# Patient Record
Sex: FEMALE | Race: WHITE | NOT HISPANIC OR LATINO | Employment: OTHER | URBAN - METROPOLITAN AREA
[De-identification: names, ages, dates, MRNs, and addresses within clinical notes are randomized per-mention and may not be internally consistent; named-entity substitution may affect disease eponyms.]

---

## 2017-02-17 ENCOUNTER — HISTORICAL (OUTPATIENT)
Dept: ADMINISTRATIVE | Facility: HOSPITAL | Age: 77
End: 2017-02-17

## 2017-02-17 LAB
ALT (SGPT): 19 IU/L (ref 3–33)
AST SERPL-CCNC: 21 IU/L (ref 10–40)
BUN SERPL-MCNC: 13 MG/DL (ref 8–20)
CALCIUM SERPL-MCNC: 8.7 MG/DL (ref 7.7–10.4)
CHLORIDE: 99 MMOL/L (ref 98–110)
CO2 SERPL-SCNC: 29.4 MMOL/L (ref 22.8–31.6)
CREATININE: 0.55 MG/DL (ref 0.6–1.4)
GLUCOSE: 103 MG/DL (ref 70–99)
POTASSIUM SERPL-SCNC: 4 MMOL/L (ref 3.5–5)
SODIUM: 133 MMOL/L (ref 134–144)

## 2017-06-15 ENCOUNTER — TELEPHONE (OUTPATIENT)
Dept: FAMILY MEDICINE | Facility: CLINIC | Age: 77
End: 2017-06-15

## 2017-06-15 RX ORDER — NITROGLYCERIN 0.4 MG/1
1 TABLET SUBLINGUAL DAILY PRN
COMMUNITY
End: 2018-03-20

## 2017-06-15 RX ORDER — ELECTROLYTES/DEXTROSE
1 SOLUTION, ORAL ORAL DAILY
COMMUNITY
End: 2018-11-15

## 2017-06-15 RX ORDER — DIPHENHYDRAMINE HCL 25 MG
1 CAPSULE ORAL DAILY
COMMUNITY
End: 2018-11-15

## 2017-06-15 RX ORDER — PANTOPRAZOLE SODIUM 40 MG/1
1 TABLET, DELAYED RELEASE ORAL DAILY
COMMUNITY
Start: 2017-02-09 | End: 2018-11-15 | Stop reason: SDUPTHER

## 2017-06-15 RX ORDER — MULTIVITAMIN WITH IRON
1 TABLET ORAL DAILY
COMMUNITY
End: 2021-11-10

## 2017-06-21 ENCOUNTER — OFFICE VISIT (OUTPATIENT)
Dept: FAMILY MEDICINE | Facility: CLINIC | Age: 77
End: 2017-06-21
Payer: MEDICARE

## 2017-06-21 VITALS
BODY MASS INDEX: 39.7 KG/M2 | HEART RATE: 67 BPM | HEIGHT: 66 IN | DIASTOLIC BLOOD PRESSURE: 79 MMHG | SYSTOLIC BLOOD PRESSURE: 133 MMHG | WEIGHT: 247 LBS

## 2017-06-21 DIAGNOSIS — E78.2 MULTIPLE-TYPE HYPERLIPIDEMIA: ICD-10-CM

## 2017-06-21 DIAGNOSIS — I10 BENIGN ESSENTIAL HYPERTENSION: Primary | ICD-10-CM

## 2017-06-21 DIAGNOSIS — K21.9 GASTROESOPHAGEAL REFLUX DISEASE WITHOUT ESOPHAGITIS: ICD-10-CM

## 2017-06-21 PROBLEM — G43.009 MIGRAINE WITHOUT AURA AND RESPONSIVE TO TREATMENT: Status: ACTIVE | Noted: 2017-06-21

## 2017-06-21 PROBLEM — E66.9 ADIPOSITY: Status: ACTIVE | Noted: 2017-06-21

## 2017-06-21 PROBLEM — G47.9 SLEEP DISORDER: Status: ACTIVE | Noted: 2017-06-21

## 2017-06-21 PROBLEM — R70.0 ELEVATED ERYTHROCYTE SEDIMENTATION RATE: Status: ACTIVE | Noted: 2017-06-21

## 2017-06-21 PROBLEM — F34.1 DYSTHYMIA: Status: ACTIVE | Noted: 2017-06-21

## 2017-06-21 PROBLEM — J30.1 HAY FEVER: Status: ACTIVE | Noted: 2017-06-21

## 2017-06-21 PROBLEM — G44.209 HEADACHE, TENSION-TYPE: Status: ACTIVE | Noted: 2017-06-21

## 2017-06-21 PROBLEM — N39.46 MIXED STRESS AND URGE URINARY INCONTINENCE: Status: ACTIVE | Noted: 2017-06-21

## 2017-06-21 PROBLEM — E66.9 OBESITY WITH BODY MASS INDEX 30 OR GREATER: Status: ACTIVE | Noted: 2017-06-21

## 2017-06-21 PROBLEM — E55.9 VITAMIN D DEFICIENCY: Status: ACTIVE | Noted: 2017-06-21

## 2017-06-21 PROBLEM — Z78.9 NON-SMOKER: Status: ACTIVE | Noted: 2017-06-21

## 2017-06-21 PROCEDURE — 99214 OFFICE O/P EST MOD 30 MIN: CPT | Mod: ,,, | Performed by: INTERNAL MEDICINE

## 2017-06-21 PROCEDURE — 1125F AMNT PAIN NOTED PAIN PRSNT: CPT | Mod: ,,, | Performed by: INTERNAL MEDICINE

## 2017-06-21 PROCEDURE — 1159F MED LIST DOCD IN RCRD: CPT | Mod: ,,, | Performed by: INTERNAL MEDICINE

## 2017-06-21 NOTE — PROGRESS NOTES
Subjective:       Patient ID: Lety Herbert is a 76 y.o. female.    Chief Complaint: Hypertension and Hyperlipidemia    HPI    Past Medical History:   Diagnosis Date    Anemia     Cancer     breast    Depression     DVT (deep venous thrombosis)     Gastric ulceration     GERD (gastroesophageal reflux disease)     History of frequent urinary tract infections     Hyperlipidemia     Hypertension     MRSA (methicillin resistant staph aureus) culture positive     Neuropathy     PE (pulmonary embolism)     Reflux      Social History     Social History    Marital status:      Spouse name: N/A    Number of children: N/A    Years of education: N/A     Occupational History    Not on file.     Social History Main Topics    Smoking status: Former Smoker    Smokeless tobacco: Not on file    Alcohol use No    Drug use: No    Sexual activity: No     Other Topics Concern    Not on file     Social History Narrative    No narrative on file     Past Surgical History:   Procedure Laterality Date    HYSTERECTOMY      MASTECTOMY, RADICAL      bilateral     Family History   Problem Relation Age of Onset    Cancer Mother     Cancer Father     Heart disease Father        Review of Systems   Constitutional: Positive for fatigue. Negative for activity change, chills, fever and unexpected weight change.   HENT: Negative for congestion, postnasal drip and sinus pressure.    Eyes: Negative for pain, discharge and visual disturbance.   Respiratory: Negative for cough, chest tightness and shortness of breath.    Cardiovascular: Negative for chest pain, palpitations and leg swelling.   Gastrointestinal: Negative for abdominal distention, anal bleeding, constipation and diarrhea.   Genitourinary: Negative for difficulty urinating, dysuria, flank pain, frequency, menstrual problem, pelvic pain, vaginal bleeding and vaginal pain.   Musculoskeletal: Negative for arthralgias and joint swelling.   Skin: Negative  "for color change, pallor and rash.   Allergic/Immunologic: Negative for environmental allergies, food allergies and immunocompromised state.   Neurological: Negative for dizziness, tremors, seizures, syncope, light-headedness and headaches.   Hematological: Negative for adenopathy. Does not bruise/bleed easily.   Psychiatric/Behavioral: Positive for sleep disturbance. Negative for agitation, confusion and dysphoric mood. The patient is not nervous/anxious.         Patient has a history of depression.       Objective:       Vitals:    06/21/17 0859   BP: 133/79   Pulse: 67   Weight: 112 kg (247 lb)   Height: 5' 6" (1.676 m)     Physical Exam   Constitutional: She is oriented to person, place, and time. Vital signs are normal. She appears well-developed. She is cooperative. No distress.   HENT:   Head: Normocephalic and atraumatic.   Right Ear: Tympanic membrane normal.   Left Ear: Tympanic membrane normal.   Eyes: Conjunctivae, EOM and lids are normal. Pupils are equal, round, and reactive to light. Lids are everted and swept, no foreign bodies found. Right pupil is round and reactive. Left pupil is round and reactive.   Neck: Trachea normal and normal range of motion. Neck supple.   Cardiovascular: Normal rate, regular rhythm, S1 normal, S2 normal, normal heart sounds and intact distal pulses.    Pulmonary/Chest: Breath sounds normal.   Abdominal: Soft. Bowel sounds are normal. There is no rigidity and no guarding.   Patient is obese.   Musculoskeletal: Normal range of motion.   Lymphadenopathy:     She has no cervical adenopathy.     She has no axillary adenopathy.   Neurological: She is alert and oriented to person, place, and time.   Skin: Skin is warm and dry. Capillary refill takes less than 2 seconds.        Varicose veins are noted in the inferior extremities.   Psychiatric: Her behavior is normal. Her affect is not inappropriate. She exhibits a depressed mood.   Nursing note and vitals reviewed.    "   Assessment:       1. Benign essential hypertension    2. Gastroesophageal reflux disease without esophagitis    3. Multiple-type hyperlipidemia         Plan:           Benign essential hypertension  -     Comprehensive metabolic panel; Future; Expected date: 06/21/2017  -     Microalbumin/creatinine urine ratio; Future; Expected date: 06/21/2017    Gastroesophageal reflux disease without esophagitis    Multiple-type hyperlipidemia  -     Lipid panel; Future; Expected date: 06/21/2017    Medications have been reviewed. I will encouraged patient exercise and watching her diet. She is interested in joining [employer] program or [employer] program. She'll keep her regular follow-up with Dr. Sargent. She will be due for immunizations with pneumonia vaccine next visit.

## 2017-06-21 NOTE — PATIENT INSTRUCTIONS
Medicines for Acid Reflux  Your healthcare provider has told you that you have acid reflux. This condition causes stomach acid to wash up into your throat. For most people, acid reflux is troubling but not dangerous. But left untreated, acid reflux sometimes damages the esophagus. Medicines can help control acid reflux and limit your risk of future problems.  Medicines for acid reflux  Your healthcare provider may prescribe medicine to help treat your acid reflux. Medicine will be based on your symptoms and any test results. Your provider will explain how to take your medicine. You will also be told about possible side effects.  Reducing stomach acid  Your provider may suggest antacids that you can buy over the counter. Antacids can give fast relief. Or you may be told to take a type of medicine called H2 blockers. These are available over the counter and by prescription (for higher doses).  Blocking stomach acid  In more severe cases, your healthcare provider may suggest stronger medicines such as proton pump inhibitors (PPIs). These keep the stomach from making acid. They are often prescribed for long-term use.  Other medicines  In some cases medicines to reduce or block stomach acid may not work. Then you may be switched to another type of medicine that helps your stomach empty better.     Date Last Reviewed: 10/1/2016  © 5736-3198 [employer] [address] [place] [address], [place] [address]. All rights reserved. This information is not intended as a substitute for professional medical care. Always follow your healthcare professional's instructions.

## 2017-07-11 RX ORDER — POLYETHYLENE GLYCOL 3350 17 G/17G
17 POWDER, FOR SOLUTION ORAL DAILY
Qty: 90 PACKET | Refills: 3 | Status: SHIPPED | OUTPATIENT
Start: 2017-07-11 | End: 2022-12-01

## 2017-11-13 ENCOUNTER — HISTORICAL (OUTPATIENT)
Dept: ADMINISTRATIVE | Facility: HOSPITAL | Age: 77
End: 2017-11-13

## 2017-11-13 LAB
HCT VFR BLD AUTO: 37.6 % (ref 36–48)
HGB BLD-MCNC: 12.4 G/DL (ref 12–15)

## 2017-11-14 LAB
ALBUMIN SERPL-MCNC: 3 G/DL (ref 3.1–4.7)
ALP SERPL-CCNC: 41 IU/L (ref 40–104)
ALT (SGPT): 19 IU/L (ref 3–33)
AST SERPL-CCNC: 45 IU/L (ref 10–40)
BASOPHILS NFR BLD: 0.1 K/UL (ref 0–0.2)
BASOPHILS NFR BLD: 0.2 %
BILIRUB SERPL-MCNC: 0.8 MG/DL (ref 0.3–1)
BUN SERPL-MCNC: 8 MG/DL (ref 8–20)
CALCIUM SERPL-MCNC: 8.1 MG/DL (ref 7.7–10.4)
CHLORIDE: 98 MMOL/L (ref 98–110)
CO2 SERPL-SCNC: 27.5 MMOL/L (ref 22.8–31.6)
CREATININE: 0.5 MG/DL (ref 0.6–1.4)
EOSINOPHIL NFR BLD: 0 K/UL (ref 0–0.7)
EOSINOPHIL NFR BLD: 0.1 %
ERYTHROCYTE [DISTWIDTH] IN BLOOD BY AUTOMATED COUNT: 13.2 % (ref 11.7–14.9)
GLUCOSE: 145 MG/DL (ref 70–99)
GRAN #: 17.2 K/UL (ref 1.4–6.5)
GRAN%: 83.8 %
HCT VFR BLD AUTO: 32.4 % (ref 36–48)
HGB BLD-MCNC: 11 G/DL (ref 12–15)
IMMATURE GRANS (ABS): 0.2 K/UL (ref 0–1)
IMMATURE GRANULOCYTES: 0.8 %
INR PPP: 1.2
LYMPH #: 1.9 K/UL (ref 1.2–3.4)
LYMPH%: 9.4 %
MAGNESIUM SERPL-MCNC: 1.7 MG/DL (ref 1.5–2.6)
MCH RBC QN AUTO: 29.8 PG (ref 25–35)
MCHC RBC AUTO-ENTMCNC: 34 G/DL (ref 31–36)
MCV RBC AUTO: 87.8 FL (ref 79–98)
MONO #: 1.2 K/UL (ref 0.1–0.6)
MONO%: 5.7 %
NUCLEATED RBCS: 0 %
PHOSPHATE FLD-MCNC: 3.2 MG/DL (ref 2.5–4.9)
PLATELET # BLD AUTO: 181 K/UL (ref 140–440)
PMV BLD AUTO: 10.6 FL (ref 8.8–12.7)
POTASSIUM SERPL-SCNC: 3.9 MMOL/L (ref 3.5–5)
PROT SERPL-MCNC: 5.6 G/DL (ref 6–8.2)
PROTHROMBIN TIME: 14.7 SEC (ref 11.3–15.2)
RBC # BLD AUTO: 3.69 M/UL (ref 3.5–5.5)
SODIUM: 132 MMOL/L (ref 134–144)
WBC # BLD AUTO: 20.5 K/UL (ref 5–10)

## 2017-11-15 LAB
ALBUMIN SERPL-MCNC: 2.9 G/DL (ref 3.1–4.7)
ALP SERPL-CCNC: 40 IU/L (ref 40–104)
ALT (SGPT): 18 IU/L (ref 3–33)
AST SERPL-CCNC: 45 IU/L (ref 10–40)
BASOPHILS NFR BLD: 0.1 K/UL (ref 0–0.2)
BASOPHILS NFR BLD: 0.3 %
BILIRUB SERPL-MCNC: 0.6 MG/DL (ref 0.3–1)
BUN SERPL-MCNC: 10 MG/DL (ref 8–20)
CALCIUM SERPL-MCNC: 8 MG/DL (ref 7.7–10.4)
CHLORIDE: 102 MMOL/L (ref 98–110)
CO2 SERPL-SCNC: 28.8 MMOL/L (ref 22.8–31.6)
CREATININE: 0.43 MG/DL (ref 0.6–1.4)
EOSINOPHIL NFR BLD: 0.3 K/UL (ref 0–0.7)
EOSINOPHIL NFR BLD: 1.5 %
ERYTHROCYTE [DISTWIDTH] IN BLOOD BY AUTOMATED COUNT: 13.2 % (ref 11.7–14.9)
GLUCOSE: 117 MG/DL (ref 70–99)
GRAN #: 13.3 K/UL (ref 1.4–6.5)
GRAN%: 78.2 %
HCT VFR BLD AUTO: 31.9 % (ref 36–48)
HGB BLD-MCNC: 10.7 G/DL (ref 12–15)
IMMATURE GRANS (ABS): 0.1 K/UL (ref 0–1)
IMMATURE GRANULOCYTES: 0.5 %
INR PPP: 1.1
LYMPH #: 2.1 K/UL (ref 1.2–3.4)
LYMPH%: 12.3 %
MAGNESIUM SERPL-MCNC: 1.9 MG/DL (ref 1.5–2.6)
MCH RBC QN AUTO: 29.9 PG (ref 25–35)
MCHC RBC AUTO-ENTMCNC: 33.5 G/DL (ref 31–36)
MCV RBC AUTO: 89.1 FL (ref 79–98)
MONO #: 1.2 K/UL (ref 0.1–0.6)
MONO%: 7.2 %
NUCLEATED RBCS: 0 %
PHOSPHATE FLD-MCNC: 2.1 MG/DL (ref 2.5–4.9)
PLATELET # BLD AUTO: 166 K/UL (ref 140–440)
PMV BLD AUTO: 10.5 FL (ref 8.8–12.7)
POTASSIUM SERPL-SCNC: 3.7 MMOL/L (ref 3.5–5)
PROT SERPL-MCNC: 5.6 G/DL (ref 6–8.2)
PROTHROMBIN TIME: 14.3 SEC (ref 11.3–15.2)
RBC # BLD AUTO: 3.58 M/UL (ref 3.5–5.5)
SODIUM: 135 MMOL/L (ref 134–144)
WBC # BLD AUTO: 17 K/UL (ref 5–10)

## 2017-11-18 PROBLEM — S72.001D CLOSED FRACTURE OF NECK OF RIGHT FEMUR WITH ROUTINE HEALING: Status: ACTIVE | Noted: 2017-11-14

## 2017-12-15 ENCOUNTER — OFFICE VISIT (OUTPATIENT)
Dept: ORTHOPEDICS | Facility: CLINIC | Age: 77
End: 2017-12-15
Payer: MEDICARE

## 2017-12-15 VITALS
DIASTOLIC BLOOD PRESSURE: 72 MMHG | SYSTOLIC BLOOD PRESSURE: 138 MMHG | HEIGHT: 66 IN | HEART RATE: 79 BPM | BODY MASS INDEX: 39.7 KG/M2 | WEIGHT: 247 LBS

## 2017-12-15 DIAGNOSIS — S72.002A LEFT DISPLACED FEMORAL NECK FRACTURE: Primary | ICD-10-CM

## 2017-12-15 DIAGNOSIS — Z96.642 STATUS POST TOTAL REPLACEMENT OF LEFT HIP: ICD-10-CM

## 2017-12-15 PROCEDURE — 99024 POSTOP FOLLOW-UP VISIT: CPT | Mod: ,,, | Performed by: ORTHOPAEDIC SURGERY

## 2017-12-15 RX ORDER — HYDROCODONE BITARTRATE AND ACETAMINOPHEN 10; 325 MG/1; MG/1
1 TABLET ORAL 2 TIMES DAILY PRN
COMMUNITY
End: 2018-01-05

## 2017-12-15 NOTE — PROGRESS NOTES
Subjective:       Chief Complaint    Chief Complaint   Patient presents with    Post-op Evaluation     NP p/o 4 weeks & 4 days, left hip.  DOS: 11/13/17, left total hip arthroplasty.  Patient reports she still has some pain but is improving.  Completed home P.T.  Would like an order to attend outpatient P.T. @ [facility] on .       [facility]  Lety Herbert is a 77 y.o.  female who presents Follow-up total hip arthroplasty on the left. A level varies from 4-64/10. He has very bad knees as well. This is slowly her down. But she wants to go to a wellness works on  [address] [facility]      Past History  Past Medical History:   Diagnosis Date    Anemia     Cancer     breast    Depression     DVT (deep venous thrombosis)     Gastric ulceration     GERD (gastroesophageal reflux disease)     History of frequent urinary tract infections     Hyperlipidemia     Hypertension     MRSA (methicillin resistant staph aureus) culture positive     Neuropathy     PE (pulmonary embolism)     Reflux      Past Surgical History:   Procedure Laterality Date    HYSTERECTOMY      MASTECTOMY, RADICAL Bilateral     TOTAL HIP ARTHROPLASTY Left 11/13/2017     Social History     Social History    Marital status:      Spouse name: N/A    Number of children: N/A    Years of education: N/A     Occupational History    Not on file.     Social History Main Topics    Smoking status: Former Smoker    Smokeless tobacco: Never Used    Alcohol use No    Drug use: No    Sexual activity: No     Other Topics Concern    Not on file     Social History Narrative    No narrative on file         Medications  Current Outpatient Prescriptions   Medication Sig    biotin 5 mg Cap Take 1 tablet by mouth once daily at 6am.    diphenhydrAMINE (ZZZQUIL) 25 mg capsule Take 1 tablet by mouth once daily at 6am.    hydrocodone-acetaminophen 10-325mg (NORCO)  mg Tab Take 1 tablet by mouth 2 (two) times daily as needed for  Pain.    metoprolol succinate (TOPROL-XL) 50 MG 24 hr tablet Take 50 mg by mouth once daily.    multivitamin with iron Tab Take 1 tablet by mouth once daily at 6am.    nitroGLYCERIN (NITROSTAT) 0.4 MG SL tablet Place 1 tablet under the tongue daily as needed.    pantoprazole (PROTONIX) 40 MG tablet Take 1 tablet by mouth once daily at 6am.    polyethylene glycol (GLYCOLAX) 17 gram PwPk Take 17 g by mouth once daily.    pravastatin (PRAVACHOL) 20 MG tablet Take 20 mg by mouth once daily.    sertraline (ZOLOFT) 100 MG tablet Take 100 mg by mouth once daily.    valsartan-hydrochlorothiazide (DIOVAN-HCT) 320-25 mg per tablet Take 1 tablet by mouth once daily.     No current facility-administered medications for this visit.        Allergies  Review of patient's allergies indicates:   Allergen Reactions    Meperidine     Promethazine     Bactrim [sulfamethoxazole-trimethoprim] Rash         Review of Systems     Constitutional: Negative    HENT: Negative  Eyes: Negative  Respiratory: Negative  Cardiovascular: Negative  Musculoskeletal: HPI  Skin: Negative  Neurological: Negative  Hematological: Negative  Endocrine: Negative      Physical Exam    Vitals:    12/15/17 1440   BP: 138/72   Pulse: 79     Physical Examination:Incision is well-healed. External rotation 25, internal rotation 30. Abduction, 30°. Flexion 100°.Ambulating with a walker.     Skin-clear  General appearance -  well appearing, and in no distress  Mental status - awake  Neck - supple  Chest -  symmetric air entry  Heart - normal rate   Abdomen - soft      Assessment/Plan   Left displaced femoral neck fracture  -     Ambulatory Referral to Physical/Occupational Therapy    Status post total replacement of left hip  -     Ambulatory Referral to Physical/Occupational Therapy      Will be going to wellness works on  [address] [facility]. Weight loss is critical.      This note was dictated using voice recognition software and may  contain grammatical errors.

## 2018-01-05 ENCOUNTER — OFFICE VISIT (OUTPATIENT)
Dept: ORTHOPEDICS | Facility: CLINIC | Age: 78
End: 2018-01-05
Payer: MEDICARE

## 2018-01-05 VITALS
SYSTOLIC BLOOD PRESSURE: 118 MMHG | WEIGHT: 227 LBS | DIASTOLIC BLOOD PRESSURE: 68 MMHG | BODY MASS INDEX: 36.48 KG/M2 | HEART RATE: 75 BPM | HEIGHT: 66 IN

## 2018-01-05 DIAGNOSIS — Z96.642 STATUS POST TOTAL REPLACEMENT OF LEFT HIP: Primary | ICD-10-CM

## 2018-01-05 PROCEDURE — 99024 POSTOP FOLLOW-UP VISIT: CPT | Mod: ,,, | Performed by: ORTHOPAEDIC SURGERY

## 2018-01-05 NOTE — PROGRESS NOTES
Subjective:       Chief Complaint    Chief Complaint   Patient presents with    Post-op Evaluation     7 weeks & 4 days, left hip.  DOS: 11/13/17, left total hip arthroplasty.  Patient reports sheis doing well.  Only time she really has pain is getting in and out the car.  P.T. 2 times a week @ [place].       [facility]  Lety Herbert is a 77 y.o.  female who presents Postop total hip arthroplasty on the left. 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 appears to be doing very well.  Past History  Past Medical History:   Diagnosis Date    Anemia     Cancer     breast    Depression     DVT (deep venous thrombosis)     Gastric ulceration     GERD (gastroesophageal reflux disease)     History of frequent urinary tract infections     Hyperlipidemia     Hypertension     MRSA (methicillin resistant staph aureus) culture positive     Neuropathy     PE (pulmonary embolism)     Reflux      Past Surgical History:   Procedure Laterality Date    HYSTERECTOMY      MASTECTOMY, RADICAL Bilateral     TOTAL HIP ARTHROPLASTY Left 11/13/2017     Social History     Social History    Marital status:      Spouse name: N/A    Number of children: N/A    Years of education: N/A     Occupational History    Not on file.     Social History Main Topics    Smoking status: Former Smoker    Smokeless tobacco: Never Used    Alcohol use No    Drug use: No    Sexual activity: No     Other Topics Concern    Not on file     Social History Narrative    No narrative on file         Medications  Current Outpatient Prescriptions   Medication Sig    biotin 5 mg Cap Take 1 tablet by mouth once daily at 6am.    diphenhydrAMINE (ZZZQUIL) 25 mg capsule Take 1 tablet by mouth once daily at 6am.    metoprolol succinate (TOPROL-XL) 50 MG 24 hr tablet Take 50 mg by mouth once daily.    multivitamin with iron Tab Take 1 tablet by mouth once daily at 6am.    nitroGLYCERIN (NITROSTAT)  0.4 MG SL tablet Place 1 tablet under the tongue daily as needed.    pantoprazole (PROTONIX) 40 MG tablet Take 1 tablet by mouth once daily at 6am.    polyethylene glycol (GLYCOLAX) 17 gram PwPk Take 17 g by mouth once daily.    pravastatin (PRAVACHOL) 20 MG tablet Take 20 mg by mouth once daily.    sertraline (ZOLOFT) 100 MG tablet Take 100 mg by mouth once daily.    valsartan-hydrochlorothiazide (DIOVAN-HCT) 320-25 mg per tablet Take 1 tablet by mouth once daily.     No current facility-administered medications for this visit.        Allergies  Review of patient's allergies indicates:   Allergen Reactions    Meperidine     Promethazine     Bactrim [sulfamethoxazole-trimethoprim] Rash         Review of Systems     Constitutional: Negative    HENT: Negative  Eyes: Negative  Respiratory: Negative  Cardiovascular: Negative  Musculoskeletal: HPI  Skin: Negative  Neurological: Negative  Hematological: Negative  Endocrine: Negative      Physical Exam    Vitals:    01/05/18 1416   BP: 118/68   Pulse: 75     Physical Examination:Examination reveals abduction 35° flexion past 90°, external rotation 20°, internal rotation 30°. Hip motion is symmetrical bilaterally. Leg lengths look good.     Skin-clear  General appearance -  well appearing, and in no distress  Mental status - awake  Neck - supple  Chest -  symmetric air entry  Heart - normal rate   Abdomen - soft      Assessment/Plan   Status post total replacement of left hip      Okay to drive if she feels safe. Advised to use a cane. She appears a little wobbly.      This note was dictated using voice recognition software and may contain grammatical errors.

## 2018-03-07 ENCOUNTER — OFFICE VISIT (OUTPATIENT)
Dept: ORTHOPEDICS | Facility: CLINIC | Age: 78
End: 2018-03-07
Payer: MEDICARE

## 2018-03-07 VITALS
WEIGHT: 216.38 LBS | HEIGHT: 66 IN | HEART RATE: 68 BPM | DIASTOLIC BLOOD PRESSURE: 84 MMHG | SYSTOLIC BLOOD PRESSURE: 146 MMHG | BODY MASS INDEX: 34.78 KG/M2

## 2018-03-07 DIAGNOSIS — Z96.642 STATUS POST TOTAL REPLACEMENT OF LEFT HIP: Primary | ICD-10-CM

## 2018-03-07 PROCEDURE — 99212 OFFICE O/P EST SF 10 MIN: CPT | Mod: ,,, | Performed by: ORTHOPAEDIC SURGERY

## 2018-03-07 PROCEDURE — 3079F DIAST BP 80-89 MM HG: CPT | Mod: ,,, | Performed by: ORTHOPAEDIC SURGERY

## 2018-03-07 PROCEDURE — 3077F SYST BP >= 140 MM HG: CPT | Mod: ,,, | Performed by: ORTHOPAEDIC SURGERY

## 2018-03-07 NOTE — PROGRESS NOTES
Subjective:       Chief Complaint    Chief Complaint   Patient presents with    Follow-up     Follow up left hip. Patient had a left total hip arthroplasty on 11/13/2017. She states she is doing well and has no issues. She uses a cane to ambulate outside the house. She has completed her PT.  She is not taking any pain medication.        HPI  Lety Herbert is a 77 y.o.  female who presents Follow-up on her total hip arthroplasty, left hip. Doing extremely well. Very happy with it. Her only complaint has been vascular. States she has vascular or varicose veins. She was referred to Dr. Puri.      Past History  Past Medical History:   Diagnosis Date    Anemia     Cancer     breast    Depression     DVT (deep venous thrombosis)     Gastric ulceration     GERD (gastroesophageal reflux disease)     History of frequent urinary tract infections     Hyperlipidemia     Hypertension     MRSA (methicillin resistant staph aureus) culture positive     Neuropathy     PE (pulmonary embolism)     Reflux      Past Surgical History:   Procedure Laterality Date    HYSTERECTOMY      MASTECTOMY, RADICAL Bilateral     TOTAL HIP ARTHROPLASTY Left 11/13/2017     Social History     Social History    Marital status:      Spouse name: N/A    Number of children: N/A    Years of education: N/A     Occupational History    Not on file.     Social History Main Topics    Smoking status: Former Smoker    Smokeless tobacco: Never Used    Alcohol use No    Drug use: No    Sexual activity: No     Other Topics Concern    Not on file     Social History Narrative    No narrative on file         Medications  Current Outpatient Prescriptions   Medication Sig    biotin 5 mg Cap Take 1 tablet by mouth once daily at 6am.    diphenhydrAMINE (ZZZQUIL) 25 mg capsule Take 1 tablet by mouth once daily at 6am.    metoprolol succinate (TOPROL-XL) 50 MG 24 hr tablet Take 50 mg by mouth once daily.    multivitamin with iron  Tab Take 1 tablet by mouth once daily at 6am.    nitroGLYCERIN (NITROSTAT) 0.4 MG SL tablet Place 1 tablet under the tongue daily as needed.    pantoprazole (PROTONIX) 40 MG tablet Take 1 tablet by mouth once daily at 6am.    polyethylene glycol (GLYCOLAX) 17 gram PwPk Take 17 g by mouth once daily.    pravastatin (PRAVACHOL) 20 MG tablet Take 20 mg by mouth once daily.    sertraline (ZOLOFT) 100 MG tablet Take 100 mg by mouth once daily.    valsartan-hydrochlorothiazide (DIOVAN-HCT) 320-25 mg per tablet Take 1 tablet by mouth once daily.     No current facility-administered medications for this visit.        Allergies  Review of patient's allergies indicates:   Allergen Reactions    Meperidine     Promethazine     Bactrim [sulfamethoxazole-trimethoprim] Rash         Review of Systems     Constitutional: Negative    HENT: Negative  Eyes: Negative  Respiratory: Negative  Cardiovascular: Negative  Musculoskeletal: HPI  Skin: Negative  Neurological: Negative  Hematological: Negative  Endocrine: Negative      Physical Exam    Vitals:    03/07/18 0957   BP: (!) 146/84   Pulse: 68     Physical Examination:Left hip is nontender. Incisions well-healed. Abduction 35°, flexion 90+ degrees. External rotation is 30°, internal rotation 30°.     Skin-clear  General appearance -  well appearing, and in no distress  Mental status - awake  Neck - supple  Chest -  symmetric air entry  Heart - normal rate   Abdomen - soft      Assessment/Plan   Status post total replacement of left hip      Continue with weight loss and with her cane.      This note was dictated using voice recognition software and may contain grammatical errors.

## 2018-03-20 ENCOUNTER — OFFICE VISIT (OUTPATIENT)
Dept: FAMILY MEDICINE | Facility: CLINIC | Age: 78
End: 2018-03-20
Payer: MEDICARE

## 2018-03-20 VITALS
HEART RATE: 64 BPM | BODY MASS INDEX: 36 KG/M2 | SYSTOLIC BLOOD PRESSURE: 138 MMHG | HEIGHT: 66 IN | WEIGHT: 224 LBS | DIASTOLIC BLOOD PRESSURE: 76 MMHG

## 2018-03-20 DIAGNOSIS — G44.229 CHRONIC TENSION-TYPE HEADACHE, NOT INTRACTABLE: ICD-10-CM

## 2018-03-20 DIAGNOSIS — S72.002D CLOSED FRACTURE OF LEFT HIP WITH ROUTINE HEALING, SUBSEQUENT ENCOUNTER: ICD-10-CM

## 2018-03-20 DIAGNOSIS — E78.2 MULTIPLE-TYPE HYPERLIPIDEMIA: ICD-10-CM

## 2018-03-20 DIAGNOSIS — I10 BENIGN ESSENTIAL HYPERTENSION: Primary | ICD-10-CM

## 2018-03-20 DIAGNOSIS — F34.1 DYSTHYMIA: ICD-10-CM
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
## 2018-03-20 DIAGNOSIS — Z78.0 ASYMPTOMATIC MENOPAUSE: ICD-10-CM
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
## 2018-03-20 PROCEDURE — 3075F SYST BP GE 130 - 139MM HG: CPT | Mod: ,,, | Performed by: INTERNAL MEDICINE

## 2018-03-20 PROCEDURE — 3078F DIAST BP <80 MM HG: CPT | Mod: ,,, | Performed by: INTERNAL MEDICINE

## 2018-03-20 PROCEDURE — 99214 OFFICE O/P EST MOD 30 MIN: CPT | Mod: ,,, | Performed by: INTERNAL MEDICINE

## 2018-03-20 RX ORDER — VALSARTAN AND HYDROCHLOROTHIAZIDE 160; 12.5 MG/1; MG/1
1 TABLET, FILM COATED ORAL DAILY
Qty: 90 TABLET | Refills: 3 | Status: SHIPPED | OUTPATIENT
Start: 2018-03-20 | End: 2019-03-20

## 2018-03-20 NOTE — LETTER
March 20, 2018        Jeremy Sargent MD  [address] [place] [address]             [facility]  [address]  [place] [address]  Phone: [phone]  Fax: [phone]   Patient: Lety Herbert   MR Number: [id number]   YOB: 1940   Date of Visit: 3/20/2018     Dear Dr. Sargent,     I had the opportunity to follow-up on Ms. Lety Herbert whom as you recall had recently fallen down and sustained a fracture in the left hip. She attributes this to feeling dizzy and not necessarily to any orthopedic issue.    She is stable with her chronic medical issues and I will check a bone density on her to rule out the possibility of osteoporosis which I believe she may have and I will treat her accordingly.      I will keep you updated on the labs that I will pursue.      Sincerely,      Shilo Perez MD            CC  No Recipients    Enclosure

## 2018-03-20 NOTE — PROGRESS NOTES
Subjective:       Patient ID: Lety Herbert is a 77 y.o. female.    Chief Complaint: Hypertension; Anxiety; Hyperlipidemia; Gastroesophageal Reflux; Fall; and Hip Injury    Ms. Davidson is a pleasant 77-year-old  female Who comes for follow-up.  Unfortunately, in the month of November she tripped and fell down. She had a fracture of the left hip. She had surgery for the same followed by skilled nursing facility and outpatient rehabilitation. At that time she was told to stop the valsartan hydrochlorothiazide possibly because of low blood pressure.    She recently saw Dr. Sargent who advised her to resume valsartan hydrochlorothiazide at half the dosage will cause her blood pressure was slightly elevated. Please note that she continues on metoprolol extended release.    She also has underlying hyperlipidemia for which she takes pravastatin. She has a stable reflux.    Chronic headaches still continue to bother her. These are not the migraines. She does have sleep apnea but could not tolerate the mask. She is looking for something which might be used as a replacement for mask.    She is able to walk safely at this point. She does use a cane .    She has not had a bone density checkup which will be ordered today.    Patient's fall apparently is related to feeling dizzy or lightheaded. She claims that she did not have loss of balance or tripping. As to the cause for dizziness and lightheadedness, it is still unclear. She did also followed by Dr. Sargent who did not find any new cardiological issues. And I will also check      Hypertension   This is a chronic problem. The current episode started more than 1 year ago. The problem has been gradually improving since onset. The problem is controlled. Associated symptoms include anxiety, headaches, malaise/fatigue and neck pain. Pertinent negatives include no chest pain, palpitations or shortness of breath. Risk factors for coronary artery disease include sedentary  lifestyle, obesity and dyslipidemia. Past treatments include diuretics, angiotensin blockers and beta blockers. The current treatment provides moderate improvement. Compliance problems include medication side effects and psychosocial issues.  There is no history of renovascular disease. There is no history of hyperaldosteronism or hypercortisolism.   Anxiety   Presents for follow-up visit. Symptoms include insomnia and malaise. Patient reports no chest pain, confusion, dizziness, nervous/anxious behavior, palpitations, panic or shortness of breath.       Hyperlipidemia   This is a chronic problem. The current episode started more than 1 year ago. The problem is controlled. Exacerbating diseases include obesity. She has no history of hypothyroidism. Pertinent negatives include no chest pain or shortness of breath. Risk factors for coronary artery disease include hypertension, dyslipidemia, a sedentary lifestyle and post-menopausal.   Gastroesophageal Reflux   She complains of heartburn. She reports no chest pain or no coughing. This is a chronic problem. The current episode started more than 1 year ago. Associated symptoms include fatigue.   Fall   The accident occurred more than 1 week ago. The fall occurred in unknown circumstances. She landed on hard floor. The point of impact was the left hip. Associated symptoms include headaches. Pertinent negatives include no fever. The treatment provided moderate relief.       Past Medical History:   Diagnosis Date    Anemia     Cancer     breast    Depression     DVT (deep venous thrombosis)     Gastric ulceration     GERD (gastroesophageal reflux disease)     History of frequent urinary tract infections     Hyperlipidemia     Hypertension     MRSA (methicillin resistant staph aureus) culture positive     Neuropathy     PE (pulmonary embolism)     Reflux      Social History     Social History    Marital status:      Spouse name: N/A    Number of  children: N/A    Years of education: N/A     Occupational History    Not on file.     Social History Main Topics    Smoking status: Former Smoker    Smokeless tobacco: Never Used    Alcohol use No    Drug use: No    Sexual activity: No     Other Topics Concern    Not on file     Social History Narrative    No narrative on file     Past Surgical History:   Procedure Laterality Date    HYSTERECTOMY      MASTECTOMY, RADICAL Bilateral     TOTAL HIP ARTHROPLASTY Left 11/13/2017     Family History   Problem Relation Age of Onset    Cancer Mother     Cancer Father     Heart disease Father        Review of Systems   Constitutional: Positive for fatigue and malaise/fatigue. Negative for activity change, chills, fever and unexpected weight change.   HENT: Negative for congestion, postnasal drip and sinus pressure.    Eyes: Negative for pain, discharge and visual disturbance.   Respiratory: Negative for cough, chest tightness and shortness of breath.    Cardiovascular: Negative for chest pain, palpitations and leg swelling.        HTN   Gastrointestinal: Positive for heartburn. Negative for abdominal distention, anal bleeding, constipation and diarrhea.   Genitourinary: Negative for difficulty urinating, dysuria, flank pain and vaginal bleeding.   Musculoskeletal: Positive for neck pain. Negative for arthralgias and joint swelling.        Left hip fracture S/P Arthroplasty NoV 2017   Skin: Negative for color change, pallor and rash.   Allergic/Immunologic: Negative for environmental allergies, food allergies and immunocompromised state.   Neurological: Positive for headaches. Negative for dizziness, tremors, seizures, syncope and light-headedness.   Hematological: Negative for adenopathy. Does not bruise/bleed easily.   Psychiatric/Behavioral: Positive for sleep disturbance. Negative for agitation, confusion and dysphoric mood. The patient has insomnia. The patient is not nervous/anxious.         Patient has a  "history of depression.       Objective:       Vitals:    03/20/18 0814   BP: 138/76   Pulse: 64   Weight: 101.6 kg (224 lb)   Height: 5' 6" (1.676 m)     Physical Exam   Constitutional: Vital signs are normal. She appears well-developed. She is cooperative. No distress.   HENT:   Head: Normocephalic and atraumatic.   Eyes: Conjunctivae, EOM and lids are normal. Pupils are equal, round, and reactive to light. Lids are everted and swept, no foreign bodies found. Right pupil is round and reactive. Left pupil is round and reactive.   Neck: Trachea normal and normal range of motion. Neck supple.   Cardiovascular: Normal rate, regular rhythm, S1 normal, S2 normal and normal heart sounds.    Pulmonary/Chest: Breath sounds normal.   Abdominal: Soft. Bowel sounds are normal. There is no rigidity and no guarding.   Patient is obese.   Lymphadenopathy:     She has no cervical adenopathy.   Neurological: She is alert.   Skin: Skin is warm and dry.        Varicose veins are noted in the inferior extremities.   Psychiatric: Her affect is not inappropriate. She exhibits a depressed mood.   Anhedonic She is attentive.   Nursing note and vitals reviewed.      Assessment:       1. Benign essential hypertension    2. Chronic tension-type headache, not intractable    3. Multiple-type hyperlipidemia    4. Fall, subsequent encounter    5. Closed fracture of left hip with routine healing, subsequent encounter    6. Asymptomatic menopause    7. Dysthymia         Plan:           Benign essential hypertension  -     valsartan-hydrochlorothiazide (DIOVAN-HCT) 160-12.5 mg per tablet; Take 1 tablet by mouth once daily.  Dispense: 90 tablet; Refill: 3  -     Microalbumin/creatinine urine ratio; Future; Expected date: 03/20/2018  -     Comprehensive metabolic panel; Future; Expected date: 03/20/2018    Chronic tension-type headache, not intractable    Multiple-type hyperlipidemia  -     Lipid panel; Future; Expected date: 03/20/2018    Fall, " subsequent encounter    Closed fracture of left hip with routine healing, subsequent encounter    Asymptomatic menopause  -     DXA Bone Density Spine And Hip    Dysthymia    patient's blood pressures are stable and I will reduce valsartan hydrochlorothiazide to 160/12.5. New prescription sent to mail order.    There is no change usually in the headaches which we will continue to address subsequently.    She has gone through physical therapy for fracture of left hip and is able to ambulate safely. Fall precautions will be discussed and then need to use cane when and as needed is important.    Perhaps she should continue the same physical therapy and exercises at home also.    She is already updated on influenza vaccination in 2017.      I will order a bone density for patient to see if she has osteoporosis which was incidentally noted during the surgery by Dr. Pyle.    If she does have osteoporosis then we will  her accordingly.      I will see her back in approximately 2-3 months to see how she is doing Guy will check on her in between.    Patient also incidentally takes vitamin D3 50,000 units once a week.

## 2018-03-23 ENCOUNTER — TELEPHONE (OUTPATIENT)
Dept: FAMILY MEDICINE | Facility: CLINIC | Age: 78
End: 2018-03-23

## 2018-03-23 NOTE — TELEPHONE ENCOUNTER
----- Message from Shilo Perez MD sent at 3/23/2018 11:28 AM CDT -----  Bone density N. No additional treatment

## 2018-04-03 ENCOUNTER — TELEPHONE (OUTPATIENT)
Dept: FAMILY MEDICINE | Facility: CLINIC | Age: 78
End: 2018-04-03

## 2018-05-14 LAB
ALBUMIN SERPL-MCNC: 4 G/DL (ref 3.1–4.7)
ALP SERPL-CCNC: 69 IU/L (ref 40–104)
ALT (SGPT): 14 IU/L (ref 3–33)
AST SERPL-CCNC: 21 IU/L (ref 10–40)
BILIRUB SERPL-MCNC: 0.7 MG/DL (ref 0.3–1)
BUN SERPL-MCNC: 9 MG/DL (ref 8–20)
CALCIUM SERPL-MCNC: 9.1 MG/DL (ref 7.7–10.4)
CHLORIDE: 102 MMOL/L (ref 98–110)
CO2 SERPL-SCNC: 29.1 MMOL/L (ref 22.8–31.6)
CREATININE RANDOM URINE: 79 MG/DL
CREATININE: 0.45 MG/DL (ref 0.6–1.4)
GLUCOSE: 95 MG/DL (ref 70–99)
MICROALBUM.,U,RANDOM: 13.6 MCG/ML (ref 0–19.9)
MICROALBUMIN/CREATININE RATIO: 17 (ref 0–30)
POTASSIUM SERPL-SCNC: 4.2 MMOL/L (ref 3.5–5)
PROT SERPL-MCNC: 7.6 G/DL (ref 6–8.2)
SODIUM: 138 MMOL/L (ref 134–144)

## 2018-05-21 ENCOUNTER — OFFICE VISIT (OUTPATIENT)
Dept: FAMILY MEDICINE | Facility: CLINIC | Age: 78
End: 2018-05-21
Payer: MEDICARE

## 2018-05-21 VITALS
SYSTOLIC BLOOD PRESSURE: 133 MMHG | DIASTOLIC BLOOD PRESSURE: 77 MMHG | WEIGHT: 222 LBS | HEIGHT: 66 IN | HEART RATE: 64 BPM | BODY MASS INDEX: 35.68 KG/M2

## 2018-05-21 DIAGNOSIS — F34.1 DYSTHYMIA: ICD-10-CM

## 2018-05-21 DIAGNOSIS — J31.0 NON-ALLERGIC RHINITIS: ICD-10-CM

## 2018-05-21 DIAGNOSIS — I10 BENIGN ESSENTIAL HYPERTENSION: Primary | ICD-10-CM

## 2018-05-21 DIAGNOSIS — E78.2 MULTIPLE-TYPE HYPERLIPIDEMIA: ICD-10-CM

## 2018-05-21 DIAGNOSIS — N39.46 MIXED STRESS AND URGE URINARY INCONTINENCE: ICD-10-CM

## 2018-05-21 DIAGNOSIS — R39.15 URINARY URGENCY: ICD-10-CM

## 2018-05-21 PROCEDURE — 3075F SYST BP GE 130 - 139MM HG: CPT | Mod: ,,, | Performed by: INTERNAL MEDICINE

## 2018-05-21 PROCEDURE — 99214 OFFICE O/P EST MOD 30 MIN: CPT | Mod: ,,, | Performed by: INTERNAL MEDICINE

## 2018-05-21 PROCEDURE — 3078F DIAST BP <80 MM HG: CPT | Mod: ,,, | Performed by: INTERNAL MEDICINE

## 2018-05-21 RX ORDER — AZELASTINE 1 MG/ML
1 SPRAY, METERED NASAL 2 TIMES DAILY
Qty: 30 ML | Refills: 1 | Status: SHIPPED | OUTPATIENT
Start: 2018-05-21 | End: 2018-11-15

## 2018-05-21 RX ORDER — SERTRALINE HYDROCHLORIDE 100 MG/1
100 TABLET, FILM COATED ORAL DAILY
Qty: 6 TABLET | Refills: 2 | Status: SHIPPED | OUTPATIENT
Start: 2018-05-21 | End: 2018-11-15 | Stop reason: SDUPTHER

## 2018-05-21 NOTE — PATIENT INSTRUCTIONS
Established High Blood Pressure    High blood pressure (hypertension) is a chronic disease. Often, healthcare providers dont know what causes it. But it can be caused by certain health conditions and medicines.  If you have high blood pressure, you may not have any symptoms. If you do have symptoms, they may include headache, dizziness, changes in your vision, chest pain, and shortness of breath. But even without symptoms, high blood pressure thats not treated raises your risk for heart attack and stroke. High blood pressure is a serious health risk and shouldnt be ignored.  A blood pressure reading is made up of two numbers: a higher number over a lower number. The top number is the systolic pressure. The bottom number is the diastolic pressure. A normal blood pressure is a systolic pressure of  less than 120 over a diastolic pressure of less than 80. You will see your blood pressure readings written together. For example, a person with a systolic pressure of 188 and a diastolic pressure of 78 will have 118/78 written in the medical record.  High blood pressure is when either the top number is 140 or higher, or the bottom number is 90 or higher. This must be the result when taking your blood pressure a number of times. The blood pressures between normal and high are called prehypertension.  Home care  If you have high blood pressure, you should do what is listed below to lower your blood pressure. If you are taking medicines for high blood pressure, these methods may reduce or end your need for medicines in the future.  · Begin a weight-loss program if you are overweight.  · Cut back on how much salt you get in your diet. Heres how to do this:  ¨ Dont eat foods that have a lot of salt. These include olives, pickles, smoked meats, and salted potato chips.  ¨ Dont add salt to your food at the table.  ¨ Use only small amounts of salt when cooking.  · Start an exercise program. Talk with your healthcare  provider about the type of exercise program that would be best for you. It doesn't have to be hard. Even brisk walking for 20 minutes 3 times a week is a good form of exercise.  · Dont take medicines that stimulate the heart. This includes many over-the-counter cold and sinus decongestant pills and sprays, as well as diet pills. Check the warnings about hypertension on the label. Before buying any over-the-counter medicines or supplements, always ask the pharmacist about the product's potential interaction with your high blood pressure and your high blood pressure medicines.  · Stimulants such as amphetamine or cocaine could be deadly for someone with high blood pressure. Never take these.  · Limit how much caffeine you get in your diet. Switch to caffeine-free products.  · Stop smoking. If you are a long-time smoker, this can be hard. Talk to your healthcare provider about medicines and nicotine replacement options to help you. Also, enroll in a stop-smoking program to make it more likely that you will quit for good.  · Learn how to handle stress. This is an important part of any program to lower blood pressure. Learn about relaxation methods like meditation, yoga, or biofeedback.  · If your provider prescribed medicines, take them exactly as directed. Missing doses may cause your blood pressure get out of control.  · If you miss a dose or doses, check with your healthcare provider or pharmacist about what to do.  · Consider buying an automatic blood pressure machine. Ask your provider for a recommendation. You can get one of these at most pharmacies.     The American [employer] Association recommends the following guidelines for home blood pressure monitoring:  · Don't smoke or drink coffee for 30 minutes before taking your blood pressure.  · Go to the bathroom before the test.  · Relax for 5 minutes before taking the measurement.  · Sit with your back supported (don't sit on a couch or soft chair); keep your feet on  the floor uncrossed. Place your arm on a solid flat surface (like a table) with the upper part of the arm at heart level. Place the middle of the cuff directly above the eye of the elbow. Check the monitor's instruction manual for an illustration.  · Take multiple readings. When you measure, take 2 to 3 readings one minute apart and record all of the results.  · Take your blood pressure at the same time every day, or as your healthcare provider recommends.  · Record the date, time, and blood pressure reading.  · Take the record with you to your next medical appointment. If your blood pressure monitor has a built-in memory, simply take the monitor with you to your next appointment.  · Call your provider if you have several high readings. Don't be frightened by a single high blood pressure reading, but if you get several high readings, check in with your healthcare provider.  · Note: When blood pressure reaches a systolic (top number) of 180 or higher OR diastolic (bottom number) of 110 or higher, seek emergency medical treatment.  Follow-up care  You will need to see your healthcare provider regularly. This is to check your blood pressure and to make changes to your medicines. Make a follow-up appointment as directed. Bring the record of your home blood pressure readings to the appointment.  When to seek medical advice  Call your healthcare provider right away if any of these occur:  · Blood pressure reaches a systolic (upper number) of 180 or higher OR a diastolic (bottom number) of 110 or higher  · Chest pain or shortness of breath  · Severe headache  · Throbbing or rushing sound in the ears  · Nosebleed  · Sudden severe pain in your belly (abdomen)  · Extreme drowsiness, confusion, or fainting  · Dizziness or spinning sensation (vertigo)  · Weakness of an arm or leg or one side of the face  · You have problems speaking or seeing   Date Last Reviewed: 12/1/2016  © 9855-9001 [employer]. [address] [place] [address]  [place] [address]. All rights reserved. This information is not intended as a substitute for professional medical care. Always follow your healthcare professional's instructions.

## 2018-11-15 ENCOUNTER — OFFICE VISIT (OUTPATIENT)
Dept: FAMILY MEDICINE | Facility: CLINIC | Age: 78
End: 2018-11-15
Payer: MEDICARE

## 2018-11-15 VITALS
HEIGHT: 66 IN | DIASTOLIC BLOOD PRESSURE: 86 MMHG | WEIGHT: 235 LBS | BODY MASS INDEX: 37.77 KG/M2 | SYSTOLIC BLOOD PRESSURE: 135 MMHG | HEART RATE: 62 BPM

## 2018-11-15 DIAGNOSIS — E78.2 MULTIPLE-TYPE HYPERLIPIDEMIA: ICD-10-CM

## 2018-11-15 DIAGNOSIS — I10 BENIGN ESSENTIAL HYPERTENSION: Primary | ICD-10-CM

## 2018-11-15 DIAGNOSIS — J31.0 NON-ALLERGIC RHINITIS: ICD-10-CM

## 2018-11-15 DIAGNOSIS — F34.1 DYSTHYMIA: ICD-10-CM

## 2018-11-15 DIAGNOSIS — R53.83 OTHER FATIGUE: ICD-10-CM

## 2018-11-15 DIAGNOSIS — R09.82 POST-NASAL DRIP: ICD-10-CM

## 2018-11-15 DIAGNOSIS — K21.9 GASTROESOPHAGEAL REFLUX DISEASE WITHOUT ESOPHAGITIS: ICD-10-CM

## 2018-11-15 PROCEDURE — 1101F PT FALLS ASSESS-DOCD LE1/YR: CPT | Mod: ,,, | Performed by: INTERNAL MEDICINE

## 2018-11-15 PROCEDURE — 99214 OFFICE O/P EST MOD 30 MIN: CPT | Mod: ,,, | Performed by: INTERNAL MEDICINE

## 2018-11-15 PROCEDURE — 3079F DIAST BP 80-89 MM HG: CPT | Mod: ,,, | Performed by: INTERNAL MEDICINE

## 2018-11-15 PROCEDURE — 3075F SYST BP GE 130 - 139MM HG: CPT | Mod: ,,, | Performed by: INTERNAL MEDICINE

## 2018-11-15 RX ORDER — SERTRALINE HYDROCHLORIDE 100 MG/1
100 TABLET, FILM COATED ORAL DAILY
Qty: 6 TABLET | Refills: 2 | Status: SHIPPED | OUTPATIENT
Start: 2018-11-15 | End: 2018-11-15 | Stop reason: SDUPTHER

## 2018-11-15 RX ORDER — SERTRALINE HYDROCHLORIDE 100 MG/1
100 TABLET, FILM COATED ORAL DAILY
Qty: 90 TABLET | Refills: 3 | Status: SHIPPED | OUTPATIENT
Start: 2018-11-15 | End: 2019-02-25 | Stop reason: SDUPTHER

## 2018-11-15 RX ORDER — METOPROLOL SUCCINATE 50 MG/1
50 TABLET, EXTENDED RELEASE ORAL DAILY
Qty: 90 TABLET | Refills: 3 | Status: SHIPPED | OUTPATIENT
Start: 2018-11-15 | End: 2020-03-17

## 2018-11-15 RX ORDER — PRAVASTATIN SODIUM 20 MG/1
20 TABLET ORAL DAILY
Qty: 90 TABLET | Refills: 3 | Status: SHIPPED | OUTPATIENT
Start: 2018-11-15 | End: 2019-06-03 | Stop reason: SDUPTHER

## 2018-11-15 RX ORDER — PREDNISONE 20 MG/1
20 TABLET ORAL DAILY
Qty: 5 TABLET | Refills: 0 | Status: SHIPPED | OUTPATIENT
Start: 2018-11-15 | End: 2018-11-20

## 2018-11-15 RX ORDER — PANTOPRAZOLE SODIUM 20 MG/1
20 TABLET, DELAYED RELEASE ORAL DAILY
Qty: 90 TABLET | Refills: 3 | Status: SHIPPED | OUTPATIENT
Start: 2018-11-15 | End: 2019-05-01 | Stop reason: SDUPTHER

## 2018-11-15 NOTE — PROGRESS NOTES
Subjective:       Patient ID: Lety Herbert is a 78 y.o. female.    Chief Complaint: Hypertension and Hyperlipidemia    Hypertension   This is a chronic problem. The current episode started more than 1 year ago. The problem has been waxing and waning since onset. The problem is controlled. Associated symptoms include headaches and malaise/fatigue. Pertinent negatives include no chest pain, neck pain, palpitations or shortness of breath. Risk factors for coronary artery disease include sedentary lifestyle, obesity and dyslipidemia. Past treatments include beta blockers, angiotensin blockers and diuretics. The current treatment provides moderate improvement. Compliance problems include psychosocial issues.  Identifiable causes of hypertension include sleep apnea (does not use mask). There is no history of pheochromocytoma.   Hyperlipidemia   This is a chronic problem. The current episode started more than 1 year ago. Exacerbating diseases include obesity. Pertinent negatives include no chest pain, myalgias or shortness of breath. Current antihyperlipidemic treatment includes statins. The current treatment provides moderate improvement of lipids.   Gastroesophageal Reflux   She complains of heartburn. She reports no chest pain, no choking, no coughing or no dysphagia. This is a chronic problem. The problem occurs occasionally. The problem has been gradually improving. Associated symptoms include fatigue. Pertinent negatives include no weight loss. She has tried a PPI for the symptoms.   Sinus Problem   This is a chronic problem. The current episode started more than 1 year ago. The problem has been waxing and waning since onset. There has been no fever. Associated symptoms include headaches. Pertinent negatives include no chills, congestion, coughing, neck pain, shortness of breath or sinus pressure. Treatments tried: astelin. The treatment provided no relief.   Fatigue   This is a chronic problem. The current  episode started more than 1 year ago. Associated symptoms include arthralgias, fatigue and headaches. Pertinent negatives include no chest pain, chills, congestion, coughing, fever, joint swelling, myalgias, neck pain, numbness or rash. The symptoms are aggravated by exertion. She has tried nothing for the symptoms. The treatment provided mild relief.   Depression   Visit Type: follow-up  Patient presents with the following symptoms: anhedonia and fatigue.  Patient is not experiencing: choking sensation, confusion, nervousness/anxiety, palpitations, psychomotor agitation, psychomotor retardation, shortness of breath, weight gain and weight loss.  Frequency of symptoms: most days     Patient also complains of nasal dripping. She does not find any relief from Astelin. She would like to have some treatment because Thanksgiving is coming and she does not want to be coughing in front of her children and grandchildren.    Past Medical History:   Diagnosis Date    Anemia     Cancer     breast    Depression     DVT (deep venous thrombosis)     Gastric ulceration     GERD (gastroesophageal reflux disease)     History of frequent urinary tract infections     Hyperlipidemia     Hypertension     MRSA (methicillin resistant staph aureus) culture positive     Neuropathy     PE (pulmonary embolism)     Reflux      Social History     Socioeconomic History    Marital status:      Spouse name: Not on file    Number of children: Not on file    Years of education: Not on file    Highest education level: Not on file   Social Needs    Financial resource strain: Not on file    Food insecurity - worry: Not on file    Food insecurity - inability: Not on file    Transportation needs - medical: Not on file    Transportation needs - non-medical: Not on file   Occupational History    Not on file   Tobacco Use    Smoking status: Former Smoker    Smokeless tobacco: Never Used   Substance and Sexual Activity     Alcohol use: No    Drug use: No    Sexual activity: No   Other Topics Concern    Not on file   Social History Narrative    Not on file     Past Surgical History:   Procedure Laterality Date    HYSTERECTOMY      MASTECTOMY, RADICAL Bilateral     TOTAL HIP ARTHROPLASTY Left 11/13/2017     Family History   Problem Relation Age of Onset    Cancer Mother     Cancer Father     Heart disease Father        Review of Systems   Constitutional: Positive for fatigue and malaise/fatigue. Negative for activity change, chills, fever, unexpected weight change, weight gain and weight loss.   HENT: Negative for congestion, postnasal drip and sinus pressure.    Eyes: Negative for pain, discharge and visual disturbance.   Respiratory: Negative for cough, choking, chest tightness and shortness of breath.    Cardiovascular: Negative for chest pain, palpitations and leg swelling.        HTN   Gastrointestinal: Positive for heartburn. Negative for abdominal distention, anal bleeding, constipation, diarrhea and dysphagia.   Genitourinary: Positive for frequency. Negative for difficulty urinating, dysuria, flank pain, hematuria and vaginal bleeding.   Musculoskeletal: Positive for arthralgias. Negative for joint swelling, myalgias and neck pain.        Left hip fracture S/P Arthroplasty NoV 2017   Skin: Negative for color change, pallor and rash.   Allergic/Immunologic: Negative for environmental allergies, food allergies and immunocompromised state.   Neurological: Positive for headaches. Negative for dizziness, tremors, seizures, syncope, light-headedness and numbness.   Hematological: Negative for adenopathy. Does not bruise/bleed easily.   Psychiatric/Behavioral: Positive for depression and sleep disturbance. Negative for agitation, confusion and dysphoric mood. The patient is not nervous/anxious.         Patient has a history of depression. Stable on sertraline. She however denies that she is depressed.         Objective:     "  Blood pressure 135/86, pulse 62, height 5' 6" (1.676 m), weight 106.6 kg (235 lb). Body mass index is 37.93 kg/m².  Physical Exam   Constitutional: Vital signs are normal. She appears well-developed. She is cooperative. No distress.   HENT:   Head: Normocephalic and atraumatic.   Eyes: Conjunctivae, EOM and lids are normal. Lids are everted and swept, no foreign bodies found. Right pupil is round and reactive. Left pupil is round and reactive.   Neck: Trachea normal and normal range of motion. Neck supple.   Cardiovascular: Normal rate, regular rhythm, S1 normal, S2 normal and normal heart sounds.   Varicosities in legs   Pulmonary/Chest: Breath sounds normal.   Abdominal: Soft. Bowel sounds are normal. There is no rigidity and no guarding.   Patient is obese.   Lymphadenopathy:     She has no cervical adenopathy.   Neurological: She is alert.   Skin: Skin is warm and dry.   Varicose veins are noted in the inferior extremities.   Psychiatric: Her affect is not inappropriate. She exhibits a depressed mood.   Anhedonic She is attentive.   Nursing note and vitals reviewed.        Assessment:       1. Benign essential hypertension    2. Multiple-type hyperlipidemia    3. Non-allergic rhinitis    4. Gastroesophageal reflux disease without esophagitis    5. Other fatigue    6. Dysthymia    7. Post-nasal drip           No visits with results within 3 Month(s) from this visit.   Latest known visit with results is:   Orders Only on 05/14/2018   Component Date Value Ref Range Status    Microalbum.,U,Random 05/14/2018 13.6  0.0 - 19.9 mcg/ml Final    Creatinine, Random Ur 05/14/2018 79.00  mg/dl Final    Microalb Creat Ratio 05/14/2018 17  0 - 30 Final         Plan:           Benign essential hypertension  -     Comprehensive metabolic panel; Future; Expected date: 11/15/2018  -     Microalbumin/creatinine urine ratio; Future; Expected date: 11/15/2018  -     metoprolol succinate (TOPROL-XL) 50 MG 24 hr tablet; Take 1 " tablet (50 mg total) by mouth once daily.  Dispense: 90 tablet; Refill: 3    Multiple-type hyperlipidemia  -     Lipid panel; Future; Expected date: 11/15/2018  -     pravastatin (PRAVACHOL) 20 MG tablet; Take 1 tablet (20 mg total) by mouth once daily.  Dispense: 90 tablet; Refill: 3    Non-allergic rhinitis    Gastroesophageal reflux disease without esophagitis  -     pantoprazole (PROTONIX) 20 MG tablet; Take 1 tablet (20 mg total) by mouth once daily. Dose reduced to 20 mgm with an effort to wean off.  Dispense: 90 tablet; Refill: 3    Other fatigue  -     CBC Without Differential; Future; Expected date: 11/15/2018  -     TSH; Future; Expected date: 11/15/2018    Dysthymia  -     Discontinue: sertraline (ZOLOFT) 100 MG tablet; Take 1 tablet (100 mg total) by mouth once daily.  Dispense: 6 tablet; Refill: 2  -     sertraline (ZOLOFT) 100 MG tablet; Take 1 tablet (100 mg total) by mouth once daily.  Dispense: 90 tablet; Refill: 3    Post-nasal drip      Patient has been advised to watch diet and exercise. Avoid fried and fatty food. Compliance to medications and follow up urged.    Increase your water intake to 64-80 oz daily    Nasal Saline spray (Over the counter [place] spray or [place])  2 sprays each nostril 2-3 times a day for nasal congestion    Tylenol 500 mg 2 tablets or Ibuprofen 200 mg 2 tablets every 4-6 hours as needed for fever, headaches, sore throat, ear pain, bodyaches, and/or nasal/sinus inflammation    Warm salt water gargles with 1/2 cup water and 1 tablespoon salt every 4 hours    Warm tea with honey and lemon    Follow up with PCP in 1 week if symptoms do not resolve          The patient is asked to make an attempt to improve diet and exercise patterns to aid in medical management of this problem.    Fall precautions discussed.    Should is already updated on flu shot.      Current Outpatient Medications:     metoprolol succinate (TOPROL-XL) 50 MG 24 hr tablet, Take 1 tablet (50 mg total) by mouth  once daily., Disp: 90 tablet, Rfl: 3    multivitamin with iron Tab, Take 1 tablet by mouth once daily at 6am., Disp: , Rfl:     pantoprazole (PROTONIX) 20 MG tablet, Take 1 tablet (20 mg total) by mouth once daily. Dose reduced to 20 mgm with an effort to wean off., Disp: 90 tablet, Rfl: 3    polyethylene glycol (GLYCOLAX) 17 gram PwPk, Take 17 g by mouth once daily., Disp: 90 packet, Rfl: 3    pravastatin (PRAVACHOL) 20 MG tablet, Take 1 tablet (20 mg total) by mouth once daily., Disp: 90 tablet, Rfl: 3    sertraline (ZOLOFT) 100 MG tablet, Take 1 tablet (100 mg total) by mouth once daily., Disp: 90 tablet, Rfl: 3    valsartan-hydrochlorothiazide (DIOVAN-HCT) 160-12.5 mg per tablet, Take 1 tablet by mouth once daily., Disp: 90 tablet, Rfl: 3

## 2018-11-15 NOTE — PATIENT INSTRUCTIONS
Normal Breathing During Sleep  When you breathe, air travels through passages in your nose and throat. When these air passages are wide enough to let air flow freely, you breathe normally.       Front view of nose and mouth.   Side view of nose and mouth. During normal sleep, air flows freely past the structures in the throat.   Nasal structures  The septum is the wall that divides the left half of the nose from the right half. Turbinates are ridges in the nasal passage.  Throat structures  Air flows past soft, flexible structures where the mouth meets the throat: the soft palate, uvula, tonsils, and back of the tongue. Throat muscles hold these structures in place. While you sleep, the throat muscles relax a bit. But they normally stay tight enough to keep the airway open.  Date Last Reviewed: 7/18/2015 © 2000-2017 [employer]. [address] [place] [address]. All rights reserved. This information is not intended as a substitute for professional medical care. Always follow your healthcare professional's instructions.

## 2019-02-25 DIAGNOSIS — F34.1 DYSTHYMIA: ICD-10-CM

## 2019-02-25 RX ORDER — SERTRALINE HYDROCHLORIDE 100 MG/1
100 TABLET, FILM COATED ORAL DAILY
Qty: 90 TABLET | Refills: 3 | Status: SHIPPED | OUTPATIENT
Start: 2019-02-25 | End: 2022-04-28 | Stop reason: SDUPTHER

## 2019-03-04 LAB
ALBUMIN SERPL-MCNC: 4 G/DL (ref 3.1–4.7)
ALP SERPL-CCNC: 69 IU/L (ref 40–104)
ALT (SGPT): 16 IU/L (ref 3–33)
AST SERPL-CCNC: 23 IU/L (ref 10–40)
BILIRUB SERPL-MCNC: 0.7 MG/DL (ref 0.3–1)
BUN SERPL-MCNC: 12 MG/DL (ref 8–20)
CALCIUM SERPL-MCNC: 9.3 MG/DL (ref 7.7–10.4)
CHLORIDE: 104 MMOL/L (ref 98–110)
CO2 SERPL-SCNC: 32.4 MMOL/L (ref 22.8–31.6)
CREATININE RANDOM URINE: 86 MG/DL
CREATININE: 0.48 MG/DL (ref 0.6–1.4)
GLUCOSE: 102 MG/DL (ref 70–99)
HCT VFR BLD AUTO: 39.8 % (ref 36–48)
HGB BLD-MCNC: 12.4 G/DL (ref 12–15)
MCH RBC QN AUTO: 28.9 PG (ref 25–35)
MCHC RBC AUTO-ENTMCNC: 31.2 G/DL (ref 31–36)
MCV RBC AUTO: 92.8 FL (ref 79–98)
MICROALBUM.,U,RANDOM: 10.1 MCG/ML (ref 0–19.9)
MICROALBUMIN/CREATININE RATIO: 12 (ref 0–30)
NUCLEATED RBCS: 0 %
PLATELET # BLD AUTO: 240 K/UL (ref 140–440)
POTASSIUM SERPL-SCNC: 4.7 MMOL/L (ref 3.5–5)
PROT SERPL-MCNC: 7.6 G/DL (ref 6–8.2)
RBC # BLD AUTO: 4.29 M/UL (ref 3.5–5.5)
SODIUM: 142 MMOL/L (ref 134–144)
TSH SERPL DL<=0.005 MIU/L-ACNC: 0.75 ULU/ML (ref 0.3–5.6)
WBC # BLD AUTO: 7.1 K/UL (ref 5–10)

## 2019-03-06 ENCOUNTER — TELEPHONE (OUTPATIENT)
Dept: FAMILY MEDICINE | Facility: CLINIC | Age: 79
End: 2019-03-06

## 2019-03-06 NOTE — TELEPHONE ENCOUNTER
----- Message from Shilo Perez MD sent at 3/4/2019  1:37 PM CST -----  Results are somewhat abnormal. Please keep regular follow up.

## 2019-03-06 NOTE — TELEPHONE ENCOUNTER
----- Message from Shilo Perez MD sent at 3/4/2019  1:36 PM CST -----  The results are within acceptable range.  Please keep regular follow up.

## 2019-03-15 ENCOUNTER — OFFICE VISIT (OUTPATIENT)
Dept: FAMILY MEDICINE | Facility: CLINIC | Age: 79
End: 2019-03-15
Payer: MEDICARE

## 2019-03-15 VITALS
OXYGEN SATURATION: 92 % | HEART RATE: 65 BPM | BODY MASS INDEX: 38.41 KG/M2 | WEIGHT: 239 LBS | DIASTOLIC BLOOD PRESSURE: 73 MMHG | HEIGHT: 66 IN | SYSTOLIC BLOOD PRESSURE: 124 MMHG | RESPIRATION RATE: 22 BRPM

## 2019-03-15 DIAGNOSIS — E78.2 MULTIPLE-TYPE HYPERLIPIDEMIA: ICD-10-CM

## 2019-03-15 DIAGNOSIS — K21.9 GASTROESOPHAGEAL REFLUX DISEASE WITHOUT ESOPHAGITIS: ICD-10-CM

## 2019-03-15 DIAGNOSIS — R06.09 DYSPNEA ON EXERTION: ICD-10-CM

## 2019-03-15 DIAGNOSIS — R53.83 OTHER FATIGUE: ICD-10-CM

## 2019-03-15 DIAGNOSIS — F34.1 DYSTHYMIA: ICD-10-CM

## 2019-03-15 DIAGNOSIS — I10 BENIGN ESSENTIAL HYPERTENSION: Primary | ICD-10-CM

## 2019-03-15 PROCEDURE — 1101F PT FALLS ASSESS-DOCD LE1/YR: CPT | Mod: ,,, | Performed by: INTERNAL MEDICINE

## 2019-03-15 PROCEDURE — 99214 OFFICE O/P EST MOD 30 MIN: CPT | Mod: ,,, | Performed by: INTERNAL MEDICINE

## 2019-03-15 PROCEDURE — 3074F SYST BP LT 130 MM HG: CPT | Mod: ,,, | Performed by: INTERNAL MEDICINE

## 2019-03-15 PROCEDURE — 3078F DIAST BP <80 MM HG: CPT | Mod: ,,, | Performed by: INTERNAL MEDICINE

## 2019-03-15 PROCEDURE — 99214 PR OFFICE/OUTPT VISIT, EST, LEVL IV, 30-39 MIN: ICD-10-PCS | Mod: ,,, | Performed by: INTERNAL MEDICINE

## 2019-03-15 PROCEDURE — 3074F PR MOST RECENT SYSTOLIC BLOOD PRESSURE < 130 MM HG: ICD-10-PCS | Mod: ,,, | Performed by: INTERNAL MEDICINE

## 2019-03-15 PROCEDURE — 3078F PR MOST RECENT DIASTOLIC BLOOD PRESSURE < 80 MM HG: ICD-10-PCS | Mod: ,,, | Performed by: INTERNAL MEDICINE

## 2019-03-15 PROCEDURE — 1101F PR PT FALLS ASSESS DOC 0-1 FALLS W/OUT INJ PAST YR: ICD-10-PCS | Mod: ,,, | Performed by: INTERNAL MEDICINE

## 2019-03-15 RX ORDER — CHOLECALCIFEROL (VITAMIN D3) 125 MCG
5000 CAPSULE ORAL DAILY
COMMUNITY
End: 2022-03-15

## 2019-03-15 RX ORDER — FLUTICASONE PROPIONATE 50 MCG
1 SPRAY, SUSPENSION (ML) NASAL
Refills: 0 | COMMUNITY
Start: 2019-01-26 | End: 2020-03-17

## 2019-03-15 RX ORDER — PNV NO.95/FERROUS FUM/FOLIC AC 28MG-0.8MG
1000 TABLET ORAL DAILY
COMMUNITY
End: 2022-03-15

## 2019-03-15 NOTE — PROGRESS NOTES
Subjective:       Patient ID: Lety Herbert is a 78 y.o. female.    Chief Complaint: Hypertension; Hyperlipidemia; Depression; Fatigue; and Shortness of Breath    Lety Herbert is a 78-year-old  female who comes for follow-up. Underlying medical issues of hypertension, dyslipidemia, sinus symptoms, gastroesophageal reflux and depression have been noted. Next    She recently went to urgent care center for sinusitis and bronchitis and was found to be somewhat hypoxemic. Patient has had some degree of shortness of breath and mostly on exertion for several years. She states that she is finally feeling her age. She has arthritic and back symptoms also.    Thus far she has not been diagnosed with congestive heart failure. Recent labs do not indicate anemia. Renal functions and liver function tests are okay.    She has not been immobilized recently. She did not have any surgery recently.    As far as depression is concerned, she feels that she is in a stable state with surmounting problems of her  and issues of aging. At any rate she would like to wean herself off Zoloft at this point.    She is also taking valsartan hydrochlorothiazide and I was asked her to check with her pharmacist if this particular batch of medication has been recalled.      Hypertension   This is a chronic problem. The current episode started more than 1 year ago. The problem has been waxing and waning since onset. The problem is controlled. Associated symptoms include headaches, malaise/fatigue and shortness of breath. Pertinent negatives include no chest pain, neck pain or palpitations. Risk factors for coronary artery disease include sedentary lifestyle, obesity and dyslipidemia. Past treatments include beta blockers, angiotensin blockers and diuretics. The current treatment provides moderate improvement. Compliance problems include psychosocial issues.  There is no history of heart failure. Identifiable causes of  hypertension include sleep apnea (does not use mask). There is no history of pheochromocytoma.   Hyperlipidemia   This is a chronic problem. The current episode started more than 1 year ago. Exacerbating diseases include obesity. Associated symptoms include shortness of breath. Pertinent negatives include no chest pain or myalgias. Current antihyperlipidemic treatment includes statins. The current treatment provides moderate improvement of lipids.   Gastroesophageal Reflux   She complains of heartburn. She reports no chest pain, no choking, no coughing, no dysphagia or no wheezing. This is a chronic problem. The problem occurs occasionally. The problem has been gradually improving. Associated symptoms include fatigue. Pertinent negatives include no weight loss. She has tried a PPI for the symptoms.   Sinus Problem   This is a chronic problem. The current episode started more than 1 year ago. The problem has been waxing and waning since onset. There has been no fever. Associated symptoms include congestion, headaches and shortness of breath. Pertinent negatives include no chills, coughing, neck pain or sinus pressure. Treatments tried: astelin. The treatment provided no relief.   Fatigue   This is a chronic problem. The current episode started more than 1 year ago. Associated symptoms include arthralgias, congestion, fatigue and headaches. Pertinent negatives include no chest pain, chills, coughing, fever, joint swelling, myalgias, neck pain, numbness, rash or vomiting. The symptoms are aggravated by exertion. She has tried nothing for the symptoms. The treatment provided mild relief.   Depression   Visit Type: follow-up  Patient presents with the following symptoms: anhedonia, fatigue and shortness of breath.  Patient is not experiencing: choking sensation, confusion, nervousness/anxiety, palpitations, psychomotor agitation, psychomotor retardation, weight gain and weight loss.  Frequency of symptoms: most days      Shortness of Breath   This is a chronic problem. The current episode started more than 1 year ago. The problem has been waxing and waning. Associated symptoms include headaches. Pertinent negatives include no chest pain, fever, hemoptysis, leg swelling, neck pain, rash, sputum production, syncope, vomiting or wheezing. She has tried nothing for the symptoms. The treatment provided no relief. There is no history of a heart failure.       I did review an old record from Dr. Jeremy Sargent, her cardiologist which indicated an echocardiogram showing an ejection fraction of 65%. Mild tricuspid regurgitation and estimated pulmonary artery pressure of 30 mm ..    Electrocardiogram had shown a second-degree AV block Mobitz type I in past.      History of breast cancer status post mastectomy have been noted. She did have a balloon sinus plasty in 2015. Status post hysterectomy in 1967.      Past Medical History:   Diagnosis Date    Anemia     Cancer     breast    Depression     DVT (deep venous thrombosis)     Gastric ulceration     GERD (gastroesophageal reflux disease)     History of frequent urinary tract infections     Hyperlipidemia     Hypertension     MRSA (methicillin resistant staph aureus) culture positive     Neuropathy     PE (pulmonary embolism)     Reflux      Social History     Socioeconomic History    Marital status:      Spouse name: Not on file    Number of children: Not on file    Years of education: Not on file    Highest education level: Not on file   Social Needs    Financial resource strain: Not on file    Food insecurity - worry: Not on file    Food insecurity - inability: Not on file    Transportation needs - medical: Not on file    Transportation needs - non-medical: Not on file   Occupational History    Not on file   Tobacco Use    Smoking status: Former Smoker    Smokeless tobacco: Never Used   Substance and Sexual Activity    Alcohol use: No    Drug use: No     Sexual activity: No   Other Topics Concern    Not on file   Social History Narrative    Not on file     Past Surgical History:   Procedure Laterality Date    HYSTERECTOMY      MASTECTOMY, RADICAL Bilateral     TOTAL HIP ARTHROPLASTY Left 11/13/2017     Family History   Problem Relation Age of Onset    Cancer Mother     Cancer Father     Heart disease Father        Review of Systems   Constitutional: Positive for fatigue and malaise/fatigue. Negative for activity change, chills, fever, unexpected weight change, weight gain and weight loss.   HENT: Positive for congestion. Negative for postnasal drip and sinus pressure.    Eyes: Negative for pain, discharge and visual disturbance.   Respiratory: Positive for shortness of breath. Negative for cough, hemoptysis, sputum production, choking, chest tightness and wheezing.    Cardiovascular: Negative for chest pain, palpitations, leg swelling and syncope.        HTN   Gastrointestinal: Positive for heartburn. Negative for abdominal distention, anal bleeding, constipation, diarrhea, dysphagia and vomiting.   Genitourinary: Positive for frequency. Negative for difficulty urinating, dysuria, flank pain, hematuria and vaginal bleeding.   Musculoskeletal: Positive for arthralgias and back pain (low back pain). Negative for joint swelling, myalgias and neck pain.        Left hip fracture S/P Arthroplasty NoV 2017   Skin: Negative for color change, pallor and rash.   Allergic/Immunologic: Negative for environmental allergies, food allergies and immunocompromised state.   Neurological: Positive for headaches. Negative for dizziness, tremors, seizures, syncope, light-headedness and numbness.   Hematological: Negative for adenopathy. Does not bruise/bleed easily.   Psychiatric/Behavioral: Positive for depression and sleep disturbance. Negative for agitation, confusion and dysphoric mood. The patient is not nervous/anxious.         Patient has a history of depression. Stable  "on sertraline. She however denies that she is depressed.         Objective:      Blood pressure 124/73, pulse 65, resp. rate (!) 22, height 5' 6" (1.676 m), weight 108.4 kg (239 lb), SpO2 (!) 92 %. Body mass index is 38.58 kg/m².  Physical Exam   Constitutional: Vital signs are normal. She appears well-developed. She is cooperative. No distress.   BMI is 38.5   HENT:   Head: Normocephalic and atraumatic.   Eyes: Conjunctivae, EOM and lids are normal. Lids are everted and swept, no foreign bodies found. Right pupil is round and reactive. Left pupil is round and reactive.   Neck: Trachea normal and normal range of motion. Neck supple. No JVD present. No tracheal deviation present. No thyromegaly present.   Cardiovascular: Normal rate, regular rhythm, S1 normal, S2 normal and normal heart sounds. Exam reveals no gallop.   Varicosities in legs   Pulmonary/Chest: Breath sounds normal. No stridor. No respiratory distress. She has no wheezes.   Abdominal: Soft. Bowel sounds are normal. There is no rigidity and no guarding.   Patient is obese.   Musculoskeletal: She exhibits edema (trace edema and varicosities).   Lymphadenopathy:     She has no cervical adenopathy.   Neurological: She is alert.   Skin: Skin is warm and dry. No rash noted. No pallor.   Varicose veins are noted in the inferior extremities.   Psychiatric: Her affect is not inappropriate. She is slowed. Cognition and memory are not impaired. She exhibits a depressed mood.   Anhedonic She is attentive.   Nursing note and vitals reviewed.        Assessment:       1. Benign essential hypertension    2. Multiple-type hyperlipidemia    3. Dysthymia    4. Gastroesophageal reflux disease without esophagitis    5. Other fatigue    6. Dyspnea on exertion           Orders Only on 03/04/2019   Component Date Value Ref Range Status    Microalbum.,U,Random 03/04/2019 10.1  0.0 - 19.9 mcg/ml Final    Creatinine, Random Ur 03/04/2019 86.00  mg/dl Final    Microalb Creat " Ratio 03/04/2019 12  0 - 30 Final   Orders Only on 03/04/2019   Component Date Value Ref Range Status    WBC 03/04/2019 7.1  5.0 - 10.0 K/uL Final    RBC 03/04/2019 4.29  3.50 - 5.50 M/uL Final    Hemoglobin 03/04/2019 12.4  12.0 - 15.0 g/dL Final    Hematocrit 03/04/2019 39.8  36.0 - 48.0 % Final    MCV 03/04/2019 92.8  79.0 - 98.0 fL Final    MCH 03/04/2019 28.9  25.0 - 35.0 pg Final    MCHC 03/04/2019 31.2  31.0 - 36.0 g/dL Final    Platelets 03/04/2019 240  140 - 440 K/uL Final    nRBC% 03/04/2019 0  % Final    Glucose 03/04/2019 102* 70 - 99 mg/dL Final    BUN, Bld 03/04/2019 12  8 - 20 mg/dL Final    Creatinine 03/04/2019 0.48* 0.60 - 1.40 mg/dL Final    Calcium 03/04/2019 9.3  7.7 - 10.4 mg/dL Final    Sodium 03/04/2019 142  134 - 144 mmol/L Final    Potassium 03/04/2019 4.7  3.5 - 5.0 mmol/L Final    Chloride 03/04/2019 104  98 - 110 mmol/L Final    CO2 03/04/2019 32.4* 22.8 - 31.6 mmol/L Final    Albumin 03/04/2019 4.0  3.1 - 4.7 g/dL Final    Total Bilirubin 03/04/2019 0.7  0.3 - 1.0 mg/dL Final    Alkaline Phosphatase 03/04/2019 69  40 - 104 IU/L Final    Total Protein 03/04/2019 7.6  6.0 - 8.2 g/dL Final    ALT (SGPT) 03/04/2019 16  3 - 33 IU/L Final    AST 03/04/2019 23  10 - 40 IU/L Final    TSH 03/04/2019 0.75  0.30 - 5.60 ulU/ml Final         Plan:           Benign essential hypertension    Multiple-type hyperlipidemia    Dysthymia    Gastroesophageal reflux disease without esophagitis    Other fatigue    Dyspnea on exertion    Patient was made to ambulate in the office and her oxygen saturations came down to 90-91%.    No evidence of deep vein thrombosis in the clinic.    Echocardiogram reviewed in past which had shown a pulmonary artery pressure of 30 mm of water. Ejection fraction was good. EKG had shown in past first-degree AV block .    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    I've advised her to  check with Dr. Mulligan concerning this at follow-up.    As far as depression is concerned, I will slowly wean off Zoloft with the provision that if she seems to have symptoms of feeling depressed, anxious or is somewhat snappy, she should go back on it. She verbalizes understanding.                Patient has been advised to watch diet and exercise. Avoid fried and fatty food. Compliance to medications and follow up urged.    Increase your water intake to 64-80 oz daily    Nasal Saline spray (Over the counter [place] spray or [place])  2 sprays each nostril 2-3 times a day for nasal congestion    Tylenol 500 mg 2 tablets or Ibuprofen 200 mg 2 tablets every 4-6 hours as needed for fever, headaches, sore throat, ear pain, bodyaches, and/or nasal/sinus inflammation    Warm salt water gargles with 1/2 cup water and 1 tablespoon salt every 4 hours    Warm tea with honey and lemon    Follow up with PCP in 1 week if symptoms do not resolve.    The patient is asked to make an attempt to improve diet and exercise patterns to aid in medical management of this problem.    Fall precautions discussed.    Should is already updated on flu shot.    Communication sent to Dr mulligan copies of recent labs given to patient to share with Dr. Mulligan.    Follow-up with me as needed in the next couple of weeks to months of situation no better and as per routine appointment in 6 months time.      Current Outpatient Medications:     cyanocobalamin (VITAMIN B-12) 100 MCG tablet, Take 100 mcg by mouth once daily., Disp: , Rfl:     fluticasone (FLONASE) 50 mcg/actuation nasal spray, 1 spray by Each Nare route as needed., Disp: , Rfl: 0    metoprolol succinate (TOPROL-XL) 50 MG 24 hr tablet, Take 1 tablet (50 mg total) by mouth once daily., Disp: 90 tablet, Rfl: 3    multivitamin with iron Tab, Take 1 tablet by mouth once daily at 6am., Disp: , Rfl:     pantoprazole (PROTONIX) 20 MG tablet, Take 1 tablet (20 mg total) by mouth once daily. Dose reduced  to 20 mgm with an effort to wean off., Disp: 90 tablet, Rfl: 3    polyethylene glycol (GLYCOLAX) 17 gram PwPk, Take 17 g by mouth once daily., Disp: 90 packet, Rfl: 3    pravastatin (PRAVACHOL) 20 MG tablet, Take 1 tablet (20 mg total) by mouth once daily., Disp: 90 tablet, Rfl: 3    sertraline (ZOLOFT) 100 MG tablet, Take 1 tablet (100 mg total) by mouth once daily., Disp: 90 tablet, Rfl: 3    valsartan-hydrochlorothiazide (DIOVAN-HCT) 160-12.5 mg per tablet, Take 1 tablet by mouth once daily., Disp: 90 tablet, Rfl: 3    vitamin D (VITAMIN D3) 1000 units Tab, Take 1,000 Units by mouth once daily., Disp: , Rfl:

## 2019-03-15 NOTE — LETTER
March 15, 2019        Jeremy Sargent MD  [address] [facility] [address]             [facility]  [address] [place] [address] [place] [address]  Phone: [phone]  Fax: [phone]   Patient: Lety Herbret   MR Number: [id number]   YOB: 1940   Date of Visit: 3/15/2019   Dear Dr. Sargent,     I had the privilege of following up on Ms. Lety Herbert today for her routine medical issues.  She had recently gone to urgent care center for sinus symptoms and was treated accordingly. She was found to be somewhat hypoxemic at the urgent care center.  During my follow-up today, she seemed to be at her baseline with some persistent sinus symptoms but with improvement. At rest her oxygen saturation was 98% but it dropped to approximately 90-91% on ambulation for a couple of minutes.  I did review an echocardiogram done by you in past which had shown normal ejection fraction but with a pulmonary artery pressure of 30 mm. I did also review ( from your records) that she had coronary artery disease in past with a slightly abnormal EKG possibly with a first-degree AV block (?).    On clinical examination she does not seem to have any evidence of DVT in either legs and neither was she immobilized in recent past. She is nonsmoker with Bilateral air entry was good. I did not hear any significant gallop or murmur.    Recent labs had shown a normal chemistry and CBC without any evidence of anemia.  I've encouraged the patient to follow-up with you for further evaluate for her mild dyspnea on exertion.    Sincerely,      Shilo Perez MD

## 2019-03-15 NOTE — Clinical Note
I have advised patient to follow up with you concerning her shortness of breath. She did have some drop in oxygen saturation on simple ambulation.Dr. Ana BALBUENA.

## 2019-03-15 NOTE — PATIENT INSTRUCTIONS
Understanding Dysthymia  You know something is wrong. You feel tired and sad most of the time. In fact, you dont seem to enjoy life much at all anymore. Nothing you do makes you feel better for very long. If this sounds like you, know that there is hope. You may have a mood disorder called dysthymia. Talk with your healthcare provider about treatments that can help.    What is dysthymia?  Also known as persistent depressive disorder, dysthymia is a mild form of depression that may persist for years. More women than men have dysthymia. Its not known just what causes this disorder. Its not as severe as other types of depression. But it does affect well-being. You may have trouble with work or school. Your relationships with friends and family may suffer. You may miss much of lifes beauty and pleasure. If you have this disorder, you likely:  · Have been depressed most days for at least 2 years  · Have two or more other symptoms of depression  How is it treated?  Your healthcare provider may recommend therapy (counseling), medicines, or both. Just talking to a therapist may be a great relief. Your therapist can help you learn how best to cope with problems. And how to make positive changes in your life. Certain medicines may also be an option. These can help you feel less depressed. Eating a healthy diet and getting plenty of exercise also may help. So will having the support and caring of those closest to you.  Symptoms of depression  · Gaining or losing a lot of weight  · Sleeping too much or too little  · Feeling tired all the time  · Feeling restless  · Feeling worthless or guilty  · Having trouble thinking clearly or making decisions  · Feeling hopeless  · Moving or speaking more slowly  · Thinking about death and suicide   Date Last Reviewed: 5/1/2017  © 7641-3936 [employer]. [address] [place] [address], [place] [address]. All rights reserved. This information is not intended as a substitute for  professional medical care. Always follow your healthcare professional's instructions.

## 2019-05-01 DIAGNOSIS — K21.9 GASTROESOPHAGEAL REFLUX DISEASE WITHOUT ESOPHAGITIS: ICD-10-CM

## 2019-05-01 RX ORDER — PANTOPRAZOLE SODIUM 20 MG/1
20 TABLET, DELAYED RELEASE ORAL DAILY
Qty: 90 TABLET | Refills: 1 | Status: SHIPPED | OUTPATIENT
Start: 2019-05-01 | End: 2019-09-17 | Stop reason: SDUPTHER

## 2019-06-03 DIAGNOSIS — E78.2 MULTIPLE-TYPE HYPERLIPIDEMIA: ICD-10-CM

## 2019-06-03 RX ORDER — PRAVASTATIN SODIUM 20 MG/1
20 TABLET ORAL DAILY
Qty: 90 TABLET | Refills: 3 | Status: SHIPPED | OUTPATIENT
Start: 2019-06-03 | End: 2019-06-27 | Stop reason: SDUPTHER

## 2019-06-27 DIAGNOSIS — E78.2 MULTIPLE-TYPE HYPERLIPIDEMIA: ICD-10-CM

## 2019-06-27 RX ORDER — PRAVASTATIN SODIUM 20 MG/1
20 TABLET ORAL DAILY
Qty: 90 TABLET | Refills: 3 | Status: SHIPPED | OUTPATIENT
Start: 2019-06-27 | End: 2019-07-25 | Stop reason: ALTCHOICE

## 2019-07-25 ENCOUNTER — OFFICE VISIT (OUTPATIENT)
Dept: DERMATOLOGY | Facility: CLINIC | Age: 79
End: 2019-07-25
Payer: MEDICARE

## 2019-07-25 VITALS — BODY MASS INDEX: 38.41 KG/M2 | WEIGHT: 239 LBS | HEIGHT: 66 IN

## 2019-07-25 DIAGNOSIS — Z85.828 HISTORY OF BASAL CELL CARCINOMA (BCC) OF SKIN: ICD-10-CM

## 2019-07-25 DIAGNOSIS — D48.9 NEOPLASM OF UNCERTAIN BEHAVIOR: Primary | ICD-10-CM

## 2019-07-25 DIAGNOSIS — Z85.820 HISTORY OF MALIGNANT MELANOMA: ICD-10-CM

## 2019-07-25 DIAGNOSIS — L82.1 SEBORRHEIC KERATOSES: ICD-10-CM

## 2019-07-25 PROCEDURE — 11102 TANGNTL BX SKIN SINGLE LES: CPT | Mod: S$GLB,,, | Performed by: DERMATOLOGY

## 2019-07-25 PROCEDURE — 88305 TISSUE EXAM BY PATHOLOGIST: CPT | Performed by: PATHOLOGY

## 2019-07-25 PROCEDURE — 88305 TISSUE EXAM BY PATHOLOGIST: CPT | Mod: 26,,, | Performed by: PATHOLOGY

## 2019-07-25 PROCEDURE — 1101F PR PT FALLS ASSESS DOC 0-1 FALLS W/OUT INJ PAST YR: ICD-10-PCS | Mod: CPTII,S$GLB,, | Performed by: DERMATOLOGY

## 2019-07-25 PROCEDURE — 99201 PR OFFICE/OUTPT VISIT,NEW,LEVL I: ICD-10-PCS | Mod: 25,S$GLB,, | Performed by: DERMATOLOGY

## 2019-07-25 PROCEDURE — 99201 PR OFFICE/OUTPT VISIT,NEW,LEVL I: CPT | Mod: 25,S$GLB,, | Performed by: DERMATOLOGY

## 2019-07-25 PROCEDURE — 99999 PR PBB SHADOW E&M-EST. PATIENT-LVL III: CPT | Mod: PBBFAC,,, | Performed by: DERMATOLOGY

## 2019-07-25 PROCEDURE — 99999 PR PBB SHADOW E&M-EST. PATIENT-LVL III: ICD-10-PCS | Mod: PBBFAC,,, | Performed by: DERMATOLOGY

## 2019-07-25 PROCEDURE — 88305 TISSUE SPECIMEN TO PATHOLOGY, DERMATOLOGY: ICD-10-PCS | Mod: 26,,, | Performed by: PATHOLOGY

## 2019-07-25 PROCEDURE — 11102 PR TANGENTIAL BIOPSY, SKIN, SINGLE LESION: ICD-10-PCS | Mod: S$GLB,,, | Performed by: DERMATOLOGY

## 2019-07-25 PROCEDURE — 1101F PT FALLS ASSESS-DOCD LE1/YR: CPT | Mod: CPTII,S$GLB,, | Performed by: DERMATOLOGY

## 2019-07-25 RX ORDER — IPRATROPIUM BROMIDE 42 UG/1
1 SPRAY, METERED NASAL 3 TIMES DAILY
Refills: 5 | COMMUNITY
Start: 2019-06-13 | End: 2020-03-17 | Stop reason: SDUPTHER

## 2019-07-25 RX ORDER — ATORVASTATIN CALCIUM 20 MG/1
TABLET, FILM COATED ORAL
Refills: 1 | COMMUNITY
Start: 2019-06-13 | End: 2019-09-17 | Stop reason: SDUPTHER

## 2019-07-25 RX ORDER — VALSARTAN AND HYDROCHLOROTHIAZIDE 160; 12.5 MG/1; MG/1
TABLET, FILM COATED ORAL
Refills: 1 | COMMUNITY
Start: 2019-06-13 | End: 2019-09-17 | Stop reason: SDUPTHER

## 2019-07-25 RX ORDER — RANOLAZINE 500 MG/1
TABLET, EXTENDED RELEASE ORAL
Refills: 2 | COMMUNITY
Start: 2019-06-19 | End: 2019-09-17

## 2019-07-25 NOTE — PATIENT INSTRUCTIONS
Shave Biopsy Wound Care    Your doctor has performed a shave biopsy today.  A band aid and vaseline ointment has been placed over the site.  This should remain in place for 24 hours.  It is recommended that you keep the area dry for the first 24 hours.  After 24 hours, you may remove the band aid and wash the area with warm soap and water and apply Vaseline jelly.  Many patients prefer to use Neosporin or Bacitracin ointment.  This is acceptable; however, know that you can develop an allergy to this medication even if you have used it safely for years.  It is important to keep the area moist.  Letting it dry out and get air slows healing time, and will worsen the scar.  Band aid is optional after first 24 hours.      If you notice increasing redness, tenderness, pain, or yellow drainage at the biopsy site, please notify your doctor.  These are signs of an infection.    If your biopsy site is bleeding, apply firm pressure for 15 minutes straight.  Repeat for another 15 minutes, if it is still bleeding.   If the surgical site continues to bleed, then please contact your doctor.       [facility]  [facility]ER[facility]  [address] [place] [address] Drive, [address]  [facility] [address]  Dept: [phone]

## 2019-07-25 NOTE — PROGRESS NOTES
"  Subjective:       Patient ID:  Lety Herbert is a 78 y.o. female who presents for   Chief Complaint   Patient presents with    Growth     nose, few months, feels hard, no tx     Initial visit  H/o melanoma left forearm (2004, no SLNB, WLE) and BCC nose, Dr Salazar  Last TBSE 4/2019  Site of electrolysis on nose (post graft from forehead for Mohs repair), "bumpy", getting bigger        Growth  - Initial  Affected locations: nose  Signs and Symptoms: feels hard.  Severity: mild  Timing: constant  Aggravated by: nothing  Relieving factors/Treatments tried: nothing        Review of Systems   Constitutional: Negative for fever, chills and fatigue.   Skin: Positive for activity-related sunscreen use. Negative for daily sunscreen use.   Hematologic/Lymphatic: Bruises/bleeds easily (sometimes).        Objective:    Physical Exam   Constitutional: She appears well-developed and well-nourished. No distress.   Neurological: She is alert and oriented to person, place, and time. She is not disoriented.   Psychiatric: She has a normal mood and affect.   Skin:   Areas Examined (abnormalities noted in diagram):   Head / Face Inspection Performed  LUE Inspection Performed                   Diagram Legend     Erythematous scaling macule/papule c/w actinic keratosis       Vascular papule c/w angioma      Pigmented verrucoid papule/plaque c/w seborrheic keratosis      Yellow umbilicated papule c/w sebaceous hyperplasia      Irregularly shaped tan macule c/w lentigo     1-2 mm smooth white papules consistent with Milia      Movable subcutaneous cyst with punctum c/w epidermal inclusion cyst      Subcutaneous movable cyst c/w pilar cyst      Firm pink to brown papule c/w dermatofibroma      Pedunculated fleshy papule(s) c/w skin tag(s)      Evenly pigmented macule c/w junctional nevus     Mildly variegated pigmented, slightly irregular-bordered macule c/w mildly atypical nevus      Flesh colored to evenly pigmented papule c/w " "intradermal nevus       Pink pearly papule/plaque c/w basal cell carcinoma      Erythematous hyperkeratotic cursted plaque c/w SCC      Surgical scar with no sign of skin cancer recurrence      Open and closed comedones      Inflammatory papules and pustules      Verrucoid papule consistent consistent with wart     Erythematous eczematous patches and plaques     Dystrophic onycholytic nail with subungual debris c/w onychomycosis     Umbilicated papule    Erythematous-base heme-crusted tan verrucoid plaque consistent with inflamed seborrheic keratosis     Erythematous Silvery Scaling Plaque c/w Psoriasis     See annotation          Assessment / Plan:      Pathology Orders:     Normal Orders This Visit    Tissue Specimen To Pathology, Dermatology     Questions:    Directional Terms:  Other(comment)    Clinical Information:  Erythematous scaly 2mm macule within graft (post Mohs repair), AK vs other    Specific Site:  nasal tip        Neoplasm of uncertain behavior  -     Tissue Specimen To Pathology, Dermatology  Shave biopsy procedure note:    Shave biopsy performed after verbal consent including risk of infection, scar, recurrence, need for additional treatment of site. Area prepped with alcohol, anesthetized with approximately 1.0cc of 1% lidocaine with epinephrine. Lesional tissue shaved with razor blade. Hemostasis achieved with application of aluminum chloride followed by hyfrecation. No complications. Dressing applied. Wound care explained.    Seborrheic keratoses  These are benign inherited growths without a malignant potential. Reassurance given to patient. No treatment is necessary.     History of basal cell carcinoma (BCC) of skin, nasal tip post Mohs with FTG repair  Patient very anxious, biopsied in part for reassurance  "hard knots" noted by patient are normal cartilage tips, reassurance    History of malignant melanoma  Last TBSE 04/2019 Dr Salazar, declines today           Follow up if symptoms worsen or " fail to improve.

## 2019-09-17 ENCOUNTER — OFFICE VISIT (OUTPATIENT)
Dept: FAMILY MEDICINE | Facility: CLINIC | Age: 79
End: 2019-09-17
Payer: MEDICARE

## 2019-09-17 VITALS
HEIGHT: 66 IN | DIASTOLIC BLOOD PRESSURE: 89 MMHG | SYSTOLIC BLOOD PRESSURE: 139 MMHG | HEART RATE: 64 BPM | WEIGHT: 246 LBS | BODY MASS INDEX: 39.53 KG/M2

## 2019-09-17 DIAGNOSIS — K21.9 GASTROESOPHAGEAL REFLUX DISEASE WITHOUT ESOPHAGITIS: ICD-10-CM

## 2019-09-17 DIAGNOSIS — F34.1 DYSTHYMIA: Chronic | ICD-10-CM

## 2019-09-17 DIAGNOSIS — I10 BENIGN ESSENTIAL HYPERTENSION: Primary | ICD-10-CM

## 2019-09-17 DIAGNOSIS — E78.2 MULTIPLE-TYPE HYPERLIPIDEMIA: ICD-10-CM

## 2019-09-17 PROCEDURE — 99999 PR PBB SHADOW E&M-EST. PATIENT-LVL III: ICD-10-PCS | Mod: PBBFAC,,, | Performed by: INTERNAL MEDICINE

## 2019-09-17 PROCEDURE — 3079F DIAST BP 80-89 MM HG: CPT | Mod: S$GLB,,, | Performed by: INTERNAL MEDICINE

## 2019-09-17 PROCEDURE — 3075F SYST BP GE 130 - 139MM HG: CPT | Mod: S$GLB,,, | Performed by: INTERNAL MEDICINE

## 2019-09-17 PROCEDURE — 3079F PR MOST RECENT DIASTOLIC BLOOD PRESSURE 80-89 MM HG: ICD-10-PCS | Mod: S$GLB,,, | Performed by: INTERNAL MEDICINE

## 2019-09-17 PROCEDURE — 1101F PT FALLS ASSESS-DOCD LE1/YR: CPT | Mod: S$GLB,,, | Performed by: INTERNAL MEDICINE

## 2019-09-17 PROCEDURE — 99214 OFFICE O/P EST MOD 30 MIN: CPT | Mod: S$GLB,,, | Performed by: INTERNAL MEDICINE

## 2019-09-17 PROCEDURE — 3075F PR MOST RECENT SYSTOLIC BLOOD PRESS GE 130-139MM HG: ICD-10-PCS | Mod: S$GLB,,, | Performed by: INTERNAL MEDICINE

## 2019-09-17 PROCEDURE — 99214 PR OFFICE/OUTPT VISIT, EST, LEVL IV, 30-39 MIN: ICD-10-PCS | Mod: S$GLB,,, | Performed by: INTERNAL MEDICINE

## 2019-09-17 PROCEDURE — 99999 PR PBB SHADOW E&M-EST. PATIENT-LVL III: CPT | Mod: PBBFAC,,, | Performed by: INTERNAL MEDICINE

## 2019-09-17 PROCEDURE — 1101F PR PT FALLS ASSESS DOC 0-1 FALLS W/OUT INJ PAST YR: ICD-10-PCS | Mod: S$GLB,,, | Performed by: INTERNAL MEDICINE

## 2019-09-17 RX ORDER — PANTOPRAZOLE SODIUM 20 MG/1
20 TABLET, DELAYED RELEASE ORAL DAILY
Qty: 90 TABLET | Refills: 3 | Status: SHIPPED | OUTPATIENT
Start: 2019-09-17 | End: 2019-10-28 | Stop reason: SDUPTHER

## 2019-09-17 RX ORDER — VALSARTAN AND HYDROCHLOROTHIAZIDE 160; 12.5 MG/1; MG/1
1 TABLET, FILM COATED ORAL DAILY
Qty: 90 TABLET | Refills: 3 | Status: SHIPPED | OUTPATIENT
Start: 2019-09-17 | End: 2021-06-17

## 2019-09-17 RX ORDER — ATORVASTATIN CALCIUM 20 MG/1
20 TABLET, FILM COATED ORAL DAILY
Qty: 90 TABLET | Refills: 3 | Status: SHIPPED | OUTPATIENT
Start: 2019-09-17 | End: 2020-11-04

## 2019-09-17 NOTE — PROGRESS NOTES
Subjective:       Patient ID: Lety Herbert is a 78 y.o. female.    Chief Complaint: Hypertension; Hyperlipidemia; Gastroesophageal Reflux; and Depression    Patient has stop the CPAP device because of a diagnosis of cancer in the nose.        Hypertension   This is a chronic problem. The current episode started more than 1 year ago. The problem has been waxing and waning since onset. The problem is controlled. Associated symptoms include headaches (stable), malaise/fatigue, peripheral edema and shortness of breath. Pertinent negatives include no chest pain, neck pain or palpitations. Risk factors for coronary artery disease include sedentary lifestyle, obesity and dyslipidemia. Past treatments include beta blockers, angiotensin blockers and diuretics. The current treatment provides moderate improvement. Compliance problems include psychosocial issues.  There is no history of coarctation of the aorta, hyperaldosteronism or pheochromocytoma.   Hyperlipidemia   This is a chronic problem. The current episode started more than 1 year ago. The problem is controlled. Exacerbating diseases include obesity. Associated symptoms include myalgias and shortness of breath. Pertinent negatives include no chest pain. Current antihyperlipidemic treatment includes statins. The current treatment provides moderate improvement of lipids. Risk factors for coronary artery disease include hypertension, dyslipidemia, a sedentary lifestyle, post-menopausal and obesity.   Gastroesophageal Reflux   She complains of heartburn. She reports no chest pain, no choking, no coughing or no wheezing. This is a chronic problem. The problem occurs occasionally. The problem has been waxing and waning. Associated symptoms include fatigue. Pertinent negatives include no anemia, melena or weight loss. Risk factors include obesity. She has tried a PPI for the symptoms. The treatment provided moderate relief.   Depression   Visit Type:  follow-up  Patient presents with the following symptoms: anhedonia, excessive worry, malaise and shortness of breath.  Patient is not experiencing: choking sensation, confusion, feelings of worthlessness, hyperventilation, irritability, nausea, nervousness/anxiety, palpitations and weight loss.  Frequency of symptoms: occasionally   Severity: mild   No history of: anemia  Compliance with medications:  %            Past Medical History:   Diagnosis Date    Anemia     Basal cell carcinoma     nose     Cancer     breast    Depression     DVT (deep venous thrombosis)     Gastric ulceration     GERD (gastroesophageal reflux disease)     History of frequent urinary tract infections     Hyperlipidemia     Hypertension     Melanoma 2004?    left forearm    MRSA (methicillin resistant staph aureus) culture positive     Neuropathy     PE (pulmonary embolism)     Reflux      Social History     Socioeconomic History    Marital status:      Spouse name: Jean    Number of children: 3    Years of education: Not on file    Highest education level: Not on file   Occupational History    Not on file   Social Needs    Financial resource strain: Not on file    Food insecurity:     Worry: Not on file     Inability: Not on file    Transportation needs:     Medical: Not on file     Non-medical: Not on file   Tobacco Use    Smoking status: Former Smoker    Smokeless tobacco: Never Used   Substance and Sexual Activity    Alcohol use: No    Drug use: No    Sexual activity: Not Currently     Partners: Male   Lifestyle    Physical activity:     Days per week: Not on file     Minutes per session: Not on file    Stress: Not at all   Relationships    Social connections:     Talks on phone: Not on file     Gets together: Not on file     Attends [employer] service: Not on file     Active member of club or organization: Not on file     Attends meetings of clubs or organizations: Not on file      Relationship status: Not on file   Other Topics Concern    Are you pregnant or think you may be? Not Asked    Breast-feeding Not Asked   Social History Narrative    3 Children- 9 GC, 7 GGrands     Past Surgical History:   Procedure Laterality Date    HYSTERECTOMY      MASTECTOMY, RADICAL Bilateral     TOTAL HIP ARTHROPLASTY Left 11/13/2017     Family History   Problem Relation Age of Onset    Cancer Mother     Cancer Father     Heart disease Father     Melanoma Neg Hx     Psoriasis Neg Hx     Lupus Neg Hx     Eczema Neg Hx        Review of Systems   Constitutional: Positive for fatigue and malaise/fatigue. Negative for activity change, appetite change, chills, fever, irritability, unexpected weight change and weight loss.   HENT: Negative for congestion, postnasal drip and sinus pressure.    Eyes: Negative for pain, discharge and visual disturbance.   Respiratory: Positive for shortness of breath. Negative for cough, choking, chest tightness and wheezing.    Cardiovascular: Negative for chest pain, palpitations and leg swelling.        HTN   Gastrointestinal: Positive for heartburn. Negative for abdominal distention, anal bleeding, constipation, diarrhea, melena and vomiting.   Genitourinary: Positive for frequency. Negative for difficulty urinating, dysuria, flank pain, hematuria and vaginal bleeding.   Musculoskeletal: Positive for arthralgias, back pain (low back pain) and myalgias. Negative for joint swelling and neck pain.        Left hip fracture S/P Arthroplasty NoV 2017   Skin: Negative for color change, pallor and rash.   Allergic/Immunologic: Negative for environmental allergies, food allergies and immunocompromised state.   Neurological: Positive for headaches (stable). Negative for dizziness, tremors, seizures, syncope, light-headedness and numbness.   Hematological: Negative for adenopathy. Does not bruise/bleed easily.   Psychiatric/Behavioral: Positive for depression and sleep disturbance.  "Negative for agitation, confusion and dysphoric mood. The patient is not nervous/anxious.         Patient has a history of depression. Stable on sertraline. She however denies that she is depressed.         Objective:      Blood pressure 139/89, pulse 64, height 5' 6" (1.676 m), weight 111.6 kg (246 lb). Body mass index is 39.71 kg/m².  Physical Exam   Constitutional: She is oriented to person, place, and time. Vital signs are normal. She appears well-developed. She is cooperative. No distress.   BMI is 38.5   HENT:   Head: Normocephalic and atraumatic.   Eyes: Conjunctivae, EOM and lids are normal. Lids are everted and swept, no foreign bodies found. Right pupil is round and reactive. Left pupil is round and reactive.   Neck: Trachea normal and normal range of motion. Neck supple. No JVD present. No tracheal deviation present. No thyromegaly present.   Cardiovascular: Normal rate, regular rhythm, S1 normal, S2 normal and normal heart sounds. Exam reveals no gallop and no friction rub.   Varicosities in legs   Pulmonary/Chest: Breath sounds normal. No stridor. No respiratory distress. She has no wheezes.   Abdominal: Soft. Bowel sounds are normal. There is no rigidity and no guarding.   Patient is obese.   Musculoskeletal: She exhibits edema (trace edema and varicosities). She exhibits no tenderness.   Lymphadenopathy:     She has no cervical adenopathy.   Neurological: She is alert and oriented to person, place, and time. She exhibits normal muscle tone. Coordination normal.   Skin: Skin is warm and dry. No rash noted. No pallor.   Varicose veins are noted in the inferior extremities.   Psychiatric: Her affect is not inappropriate. She is slowed. Cognition and memory are not impaired. She does not exhibit a depressed mood.   Anhedonic She is attentive.   Nursing note and vitals reviewed.        Assessment:       1. Benign essential hypertension    2. Gastroesophageal reflux disease without esophagitis    3. " Multiple-type hyperlipidemia    4. Dysthymia           No visits with results within 3 Month(s) from this visit.   Latest known visit with results is:   Orders Only on 03/04/2019   Component Date Value Ref Range Status    Microalbum.,U,Random 03/04/2019 10.1  0.0 - 19.9 mcg/ml Final    Creatinine, Random Ur 03/04/2019 86.00  mg/dl Final    Microalb Creat Ratio 03/04/2019 12  0 - 30 Final         Plan:           Benign essential hypertension  -     valsartan-hydrochlorothiazide (DIOVAN-HCT) 160-12.5 mg per tablet; Take 1 tablet by mouth once daily.  Dispense: 90 tablet; Refill: 3  -     Comprehensive metabolic panel; Future; Expected date: 02/03/2020  -     Microalbumin/creatinine urine ratio; Future; Expected date: 02/03/2020    Gastroesophageal reflux disease without esophagitis  -     pantoprazole (PROTONIX) 20 MG tablet; Take 1 tablet (20 mg total) by mouth once daily. Dose reduced to 20 mgm with an effort to wean off.  Dispense: 90 tablet; Refill: 3    Multiple-type hyperlipidemia  -     atorvastatin (LIPITOR) 20 MG tablet; Take 1 tablet (20 mg total) by mouth once daily.  Dispense: 90 tablet; Refill: 3  -     Lipid panel; Future; Expected date: 02/03/2020    Dysthymia  Comments:  Depression and is on sertraline.  This seems to be manageable at this point.  Concerns about her  and issues of aging and medical issues contribute to fe    Patient has been advised to watch diet and exercise. Avoid fried and fatty food. Compliance to medications and follow up urged.  Advised Ms. Herbert about age and season appropriate immunizations/ cancer screenings.  Also seasonal influenza vaccine, update on tetanus diphtheria vaccination every 10 years.  Labs ordered for future visit.    Patient likes to take her flu shot with her  at a local urgent care center for sake of continuity and uniformity.    Fall cautions  Please for would be any consultations.        Current Outpatient Medications:     atorvastatin  (LIPITOR) 20 MG tablet, Take 1 tablet (20 mg total) by mouth once daily., Disp: 90 tablet, Rfl: 3    cyanocobalamin (VITAMIN B-12) 100 MCG tablet, Take 100 mcg by mouth once daily., Disp: , Rfl:     fluticasone (FLONASE) 50 mcg/actuation nasal spray, 1 spray by Each Nare route as needed., Disp: , Rfl: 0    ipratropium (ATROVENT) 42 mcg (0.06 %) nasal spray, , Disp: , Rfl: 5    metoprolol succinate (TOPROL-XL) 50 MG 24 hr tablet, Take 1 tablet (50 mg total) by mouth once daily., Disp: 90 tablet, Rfl: 3    multivitamin with iron Tab, Take 1 tablet by mouth once daily at 6am., Disp: , Rfl:     pantoprazole (PROTONIX) 20 MG tablet, Take 1 tablet (20 mg total) by mouth once daily. Dose reduced to 20 mgm with an effort to wean off., Disp: 90 tablet, Rfl: 3    polyethylene glycol (GLYCOLAX) 17 gram PwPk, Take 17 g by mouth once daily., Disp: 90 packet, Rfl: 3    sertraline (ZOLOFT) 100 MG tablet, Take 1 tablet (100 mg total) by mouth once daily., Disp: 90 tablet, Rfl: 3    valsartan-hydrochlorothiazide (DIOVAN-HCT) 160-12.5 mg per tablet, Take 1 tablet by mouth once daily., Disp: 90 tablet, Rfl: 3    vitamin D (VITAMIN D3) 1000 units Tab, Take 1,000 Units by mouth once daily., Disp: , Rfl:

## 2019-09-17 NOTE — PATIENT INSTRUCTIONS
Tips to Control Acid Reflux    To control acid reflux, youll need to make some basic diet and lifestyle changes. The simple steps outlined below may be all youll need to ease discomfort.  Watch what you eat  · Avoid fatty foods and spicy foods.  · Eat fewer acidic foods, such as citrus and tomato-based foods. These can increase symptoms.  · Limit drinking alcohol, caffeine, and fizzy beverages. All increase acid reflux.  · Try limiting chocolate, peppermint, and spearmint. These can worsen acid reflux in some people.  Watch when you eat  · Avoid lying down for 3 hours after eating.  · Do not snack before going to bed.  Raise your head  Raising your head and upper body by 4 to 6 inches helps limit reflux when youre lying down. Put blocks under the head of your bed frame to raise it.  Other changes  · Lose weight, if you need to  · Dont exercise near bedtime  · Avoid tight-fitting clothes  · Limit aspirin and ibuprofen  · Stop smoking   Date Last Reviewed: 7/1/2016 © 2000-2017 [employer]. [address] [place] [address], [place] [address]. All rights reserved. This information is not intended as a substitute for professional medical care. Always follow your healthcare professional's instructions.        Medicine for Cholesterol Control  Cholesterol is a waxy substance in your bloodstream. If there is too much of it in your blood, it can build up in the walls of your arteries. Over time, this buildup can lead to coronary disease. Coronary disease can put you at risk for a heart attack or stroke. It can also put you at risk for disease of the arteries in your legs and other places in your body. Medicine can give you the extra help you need to control your cholesterol.    How medicine helps  Different kinds of medicines help with cholesterol levels. Some help lower your LDL (bad cholesterol). Some help raise your HDL (good cholesterol). Other medicines lower your triglyceride levels. And some do all three.  It may take some time to find the right medicine for you. Taking medicine will be only one part of your cholesterol control plan. You will still need to eat right and get regular exercise.  Talk with your healthcare provider to find out your risks for having a heart attack. Your provider can tell you what goals to use to see if your treatment is working. These goals may vary based on your health issues or family history. Also ask your provider how often your cholesterol should be checked as part of your treatment plan. You may need to fast before getting your cholesterol checked.  Taking your medicine  It is important to:  · Tell your healthcare provider about any other medicines you take. This includes over-the-counter medicines. It also includes vitamins and herbs.  · Take your medicine exactly as directed. This helps make sure that it works as it should.  · Don't skip a dose.  · Don't stop taking it if you feel better.  · Don't stop taking it when your cholesterol numbers improve.  · Order your refill before your medicine runs out.  Side effects  Medicines can cause side effects. These often occur at the start of taking a new medicine. Side effects can include headache and upset stomach. Rarely you can have muscle aches. Tell your healthcare provider about any side effects you have.  When to call your healthcare provider  When taking your medicine, let your healthcare provider know if you have:  · Yellowing of the whites of eyes  · Blurred vision  · Muscle aches  · Trouble breathing   High-risk groups  Some people may need to take medicines called statins to control their cholesterol. This is in addition to eating a healthy diet and getting regular exercise.  Statins can help you stay healthy. They can also help prevent a heart attack or stroke. You may need to take a statin if you are in one of these groups:  · Adults who have had a heart attack or stroke. Or adults who have had peripheral vascular disease, a  ministroke (transient ischemic attack), or stable or unstable angina. This group also includes people who have had a procedure to restore blood flow through a blocked artery. These procedures include percutaneous coronary intervention, angioplasty, stent, and open-heart bypass surgery.  · Adults who have diabetes. Or adults who are at higher risk of having a heart attack or stroke and have an LDL cholesterol level of 70 to 189 mg/dL  · Adults who are 21 years old or older and have an LDL cholesterol level of 190 mg/dL or higher.  If you are in a high-risk group, talk with your healthcare provider about your treatment goals. Make sure you understand why these goals are important, based on your own health history and your family history of heart disease or high cholesterol.  Make a plan to have regular cholesterol checks. You may need to fast before getting this test. Also ask your provider about any side effects your medicines may cause. Let your provider know about any side effects you have. You may need to take more than one medicine to reach the cholesterol goals that you and your provider decide on.  Date Last Reviewed: 10/1/2016  © 2956-3047 [employer]. [address] [place] [address], [place] [address]. All rights reserved. This information is not intended as a substitute for professional medical care. Always follow your healthcare professional's instructions.

## 2019-10-28 ENCOUNTER — HOSPITAL ENCOUNTER (EMERGENCY)
Facility: HOSPITAL | Age: 79
End: 2019-10-28
Attending: EMERGENCY MEDICINE
Payer: MEDICARE

## 2019-10-28 VITALS
SYSTOLIC BLOOD PRESSURE: 160 MMHG | TEMPERATURE: 98 F | WEIGHT: 245 LBS | RESPIRATION RATE: 18 BRPM | BODY MASS INDEX: 39.37 KG/M2 | OXYGEN SATURATION: 98 % | DIASTOLIC BLOOD PRESSURE: 74 MMHG | HEART RATE: 67 BPM | HEIGHT: 66 IN

## 2019-10-28 DIAGNOSIS — S32.010A CLOSED COMPRESSION FRACTURE OF BODY OF L1 VERTEBRA: ICD-10-CM

## 2019-10-28 DIAGNOSIS — M54.9 INTRACTABLE BACK PAIN: Primary | ICD-10-CM

## 2019-10-28 DIAGNOSIS — K21.9 GASTROESOPHAGEAL REFLUX DISEASE WITHOUT ESOPHAGITIS: ICD-10-CM

## 2019-10-28 LAB
ALBUMIN SERPL BCP-MCNC: 4.4 G/DL (ref 3.5–5.2)
ALP SERPL-CCNC: 71 U/L (ref 55–135)
ALT SERPL W/O P-5'-P-CCNC: 21 U/L (ref 10–44)
ANION GAP SERPL CALC-SCNC: 10 MMOL/L (ref 8–16)
AST SERPL-CCNC: 27 U/L (ref 10–40)
BASOPHILS # BLD AUTO: 0.07 K/UL (ref 0–0.2)
BASOPHILS NFR BLD: 0.5 % (ref 0–1.9)
BILIRUB SERPL-MCNC: 1.1 MG/DL (ref 0.1–1)
BUN SERPL-MCNC: 12 MG/DL (ref 8–23)
CALCIUM SERPL-MCNC: 9.3 MG/DL (ref 8.7–10.5)
CHLORIDE SERPL-SCNC: 99 MMOL/L (ref 95–110)
CO2 SERPL-SCNC: 28 MMOL/L (ref 23–29)
CREAT SERPL-MCNC: 0.5 MG/DL (ref 0.5–1.4)
DIFFERENTIAL METHOD: ABNORMAL
EOSINOPHIL # BLD AUTO: 0.1 K/UL (ref 0–0.5)
EOSINOPHIL NFR BLD: 0.9 % (ref 0–8)
ERYTHROCYTE [DISTWIDTH] IN BLOOD BY AUTOMATED COUNT: 12.7 % (ref 11.5–14.5)
EST. GFR  (AFRICAN AMERICAN): >60 ML/MIN/1.73 M^2
EST. GFR  (NON AFRICAN AMERICAN): >60 ML/MIN/1.73 M^2
GLUCOSE SERPL-MCNC: 106 MG/DL (ref 70–110)
HCT VFR BLD AUTO: 41.4 % (ref 37–48.5)
HGB BLD-MCNC: 13.3 G/DL (ref 12–16)
IMM GRANULOCYTES # BLD AUTO: 0.08 K/UL (ref 0–0.04)
IMM GRANULOCYTES NFR BLD AUTO: 0.6 % (ref 0–0.5)
LYMPHOCYTES # BLD AUTO: 2 K/UL (ref 1–4.8)
LYMPHOCYTES NFR BLD: 15 % (ref 18–48)
MCH RBC QN AUTO: 29.8 PG (ref 27–31)
MCHC RBC AUTO-ENTMCNC: 32.1 G/DL (ref 32–36)
MCV RBC AUTO: 93 FL (ref 82–98)
MONOCYTES # BLD AUTO: 0.8 K/UL (ref 0.3–1)
MONOCYTES NFR BLD: 5.9 % (ref 4–15)
NEUTROPHILS # BLD AUTO: 10.3 K/UL (ref 1.8–7.7)
NEUTROPHILS NFR BLD: 77.1 % (ref 38–73)
NRBC BLD-RTO: 0 /100 WBC
PLATELET # BLD AUTO: 228 K/UL (ref 150–350)
PMV BLD AUTO: 11.1 FL (ref 9.2–12.9)
POTASSIUM SERPL-SCNC: 4 MMOL/L (ref 3.5–5.1)
PROT SERPL-MCNC: 8.1 G/DL (ref 6–8.4)
RBC # BLD AUTO: 4.47 M/UL (ref 4–5.4)
SODIUM SERPL-SCNC: 137 MMOL/L (ref 136–145)
WBC # BLD AUTO: 13.3 K/UL (ref 3.9–12.7)

## 2019-10-28 PROCEDURE — 63600175 PHARM REV CODE 636 W HCPCS: Performed by: EMERGENCY MEDICINE

## 2019-10-28 PROCEDURE — 93005 ELECTROCARDIOGRAM TRACING: CPT

## 2019-10-28 PROCEDURE — 85025 COMPLETE CBC W/AUTO DIFF WBC: CPT

## 2019-10-28 PROCEDURE — 96374 THER/PROPH/DIAG INJ IV PUSH: CPT

## 2019-10-28 PROCEDURE — 80053 COMPREHEN METABOLIC PANEL: CPT

## 2019-10-28 PROCEDURE — 96376 TX/PRO/DX INJ SAME DRUG ADON: CPT

## 2019-10-28 PROCEDURE — 99285 EMERGENCY DEPT VISIT HI MDM: CPT | Mod: 25

## 2019-10-28 PROCEDURE — 96375 TX/PRO/DX INJ NEW DRUG ADDON: CPT

## 2019-10-28 RX ORDER — MORPHINE SULFATE 2 MG/ML
2 INJECTION, SOLUTION INTRAMUSCULAR; INTRAVENOUS
Status: COMPLETED | OUTPATIENT
Start: 2019-10-28 | End: 2019-10-28

## 2019-10-28 RX ORDER — ONDANSETRON 2 MG/ML
4 INJECTION INTRAMUSCULAR; INTRAVENOUS
Status: COMPLETED | OUTPATIENT
Start: 2019-10-28 | End: 2019-10-28

## 2019-10-28 RX ORDER — PANTOPRAZOLE SODIUM 20 MG/1
TABLET, DELAYED RELEASE ORAL
Qty: 90 TABLET | Refills: 1 | Status: SHIPPED | OUTPATIENT
Start: 2019-10-28 | End: 2020-11-04

## 2019-10-28 RX ADMIN — ONDANSETRON 4 MG: 2 INJECTION INTRAMUSCULAR; INTRAVENOUS at 03:10

## 2019-10-28 RX ADMIN — ONDANSETRON 4 MG: 2 INJECTION INTRAMUSCULAR; INTRAVENOUS at 04:10

## 2019-10-28 RX ADMIN — MORPHINE SULFATE 2 MG: 2 INJECTION, SOLUTION INTRAMUSCULAR; INTRAVENOUS at 07:10

## 2019-10-28 RX ADMIN — MORPHINE SULFATE 2 MG: 2 INJECTION, SOLUTION INTRAMUSCULAR; INTRAVENOUS at 03:10

## 2019-10-28 RX ADMIN — MORPHINE SULFATE 2 MG: 2 INJECTION, SOLUTION INTRAMUSCULAR; INTRAVENOUS at 04:10

## 2019-10-28 NOTE — ED PROVIDER NOTES
Encounter Date: 10/28/2019       History     Chief Complaint   Patient presents with    Back Pain     DOG KNOCKED HER DOWN OUTSIDE, LANDING ON BUTT, REPORTS LOW BACK PAIN, NO LOC     78-year-old female with history of anemia, basal cell carcinoma, hypertension, hyperlipidemia, depression, pulmonary embolism, DVT.  Patient presents to the emergency department status post mechanical trip and fall.  Patient states since tripping and falling landing on her back she has had persistent lower back pain since incident.  Patient denies head trauma. Denies nausea, no neck pain, does admit to mid upper back pain and lower back pain.  Denies lower extremity trauma, no weakness, no paresthesia denies any other associated musculoskeletal trauma.        Review of patient's allergies indicates:   Allergen Reactions    Meperidine     Promethazine     Bactrim [sulfamethoxazole-trimethoprim] Rash     Past Medical History:   Diagnosis Date    Anemia     Basal cell carcinoma     nose     Cancer     breast    Depression     DVT (deep venous thrombosis)     Gastric ulceration     GERD (gastroesophageal reflux disease)     History of frequent urinary tract infections     Hyperlipidemia     Hypertension     Melanoma 2004?    left forearm    MRSA (methicillin resistant staph aureus) culture positive     Neuropathy     PE (pulmonary embolism)     Reflux      Past Surgical History:   Procedure Laterality Date    HYSTERECTOMY      MASTECTOMY, RADICAL Bilateral     TOTAL HIP ARTHROPLASTY Left 11/13/2017     Family History   Problem Relation Age of Onset    Cancer Mother     Cancer Father     Heart disease Father     Melanoma Neg Hx     Psoriasis Neg Hx     Lupus Neg Hx     Eczema Neg Hx      Social History     Tobacco Use    Smoking status: Former Smoker    Smokeless tobacco: Never Used   Substance Use Topics    Alcohol use: No    Drug use: No     Review of Systems   Constitutional: Negative for fever.   HENT:  Negative for facial swelling, postnasal drip, sinus pain and sore throat.    Eyes: Negative for redness.   Respiratory: Negative for shortness of breath.    Cardiovascular: Negative for chest pain.   Gastrointestinal: Negative for nausea.   Genitourinary: Negative for dysuria.   Musculoskeletal: Positive for back pain and myalgias.   Skin: Negative for rash.   Neurological: Negative for dizziness, weakness, light-headedness and headaches.   Hematological: Does not bruise/bleed easily.   Psychiatric/Behavioral: Negative for behavioral problems, dysphoric mood, hallucinations and self-injury. The patient is not nervous/anxious and is not hyperactive.        Physical Exam     Initial Vitals [10/28/19 1204]   BP Pulse Resp Temp SpO2   (!) 181/87 66 18 97.9 °F (36.6 °C) 96 %      MAP       --         Physical Exam    Nursing note and vitals reviewed.  Constitutional: She appears well-developed and well-nourished. She is not diaphoretic. No distress.   HENT:   Head: Normocephalic and atraumatic.   Nose: Nose normal.   Mouth/Throat: Oropharynx is clear and moist.   Eyes: Conjunctivae and EOM are normal. Pupils are equal, round, and reactive to light.   Neck: Normal range of motion. Neck supple. No thyromegaly present. No tracheal deviation present.   Cardiovascular: Normal rate, regular rhythm, normal heart sounds and intact distal pulses. Exam reveals no gallop and no friction rub.    No murmur heard.  Pulmonary/Chest: No stridor. No respiratory distress. She has no wheezes. She has no rhonchi. She has no rales.   Course bilateral breath sounds or adventitious sounds   Abdominal: Soft. Bowel sounds are normal. She exhibits no mass. There is no rebound and no guarding.   Musculoskeletal: Normal range of motion. She exhibits no edema.   Lymphadenopathy:     She has no cervical adenopathy.   Neurological: She is alert and oriented to person, place, and time. She has normal strength and normal reflexes. She displays normal  reflexes. No cranial nerve deficit or sensory deficit. GCS score is 15. GCS eye subscore is 4. GCS verbal subscore is 5. GCS motor subscore is 6.   Skin: Skin is warm and dry. Capillary refill takes less than 2 seconds.   Psychiatric: She has a normal mood and affect.         ED Course   Procedures  Labs Reviewed   CBC W/ AUTO DIFFERENTIAL - Abnormal; Notable for the following components:       Result Value    WBC 13.30 (*)     Immature Granulocytes 0.6 (*)     Gran # (ANC) 10.3 (*)     Immature Grans (Abs) 0.08 (*)     Gran% 77.1 (*)     Lymph% 15.0 (*)     All other components within normal limits   COMPREHENSIVE METABOLIC PANEL - Abnormal; Notable for the following components:    Total Bilirubin 1.1 (*)     All other components within normal limits        ECG Results          EKG 12-lead (In process)  Result time 10/28/19 17:24:48    In process by Interface, Lab In [facility] (10/28/19 17:24:48)                 Narrative:    Test Reason : M54.9,    Vent. Rate : 067 BPM     Atrial Rate : 300 BPM     P-R Int : 000 ms          QRS Dur : 090 ms      QT Int : 426 ms       P-R-T Axes : 075 025 039 degrees     QTc Int : 450 ms    Atrial flutter  Abnormal ECG  When compared with ECG of 18-JUN-2008 17:10,  Atrial flutter has replaced Sinus rhythm    Referred By: [email or web address]   SELF           Confirmed By:                             Imaging Results          CT Thoracic Spine Without Contrast (Final result)  Result time 10/28/19 15:57:06    Final result by Moisés Gutierrez MD (10/28/19 15:57:06)                 Impression:      1.  No acute osseous abnormality in the thoracic spine.    2.  Degenerative disc height loss throughout the thoracic spine.      Electronically signed by: Moisés Gutierrez MD  Date:    10/28/2019  Time:    15:57             Narrative:    CLINICAL HISTORY:  ([id number])77 y/o  (1940) F    back pain;    TECHNIQUE:  (CASSANDRA#[id number], exam time 10/28/2019 15:48)    CT THORACIC SPINE WITHOUT CONTRAST  [id number]    Axial CT images of the thoracic spine were obtained with bone and soft tissue algorithm. Sagittal and coronal reformatted images are available for review.    CONTRAST:    No contrast was administered.    CMS MANDATED QUALITY DATA - CT RADIATION - 436    All CT scans at this facility utilize dose modulation, iterative reconstruction, and/or weight based dosing when appropriate to reduce radiation dose to as low as reasonably achievable.    COMPARISON:  None available.    FINDINGS:  Twelve non rib-bearing lumbar vertebral segments.  The vertebral body heights are maintained in the thoracic spine.  There is multilevel degenerative disc height loss throughout the thoracic spine.  No significant spinal canal or neural foraminal stenosis is seen in the thoracic spine.    The visualized prevertebral soft tissues show scattered atheromatous calcifications in the aortic arch and coronary arteries.  The visualized lungs are essentially clear.                               CT Lumbar Spine Without Contrast (Final result)  Result time 10/28/19 15:53:04    Final result by Moisés Gutierrez MD (10/28/19 15:53:04)                 Impression:      1.  Age-indeterminate anterior superior endplate compression fracture deformity of the L1 vertebral body (likely acute to subacute given the imaging appearance).    2.  Dextroscoliosis at the thoracolumbar junction.    3.  Multilevel degenerative disc disease and facet arthropathy as outlined in detail above      Electronically signed by: Moisés Gutierrez MD  Date:    10/28/2019  Time:    15:53             Narrative:    CLINICAL HISTORY:  ([id number])79 y/o  (1940) F    back pain;    TECHNIQUE:  (A#[id number], exam time 10/28/2019 15:46)    CT LUMBAR SPINE WITHOUT CONTRAST [id number]    Axial CT images of the lumbar spine were obtained with bone and soft tissue algorithm. Sagittal and coronal reformatted images are available for review.    CONTRAST:    No contrast was  administered.    CMS MANDATED QUALITY DATA - CT RADIATION - 436    All CT scans at this facility utilize dose modulation, iterative reconstruction, and/or weight based dosing when appropriate to reduce radiation dose to as low as reasonably achievable.    COMPARISON:  None available.    FINDINGS:  This report assumes that there are 5 non-rib bearing lumbar vertebral bodies with the L5 vertebral body articulating with the sacrum. Accurate numbering of the spine levels would require imaging of the thoracolumbar spine to count ribs.    The prevertebral soft tissues show scattered atheromatous calcifications in the abdominal aorta and iliacs with no aneurysm identified.  Dextroscoliosis of the thoracolumbar junction.    Age-indeterminate anterior superior compression fracture deformity of the L1 vertebral body (25% height loss of the anterior column) and no significant retropulsion.    At L1-2,mild disc height loss with a small broad-based posterior disc bulge.  There is mild bilateral facet arthropathy.  This causes mild bilateral neural foraminal stenosis without spinal canal stenosis.    At L2-3, mild disc height loss with a small broad-based posterior disc bulge.  There is moderate right and mild left facet arthropathy.  This causes mild bilateral neural foraminal stenosis without spinal canal stenosis.    At L3-4, the disc demonstrates mild disc height loss with a small broad-based posterior disc bulge.  There is severe bilateral facet arthropathy.  This causes mild-to-moderate spinal canal and bilateral neural foraminal stenosis.    At L4-5, the disc demonstrates moderate disc height loss with severe bilateral facet arthropathy.  There is a small broad-based posterior disc bulge.  This causes severe bilateral neural foraminal stenosis and moderate spinal canal stenosis.    At L5-S1, the disc demonstrates moderate disc height loss with a moderate sized broad-based posterior disc bulge.  There is severe bilateral  facet arthropathy.  This causes moderate to severe bilateral neural foraminal stenosis and mild-to-moderate spinal canal stenosis.    The visualized SI joints and pelvis are within normal limits.                               X-Ray Lumbar Spine Complete 5 View (Final result)  Result time 10/28/19 13:58:26   Procedure changed from X-Ray Lumbar Spine Ap And Lateral     Final result by Moisés Gutierrez MD (10/28/19 13:58:26)                 Impression:      1.  Age-indeterminate anterior superior endplate compression deformity of L1.    2.  Dextroscoliosis.    3.  Degenerative disc height loss and facet arthropathy in the lumbar spine.      Electronically signed by: Moisés Gutierrez MD  Date:    10/28/2019  Time:    13:58             Narrative:    CLINICAL HISTORY:  ([id number])79 y/o  (1940) F    T/L-spine trauma, minor-mod, low back pain;    TECHNIQUE:  (A#[id number], exam time 10/28/2019 13:54)    XR LUMBAR SPINE COMPLETE 5 VIEW [id number]    view(s) obtained.    COMPARISON:  None.    FINDINGS:  There are 5 non rib bearing lumbar vertebral segments.  Age-indeterminate anterior superior endplate compression deformity of L1 (25% height loss of the anterior column) with no retropulsion.  Dextroscoliosis of the thoracolumbar junction of approximately 10 degrees from T11-L3.  Vertebral body heights are otherwise maintained.  Moderate disc height loss at L4-L5 and L5-S1 with moderate to severe facet arthropathy at L3-L4, L4-L5 and L5-S1. The SI joints and visualized sacrum are normal. There are atheromatous calcifications seen in the aorta.                                 Medical Decision Making:   Initial Assessment:   78-year-old female status post mechanical lower back pain after trip and fall.  Differential Diagnosis:   Acute lumbar compression fracture, thoracic compression fracture, lumbar thoracic contusion, musculoskeletal strain,  Clinical Tests:   Lab Tests: Ordered and Reviewed  Radiological Study: Ordered and  Reviewed  Medical Tests: Ordered and Reviewed                      Clinical Impression:       ICD-10-CM ICD-9-CM   1. Intractable back pain M54.9 724.5   2. Closed compression fracture of body of L1 vertebra S32.010A 805.4                                Fede Brandon MD  10/28/19 2030

## 2019-11-11 ENCOUNTER — TELEPHONE (OUTPATIENT)
Dept: NEUROSURGERY | Facility: CLINIC | Age: 79
End: 2019-11-11

## 2019-11-11 DIAGNOSIS — S32.010A CLOSED COMPRESSION FRACTURE OF BODY OF L1 VERTEBRA: Primary | ICD-10-CM

## 2019-11-11 NOTE — TELEPHONE ENCOUNTER
Attempted to contact patient regarding scheduled appointment with Dr. Vickers. No answer. Unable to leave voicemail.

## 2019-11-26 ENCOUNTER — OFFICE VISIT (OUTPATIENT)
Dept: SPINE | Facility: CLINIC | Age: 79
End: 2019-11-26
Payer: MEDICARE

## 2019-11-26 ENCOUNTER — HOSPITAL ENCOUNTER (OUTPATIENT)
Dept: RADIOLOGY | Facility: HOSPITAL | Age: 79
Discharge: HOME | End: 2019-11-26
Attending: NEUROLOGICAL SURGERY
Payer: MEDICARE

## 2019-11-26 VITALS
HEART RATE: 71 BPM | BODY MASS INDEX: 39.54 KG/M2 | SYSTOLIC BLOOD PRESSURE: 129 MMHG | WEIGHT: 246.06 LBS | DIASTOLIC BLOOD PRESSURE: 77 MMHG | HEIGHT: 66 IN

## 2019-11-26 DIAGNOSIS — S32.010A CLOSED COMPRESSION FRACTURE OF BODY OF L1 VERTEBRA: Primary | ICD-10-CM

## 2019-11-26 DIAGNOSIS — S32.010A CLOSED COMPRESSION FRACTURE OF BODY OF L1 VERTEBRA: ICD-10-CM

## 2019-11-26 PROCEDURE — 3074F PR MOST RECENT SYSTOLIC BLOOD PRESSURE < 130 MM HG: ICD-10-PCS | Mod: CPTII,S$GLB,, | Performed by: PHYSICIAN ASSISTANT

## 2019-11-26 PROCEDURE — 99999 PR PBB SHADOW E&M-EST. PATIENT-LVL IV: CPT | Mod: PBBFAC,,, | Performed by: PHYSICIAN ASSISTANT

## 2019-11-26 PROCEDURE — 72100 XR LUMBAR SPINE AP AND LATERAL: ICD-10-PCS | Mod: 26,,, | Performed by: RADIOLOGY

## 2019-11-26 PROCEDURE — 1125F PR PAIN SEVERITY QUANTIFIED, PAIN PRESENT: ICD-10-PCS | Mod: S$GLB,,, | Performed by: PHYSICIAN ASSISTANT

## 2019-11-26 PROCEDURE — 72100 X-RAY EXAM L-S SPINE 2/3 VWS: CPT | Mod: 26,,, | Performed by: RADIOLOGY

## 2019-11-26 PROCEDURE — 99999 PR PBB SHADOW E&M-EST. PATIENT-LVL IV: ICD-10-PCS | Mod: PBBFAC,,, | Performed by: PHYSICIAN ASSISTANT

## 2019-11-26 PROCEDURE — 1101F PR PT FALLS ASSESS DOC 0-1 FALLS W/OUT INJ PAST YR: ICD-10-PCS | Mod: CPTII,S$GLB,, | Performed by: PHYSICIAN ASSISTANT

## 2019-11-26 PROCEDURE — 1159F PR MEDICATION LIST DOCUMENTED IN MEDICAL RECORD: ICD-10-PCS | Mod: S$GLB,,, | Performed by: PHYSICIAN ASSISTANT

## 2019-11-26 PROCEDURE — 3074F SYST BP LT 130 MM HG: CPT | Mod: CPTII,S$GLB,, | Performed by: PHYSICIAN ASSISTANT

## 2019-11-26 PROCEDURE — 3078F PR MOST RECENT DIASTOLIC BLOOD PRESSURE < 80 MM HG: ICD-10-PCS | Mod: CPTII,S$GLB,, | Performed by: PHYSICIAN ASSISTANT

## 2019-11-26 PROCEDURE — 1101F PT FALLS ASSESS-DOCD LE1/YR: CPT | Mod: CPTII,S$GLB,, | Performed by: PHYSICIAN ASSISTANT

## 2019-11-26 PROCEDURE — 72100 X-RAY EXAM L-S SPINE 2/3 VWS: CPT | Mod: TC,FY,PO

## 2019-11-26 PROCEDURE — 99204 PR OFFICE/OUTPT VISIT, NEW, LEVL IV, 45-59 MIN: ICD-10-PCS | Mod: S$GLB,,, | Performed by: PHYSICIAN ASSISTANT

## 2019-11-26 PROCEDURE — 99204 OFFICE O/P NEW MOD 45 MIN: CPT | Mod: S$GLB,,, | Performed by: PHYSICIAN ASSISTANT

## 2019-11-26 PROCEDURE — 1125F AMNT PAIN NOTED PAIN PRSNT: CPT | Mod: S$GLB,,, | Performed by: PHYSICIAN ASSISTANT

## 2019-11-26 PROCEDURE — 3078F DIAST BP <80 MM HG: CPT | Mod: CPTII,S$GLB,, | Performed by: PHYSICIAN ASSISTANT

## 2019-11-26 PROCEDURE — 1159F MED LIST DOCD IN RCRD: CPT | Mod: S$GLB,,, | Performed by: PHYSICIAN ASSISTANT

## 2019-11-26 RX ORDER — TIZANIDINE 4 MG/1
4 TABLET ORAL EVERY 12 HOURS PRN
Qty: 40 TABLET | Refills: 0 | Status: SHIPPED | OUTPATIENT
Start: 2019-11-26 | End: 2020-01-07 | Stop reason: SDUPTHER

## 2019-11-26 NOTE — LETTER
November 26, 2019      Lavonne Vickers MD  [address] [facility] [address] [place] [address]           [facility]  [address]  [facility] [address]  Phone: [phone]  Fax: [phone]          Patient: Lety Herbert   MR Number: [id number]   YOB: 1940   Date of Visit: 11/26/2019       Dear Dr. Lavonne Vickers:    Thank you for referring Lety Herbert to me for evaluation. Attached you will find relevant portions of my assessment and plan of care.    If you have questions, please do not hesitate to call me. I look forward to following Lety Herbert along with you.    Sincerely,    Celine Garcia PA-C    Enclosure  CC:  No Recipients    If you would like to receive this communication electronically, please contact [email or web address] or [phone] to request more information on [facility] access.    For providers and/or their staff who would like to refer a patient to [facility], please contact us through our one-stop-shop provider referral line, [facility], at [phone].    If you feel you have received this communication in error or would no longer like to receive these types of communications, please e-mail [email or web address]

## 2019-11-27 NOTE — PROGRESS NOTES
Back and Spine New Patient    Patient ID: Lety Herbert is a 79 y.o. female.    Chief Complaint   Patient presents with    Low-back Pain     She has had low back pain since falling 10-28-19 and having a L1 compression fracture. She is wearing a back brace. Lying down eases the pain. Standing causes constant pain. Nothing helps pain. Walking and sitting makes pain worse.       Review of Systems   Constitutional: Negative for activity change, appetite change, chills, fever and unexpected weight change.   HENT: Negative for tinnitus, trouble swallowing and voice change.    Respiratory: Negative for apnea, cough, chest tightness and shortness of breath.    Cardiovascular: Negative for chest pain and palpitations.   Gastrointestinal: Negative for constipation, diarrhea, nausea and vomiting.   Genitourinary: Negative for difficulty urinating, dysuria, frequency and urgency.   Musculoskeletal: Positive for back pain. Negative for gait problem, myalgias, neck pain and neck stiffness.   Skin: Negative for wound.   Neurological: Negative for dizziness, tremors, seizures, facial asymmetry, speech difficulty, weakness, light-headedness, numbness and headaches.   Psychiatric/Behavioral: Negative for confusion and decreased concentration.       Past Medical History:   Diagnosis Date    Anemia     Basal cell carcinoma     nose     Cancer     breast    Depression     DVT (deep venous thrombosis)     Gastric ulceration     GERD (gastroesophageal reflux disease)     History of frequent urinary tract infections     Hyperlipidemia     Hypertension     Melanoma 2004?    left forearm    MRSA (methicillin resistant staph aureus) culture positive     Neuropathy     PE (pulmonary embolism)     Reflux      Social History     Socioeconomic History    Marital status:      Spouse name: Jean    Number of children: 3    Years of education: Not on file    Highest education level: Not on file   Occupational  History    Not on file   Social Needs    Financial resource strain: Not on file    Food insecurity:     Worry: Not on file     Inability: Not on file    Transportation needs:     Medical: Not on file     Non-medical: Not on file   Tobacco Use    Smoking status: Former Smoker    Smokeless tobacco: Never Used   Substance and Sexual Activity    Alcohol use: No    Drug use: No    Sexual activity: Not Currently     Partners: Male   Lifestyle    Physical activity:     Days per week: Not on file     Minutes per session: Not on file    Stress: Not at all   Relationships    Social connections:     Talks on phone: Not on file     Gets together: Not on file     Attends [employer] service: Not on file     Active member of club or organization: Not on file     Attends meetings of clubs or organizations: Not on file     Relationship status: Not on file   Other Topics Concern    Are you pregnant or think you may be? Not Asked    Breast-feeding Not Asked   Social History Narrative    3 Children- 9 GC, 7 GGrands     Family History   Problem Relation Age of Onset    Cancer Mother     Cancer Father     Heart disease Father     Melanoma Neg Hx     Psoriasis Neg Hx     Lupus Neg Hx     Eczema Neg Hx      Review of patient's allergies indicates:   Allergen Reactions    Meperidine     Promethazine     Bactrim [sulfamethoxazole-trimethoprim] Rash       Current Outpatient Medications:     atorvastatin (LIPITOR) 20 MG tablet, Take 1 tablet (20 mg total) by mouth once daily., Disp: 90 tablet, Rfl: 3    cholecalciferol, vitamin D3, 5,000 unit capsule, Take 5,000 Units by mouth once daily. , Disp: , Rfl:     cyanocobalamin (VITAMIN B-12) 100 MCG tablet, Take 1,000 mcg by mouth once daily. , Disp: , Rfl:     fluticasone (FLONASE) 50 mcg/actuation nasal spray, 1 spray by Each Nare route as needed., Disp: , Rfl: 0    ipratropium (ATROVENT) 42 mcg (0.06 %) nasal spray, 1 spray by Nasal route 3 (three) times daily. ,  "Disp: , Rfl: 5    metoprolol succinate (TOPROL-XL) 50 MG 24 hr tablet, Take 1 tablet (50 mg total) by mouth once daily. (Patient taking differently: Take 50 mg by mouth every evening. ), Disp: 90 tablet, Rfl: 3    multivitamin with iron Tab, Take 1 tablet by mouth once daily at 6am., Disp: , Rfl:     pantoprazole (PROTONIX) 20 MG tablet, TAKE ONE TABLET BY MOUTH ONCE DAILY, Disp: 90 tablet, Rfl: 1    polyethylene glycol (GLYCOLAX) 17 gram PwPk, Take 17 g by mouth once daily., Disp: 90 packet, Rfl: 3    sertraline (ZOLOFT) 100 MG tablet, Take 1 tablet (100 mg total) by mouth once daily., Disp: 90 tablet, Rfl: 3    valsartan-hydrochlorothiazide (DIOVAN-HCT) 160-12.5 mg per tablet, Take 1 tablet by mouth once daily., Disp: 90 tablet, Rfl: 3    tiZANidine (ZANAFLEX) 4 MG tablet, Take 1 tablet (4 mg total) by mouth every 12 (twelve) hours as needed (muscle spasms/ pain)., Disp: 40 tablet, Rfl: 0    Vitals:    11/26/19 1503   BP: 129/77   BP Location: Left arm   Patient Position: Sitting   BP Method: Medium (Automatic)   Pulse: 71   Weight: 111.6 kg (246 lb 0.5 oz)   Height: 5' 6" (1.676 m)       Physical Exam   Constitutional: She is oriented to person, place, and time. She appears well-developed and well-nourished.   HENT:   Head: Normocephalic and atraumatic.   Eyes: Pupils are equal, round, and reactive to light.   Neck: Normal range of motion. Neck supple.   Cardiovascular: Normal rate.   Pulmonary/Chest: Effort normal.   Musculoskeletal: Normal range of motion. She exhibits no edema.   Neurological: She is alert and oriented to person, place, and time. She has a normal Finger-Nose-Finger Test, a normal Heel to Shin Test, a normal Romberg Test and a normal Tandem Gait Test. Gait normal.   Reflex Scores:       Tricep reflexes are 2+ on the right side and 2+ on the left side.       Bicep reflexes are 2+ on the right side and 2+ on the left side.       Brachioradialis reflexes are 2+ on the right side and 2+ on " the left side.       Patellar reflexes are 2+ on the right side and 2+ on the left side.       Achilles reflexes are 2+ on the right side and 2+ on the left side.  Skin: Skin is warm, dry and intact.   Psychiatric: She has a normal mood and affect. Her speech is normal and behavior is normal. Judgment and thought content normal.   Nursing note and vitals reviewed.      Neurologic Exam     Mental Status   Oriented to person, place, and time.   Oriented to person.   Oriented to place.   Oriented to time.   Follows 3 step commands.   Attention: normal. Concentration: normal.   Speech: speech is normal   Level of consciousness: alert  Knowledge: consistent with education.   Able to name object. Able to read. Able to repeat. Able to write. Normal comprehension.     Cranial Nerves     CN II   Visual acuity: normal  Right visual field deficit: none  Left visual field deficit: none     CN III, IV, VI   Pupils are equal, round, and reactive to light.  Right pupil: Size: 3 mm. Shape: regular. Reactivity: brisk. Consensual response: intact.   Left pupil: Size: 3 mm. Shape: regular. Reactivity: brisk. Consensual response: intact.   CN III: no CN III palsy  CN VI: no CN VI palsy  Nystagmus: none   Diplopia: none  Ophthalmoparesis: none  Conjugate gaze: present    CN V   Right facial sensation deficit: none  Left facial sensation deficit: none    CN VII   Right facial weakness: none  Left facial weakness: none    CN VIII   Hearing: intact    CN IX, X   CN IX normal.   CN X normal.     CN XI   Right sternocleidomastoid strength: normal  Left sternocleidomastoid strength: normal  Right trapezius strength: normal  Left trapezius strength: normal    CN XII   Fasciculations: absent  Tongue deviation: none    Motor Exam   Muscle bulk: normal  Overall muscle tone: normal  Right arm pronator drift: absent  Left arm pronator drift: absent    Strength   Right neck flexion: 5/5  Left neck flexion: 5/5  Right neck extension: 5/5  Left neck  extension: 5/5  Right deltoid: 5/5  Left deltoid: 5/5  Right biceps: 5/5  Left biceps: 5/5  Right triceps: 5/5  Left triceps: 5/5  Right wrist flexion: 5/5  Left wrist flexion: 5/5  Right wrist extension: 5/5  Left wrist extension: 5/5  Right interossei: 5/5  Left interossei: 5/5  Right abdominals: 5/5  Left abdominals: 5/5  Right iliopsoas: 5/5  Left iliopsoas: 5/5  Right quadriceps: 5/5  Left quadriceps: 5/5  Right hamstrin/5  Left hamstrin/5  Right glutei: 5/5  Left glutei: 5/5  Right anterior tibial: 5/5  Left anterior tibial: 5/5  Right posterior tibial: 5/5  Left posterior tibial: 5/5  Right peroneal: 5/5  Left peroneal: 5/5  Right gastroc: 5/5  Left gastroc: 5/5Tender to percussion along entire spine, but most concentrated in the upper lumbar region.  Tenderness is reported as mild by the patient.     Sensory Exam   Right arm light touch: normal  Left arm light touch: normal  Right leg light touch: normal  Left leg light touch: normal  Right arm vibration: normal  Left arm vibration: normal  Right arm pinprick: normal  Left arm pinprick: normal    Gait, Coordination, and Reflexes     Gait  Gait: normal    Coordination   Romberg: negative  Finger to nose coordination: normal  Heel to shin coordination: normal  Tandem walking coordination: normal    Tremor   Resting tremor: absent  Intention tremor: absent  Action tremor: absent    Reflexes   Right brachioradialis: 2+  Left brachioradialis: 2+  Right biceps: 2+  Left biceps: 2+  Right triceps: 2+  Left triceps: 2+  Right patellar: 2+  Left patellar: 2+  Right achilles: 2+  Left achilles: 2+  Right Dumont: absent  Left Dumont: absent  Right ankle clonus: absent  Left ankle clonus: absent      Provider dictation:  79-year-old female former smoker with depression, GERD, hypertension, peripheral neuropathy, migraine, history of breast cancer and pulmonary embolism is referred through the [facility] for evaluation and follow-up of L1 compression  fracture.  She presented to the emergency room at [facility] 10/28/2019 after her dog jumped on her and falling backwards triggering back pain.  She was then transferred to [facility] for neurosurgical care.  At [facility] she was prescribed TLSO brace, pain medication and discharged home.  She feels pain throughout the entire back but most concentrated in the upper lumbar region.  She denies radicular leg pain, numbness, tingling.  Pain increases with activity and improves with lying down.  She has been consistent with wearing the TLSO brace.  Overall pain is better and she is happy with her progress thus far.  She does continue to feel back pain.  She denies lower extremity weakness.  She denies bowel bladder dysfunction.  Oswestry score: 57%.  PHQ:  0.    On exam she is tender to percussion along the entire spine but most concentrated in the upper lumbar region around L1.  She has 5/5 strength in the upper and lower extremities with 2+ muscle stretch reflexes and no sensory deficits.  Gait and station fluid.    I reviewed a CT of the thoracic and lumbar spine from 10/28/2019 revealing L1 superior endplate compression fracture.  There is dextroscoliosis of the thoracolumbar junction.  Multilevel degenerative changes are seen throughout the thoracic and lumbar spine.    She has had repeat x-rays today dated 11/26/2019 of the lumbar spine that reveals worsening L1 compression fracture in comparison to CT from 10/28/2019.    In conclusion, Ms. Quinones is an 79-year-old female with acute onset L1 compression fracture after a fall 10-28-19.  Although current x-rays show worsening of the L1 compression fracture, her overall level of pain is much improved.  She is happy with her progress thus far.  We discussed 2 options:  In light of worsening fracture we can have her see a neurosurgeon to discuss kyphoplasty.  Or because she is improving despite worsening of fracture on imaging,  we can allow her 4 more weeks time with wearing the TLSO brace and see how she continues to do.  She elects to continue with bracing for 4 more weeks.  She should wear the brace anytime she is up and out of the bed.  We removed brace for sleeping in a horizontal position and for hygiene purposes.  Follow-up in 4 weeks with repeat x-rays of the lumbar spine.  Call if any significant worsening of symptoms.    Visit Diagnosis:  Closed compression fracture of body of L1 vertebra  -     X-Ray Lumbar Complete With Flex And Ext; Future; Expected date: 11/26/2019  -     tiZANidine (ZANAFLEX) 4 MG tablet; Take 1 tablet (4 mg total) by mouth every 12 (twelve) hours as needed (muscle spasms/ pain).  Dispense: 40 tablet; Refill: 0        Total time spent counseling greater than fifty percent of total visit time.  Counseling included discussion regarding imaging findings, diagnosis possibilities, treatment options, risks and benefits.   The patient had many questions regarding the options and long-term effects.

## 2020-01-07 ENCOUNTER — HOSPITAL ENCOUNTER (OUTPATIENT)
Dept: RADIOLOGY | Facility: HOSPITAL | Age: 80
Discharge: HOME | End: 2020-01-07
Attending: PHYSICIAN ASSISTANT
Payer: MEDICARE

## 2020-01-07 ENCOUNTER — OFFICE VISIT (OUTPATIENT)
Dept: SPINE | Facility: CLINIC | Age: 80
End: 2020-01-07
Payer: MEDICARE

## 2020-01-07 VITALS
DIASTOLIC BLOOD PRESSURE: 80 MMHG | SYSTOLIC BLOOD PRESSURE: 133 MMHG | BODY MASS INDEX: 39.54 KG/M2 | HEIGHT: 66 IN | HEART RATE: 63 BPM | WEIGHT: 246.06 LBS

## 2020-01-07 DIAGNOSIS — S32.010A CLOSED COMPRESSION FRACTURE OF BODY OF L1 VERTEBRA: Primary | ICD-10-CM

## 2020-01-07 DIAGNOSIS — S32.010A CLOSED COMPRESSION FRACTURE OF BODY OF L1 VERTEBRA: ICD-10-CM

## 2020-01-07 PROCEDURE — 99999 PR PBB SHADOW E&M-EST. PATIENT-LVL V: CPT | Mod: PBBFAC,,, | Performed by: PHYSICIAN ASSISTANT

## 2020-01-07 PROCEDURE — 99213 PR OFFICE/OUTPT VISIT, EST, LEVL III, 20-29 MIN: ICD-10-PCS | Mod: S$GLB,,, | Performed by: PHYSICIAN ASSISTANT

## 2020-01-07 PROCEDURE — 3079F PR MOST RECENT DIASTOLIC BLOOD PRESSURE 80-89 MM HG: ICD-10-PCS | Mod: CPTII,S$GLB,, | Performed by: PHYSICIAN ASSISTANT

## 2020-01-07 PROCEDURE — 1125F PR PAIN SEVERITY QUANTIFIED, PAIN PRESENT: ICD-10-PCS | Mod: S$GLB,,, | Performed by: PHYSICIAN ASSISTANT

## 2020-01-07 PROCEDURE — 1125F AMNT PAIN NOTED PAIN PRSNT: CPT | Mod: S$GLB,,, | Performed by: PHYSICIAN ASSISTANT

## 2020-01-07 PROCEDURE — 1101F PT FALLS ASSESS-DOCD LE1/YR: CPT | Mod: CPTII,S$GLB,, | Performed by: PHYSICIAN ASSISTANT

## 2020-01-07 PROCEDURE — 1159F PR MEDICATION LIST DOCUMENTED IN MEDICAL RECORD: ICD-10-PCS | Mod: S$GLB,,, | Performed by: PHYSICIAN ASSISTANT

## 2020-01-07 PROCEDURE — 99213 OFFICE O/P EST LOW 20 MIN: CPT | Mod: S$GLB,,, | Performed by: PHYSICIAN ASSISTANT

## 2020-01-07 PROCEDURE — 3079F DIAST BP 80-89 MM HG: CPT | Mod: CPTII,S$GLB,, | Performed by: PHYSICIAN ASSISTANT

## 2020-01-07 PROCEDURE — 1101F PR PT FALLS ASSESS DOC 0-1 FALLS W/OUT INJ PAST YR: ICD-10-PCS | Mod: CPTII,S$GLB,, | Performed by: PHYSICIAN ASSISTANT

## 2020-01-07 PROCEDURE — 1159F MED LIST DOCD IN RCRD: CPT | Mod: S$GLB,,, | Performed by: PHYSICIAN ASSISTANT

## 2020-01-07 PROCEDURE — 3075F PR MOST RECENT SYSTOLIC BLOOD PRESS GE 130-139MM HG: ICD-10-PCS | Mod: CPTII,S$GLB,, | Performed by: PHYSICIAN ASSISTANT

## 2020-01-07 PROCEDURE — 72110 X-RAY EXAM L-2 SPINE 4/>VWS: CPT | Mod: TC,PO

## 2020-01-07 PROCEDURE — 3075F SYST BP GE 130 - 139MM HG: CPT | Mod: CPTII,S$GLB,, | Performed by: PHYSICIAN ASSISTANT

## 2020-01-07 PROCEDURE — 99999 PR PBB SHADOW E&M-EST. PATIENT-LVL V: ICD-10-PCS | Mod: PBBFAC,,, | Performed by: PHYSICIAN ASSISTANT

## 2020-01-07 PROCEDURE — 72110 X-RAY EXAM L-2 SPINE 4/>VWS: CPT | Mod: 26,,, | Performed by: RADIOLOGY

## 2020-01-07 PROCEDURE — 72110 XR LUMBAR SPINE 5 VIEW WITH FLEX AND EXT: ICD-10-PCS | Mod: 26,,, | Performed by: RADIOLOGY

## 2020-01-07 RX ORDER — TIZANIDINE 4 MG/1
4 TABLET ORAL EVERY 12 HOURS PRN
Qty: 40 TABLET | Refills: 0 | Status: SHIPPED | OUTPATIENT
Start: 2020-01-07 | End: 2020-03-17

## 2020-01-07 NOTE — Clinical Note
At her visit 1-7-20 I referred her to Dr. Kaur for kyphoplasty.  The appt has not been made yet, can you call to see if she wants to schedule.

## 2020-01-07 NOTE — PATIENT INSTRUCTIONS
I have sent in a refill of zanalfex - the muscle relaxant to help with pain.    With your fracture still causing pain and not healing, I am referring you to Dr. Beto Kaur for the kyphoplasty - bone cement procedure to further resolve pain.    [phone] or [phone]

## 2020-01-12 NOTE — PROGRESS NOTES
Back and Spine Follow Up    Patient ID: Lety Herbert is a 79 y.o. female.    Chief Complaint   Patient presents with    Follow-up     low back pain from compression fracture. Pain is better, but she can't stand for long period with out pain increasing.       Review of Systems   Constitutional: Negative for activity change, appetite change, chills, fever and unexpected weight change.   HENT: Negative for tinnitus, trouble swallowing and voice change.    Respiratory: Negative for apnea, cough, chest tightness and shortness of breath.    Cardiovascular: Negative for chest pain and palpitations.   Gastrointestinal: Negative for constipation, diarrhea, nausea and vomiting.   Genitourinary: Negative for difficulty urinating, dysuria, frequency and urgency.   Musculoskeletal: Positive for back pain. Negative for gait problem, myalgias, neck pain and neck stiffness.   Skin: Negative for wound.   Neurological: Negative for dizziness, tremors, seizures, facial asymmetry, speech difficulty, weakness, light-headedness, numbness and headaches.   Psychiatric/Behavioral: Negative for confusion and decreased concentration.       Past Medical History:   Diagnosis Date    Anemia     Basal cell carcinoma     nose     Cancer     breast    Depression     DVT (deep venous thrombosis)     Gastric ulceration     GERD (gastroesophageal reflux disease)     History of frequent urinary tract infections     Hyperlipidemia     Hypertension     Melanoma 2004?    left forearm    MRSA (methicillin resistant staph aureus) culture positive     Neuropathy     PE (pulmonary embolism)     Reflux      Social History     Socioeconomic History    Marital status:      Spouse name: Jean    Number of children: 3    Years of education: Not on file    Highest education level: Not on file   Occupational History    Not on file   Social Needs    Financial resource strain: Not on file    Food insecurity:     Worry: Not on  file     Inability: Not on file    Transportation needs:     Medical: Not on file     Non-medical: Not on file   Tobacco Use    Smoking status: Former Smoker    Smokeless tobacco: Never Used   Substance and Sexual Activity    Alcohol use: No    Drug use: No    Sexual activity: Not Currently     Partners: Male   Lifestyle    Physical activity:     Days per week: Not on file     Minutes per session: Not on file    Stress: Not at all   Relationships    Social connections:     Talks on phone: Not on file     Gets together: Not on file     Attends [employer] service: Not on file     Active member of club or organization: Not on file     Attends meetings of clubs or organizations: Not on file     Relationship status: Not on file   Other Topics Concern    Are you pregnant or think you may be? Not Asked    Breast-feeding Not Asked   Social History Narrative    3 Children- 9 GC, 7 GGrands     Family History   Problem Relation Age of Onset    Cancer Mother     Cancer Father     Heart disease Father     Melanoma Neg Hx     Psoriasis Neg Hx     Lupus Neg Hx     Eczema Neg Hx      Review of patient's allergies indicates:   Allergen Reactions    Meperidine     Promethazine     Bactrim [sulfamethoxazole-trimethoprim] Rash       Current Outpatient Medications:     atorvastatin (LIPITOR) 20 MG tablet, Take 1 tablet (20 mg total) by mouth once daily., Disp: 90 tablet, Rfl: 3    cholecalciferol, vitamin D3, 5,000 unit capsule, Take 5,000 Units by mouth once daily. , Disp: , Rfl:     cyanocobalamin (VITAMIN B-12) 100 MCG tablet, Take 1,000 mcg by mouth once daily. , Disp: , Rfl:     fluticasone (FLONASE) 50 mcg/actuation nasal spray, 1 spray by Each Nare route as needed., Disp: , Rfl: 0    ipratropium (ATROVENT) 42 mcg (0.06 %) nasal spray, 1 spray by Nasal route 3 (three) times daily. , Disp: , Rfl: 5    metoprolol succinate (TOPROL-XL) 50 MG 24 hr tablet, Take 1 tablet (50 mg total) by mouth once daily.  "(Patient taking differently: Take 50 mg by mouth every evening. ), Disp: 90 tablet, Rfl: 3    multivitamin with iron Tab, Take 1 tablet by mouth once daily at 6am., Disp: , Rfl:     pantoprazole (PROTONIX) 20 MG tablet, TAKE ONE TABLET BY MOUTH ONCE DAILY, Disp: 90 tablet, Rfl: 1    polyethylene glycol (GLYCOLAX) 17 gram PwPk, Take 17 g by mouth once daily., Disp: 90 packet, Rfl: 3    sertraline (ZOLOFT) 100 MG tablet, Take 1 tablet (100 mg total) by mouth once daily., Disp: 90 tablet, Rfl: 3    tiZANidine (ZANAFLEX) 4 MG tablet, Take 1 tablet (4 mg total) by mouth every 12 (twelve) hours as needed (muscle spasms/ pain)., Disp: 40 tablet, Rfl: 0    valsartan-hydrochlorothiazide (DIOVAN-HCT) 160-12.5 mg per tablet, Take 1 tablet by mouth once daily., Disp: 90 tablet, Rfl: 3    Vitals:    01/07/20 1324   BP: 133/80   BP Location: Left arm   Patient Position: Sitting   BP Method: Medium (Automatic)   Pulse: 63   Weight: 111.6 kg (246 lb 0.5 oz)   Height: 5' 6" (1.676 m)       Physical Exam   Constitutional: She is oriented to person, place, and time. She appears well-developed and well-nourished.   HENT:   Head: Normocephalic and atraumatic.   Eyes: Pupils are equal, round, and reactive to light.   Neck: Normal range of motion. Neck supple.   Cardiovascular: Normal rate.   Pulmonary/Chest: Effort normal.   Musculoskeletal: Normal range of motion. She exhibits no edema.   Neurological: She is alert and oriented to person, place, and time. She has a normal Finger-Nose-Finger Test, a normal Heel to Shin Test, a normal Romberg Test and a normal Tandem Gait Test. Gait normal.   Reflex Scores:       Tricep reflexes are 2+ on the right side and 2+ on the left side.       Bicep reflexes are 2+ on the right side and 2+ on the left side.       Brachioradialis reflexes are 2+ on the right side and 2+ on the left side.       Patellar reflexes are 2+ on the right side and 2+ on the left side.       Achilles reflexes are 2+ on " the right side and 2+ on the left side.  Skin: Skin is warm, dry and intact.   Psychiatric: She has a normal mood and affect. Her speech is normal and behavior is normal. Judgment and thought content normal.   Nursing note and vitals reviewed.      Neurologic Exam     Mental Status   Oriented to person, place, and time.   Oriented to person.   Oriented to place.   Oriented to time.   Follows 3 step commands.   Attention: normal. Concentration: normal.   Speech: speech is normal   Level of consciousness: alert  Knowledge: consistent with education.   Able to name object. Able to read. Able to repeat. Able to write. Normal comprehension.     Cranial Nerves     CN II   Visual acuity: normal  Right visual field deficit: none  Left visual field deficit: none     CN III, IV, VI   Pupils are equal, round, and reactive to light.  Right pupil: Size: 3 mm. Shape: regular. Reactivity: brisk. Consensual response: intact.   Left pupil: Size: 3 mm. Shape: regular. Reactivity: brisk. Consensual response: intact.   CN III: no CN III palsy  CN VI: no CN VI palsy  Nystagmus: none   Diplopia: none  Ophthalmoparesis: none  Conjugate gaze: present    CN V   Right facial sensation deficit: none  Left facial sensation deficit: none    CN VII   Right facial weakness: none  Left facial weakness: none    CN VIII   Hearing: intact    CN IX, X   CN IX normal.   CN X normal.     CN XI   Right sternocleidomastoid strength: normal  Left sternocleidomastoid strength: normal  Right trapezius strength: normal  Left trapezius strength: normal    CN XII   Fasciculations: absent  Tongue deviation: none    Motor Exam   Muscle bulk: normal  Overall muscle tone: normal  Right arm pronator drift: absent  Left arm pronator drift: absent    Strength   Right neck flexion: 5/5  Left neck flexion: 5/5  Right neck extension: 5/5  Left neck extension: 5/5  Right deltoid: 5/5  Left deltoid: 5/5  Right biceps: 5/5  Left biceps: 5/5  Right triceps: 5/5  Left  triceps: 5/5  Right wrist flexion: 5/5  Left wrist flexion: 5/5  Right wrist extension: 5/5  Left wrist extension: 5/5  Right interossei: 5/5  Left interossei: 5/5  Right abdominals: 5/5  Left abdominals: 5/5  Right iliopsoas: 5/5  Left iliopsoas: 5/5  Right quadriceps: 5/5  Left quadriceps: 5/5  Right hamstrin/5  Left hamstrin/5  Right glutei: 5/5  Left glutei: 5/5  Right anterior tibial: 5/5  Left anterior tibial: 5/5  Right posterior tibial: 5/5  Left posterior tibial: 5/5  Right peroneal: 5/5  Left peroneal: 5/5  Right gastroc: 5/5  Left gastroc: 5/5Tender to percussion along entire spine, but most concentrated in the upper lumbar region.  Tenderness is reported as mild by the patient.     Sensory Exam   Right arm light touch: normal  Left arm light touch: normal  Right leg light touch: normal  Left leg light touch: normal  Right arm vibration: normal  Left arm vibration: normal  Right arm pinprick: normal  Left arm pinprick: normal    Gait, Coordination, and Reflexes     Gait  Gait: normal    Coordination   Romberg: negative  Finger to nose coordination: normal  Heel to shin coordination: normal  Tandem walking coordination: normal    Tremor   Resting tremor: absent  Intention tremor: absent  Action tremor: absent    Reflexes   Right brachioradialis: 2+  Left brachioradialis: 2+  Right biceps: 2+  Left biceps: 2+  Right triceps: 2+  Left triceps: 2+  Right patellar: 2+  Left patellar: 2+  Right achilles: 2+  Left achilles: 2+  Right Dumont: absent  Left Dumont: absent  Right ankle clonus: absent  Left ankle clonus: absent      Provider dictation:  79-year-old female former smoker with depression, GERD, hypertension, peripheral neuropathy, migraine, history of breast cancer and pulmonary embolism presents for follow up of lumbar compression fracture.  She presented to the emergency room at [facility] 10/28/2019 after her dog jumped on her and falling backwards triggering back pain.  She was then  transferred to [facility] for neurosurgical care.  At [facility] she was prescribed TLSO brace, pain medication and discharged home.  She has worn the brace for 4 weeks now. Pain continues and she cannot stand for long periods of time due to pain.  She has been as compliant as possible with TLSO brace and admits to it being uncomfortable.  She feels pain throughout the entire back but most concentrated in the upper lumbar region.  She denies radicular leg pain, numbness, tingling.   She denies lower extremity weakness.  She denies bowel bladder dysfunction.  Oswestry score: 45% ( 57% last visit)  PHQ:  0.    On exam she is  to percussion along the entire spine but most concentrated in the upper lumbar region around L1.  She has 5/5 strength in the upper and lower extremities with 2+ muscle stretch reflexes and no sensory deficits.  Gait and station fluid.    CT of the thoracic and lumbar spine from 10/28/2019 revealing L1 superior endplate compression fracture.  There is dextroscoliosis of the thoracolumbar junction.  Multilevel degenerative changes are seen throughout the thoracic and lumbar spine.    Lumbar x-rays dated 11/26/2019 of the lumbar spine that reveals worsening L1 compression fracture in comparison to CT from 10/28/2019.    Lumbar spine xrays today 1-7-20 show continued, and stable L1 compression fracture (no further height loss).    In conclusion, Ms. Quinones is an 79-year-old female with acute onset L1 compression fracture after a fall 10-28-19.  Fracture is remaining stable with worsening.  She continues to have pain.  I am referring her to pain management of kyphoplasty evaluation in [place].  She has been wearing a TLSO Brace and it is uncomfortable for her.  We will try a ave back LSO brace for better comfort.  She has requested refill of zanaflex to help with spams.  Follow up with me as needed.     Visit Diagnosis:  Closed compression fracture of  body of L1 vertebra  -     HME - OTHER  -     Ambulatory referral to Pain Clinic  -     tiZANidine (ZANAFLEX) 4 MG tablet; Take 1 tablet (4 mg total) by mouth every 12 (twelve) hours as needed (muscle spasms/ pain).  Dispense: 40 tablet; Refill: 0        Total time spent counseling greater than fifty percent of total visit time.  Counseling included discussion regarding imaging findings, diagnosis possibilities, treatment options, risks and benefits.   The patient had many questions regarding the options and long-term effects.

## 2020-01-13 ENCOUNTER — TELEPHONE (OUTPATIENT)
Dept: SPINE | Facility: CLINIC | Age: 80
End: 2020-01-13

## 2020-01-13 NOTE — TELEPHONE ENCOUNTER
----- Message from Celine Garcia PA-C sent at 1/11/2020 10:20 PM CST -----  At her visit 1-7-20 I referred her to Dr. Kaur for kyphoplasty.  The appt has not been made yet, can you call to see if she wants to schedule.

## 2020-01-15 ENCOUNTER — LAB VISIT (OUTPATIENT)
Dept: LAB | Facility: HOSPITAL | Age: 80
End: 2020-01-15
Attending: PAIN MEDICINE
Payer: MEDICARE

## 2020-01-15 DIAGNOSIS — M48.56XA COLLAPSE OF LUMBAR VERTEBRA: Primary | ICD-10-CM

## 2020-01-15 LAB
BASOPHILS # BLD AUTO: 0.09 K/UL (ref 0–0.2)
BASOPHILS NFR BLD: 0.9 % (ref 0–1.9)
DIFFERENTIAL METHOD: ABNORMAL
EOSINOPHIL # BLD AUTO: 0.2 K/UL (ref 0–0.5)
EOSINOPHIL NFR BLD: 2.1 % (ref 0–8)
ERYTHROCYTE [DISTWIDTH] IN BLOOD BY AUTOMATED COUNT: 12.7 % (ref 11.5–14.5)
HCT VFR BLD AUTO: 42.2 % (ref 37–48.5)
HGB BLD-MCNC: 13 G/DL (ref 12–16)
IMM GRANULOCYTES # BLD AUTO: 0.03 K/UL (ref 0–0.04)
IMM GRANULOCYTES NFR BLD AUTO: 0.3 % (ref 0–0.5)
LYMPHOCYTES # BLD AUTO: 3.4 K/UL (ref 1–4.8)
LYMPHOCYTES NFR BLD: 33.6 % (ref 18–48)
MCH RBC QN AUTO: 28.8 PG (ref 27–31)
MCHC RBC AUTO-ENTMCNC: 30.8 G/DL (ref 32–36)
MCV RBC AUTO: 94 FL (ref 82–98)
MONOCYTES # BLD AUTO: 0.6 K/UL (ref 0.3–1)
MONOCYTES NFR BLD: 5.7 % (ref 4–15)
NEUTROPHILS # BLD AUTO: 5.8 K/UL (ref 1.8–7.7)
NEUTROPHILS NFR BLD: 57.4 % (ref 38–73)
NRBC BLD-RTO: 0 /100 WBC
PLATELET # BLD AUTO: 265 K/UL (ref 150–350)
PMV BLD AUTO: 11.5 FL (ref 9.2–12.9)
RBC # BLD AUTO: 4.51 M/UL (ref 4–5.4)
WBC # BLD AUTO: 10.04 K/UL (ref 3.9–12.7)

## 2020-01-15 PROCEDURE — 36415 COLL VENOUS BLD VENIPUNCTURE: CPT

## 2020-01-15 PROCEDURE — 85025 COMPLETE CBC W/AUTO DIFF WBC: CPT

## 2020-01-21 ENCOUNTER — TELEPHONE (OUTPATIENT)
Dept: PAIN MEDICINE | Facility: CLINIC | Age: 80
End: 2020-01-21

## 2020-01-21 NOTE — TELEPHONE ENCOUNTER
I attempted to contact patient on the following days      1/17/20 @ 11:44am    1/14/20 @ 2:10pm    1/19/20 @2:08pm     1/21/20 9:11am     Pt is advise to contact office or phone room for apt

## 2020-03-11 ENCOUNTER — TELEPHONE (OUTPATIENT)
Dept: FAMILY MEDICINE | Facility: CLINIC | Age: 80
End: 2020-03-11

## 2020-03-11 ENCOUNTER — LAB VISIT (OUTPATIENT)
Dept: LAB | Facility: HOSPITAL | Age: 80
End: 2020-03-11
Attending: INTERNAL MEDICINE
Payer: MEDICARE

## 2020-03-11 DIAGNOSIS — I10 BENIGN ESSENTIAL HYPERTENSION: ICD-10-CM

## 2020-03-11 LAB
ALBUMIN/CREAT UR: 124.9 UG/MG (ref 0–30)
CREAT UR-MCNC: 129 MG/DL (ref 15–325)
MICROALBUMIN UR DL<=1MG/L-MCNC: 161.1 UG/ML

## 2020-03-11 PROCEDURE — 82043 UR ALBUMIN QUANTITATIVE: CPT

## 2020-03-11 NOTE — TELEPHONE ENCOUNTER
----- Message from Shilo Perez MD sent at 3/11/2020  2:12 PM CDT -----  Results are somewhat abnormal. Please keep regular follow up.

## 2020-03-17 ENCOUNTER — OFFICE VISIT (OUTPATIENT)
Dept: FAMILY MEDICINE | Facility: CLINIC | Age: 80
End: 2020-03-17
Payer: MEDICARE

## 2020-03-17 VITALS
WEIGHT: 233 LBS | DIASTOLIC BLOOD PRESSURE: 80 MMHG | SYSTOLIC BLOOD PRESSURE: 137 MMHG | BODY MASS INDEX: 37.45 KG/M2 | HEIGHT: 66 IN | HEART RATE: 68 BPM

## 2020-03-17 DIAGNOSIS — E78.2 MULTIPLE-TYPE HYPERLIPIDEMIA: Chronic | ICD-10-CM
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
## 2020-03-17 DIAGNOSIS — I10 BENIGN ESSENTIAL HYPERTENSION: Primary | Chronic | ICD-10-CM

## 2020-03-17 DIAGNOSIS — F34.1 DYSTHYMIA: Chronic | ICD-10-CM

## 2020-03-17 DIAGNOSIS — J31.0 NON-ALLERGIC RHINITIS: Chronic | ICD-10-CM

## 2020-03-17 DIAGNOSIS — K21.9 GASTROESOPHAGEAL REFLUX DISEASE WITHOUT ESOPHAGITIS: Chronic | ICD-10-CM

## 2020-03-17 DIAGNOSIS — S32.010S COMPRESSION FRACTURE OF L1 VERTEBRA, SEQUELA: ICD-10-CM

## 2020-03-17 PROCEDURE — 99214 OFFICE O/P EST MOD 30 MIN: CPT | Mod: S$GLB,,, | Performed by: INTERNAL MEDICINE

## 2020-03-17 PROCEDURE — 1126F AMNT PAIN NOTED NONE PRSNT: CPT | Mod: S$GLB,,, | Performed by: INTERNAL MEDICINE

## 2020-03-17 PROCEDURE — 1101F PR PT FALLS ASSESS DOC 0-1 FALLS W/OUT INJ PAST YR: ICD-10-PCS | Mod: S$GLB,,, | Performed by: INTERNAL MEDICINE

## 2020-03-17 PROCEDURE — 1159F PR MEDICATION LIST DOCUMENTED IN MEDICAL RECORD: ICD-10-PCS | Mod: S$GLB,,, | Performed by: INTERNAL MEDICINE

## 2020-03-17 PROCEDURE — 3075F SYST BP GE 130 - 139MM HG: CPT | Mod: S$GLB,,, | Performed by: INTERNAL MEDICINE

## 2020-03-17 PROCEDURE — 3075F PR MOST RECENT SYSTOLIC BLOOD PRESS GE 130-139MM HG: ICD-10-PCS | Mod: S$GLB,,, | Performed by: INTERNAL MEDICINE

## 2020-03-17 PROCEDURE — 3079F DIAST BP 80-89 MM HG: CPT | Mod: S$GLB,,, | Performed by: INTERNAL MEDICINE

## 2020-03-17 PROCEDURE — 1126F PR PAIN SEVERITY QUANTIFIED, NO PAIN PRESENT: ICD-10-PCS | Mod: S$GLB,,, | Performed by: INTERNAL MEDICINE

## 2020-03-17 PROCEDURE — 1101F PT FALLS ASSESS-DOCD LE1/YR: CPT | Mod: S$GLB,,, | Performed by: INTERNAL MEDICINE

## 2020-03-17 PROCEDURE — 99214 PR OFFICE/OUTPT VISIT, EST, LEVL IV, 30-39 MIN: ICD-10-PCS | Mod: S$GLB,,, | Performed by: INTERNAL MEDICINE

## 2020-03-17 PROCEDURE — 1159F MED LIST DOCD IN RCRD: CPT | Mod: S$GLB,,, | Performed by: INTERNAL MEDICINE

## 2020-03-17 PROCEDURE — 3079F PR MOST RECENT DIASTOLIC BLOOD PRESSURE 80-89 MM HG: ICD-10-PCS | Mod: S$GLB,,, | Performed by: INTERNAL MEDICINE

## 2020-03-17 RX ORDER — METOPROLOL SUCCINATE 100 MG/1
100 TABLET, EXTENDED RELEASE ORAL DAILY
COMMUNITY
End: 2020-03-17 | Stop reason: SDUPTHER

## 2020-03-17 RX ORDER — IPRATROPIUM BROMIDE 42 UG/1
1 SPRAY, METERED NASAL 3 TIMES DAILY
Qty: 1 ML | Refills: 5 | Status: SHIPPED | OUTPATIENT
Start: 2020-03-17 | End: 2021-02-04

## 2020-03-17 RX ORDER — METOPROLOL SUCCINATE 100 MG/1
100 TABLET, EXTENDED RELEASE ORAL DAILY
Qty: 90 TABLET | Refills: 3 | Status: SHIPPED | OUTPATIENT
Start: 2020-03-17 | End: 2021-02-04

## 2020-03-17 NOTE — PATIENT INSTRUCTIONS
Tips to Control Acid Reflux    To control acid reflux, youll need to make some basic diet and lifestyle changes. The simple steps outlined below may be all youll need to ease discomfort.  Watch what you eat  · Avoid fatty foods and spicy foods.  · Eat fewer acidic foods, such as citrus and tomato-based foods. These can increase symptoms.  · Limit drinking alcohol, caffeine, and fizzy beverages. All increase acid reflux.  · Try limiting chocolate, peppermint, and spearmint. These can worsen acid reflux in some people.  Watch when you eat  · Avoid lying down for 3 hours after eating.  · Do not snack before going to bed.  Raise your head  Raising your head and upper body by 4 to 6 inches helps limit reflux when youre lying down. Put blocks under the head of your bed frame to raise it.  Other changes  · Lose weight, if you need to  · Dont exercise near bedtime  · Avoid tight-fitting clothes  · Limit aspirin and ibuprofen  · Stop smoking   Date Last Reviewed: 7/1/2016  © 7478-3985 [employer]. [address] [place] [address], [place] [address]. All rights reserved. This information is not intended as a substitute for professional medical care. Always follow your healthcare professional's instructions.

## 2020-03-17 NOTE — PROGRESS NOTES
Subjective:       Patient ID: Lety Herbert is a 79 y.o. female.    Chief Complaint: Hypertension (lab review ); Hyperlipidemia; Fall (L1 Compression); Depression; Gastroesophageal Reflux; and Sinus Problem    Miss Davidson is a pleasant 79-year-old  female who comes for follow-up.  Underlying medical issues of hypertension, dyslipidemia have been noted.    Unfortunately she fell down when she was pushed or tripped by her dog.  She fell on her buttocks and upon evaluation she was found ultimately to have L1 compression fracture.  This approximately happened 4 months ago.  She was evaluated then ultimately had to get a vertebroplasty for L1 vertebra which did give her some relief.  At this point she is doing okay and pain is manageable.  She continues to have some trouble walking and uses a quad cane and quad walker at home.  She cannot stand for long periods of time.    Her blood pressures are doing okay.  She is taking her medications as prescribed.    Her sinus allergies are better with ipratropium nasal spray rather than fluticasone.    As far as depression is concerned, she is taking Zoloft for the same.  She is doing fairly okay with this medication.  Her 's illness continues to bother her.  Certainly her own medical conditions which do not get better as time goes by does not help either.    Hypertension   This is a chronic problem. The current episode started more than 1 year ago. The problem is controlled. Associated symptoms include malaise/fatigue, peripheral edema and shortness of breath. Pertinent negatives include no chest pain, headaches, neck pain or palpitations. Risk factors for coronary artery disease include sedentary lifestyle, obesity, dyslipidemia and post-menopausal state. Past treatments include beta blockers, angiotensin blockers and diuretics. The current treatment provides moderate improvement. Compliance problems include psychosocial issues.  There is no history of  coarctation of the aorta, hyperaldosteronism, pheochromocytoma or renovascular disease.   Hyperlipidemia   This is a chronic problem. The current episode started more than 1 year ago. The problem is controlled. Exacerbating diseases include obesity. Associated symptoms include myalgias and shortness of breath. Pertinent negatives include no chest pain. Current antihyperlipidemic treatment includes statins. The current treatment provides moderate improvement of lipids. Compliance problems include psychosocial issues.  Risk factors for coronary artery disease include a sedentary lifestyle, hypertension, post-menopausal, obesity and dyslipidemia.   Fall   Incident onset: Four months ago. The fall occurred while standing. She fell from a height of 1 to 2 ft. She landed on concrete. The point of impact was the buttocks. Pertinent negatives include no abdominal pain, fever, headaches, hematuria, loss of consciousness, numbness or vomiting. Treatments tried: Vertebroplasty of L1 Vertebra.   Gastroesophageal Reflux   She complains of heartburn. She reports no abdominal pain, no chest pain, no choking, no coughing or no wheezing. This is a chronic problem. The current episode started more than 1 year ago. The problem has been waxing and waning. Associated symptoms include fatigue. Risk factors include obesity and lack of exercise. She has tried a PPI for the symptoms. The treatment provided moderate relief.   Depression   Visit Type: follow-up  Patient presents with the following symptoms: anhedonia, depressed mood and shortness of breath.  Patient is not experiencing: choking sensation, confusion, nervousness/anxiety, palpitations, suicidal ideas, suicidal planning and weight gain.  Frequency of symptoms: occasionally     Sinus Problem   This is a chronic problem. The current episode started more than 1 year ago. The problem has been waxing and waning since onset. There has been no fever. Associated symptoms include  shortness of breath. Pertinent negatives include no chills, congestion, coughing, headaches, neck pain or sinus pressure. Treatments tried: Ipratropium nasal spray. The treatment provided moderate relief.       Past Medical History:   Diagnosis Date    Anemia     Basal cell carcinoma     nose     Cancer     breast    Depression     DVT (deep venous thrombosis)     Gastric ulceration     GERD (gastroesophageal reflux disease)     History of frequent urinary tract infections     Hyperlipidemia     Hypertension     Melanoma 2004?    left forearm    MRSA (methicillin resistant staph aureus) culture positive     Neuropathy     PE (pulmonary embolism)     Reflux      Social History     Socioeconomic History    Marital status:      Spouse name: Jean    Number of children: 3    Years of education: Not on file    Highest education level: Not on file   Occupational History    Not on file   Social Needs    Financial resource strain: Not on file    Food insecurity:     Worry: Not on file     Inability: Not on file    Transportation needs:     Medical: Not on file     Non-medical: Not on file   Tobacco Use    Smoking status: Former Smoker    Smokeless tobacco: Never Used   Substance and Sexual Activity    Alcohol use: No    Drug use: No    Sexual activity: Not Currently     Partners: Male   Lifestyle    Physical activity:     Days per week: Not on file     Minutes per session: Not on file    Stress: Not at all   Relationships    Social connections:     Talks on phone: Not on file     Gets together: Not on file     Attends Jew service: Not on file     Active member of club or organization: Not on file     Attends meetings of clubs or organizations: Not on file     Relationship status: Not on file   Other Topics Concern    Are you pregnant or think you may be? Not Asked    Breast-feeding Not Asked   Social History Narrative    3 Children- 9 GC, 7 GGrands     Past Surgical History:    Procedure Laterality Date    HYSTERECTOMY      MASTECTOMY, RADICAL Bilateral     TOTAL HIP ARTHROPLASTY Left 11/13/2017     Family History   Problem Relation Age of Onset    Cancer Mother     Cancer Father     Heart disease Father     Melanoma Neg Hx     Psoriasis Neg Hx     Lupus Neg Hx     Eczema Neg Hx        Review of Systems   Constitutional: Positive for fatigue and malaise/fatigue. Negative for activity change, appetite change, chills, fever, unexpected weight change and weight gain.   HENT: Negative for congestion, postnasal drip and sinus pressure.    Eyes: Negative for pain, discharge and visual disturbance.   Respiratory: Positive for shortness of breath. Negative for cough, choking, chest tightness and wheezing.    Cardiovascular: Negative for chest pain, palpitations and leg swelling.        HTN   Gastrointestinal: Positive for heartburn. Negative for abdominal distention, abdominal pain, anal bleeding, constipation, diarrhea and vomiting.   Genitourinary: Positive for frequency (Nocturia). Negative for difficulty urinating, dysuria, flank pain, hematuria and vaginal bleeding.   Musculoskeletal: Positive for arthralgias, back pain (low back pain) and myalgias. Negative for joint swelling and neck pain.        Left hip fracture S/P Arthroplasty NoV 2017  Fall in October/November 2019.  L1 compression fracture status post vertebroplasty.   Skin: Negative for color change, pallor and rash.   Allergic/Immunologic: Negative for environmental allergies, food allergies and immunocompromised state.   Neurological: Negative for dizziness, tremors, seizures, loss of consciousness, syncope, light-headedness, numbness and headaches.   Hematological: Negative for adenopathy. Does not bruise/bleed easily.   Psychiatric/Behavioral: Positive for depression and sleep disturbance. Negative for agitation, confusion, dysphoric mood and suicidal ideas. The patient is not nervous/anxious.         Patient has a  "history of depression. Stable on sertraline. She however denies that she is depressed.         Objective:      Blood pressure 137/80, pulse 68, height 5' 6" (1.676 m), weight 105.7 kg (233 lb). Body mass index is 37.61 kg/m².  Physical Exam   Constitutional: She is oriented to person, place, and time. Vital signs are normal. She appears well-developed. She is cooperative. No distress.   BMI is 37.6   HENT:   Head: Normocephalic and atraumatic.   Eyes: Conjunctivae, EOM and lids are normal. Lids are everted and swept, no foreign bodies found. Right pupil is round and reactive. Left pupil is round and reactive.   Neck: Trachea normal and normal range of motion. Neck supple. No JVD present. No tracheal deviation present. No thyromegaly present.   Cardiovascular: Normal rate, regular rhythm, S1 normal, S2 normal and normal heart sounds. Exam reveals no gallop and no friction rub.   Varicosities in legs-significant varicosities.   Pulmonary/Chest: Breath sounds normal. No stridor. No respiratory distress. She has no wheezes.   Abdominal: Soft. Bowel sounds are normal. There is no rigidity and no guarding.   Patient is obese.   Musculoskeletal: She exhibits edema (trace edema and varicosities). She exhibits no tenderness.   Lymphadenopathy:     She has no cervical adenopathy.   Neurological: She is alert and oriented to person, place, and time. She exhibits normal muscle tone. Coordination normal.   Skin: Skin is warm and dry. No rash noted. No pallor.   Varicose veins are noted in the inferior extremities.   Psychiatric: Her affect is not inappropriate. She is slowed. Cognition and memory are not impaired. She does not exhibit a depressed mood.   Anhedonic She is attentive.   Nursing note and vitals reviewed.        Assessment:       1. Benign essential hypertension    2. Multiple-type hyperlipidemia    3. Gastroesophageal reflux disease without esophagitis    4. Dysthymia    5. Compression fracture of L1 vertebra, " sequela    6. Fall, subsequent encounter    7. Non-allergic rhinitis           Lab Visit on 03/11/2020   Component Date Value Ref Range Status    Sodium 03/11/2020 140  136 - 145 mmol/L Final    Potassium 03/11/2020 4.6  3.5 - 5.1 mmol/L Final    Chloride 03/11/2020 102  95 - 110 mmol/L Final    CO2 03/11/2020 32* 23 - 29 mmol/L Final    Glucose 03/11/2020 113* 70 - 110 mg/dL Final    BUN, Bld 03/11/2020 14  8 - 23 mg/dL Final    Creatinine 03/11/2020 0.6  0.5 - 1.4 mg/dL Final    Calcium 03/11/2020 9.4  8.7 - 10.5 mg/dL Final    Total Protein 03/11/2020 7.5  6.0 - 8.4 g/dL Final    Albumin 03/11/2020 3.9  3.5 - 5.2 g/dL Final    Total Bilirubin 03/11/2020 0.7  0.1 - 1.0 mg/dL Final    Alkaline Phosphatase 03/11/2020 80  55 - 135 U/L Final    AST 03/11/2020 20  10 - 40 U/L Final    ALT 03/11/2020 15  10 - 44 U/L Final    Anion Gap 03/11/2020 6* 8 - 16 mmol/L Final    eGFR if African American 03/11/2020 >60.0  >60 mL/min/1.73 m^2 Final    eGFR if non African American 03/11/2020 >60.0  >60 mL/min/1.73 m^2 Final    Cholesterol 03/11/2020 172  120 - 199 mg/dL Final    Triglycerides 03/11/2020 109  30 - 150 mg/dL Final    HDL 03/11/2020 49  40 - 75 mg/dL Final    LDL Cholesterol 03/11/2020 101.2  63.0 - 159.0 mg/dL Final    Hdl/Cholesterol Ratio 03/11/2020 28.5  20.0 - 50.0 % Final    Total Cholesterol/HDL Ratio 03/11/2020 3.5  2.0 - 5.0 Final    Non-HDL Cholesterol 03/11/2020 123  mg/dL Final   Lab Visit on 03/11/2020   Component Date Value Ref Range Status    Microalbum.,U,Random 03/11/2020 161.1  ug/mL Final    Creatinine, Random Ur 03/11/2020 129.0  15.0 - 325.0 mg/dL Final    Microalb Creat Ratio 03/11/2020 124.9* 0.0 - 30.0 ug/mg Final   Lab Visit on 01/15/2020   Component Date Value Ref Range Status    WBC 01/15/2020 10.04  3.90 - 12.70 K/uL Final    RBC 01/15/2020 4.51  4.00 - 5.40 M/uL Final    Hemoglobin 01/15/2020 13.0  12.0 - 16.0 g/dL Final    Hematocrit 01/15/2020 42.2  37.0 -  48.5 % Final    Mean Corpuscular Volume 01/15/2020 94  82 - 98 fL Final    Mean Corpuscular Hemoglobin 01/15/2020 28.8  27.0 - 31.0 pg Final    Mean Corpuscular Hemoglobin Conc 01/15/2020 30.8* 32.0 - 36.0 g/dL Final    RDW 01/15/2020 12.7  11.5 - 14.5 % Final    Platelets 01/15/2020 265  150 - 350 K/uL Final    MPV 01/15/2020 11.5  9.2 - 12.9 fL Final    Immature Granulocytes 01/15/2020 0.3  0.0 - 0.5 % Final    Gran # (ANC) 01/15/2020 5.8  1.8 - 7.7 K/uL Final    Immature Grans (Abs) 01/15/2020 0.03  0.00 - 0.04 K/uL Final    Lymph # 01/15/2020 3.4  1.0 - 4.8 K/uL Final    Mono # 01/15/2020 0.6  0.3 - 1.0 K/uL Final    Eos # 01/15/2020 0.2  0.0 - 0.5 K/uL Final    Baso # 01/15/2020 0.09  0.00 - 0.20 K/uL Final    nRBC 01/15/2020 0  0 /100 WBC Final    Gran% 01/15/2020 57.4  38.0 - 73.0 % Final    Lymph% 01/15/2020 33.6  18.0 - 48.0 % Final    Mono% 01/15/2020 5.7  4.0 - 15.0 % Final    Eosinophil% 01/15/2020 2.1  0.0 - 8.0 % Final    Basophil% 01/15/2020 0.9  0.0 - 1.9 % Final    Differential Method 01/15/2020 Automated   Final         Plan:           Benign essential hypertension  Comments:  Patient's blood pressures are stable.  Medications have been reviewed.  Dietary precautions discussed.  Exercise is limited because of arthritis and risk for fa  Orders:  -     metoprolol succinate (TOPROL-XL) 100 MG 24 hr tablet; Take 1 tablet (100 mg total) by mouth once daily.  Dispense: 90 tablet; Refill: 3    Multiple-type hyperlipidemia  Comments:  Patient is taking her statin medications as prescribed.  Dietary precautions expressed.    Gastroesophageal reflux disease without esophagitis  Comments:  Reflux precautions discussed.  Patient is taking medications.    Dysthymia  Comments:  Patient has multiple issues to cause her some degree of depression and anxiety.  She is taking Zoloft.  Stable at this point.    Compression fracture of L1 vertebra, sequela  Comments:  Patient had a compression  fracture of L1 vertebra in past.  Patient her vertebroplasty.  No pain at this point.  Fall precautions discussed.    Fall, subsequent encounter  Comments:  Patient had a fall when she got tripped by her dog.  This fall had led to compression fracture at L1 vertebra.  She had vertebropla  Fall precautions discussed.    Non-allergic rhinitis  Comments:  She finds relief more from ipratropium than Flonase.  New prescription given.  Allergen precautions discussed.  Orders:  -     ipratropium (ATROVENT) 42 mcg (0.06 %) nasal spray; 1 spray by Nasal route 3 (three) times daily.  Dispense: 1 mL; Refill: 5     Patient's labs have been reviewed.  Her blood sugar level is slightly elevated and microalbumin creatinine ratio is slightly abnormal.  She will continue to watch her diet and avoid excess carbohydrates.    Ambulation cautions again have been discussed.  She does use a quad cane.  She will let me know if she would consider some physical therapy for her back and exercise.    I have reviewed her medications including for blood pressure, reflux and depression.  She has been refilled on metoprolol and ipratropium which seems to give her more relief than fluticasone.    If everything goes on well I will see her back in 6 months time also.    Spent sim 25 minutes with patient which involved review of pts medical conditions, labs, medications and with 50% of time face-to-face discussion about medical problems, management and any applicable changes.    Current Outpatient Medications:     atorvastatin (LIPITOR) 20 MG tablet, Take 1 tablet (20 mg total) by mouth once daily., Disp: 90 tablet, Rfl: 3    cholecalciferol, vitamin D3, 5,000 unit capsule, Take 5,000 Units by mouth once daily. , Disp: , Rfl:     cyanocobalamin (VITAMIN B-12) 100 MCG tablet, Take 1,000 mcg by mouth once daily. , Disp: , Rfl:     ipratropium (ATROVENT) 42 mcg (0.06 %) nasal spray, 1 spray by Nasal route 3 (three) times daily., Disp: 1 mL, Rfl: 5     metoprolol succinate (TOPROL-XL) 100 MG 24 hr tablet, Take 1 tablet (100 mg total) by mouth once daily., Disp: 90 tablet, Rfl: 3    multivitamin with iron Tab, Take 1 tablet by mouth once daily at 6am., Disp: , Rfl:     pantoprazole (PROTONIX) 20 MG tablet, TAKE ONE TABLET BY MOUTH ONCE DAILY, Disp: 90 tablet, Rfl: 1    polyethylene glycol (GLYCOLAX) 17 gram PwPk, Take 17 g by mouth once daily., Disp: 90 packet, Rfl: 3    sertraline (ZOLOFT) 100 MG tablet, Take 1 tablet (100 mg total) by mouth once daily., Disp: 90 tablet, Rfl: 3    valsartan-hydrochlorothiazide (DIOVAN-HCT) 160-12.5 mg per tablet, Take 1 tablet by mouth once daily., Disp: 90 tablet, Rfl: 3

## 2020-06-05 DIAGNOSIS — I51.9 HEART DISEASE, UNSPECIFIED: Primary | ICD-10-CM

## 2020-06-05 DIAGNOSIS — I44.0 ATRIOVENTRICULAR BLOCK, FIRST DEGREE: ICD-10-CM

## 2020-06-05 DIAGNOSIS — I25.10 CVD (CARDIOVASCULAR DISEASE): ICD-10-CM

## 2020-06-05 DIAGNOSIS — R55 SYNCOPE AND COLLAPSE: ICD-10-CM

## 2020-06-05 DIAGNOSIS — R94.31 NONSPECIFIC ABNORMAL ELECTROCARDIOGRAM (ECG) (EKG): ICD-10-CM

## 2020-06-05 DIAGNOSIS — R06.02 SHORTNESS OF BREATH: ICD-10-CM

## 2020-06-08 ENCOUNTER — CLINICAL SUPPORT (OUTPATIENT)
Dept: CARDIOLOGY | Facility: HOSPITAL | Age: 80
End: 2020-06-08
Attending: INTERNAL MEDICINE
Payer: MEDICARE

## 2020-06-08 ENCOUNTER — HOSPITAL ENCOUNTER (OUTPATIENT)
Dept: RADIOLOGY | Facility: HOSPITAL | Age: 80
Discharge: HOME | End: 2020-06-08
Attending: INTERNAL MEDICINE
Payer: MEDICARE

## 2020-06-08 DIAGNOSIS — I25.10 CORONARY ATHEROSCLEROSIS OF NATIVE CORONARY ARTERY: ICD-10-CM

## 2020-06-08 DIAGNOSIS — I48.91 ATRIAL FIBRILLATION: ICD-10-CM

## 2020-06-08 DIAGNOSIS — R94.31 NONSPECIFIC ABNORMAL ELECTROCARDIOGRAM (ECG) (EKG): ICD-10-CM

## 2020-06-08 DIAGNOSIS — I44.0 FIRST DEGREE HEART BLOCK BY ELECTROCARDIOGRAM: ICD-10-CM

## 2020-06-08 DIAGNOSIS — I51.9 HEART DISEASE, UNSPECIFIED: ICD-10-CM

## 2020-06-08 DIAGNOSIS — I25.10 CORONARY ATHEROSCLEROSIS OF NATIVE CORONARY ARTERY: Primary | ICD-10-CM

## 2020-06-08 DIAGNOSIS — R55 SYNCOPE AND COLLAPSE: ICD-10-CM

## 2020-06-08 DIAGNOSIS — R06.02 SHORTNESS OF BREATH: ICD-10-CM

## 2020-06-08 DIAGNOSIS — I25.10 CVD (CARDIOVASCULAR DISEASE): ICD-10-CM

## 2020-06-08 DIAGNOSIS — I44.0 ATRIOVENTRICULAR BLOCK, FIRST DEGREE: ICD-10-CM

## 2020-06-08 PROCEDURE — 93306 TTE W/DOPPLER COMPLETE: CPT

## 2020-06-08 PROCEDURE — 71046 X-RAY EXAM CHEST 2 VIEWS: CPT | Mod: TC

## 2020-06-08 PROCEDURE — A9540 TC99M MAA: HCPCS

## 2020-06-09 LAB
AORTIC ROOT ANNULUS: 3.04 CM
AORTIC VALVE CUSP SEPERATION: 2 CM
AV INDEX (PROSTH): 1.07
AV MEAN GRADIENT: 2 MMHG
AV PEAK GRADIENT: 4 MMHG
AV VALVE AREA: 3.33 CM2
AV VELOCITY RATIO: 95.93
CV ECHO LV RWT: 0.46 CM
DOP CALC AO PEAK VEL: 1.06 M/S
DOP CALC AO VTI: 22.9 CM
DOP CALC LVOT AREA: 3.1 CM2
DOP CALC LVOT DIAMETER: 1.99 CM
DOP CALC LVOT PEAK VEL: 101.69 M/S
DOP CALC LVOT STROKE VOLUME: 76.19 CM3
DOP CALCLVOT PEAK VEL VTI: 24.51 CM
E WAVE DECELERATION TIME: 159.36 MSEC
E/E' RATIO: 10.78 M/S
ECHO LV POSTERIOR WALL: 1.24 CM (ref 0.6–1.1)
FRACTIONAL SHORTENING: 36 % (ref 28–44)
INTERVENTRICULAR SEPTUM: 1.24 CM (ref 0.6–1.1)
LEFT ATRIUM SIZE: 4 CM
LEFT INTERNAL DIMENSION IN SYSTOLE: 3.44 CM (ref 2.1–4)
LEFT VENTRICULAR INTERNAL DIMENSION IN DIASTOLE: 5.36 CM (ref 3.5–6)
LEFT VENTRICULAR MASS: 273.41 G
LV LATERAL E/E' RATIO: 11.27 M/S
LV SEPTAL E/E' RATIO: 10.33 M/S
MV PEAK E VEL: 1.24 M/S
PISA TR MAX VEL: 2.88 M/S
PV PEAK VELOCITY: 113.67 CM/S
RA PRESSURE: 3 MMHG
TDI LATERAL: 0.11 M/S
TDI SEPTAL: 0.12 M/S
TDI: 0.12 M/S
TR MAX PG: 33 MMHG
TV REST PULMONARY ARTERY PRESSURE: 36 MMHG

## 2020-06-30 ENCOUNTER — TELEPHONE (OUTPATIENT)
Dept: PULMONOLOGY | Facility: CLINIC | Age: 80
End: 2020-06-30

## 2020-06-30 ENCOUNTER — OFFICE VISIT (OUTPATIENT)
Dept: PULMONOLOGY | Facility: CLINIC | Age: 80
End: 2020-06-30
Payer: MEDICARE

## 2020-06-30 VITALS
HEART RATE: 75 BPM | SYSTOLIC BLOOD PRESSURE: 120 MMHG | DIASTOLIC BLOOD PRESSURE: 63 MMHG | WEIGHT: 237.13 LBS | BODY MASS INDEX: 38.27 KG/M2 | OXYGEN SATURATION: 91 %

## 2020-06-30 DIAGNOSIS — R06.09 DYSPNEA ON EXERTION: Primary | ICD-10-CM

## 2020-06-30 DIAGNOSIS — J96.11 CHRONIC HYPOXEMIC RESPIRATORY FAILURE: ICD-10-CM

## 2020-06-30 DIAGNOSIS — I50.32 CHRONIC HEART FAILURE WITH PRESERVED EJECTION FRACTION: ICD-10-CM

## 2020-06-30 DIAGNOSIS — G47.9 SLEEP DISORDER: ICD-10-CM

## 2020-06-30 PROCEDURE — 1101F PT FALLS ASSESS-DOCD LE1/YR: CPT | Mod: CPTII,S$GLB,, | Performed by: INTERNAL MEDICINE

## 2020-06-30 PROCEDURE — 99999 PR PBB SHADOW E&M-EST. PATIENT-LVL IV: CPT | Mod: PBBFAC,,, | Performed by: INTERNAL MEDICINE

## 2020-06-30 PROCEDURE — 99999 PR PBB SHADOW E&M-EST. PATIENT-LVL IV: ICD-10-PCS | Mod: PBBFAC,,, | Performed by: INTERNAL MEDICINE

## 2020-06-30 PROCEDURE — 1126F PR PAIN SEVERITY QUANTIFIED, NO PAIN PRESENT: ICD-10-PCS | Mod: S$GLB,,, | Performed by: INTERNAL MEDICINE

## 2020-06-30 PROCEDURE — 1126F AMNT PAIN NOTED NONE PRSNT: CPT | Mod: S$GLB,,, | Performed by: INTERNAL MEDICINE

## 2020-06-30 PROCEDURE — 1101F PR PT FALLS ASSESS DOC 0-1 FALLS W/OUT INJ PAST YR: ICD-10-PCS | Mod: CPTII,S$GLB,, | Performed by: INTERNAL MEDICINE

## 2020-06-30 PROCEDURE — 99205 PR OFFICE/OUTPT VISIT, NEW, LEVL V, 60-74 MIN: ICD-10-PCS | Mod: S$GLB,,, | Performed by: INTERNAL MEDICINE

## 2020-06-30 PROCEDURE — 3074F SYST BP LT 130 MM HG: CPT | Mod: CPTII,S$GLB,, | Performed by: INTERNAL MEDICINE

## 2020-06-30 PROCEDURE — 1159F MED LIST DOCD IN RCRD: CPT | Mod: S$GLB,,, | Performed by: INTERNAL MEDICINE

## 2020-06-30 PROCEDURE — 3078F PR MOST RECENT DIASTOLIC BLOOD PRESSURE < 80 MM HG: ICD-10-PCS | Mod: CPTII,S$GLB,, | Performed by: INTERNAL MEDICINE

## 2020-06-30 PROCEDURE — 1159F PR MEDICATION LIST DOCUMENTED IN MEDICAL RECORD: ICD-10-PCS | Mod: S$GLB,,, | Performed by: INTERNAL MEDICINE

## 2020-06-30 PROCEDURE — 3078F DIAST BP <80 MM HG: CPT | Mod: CPTII,S$GLB,, | Performed by: INTERNAL MEDICINE

## 2020-06-30 PROCEDURE — 99205 OFFICE O/P NEW HI 60 MIN: CPT | Mod: S$GLB,,, | Performed by: INTERNAL MEDICINE

## 2020-06-30 PROCEDURE — 3074F PR MOST RECENT SYSTOLIC BLOOD PRESSURE < 130 MM HG: ICD-10-PCS | Mod: CPTII,S$GLB,, | Performed by: INTERNAL MEDICINE

## 2020-06-30 RX ORDER — METHOCARBAMOL 500 MG/1
TABLET, FILM COATED ORAL
COMMUNITY
End: 2021-02-04

## 2020-06-30 RX ORDER — RIVAROXABAN 20 MG/1
TABLET, FILM COATED ORAL
COMMUNITY
Start: 2020-06-04 | End: 2023-09-06

## 2020-06-30 RX ORDER — PRAVASTATIN SODIUM 20 MG/1
TABLET ORAL
COMMUNITY
End: 2020-11-04 | Stop reason: ALTCHOICE

## 2020-06-30 RX ORDER — RANOLAZINE 500 MG/1
TABLET, EXTENDED RELEASE ORAL
COMMUNITY
End: 2021-02-04

## 2020-06-30 RX ORDER — MELOXICAM 7.5 MG/1
TABLET ORAL
COMMUNITY
End: 2021-02-04

## 2020-06-30 RX ORDER — PREDNISONE 20 MG/1
TABLET ORAL
COMMUNITY
Start: 2020-06-04 | End: 2021-02-04

## 2020-06-30 NOTE — PATIENT INSTRUCTIONS
Oxygen for home use ordered- would wear continuously  Use CPAP machine- can try nasal pillows. Would use oxygen with your CPAP too.  Get lung function test and 6 minute walk to check to see how much oxygen you need.

## 2020-06-30 NOTE — PROGRESS NOTES
"6/30/2020    Lety Finley Herbert  New Patient Consult    Chief Complaint   Patient presents with    Referral To Pulmonary     Dr. Sargent sent over,  has afib    Shortness of Breath     low O2 stats       HPI: Pt is an 78 yo female with DVT/PE, GERD, breast ca s/p double mastectomy- no chemo or XRT, melanoma of arm, [place], obesity presenting for initial evaluation. Pt reports oxygen desaturation for over a year but no one has ordered her any oxygen for home. First noted low O2 sat when she had sinus infection. O2 sats will drop when exerting herself or resting. Feels short of breath worsening over time. She coughs w/ phlegm from sinuses. Sees ENT who cauterized nose for bleeding. Daughter is here as well and assists w/ history.  Reports not using CPAP because she had tip of her nose removed for cancer in past and she is concerned mask will cause more cancer to come back. She wakes up a lot at night and feels drowsy during the day.  Denies any hx of lung problems. She is a past smoker- quit 40 yrs ago 1-2 PPD x 25 yrs. Used to have recurrent bronchitis and cough which stopped after she quit smoking.    Outside records from pt's cardiologist Jeremy Sargent in [facility] were faxed to us and have been reviewed- with dx including heart block, atrial fib & flutter, CAD, diastolic dysfunction, HTN, HLD, and obesity. Per that note ASAP consult was placed to pulmonary for "shortness of breath and O2 sat of 70s-80s."    The chief compliant  problem is new to me    PFSH:  Past Medical History:   Diagnosis Date    Anemia     Basal cell carcinoma     nose     Cancer     breast    Depression     DVT (deep venous thrombosis)     Gastric ulceration     GERD (gastroesophageal reflux disease)     History of frequent urinary tract infections     Hyperlipidemia     Hypertension     Melanoma 2004?    left forearm    MRSA (methicillin resistant staph aureus) culture positive     Neuropathy     PE (pulmonary embolism)     " Reflux          Past Surgical History:   Procedure Laterality Date    HYSTERECTOMY      MASTECTOMY, RADICAL Bilateral     TOTAL HIP ARTHROPLASTY Left 11/13/2017     Social History     Tobacco Use    Smoking status: Former Smoker    Smokeless tobacco: Never Used   Substance Use Topics    Alcohol use: No    Drug use: No     Family History   Problem Relation Age of Onset    Cancer Mother     Cancer Father     Heart disease Father     Melanoma Neg Hx     Psoriasis Neg Hx     Lupus Neg Hx     Eczema Neg Hx      Review of patient's allergies indicates:   Allergen Reactions    Meperidine     Promethazine     Bactrim [sulfamethoxazole-trimethoprim] Rash       Performance Status:The patient's activity level is functions out of house.  Feels SOB any exertion out of house.     Review of Systems:  a review of eleven systems covering constitutional, Eye, HEENT, Psych, Respiratory, Cardiac, GI, , Musculoskeletal, Endocrine, Dermatologic was negative except for pertinent findings as listed ABOVE and below:  1 week chest pains radiating to the back- feels like gas- better w/ tums   sinus drip- constant    Exam:Comprehensive exam done. /63 (BP Location: Left arm, Patient Position: Sitting)   Pulse 75   Wt 107.6 kg (237 lb 1.7 oz)   SpO2 (!) 91% Comment: on room air at rest  BMI 38.27 kg/m²   Exam included Vitals as listed, and patient's appearance and affect and alertness and mood, oral exam for yeast and hygiene and pharynx lesions and Mallapatti (M) score, neck with inspection for jvd and masses and thyroid abnormalities and lymph nodes (supraclavicular and infraclavicular nodes and axillary also examined and noted if abn), chest exam included symmetry and effort and fremitus and percussion and auscultation, cardiac exam included rhythm and gallops and murmur and rubs and jvd and edema, abdominal exam for mass and hepatosplenomegaly and tenderness and hernias and bowel sounds, Musculoskeletal exam with  muscle tone and posture and mobility/gait and  strength, and skin for rashes and cyanosis and pallor and turgor, extremity for clubbing.  Findings were normal except for pertinent findings listed below:  Well healed scar over nose  M1  HR irregular  No edema    Radiographs (ct chest and cxr) reviewed: view by direct vision   CXR 6/8/20- interstitial markings prominent  VQ scan 6/8/20- IMPRESSION:  Normal VQ scan perfusion imaging  TTE 6/8/20-   · Concentric left ventricular hypertrophy.  · Normal left ventricular systolic function. The estimated ejection fraction is 65%.  · Normal LV diastolic function.  · Normal right ventricular systolic function.  · Mild left atrial enlargement.  · Mild aortic regurgitation.  · Mild mitral regurgitation.  · Mild tricuspid regurgitation.  · Mild pulmonic regurgitation.  · Normal central venous pressure (3 mmHg).  · The estimated PA systolic pressure is 36 mmHg.    Labs reviewed    Results for RONN SOLORZANO (MRN [id number]) as of 6/30/2020 14:08   Ref. Range 3/11/2020 07:45   CO2 Latest Ref Range: 23 - 29 mmol/L 32 (H)        PFT will be done and results to be reviewed      Plan:  Clinical impression is resonably certain and repeated evaluation prn +/- follow up will be needed as below. Chronic hypoxemic resp failure due to cardiac comorbidities, needs home O2. Needs to use CPAP regularly.    Lety was seen today for referral to pulmonary and shortness of breath.    Diagnoses and all orders for this visit:    Dyspnea on exertion  -     Complete PFT with bronchodilator; Future    Chronic hypoxemic respiratory failure  -     OXYGEN FOR HOME USE  -     Six Minute Walk Test to qualify for Home Oxygen; Future    Sleep disorder  -     CPAP/BIPAP SUPPLIES    Chronic heart failure with preserved ejection fraction        Follow up in about 6 months (around 12/30/2020).    Discussed with patient above for education the following:      Patient Instructions   Oxygen for home use  ordered- would wear continuously  Use CPAP machine- can try nasal pillows. Would use oxygen with your CPAP too.  Get lung function test and 6 minute walk to check to see how much oxygen you need.

## 2020-06-30 NOTE — LETTER
June 30, 2020      Jeremy Sargent MD  [address] [facility] [place] [address]           [facility]  [address]  [place] [address]  Phone: [phone]  Fax: [phone]          Patient: Lety Herbert   MR Number: [id number]   YOB: 1940   Date of Visit: 6/30/2020       Dear Dr. Jeremy Sargent:    Thank you for referring Lety Herbert to me for evaluation. Attached you will find relevant portions of my assessment and plan of care.    If you have questions, please do not hesitate to call me. I look forward to following Lety Herbert along with you.    Sincerely,    Shelbie Villalpando MD    Enclosure  CC:  No Recipients    If you would like to receive this communication electronically, please contact [email or web address] or [phone] to request more information on [email or web address] Link access.    For providers and/or their staff who would like to refer a patient to [facility], please contact us through our one-stop-shop provider referral line, [facility] [place], at [phone].    If you feel you have received this communication in error or would no longer like to receive these types of communications, please e-mail [email or web address]

## 2020-07-08 ENCOUNTER — TELEPHONE (OUTPATIENT)
Dept: PULMONOLOGY | Facility: CLINIC | Age: 80
End: 2020-07-08

## 2020-07-08 NOTE — TELEPHONE ENCOUNTER
Paperwork for portable oxygen faxed to [employer]. Patient notified.----- Message from Shelbie Villalpando MD sent at 7/8/2020  7:35 AM CDT -----  Regarding: RE: Portable oxygen tank  Contact: patient  Please let pt know that the oxygen company just sent paperwork which I signed today and going to fax back to them.  ----- Message -----  From: Sarah Bryant  Sent: 7/6/2020   2:18 PM CDT  To: Shelbie Villalpando MD  Subject: Portable oxygen tank                             Type: Needs Medical Advice  Who Called:  patient  Best Call Back Number: [phone]  Additional Information: Patient states she needs portable oxygen the tank that was ordered for her is to heavy to get around with.  Please call to advise.  [email or web address]

## 2020-07-14 ENCOUNTER — HOSPITAL ENCOUNTER (OUTPATIENT)
Dept: PULMONOLOGY | Facility: HOSPITAL | Age: 80
Discharge: HOME | End: 2020-07-14
Attending: INTERNAL MEDICINE
Payer: MEDICARE

## 2020-07-14 ENCOUNTER — TELEPHONE (OUTPATIENT)
Dept: PULMONOLOGY | Facility: CLINIC | Age: 80
End: 2020-07-14

## 2020-07-14 DIAGNOSIS — R06.09 DYSPNEA ON EXERTION: ICD-10-CM

## 2020-07-14 DIAGNOSIS — J96.11 CHRONIC HYPOXEMIC RESPIRATORY FAILURE: ICD-10-CM

## 2020-07-14 LAB
BR6MWT: NORMAL
BRPFT: ABNORMAL
DLCO ADJ PRE: 11.41 ML/(MIN*MMHG) (ref 15.27–26.73)
DLCO SINGLE BREATH LLN: 15.27
DLCO SINGLE BREATH PRE REF: 54.3 %
DLCO SINGLE BREATH REF: 21
DLCOC SBVA LLN: 2.65
DLCOC SBVA PRE REF: 71.4 %
DLCOC SBVA REF: 3.96
DLCOC SINGLE BREATH LLN: 15.27
DLCOC SINGLE BREATH PRE REF: 54.3 %
DLCOC SINGLE BREATH REF: 21
DLCOVA LLN: 2.65
DLCOVA PRE REF: 71.4 %
DLCOVA PRE: 2.83 ML/(MIN*MMHG*L) (ref 2.65–5.28)
DLCOVA REF: 3.96
DLVAADJ PRE: 2.83 ML/(MIN*MMHG*L) (ref 2.65–5.28)
ERVN2 LLN: -16449.49
ERVN2 PRE REF: 68 %
ERVN2 PRE: 0.34 L (ref -16449.49–16450.51)
ERVN2 REF: 0.51
FEF 25 75 CHG: 26.8 %
FEF 25 75 LLN: 0.65
FEF 25 75 POST REF: 86.3 %
FEF 25 75 PRE REF: 68.1 %
FEF 25 75 REF: 1.58
FET100 CHG: 8.1 %
FEV1 CHG: 6.6 %
FEV1 FVC CHG: 5.6 %
FEV1 FVC LLN: 62
FEV1 FVC POST REF: 97.6 %
FEV1 FVC PRE REF: 92.5 %
FEV1 FVC REF: 77
FEV1 LLN: 1.41
FEV1 POST REF: 89.9 %
FEV1 PRE REF: 84.3 %
FEV1 REF: 2.03
FRCN2 LLN: 2.02
FRCN2 PRE REF: 62.5 %
FRCN2 REF: 2.84
FVC CHG: 1 %
FVC LLN: 1.87
FVC POST REF: 90.7 %
FVC PRE REF: 89.8 %
FVC REF: 2.69
IVC PRE: 2.15 L (ref 1.87–3.51)
IVC SINGLE BREATH LLN: 1.87
IVC SINGLE BREATH PRE REF: 80 %
IVC SINGLE BREATH REF: 2.69
MVV LLN: 68
MVV PRE REF: 57.2 %
MVV REF: 80
PEF CHG: -17.2 %
PEF LLN: 3.2
PEF POST REF: 113.4 %
PEF PRE REF: 137 %
PEF REF: 5.03
POST FEF 25 75: 1.36 L/S (ref 0.65–2.51)
POST FET 100: 9.59 SEC
POST FEV1 FVC: 74.74 % (ref 61.73–91.38)
POST FEV1: 1.82 L (ref 1.41–2.65)
POST FVC: 2.44 L (ref 1.87–3.51)
POST PEF: 5.71 L/S (ref 3.2–6.86)
PRE DLCO: 11.41 ML/(MIN*MMHG) (ref 15.27–26.73)
PRE FEF 25 75: 1.08 L/S (ref 0.65–2.51)
PRE FET 100: 8.87 SEC
PRE FEV1 FVC: 70.78 % (ref 61.73–91.38)
PRE FEV1: 1.71 L (ref 1.41–2.65)
PRE FRC N2: 1.78 L
PRE FVC: 2.42 L (ref 1.87–3.51)
PRE MVV: 46 L/MIN (ref 68.37–92.51)
PRE PEF: 6.89 L/S (ref 3.2–6.86)
RVN2 LLN: 1.76
RVN2 PRE REF: 59.5 %
RVN2 PRE: 1.39 L (ref 1.76–2.91)
RVN2 REF: 2.34
RVN2TLCN2 LLN: 36.91
RVN2TLCN2 PRE REF: 78.6 %
RVN2TLCN2 PRE: 36.54 % (ref 36.91–56.09)
RVN2TLCN2 REF: 46.5
TLCN2 LLN: 4.31
TLCN2 PRE REF: 71.9 %
TLCN2 PRE: 3.81 L (ref 4.31–6.29)
TLCN2 REF: 5.3
VA PRE: 4.04 L (ref 5.15–5.15)
VA SINGLE BREATH LLN: 5.15
VA SINGLE BREATH PRE REF: 78.4 %
VA SINGLE BREATH REF: 5.15
VCMAXN2 LLN: 1.87
VCMAXN2 PRE REF: 89.8 %
VCMAXN2 PRE: 2.42 L (ref 1.87–3.51)
VCMAXN2 REF: 2.69

## 2020-07-14 PROCEDURE — 94729 DIFFUSING CAPACITY: CPT

## 2020-07-14 PROCEDURE — 94060 EVALUATION OF WHEEZING: CPT

## 2020-07-14 PROCEDURE — 94618 PULMONARY STRESS TESTING: CPT | Mod: 26,,, | Performed by: INTERNAL MEDICINE

## 2020-07-14 PROCEDURE — 94729 PR C02/MEMBANE DIFFUSE CAPACITY: ICD-10-PCS | Mod: 26,,, | Performed by: INTERNAL MEDICINE

## 2020-07-14 PROCEDURE — 94727 PR PULM FUNCTION TEST BY GAS: ICD-10-PCS | Mod: 26,,, | Performed by: INTERNAL MEDICINE

## 2020-07-14 PROCEDURE — 94060 EVALUATION OF WHEEZING: CPT | Mod: 26,59,, | Performed by: INTERNAL MEDICINE

## 2020-07-14 PROCEDURE — 94729 DIFFUSING CAPACITY: CPT | Mod: 26,,, | Performed by: INTERNAL MEDICINE

## 2020-07-14 PROCEDURE — 94618 PULMONARY STRESS TESTING: ICD-10-PCS | Mod: 26,,, | Performed by: INTERNAL MEDICINE

## 2020-07-14 PROCEDURE — 94618 PULMONARY STRESS TESTING: CPT

## 2020-07-14 PROCEDURE — 94060 PR EVAL OF BRONCHOSPASM: ICD-10-PCS | Mod: 26,59,, | Performed by: INTERNAL MEDICINE

## 2020-07-14 PROCEDURE — 94727 GAS DIL/WSHOT DETER LNG VOL: CPT | Mod: 26,,, | Performed by: INTERNAL MEDICINE

## 2020-07-14 PROCEDURE — 94727 GAS DIL/WSHOT DETER LNG VOL: CPT

## 2020-07-14 NOTE — TELEPHONE ENCOUNTER
LVM for pt to call back for results----- Message from Shelbie Villalpando MD sent at 7/14/2020  4:18 PM CDT -----  PFT showed restriction which means low lung volumes- likely related to heart issues

## 2020-07-14 NOTE — TELEPHONE ENCOUNTER
[facility] to inform of results AND results of pft ----- Message from Shelbie Villalpando MD sent at 7/14/2020  4:16 PM CDT -----  Walking test showed that she needs 3 L of oxygen continuously

## 2020-08-19 ENCOUNTER — TELEPHONE (OUTPATIENT)
Dept: PULMONOLOGY | Facility: CLINIC | Age: 80
End: 2020-08-19

## 2020-08-19 NOTE — TELEPHONE ENCOUNTER
Returned patients call. No answer. Left voice message for patient to call back. ----- Message from Karley Manning sent at 8/19/2020 12:16 PM CDT -----  Contact: patient  Type: Needs Medical Advice  Who Called:  patient  Symptoms (please be specific):    How long has patient had these symptoms:    Pharmacy name and phone #:    Best Call Back Number: [phone]  Additional Information: patient requesting a call back regarding portable oxygen,and test results

## 2020-09-01 ENCOUNTER — LAB VISIT (OUTPATIENT)
Dept: LAB | Facility: HOSPITAL | Age: 80
End: 2020-09-01
Attending: INTERNAL MEDICINE
Payer: MEDICARE

## 2020-09-01 DIAGNOSIS — R06.02 SHORTNESS OF BREATH: ICD-10-CM

## 2020-09-01 DIAGNOSIS — R60.0 LOCALIZED EDEMA: ICD-10-CM

## 2020-09-01 DIAGNOSIS — E78.2 MIXED HYPERLIPIDEMIA: ICD-10-CM

## 2020-09-01 DIAGNOSIS — I10 ESSENTIAL HYPERTENSION, MALIGNANT: ICD-10-CM

## 2020-09-01 DIAGNOSIS — I48.91 ATRIAL FIBRILLATION: Primary | ICD-10-CM

## 2020-09-01 DIAGNOSIS — Z79.899 ENCOUNTER FOR LONG-TERM (CURRENT) USE OF OTHER MEDICATIONS: ICD-10-CM

## 2020-09-01 DIAGNOSIS — I25.10 CORONARY ATHEROSCLEROSIS OF NATIVE CORONARY ARTERY: ICD-10-CM

## 2020-09-01 DIAGNOSIS — R09.02 HYPOXEMIA: ICD-10-CM

## 2020-09-01 LAB
25(OH)D3+25(OH)D2 SERPL-MCNC: 50 NG/ML (ref 30–96)
ALBUMIN SERPL BCP-MCNC: 4.1 G/DL (ref 3.5–5.2)
ALP SERPL-CCNC: 68 U/L (ref 55–135)
ALT SERPL W/O P-5'-P-CCNC: 17 U/L (ref 10–44)
ANION GAP SERPL CALC-SCNC: 7 MMOL/L (ref 8–16)
AST SERPL-CCNC: 21 U/L (ref 10–40)
BASOPHILS # BLD AUTO: 0.08 K/UL (ref 0–0.2)
BASOPHILS NFR BLD: 1.2 % (ref 0–1.9)
BILIRUB SERPL-MCNC: 0.8 MG/DL (ref 0.1–1)
BNP SERPL-MCNC: 129 PG/ML (ref 0–99)
BUN SERPL-MCNC: 10 MG/DL (ref 8–23)
CALCIUM SERPL-MCNC: 8.9 MG/DL (ref 8.7–10.5)
CHLORIDE SERPL-SCNC: 105 MMOL/L (ref 95–110)
CHOLEST SERPL-MCNC: 136 MG/DL (ref 120–199)
CHOLEST/HDLC SERPL: 3.2 {RATIO} (ref 2–5)
CO2 SERPL-SCNC: 27 MMOL/L (ref 23–29)
CREAT SERPL-MCNC: 0.5 MG/DL (ref 0.5–1.4)
CRP SERPL-MCNC: 0.08 MG/DL
DIFFERENTIAL METHOD: ABNORMAL
EOSINOPHIL # BLD AUTO: 0.2 K/UL (ref 0–0.5)
EOSINOPHIL NFR BLD: 2.6 % (ref 0–8)
ERYTHROCYTE [DISTWIDTH] IN BLOOD BY AUTOMATED COUNT: 14.3 % (ref 11.5–14.5)
ERYTHROCYTE [SEDIMENTATION RATE] IN BLOOD BY WESTERGREN METHOD: 21 MM/HR (ref 0–20)
EST. GFR  (AFRICAN AMERICAN): >60 ML/MIN/1.73 M^2
EST. GFR  (NON AFRICAN AMERICAN): >60 ML/MIN/1.73 M^2
GLUCOSE SERPL-MCNC: 102 MG/DL (ref 70–110)
HCT VFR BLD AUTO: 36.1 % (ref 37–48.5)
HDLC SERPL-MCNC: 42 MG/DL (ref 40–75)
HDLC SERPL: 30.9 % (ref 20–50)
HGB BLD-MCNC: 11 G/DL (ref 12–16)
IMM GRANULOCYTES # BLD AUTO: 0.02 K/UL (ref 0–0.04)
IMM GRANULOCYTES NFR BLD AUTO: 0.3 % (ref 0–0.5)
LDLC SERPL CALC-MCNC: 75.2 MG/DL (ref 63–159)
LYMPHOCYTES # BLD AUTO: 1.7 K/UL (ref 1–4.8)
LYMPHOCYTES NFR BLD: 26.2 % (ref 18–48)
MAGNESIUM SERPL-MCNC: 1.9 MG/DL (ref 1.6–2.6)
MCH RBC QN AUTO: 26.2 PG (ref 27–31)
MCHC RBC AUTO-ENTMCNC: 30.5 G/DL (ref 32–36)
MCV RBC AUTO: 86 FL (ref 82–98)
MONOCYTES # BLD AUTO: 0.4 K/UL (ref 0.3–1)
MONOCYTES NFR BLD: 6.7 % (ref 4–15)
NEUTROPHILS # BLD AUTO: 4 K/UL (ref 1.8–7.7)
NEUTROPHILS NFR BLD: 63 % (ref 38–73)
NONHDLC SERPL-MCNC: 94 MG/DL
NRBC BLD-RTO: 0 /100 WBC
PLATELET # BLD AUTO: 234 K/UL (ref 150–350)
PMV BLD AUTO: 11.3 FL (ref 9.2–12.9)
POTASSIUM SERPL-SCNC: 4 MMOL/L (ref 3.5–5.1)
PROT SERPL-MCNC: 7.3 G/DL (ref 6–8.4)
RBC # BLD AUTO: 4.2 M/UL (ref 4–5.4)
SODIUM SERPL-SCNC: 139 MMOL/L (ref 136–145)
TRIGL SERPL-MCNC: 94 MG/DL (ref 30–150)
TSH SERPL DL<=0.005 MIU/L-ACNC: 0.42 UIU/ML (ref 0.34–5.6)
WBC # BLD AUTO: 6.42 K/UL (ref 3.9–12.7)

## 2020-09-01 PROCEDURE — 85025 COMPLETE CBC W/AUTO DIFF WBC: CPT

## 2020-09-01 PROCEDURE — 83735 ASSAY OF MAGNESIUM: CPT

## 2020-09-01 PROCEDURE — 82306 VITAMIN D 25 HYDROXY: CPT

## 2020-09-01 PROCEDURE — 85651 RBC SED RATE NONAUTOMATED: CPT

## 2020-09-01 PROCEDURE — 84443 ASSAY THYROID STIM HORMONE: CPT

## 2020-09-01 PROCEDURE — 83880 ASSAY OF NATRIURETIC PEPTIDE: CPT

## 2020-09-01 PROCEDURE — 80061 LIPID PANEL: CPT

## 2020-09-01 PROCEDURE — 80053 COMPREHEN METABOLIC PANEL: CPT

## 2020-09-01 PROCEDURE — 86140 C-REACTIVE PROTEIN: CPT

## 2020-09-01 PROCEDURE — 36415 COLL VENOUS BLD VENIPUNCTURE: CPT

## 2020-09-18 ENCOUNTER — TELEPHONE (OUTPATIENT)
Dept: PULMONOLOGY | Facility: CLINIC | Age: 80
End: 2020-09-18

## 2020-09-18 NOTE — TELEPHONE ENCOUNTER
Spoke to patient and told her we would send over an order for a POC.     ----- Message from Justyna Powers sent at 9/18/2020  3:12 PM CDT -----  Type: Needs Medical Advice  Who Called:  Patient   Best Call Back Number:   [phone]  Additional Information: Per patient left a message a few days ago regarding not wanting a portable oxygen tank with wheels and hasn't heard back from anyone, checking status and requesting a call back-please advise-thank you

## 2020-12-30 ENCOUNTER — OFFICE VISIT (OUTPATIENT)
Dept: PULMONOLOGY | Facility: CLINIC | Age: 80
End: 2020-12-30
Payer: MEDICARE

## 2020-12-30 VITALS
WEIGHT: 229.25 LBS | HEART RATE: 70 BPM | OXYGEN SATURATION: 87 % | SYSTOLIC BLOOD PRESSURE: 150 MMHG | BODY MASS INDEX: 36.84 KG/M2 | HEIGHT: 66 IN | DIASTOLIC BLOOD PRESSURE: 78 MMHG

## 2020-12-30 DIAGNOSIS — J96.11 CHRONIC HYPOXEMIC RESPIRATORY FAILURE: ICD-10-CM

## 2020-12-30 DIAGNOSIS — I27.20 PULMONARY HYPERTENSION: ICD-10-CM

## 2020-12-30 DIAGNOSIS — R09.89 CHRONIC SINUS COMPLAINTS: ICD-10-CM

## 2020-12-30 DIAGNOSIS — I50.32 CHRONIC HEART FAILURE WITH PRESERVED EJECTION FRACTION: Primary | ICD-10-CM

## 2020-12-30 DIAGNOSIS — G47.33 OSA (OBSTRUCTIVE SLEEP APNEA): ICD-10-CM

## 2020-12-30 PROBLEM — G47.9 SLEEP DISORDER: Status: RESOLVED | Noted: 2017-06-21 | Resolved: 2020-12-30

## 2020-12-30 PROCEDURE — 1101F PT FALLS ASSESS-DOCD LE1/YR: CPT | Mod: CPTII,S$GLB,, | Performed by: INTERNAL MEDICINE

## 2020-12-30 PROCEDURE — 3078F PR MOST RECENT DIASTOLIC BLOOD PRESSURE < 80 MM HG: ICD-10-PCS | Mod: CPTII,S$GLB,, | Performed by: INTERNAL MEDICINE

## 2020-12-30 PROCEDURE — 1101F PR PT FALLS ASSESS DOC 0-1 FALLS W/OUT INJ PAST YR: ICD-10-PCS | Mod: CPTII,S$GLB,, | Performed by: INTERNAL MEDICINE

## 2020-12-30 PROCEDURE — 3077F SYST BP >= 140 MM HG: CPT | Mod: CPTII,S$GLB,, | Performed by: INTERNAL MEDICINE

## 2020-12-30 PROCEDURE — 1159F MED LIST DOCD IN RCRD: CPT | Mod: S$GLB,,, | Performed by: INTERNAL MEDICINE

## 2020-12-30 PROCEDURE — 3077F PR MOST RECENT SYSTOLIC BLOOD PRESSURE >= 140 MM HG: ICD-10-PCS | Mod: CPTII,S$GLB,, | Performed by: INTERNAL MEDICINE

## 2020-12-30 PROCEDURE — 99214 PR OFFICE/OUTPT VISIT, EST, LEVL IV, 30-39 MIN: ICD-10-PCS | Mod: S$GLB,,, | Performed by: INTERNAL MEDICINE

## 2020-12-30 PROCEDURE — 3288F PR FALLS RISK ASSESSMENT DOCUMENTED: ICD-10-PCS | Mod: CPTII,S$GLB,, | Performed by: INTERNAL MEDICINE

## 2020-12-30 PROCEDURE — 3288F FALL RISK ASSESSMENT DOCD: CPT | Mod: CPTII,S$GLB,, | Performed by: INTERNAL MEDICINE

## 2020-12-30 PROCEDURE — 1126F PR PAIN SEVERITY QUANTIFIED, NO PAIN PRESENT: ICD-10-PCS | Mod: S$GLB,,, | Performed by: INTERNAL MEDICINE

## 2020-12-30 PROCEDURE — 1159F PR MEDICATION LIST DOCUMENTED IN MEDICAL RECORD: ICD-10-PCS | Mod: S$GLB,,, | Performed by: INTERNAL MEDICINE

## 2020-12-30 PROCEDURE — 99214 OFFICE O/P EST MOD 30 MIN: CPT | Mod: S$GLB,,, | Performed by: INTERNAL MEDICINE

## 2020-12-30 PROCEDURE — 99999 PR PBB SHADOW E&M-EST. PATIENT-LVL V: CPT | Mod: PBBFAC,,, | Performed by: INTERNAL MEDICINE

## 2020-12-30 PROCEDURE — 1126F AMNT PAIN NOTED NONE PRSNT: CPT | Mod: S$GLB,,, | Performed by: INTERNAL MEDICINE

## 2020-12-30 PROCEDURE — 3078F DIAST BP <80 MM HG: CPT | Mod: CPTII,S$GLB,, | Performed by: INTERNAL MEDICINE

## 2020-12-30 PROCEDURE — 99999 PR PBB SHADOW E&M-EST. PATIENT-LVL V: ICD-10-PCS | Mod: PBBFAC,,, | Performed by: INTERNAL MEDICINE

## 2020-12-30 RX ORDER — LUBIPROSTONE 24 UG/1
CAPSULE, GELATIN COATED ORAL
COMMUNITY
Start: 2020-11-18 | End: 2021-11-10

## 2020-12-30 RX ORDER — LORAZEPAM 1 MG/1
TABLET ORAL
COMMUNITY
Start: 2020-10-20 | End: 2021-02-04

## 2020-12-30 RX ORDER — FLECAINIDE ACETATE 50 MG/1
TABLET ORAL
COMMUNITY
Start: 2020-11-23 | End: 2021-11-10

## 2020-12-30 RX ORDER — METOPROLOL SUCCINATE 50 MG/1
TABLET, EXTENDED RELEASE ORAL
COMMUNITY
Start: 2020-10-24 | End: 2021-11-10

## 2020-12-30 RX ORDER — LORATADINE 10 MG/1
10 TABLET ORAL DAILY
Qty: 30 TABLET | Refills: 11 | Status: SHIPPED | OUTPATIENT
Start: 2020-12-30 | End: 2021-06-30

## 2020-12-30 RX ORDER — FLUTICASONE PROPIONATE 50 MCG
2 SPRAY, SUSPENSION (ML) NASAL DAILY
Qty: 16 G | Refills: 11 | Status: SHIPPED | OUTPATIENT
Start: 2020-12-30 | End: 2022-03-15

## 2020-12-30 NOTE — PROGRESS NOTES
"12/30/2020    Lety Finley Herbert  Follow up    Chief Complaint   Patient presents with    Follow-up     6m f/u       HPI:   12/30/2020- feels better w/ O2, wears 3L continuously except when sitting still at home. Trouble breathing w/ fatigue is episodic. Gets headaches- tension from wearing O2, glasses and mask behind ear. Has constant sinus drip. Other night R ear blocked up while wearing CPAP.  Tries mucinex DM but doesn't last long. She is on xarelto for atrial fib now.     6/30/20-   Oxygen for home use ordered- would wear continuously  Use CPAP machine- can try nasal pillows. Would use oxygen with your CPAP too.  Get lung function test and 6 minute walk to check to see how much oxygen you need.  Pt is an 80 yo female with DVT/PE, GERD, breast ca s/p double mastectomy- no chemo or XRT, melanoma of arm, [place], obesity presenting for initial evaluation. Pt reports oxygen desaturation for over a year but no one has ordered her any oxygen for home. First noted low O2 sat when she had sinus infection. O2 sats will drop when exerting herself or resting. Feels short of breath worsening over time. She coughs w/ phlegm from sinuses. Sees ENT who cauterized nose for bleeding. Daughter is here as well and assists w/ history.  Reports not using CPAP because she had tip of her nose removed for cancer in past and she is concerned mask will cause more cancer to come back. She wakes up a lot at night and feels drowsy during the day.  Denies any hx of lung problems. She is a past smoker- quit 40 yrs ago 1-2 PPD x 25 yrs. Used to have recurrent bronchitis and cough which stopped after she quit smoking.    Outside records from pt's cardiologist Jeremy Sargent in [facility] were faxed to us and have been reviewed- with dx including heart block, atrial fib & flutter, CAD, diastolic dysfunction, HTN, HLD, and obesity. Per that note ASAP consult was placed to pulmonary for "shortness of breath and O2 sat of 70s-80s."    The chief " "compliant  problem is varies w/ instability at times    PFSH:  Past Medical History:   Diagnosis Date    Anemia     Basal cell carcinoma     nose     Cancer     breast    Depression     DVT (deep venous thrombosis)     Gastric ulceration     GERD (gastroesophageal reflux disease)     History of frequent urinary tract infections     Hyperlipidemia     Hypertension     Melanoma 2004?    left forearm    MRSA (methicillin resistant staph aureus) culture positive     Neuropathy     PE (pulmonary embolism)     Reflux          Past Surgical History:   Procedure Laterality Date    HYSTERECTOMY      MASTECTOMY, RADICAL Bilateral     TOTAL HIP ARTHROPLASTY Left 11/13/2017     Social History     Tobacco Use    Smoking status: Former Smoker    Smokeless tobacco: Never Used   Substance Use Topics    Alcohol use: No    Drug use: No     Family History   Problem Relation Age of Onset    Cancer Mother     Cancer Father     Heart disease Father     Melanoma Neg Hx     Psoriasis Neg Hx     Lupus Neg Hx     Eczema Neg Hx      Review of patient's allergies indicates:   Allergen Reactions    Meperidine     Promethazine     Bactrim [sulfamethoxazole-trimethoprim] Rash       Performance Status:The patient's activity level is functions out of house.  Feels SOB any exertion out of house.     Review of Systems:  a review of eleven systems covering constitutional, Eye, HEENT, Psych, Respiratory, Cardiac, GI, , Musculoskeletal, Endocrine, Dermatologic was negative except for pertinent findings as listed ABOVE and below:  All negative with pertinent positives as above       Exam:Comprehensive exam done. BP (!) 150/78 (BP Location: Left arm, Patient Position: Sitting, BP Method: Medium (Automatic))   Pulse 70   Ht 5' 6" (1.676 m)   Wt 104 kg (229 lb 4.5 oz)   SpO2 (!) 87% Comment: on room air at rest  BMI 37.01 kg/m²   Exam included Vitals as listed, and patient's appearance and affect and alertness and " mood, oral exam for yeast and hygiene and pharynx lesions and Mallapatti (M) score, neck with inspection for jvd and masses and thyroid abnormalities and lymph nodes (supraclavicular and infraclavicular nodes and axillary also examined and noted if abn), chest exam included symmetry and effort and fremitus and percussion and auscultation, cardiac exam included rhythm and gallops and murmur and rubs and jvd and edema, abdominal exam for mass and hepatosplenomegaly and tenderness and hernias and bowel sounds, Musculoskeletal exam with muscle tone and posture and mobility/gait and  strength, and skin for rashes and cyanosis and pallor and turgor, extremity for clubbing.  Findings were normal except for pertinent findings listed below:  M1  HR irregularly irreg  No edema  Varicose veins of legs    Radiographs (ct chest and cxr) reviewed: view by direct vision   CXR 6/8/20- interstitial markings prominent  VQ scan 6/8/20- IMPRESSION:  Normal VQ scan perfusion imaging  TTE 6/8/20-   · Concentric left ventricular hypertrophy.  · Normal left ventricular systolic function. The estimated ejection fraction is 65%.  · Normal LV diastolic function.  · Normal right ventricular systolic function.  · Mild left atrial enlargement.  · Mild aortic regurgitation.  · Mild mitral regurgitation.  · Mild tricuspid regurgitation.  · Mild pulmonic regurgitation.  · Normal central venous pressure (3 mmHg).  · The estimated PA systolic pressure is 36 mmHg.    Labs reviewed    Results for RONN SOLORZANO (MRN [id number]) as of 6/30/2020 14:08   Ref. Range 3/11/2020 07:45   CO2 Latest Ref Range: 23 - 29 mmol/L 32 (H)        PFT 7/14/20 reviewed- mild restriction, reduced DLCO      6MWT 7/14/20- 85m, desat to 83% at rest, req 3L NC    Plan:  Clinical impression is resonably certain and repeated evaluation prn +/- follow up will be needed as below. Chronic hypoxemic resp failure due to cardiac comorbidities, needs home O2. Needs to use CPAP  regularly. PH likely groups 2/3 due to A[place] and HFpEF.    Lety was seen today for follow-up.    Diagnoses and all orders for this visit:    Chronic heart failure with preserved ejection fraction    Chronic hypoxemic respiratory failure    Pulmonary hypertension    [place] (obstructive sleep apnea)    Chronic sinus complaints  -     fluticasone propionate (FLONASE) 50 mcg/actuation nasal spray; 2 sprays (100 mcg total) by Each Nostril route once daily.  -     loratadine (CLARITIN) 10 mg tablet; Take 1 tablet (10 mg total) by mouth once daily.        Follow up in about 6 months (around 6/30/2021).    Discussed with patient above for education the following:      Patient Instructions   Call medical supply for the oxygen and ask about thinner or more comfortable nasal cannulas  Start claritin daily  Start flonase 2 sprays in each nostril daily

## 2020-12-30 NOTE — PATIENT INSTRUCTIONS
Call medical supply for the oxygen and ask about thinner or more comfortable nasal cannulas  Start claritin daily  Start flonase 2 sprays in each nostril daily

## 2021-02-04 ENCOUNTER — OFFICE VISIT (OUTPATIENT)
Dept: FAMILY MEDICINE | Facility: CLINIC | Age: 81
End: 2021-02-04
Payer: MEDICARE

## 2021-02-04 VITALS
TEMPERATURE: 98 F | DIASTOLIC BLOOD PRESSURE: 74 MMHG | HEART RATE: 60 BPM | HEIGHT: 66 IN | SYSTOLIC BLOOD PRESSURE: 136 MMHG | BODY MASS INDEX: 35.84 KG/M2 | WEIGHT: 223 LBS

## 2021-02-04 DIAGNOSIS — J96.11 CHRONIC HYPOXEMIC RESPIRATORY FAILURE: ICD-10-CM

## 2021-02-04 DIAGNOSIS — I48.0 PAROXYSMAL ATRIAL FIBRILLATION: ICD-10-CM

## 2021-02-04 DIAGNOSIS — D64.9 NORMOCYTIC ANEMIA: ICD-10-CM

## 2021-02-04 DIAGNOSIS — N39.46 MIXED STRESS AND URGE URINARY INCONTINENCE: ICD-10-CM

## 2021-02-04 DIAGNOSIS — K21.9 GASTROESOPHAGEAL REFLUX DISEASE WITHOUT ESOPHAGITIS: ICD-10-CM

## 2021-02-04 DIAGNOSIS — F34.1 DYSTHYMIA: ICD-10-CM

## 2021-02-04 DIAGNOSIS — I10 BENIGN ESSENTIAL HYPERTENSION: Primary | ICD-10-CM

## 2021-02-04 DIAGNOSIS — I50.32 CHRONIC HEART FAILURE WITH PRESERVED EJECTION FRACTION: ICD-10-CM

## 2021-02-04 DIAGNOSIS — G44.229 CHRONIC TENSION-TYPE HEADACHE, NOT INTRACTABLE: ICD-10-CM

## 2021-02-04 DIAGNOSIS — E78.2 MULTIPLE-TYPE HYPERLIPIDEMIA: ICD-10-CM
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
## 2021-02-04 PROBLEM — R09.82 POST-NASAL DRIP: Status: RESOLVED | Noted: 2018-11-15 | Resolved: 2021-02-04

## 2021-02-04 PROCEDURE — 99214 OFFICE O/P EST MOD 30 MIN: CPT | Mod: S$GLB,,, | Performed by: INTERNAL MEDICINE

## 2021-02-04 PROCEDURE — 3078F PR MOST RECENT DIASTOLIC BLOOD PRESSURE < 80 MM HG: ICD-10-PCS | Mod: S$GLB,,, | Performed by: INTERNAL MEDICINE

## 2021-02-04 PROCEDURE — 1100F PR PT FALLS ASSESS DOC 2+ FALLS/FALL W/INJURY/YR: ICD-10-PCS | Mod: S$GLB,,, | Performed by: INTERNAL MEDICINE

## 2021-02-04 PROCEDURE — 1126F PR PAIN SEVERITY QUANTIFIED, NO PAIN PRESENT: ICD-10-PCS | Mod: S$GLB,,, | Performed by: INTERNAL MEDICINE

## 2021-02-04 PROCEDURE — 1170F FXNL STATUS ASSESSED: CPT | Mod: S$GLB,,, | Performed by: INTERNAL MEDICINE

## 2021-02-04 PROCEDURE — 1159F MED LIST DOCD IN RCRD: CPT | Mod: S$GLB,,, | Performed by: INTERNAL MEDICINE

## 2021-02-04 PROCEDURE — 3078F DIAST BP <80 MM HG: CPT | Mod: S$GLB,,, | Performed by: INTERNAL MEDICINE

## 2021-02-04 PROCEDURE — 3288F FALL RISK ASSESSMENT DOCD: CPT | Mod: S$GLB,,, | Performed by: INTERNAL MEDICINE

## 2021-02-04 PROCEDURE — 1126F AMNT PAIN NOTED NONE PRSNT: CPT | Mod: S$GLB,,, | Performed by: INTERNAL MEDICINE

## 2021-02-04 PROCEDURE — 1170F PR FUNCTIONAL STATUS ASSESSED: ICD-10-PCS | Mod: S$GLB,,, | Performed by: INTERNAL MEDICINE

## 2021-02-04 PROCEDURE — 1100F PTFALLS ASSESS-DOCD GE2>/YR: CPT | Mod: S$GLB,,, | Performed by: INTERNAL MEDICINE

## 2021-02-04 PROCEDURE — 3288F PR FALLS RISK ASSESSMENT DOCUMENTED: ICD-10-PCS | Mod: S$GLB,,, | Performed by: INTERNAL MEDICINE

## 2021-02-04 PROCEDURE — 3075F PR MOST RECENT SYSTOLIC BLOOD PRESS GE 130-139MM HG: ICD-10-PCS | Mod: S$GLB,,, | Performed by: INTERNAL MEDICINE

## 2021-02-04 PROCEDURE — 1159F PR MEDICATION LIST DOCUMENTED IN MEDICAL RECORD: ICD-10-PCS | Mod: S$GLB,,, | Performed by: INTERNAL MEDICINE

## 2021-02-04 PROCEDURE — 3075F SYST BP GE 130 - 139MM HG: CPT | Mod: S$GLB,,, | Performed by: INTERNAL MEDICINE

## 2021-02-04 PROCEDURE — 99214 PR OFFICE/OUTPT VISIT, EST, LEVL IV, 30-39 MIN: ICD-10-PCS | Mod: S$GLB,,, | Performed by: INTERNAL MEDICINE

## 2021-06-17 ENCOUNTER — OFFICE VISIT (OUTPATIENT)
Dept: PODIATRY | Facility: CLINIC | Age: 81
End: 2021-06-17
Payer: MEDICARE

## 2021-06-17 VITALS
HEIGHT: 66 IN | SYSTOLIC BLOOD PRESSURE: 124 MMHG | DIASTOLIC BLOOD PRESSURE: 64 MMHG | HEART RATE: 66 BPM | WEIGHT: 223 LBS | BODY MASS INDEX: 35.84 KG/M2

## 2021-06-17 DIAGNOSIS — B35.1 ONYCHOMYCOSIS DUE TO DERMATOPHYTE: Primary | ICD-10-CM

## 2021-06-17 PROCEDURE — 1125F PR PAIN SEVERITY QUANTIFIED, PAIN PRESENT: ICD-10-PCS | Mod: S$GLB,,, | Performed by: PODIATRIST

## 2021-06-17 PROCEDURE — 99203 PR OFFICE/OUTPT VISIT, NEW, LEVL III, 30-44 MIN: ICD-10-PCS | Mod: S$GLB,,, | Performed by: PODIATRIST

## 2021-06-17 PROCEDURE — 1125F AMNT PAIN NOTED PAIN PRSNT: CPT | Mod: S$GLB,,, | Performed by: PODIATRIST

## 2021-06-17 PROCEDURE — 17999 PR NON-COVERED FOOT CARE: ICD-10-PCS | Mod: CSM,S$GLB,, | Performed by: PODIATRIST

## 2021-06-17 PROCEDURE — 1159F MED LIST DOCD IN RCRD: CPT | Mod: S$GLB,,, | Performed by: PODIATRIST

## 2021-06-17 PROCEDURE — 3288F FALL RISK ASSESSMENT DOCD: CPT | Mod: CPTII,S$GLB,, | Performed by: PODIATRIST

## 2021-06-17 PROCEDURE — 99203 OFFICE O/P NEW LOW 30 MIN: CPT | Mod: S$GLB,,, | Performed by: PODIATRIST

## 2021-06-17 PROCEDURE — 1101F PT FALLS ASSESS-DOCD LE1/YR: CPT | Mod: CPTII,S$GLB,, | Performed by: PODIATRIST

## 2021-06-17 PROCEDURE — 3288F PR FALLS RISK ASSESSMENT DOCUMENTED: ICD-10-PCS | Mod: CPTII,S$GLB,, | Performed by: PODIATRIST

## 2021-06-17 PROCEDURE — 1159F PR MEDICATION LIST DOCUMENTED IN MEDICAL RECORD: ICD-10-PCS | Mod: S$GLB,,, | Performed by: PODIATRIST

## 2021-06-17 PROCEDURE — 17999 UNLISTD PX SKN MUC MEMB SUBQ: CPT | Mod: CSM,S$GLB,, | Performed by: PODIATRIST

## 2021-06-17 PROCEDURE — 1101F PR PT FALLS ASSESS DOC 0-1 FALLS W/OUT INJ PAST YR: ICD-10-PCS | Mod: CPTII,S$GLB,, | Performed by: PODIATRIST

## 2021-06-17 RX ORDER — GABAPENTIN 100 MG/1
CAPSULE ORAL
COMMUNITY
Start: 2021-03-08 | End: 2022-03-15

## 2021-06-17 RX ORDER — VALSARTAN AND HYDROCHLOROTHIAZIDE 320; 12.5 MG/1; MG/1
1 TABLET, FILM COATED ORAL EVERY MORNING
COMMUNITY
Start: 2021-06-10 | End: 2023-01-11 | Stop reason: SDUPTHER

## 2021-06-30 ENCOUNTER — OFFICE VISIT (OUTPATIENT)
Dept: PULMONOLOGY | Facility: CLINIC | Age: 81
End: 2021-06-30
Payer: MEDICARE

## 2021-06-30 VITALS
DIASTOLIC BLOOD PRESSURE: 77 MMHG | BODY MASS INDEX: 35.86 KG/M2 | HEART RATE: 80 BPM | OXYGEN SATURATION: 88 % | HEIGHT: 66 IN | WEIGHT: 223.13 LBS | SYSTOLIC BLOOD PRESSURE: 146 MMHG

## 2021-06-30 DIAGNOSIS — R09.89 CHRONIC SINUS COMPLAINTS: ICD-10-CM

## 2021-06-30 DIAGNOSIS — J96.11 CHRONIC HYPOXEMIC RESPIRATORY FAILURE: Primary | ICD-10-CM

## 2021-06-30 DIAGNOSIS — I50.32 CHRONIC HEART FAILURE WITH PRESERVED EJECTION FRACTION: ICD-10-CM

## 2021-06-30 PROCEDURE — 99999 PR PBB SHADOW E&M-EST. PATIENT-LVL IV: CPT | Mod: PBBFAC,,, | Performed by: INTERNAL MEDICINE

## 2021-06-30 PROCEDURE — 99214 PR OFFICE/OUTPT VISIT, EST, LEVL IV, 30-39 MIN: ICD-10-PCS | Mod: S$GLB,,, | Performed by: INTERNAL MEDICINE

## 2021-06-30 PROCEDURE — 3288F PR FALLS RISK ASSESSMENT DOCUMENTED: ICD-10-PCS | Mod: CPTII,S$GLB,, | Performed by: INTERNAL MEDICINE

## 2021-06-30 PROCEDURE — 1126F AMNT PAIN NOTED NONE PRSNT: CPT | Mod: S$GLB,,, | Performed by: INTERNAL MEDICINE

## 2021-06-30 PROCEDURE — 99214 OFFICE O/P EST MOD 30 MIN: CPT | Mod: S$GLB,,, | Performed by: INTERNAL MEDICINE

## 2021-06-30 PROCEDURE — 1159F MED LIST DOCD IN RCRD: CPT | Mod: S$GLB,,, | Performed by: INTERNAL MEDICINE

## 2021-06-30 PROCEDURE — 99999 PR PBB SHADOW E&M-EST. PATIENT-LVL IV: ICD-10-PCS | Mod: PBBFAC,,, | Performed by: INTERNAL MEDICINE

## 2021-06-30 PROCEDURE — 1126F PR PAIN SEVERITY QUANTIFIED, NO PAIN PRESENT: ICD-10-PCS | Mod: S$GLB,,, | Performed by: INTERNAL MEDICINE

## 2021-06-30 PROCEDURE — 1101F PT FALLS ASSESS-DOCD LE1/YR: CPT | Mod: CPTII,S$GLB,, | Performed by: INTERNAL MEDICINE

## 2021-06-30 PROCEDURE — 1159F PR MEDICATION LIST DOCUMENTED IN MEDICAL RECORD: ICD-10-PCS | Mod: S$GLB,,, | Performed by: INTERNAL MEDICINE

## 2021-06-30 PROCEDURE — 3288F FALL RISK ASSESSMENT DOCD: CPT | Mod: CPTII,S$GLB,, | Performed by: INTERNAL MEDICINE

## 2021-06-30 PROCEDURE — 1101F PR PT FALLS ASSESS DOC 0-1 FALLS W/OUT INJ PAST YR: ICD-10-PCS | Mod: CPTII,S$GLB,, | Performed by: INTERNAL MEDICINE

## 2021-06-30 RX ORDER — MONTELUKAST SODIUM 10 MG/1
10 TABLET ORAL NIGHTLY
Qty: 30 TABLET | Refills: 11 | Status: SHIPPED | OUTPATIENT
Start: 2021-06-30 | End: 2021-07-30

## 2021-07-01 ENCOUNTER — LAB VISIT (OUTPATIENT)
Dept: LAB | Facility: HOSPITAL | Age: 81
End: 2021-07-01
Attending: INTERNAL MEDICINE
Payer: MEDICARE

## 2021-07-01 DIAGNOSIS — I50.32 CHRONIC HEART FAILURE WITH PRESERVED EJECTION FRACTION: ICD-10-CM

## 2021-07-01 DIAGNOSIS — I48.0 PAROXYSMAL ATRIAL FIBRILLATION: ICD-10-CM

## 2021-07-01 DIAGNOSIS — I10 BENIGN ESSENTIAL HYPERTENSION: ICD-10-CM

## 2021-07-01 DIAGNOSIS — D64.9 NORMOCYTIC ANEMIA: ICD-10-CM

## 2021-07-01 LAB
ALBUMIN SERPL BCP-MCNC: 4 G/DL (ref 3.5–5.2)
ALBUMIN/CREAT UR: 30.9 UG/MG (ref 0–30)
ALP SERPL-CCNC: 108 U/L (ref 55–135)
ALT SERPL W/O P-5'-P-CCNC: 15 U/L (ref 10–44)
ANION GAP SERPL CALC-SCNC: 9 MMOL/L (ref 8–16)
AST SERPL-CCNC: 26 U/L (ref 10–40)
BASOPHILS # BLD AUTO: 0.06 K/UL (ref 0–0.2)
BASOPHILS NFR BLD: 0.8 % (ref 0–1.9)
BILIRUB SERPL-MCNC: 0.8 MG/DL (ref 0.1–1)
BNP SERPL-MCNC: 366 PG/ML (ref 0–99)
BUN SERPL-MCNC: 9 MG/DL (ref 8–23)
CALCIUM SERPL-MCNC: 9.1 MG/DL (ref 8.7–10.5)
CHLORIDE SERPL-SCNC: 101 MMOL/L (ref 95–110)
CO2 SERPL-SCNC: 31 MMOL/L (ref 23–29)
CREAT SERPL-MCNC: 0.6 MG/DL (ref 0.5–1.4)
CREAT UR-MCNC: 76 MG/DL (ref 15–325)
DIFFERENTIAL METHOD: ABNORMAL
EOSINOPHIL # BLD AUTO: 0.1 K/UL (ref 0–0.5)
EOSINOPHIL NFR BLD: 1.3 % (ref 0–8)
ERYTHROCYTE [DISTWIDTH] IN BLOOD BY AUTOMATED COUNT: 16.3 % (ref 11.5–14.5)
EST. GFR  (AFRICAN AMERICAN): >60 ML/MIN/1.73 M^2
EST. GFR  (NON AFRICAN AMERICAN): >60 ML/MIN/1.73 M^2
GLUCOSE SERPL-MCNC: 109 MG/DL (ref 70–110)
HCT VFR BLD AUTO: 37 % (ref 37–48.5)
HGB BLD-MCNC: 10.8 G/DL (ref 12–16)
IMM GRANULOCYTES # BLD AUTO: 0.02 K/UL (ref 0–0.04)
IMM GRANULOCYTES NFR BLD AUTO: 0.3 % (ref 0–0.5)
IRON SERPL-MCNC: 29 UG/DL (ref 30–160)
LYMPHOCYTES # BLD AUTO: 2.2 K/UL (ref 1–4.8)
LYMPHOCYTES NFR BLD: 28.1 % (ref 18–48)
MAGNESIUM SERPL-MCNC: 1.9 MG/DL (ref 1.6–2.6)
MCH RBC QN AUTO: 23.6 PG (ref 27–31)
MCHC RBC AUTO-ENTMCNC: 29.2 G/DL (ref 32–36)
MCV RBC AUTO: 81 FL (ref 82–98)
MICROALBUMIN UR DL<=1MG/L-MCNC: 23.5 UG/ML
MONOCYTES # BLD AUTO: 0.6 K/UL (ref 0.3–1)
MONOCYTES NFR BLD: 7.8 % (ref 4–15)
NEUTROPHILS # BLD AUTO: 4.9 K/UL (ref 1.8–7.7)
NEUTROPHILS NFR BLD: 61.7 % (ref 38–73)
NRBC BLD-RTO: 0 /100 WBC
PLATELET # BLD AUTO: 283 K/UL (ref 150–450)
PMV BLD AUTO: 10.6 FL (ref 9.2–12.9)
POTASSIUM SERPL-SCNC: 3.8 MMOL/L (ref 3.5–5.1)
PROT SERPL-MCNC: 7.8 G/DL (ref 6–8.4)
RBC # BLD AUTO: 4.57 M/UL (ref 4–5.4)
SATURATED IRON: 5 % (ref 20–50)
SODIUM SERPL-SCNC: 141 MMOL/L (ref 136–145)
TOTAL IRON BINDING CAPACITY: 545 UG/DL (ref 250–450)
TRANSFERRIN SERPL-MCNC: 389 MG/DL (ref 200–375)
WBC # BLD AUTO: 7.93 K/UL (ref 3.9–12.7)

## 2021-07-01 PROCEDURE — 80053 COMPREHEN METABOLIC PANEL: CPT | Performed by: INTERNAL MEDICINE

## 2021-07-01 PROCEDURE — 82570 ASSAY OF URINE CREATININE: CPT | Performed by: INTERNAL MEDICINE

## 2021-07-01 PROCEDURE — 82043 UR ALBUMIN QUANTITATIVE: CPT | Performed by: INTERNAL MEDICINE

## 2021-07-01 PROCEDURE — 83880 ASSAY OF NATRIURETIC PEPTIDE: CPT | Performed by: INTERNAL MEDICINE

## 2021-07-01 PROCEDURE — 85025 COMPLETE CBC W/AUTO DIFF WBC: CPT | Performed by: INTERNAL MEDICINE

## 2021-07-01 PROCEDURE — 83735 ASSAY OF MAGNESIUM: CPT | Performed by: INTERNAL MEDICINE

## 2021-07-01 PROCEDURE — 83540 ASSAY OF IRON: CPT | Performed by: INTERNAL MEDICINE

## 2021-07-01 PROCEDURE — 36415 COLL VENOUS BLD VENIPUNCTURE: CPT | Performed by: INTERNAL MEDICINE

## 2021-07-02 ENCOUNTER — TELEPHONE (OUTPATIENT)
Dept: FAMILY MEDICINE | Facility: CLINIC | Age: 81
End: 2021-07-02

## 2021-07-06 ENCOUNTER — OFFICE VISIT (OUTPATIENT)
Dept: FAMILY MEDICINE | Facility: CLINIC | Age: 81
End: 2021-07-06
Payer: MEDICARE

## 2021-07-06 VITALS
SYSTOLIC BLOOD PRESSURE: 118 MMHG | HEART RATE: 80 BPM | HEIGHT: 66 IN | WEIGHT: 222 LBS | BODY MASS INDEX: 35.68 KG/M2 | DIASTOLIC BLOOD PRESSURE: 49 MMHG | RESPIRATION RATE: 18 BRPM

## 2021-07-06 DIAGNOSIS — D64.9 NORMOCYTIC ANEMIA: ICD-10-CM

## 2021-07-06 DIAGNOSIS — I48.0 PAROXYSMAL ATRIAL FIBRILLATION: ICD-10-CM

## 2021-07-06 DIAGNOSIS — Z13.820 ENCOUNTER FOR OSTEOPOROSIS SCREENING IN ASYMPTOMATIC POSTMENOPAUSAL PATIENT: ICD-10-CM

## 2021-07-06 DIAGNOSIS — J96.11 CHRONIC HYPOXEMIC RESPIRATORY FAILURE: ICD-10-CM

## 2021-07-06 DIAGNOSIS — I10 BENIGN ESSENTIAL HYPERTENSION: Primary | ICD-10-CM

## 2021-07-06 DIAGNOSIS — Z78.0 ENCOUNTER FOR OSTEOPOROSIS SCREENING IN ASYMPTOMATIC POSTMENOPAUSAL PATIENT: ICD-10-CM

## 2021-07-06 DIAGNOSIS — K21.9 GASTROESOPHAGEAL REFLUX DISEASE WITHOUT ESOPHAGITIS: ICD-10-CM

## 2021-07-06 DIAGNOSIS — Z23 NEED FOR SHINGLES VACCINE: ICD-10-CM

## 2021-07-06 DIAGNOSIS — I50.32 CHRONIC HEART FAILURE WITH PRESERVED EJECTION FRACTION: ICD-10-CM

## 2021-07-06 DIAGNOSIS — E78.2 MULTIPLE-TYPE HYPERLIPIDEMIA: ICD-10-CM

## 2021-07-06 DIAGNOSIS — N39.46 MIXED STRESS AND URGE URINARY INCONTINENCE: ICD-10-CM

## 2021-07-06 DIAGNOSIS — E61.1 IRON DEFICIENCY: ICD-10-CM

## 2021-07-06 PROCEDURE — 1159F PR MEDICATION LIST DOCUMENTED IN MEDICAL RECORD: ICD-10-PCS | Mod: S$GLB,,, | Performed by: INTERNAL MEDICINE

## 2021-07-06 PROCEDURE — 1101F PT FALLS ASSESS-DOCD LE1/YR: CPT | Mod: S$GLB,,, | Performed by: INTERNAL MEDICINE

## 2021-07-06 PROCEDURE — 99214 OFFICE O/P EST MOD 30 MIN: CPT | Mod: S$GLB,,, | Performed by: INTERNAL MEDICINE

## 2021-07-06 PROCEDURE — 1159F MED LIST DOCD IN RCRD: CPT | Mod: S$GLB,,, | Performed by: INTERNAL MEDICINE

## 2021-07-06 PROCEDURE — 3288F PR FALLS RISK ASSESSMENT DOCUMENTED: ICD-10-PCS | Mod: S$GLB,,, | Performed by: INTERNAL MEDICINE

## 2021-07-06 PROCEDURE — 1126F AMNT PAIN NOTED NONE PRSNT: CPT | Mod: S$GLB,,, | Performed by: INTERNAL MEDICINE

## 2021-07-06 PROCEDURE — 1101F PR PT FALLS ASSESS DOC 0-1 FALLS W/OUT INJ PAST YR: ICD-10-PCS | Mod: S$GLB,,, | Performed by: INTERNAL MEDICINE

## 2021-07-06 PROCEDURE — 3078F DIAST BP <80 MM HG: CPT | Mod: S$GLB,,, | Performed by: INTERNAL MEDICINE

## 2021-07-06 PROCEDURE — 3288F FALL RISK ASSESSMENT DOCD: CPT | Mod: S$GLB,,, | Performed by: INTERNAL MEDICINE

## 2021-07-06 PROCEDURE — 3074F PR MOST RECENT SYSTOLIC BLOOD PRESSURE < 130 MM HG: ICD-10-PCS | Mod: S$GLB,,, | Performed by: INTERNAL MEDICINE

## 2021-07-06 PROCEDURE — 1126F PR PAIN SEVERITY QUANTIFIED, NO PAIN PRESENT: ICD-10-PCS | Mod: S$GLB,,, | Performed by: INTERNAL MEDICINE

## 2021-07-06 PROCEDURE — 99214 PR OFFICE/OUTPT VISIT, EST, LEVL IV, 30-39 MIN: ICD-10-PCS | Mod: S$GLB,,, | Performed by: INTERNAL MEDICINE

## 2021-07-06 PROCEDURE — 3078F PR MOST RECENT DIASTOLIC BLOOD PRESSURE < 80 MM HG: ICD-10-PCS | Mod: S$GLB,,, | Performed by: INTERNAL MEDICINE

## 2021-07-06 PROCEDURE — 3074F SYST BP LT 130 MM HG: CPT | Mod: S$GLB,,, | Performed by: INTERNAL MEDICINE

## 2021-07-06 RX ORDER — ZOSTER VACCINE RECOMBINANT, ADJUVANTED 50 MCG/0.5
0.5 KIT INTRAMUSCULAR ONCE
Qty: 1 EACH | Refills: 1 | Status: SHIPPED | OUTPATIENT
Start: 2021-07-06 | End: 2021-07-06

## 2021-07-06 RX ORDER — QUINIDINE GLUCONATE 324 MG
240 TABLET, EXTENDED RELEASE ORAL
Qty: 36 TABLET | Refills: 1 | Status: SHIPPED | OUTPATIENT
Start: 2021-07-07 | End: 2022-03-15

## 2021-07-28 ENCOUNTER — HOSPITAL ENCOUNTER (OUTPATIENT)
Dept: RADIOLOGY | Facility: HOSPITAL | Age: 81
Discharge: HOME | End: 2021-07-28
Attending: INTERNAL MEDICINE
Payer: MEDICARE

## 2021-07-28 DIAGNOSIS — Z13.820 ENCOUNTER FOR OSTEOPOROSIS SCREENING IN ASYMPTOMATIC POSTMENOPAUSAL PATIENT: ICD-10-CM

## 2021-07-28 DIAGNOSIS — Z78.0 ENCOUNTER FOR OSTEOPOROSIS SCREENING IN ASYMPTOMATIC POSTMENOPAUSAL PATIENT: ICD-10-CM

## 2021-07-28 PROCEDURE — 77080 DXA BONE DENSITY AXIAL: CPT | Mod: TC,PO

## 2021-07-29 ENCOUNTER — TELEPHONE (OUTPATIENT)
Dept: FAMILY MEDICINE | Facility: CLINIC | Age: 81
End: 2021-07-29

## 2021-11-10 ENCOUNTER — OFFICE VISIT (OUTPATIENT)
Dept: FAMILY MEDICINE | Facility: CLINIC | Age: 81
End: 2021-11-10
Payer: MEDICARE

## 2021-11-10 DIAGNOSIS — J96.11 CHRONIC HYPOXEMIC RESPIRATORY FAILURE: ICD-10-CM

## 2021-11-10 DIAGNOSIS — E78.2 MULTIPLE-TYPE HYPERLIPIDEMIA: ICD-10-CM

## 2021-11-10 DIAGNOSIS — D64.9 NORMOCYTIC ANEMIA: ICD-10-CM

## 2021-11-10 DIAGNOSIS — G44.89 OTHER HEADACHE SYNDROME: Primary | Chronic | ICD-10-CM

## 2021-11-10 DIAGNOSIS — I10 BENIGN ESSENTIAL HYPERTENSION: Chronic | ICD-10-CM

## 2021-11-10 DIAGNOSIS — I50.32 CHRONIC HEART FAILURE WITH PRESERVED EJECTION FRACTION: Chronic | ICD-10-CM

## 2021-11-10 DIAGNOSIS — E61.1 IRON DEFICIENCY: ICD-10-CM

## 2021-11-10 DIAGNOSIS — K21.9 GASTROESOPHAGEAL REFLUX DISEASE WITHOUT ESOPHAGITIS: ICD-10-CM

## 2021-11-10 DIAGNOSIS — I48.0 PAROXYSMAL ATRIAL FIBRILLATION: Chronic | ICD-10-CM

## 2021-11-10 DIAGNOSIS — N39.46 MIXED STRESS AND URGE URINARY INCONTINENCE: ICD-10-CM

## 2021-11-10 PROCEDURE — 3077F SYST BP >= 140 MM HG: CPT | Mod: S$GLB,,, | Performed by: INTERNAL MEDICINE

## 2021-11-10 PROCEDURE — 1159F PR MEDICATION LIST DOCUMENTED IN MEDICAL RECORD: ICD-10-PCS | Mod: S$GLB,,, | Performed by: INTERNAL MEDICINE

## 2021-11-10 PROCEDURE — 3288F PR FALLS RISK ASSESSMENT DOCUMENTED: ICD-10-PCS | Mod: S$GLB,,, | Performed by: INTERNAL MEDICINE

## 2021-11-10 PROCEDURE — 3288F FALL RISK ASSESSMENT DOCD: CPT | Mod: S$GLB,,, | Performed by: INTERNAL MEDICINE

## 2021-11-10 PROCEDURE — 1160F PR REVIEW ALL MEDS BY PRESCRIBER/CLIN PHARMACIST DOCUMENTED: ICD-10-PCS | Mod: S$GLB,,, | Performed by: INTERNAL MEDICINE

## 2021-11-10 PROCEDURE — 99214 PR OFFICE/OUTPT VISIT, EST, LEVL IV, 30-39 MIN: ICD-10-PCS | Mod: S$GLB,,, | Performed by: INTERNAL MEDICINE

## 2021-11-10 PROCEDURE — 1101F PT FALLS ASSESS-DOCD LE1/YR: CPT | Mod: S$GLB,,, | Performed by: INTERNAL MEDICINE

## 2021-11-10 PROCEDURE — 1101F PR PT FALLS ASSESS DOC 0-1 FALLS W/OUT INJ PAST YR: ICD-10-PCS | Mod: S$GLB,,, | Performed by: INTERNAL MEDICINE

## 2021-11-10 PROCEDURE — 3079F DIAST BP 80-89 MM HG: CPT | Mod: S$GLB,,, | Performed by: INTERNAL MEDICINE

## 2021-11-10 PROCEDURE — 1160F RVW MEDS BY RX/DR IN RCRD: CPT | Mod: S$GLB,,, | Performed by: INTERNAL MEDICINE

## 2021-11-10 PROCEDURE — 1159F MED LIST DOCD IN RCRD: CPT | Mod: S$GLB,,, | Performed by: INTERNAL MEDICINE

## 2021-11-10 PROCEDURE — 99214 OFFICE O/P EST MOD 30 MIN: CPT | Mod: S$GLB,,, | Performed by: INTERNAL MEDICINE

## 2021-11-10 PROCEDURE — 1125F PR PAIN SEVERITY QUANTIFIED, PAIN PRESENT: ICD-10-PCS | Mod: S$GLB,,, | Performed by: INTERNAL MEDICINE

## 2021-11-10 PROCEDURE — 1125F AMNT PAIN NOTED PAIN PRSNT: CPT | Mod: S$GLB,,, | Performed by: INTERNAL MEDICINE

## 2021-11-10 PROCEDURE — 3079F PR MOST RECENT DIASTOLIC BLOOD PRESSURE 80-89 MM HG: ICD-10-PCS | Mod: S$GLB,,, | Performed by: INTERNAL MEDICINE

## 2021-11-10 PROCEDURE — 3077F PR MOST RECENT SYSTOLIC BLOOD PRESSURE >= 140 MM HG: ICD-10-PCS | Mod: S$GLB,,, | Performed by: INTERNAL MEDICINE

## 2021-11-10 RX ORDER — DILTIAZEM HYDROCHLORIDE 120 MG/1
120 CAPSULE, EXTENDED RELEASE ORAL DAILY
COMMUNITY
End: 2021-11-10

## 2021-11-10 RX ORDER — DILTIAZEM HYDROCHLORIDE 120 MG/1
120 CAPSULE, COATED, EXTENDED RELEASE ORAL DAILY
COMMUNITY
Start: 2021-07-08 | End: 2021-11-10 | Stop reason: SDUPTHER

## 2021-11-10 RX ORDER — MONTELUKAST SODIUM 10 MG/1
10 TABLET ORAL NIGHTLY
COMMUNITY
End: 2022-03-15

## 2021-11-10 RX ORDER — DILTIAZEM HYDROCHLORIDE 120 MG/1
120 CAPSULE, COATED, EXTENDED RELEASE ORAL DAILY
Qty: 90 CAPSULE | Refills: 1 | Status: SHIPPED | OUTPATIENT
Start: 2021-11-10 | End: 2023-03-27 | Stop reason: SDUPTHER

## 2021-11-11 VITALS
DIASTOLIC BLOOD PRESSURE: 80 MMHG | RESPIRATION RATE: 18 BRPM | HEART RATE: 76 BPM | HEIGHT: 66 IN | SYSTOLIC BLOOD PRESSURE: 146 MMHG | WEIGHT: 202 LBS | BODY MASS INDEX: 32.47 KG/M2

## 2021-12-28 ENCOUNTER — OFFICE VISIT (OUTPATIENT)
Dept: PULMONOLOGY | Facility: CLINIC | Age: 81
End: 2021-12-28
Payer: MEDICARE

## 2021-12-28 VITALS
DIASTOLIC BLOOD PRESSURE: 87 MMHG | HEART RATE: 84 BPM | RESPIRATION RATE: 16 BRPM | WEIGHT: 198.63 LBS | SYSTOLIC BLOOD PRESSURE: 141 MMHG | HEIGHT: 66 IN | BODY MASS INDEX: 31.92 KG/M2 | OXYGEN SATURATION: 96 %

## 2021-12-28 DIAGNOSIS — J96.11 CHRONIC HYPOXEMIC RESPIRATORY FAILURE: ICD-10-CM

## 2021-12-28 DIAGNOSIS — R09.89 CHRONIC SINUS COMPLAINTS: ICD-10-CM

## 2021-12-28 DIAGNOSIS — I50.32 CHRONIC HEART FAILURE WITH PRESERVED EJECTION FRACTION: Primary | ICD-10-CM

## 2021-12-28 PROCEDURE — 1159F MED LIST DOCD IN RCRD: CPT | Mod: CPTII,S$GLB,, | Performed by: INTERNAL MEDICINE

## 2021-12-28 PROCEDURE — 99214 PR OFFICE/OUTPT VISIT, EST, LEVL IV, 30-39 MIN: ICD-10-PCS | Mod: S$GLB,,, | Performed by: INTERNAL MEDICINE

## 2021-12-28 PROCEDURE — 3077F SYST BP >= 140 MM HG: CPT | Mod: CPTII,S$GLB,, | Performed by: INTERNAL MEDICINE

## 2021-12-28 PROCEDURE — 1126F AMNT PAIN NOTED NONE PRSNT: CPT | Mod: CPTII,S$GLB,, | Performed by: INTERNAL MEDICINE

## 2021-12-28 PROCEDURE — 1159F PR MEDICATION LIST DOCUMENTED IN MEDICAL RECORD: ICD-10-PCS | Mod: CPTII,S$GLB,, | Performed by: INTERNAL MEDICINE

## 2021-12-28 PROCEDURE — 99999 PR PBB SHADOW E&M-EST. PATIENT-LVL IV: ICD-10-PCS | Mod: PBBFAC,,, | Performed by: INTERNAL MEDICINE

## 2021-12-28 PROCEDURE — 3079F DIAST BP 80-89 MM HG: CPT | Mod: CPTII,S$GLB,, | Performed by: INTERNAL MEDICINE

## 2021-12-28 PROCEDURE — 1126F PR PAIN SEVERITY QUANTIFIED, NO PAIN PRESENT: ICD-10-PCS | Mod: CPTII,S$GLB,, | Performed by: INTERNAL MEDICINE

## 2021-12-28 PROCEDURE — 99999 PR PBB SHADOW E&M-EST. PATIENT-LVL IV: CPT | Mod: PBBFAC,,, | Performed by: INTERNAL MEDICINE

## 2021-12-28 PROCEDURE — 3077F PR MOST RECENT SYSTOLIC BLOOD PRESSURE >= 140 MM HG: ICD-10-PCS | Mod: CPTII,S$GLB,, | Performed by: INTERNAL MEDICINE

## 2021-12-28 PROCEDURE — 99214 OFFICE O/P EST MOD 30 MIN: CPT | Mod: S$GLB,,, | Performed by: INTERNAL MEDICINE

## 2021-12-28 PROCEDURE — 3079F PR MOST RECENT DIASTOLIC BLOOD PRESSURE 80-89 MM HG: ICD-10-PCS | Mod: CPTII,S$GLB,, | Performed by: INTERNAL MEDICINE

## 2021-12-28 NOTE — PATIENT INSTRUCTIONS
Oxygen - continue with activity and any time sats <92%  Clubbing of fingernails is not harmful- due to chronic hypoxia

## 2021-12-28 NOTE — PROGRESS NOTES
1/3/2022    Lety Herbert  Follow up    Chief Complaint   Patient presents with    Follow-up     3 months; doing good- no complaints really, she states.       HPI:   12/28/2021- breathing stable, able to walk around store w/ oxygen. Tries to walk 20 min per day. Denies chest congestion/cough. Sometimes nose congested, uses astelin. She takes singulair nightly.    06/30/2021-   Continue oxygen  Follow up with cardiologist  Stop claritin and try singulair one pill every night for sinus drip  continue flonase  while seated feels fine but gets breathless w/ minimal activity. Daughter reports dyspnea is getting worse over time. Reports cardiologist wants to place pacemaker for her but insurance denied it. Does not feel well and sits in recliner all the time. Reports constant sinus drip and cough w/ green/yellow mucous.    12/30/2020-   Call medical supply for the oxygen and ask about thinner or more comfortable nasal cannulas  Start claritin daily  Start flonase 2 sprays in each nostril daily  feels better w/ O2, wears 3L continuously except when sitting still at home. Trouble breathing w/ fatigue is episodic. Gets headaches- tension from wearing O2, glasses and mask behind ear. Has constant sinus drip. Other night R ear blocked up while wearing CPAP.  Tries mucinex DM but doesn't last long. She is on xarelto for atrial fib now.     6/30/20-   Oxygen for home use ordered- would wear continuously  Use CPAP machine- can try nasal pillows. Would use oxygen with your CPAP too.  Get lung function test and 6 minute walk to check to see how much oxygen you need.  Pt is an 80 yo female with DVT/PE, GERD, breast ca s/p double mastectomy- no chemo or XRT, melanoma of arm, [place], obesity presenting for initial evaluation. Pt reports oxygen desaturation for over a year but no one has ordered her any oxygen for home. First noted low O2 sat when she had sinus infection. O2 sats will drop when exerting herself or resting. Feels  "short of breath worsening over time. She coughs w/ phlegm from sinuses. Sees ENT who cauterized nose for bleeding. Daughter is here as well and assists w/ history.  Reports not using CPAP because she had tip of her nose removed for cancer in past and she is concerned mask will cause more cancer to come back. She wakes up a lot at night and feels drowsy during the day.  Denies any hx of lung problems. She is a past smoker- quit 40 yrs ago 1-2 PPD x 25 yrs. Used to have recurrent bronchitis and cough which stopped after she quit smoking.    Outside records from pt's cardiologist Jeremy Sargent in [facility] were faxed to us and have been reviewed- with dx including heart block, atrial fib & flutter, CAD, diastolic dysfunction, HTN, HLD, and obesity. Per that note ASAP consult was placed to pulmonary for "shortness of breath and O2 sat of 70s-80s."    The chief compliant  problem is varies w/ instability at times    PFSH:  Past Medical History:   Diagnosis Date    Anemia     Basal cell carcinoma     nose     Cancer     breast    Depression     DVT (deep venous thrombosis)     Fall 3/20/2018    Gastric ulceration     GERD (gastroesophageal reflux disease)     History of frequent urinary tract infections     Hyperlipidemia     Hypertension     Melanoma 2004?    left forearm    MRSA (methicillin resistant staph aureus) culture positive     Neuropathy     PE (pulmonary embolism)     Post-nasal drip 11/15/2018    Reflux          Past Surgical History:   Procedure Laterality Date    HYSTERECTOMY      MASTECTOMY, RADICAL Bilateral     TOTAL HIP ARTHROPLASTY Left 11/13/2017     Social History     Tobacco Use    Smoking status: Former Smoker    Smokeless tobacco: Never Used   Substance Use Topics    Alcohol use: No    Drug use: No     Family History   Problem Relation Age of Onset    Cancer Mother     Cancer Father     Heart disease Father     Melanoma Neg Hx     Psoriasis Neg Hx     Lupus Neg Hx  " "   Eczema Neg Hx      Review of patient's allergies indicates:   Allergen Reactions    Meperidine     Promethazine     Bactrim [sulfamethoxazole-trimethoprim] Rash       Performance Status:The patient's activity level is functions out of house.  Feels SOB any exertion out of house.     Review of Systems:  a review of eleven systems covering constitutional, Eye, HEENT, Psych, Respiratory, Cardiac, GI, , Musculoskeletal, Endocrine, Dermatologic was negative except for pertinent findings as listed ABOVE and below:  All negative with pertinent positives as above       Exam:Comprehensive exam done. BP (!) 141/87 (BP Location: Left arm, Patient Position: Sitting, BP Method: Large (Automatic))   Pulse 84   Resp 16   Ht 5' 6" (1.676 m)   Wt 90.1 kg (198 lb 10.2 oz)   SpO2 96% Comment: on room air at rest  BMI 32.06 kg/m²   Exam included Vitals as listed, and patient's appearance and affect and alertness and mood, oral exam for yeast and hygiene and pharynx lesions and Mallapatti (M) score, neck with inspection for jvd and masses and thyroid abnormalities and lymph nodes (supraclavicular and infraclavicular nodes and axillary also examined and noted if abn), chest exam included symmetry and effort and fremitus and percussion and auscultation, cardiac exam included rhythm and gallops and murmur and rubs and jvd and edema, abdominal exam for mass and hepatosplenomegaly and tenderness and hernias and bowel sounds, Musculoskeletal exam with muscle tone and posture and mobility/gait and  strength, and skin for rashes and cyanosis and pallor and turgor, extremity for clubbing.  Findings were normal except for pertinent findings listed below:  M1, oropharynx clear  HR irregularly irreg  Lungs clear bilaterally  No edema  Varicose veins of legs    Radiographs (ct chest and cxr) reviewed: view by direct vision   CXR 6/8/20- interstitial markings prominent  VQ scan 6/8/20- IMPRESSION:  Normal VQ scan perfusion " imaging  TTE 6/8/20-   · Concentric left ventricular hypertrophy.  · Normal left ventricular systolic function. The estimated ejection fraction is 65%.  · Normal LV diastolic function.  · Normal right ventricular systolic function.  · Mild left atrial enlargement.  · Mild aortic regurgitation.  · Mild mitral regurgitation.  · Mild tricuspid regurgitation.  · Mild pulmonic regurgitation.  · Normal central venous pressure (3 mmHg).  · The estimated PA systolic pressure is 36 mmHg.    Labs reviewed    Results for RONN SOLORZANO (MRN [id number]) as of 6/30/2020 14:08   Ref. Range 3/11/2020 07:45   CO2 Latest Ref Range: 23 - 29 mmol/L 32 (H)        PFT 7/14/20 reviewed- mild restriction, reduced DLCO      6MWT 7/14/20- 85m, desat to 83% at rest, req 3L NC    Plan:  Clinical impression is resonably certain and repeated evaluation prn +/- follow up will be needed as below. Chronic hypoxemic resp failure due to cardiac comorbidities. PH likely groups 2/3 due to [place] and HFpEF.    Lety was seen today for follow-up.    Diagnoses and all orders for this visit:    Chronic heart failure with preserved ejection fraction    Chronic hypoxemic respiratory failure    Chronic sinus complaints        Follow up in about 1 year (around 12/28/2022).    Discussed with patient above for education the following:      Patient Instructions   Oxygen - continue with activity and any time sats <92%  Clubbing of fingernails is not harmful- due to chronic hypoxia

## 2022-01-03 PROBLEM — Z78.9 NON-SMOKER: Status: RESOLVED | Noted: 2017-06-21 | Resolved: 2022-01-03

## 2022-01-03 PROBLEM — R09.89 CHRONIC SINUS COMPLAINTS: Status: ACTIVE | Noted: 2022-01-03

## 2022-01-25 ENCOUNTER — OFFICE VISIT (OUTPATIENT)
Dept: FAMILY MEDICINE | Facility: CLINIC | Age: 82
End: 2022-01-25
Payer: MEDICARE

## 2022-01-25 VITALS
DIASTOLIC BLOOD PRESSURE: 86 MMHG | HEART RATE: 70 BPM | HEIGHT: 66 IN | WEIGHT: 200 LBS | SYSTOLIC BLOOD PRESSURE: 192 MMHG | BODY MASS INDEX: 32.14 KG/M2

## 2022-01-25 DIAGNOSIS — M25.532 LEFT WRIST PAIN: Primary | ICD-10-CM

## 2022-01-25 PROCEDURE — 1125F AMNT PAIN NOTED PAIN PRSNT: CPT | Mod: S$GLB,,, | Performed by: INTERNAL MEDICINE

## 2022-01-25 PROCEDURE — 1159F PR MEDICATION LIST DOCUMENTED IN MEDICAL RECORD: ICD-10-PCS | Mod: S$GLB,,, | Performed by: INTERNAL MEDICINE

## 2022-01-25 PROCEDURE — 1160F PR REVIEW ALL MEDS BY PRESCRIBER/CLIN PHARMACIST DOCUMENTED: ICD-10-PCS | Mod: S$GLB,,, | Performed by: INTERNAL MEDICINE

## 2022-01-25 PROCEDURE — 99212 PR OFFICE/OUTPT VISIT, EST, LEVL II, 10-19 MIN: ICD-10-PCS | Mod: S$GLB,,, | Performed by: INTERNAL MEDICINE

## 2022-01-25 PROCEDURE — 3288F PR FALLS RISK ASSESSMENT DOCUMENTED: ICD-10-PCS | Mod: S$GLB,,, | Performed by: INTERNAL MEDICINE

## 2022-01-25 PROCEDURE — 1125F PR PAIN SEVERITY QUANTIFIED, PAIN PRESENT: ICD-10-PCS | Mod: S$GLB,,, | Performed by: INTERNAL MEDICINE

## 2022-01-25 PROCEDURE — 3288F FALL RISK ASSESSMENT DOCD: CPT | Mod: S$GLB,,, | Performed by: INTERNAL MEDICINE

## 2022-01-25 PROCEDURE — 99212 OFFICE O/P EST SF 10 MIN: CPT | Mod: S$GLB,,, | Performed by: INTERNAL MEDICINE

## 2022-01-25 PROCEDURE — 3079F DIAST BP 80-89 MM HG: CPT | Mod: S$GLB,,, | Performed by: INTERNAL MEDICINE

## 2022-01-25 PROCEDURE — 3079F PR MOST RECENT DIASTOLIC BLOOD PRESSURE 80-89 MM HG: ICD-10-PCS | Mod: S$GLB,,, | Performed by: INTERNAL MEDICINE

## 2022-01-25 PROCEDURE — 1159F MED LIST DOCD IN RCRD: CPT | Mod: S$GLB,,, | Performed by: INTERNAL MEDICINE

## 2022-01-25 PROCEDURE — 1101F PR PT FALLS ASSESS DOC 0-1 FALLS W/OUT INJ PAST YR: ICD-10-PCS | Mod: S$GLB,,, | Performed by: INTERNAL MEDICINE

## 2022-01-25 PROCEDURE — 1160F RVW MEDS BY RX/DR IN RCRD: CPT | Mod: S$GLB,,, | Performed by: INTERNAL MEDICINE

## 2022-01-25 PROCEDURE — 1101F PT FALLS ASSESS-DOCD LE1/YR: CPT | Mod: S$GLB,,, | Performed by: INTERNAL MEDICINE

## 2022-01-25 PROCEDURE — 3077F SYST BP >= 140 MM HG: CPT | Mod: S$GLB,,, | Performed by: INTERNAL MEDICINE

## 2022-01-25 PROCEDURE — 3077F PR MOST RECENT SYSTOLIC BLOOD PRESSURE >= 140 MM HG: ICD-10-PCS | Mod: S$GLB,,, | Performed by: INTERNAL MEDICINE

## 2022-01-25 RX ORDER — LIDOCAINE AND PRILOCAINE 25; 25 MG/G; MG/G
CREAM TOPICAL
Qty: 30 G | Refills: 0 | Status: SHIPPED | OUTPATIENT
Start: 2022-01-25 | End: 2022-03-15

## 2022-01-25 NOTE — PROGRESS NOTES
Subjective:       Patient ID: Lety Herbert is a 81 y.o. female.    Chief Complaint: Wrist Pain (Both wrist hurt )    Patient is 81-year-old female who is having wrist pain.  She declines any history of trauma or injury.  She states that the pain is in both the wrists but more on the left side.  Thus far she has not been diagnosed with conditions like rheumatoid arthritis, cirrhotic arthritis or gout.    Hand Pain   The incident occurred more than 1 week ago. There was no injury mechanism. The pain is present in the left forearm, left wrist, right forearm and right wrist. The quality of the pain is described as aching, cramping and shooting. The pain is at a severity of 7/10. The pain is moderate. The pain has been constant since the incident. Pertinent negatives include no chest pain, numbness or tingling. She has tried immobilization, rest, heat and ice for the symptoms. The treatment provided no relief.       Past Medical History:   Diagnosis Date    Anemia     Basal cell carcinoma     nose     Cancer     breast    Depression     DVT (deep venous thrombosis)     Fall 3/20/2018    Gastric ulceration     GERD (gastroesophageal reflux disease)     History of frequent urinary tract infections     Hyperlipidemia     Hypertension     Melanoma 2004?    left forearm    MRSA (methicillin resistant staph aureus) culture positive     Neuropathy     PE (pulmonary embolism)     Post-nasal drip 11/15/2018    Reflux      Social History     Socioeconomic History    Marital status:      Spouse name: Jean    Number of children: 3   Tobacco Use    Smoking status: Former Smoker    Smokeless tobacco: Never Used   Substance and Sexual Activity    Alcohol use: No    Drug use: No    Sexual activity: Not Currently     Partners: Male   Social History Narrative    3 Children- 9 GC, 7 GGrands     Social Determinants of Health     Financial Resource Strain: Medium Risk    Difficulty of Paying Living  Expenses: Somewhat hard   Food Insecurity: No Food Insecurity    Worried About Running Out of Food in the Last Year: Never true    Ran Out of Food in the Last Year: Never true   Transportation Needs: No Transportation Needs    Lack of Transportation (Medical): No    Lack of Transportation (Non-Medical): No   Physical Activity: Inactive    Days of Exercise per Week: 0 days    Minutes of Exercise per Session: 0 min   Stress: Stress Concern Present    Feeling of Stress : To some extent   Housing Stability: Low Risk     Unable to Pay for Housing in the Last Year: No    Number of Places Lived in the Last Year: 1    Unstable Housing in the Last Year: No     Past Surgical History:   Procedure Laterality Date    HYSTERECTOMY      MASTECTOMY, RADICAL Bilateral     TOTAL HIP ARTHROPLASTY Left 11/13/2017     Family History   Problem Relation Age of Onset    Cancer Mother     Cancer Father     Heart disease Father     Melanoma Neg Hx     Psoriasis Neg Hx     Lupus Neg Hx     Eczema Neg Hx        Review of Systems   Constitutional: Positive for fatigue and unexpected weight change (lost weight 20 lbs). Negative for activity change, appetite change and chills.        She has lost 20 lb of weight.  Patient is overall not feeling well.  This should not be the case.  Generally when somebody loses weight by watching diet they really feel well.  Patient on the contrary feels more sick and tired and headaches.   Respiratory: Negative for cough, choking, chest tightness, shortness of breath and wheezing.    Cardiovascular: Negative for chest pain and leg swelling.        HTN   Gastrointestinal: Positive for constipation. Negative for abdominal distention, abdominal pain, anal bleeding and diarrhea.        Constipation seems to be stable with Amitiza 24 mcg capsule.   Musculoskeletal: Positive for arthralgias and myalgias. Negative for joint swelling.        Left hip fracture S/P Arthroplasty NoV 2017  Fall in  "October/November 2019.  L1 compression fracture status post vertebroplasty.   Skin: Negative for color change, pallor and rash.   Neurological: Positive for headaches. Negative for tingling, light-headedness and numbness.   Psychiatric/Behavioral:        Patient has a history of depression. Stable on sertraline. She however denies that she is depressed.         Objective:      Blood pressure (!) 192/86, pulse 70, height 5' 6" (1.676 m), weight 90.7 kg (200 lb). Body mass index is 32.28 kg/m².  Physical Exam  Vitals and nursing note reviewed.   Constitutional:       General: She is not in acute distress.     Appearance: She is well-developed. She is obese. She is ill-appearing. She is not toxic-appearing or diaphoretic.      Comments: BMI is 32.28   HENT:      Head: Normocephalic and atraumatic.   Neck:      Thyroid: No thyromegaly.      Vascular: No JVD.      Trachea: Trachea normal. No tracheal deviation.   Cardiovascular:      Rate and Rhythm: Normal rate and regular rhythm.      Heart sounds: Normal heart sounds, S1 normal and S2 normal. No friction rub. No gallop.       Comments: Varicosities in legs-significant varicosities.  Pulse appears to be regular.  Pulmonary:      Breath sounds: Normal breath sounds. No stridor.   Abdominal:      General: There is no distension.      Palpations: Abdomen is soft. Abdomen is not rigid.      Tenderness: There is no abdominal tenderness.      Comments: Patient is obese.   Musculoskeletal:      Right hand: No swelling or tenderness.      Left hand: Swelling and tenderness present. Normal strength. Normal sensation.        Arms:         Hands:       Right lower leg: Edema present.      Left lower leg: Edema present.      Comments: Tenderness is at the base of the left thumb and shoots up the brachioradialis muscle.  Range of motion is complete the wrist joint itself except in extension when she experiences pain.  Fingertips of warm with intact sensations.  No tenderness on " pressure over the carpal tunnel groove.   Lymphadenopathy:      Cervical: No cervical adenopathy.   Skin:     General: Skin is warm and dry.      Coloration: Skin is not pale.      Findings: No rash.      Comments: Varicose veins are noted in the inferior extremities.   Neurological:      Mental Status: She is alert. Mental status is at baseline.      Motor: No abnormal muscle tone.   Psychiatric:         Attention and Perception: She is attentive.         Behavior: Behavior is slowed.      Comments: Anhedonic           Assessment:       1. Left wrist pain             .pft    Plan:           Left wrist pain  -     Ambulatory referral/consult to Orthopedics; Future; Expected date: 02/01/2022  -     LIDOcaine-prilocaine (EMLA) cream; Apply topically as needed.  Dispense: 30 g; Refill: 0    Patient has bilateral wrist pain especially more on the left side has been noted.  On examination and historically there is no history of trauma or injury.    I suspect she has wear and tear arthritis with some overuse recently and may have tenosynovitis.  Some tendinitis also.  She has tried conservative measures including bracing and splinting.  He had been to local urgent care center where she had an x-ray done and only with findings of arthritis and no fracture.    At this point will make orthopedic referral to see if a cortisone shot on aggressive treatment might be helpful for her.    Thus far I have not diagnosed her with rheumatoid arthritis.    She has tried over-the-counter anti-inflammatory creams without relief.  I will try small amount of EMLA cream and CVD subject to its coverage and cost issues.  Continue with splinting at this point.    She will keep her regular follow-up with me as per her previous appointment.  Her blood pressures are elevated which might be secondary to pain and other issues.    Given her age of 81 and multiple comorbidities her overall prognosis is modest and guarded.    Continue with COVID 19  precautions.      Current Outpatient Medications:     atorvastatin (LIPITOR) 20 MG tablet, TAKE 1 TABLET (20 MG TOTAL) BY MOUTH ONCE DAILY., Disp: 90 tablet, Rfl: 1    cholecalciferol, vitamin D3, 5,000 unit capsule, Take 5,000 Units by mouth once daily. , Disp: , Rfl:     cyanocobalamin (VITAMIN B-12) 100 MCG tablet, Take 1,000 mcg by mouth once daily. , Disp: , Rfl:     diltiaZEM (CARDIZEM CD) 120 MG Cp24, Take 1 capsule (120 mg total) by mouth once daily., Disp: 90 capsule, Rfl: 1    ferrous gluconate (FERGON) 240 (27 FE) MG tablet, Take 1 tablet (240 mg total) by mouth every Mon, Wed, Fri., Disp: 36 tablet, Rfl: 1    fluticasone propionate (FLONASE) 50 mcg/actuation nasal spray, 2 sprays (100 mcg total) by Each Nostril route once daily., Disp: 16 g, Rfl: 11    gabapentin (NEURONTIN) 100 MG capsule, , Disp: , Rfl:     montelukast (SINGULAIR) 10 mg tablet, Take 10 mg by mouth every evening., Disp: , Rfl:     pantoprazole (PROTONIX) 20 MG tablet, TAKE 1 TABLET (20 MG TOTAL) BY MOUTH ONCE DAILY., Disp: 90 tablet, Rfl: 1    polyethylene glycol (GLYCOLAX) 17 gram PwPk, Take 17 g by mouth once daily., Disp: 90 packet, Rfl: 3    sertraline (ZOLOFT) 100 MG tablet, Take 1 tablet (100 mg total) by mouth once daily., Disp: 90 tablet, Rfl: 3    valsartan-hydrochlorothiazide (DIOVAN-HCT) 320-12.5 mg per tablet, Take 1 tablet by mouth every morning., Disp: , Rfl:     XARELTO 20 mg Tab, , Disp: , Rfl:     LIDOcaine-prilocaine (EMLA) cream, Apply topically as needed., Disp: 30 g, Rfl: 0

## 2022-02-08 ENCOUNTER — OFFICE VISIT (OUTPATIENT)
Dept: PHYSICAL MEDICINE AND REHAB | Facility: CLINIC | Age: 82
End: 2022-02-08
Payer: MEDICARE

## 2022-02-08 VITALS — WEIGHT: 200 LBS | HEIGHT: 66 IN | RESPIRATION RATE: 18 BRPM | BODY MASS INDEX: 32.14 KG/M2

## 2022-02-08 DIAGNOSIS — M25.532 BILATERAL WRIST PAIN: Primary | ICD-10-CM

## 2022-02-08 DIAGNOSIS — M25.531 BILATERAL WRIST PAIN: Primary | ICD-10-CM

## 2022-02-08 DIAGNOSIS — M65.4 RADIAL STYLOID TENOSYNOVITIS (DE QUERVAIN): ICD-10-CM

## 2022-02-08 PROCEDURE — 99999 PR PBB SHADOW E&M-EST. PATIENT-LVL III: ICD-10-PCS | Mod: PBBFAC,,, | Performed by: PHYSICAL MEDICINE & REHABILITATION

## 2022-02-08 PROCEDURE — 1125F PR PAIN SEVERITY QUANTIFIED, PAIN PRESENT: ICD-10-PCS | Mod: CPTII,S$GLB,, | Performed by: PHYSICAL MEDICINE & REHABILITATION

## 2022-02-08 PROCEDURE — 1125F AMNT PAIN NOTED PAIN PRSNT: CPT | Mod: CPTII,S$GLB,, | Performed by: PHYSICAL MEDICINE & REHABILITATION

## 2022-02-08 PROCEDURE — 1160F RVW MEDS BY RX/DR IN RCRD: CPT | Mod: CPTII,S$GLB,, | Performed by: PHYSICAL MEDICINE & REHABILITATION

## 2022-02-08 PROCEDURE — 76942 PR U/S GUIDANCE FOR NEEDLE GUIDANCE: ICD-10-PCS | Mod: S$GLB,,, | Performed by: PHYSICAL MEDICINE & REHABILITATION

## 2022-02-08 PROCEDURE — 1159F MED LIST DOCD IN RCRD: CPT | Mod: CPTII,S$GLB,, | Performed by: PHYSICAL MEDICINE & REHABILITATION

## 2022-02-08 PROCEDURE — 99203 OFFICE O/P NEW LOW 30 MIN: CPT | Mod: 25,S$GLB,, | Performed by: PHYSICAL MEDICINE & REHABILITATION

## 2022-02-08 PROCEDURE — 1160F PR REVIEW ALL MEDS BY PRESCRIBER/CLIN PHARMACIST DOCUMENTED: ICD-10-PCS | Mod: CPTII,S$GLB,, | Performed by: PHYSICAL MEDICINE & REHABILITATION

## 2022-02-08 PROCEDURE — 20550 NJX 1 TENDON SHEATH/LIGAMENT: CPT | Mod: 50,S$GLB,, | Performed by: PHYSICAL MEDICINE & REHABILITATION

## 2022-02-08 PROCEDURE — 76942 ECHO GUIDE FOR BIOPSY: CPT | Mod: S$GLB,,, | Performed by: PHYSICAL MEDICINE & REHABILITATION

## 2022-02-08 PROCEDURE — 3288F PR FALLS RISK ASSESSMENT DOCUMENTED: ICD-10-PCS | Mod: CPTII,S$GLB,, | Performed by: PHYSICAL MEDICINE & REHABILITATION

## 2022-02-08 PROCEDURE — 1159F PR MEDICATION LIST DOCUMENTED IN MEDICAL RECORD: ICD-10-PCS | Mod: CPTII,S$GLB,, | Performed by: PHYSICAL MEDICINE & REHABILITATION

## 2022-02-08 PROCEDURE — 1101F PT FALLS ASSESS-DOCD LE1/YR: CPT | Mod: CPTII,S$GLB,, | Performed by: PHYSICAL MEDICINE & REHABILITATION

## 2022-02-08 PROCEDURE — 20550 PR INJECT TENDON SHEATH/LIGAMENT: ICD-10-PCS | Mod: 50,S$GLB,, | Performed by: PHYSICAL MEDICINE & REHABILITATION

## 2022-02-08 PROCEDURE — 99203 PR OFFICE/OUTPT VISIT, NEW, LEVL III, 30-44 MIN: ICD-10-PCS | Mod: 25,S$GLB,, | Performed by: PHYSICAL MEDICINE & REHABILITATION

## 2022-02-08 PROCEDURE — 1101F PR PT FALLS ASSESS DOC 0-1 FALLS W/OUT INJ PAST YR: ICD-10-PCS | Mod: CPTII,S$GLB,, | Performed by: PHYSICAL MEDICINE & REHABILITATION

## 2022-02-08 PROCEDURE — 3288F FALL RISK ASSESSMENT DOCD: CPT | Mod: CPTII,S$GLB,, | Performed by: PHYSICAL MEDICINE & REHABILITATION

## 2022-02-08 PROCEDURE — 99999 PR PBB SHADOW E&M-EST. PATIENT-LVL III: CPT | Mod: PBBFAC,,, | Performed by: PHYSICAL MEDICINE & REHABILITATION

## 2022-02-09 ENCOUNTER — TELEPHONE (OUTPATIENT)
Dept: ORTHOPEDICS | Facility: CLINIC | Age: 82
End: 2022-02-09
Payer: MEDICARE

## 2022-02-09 NOTE — TELEPHONE ENCOUNTER
LVM for pt to schedule appt for L Wrist pain.    Usha Cassidy, MS, OTC  Clinical & OR Assistant to Dr. Gerber Aldridge  [facility]  [phone]    ----- Message from Yareli Gifford sent at 2/7/2022 10:13 AM CST -----  Regarding: STAT referral  Hi     Dr. Shilo Perez  would like to refer the following patient to Dr. Gerber Aldridge. The patients diagnosis is left wrist pain. A referral is placed in [facility].       Thank you,   Yareli Bain

## 2022-02-10 NOTE — PROGRESS NOTES
Today's Date: 02/10/2022     Referring Provider: Aaareferral Self     Chief Complaint:   Chief Complaint   Patient presents with    Wrist Pain     Bilateral wrist pain    Leg Pain     Bilateral leg numbness       HPI: Lety Herbert is a 81 y.o. female  who presents today for evaluation and treatment of bilateral wrist pain.  She complains of pain across the dorsal radial wrist on the right and left.  Symptoms are worse with gripping.  She describes the pain as a deep dull achy pain.    Review of Systems   Constitutional: Negative for chills and fever.   HENT: Negative for congestion and tinnitus.    Eyes: Negative for blurred vision and photophobia.   Respiratory: Negative for shortness of breath and wheezing.    Cardiovascular: Negative for chest pain and palpitations.   Gastrointestinal: Negative for nausea and vomiting.   Genitourinary: Negative for dysuria and frequency.   Musculoskeletal: Positive for joint pain. Negative for myalgias.   Skin: Negative for itching and rash.   Neurological: Negative for speech change and focal weakness.   Endo/Heme/Allergies: Negative for environmental allergies. Does not bruise/bleed easily.   Psychiatric/Behavioral: Negative for depression. The patient is not nervous/anxious.         Social History     Socioeconomic History    Marital status:      Spouse name: Jean    Number of children: 3   Tobacco Use    Smoking status: Former Smoker    Smokeless tobacco: Never Used   Substance and Sexual Activity    Alcohol use: No    Drug use: No    Sexual activity: Not Currently     Partners: Male   Social History Narrative    3 Children- 9 GC, 7 GGrands     Social Determinants of Health     Financial Resource Strain: Medium Risk    Difficulty of Paying Living Expenses: Somewhat hard   Food Insecurity: No Food Insecurity    Worried About Running Out of Food in the Last Year: Never true    Ran Out of Food in the Last Year: Never true   Transportation Needs: No  Transportation Needs    Lack of Transportation (Medical): No    Lack of Transportation (Non-Medical): No   Physical Activity: Inactive    Days of Exercise per Week: 0 days    Minutes of Exercise per Session: 0 min   Stress: Stress Concern Present    Feeling of Stress : To some extent   Housing Stability: Low Risk     Unable to Pay for Housing in the Last Year: No    Number of Places Lived in the Last Year: 1    Unstable Housing in the Last Year: No       Family History   Problem Relation Age of Onset    Cancer Mother     Cancer Father     Heart disease Father     Melanoma Neg Hx     Psoriasis Neg Hx     Lupus Neg Hx     Eczema Neg Hx        Current Outpatient Medications on File Prior to Visit   Medication Sig Dispense Refill    atorvastatin (LIPITOR) 20 MG tablet TAKE 1 TABLET (20 MG TOTAL) BY MOUTH ONCE DAILY. 90 tablet 1    cholecalciferol, vitamin D3, 5,000 unit capsule Take 5,000 Units by mouth once daily.       cyanocobalamin (VITAMIN B-12) 100 MCG tablet Take 1,000 mcg by mouth once daily.       diltiaZEM (CARDIZEM CD) 120 MG Cp24 Take 1 capsule (120 mg total) by mouth once daily. 90 capsule 1    ferrous gluconate (FERGON) 240 (27 FE) MG tablet Take 1 tablet (240 mg total) by mouth every Mon, Wed, Fri. 36 tablet 1    fluticasone propionate (FLONASE) 50 mcg/actuation nasal spray 2 sprays (100 mcg total) by Each Nostril route once daily. 16 g 11    gabapentin (NEURONTIN) 100 MG capsule       LIDOcaine-prilocaine (EMLA) cream Apply topically as needed. 30 g 0    montelukast (SINGULAIR) 10 mg tablet Take 10 mg by mouth every evening.      pantoprazole (PROTONIX) 20 MG tablet TAKE 1 TABLET (20 MG TOTAL) BY MOUTH ONCE DAILY. 90 tablet 1    polyethylene glycol (GLYCOLAX) 17 gram PwPk Take 17 g by mouth once daily. 90 packet 3    sertraline (ZOLOFT) 100 MG tablet Take 1 tablet (100 mg total) by mouth once daily. 90 tablet 3    valsartan-hydrochlorothiazide (DIOVAN-HCT) 320-12.5 mg per  tablet Take 1 tablet by mouth every morning.      XARELTO 20 mg Tab        No current facility-administered medications on file prior to visit.        Review of patient's allergies indicates:   Allergen Reactions    Meperidine     Promethazine     Bactrim [sulfamethoxazole-trimethoprim] Rash       Past Surgical History:   Procedure Laterality Date    HYSTERECTOMY      MASTECTOMY, RADICAL Bilateral     TOTAL HIP ARTHROPLASTY Left 11/13/2017       Past Medical History:   Diagnosis Date    Anemia     Basal cell carcinoma     nose     Cancer     breast    Depression     DVT (deep venous thrombosis)     Fall 3/20/2018    Gastric ulceration     GERD (gastroesophageal reflux disease)     History of frequent urinary tract infections     Hyperlipidemia     Hypertension     Melanoma 2004?    left forearm    MRSA (methicillin resistant staph aureus) culture positive     Neuropathy     PE (pulmonary embolism)     Post-nasal drip 11/15/2018    Reflux        Vitals:    02/08/22 1401   Resp: 18     Physical Exam  Constitutional:       General: She is not in acute distress.     Appearance: Normal appearance.   HENT:      Head: Normocephalic and atraumatic.      Nose: Nose normal. No congestion.      Mouth/Throat:      Mouth: Mucous membranes are moist.      Pharynx: Oropharynx is clear.   Eyes:      Extraocular Movements: Extraocular movements intact.      Pupils: Pupils are equal, round, and reactive to light.   Neck:      Trachea: Trachea and phonation normal.   Pulmonary:      Effort: Pulmonary effort is normal. No respiratory distress.   Abdominal:      General: Abdomen is flat. There is no distension.   Skin:     General: Skin is warm and dry.   Neurological:      Mental Status: She is alert and oriented to person, place, and time.      GCS: GCS eye subscore is 4. GCS verbal subscore is 5. GCS motor subscore is 6.      Gait: Gait abnormal (Walks with a walker).   Psychiatric:         Mood and Affect:  Mood and affect normal.         Behavior: Behavior normal.        Right Hand Exam     Tenderness   The patient is experiencing tenderness in the dorsal area and radial area.    Tests   Finkelstein's test: positive      Left Hand Exam     Tenderness   The patient is experiencing tenderness in the radial area and dorsal area.     Tests   Finkelstein's test: positive             Bilateral wrist pain    Radial styloid tenosynovitis (de quervain)           PLAN:   Lety Herbert is a 81 y.o. female with bilateral dorsal radial wrist pain.  History and physical examination is consistent with de Quervain tenosynovitis.  She has significant inflammation within the tendon sheath of the 1st dorsal compartment of the wrist on the right and left.  At this time, provide her with a tendon sheath injection to the 1st dorsal compartment on the right and left.  See procedure note for more details.      Bello Armendariz D.O.  Physical Medicine and Rehabilitation          CC: Patients PCP: Shilo Perez MD  CC: Referring Provider: Mckenziereferral Self

## 2022-02-10 NOTE — PROCEDURES
Procedures     Procedure:  Bilateral 1st dorsal compartment of the wrist injection with ultrasound guidance.    Procedure indications:  Bilateral de Quervain tenosynovitis    Procedure in detail:  Risks and benefits were discussed and written informed consent was obtained.  The patient was placed in seated position and the right forearm was placed the neutral position.  Using a high-frequency linear transducer, the 1st dorsal compartment over the area of maximal tenderness was visualized.  Distally, skin was cleaned with ChloraPrep.  A 30 gauge 1 in needle was advanced in the short axis into 1st dorsal compartment within the tendon sheath were a 1 cc solution of 0.5 cc of 2% lidocaine, and 0.5 cc of 40 milligrams/milliliter of Kenalog was injected into the tendon sheath.  Needle was removed and Band-Aid was applied.  The procedure was then repeated on the left side in a similar fashion

## 2022-03-03 ENCOUNTER — OFFICE VISIT (OUTPATIENT)
Dept: PHYSICAL MEDICINE AND REHAB | Facility: CLINIC | Age: 82
End: 2022-03-03
Payer: MEDICARE

## 2022-03-03 VITALS — BODY MASS INDEX: 32.14 KG/M2 | WEIGHT: 200 LBS | HEIGHT: 66 IN | RESPIRATION RATE: 18 BRPM

## 2022-03-03 DIAGNOSIS — G62.9 PERIPHERAL POLYNEUROPATHY: ICD-10-CM

## 2022-03-03 DIAGNOSIS — M25.531 PAIN IN BOTH WRISTS: Primary | ICD-10-CM

## 2022-03-03 DIAGNOSIS — M65.4 RADIAL STYLOID TENOSYNOVITIS (DE QUERVAIN): ICD-10-CM

## 2022-03-03 DIAGNOSIS — M25.532 PAIN IN BOTH WRISTS: Primary | ICD-10-CM

## 2022-03-03 PROCEDURE — 1126F AMNT PAIN NOTED NONE PRSNT: CPT | Mod: CPTII,S$GLB,, | Performed by: PHYSICAL MEDICINE & REHABILITATION

## 2022-03-03 PROCEDURE — 3288F FALL RISK ASSESSMENT DOCD: CPT | Mod: CPTII,S$GLB,, | Performed by: PHYSICAL MEDICINE & REHABILITATION

## 2022-03-03 PROCEDURE — 1159F PR MEDICATION LIST DOCUMENTED IN MEDICAL RECORD: ICD-10-PCS | Mod: CPTII,S$GLB,, | Performed by: PHYSICAL MEDICINE & REHABILITATION

## 2022-03-03 PROCEDURE — 3288F PR FALLS RISK ASSESSMENT DOCUMENTED: ICD-10-PCS | Mod: CPTII,S$GLB,, | Performed by: PHYSICAL MEDICINE & REHABILITATION

## 2022-03-03 PROCEDURE — 1159F MED LIST DOCD IN RCRD: CPT | Mod: CPTII,S$GLB,, | Performed by: PHYSICAL MEDICINE & REHABILITATION

## 2022-03-03 PROCEDURE — 1101F PR PT FALLS ASSESS DOC 0-1 FALLS W/OUT INJ PAST YR: ICD-10-PCS | Mod: CPTII,S$GLB,, | Performed by: PHYSICAL MEDICINE & REHABILITATION

## 2022-03-03 PROCEDURE — 99213 PR OFFICE/OUTPT VISIT, EST, LEVL III, 20-29 MIN: ICD-10-PCS | Mod: S$GLB,,, | Performed by: PHYSICAL MEDICINE & REHABILITATION

## 2022-03-03 PROCEDURE — 1101F PT FALLS ASSESS-DOCD LE1/YR: CPT | Mod: CPTII,S$GLB,, | Performed by: PHYSICAL MEDICINE & REHABILITATION

## 2022-03-03 PROCEDURE — 1126F PR PAIN SEVERITY QUANTIFIED, NO PAIN PRESENT: ICD-10-PCS | Mod: CPTII,S$GLB,, | Performed by: PHYSICAL MEDICINE & REHABILITATION

## 2022-03-03 PROCEDURE — 99999 PR PBB SHADOW E&M-EST. PATIENT-LVL III: ICD-10-PCS | Mod: PBBFAC,,, | Performed by: PHYSICAL MEDICINE & REHABILITATION

## 2022-03-03 PROCEDURE — 99213 OFFICE O/P EST LOW 20 MIN: CPT | Mod: S$GLB,,, | Performed by: PHYSICAL MEDICINE & REHABILITATION

## 2022-03-03 PROCEDURE — 99999 PR PBB SHADOW E&M-EST. PATIENT-LVL III: CPT | Mod: PBBFAC,,, | Performed by: PHYSICAL MEDICINE & REHABILITATION

## 2022-03-03 NOTE — PROGRESS NOTES
Chief Complaint   Patient presents with    Follow-up    Wrist Pain     Bilateral wrist pain         HPI: Lety Herbert is a  81 y.o. female who presents today for followup. she was last seen in clinic which time I performed bilateral 1st dorsal compartment injections.  She states significant improvement in pain and function.  She is able to perform more independent activities of daily living such as dressing and activities.  She is happy with the amount of relief.  No new complaints of present        Review of Systems   Constitutional: Negative for chills and fever.   HENT: Negative for congestion and tinnitus.    Eyes: Negative for blurred vision and photophobia.   Respiratory: Negative for shortness of breath and wheezing.    Cardiovascular: Negative for chest pain and palpitations.   Gastrointestinal: Negative for nausea and vomiting.   Genitourinary: Negative for dysuria and frequency.   Musculoskeletal: Negative for joint pain and myalgias.   Skin: Negative for itching and rash.   Neurological: Positive for tingling and sensory change. Negative for speech change and focal weakness.   Endo/Heme/Allergies: Negative for environmental allergies. Does not bruise/bleed easily.   Psychiatric/Behavioral: Negative for depression. The patient is not nervous/anxious.             Past Medical History:   Diagnosis Date    Anemia     Basal cell carcinoma     nose     Cancer     breast    Depression     DVT (deep venous thrombosis)     Fall 3/20/2018    Gastric ulceration     GERD (gastroesophageal reflux disease)     History of frequent urinary tract infections     Hyperlipidemia     Hypertension     Melanoma 2004?    left forearm    MRSA (methicillin resistant staph aureus) culture positive     Neuropathy     PE (pulmonary embolism)     Post-nasal drip 11/15/2018    Reflux           Current Outpatient Medications on File Prior to Visit   Medication Sig Dispense Refill    atorvastatin (LIPITOR) 20  MG tablet TAKE 1 TABLET (20 MG TOTAL) BY MOUTH ONCE DAILY. 90 tablet 1    cholecalciferol, vitamin D3, 5,000 unit capsule Take 5,000 Units by mouth once daily.       cyanocobalamin (VITAMIN B-12) 100 MCG tablet Take 1,000 mcg by mouth once daily.       diltiaZEM (CARDIZEM CD) 120 MG Cp24 Take 1 capsule (120 mg total) by mouth once daily. 90 capsule 1    ferrous gluconate (FERGON) 240 (27 FE) MG tablet Take 1 tablet (240 mg total) by mouth every Mon, Wed, Fri. 36 tablet 1    fluticasone propionate (FLONASE) 50 mcg/actuation nasal spray 2 sprays (100 mcg total) by Each Nostril route once daily. 16 g 11    gabapentin (NEURONTIN) 100 MG capsule       LIDOcaine-prilocaine (EMLA) cream Apply topically as needed. 30 g 0    montelukast (SINGULAIR) 10 mg tablet Take 10 mg by mouth every evening.      pantoprazole (PROTONIX) 20 MG tablet TAKE 1 TABLET (20 MG TOTAL) BY MOUTH ONCE DAILY. 90 tablet 1    polyethylene glycol (GLYCOLAX) 17 gram PwPk Take 17 g by mouth once daily. 90 packet 3    sertraline (ZOLOFT) 100 MG tablet Take 1 tablet (100 mg total) by mouth once daily. 90 tablet 3    valsartan-hydrochlorothiazide (DIOVAN-HCT) 320-12.5 mg per tablet Take 1 tablet by mouth every morning.      XARELTO 20 mg Tab        No current facility-administered medications on file prior to visit.              Physical Exam:  Vitals:    03/03/22 1038   Resp: 18     Physical Exam  Constitutional:       General: She is not in acute distress.     Appearance: Normal appearance.   HENT:      Head: Normocephalic and atraumatic.      Nose: Nose normal. No congestion.      Mouth/Throat:      Mouth: Mucous membranes are moist.      Pharynx: Oropharynx is clear.   Eyes:      Extraocular Movements: Extraocular movements intact.      Pupils: Pupils are equal, round, and reactive to light.   Neck:      Trachea: Trachea and phonation normal.   Pulmonary:      Effort: Pulmonary effort is normal. No respiratory distress.   Abdominal:       General: Abdomen is flat. There is no distension.   Skin:     General: Skin is warm and dry.   Neurological:      General: No focal deficit present.      Mental Status: She is alert and oriented to person, place, and time. Mental status is at baseline.      Gait: Gait abnormal (Walks with a walker).   Psychiatric:         Mood and Affect: Mood and affect normal.         Behavior: Behavior normal.       Ortho Exam          Assessment:  Pain in both wrists    Radial styloid tenosynovitis (de quervain)    Peripheral polyneuropathy               PLAN:  Lety Herbert is a 81 y.o. female with bilateral wrist pain secondary to radial styloid tenosynovitis.  She is status post injections with significant improvement in pain and function.  She will follow up in clinic as needed                      Bello Armendariz D.O.  Physical Medicine and Rehabilitation

## 2022-03-09 ENCOUNTER — LAB VISIT (OUTPATIENT)
Dept: LAB | Facility: HOSPITAL | Age: 82
End: 2022-03-09
Attending: INTERNAL MEDICINE
Payer: MEDICARE

## 2022-03-09 DIAGNOSIS — E78.2 MULTIPLE-TYPE HYPERLIPIDEMIA: ICD-10-CM

## 2022-03-09 DIAGNOSIS — G44.89 OTHER HEADACHE SYNDROME: Chronic | ICD-10-CM

## 2022-03-09 DIAGNOSIS — I10 BENIGN ESSENTIAL HYPERTENSION: Chronic | ICD-10-CM

## 2022-03-09 DIAGNOSIS — I50.32 CHRONIC HEART FAILURE WITH PRESERVED EJECTION FRACTION: Chronic | ICD-10-CM

## 2022-03-09 DIAGNOSIS — E61.1 IRON DEFICIENCY: ICD-10-CM

## 2022-03-09 LAB
ALBUMIN SERPL BCP-MCNC: 4 G/DL (ref 3.5–5.2)
ALP SERPL-CCNC: 68 U/L (ref 55–135)
ALT SERPL W/O P-5'-P-CCNC: 20 U/L (ref 10–44)
ANION GAP SERPL CALC-SCNC: 9 MMOL/L (ref 8–16)
AST SERPL-CCNC: 22 U/L (ref 10–40)
BASOPHILS # BLD AUTO: 0.04 K/UL (ref 0–0.2)
BASOPHILS NFR BLD: 0.5 % (ref 0–1.9)
BILIRUB SERPL-MCNC: 0.7 MG/DL (ref 0.1–1)
BUN SERPL-MCNC: 16 MG/DL (ref 8–23)
CALCIUM SERPL-MCNC: 9.4 MG/DL (ref 8.7–10.5)
CHLORIDE SERPL-SCNC: 100 MMOL/L (ref 95–110)
CO2 SERPL-SCNC: 30 MMOL/L (ref 23–29)
CREAT SERPL-MCNC: 0.6 MG/DL (ref 0.5–1.4)
DIFFERENTIAL METHOD: ABNORMAL
EOSINOPHIL # BLD AUTO: 0.1 K/UL (ref 0–0.5)
EOSINOPHIL NFR BLD: 1.1 % (ref 0–8)
ERYTHROCYTE [DISTWIDTH] IN BLOOD BY AUTOMATED COUNT: 12.7 % (ref 11.5–14.5)
ERYTHROCYTE [SEDIMENTATION RATE] IN BLOOD BY WESTERGREN METHOD: 2 MM/HR (ref 0–20)
EST. GFR  (AFRICAN AMERICAN): >60 ML/MIN/1.73 M^2
EST. GFR  (NON AFRICAN AMERICAN): >60 ML/MIN/1.73 M^2
FERRITIN SERPL-MCNC: 33 NG/ML (ref 20–300)
GLUCOSE SERPL-MCNC: 94 MG/DL (ref 70–110)
HCT VFR BLD AUTO: 43.1 % (ref 37–48.5)
HGB BLD-MCNC: 14 G/DL (ref 12–16)
IMM GRANULOCYTES # BLD AUTO: 0.02 K/UL (ref 0–0.04)
IMM GRANULOCYTES NFR BLD AUTO: 0.3 % (ref 0–0.5)
IRON SERPL-MCNC: 100 UG/DL (ref 30–160)
LYMPHOCYTES # BLD AUTO: 2.2 K/UL (ref 1–4.8)
LYMPHOCYTES NFR BLD: 29.8 % (ref 18–48)
MCH RBC QN AUTO: 31.1 PG (ref 27–31)
MCHC RBC AUTO-ENTMCNC: 32.5 G/DL (ref 32–36)
MCV RBC AUTO: 96 FL (ref 82–98)
MONOCYTES # BLD AUTO: 0.5 K/UL (ref 0.3–1)
MONOCYTES NFR BLD: 6.6 % (ref 4–15)
NEUTROPHILS # BLD AUTO: 4.5 K/UL (ref 1.8–7.7)
NEUTROPHILS NFR BLD: 61.7 % (ref 38–73)
NRBC BLD-RTO: 0 /100 WBC
PLATELET # BLD AUTO: 212 K/UL (ref 150–450)
PMV BLD AUTO: 10.2 FL (ref 9.2–12.9)
POTASSIUM SERPL-SCNC: 4.6 MMOL/L (ref 3.5–5.1)
PROT SERPL-MCNC: 7.1 G/DL (ref 6–8.4)
RBC # BLD AUTO: 4.5 M/UL (ref 4–5.4)
SATURATED IRON: 25 % (ref 20–50)
SODIUM SERPL-SCNC: 139 MMOL/L (ref 136–145)
TOTAL IRON BINDING CAPACITY: 406 UG/DL (ref 250–450)
TRANSFERRIN SERPL-MCNC: 290 MG/DL (ref 200–375)
WBC # BLD AUTO: 7.29 K/UL (ref 3.9–12.7)

## 2022-03-09 PROCEDURE — 82728 ASSAY OF FERRITIN: CPT | Performed by: INTERNAL MEDICINE

## 2022-03-09 PROCEDURE — 85025 COMPLETE CBC W/AUTO DIFF WBC: CPT | Performed by: INTERNAL MEDICINE

## 2022-03-09 PROCEDURE — 80053 COMPREHEN METABOLIC PANEL: CPT | Performed by: INTERNAL MEDICINE

## 2022-03-09 PROCEDURE — 84466 ASSAY OF TRANSFERRIN: CPT | Performed by: INTERNAL MEDICINE

## 2022-03-09 PROCEDURE — 36415 COLL VENOUS BLD VENIPUNCTURE: CPT | Performed by: INTERNAL MEDICINE

## 2022-03-09 PROCEDURE — 85651 RBC SED RATE NONAUTOMATED: CPT | Performed by: INTERNAL MEDICINE

## 2022-03-14 NOTE — PROGRESS NOTES
Subjective:       Patient ID: Lety Herbert is a 81 y.o. female.    Chief Complaint: Hyperlipidemia, Hypertension, lab review, CHF, Atrial Fibrillation, Gastroesophageal Reflux, Headache, and Chronic resp failure (On 24/7 oxygen)    1. Chronic hypoxemic respiratory failure   2. Chronic heart failure with preserved ejection fraction   3. Multiple-type hyperlipidemia   4. Benign essential hypertension   5. Paroxysmal atrial fibrillation   6. Normocytic anemia   7. Mixed stress and urge urinary incontinence   8. Gastroesophageal reflux disease without esophagitis   9. Chronic tension-type headache, not intractable   10. Compression fracture of L1 vertebra, sequela       Hypertension  This is a chronic problem. The current episode started more than 1 year ago. The problem is controlled. Associated symptoms include headaches, malaise/fatigue and peripheral edema. Pertinent negatives include no chest pain or shortness of breath. Risk factors for coronary artery disease include sedentary lifestyle, obesity, dyslipidemia and post-menopausal state. Past treatments include beta blockers, angiotensin blockers and diuretics. The current treatment provides moderate improvement. Compliance problems include psychosocial issues.  There is no history of coarctation of the aorta, hyperaldosteronism, pheochromocytoma or renovascular disease.   Hyperlipidemia  This is a chronic problem. The current episode started more than 1 year ago. The problem is controlled. Associated symptoms include myalgias. Pertinent negatives include no chest pain or shortness of breath. Current antihyperlipidemic treatment includes statins. The current treatment provides moderate improvement of lipids. Compliance problems include psychosocial issues.  Risk factors for coronary artery disease include a sedentary lifestyle, hypertension, post-menopausal, obesity and dyslipidemia.   Sinus Problem  This is a chronic problem. The current episode started  more than 1 year ago. The problem has been waxing and waning since onset. There has been no fever. Associated symptoms include headaches. Pertinent negatives include no chills, congestion, coughing or shortness of breath. Treatments tried: Ipratropium nasal spray. The treatment provided moderate relief.   Constipation  This is a chronic problem. The current episode started more than 1 year ago. The patient is not on a high fiber diet. She does not exercise regularly. There has not been adequate water intake. Associated symptoms include back pain. Pertinent negatives include no abdominal pain, diarrhea or difficulty urinating. The treatment provided moderate relief.   Gastroesophageal Reflux  She complains of heartburn. She reports no abdominal pain, no chest pain, no choking, no coughing or no wheezing. This is a chronic problem. The current episode started more than 1 year ago. The problem has been waxing and waning. Associated symptoms include fatigue. Risk factors include obesity and lack of exercise. She has tried a PPI for the symptoms. The treatment provided moderate relief.   Atrial Fibrillation  Presents for follow-up visit. Symptoms are negative for bradycardia, chest pain, dizziness and shortness of breath. The symptoms have been stable. Past medical history includes atrial fibrillation, CHF and hyperlipidemia.   Depression  Visit Type: follow-up  Patient presents with the following symptoms: anhedonia and depressed mood.  Patient is not experiencing: choking sensation, confusion, nervousness/anxiety, shortness of breath, suicidal ideas, suicidal planning and weight gain.  Frequency of symptoms: occasionally   Patient has a history of: CHF    Headache   This is a chronic problem. The current episode started more than 1 year ago. The problem occurs constantly. The problem has been gradually worsening. The pain is located in the occipital region. The pain radiates to the left neck and right neck. The pain  quality is not similar to prior headaches. The quality of the pain is described as aching. The pain is moderate. Associated symptoms include back pain. Pertinent negatives include no abdominal pain, coughing, dizziness, eye pain or numbness. Nothing aggravates the symptoms. She has tried acetaminophen for the symptoms.   Congestive Heart Failure  Presents for follow-up visit. Associated symptoms include fatigue and unexpected weight change (lost weight 20 lbs). Pertinent negatives include no abdominal pain, chest pain or shortness of breath. Compliance with total regimen is 26-50%. Compliance with diet is 26-50%. Compliance with exercise is 0-25%. Compliance with medications is 51-75%.   Anemia  Symptoms include malaise/fatigue. There has been no abdominal pain, bruising/bleeding easily, confusion, light-headedness or pallor. Signs of blood loss that are not present include vaginal bleeding.       Past Medical History:   Diagnosis Date    Anemia     Basal cell carcinoma     nose     Cancer     breast    Depression     DVT (deep venous thrombosis)     Fall 3/20/2018    Gastric ulceration     GERD (gastroesophageal reflux disease)     History of frequent urinary tract infections     Hyperlipidemia     Hypertension     Melanoma 2004?    left forearm    MRSA (methicillin resistant staph aureus) culture positive     Neuropathy     PE (pulmonary embolism)     Post-nasal drip 11/15/2018    Reflux      Social History     Socioeconomic History    Marital status:      Spouse name: Jean    Number of children: 3   Tobacco Use    Smoking status: Former Smoker    Smokeless tobacco: Never Used   Substance and Sexual Activity    Alcohol use: No    Drug use: No    Sexual activity: Not Currently     Partners: Male   Social History Narrative    3 Children- 9 GC, 7 GGrands     Social Determinants of Health     Financial Resource Strain: Medium Risk    Difficulty of Paying Living Expenses: Somewhat  hard   Food Insecurity: No Food Insecurity    Worried About Running Out of Food in the Last Year: Never true    Ran Out of Food in the Last Year: Never true   Transportation Needs: No Transportation Needs    Lack of Transportation (Medical): No    Lack of Transportation (Non-Medical): No   Physical Activity: Inactive    Days of Exercise per Week: 0 days    Minutes of Exercise per Session: 0 min   Stress: Stress Concern Present    Feeling of Stress : To some extent   Housing Stability: Low Risk     Unable to Pay for Housing in the Last Year: No    Number of Places Lived in the Last Year: 1    Unstable Housing in the Last Year: No     Past Surgical History:   Procedure Laterality Date    HYSTERECTOMY      MASTECTOMY, RADICAL Bilateral     TOTAL HIP ARTHROPLASTY Left 11/13/2017     Family History   Problem Relation Age of Onset    Cancer Mother     Cancer Father     Heart disease Father     Melanoma Neg Hx     Psoriasis Neg Hx     Lupus Neg Hx     Eczema Neg Hx        Review of Systems   Constitutional: Positive for fatigue, malaise/fatigue and unexpected weight change (lost weight 20 lbs). Negative for activity change, appetite change, chills and weight gain.        She has lost 20 lb of weight.  Patient is overall not feeling well.  This should not be the case.  Generally when somebody loses weight by watching diet they really feel well.  Patient on the contrary feels more sick and tired and headaches.   HENT: Negative for congestion.    Eyes: Negative for pain, discharge and visual disturbance.   Respiratory: Negative for cough, choking, chest tightness, shortness of breath and wheezing.    Cardiovascular: Negative for chest pain and leg swelling.        HTN   Gastrointestinal: Positive for heartburn. Negative for abdominal distention, abdominal pain, anal bleeding, constipation and diarrhea.        Constipation seems to be stable with Amitiza 24 mcg capsule.   Endocrine: Positive for polyuria.  "Negative for cold intolerance and polydipsia.   Genitourinary: Positive for enuresis. Negative for difficulty urinating and vaginal bleeding.        Incontinence symptoms   Musculoskeletal: Positive for arthralgias, back pain and myalgias. Negative for joint swelling.        Left hip fracture S/P Arthroplasty NoV 2017  Fall in October/November 2019.  L1 compression fracture status post vertebroplasty.  Did fairly well with radial styloid injections with both the hands by Dr. Leonides Armendariz.  Very thrilled with the relief.   Skin: Negative for color change, pallor, rash and wound.        Complains of somewhat brittle nails.   Allergic/Immunologic: Negative for environmental allergies, food allergies and immunocompromised state.   Neurological: Positive for headaches. Negative for dizziness, light-headedness and numbness.   Hematological: Does not bruise/bleed easily.   Psychiatric/Behavioral: Positive for depression. Negative for confusion and suicidal ideas. The patient is not nervous/anxious.         Patient has a history of depression. Stable on sertraline. She however denies that she is depressed.         Objective:      Blood pressure 136/69, pulse 62, resp. rate (!) 22, height 5' 6" (1.676 m), weight 89.8 kg (198 lb). Body mass index is 31.96 kg/m².     The oxygen saturation was at rest without the oxygen on.  Physical Exam  Vitals and nursing note reviewed.   Constitutional:       General: She is not in acute distress.     Appearance: She is well-developed. She is obese. She is ill-appearing. She is not toxic-appearing or diaphoretic.      Comments: BMI is 31.96   HENT:      Head: Normocephalic and atraumatic.   Neck:      Thyroid: No thyromegaly.      Vascular: No JVD.      Trachea: Trachea normal. No tracheal deviation.   Cardiovascular:      Rate and Rhythm: Normal rate and regular rhythm.      Heart sounds: Normal heart sounds, S1 normal and S2 normal.     No friction rub. No gallop.      Comments: Varicosities " in legs-significant varicosities.  Pulse appears to be regular.  Pulmonary:      Breath sounds: Normal breath sounds. No stridor.   Abdominal:      General: There is no distension.      Palpations: Abdomen is soft. Abdomen is not rigid.      Tenderness: There is no abdominal tenderness.      Comments: Patient is obese.   Musculoskeletal:      Right hand: No swelling or tenderness.      Left hand: Swelling and tenderness present. Normal strength. Normal sensation.        Arms:         Hands:       Right lower leg: Edema present.      Left lower leg: Edema present.      Comments: Tenderness is at the base of the left thumb and shoots up the brachioradialis muscle.  Range of motion is complete the wrist joint itself except in extension when she experiences pain.  Fingertips of warm with intact sensations.  No tenderness on pressure over the carpal tunnel groove.   Lymphadenopathy:      Cervical: No cervical adenopathy.   Skin:     General: Skin is warm and dry.      Coloration: Skin is not pale.      Findings: No rash.      Comments: Varicose veins are noted in the inferior extremities.   Neurological:      Mental Status: She is alert. Mental status is at baseline.      Motor: No abnormal muscle tone.   Psychiatric:         Attention and Perception: She is attentive.         Behavior: Behavior is slowed.      Comments: Anhedonic           Assessment:       1. Chronic hypoxemic respiratory failure    2. Chronic heart failure with preserved ejection fraction    3. Multiple-type hyperlipidemia    4. Benign essential hypertension    5. Paroxysmal atrial fibrillation    6. Normocytic anemia    7. Mixed stress and urge urinary incontinence    8. Gastroesophageal reflux disease without esophagitis    9. Chronic tension-type headache, not intractable    10. Compression fracture of L1 vertebra, sequela    11. Dysthymia           Lab Visit on 03/09/2022   Component Date Value Ref Range Status    WBC 03/09/2022 7.29  3.90 -  12.70 K/uL Final    RBC 03/09/2022 4.50  4.00 - 5.40 M/uL Final    Hemoglobin 03/09/2022 14.0  12.0 - 16.0 g/dL Final    Hematocrit 03/09/2022 43.1  37.0 - 48.5 % Final    MCV 03/09/2022 96  82 - 98 fL Final    MCH 03/09/2022 31.1 (A) 27.0 - 31.0 pg Final    MCHC 03/09/2022 32.5  32.0 - 36.0 g/dL Final    RDW 03/09/2022 12.7  11.5 - 14.5 % Final    Platelets 03/09/2022 212  150 - 450 K/uL Final    MPV 03/09/2022 10.2  9.2 - 12.9 fL Final    Immature Granulocytes 03/09/2022 0.3  0.0 - 0.5 % Final    Gran # (ANC) 03/09/2022 4.5  1.8 - 7.7 K/uL Final    Immature Grans (Abs) 03/09/2022 0.02  0.00 - 0.04 K/uL Final    Lymph # 03/09/2022 2.2  1.0 - 4.8 K/uL Final    Mono # 03/09/2022 0.5  0.3 - 1.0 K/uL Final    Eos # 03/09/2022 0.1  0.0 - 0.5 K/uL Final    Baso # 03/09/2022 0.04  0.00 - 0.20 K/uL Final    nRBC 03/09/2022 0  0 /100 WBC Final    Gran % 03/09/2022 61.7  38.0 - 73.0 % Final    Lymph % 03/09/2022 29.8  18.0 - 48.0 % Final    Mono % 03/09/2022 6.6  4.0 - 15.0 % Final    Eosinophil % 03/09/2022 1.1  0.0 - 8.0 % Final    Basophil % 03/09/2022 0.5  0.0 - 1.9 % Final    Differential Method 03/09/2022 Automated   Final    Iron 03/09/2022 100  30 - 160 ug/dL Final    Transferrin 03/09/2022 290  200 - 375 mg/dL Final    TIBC 03/09/2022 406  250 - 450 ug/dL Final    Saturated Iron 03/09/2022 25  20 - 50 % Final    Ferritin 03/09/2022 33  20.0 - 300.0 ng/mL Final    Sodium 03/09/2022 139  136 - 145 mmol/L Final    Potassium 03/09/2022 4.6  3.5 - 5.1 mmol/L Final    Chloride 03/09/2022 100  95 - 110 mmol/L Final    CO2 03/09/2022 30 (A) 23 - 29 mmol/L Final    Glucose 03/09/2022 94  70 - 110 mg/dL Final    BUN 03/09/2022 16  8 - 23 mg/dL Final    Creatinine 03/09/2022 0.6  0.5 - 1.4 mg/dL Final    Calcium 03/09/2022 9.4  8.7 - 10.5 mg/dL Final    Total Protein 03/09/2022 7.1  6.0 - 8.4 g/dL Final    Albumin 03/09/2022 4.0  3.5 - 5.2 g/dL Final    Total Bilirubin 03/09/2022 0.7  0.1  "- 1.0 mg/dL Final    Alkaline Phosphatase 03/09/2022 68  55 - 135 U/L Final    AST 03/09/2022 22  10 - 40 U/L Final    ALT 03/09/2022 20  10 - 44 U/L Final    Anion Gap 03/09/2022 9  8 - 16 mmol/L Final    eGFR if African American 03/09/2022 >60.0  >60 mL/min/1.73 m^2 Final    eGFR if non African American 03/09/2022 >60.0  >60 mL/min/1.73 m^2 Final    Sed Rate 03/09/2022 2  0 - 20 mm/Hr Final         Plan:           Chronic hypoxemic respiratory failure  Comments:  Uses oxygen continuously.  Doing fairly well.  She is ambulatory.    Chronic heart failure with preserved ejection fraction  Comments:  Heart failure seems to be stable.    Multiple-type hyperlipidemia    Benign essential hypertension  Comments:  Blood pressures are good.    Paroxysmal atrial fibrillation  Comments:  Currently on anticoagulation with Xarelto.    Normocytic anemia  Comments:  Patient's blood count has significantly improved and iron levels also have improved.    Mixed stress and urge urinary incontinence  Comments:  Still not that great.    Gastroesophageal reflux disease without esophagitis  Comments:  Reflux symptoms are stable.    Chronic tension-type headache, not intractable  Comments:  Headaches are better.    Compression fracture of L1 vertebra, sequela  Comments:  Some pain in the back persists and she accepts it has graciously as a part and parcel of getting older    Dysthymia  Comments:  Continues on sertraline.  At this point doing okay.  This increase the resilience and coping skills especially with her  who can be "handful."    Patient's respiratory failure seems to be doing okay.  She uses oxygen and portable oxygen also.    Her heart failure with preserved ejection fraction is also doing okay and she continues on anticoagulation with Xarelto for atrial fibrillation.  This seems to be stable.    The biggest happiness seems to be that her wrist pain is extremely better with radial injection by Dr. Leonides Armendariz.  "     Her headaches which used to bother her previously are also doing better.    She is learning to live with the low back pain at this point and also she has bladder incontinence symptoms.    Her depression and anxiety seems to be stable with Zoloft.  Advised Ms. Herbert about age and season appropriate immunizations/ cancer screenings.  Also seasonal influenza vaccine, update on tetanus diphtheria vaccination every 10 years.        Patient has been advised to watch diet and exercise. Avoid fried and fatty food. Compliance to medications and follow up urged.    Please utilize precautions for current COVID-19 pandemic.  Try to avoid crowds or close contact with multiple people.  Minimize outside interaction.  Wash hands with soap for  frequently upon contact.Use face mask or cover.        Current Outpatient Medications:     atorvastatin (LIPITOR) 20 MG tablet, TAKE 1 TABLET (20 MG TOTAL) BY MOUTH ONCE DAILY., Disp: 90 tablet, Rfl: 1    diltiaZEM (CARDIZEM CD) 120 MG Cp24, Take 1 capsule (120 mg total) by mouth once daily., Disp: 90 capsule, Rfl: 1    pantoprazole (PROTONIX) 20 MG tablet, TAKE 1 TABLET (20 MG TOTAL) BY MOUTH ONCE DAILY., Disp: 90 tablet, Rfl: 1    polyethylene glycol (GLYCOLAX) 17 gram PwPk, Take 17 g by mouth once daily., Disp: 90 packet, Rfl: 3    sertraline (ZOLOFT) 100 MG tablet, Take 1 tablet (100 mg total) by mouth once daily., Disp: 90 tablet, Rfl: 3    valsartan-hydrochlorothiazide (DIOVAN-HCT) 320-12.5 mg per tablet, Take 1 tablet by mouth every morning., Disp: , Rfl:     XARELTO 20 mg Tab, , Disp: , Rfl:

## 2022-03-15 ENCOUNTER — OFFICE VISIT (OUTPATIENT)
Dept: FAMILY MEDICINE | Facility: CLINIC | Age: 82
End: 2022-03-15
Payer: MEDICARE

## 2022-03-15 VITALS
SYSTOLIC BLOOD PRESSURE: 136 MMHG | DIASTOLIC BLOOD PRESSURE: 69 MMHG | HEART RATE: 62 BPM | RESPIRATION RATE: 22 BRPM | BODY MASS INDEX: 31.82 KG/M2 | WEIGHT: 198 LBS | OXYGEN SATURATION: 94 % | HEIGHT: 66 IN

## 2022-03-15 DIAGNOSIS — J96.11 CHRONIC HYPOXEMIC RESPIRATORY FAILURE: Primary | ICD-10-CM

## 2022-03-15 DIAGNOSIS — G44.229 CHRONIC TENSION-TYPE HEADACHE, NOT INTRACTABLE: ICD-10-CM

## 2022-03-15 DIAGNOSIS — I50.32 CHRONIC HEART FAILURE WITH PRESERVED EJECTION FRACTION: ICD-10-CM

## 2022-03-15 DIAGNOSIS — S32.010S COMPRESSION FRACTURE OF L1 VERTEBRA, SEQUELA: ICD-10-CM

## 2022-03-15 DIAGNOSIS — E78.2 MULTIPLE-TYPE HYPERLIPIDEMIA: ICD-10-CM

## 2022-03-15 DIAGNOSIS — N39.46 MIXED STRESS AND URGE URINARY INCONTINENCE: Chronic | ICD-10-CM

## 2022-03-15 DIAGNOSIS — I48.0 PAROXYSMAL ATRIAL FIBRILLATION: ICD-10-CM

## 2022-03-15 DIAGNOSIS — F34.1 DYSTHYMIA: Chronic | ICD-10-CM

## 2022-03-15 DIAGNOSIS — K21.9 GASTROESOPHAGEAL REFLUX DISEASE WITHOUT ESOPHAGITIS: Chronic | ICD-10-CM

## 2022-03-15 DIAGNOSIS — I10 BENIGN ESSENTIAL HYPERTENSION: Chronic | ICD-10-CM

## 2022-03-15 DIAGNOSIS — D64.9 NORMOCYTIC ANEMIA: Chronic | ICD-10-CM

## 2022-03-15 PROCEDURE — 3078F PR MOST RECENT DIASTOLIC BLOOD PRESSURE < 80 MM HG: ICD-10-PCS | Mod: S$GLB,,, | Performed by: INTERNAL MEDICINE

## 2022-03-15 PROCEDURE — 1126F PR PAIN SEVERITY QUANTIFIED, NO PAIN PRESENT: ICD-10-PCS | Mod: S$GLB,,, | Performed by: INTERNAL MEDICINE

## 2022-03-15 PROCEDURE — 99214 PR OFFICE/OUTPT VISIT, EST, LEVL IV, 30-39 MIN: ICD-10-PCS | Mod: S$GLB,,, | Performed by: INTERNAL MEDICINE

## 2022-03-15 PROCEDURE — 99214 OFFICE O/P EST MOD 30 MIN: CPT | Mod: S$GLB,,, | Performed by: INTERNAL MEDICINE

## 2022-03-15 PROCEDURE — 1101F PR PT FALLS ASSESS DOC 0-1 FALLS W/OUT INJ PAST YR: ICD-10-PCS | Mod: S$GLB,,, | Performed by: INTERNAL MEDICINE

## 2022-03-15 PROCEDURE — 1159F PR MEDICATION LIST DOCUMENTED IN MEDICAL RECORD: ICD-10-PCS | Mod: S$GLB,,, | Performed by: INTERNAL MEDICINE

## 2022-03-15 PROCEDURE — 1160F RVW MEDS BY RX/DR IN RCRD: CPT | Mod: S$GLB,,, | Performed by: INTERNAL MEDICINE

## 2022-03-15 PROCEDURE — 3075F SYST BP GE 130 - 139MM HG: CPT | Mod: S$GLB,,, | Performed by: INTERNAL MEDICINE

## 2022-03-15 PROCEDURE — 3075F PR MOST RECENT SYSTOLIC BLOOD PRESS GE 130-139MM HG: ICD-10-PCS | Mod: S$GLB,,, | Performed by: INTERNAL MEDICINE

## 2022-03-15 PROCEDURE — 1126F AMNT PAIN NOTED NONE PRSNT: CPT | Mod: S$GLB,,, | Performed by: INTERNAL MEDICINE

## 2022-03-15 PROCEDURE — 1101F PT FALLS ASSESS-DOCD LE1/YR: CPT | Mod: S$GLB,,, | Performed by: INTERNAL MEDICINE

## 2022-03-15 PROCEDURE — 3078F DIAST BP <80 MM HG: CPT | Mod: S$GLB,,, | Performed by: INTERNAL MEDICINE

## 2022-03-15 PROCEDURE — 1160F PR REVIEW ALL MEDS BY PRESCRIBER/CLIN PHARMACIST DOCUMENTED: ICD-10-PCS | Mod: S$GLB,,, | Performed by: INTERNAL MEDICINE

## 2022-03-15 PROCEDURE — 1159F MED LIST DOCD IN RCRD: CPT | Mod: S$GLB,,, | Performed by: INTERNAL MEDICINE

## 2022-03-15 PROCEDURE — 3288F FALL RISK ASSESSMENT DOCD: CPT | Mod: S$GLB,,, | Performed by: INTERNAL MEDICINE

## 2022-03-15 PROCEDURE — 3288F PR FALLS RISK ASSESSMENT DOCUMENTED: ICD-10-PCS | Mod: S$GLB,,, | Performed by: INTERNAL MEDICINE

## 2022-03-15 NOTE — Clinical Note
MADDISONI .This pt is Ecstatic with the radial injections you performed. Best thing happened to her.  SR

## 2022-04-28 DIAGNOSIS — F34.1 DYSTHYMIA: ICD-10-CM

## 2022-04-28 RX ORDER — SERTRALINE HYDROCHLORIDE 100 MG/1
100 TABLET, FILM COATED ORAL DAILY
Qty: 90 TABLET | Refills: 3 | Status: SHIPPED | OUTPATIENT
Start: 2022-04-28 | End: 2023-07-17

## 2022-07-24 NOTE — PROGRESS NOTES
Subjective:       Patient ID: Lety Herbert is a 81 y.o. female.    Chief Complaint: Hypertension, Congestive Heart Failure, Hyperlipidemia, Atrial Fibrillation, Gastroesophageal Reflux, Headache, and Anemia    Patient is a chronically ill  female comes for follow-up 4 month fup .  Underlying medical issues are as below:-      1.         Chronic hypoxemic respiratory failure   2.         Chronic heart failure with preserved ejection fraction   3.         Multiple-type hyperlipidemia   4.         Benign essential hypertension   5.         Paroxysmal atrial fibrillation   6.         Normocytic anemia   7.         Mixed stress and urge urinary incontinence   8.         Gastroesophageal reflux disease without esophagitis   9.         Chronic tension-type headache, not intractable   10.       Compression fracture of L1 vertebra, sequela     HER MOST IMPORTANT ISSUE SEEMS TO BE THIS INTOLERABLE AND INTRACTABLE PAIN IN BOTH THE LOWER EXTREMITIES ESPECIALLY WHEN SHE IS TRYING TO SLEEP AT NIGHT AND SHE HAS NOT SLEPT FOR THE LAST 2 OR 3 NIGHTS.  SHE HAS KEPT ON TOSSING AND TURNING FINDING A COMFORTABLE POSITION FOR LEGS.  NO HISTORY OF FALL OR TRAUMA.  PATIENT IS SOMEWHAT CONFUSED AS TO THE DURATION OF ONSET OF PAIN BUT I DID SEE A NOTE FROM DR. CELIS WHICH INDICATED THAT SHE WAS INVESTIGATED FOR THIS PROBLEM AS EARLY AS 2021. SHE DID HAVE LEG ULTRASOUND WHICH WAS NEGATIVE FOR DVT.  IF SHE DID HAVE A PERIPHERAL VASCULAR DISEASE STUDY I AM NOT SURE.         Last visit we had noted the issues of chronic low back pain following a compression fracture of L4 vertebra with no significant improvement but no deterioration either.  She was learning to live it.    Her exercise tolerance seems to be stable with her chronic hypoxemic respiratory failure and need for oxygen and also chronic heart failure with preserved ejection fraction.    She continues on anticoagulation with Xarelto for paroxysmal atrial  fibrillation.    The biggest treatment last visit was improvement in the wrist pain after injection by physical medicine rehab doctor Marcello.    Chronic tension-type headaches have been reviewed which wax and wane.    Some degree of disorder with stress also has been noted on a chronic basis several years.  She has stable need for Zoloft at this point.  This apparently keeps her at even keel with various psychosocial issues in factors including her 's issues.    Her cardiologist is Dr. Sargent-    No recent fall.    Preventive care issues of ophthalmology examination, COVID precautions and need for tetanus vaccination been discussed.    Hypertension  This is a chronic problem. The current episode started more than 1 year ago. The problem is controlled. Associated symptoms include headaches, malaise/fatigue and peripheral edema. Pertinent negatives include no chest pain, palpitations or shortness of breath. Risk factors for coronary artery disease include sedentary lifestyle, obesity, dyslipidemia and post-menopausal state. Past treatments include beta blockers, angiotensin blockers and diuretics. The current treatment provides moderate improvement. Compliance problems include psychosocial issues.  There is no history of coarctation of the aorta, hyperaldosteronism, pheochromocytoma or renovascular disease.   Hyperlipidemia  This is a chronic problem. The current episode started more than 1 year ago. The problem is controlled. Associated symptoms include myalgias. Pertinent negatives include no chest pain or shortness of breath. Current antihyperlipidemic treatment includes statins. The current treatment provides moderate improvement of lipids. Compliance problems include psychosocial issues.  Risk factors for coronary artery disease include a sedentary lifestyle, hypertension, post-menopausal, obesity and dyslipidemia.   Sinus Problem  This is a chronic problem. The current episode started more than 1 year ago.  The problem has been waxing and waning since onset. There has been no fever. Associated symptoms include headaches. Pertinent negatives include no chills, congestion, coughing or shortness of breath. Treatments tried: Ipratropium nasal spray. The treatment provided moderate relief.   Constipation  This is a chronic problem. The current episode started more than 1 year ago. The patient is not on a high fiber diet. She does not exercise regularly. There has not been adequate water intake. Associated symptoms include back pain. Pertinent negatives include no abdominal pain, diarrhea or difficulty urinating. The treatment provided moderate relief.   Gastroesophageal Reflux  She complains of heartburn. She reports no abdominal pain, no chest pain, no choking, no coughing or no wheezing. This is a chronic problem. The current episode started more than 1 year ago. The problem has been waxing and waning. Associated symptoms include fatigue. Risk factors include obesity and lack of exercise. She has tried a PPI for the symptoms. The treatment provided moderate relief.   Atrial Fibrillation  Presents for follow-up visit. Symptoms are negative for bradycardia, chest pain, dizziness, palpitations and shortness of breath. The symptoms have been stable. Past medical history includes atrial fibrillation, CHF and hyperlipidemia.   Depression  Visit Type: follow-up  Patient presents with the following symptoms: anhedonia and depressed mood.  Patient is not experiencing: choking sensation, confusion, nervousness/anxiety, palpitations, shortness of breath, suicidal ideas, suicidal planning and weight gain.  Frequency of symptoms: occasionally   Patient has a history of: CHF    Headache   This is a chronic problem. The current episode started more than 1 year ago. The problem occurs constantly. The problem has been gradually worsening. The pain is located in the occipital region. The pain radiates to the left neck and right neck. The  pain quality is not similar to prior headaches. The quality of the pain is described as aching. The pain is moderate. Associated symptoms include back pain. Pertinent negatives include no abdominal pain, coughing, dizziness, eye pain or numbness. Nothing aggravates the symptoms. She has tried acetaminophen for the symptoms.   Congestive Heart Failure  Presents for follow-up visit. Associated symptoms include fatigue and unexpected weight change (GAINED 7 LB OF WEIGHT.). Pertinent negatives include no abdominal pain, chest pain, palpitations or shortness of breath. Compliance with total regimen is 26-50%. Compliance with diet is 26-50%. Compliance with exercise is 0-25%. Compliance with medications is 51-75%.   Anemia  Symptoms include malaise/fatigue. There has been no abdominal pain, bruising/bleeding easily, confusion, light-headedness, pallor or palpitations. Signs of blood loss that are not present include vaginal bleeding.       Past Medical History:   Diagnosis Date    Anemia     Basal cell carcinoma     nose     Cancer     breast    Depression     DVT (deep venous thrombosis)     Fall 3/20/2018    Gastric ulceration     GERD (gastroesophageal reflux disease)     History of frequent urinary tract infections     Hyperlipidemia     Hypertension     Melanoma 2004?    left forearm    MRSA (methicillin resistant staph aureus) culture positive     Neuropathy     PE (pulmonary embolism)     Post-nasal drip 11/15/2018    Reflux      Social History     Socioeconomic History    Marital status:      Spouse name: Jean    Number of children: 3   Tobacco Use    Smoking status: Former Smoker    Smokeless tobacco: Never Used   Substance and Sexual Activity    Alcohol use: No    Drug use: No    Sexual activity: Not Currently     Partners: Male   Social History Narrative    3 Children- 9 GC, 7 GGrands     Social Determinants of Health     Financial Resource Strain: Medium Risk    Difficulty of  Paying Living Expenses: Somewhat hard   Food Insecurity: No Food Insecurity    Worried About Running Out of Food in the Last Year: Never true    Ran Out of Food in the Last Year: Never true   Transportation Needs: No Transportation Needs    Lack of Transportation (Medical): No    Lack of Transportation (Non-Medical): No   Physical Activity: Inactive    Days of Exercise per Week: 0 days    Minutes of Exercise per Session: 0 min   Stress: Stress Concern Present    Feeling of Stress : To some extent   Housing Stability: Low Risk     Unable to Pay for Housing in the Last Year: No    Number of Places Lived in the Last Year: 1    Unstable Housing in the Last Year: No     Past Surgical History:   Procedure Laterality Date    HYSTERECTOMY      MASTECTOMY, RADICAL Bilateral     TOTAL HIP ARTHROPLASTY Left 11/13/2017     Family History   Problem Relation Age of Onset    Cancer Mother     Cancer Father     Heart disease Father     Melanoma Neg Hx     Psoriasis Neg Hx     Lupus Neg Hx     Eczema Neg Hx        Review of Systems   Constitutional: Positive for fatigue, malaise/fatigue and unexpected weight change (GAINED 7 LB OF WEIGHT.). Negative for activity change, appetite change, chills and weight gain.        She has lost 20 lb of weight.  Patient is overall not feeling well.  This should not be the case.  Generally when somebody loses weight by watching diet they really feel well.  Patient on the contrary feels more sick and tired and headaches.   HENT: Negative for congestion.    Eyes: Negative for pain, discharge and visual disturbance.   Respiratory: Negative for cough, choking, chest tightness, shortness of breath and wheezing.    Cardiovascular: Negative for chest pain, palpitations and leg swelling.        HTN   Gastrointestinal: Positive for heartburn. Negative for abdominal distention, abdominal pain, anal bleeding, constipation and diarrhea.        Constipation seems to be stable with Amitiza 24  "mcg capsule.   Endocrine: Positive for polyuria. Negative for cold intolerance and polydipsia.   Genitourinary: Positive for enuresis. Negative for difficulty urinating and vaginal bleeding.        Incontinence symptoms   Musculoskeletal: Positive for arthralgias, back pain and myalgias. Negative for joint swelling.        Left hip fracture S/P Arthroplasty NoV 2017  Fall in October/November 2019.  L1 compression fracture status post vertebroplasty.  Did fairly well with radial styloid injections with both the hands by Dr. Leonides Armendariz.  Very thrilled with the relief.    LOWER EXTREMITY PAIN AND DURATION UNKNOWN.   Skin: Negative for color change, pallor, rash and wound.        Complains of somewhat brittle nails.   Allergic/Immunologic: Negative for environmental allergies, food allergies and immunocompromised state.   Neurological: Positive for headaches. Negative for dizziness, light-headedness and numbness.   Hematological: Does not bruise/bleed easily.   Psychiatric/Behavioral: Positive for depression. Negative for confusion and suicidal ideas. The patient is not nervous/anxious.         Patient has a history of depression. Stable on sertraline. She however denies that she is depressed.         Objective:      Blood pressure (!) 142/73, pulse 62, resp. rate 18, height 5' 6" (1.676 m), weight 93 kg (205 lb). Body mass index is 33.09 kg/m².  Physical Exam  Vitals and nursing note reviewed.   Constitutional:       General: She is not in acute distress.     Appearance: She is well-developed. She is obese. She is ill-appearing. She is not toxic-appearing or diaphoretic.      Comments: BMI is 33.09   HENT:      Head: Normocephalic and atraumatic.   Neck:      Thyroid: No thyromegaly.      Vascular: No JVD.      Trachea: Trachea normal. No tracheal deviation.   Cardiovascular:      Rate and Rhythm: Normal rate and regular rhythm.      Heart sounds: Normal heart sounds, S1 normal and S2 normal.     No friction rub. No " negrito.      Comments: Varicosities in legs-significant varicosities.  Pulse appears to be regular.  Pulmonary:      Breath sounds: Normal breath sounds. No stridor.   Abdominal:      General: There is no distension.      Palpations: Abdomen is soft. Abdomen is not rigid.      Tenderness: There is no abdominal tenderness.      Comments: Patient is obese.   Musculoskeletal:      Right hand: No swelling or tenderness.      Left hand: Swelling and tenderness present. Normal strength. Normal sensation.        Arms:         Hands:       Right lower leg: Edema present.      Left lower leg: Edema present.      Comments: Tenderness is at the base of the left thumb and shoots up the brachioradialis muscle.  Range of motion is complete the wrist joint itself except in extension when she experiences pain.  Fingertips of warm with intact sensations.  No tenderness on pressure over the carpal tunnel groove.   Lymphadenopathy:      Cervical: No cervical adenopathy.   Skin:     General: Skin is warm and dry.      Coloration: Skin is not pale.      Findings: No rash.      Comments: Varicose veins are noted in the inferior extremities.   Neurological:      Mental Status: She is alert. Mental status is at baseline.      Motor: No abnormal muscle tone.   Psychiatric:         Attention and Perception: She is attentive.         Behavior: Behavior is slowed.      Comments: Anhedonic           Assessment:       1. Chronic hypoxemic respiratory failure    2. Chronic heart failure with preserved ejection fraction    3. Benign essential hypertension    4. Paroxysmal atrial fibrillation    5. Multiple-type hyperlipidemia    6. Chronic anticoagulation    7. Normocytic anemia    8. Mixed stress and urge urinary incontinence    9. Gastroesophageal reflux disease without esophagitis    10. Chronic tension-type headache, not intractable    11. Compression fracture of L1 vertebra, sequela    12. Dysthymia           No visits with results within 3  Month(s) from this visit.   Latest known visit with results is:   Lab Visit on 03/09/2022   Component Date Value Ref Range Status    WBC 03/09/2022 7.29  3.90 - 12.70 K/uL Final    RBC 03/09/2022 4.50  4.00 - 5.40 M/uL Final    Hemoglobin 03/09/2022 14.0  12.0 - 16.0 g/dL Final    Hematocrit 03/09/2022 43.1  37.0 - 48.5 % Final    MCV 03/09/2022 96  82 - 98 fL Final    MCH 03/09/2022 31.1 (A) 27.0 - 31.0 pg Final    MCHC 03/09/2022 32.5  32.0 - 36.0 g/dL Final    RDW 03/09/2022 12.7  11.5 - 14.5 % Final    Platelets 03/09/2022 212  150 - 450 K/uL Final    MPV 03/09/2022 10.2  9.2 - 12.9 fL Final    Immature Granulocytes 03/09/2022 0.3  0.0 - 0.5 % Final    Gran # (ANC) 03/09/2022 4.5  1.8 - 7.7 K/uL Final    Immature Grans (Abs) 03/09/2022 0.02  0.00 - 0.04 K/uL Final    Lymph # 03/09/2022 2.2  1.0 - 4.8 K/uL Final    Mono # 03/09/2022 0.5  0.3 - 1.0 K/uL Final    Eos # 03/09/2022 0.1  0.0 - 0.5 K/uL Final    Baso # 03/09/2022 0.04  0.00 - 0.20 K/uL Final    nRBC 03/09/2022 0  0 /100 WBC Final    Gran % 03/09/2022 61.7  38.0 - 73.0 % Final    Lymph % 03/09/2022 29.8  18.0 - 48.0 % Final    Mono % 03/09/2022 6.6  4.0 - 15.0 % Final    Eosinophil % 03/09/2022 1.1  0.0 - 8.0 % Final    Basophil % 03/09/2022 0.5  0.0 - 1.9 % Final    Differential Method 03/09/2022 Automated   Final    Iron 03/09/2022 100  30 - 160 ug/dL Final    Transferrin 03/09/2022 290  200 - 375 mg/dL Final    TIBC 03/09/2022 406  250 - 450 ug/dL Final    Saturated Iron 03/09/2022 25  20 - 50 % Final    Ferritin 03/09/2022 33  20.0 - 300.0 ng/mL Final    Sodium 03/09/2022 139  136 - 145 mmol/L Final    Potassium 03/09/2022 4.6  3.5 - 5.1 mmol/L Final    Chloride 03/09/2022 100  95 - 110 mmol/L Final    CO2 03/09/2022 30 (A) 23 - 29 mmol/L Final    Glucose 03/09/2022 94  70 - 110 mg/dL Final    BUN 03/09/2022 16  8 - 23 mg/dL Final    Creatinine 03/09/2022 0.6  0.5 - 1.4 mg/dL Final    Calcium 03/09/2022 9.4  8.7  - 10.5 mg/dL Final    Total Protein 03/09/2022 7.1  6.0 - 8.4 g/dL Final    Albumin 03/09/2022 4.0  3.5 - 5.2 g/dL Final    Total Bilirubin 03/09/2022 0.7  0.1 - 1.0 mg/dL Final    Alkaline Phosphatase 03/09/2022 68  55 - 135 U/L Final    AST 03/09/2022 22  10 - 40 U/L Final    ALT 03/09/2022 20  10 - 44 U/L Final    Anion Gap 03/09/2022 9  8 - 16 mmol/L Final    eGFR if African American 03/09/2022 >60.0  >60 mL/min/1.73 m^2 Final    eGFR if non African American 03/09/2022 >60.0  >60 mL/min/1.73 m^2 Final    Sed Rate 03/09/2022 2  0 - 20 mm/Hr Final   Summary    · Concentric left ventricular hypertrophy.  · Normal left ventricular systolic function. The estimated ejection fraction is 65%.  · Normal LV diastolic function.  · Normal right ventricular systolic function.  · Mild left atrial enlargement.  · Mild aortic regurgitation.  · Mild mitral regurgitation.  · Mild tricuspid regurgitation.  · Mild pulmonic regurgitation.  · Normal central venous pressure (3 mmHg).  · The estimated PA systolic pressure is 36 mmHg.           Plan:           Chronic hypoxemic respiratory failure  Comments:  Stable with modest exercise tolerance to moving around and extremely minor chores.    Chronic heart failure with preserved ejection fraction  Comments:  Continues with valsartan hydrochlorothiazide .    Benign essential hypertension    Paroxysmal atrial fibrillation  Comments:  Currently on diltiazem and Xarelto as anticoagulation.    Multiple-type hyperlipidemia    Chronic anticoagulation  Comments:  Xarelto for a fibr.  Discussed injury and bleeding precautions.  Need to notify dental surgeon,dermatology  or any other specialist about anticoagulation    Normocytic anemia    Mixed stress and urge urinary incontinence    Gastroesophageal reflux disease without esophagitis  Comments:  Currently taking pantoprazole.  Consider magnesium supplements.    Chronic tension-type headache, not intractable    Compression fracture of  L1 vertebra, sequela  Comments:  Learning to live with pain and accepts it as a part of aging.  Recommend stretch exercises and fall precautions.    Dysthymia  Comments:  Currently stable on sertraline at 100 mg per day.  Main stressors with her health condition and also her 's decline.  Gradually accepting and coping.    Patient's multiple medical issues have been reviewed.    MOST IMPORTANT ISSUE TODAY SEEMS TO BE THE LEG PAINS BILATERAL WHICH IS MAKING HER LIFE UNBEARABLE WITH LACK OF SLEEP.    My differential diagnosis would be as follows:-    1.-chronic varicosities and pain secondary to venous insufficiency with some shin splints.  2.-rule out possibility of peripheral vascular disease though her peripheral pulses are moderately palpable and I need to see if she had any PVD study done.  This would not explain rest pain but mostly claudication pain.  3.-less likely blood clot with no prolonged immobilization recently.  This would cause a variable degree of pain in both the lower extremities but not to the same extent.  The pain seems to be reproducible on palpation over the shin splints and Homans sign is negative.    4.-referred pain from the spine.    Regardless, I will give her a small dose of tramadol 50 mg at nighttime to see if it helps her with sleep.  She can mix it with some Tylenol.  I would like to avoid NSAIDs.    I will checkup with Dr. Sargent if he had done any vascular studies in past.  Compression stockings might be helpful but this has not given her relief in past.  May consider orthopedic or vascular surgeon evaluation also.      STATIN HOLIDAY FOR 2 MONTHS AND SEE IF IT MAKES ANY DIFFERENCE.    /////////////////////////////////////////////////////////////////////////////    Respiratory failure is stable with p.r.n. need for oxygen.    Prior echocardiogram has been reviewed which had indicated acceptable diastolic function and normal systolic function.  LVH been noted.  She might have  some degree of heart failure with ejection fraction noted to be preserved.    Paroxysmal atrial fibrillation has been reviewed as she continues with anticoagulation with Xarelto.  Anticoagulation precautions have been discussed again.    Issues of stressors have been discussed which seems to be stable and 1 of the main issue seems to be a 's declining condition and her own medical problems and issues which prevent her from accomplishing things and chores that she would like to do.  However she has slowly and steadily come into acceptance of limitations.    Long term use of pantoprazole has been reviewed and consideration for magnesium supplements has been given.    Preventive care issues including tetanus vaccination, ophthalmology examination and COVID precautions have been discussed.  The jury is not out on the 2nd booster dose for COVID vaccine given the new variants currently prevalent.    Follow-up in 4 months.    Flu season starting in a few months and recommended to take the shots.    Spent sim 30 minutes with patient which involved review of pts medical conditions, labs, medications and with 50% of time face-to-face discussion about medical problems, management and any applicable changes.        Current Outpatient Medications:     atorvastatin (LIPITOR) 20 MG tablet, TAKE 1 TABLET (20 MG TOTAL) BY MOUTH ONCE DAILY., Disp: 90 tablet, Rfl: 1    diltiaZEM (CARDIZEM CD) 120 MG Cp24, Take 1 capsule (120 mg total) by mouth once daily., Disp: 90 capsule, Rfl: 1    pantoprazole (PROTONIX) 20 MG tablet, TAKE 1 TABLET (20 MG TOTAL) BY MOUTH ONCE DAILY., Disp: 90 tablet, Rfl: 1    polyethylene glycol (GLYCOLAX) 17 gram PwPk, Take 17 g by mouth once daily., Disp: 90 packet, Rfl: 3    sertraline (ZOLOFT) 100 MG tablet, Take 1 tablet (100 mg total) by mouth once daily., Disp: 90 tablet, Rfl: 3    valsartan-hydrochlorothiazide (DIOVAN-HCT) 320-12.5 mg per tablet, Take 1 tablet by mouth every morning., Disp: ,  Rfl:     XARELTO 20 mg Tab, , Disp: , Rfl:   July 26, 2022    Jeremy Sargent MD  [address] [facility] [address]     [facility]  [address] [facility]  [place] [address]  Phone: [phone]  Fax: [phone]   Patient: Lety Herbert   MR Number: [id number]   YOB: 1940   Date of Visit: 7/26/2022     Dear Dr. Sargent,     I had the opportunity to see this wonderful patient miss Lety Herbert today for her medical issues.    The new problem seems to be intractable pain in both the lower extremities localized around the shin bones bilaterally.  She was not sure how long this has been going on but I did read onef your progress notes in early 2021 addressing this issue.  You had performed an venous ultrasound and I believe the results were negative for any DVT.  She has extensive varicosities in both the lower extremities which may contribute to some of this discomfort.    I advised her to stop statins for a couple of months and see how she does.    I am wondering if you had done any peripheral vascular some study for [facility] in past?    Sincerely,      Shilo Perez MD    CC  No Recipients    Enclosure

## 2022-07-26 ENCOUNTER — OFFICE VISIT (OUTPATIENT)
Dept: FAMILY MEDICINE | Facility: CLINIC | Age: 82
End: 2022-07-26
Payer: MEDICARE

## 2022-07-26 VITALS
RESPIRATION RATE: 18 BRPM | HEART RATE: 62 BPM | SYSTOLIC BLOOD PRESSURE: 142 MMHG | BODY MASS INDEX: 32.95 KG/M2 | HEIGHT: 66 IN | WEIGHT: 205 LBS | DIASTOLIC BLOOD PRESSURE: 73 MMHG

## 2022-07-26 DIAGNOSIS — S32.010S COMPRESSION FRACTURE OF L1 VERTEBRA, SEQUELA: Chronic | ICD-10-CM

## 2022-07-26 DIAGNOSIS — D64.9 NORMOCYTIC ANEMIA: ICD-10-CM

## 2022-07-26 DIAGNOSIS — G44.229 CHRONIC TENSION-TYPE HEADACHE, NOT INTRACTABLE: ICD-10-CM

## 2022-07-26 DIAGNOSIS — E78.2 MULTIPLE-TYPE HYPERLIPIDEMIA: ICD-10-CM

## 2022-07-26 DIAGNOSIS — M25.562 ARTHRALGIA OF BOTH LOWER LEGS: ICD-10-CM

## 2022-07-26 DIAGNOSIS — I48.0 PAROXYSMAL ATRIAL FIBRILLATION: Chronic | ICD-10-CM

## 2022-07-26 DIAGNOSIS — I50.32 CHRONIC HEART FAILURE WITH PRESERVED EJECTION FRACTION: ICD-10-CM

## 2022-07-26 DIAGNOSIS — I10 BENIGN ESSENTIAL HYPERTENSION: ICD-10-CM

## 2022-07-26 DIAGNOSIS — N39.46 MIXED STRESS AND URGE URINARY INCONTINENCE: ICD-10-CM

## 2022-07-26 DIAGNOSIS — F34.1 DYSTHYMIA: Chronic | ICD-10-CM

## 2022-07-26 DIAGNOSIS — J96.11 CHRONIC HYPOXEMIC RESPIRATORY FAILURE: Primary | Chronic | ICD-10-CM

## 2022-07-26 DIAGNOSIS — K21.9 GASTROESOPHAGEAL REFLUX DISEASE WITHOUT ESOPHAGITIS: Chronic | ICD-10-CM

## 2022-07-26 DIAGNOSIS — M25.561 ARTHRALGIA OF BOTH LOWER LEGS: ICD-10-CM

## 2022-07-26 DIAGNOSIS — Z79.01 CHRONIC ANTICOAGULATION: Chronic | ICD-10-CM

## 2022-07-26 PROCEDURE — 3077F PR MOST RECENT SYSTOLIC BLOOD PRESSURE >= 140 MM HG: ICD-10-PCS | Mod: CPTII,S$GLB,, | Performed by: INTERNAL MEDICINE

## 2022-07-26 PROCEDURE — 99214 PR OFFICE/OUTPT VISIT, EST, LEVL IV, 30-39 MIN: ICD-10-PCS | Mod: S$GLB,,, | Performed by: INTERNAL MEDICINE

## 2022-07-26 PROCEDURE — 1101F PR PT FALLS ASSESS DOC 0-1 FALLS W/OUT INJ PAST YR: ICD-10-PCS | Mod: CPTII,S$GLB,, | Performed by: INTERNAL MEDICINE

## 2022-07-26 PROCEDURE — 1160F PR REVIEW ALL MEDS BY PRESCRIBER/CLIN PHARMACIST DOCUMENTED: ICD-10-PCS | Mod: CPTII,S$GLB,, | Performed by: INTERNAL MEDICINE

## 2022-07-26 PROCEDURE — 1159F MED LIST DOCD IN RCRD: CPT | Mod: CPTII,S$GLB,, | Performed by: INTERNAL MEDICINE

## 2022-07-26 PROCEDURE — 3288F PR FALLS RISK ASSESSMENT DOCUMENTED: ICD-10-PCS | Mod: CPTII,S$GLB,, | Performed by: INTERNAL MEDICINE

## 2022-07-26 PROCEDURE — 3288F FALL RISK ASSESSMENT DOCD: CPT | Mod: CPTII,S$GLB,, | Performed by: INTERNAL MEDICINE

## 2022-07-26 PROCEDURE — 1126F PR PAIN SEVERITY QUANTIFIED, NO PAIN PRESENT: ICD-10-PCS | Mod: CPTII,S$GLB,, | Performed by: INTERNAL MEDICINE

## 2022-07-26 PROCEDURE — 3077F SYST BP >= 140 MM HG: CPT | Mod: CPTII,S$GLB,, | Performed by: INTERNAL MEDICINE

## 2022-07-26 PROCEDURE — 3078F DIAST BP <80 MM HG: CPT | Mod: CPTII,S$GLB,, | Performed by: INTERNAL MEDICINE

## 2022-07-26 PROCEDURE — 1159F PR MEDICATION LIST DOCUMENTED IN MEDICAL RECORD: ICD-10-PCS | Mod: CPTII,S$GLB,, | Performed by: INTERNAL MEDICINE

## 2022-07-26 PROCEDURE — 1101F PT FALLS ASSESS-DOCD LE1/YR: CPT | Mod: CPTII,S$GLB,, | Performed by: INTERNAL MEDICINE

## 2022-07-26 PROCEDURE — 3078F PR MOST RECENT DIASTOLIC BLOOD PRESSURE < 80 MM HG: ICD-10-PCS | Mod: CPTII,S$GLB,, | Performed by: INTERNAL MEDICINE

## 2022-07-26 PROCEDURE — 1160F RVW MEDS BY RX/DR IN RCRD: CPT | Mod: CPTII,S$GLB,, | Performed by: INTERNAL MEDICINE

## 2022-07-26 PROCEDURE — 1126F AMNT PAIN NOTED NONE PRSNT: CPT | Mod: CPTII,S$GLB,, | Performed by: INTERNAL MEDICINE

## 2022-07-26 PROCEDURE — 99214 OFFICE O/P EST MOD 30 MIN: CPT | Mod: S$GLB,,, | Performed by: INTERNAL MEDICINE

## 2022-07-26 RX ORDER — TRAMADOL HYDROCHLORIDE 50 MG/1
50 TABLET ORAL
Qty: 30 TABLET | Refills: 0 | Status: SHIPPED | OUTPATIENT
Start: 2022-07-26 | End: 2022-09-14

## 2022-07-26 NOTE — LETTER
July 26, 2022    Jeremy Sargent MD  [address] [facility] [place] [address]         [facility]  [address] [place] [address]  [place] [address]  Phone: [phone]  Fax: [phone]   Patient: Lety Herbert   MR Number: [id number]   YOB: 1940   Date of Visit: 7/26/2022     Dear Dr. Sargent,     I had the opportunity to see this wonderful patient miss Lety Herbert today for her medical issues.    The new problem seems to be intractable pain in both the lower extremities localized around the shin bones bilaterally.  She was not sure how long this has been going on but I did read onef your progress notes in early 2021 addressing this issue.  You had performed an venous ultrasound and I believe the results were negative for any DVT.  She has extensive varicosities in both the lower extremities which may contribute to some of this discomfort.    I advised her to stop statins for a couple of months and see how she does.    I am wondering if you had done any peripheral vascular some study for [facility] R[facility]Y in past?    Sincerely,      Shilo Perez MD    CC  No Recipients    Enclosure

## 2022-09-09 ENCOUNTER — LAB VISIT (OUTPATIENT)
Dept: LAB | Facility: HOSPITAL | Age: 82
End: 2022-09-09
Attending: INTERNAL MEDICINE
Payer: MEDICARE

## 2022-09-09 DIAGNOSIS — I50.32 CHRONIC HEART FAILURE WITH PRESERVED EJECTION FRACTION: ICD-10-CM

## 2022-09-09 DIAGNOSIS — M25.562 ARTHRALGIA OF BOTH LOWER LEGS: ICD-10-CM

## 2022-09-09 DIAGNOSIS — E78.2 MULTIPLE-TYPE HYPERLIPIDEMIA: ICD-10-CM

## 2022-09-09 DIAGNOSIS — M25.561 ARTHRALGIA OF BOTH LOWER LEGS: ICD-10-CM

## 2022-09-09 DIAGNOSIS — J96.11 CHRONIC HYPOXEMIC RESPIRATORY FAILURE: Chronic | ICD-10-CM

## 2022-09-09 DIAGNOSIS — I48.0 PAROXYSMAL ATRIAL FIBRILLATION: Chronic | ICD-10-CM

## 2022-09-09 DIAGNOSIS — I10 BENIGN ESSENTIAL HYPERTENSION: ICD-10-CM

## 2022-09-09 LAB
ALBUMIN SERPL BCP-MCNC: 4.2 G/DL (ref 3.5–5.2)
ALBUMIN/CREAT UR: 22.6 UG/MG (ref 0–30)
ALP SERPL-CCNC: 71 U/L (ref 55–135)
ALT SERPL W/O P-5'-P-CCNC: 14 U/L (ref 10–44)
ANION GAP SERPL CALC-SCNC: 7 MMOL/L (ref 8–16)
AST SERPL-CCNC: 20 U/L (ref 10–40)
BASOPHILS # BLD AUTO: 0.07 K/UL (ref 0–0.2)
BASOPHILS NFR BLD: 1.1 % (ref 0–1.9)
BILIRUB SERPL-MCNC: 0.8 MG/DL (ref 0.1–1)
BNP SERPL-MCNC: 175 PG/ML (ref 0–99)
BUN SERPL-MCNC: 16 MG/DL (ref 8–23)
CALCIUM SERPL-MCNC: 9.4 MG/DL (ref 8.7–10.5)
CHLORIDE SERPL-SCNC: 103 MMOL/L (ref 95–110)
CHOLEST SERPL-MCNC: 231 MG/DL (ref 120–199)
CHOLEST/HDLC SERPL: 3.9 {RATIO} (ref 2–5)
CK SERPL-CCNC: 48 U/L (ref 20–180)
CO2 SERPL-SCNC: 32 MMOL/L (ref 23–29)
CREAT SERPL-MCNC: 0.6 MG/DL (ref 0.5–1.4)
CREAT UR-MCNC: 42 MG/DL (ref 15–325)
DIFFERENTIAL METHOD: ABNORMAL
EOSINOPHIL # BLD AUTO: 0.1 K/UL (ref 0–0.5)
EOSINOPHIL NFR BLD: 2.2 % (ref 0–8)
ERYTHROCYTE [DISTWIDTH] IN BLOOD BY AUTOMATED COUNT: 12.6 % (ref 11.5–14.5)
EST. GFR  (NO RACE VARIABLE): >60 ML/MIN/1.73 M^2
GLUCOSE SERPL-MCNC: 96 MG/DL (ref 70–110)
HCT VFR BLD AUTO: 41.8 % (ref 37–48.5)
HDLC SERPL-MCNC: 59 MG/DL (ref 40–75)
HDLC SERPL: 25.5 % (ref 20–50)
HGB BLD-MCNC: 13.3 G/DL (ref 12–16)
IMM GRANULOCYTES # BLD AUTO: 0.01 K/UL (ref 0–0.04)
IMM GRANULOCYTES NFR BLD AUTO: 0.2 % (ref 0–0.5)
LDLC SERPL CALC-MCNC: 154.8 MG/DL (ref 63–159)
LYMPHOCYTES # BLD AUTO: 2.1 K/UL (ref 1–4.8)
LYMPHOCYTES NFR BLD: 32.6 % (ref 18–48)
MAGNESIUM SERPL-MCNC: 2 MG/DL (ref 1.6–2.6)
MCH RBC QN AUTO: 30.5 PG (ref 27–31)
MCHC RBC AUTO-ENTMCNC: 31.8 G/DL (ref 32–36)
MCV RBC AUTO: 96 FL (ref 82–98)
MICROALBUMIN UR DL<=1MG/L-MCNC: 9.5 UG/ML
MONOCYTES # BLD AUTO: 0.5 K/UL (ref 0.3–1)
MONOCYTES NFR BLD: 8.1 % (ref 4–15)
NEUTROPHILS # BLD AUTO: 3.5 K/UL (ref 1.8–7.7)
NEUTROPHILS NFR BLD: 55.8 % (ref 38–73)
NONHDLC SERPL-MCNC: 172 MG/DL
NRBC BLD-RTO: 0 /100 WBC
PLATELET # BLD AUTO: 230 K/UL (ref 150–450)
PMV BLD AUTO: 10.4 FL (ref 9.2–12.9)
POTASSIUM SERPL-SCNC: 4.4 MMOL/L (ref 3.5–5.1)
PROT SERPL-MCNC: 8 G/DL (ref 6–8.4)
RBC # BLD AUTO: 4.36 M/UL (ref 4–5.4)
SODIUM SERPL-SCNC: 142 MMOL/L (ref 136–145)
TRIGL SERPL-MCNC: 86 MG/DL (ref 30–150)
TSH SERPL DL<=0.005 MIU/L-ACNC: 0.87 UIU/ML (ref 0.34–5.6)
WBC # BLD AUTO: 6.29 K/UL (ref 3.9–12.7)

## 2022-09-09 PROCEDURE — 82570 ASSAY OF URINE CREATININE: CPT | Performed by: INTERNAL MEDICINE

## 2022-09-09 PROCEDURE — 82550 ASSAY OF CK (CPK): CPT | Performed by: INTERNAL MEDICINE

## 2022-09-09 PROCEDURE — 84443 ASSAY THYROID STIM HORMONE: CPT | Performed by: INTERNAL MEDICINE

## 2022-09-09 PROCEDURE — 80061 LIPID PANEL: CPT | Performed by: INTERNAL MEDICINE

## 2022-09-09 PROCEDURE — 36415 COLL VENOUS BLD VENIPUNCTURE: CPT | Performed by: INTERNAL MEDICINE

## 2022-09-09 PROCEDURE — 85025 COMPLETE CBC W/AUTO DIFF WBC: CPT | Performed by: INTERNAL MEDICINE

## 2022-09-09 PROCEDURE — 83880 ASSAY OF NATRIURETIC PEPTIDE: CPT | Performed by: INTERNAL MEDICINE

## 2022-09-09 PROCEDURE — 82043 UR ALBUMIN QUANTITATIVE: CPT | Performed by: INTERNAL MEDICINE

## 2022-09-09 PROCEDURE — 83735 ASSAY OF MAGNESIUM: CPT | Performed by: INTERNAL MEDICINE

## 2022-09-09 PROCEDURE — 80053 COMPREHEN METABOLIC PANEL: CPT | Performed by: INTERNAL MEDICINE

## 2022-09-11 NOTE — PROGRESS NOTES
Subjective:       Patient ID: Lety Herbert is a 81 y.o. female.    Chief Complaint: Hypertension, Hyperlipidemia, Follow-up, Atrial Fibrillation, Gastroesophageal Reflux, Headache, CHF with Preserved EF, Bladder incontinence, and Anemia    Patient is a chronically ill  female comes for follow-up 4 month fup .      Besides her numerous issues , she has the following 3 main issues to discuss    1) I am seeming to forget everything and seem not to be focussed. I have no sense of direction  2) I am having trouble having sleep.  3 I need a referral to Skin specialist for Lesions in lip      Underlying medical issues are as below:-    1.         Chronic hypoxemic respiratory failure   2.         Chronic heart failure with preserved ejection fraction   3.         Multiple-type hyperlipidemia   4.         Benign essential hypertension   5.         Paroxysmal atrial fibrillation   6.         Normocytic anemia   7.         Mixed stress and urge urinary incontinence   8.         Gastroesophageal reflux disease without esophagitis   9.         Chronic tension-type headache, not intractable   10.       Compression fracture of L1 vertebra, sequela       Coming back to her presenting issues, she she and her daughter agree that there have been issues when she is not very focused in seems to be lost.  As per the daughter she does not seem to have any sense of direction and plan both psychological as well as physical.    She agrees that she is going through a lot of stress because of her 's medical and mental issues.  Plus her health is noted the best.  There is no history of trauma or injury.  These symptoms have been going on for the last 2 or so months.  Prior to that she was apparently sharp and astute.  No fever.  No history of COVID.      Sleep is somewhat patchy and does not feel rested.  She would like to have something to help her sleep.  We discussed various options including medications like  benzodiazepines and Ambien which would have long-term repercussions and dependency potential.  Those would be considered mostly on a palliative basis.  However palliation does not seem to be patient's goal at this point.    She also reports some lesions on the lip and I am not sure if it is representing some vitamin B12 deficiency or other reasons.  But would like to see a dermatologist.    ////////////////////////////////////////////////////////////////////////////////////////    Her exercise tolerance seems to be stable with her chronic hypoxemic respiratory failure and need for oxygen and also chronic heart failure with preserved ejection fraction.    She continues on anticoagulation with Xarelto for paroxysmal atrial fibrillation.    The biggest treatment last visit was improvement in the wrist pain after injection by physical medicine rehab doctor Marcello.    Chronic tension-type headaches have been reviewed which wax and wane.    Some degree of disorder with stress also has been noted on a chronic basis several years.  She has stable need for Zoloft at this point.  This apparently keeps her at even keel with various psychosocial issues in factors including her 's issues.    Her cardiologist is Dr. Sargent-    No recent fall.    Preventive care issues of ophthalmology examination, COVID precautions and need for tetanus vaccination been discussed.    Hypertension  This is a chronic problem. The current episode started more than 1 year ago. The problem is controlled. Associated symptoms include headaches, malaise/fatigue and peripheral edema. Pertinent negatives include no chest pain, palpitations or shortness of breath. Risk factors for coronary artery disease include sedentary lifestyle, obesity, dyslipidemia and post-menopausal state. Past treatments include beta blockers, angiotensin blockers and diuretics. The current treatment provides moderate improvement. Compliance problems include psychosocial issues.   There is no history of coarctation of the aorta, hyperaldosteronism, pheochromocytoma or renovascular disease.   Hyperlipidemia  This is a chronic problem. The current episode started more than 1 year ago. The problem is controlled. Exacerbating diseases include obesity. Associated symptoms include myalgias. Pertinent negatives include no chest pain or shortness of breath. Current antihyperlipidemic treatment includes statins. The current treatment provides moderate improvement of lipids. Compliance problems include psychosocial issues.  Risk factors for coronary artery disease include a sedentary lifestyle, hypertension, post-menopausal, obesity and dyslipidemia.   Sinus Problem  This is a chronic problem. The current episode started more than 1 year ago. The problem has been waxing and waning since onset. There has been no fever. Associated symptoms include headaches. Pertinent negatives include no chills, congestion, coughing or shortness of breath. Treatments tried: Ipratropium nasal spray. The treatment provided moderate relief.   Constipation  This is a chronic problem. The current episode started more than 1 year ago. The patient is not on a high fiber diet. She Does not exercise regularly. There has Not been adequate water intake. Associated symptoms include back pain. Pertinent negatives include no abdominal pain, diarrhea or difficulty urinating. The treatment provided moderate relief.   Gastroesophageal Reflux  She complains of heartburn. She reports no abdominal pain, no chest pain, no choking, no coughing or no wheezing. This is a chronic problem. The current episode started more than 1 year ago. The problem has been waxing and waning. Associated symptoms include fatigue. Risk factors include obesity and lack of exercise. She has tried a PPI for the symptoms. The treatment provided moderate relief.   Atrial Fibrillation  Presents for follow-up visit. Symptoms are negative for bradycardia, chest pain,  dizziness, palpitations and shortness of breath. The symptoms have been stable. Past medical history includes atrial fibrillation, CHF and hyperlipidemia.   Depression  Visit Type: follow-up  Patient presents with the following symptoms: anhedonia and depressed mood.  Patient is not experiencing: choking sensation, confusion, nervousness/anxiety, palpitations, shortness of breath, suicidal ideas, suicidal planning and weight gain.  Frequency of symptoms: occasionally    Patient has a history of: CHF    Headache   This is a chronic problem. The current episode started more than 1 year ago. The problem occurs constantly. The problem has been gradually worsening. The pain is located in the Occipital region. The pain radiates to the left neck and right neck. The pain quality is not similar to prior headaches. The quality of the pain is described as aching. The pain is moderate. Associated symptoms include back pain. Pertinent negatives include no abdominal pain, coughing, dizziness, eye pain or numbness. Nothing aggravates the symptoms. She has tried acetaminophen for the symptoms. Her past medical history is significant for obesity.   Congestive Heart Failure  Presents for follow-up visit. Associated symptoms include fatigue and unexpected weight change (GAINED 7 LB OF WEIGHT.). Pertinent negatives include no abdominal pain, chest pain, palpitations or shortness of breath. Compliance with total regimen is 26-50%. Compliance with diet is 26-50%. Compliance with exercise is 0-25%. Compliance with medications is 51-75%.   Anemia  Symptoms include malaise/fatigue. There has been no abdominal pain, bruising/bleeding easily, confusion, light-headedness, pallor or palpitations. Signs of blood loss that are not present include vaginal bleeding.     Past Medical History:   Diagnosis Date    Anemia     Basal cell carcinoma     nose     Cancer     breast    Depression     DVT (deep venous thrombosis)     Fall 3/20/2018    Gastric  ulceration     GERD (gastroesophageal reflux disease)     History of frequent urinary tract infections     Hyperlipidemia     Hypertension     Melanoma 2004?    left forearm    MRSA (methicillin resistant staph aureus) culture positive     Neuropathy     PE (pulmonary embolism)     Post-nasal drip 11/15/2018    Reflux      Social History     Socioeconomic History    Marital status:      Spouse name: Jean    Number of children: 3   Tobacco Use    Smoking status: Former    Smokeless tobacco: Never   Substance and Sexual Activity    Alcohol use: No    Drug use: No    Sexual activity: Not Currently     Partners: Male   Social History Narrative    3 Children- 9 GC, 7 GGrands     Social Determinants of Health     Financial Resource Strain: Medium Risk    Difficulty of Paying Living Expenses: Somewhat hard   Food Insecurity: No Food Insecurity    Worried About Running Out of Food in the Last Year: Never true    Ran Out of Food in the Last Year: Never true   Transportation Needs: No Transportation Needs    Lack of Transportation (Medical): No    Lack of Transportation (Non-Medical): No   Physical Activity: Inactive    Days of Exercise per Week: 0 days    Minutes of Exercise per Session: 0 min   Stress: Stress Concern Present    Feeling of Stress : To some extent   Social Connections: Moderately Integrated    Frequency of Communication with Friends and Family: Three times a week    Frequency of Social Gatherings with Friends and Family: Once a week    Attends Restorationist Services: More than 4 times per year    Active Member of Clubs or Organizations: No    Attends Club or Organization Meetings: Never    Marital Status:    Housing Stability: Low Risk     Unable to Pay for Housing in the Last Year: No    Number of Places Lived in the Last Year: 1    Unstable Housing in the Last Year: No     Past Surgical History:   Procedure Laterality Date    HYSTERECTOMY      MASTECTOMY, RADICAL Bilateral     TOTAL HIP  ARTHROPLASTY Left 11/13/2017     Family History   Problem Relation Age of Onset    Cancer Mother     Cancer Father     Heart disease Father     Melanoma Neg Hx     Psoriasis Neg Hx     Lupus Neg Hx     Eczema Neg Hx        Review of Systems   Constitutional:  Positive for fatigue, malaise/fatigue and unexpected weight change (GAINED 7 LB OF WEIGHT.). Negative for activity change, appetite change, chills and weight gain.        She has lost 20 lb of weight.  Patient is overall not feeling well.  This should not be the case.  Generally when somebody loses weight by watching diet they really feel well.  Patient on the contrary feels more sick and tired and headaches.   HENT:  Negative for congestion.    Eyes:  Negative for pain, discharge and visual disturbance.   Respiratory:  Negative for cough, choking, chest tightness, shortness of breath and wheezing.    Cardiovascular:  Negative for chest pain, palpitations and leg swelling.        HTN   Gastrointestinal:  Positive for heartburn. Negative for abdominal distention, abdominal pain, anal bleeding, constipation and diarrhea.        Constipation seems to be stable with Amitiza 24 mcg capsule.   Endocrine: Positive for polyuria. Negative for cold intolerance and polydipsia.   Genitourinary:  Positive for enuresis. Negative for difficulty urinating and vaginal bleeding.        Incontinence symptoms   Musculoskeletal:  Positive for arthralgias, back pain and myalgias. Negative for joint swelling.        Left hip fracture S/P Arthroplasty NoV 2017  Fall in October/November 2019.  L1 compression fracture status post vertebroplasty.  Did fairly well with radial styloid injections with both the hands by Dr. Leonides Armendariz.  Very thrilled with the relief.    LOWER EXTREMITY PAIN AND DURATION UNKNOWN.   Skin:  Negative for color change, pallor, rash and wound.        Complains of somewhat brittle nails.   Allergic/Immunologic: Negative for environmental allergies, food allergies and  "immunocompromised state.   Neurological:  Positive for headaches. Negative for dizziness, light-headedness and numbness.   Hematological:  Does not bruise/bleed easily.   Psychiatric/Behavioral:  Positive for depression. Negative for confusion and suicidal ideas. The patient is not nervous/anxious.         Patient has a history of depression. Stable on sertraline. She however denies that she is depressed.       Objective:      Blood pressure 133/79, pulse 74, height 5' 6" (1.676 m), weight 96.6 kg (213 lb). Body mass index is 34.38 kg/m².  Physical Exam  Vitals and nursing note reviewed.   Constitutional:       General: She is not in acute distress.     Appearance: She is well-developed. She is obese. She is ill-appearing. She is not toxic-appearing or diaphoretic.      Comments: BMI is 34.38   HENT:      Head: Normocephalic and atraumatic.      Mouth/Throat:      Lips: Lesions present.        Comments: A few tiny lesions of denuded mucus linings have been noted.  Neck:      Thyroid: No thyromegaly.      Vascular: No JVD.      Trachea: Trachea normal. No tracheal deviation.   Cardiovascular:      Rate and Rhythm: Normal rate and regular rhythm.      Heart sounds: Normal heart sounds, S1 normal and S2 normal.     No friction rub. No gallop.      Comments: Varicosities in legs-significant varicosities.  Pulse appears to be regular.  Pulmonary:      Breath sounds: Normal breath sounds. No stridor.   Abdominal:      General: There is no distension.      Palpations: Abdomen is soft. Abdomen is not rigid.      Tenderness: There is no abdominal tenderness.      Comments: Patient is obese.   Musculoskeletal:      Right lower leg: Edema present.      Left lower leg: Edema present.   Lymphadenopathy:      Cervical: No cervical adenopathy.   Skin:     General: Skin is warm and dry.      Coloration: Skin is not pale.      Findings: No rash.      Comments: Varicose veins are noted in the inferior extremities.   Neurological:     "  Mental Status: She is alert. Mental status is at baseline.      Motor: No abnormal muscle tone.      Gait: Gait abnormal.      Comments: Was able to recall today's date, month and year.  Was able to recall 4/6 presidents and 2/6 with cues.  Was able to recall 3/3 objects after distraction and before check out.   Psychiatric:         Attention and Perception: She is attentive.         Behavior: Behavior is slowed.         Cognition and Memory: Memory is impaired.      Comments: Anhedonic         Assessment:       1. Mild memory disturbance    2. Other insomnia    3. Lesion of lip    4. Chronic heart failure with preserved ejection fraction    5. Benign essential hypertension    6. Paroxysmal atrial fibrillation    7. Multiple-type hyperlipidemia    8. Chronic anticoagulation    9. Normocytic anemia    10. Mixed stress and urge urinary incontinence    11. Gastroesophageal reflux disease without esophagitis           Lab Visit on 09/09/2022   Component Date Value Ref Range Status    Sodium 09/09/2022 142  136 - 145 mmol/L Final    Potassium 09/09/2022 4.4  3.5 - 5.1 mmol/L Final    Chloride 09/09/2022 103  95 - 110 mmol/L Final    CO2 09/09/2022 32 (H)  23 - 29 mmol/L Final    Glucose 09/09/2022 96  70 - 110 mg/dL Final    BUN 09/09/2022 16  8 - 23 mg/dL Final    Creatinine 09/09/2022 0.6  0.5 - 1.4 mg/dL Final    Calcium 09/09/2022 9.4  8.7 - 10.5 mg/dL Final    Total Protein 09/09/2022 8.0  6.0 - 8.4 g/dL Final    Albumin 09/09/2022 4.2  3.5 - 5.2 g/dL Final    Total Bilirubin 09/09/2022 0.8  0.1 - 1.0 mg/dL Final    Alkaline Phosphatase 09/09/2022 71  55 - 135 U/L Final    AST 09/09/2022 20  10 - 40 U/L Final    ALT 09/09/2022 14  10 - 44 U/L Final    Anion Gap 09/09/2022 7 (L)  8 - 16 mmol/L Final    eGFR 09/09/2022 >60.0  >60 mL/min/1.73 m^2 Final    WBC 09/09/2022 6.29  3.90 - 12.70 K/uL Final    RBC 09/09/2022 4.36  4.00 - 5.40 M/uL Final    Hemoglobin 09/09/2022 13.3  12.0 - 16.0 g/dL Final    Hematocrit  09/09/2022 41.8  37.0 - 48.5 % Final    MCV 09/09/2022 96  82 - 98 fL Final    MCH 09/09/2022 30.5  27.0 - 31.0 pg Final    MCHC 09/09/2022 31.8 (L)  32.0 - 36.0 g/dL Final    RDW 09/09/2022 12.6  11.5 - 14.5 % Final    Platelets 09/09/2022 230  150 - 450 K/uL Final    MPV 09/09/2022 10.4  9.2 - 12.9 fL Final    Immature Granulocytes 09/09/2022 0.2  0.0 - 0.5 % Final    Gran # (ANC) 09/09/2022 3.5  1.8 - 7.7 K/uL Final    Immature Grans (Abs) 09/09/2022 0.01  0.00 - 0.04 K/uL Final    Lymph # 09/09/2022 2.1  1.0 - 4.8 K/uL Final    Mono # 09/09/2022 0.5  0.3 - 1.0 K/uL Final    Eos # 09/09/2022 0.1  0.0 - 0.5 K/uL Final    Baso # 09/09/2022 0.07  0.00 - 0.20 K/uL Final    nRBC 09/09/2022 0  0 /100 WBC Final    Gran % 09/09/2022 55.8  38.0 - 73.0 % Final    Lymph % 09/09/2022 32.6  18.0 - 48.0 % Final    Mono % 09/09/2022 8.1  4.0 - 15.0 % Final    Eosinophil % 09/09/2022 2.2  0.0 - 8.0 % Final    Basophil % 09/09/2022 1.1  0.0 - 1.9 % Final    Differential Method 09/09/2022 Automated   Final    Microalbumin, Urine 09/09/2022 9.5  <19.9 ug/mL Final    Creatinine, Urine 09/09/2022 42.0  15.0 - 325.0 mg/dL Final    Microalb/Creat Ratio 09/09/2022 22.6  0.0 - 30.0 ug/mg Final    Cholesterol 09/09/2022 231 (H)  120 - 199 mg/dL Final    Triglycerides 09/09/2022 86  30 - 150 mg/dL Final    HDL 09/09/2022 59  40 - 75 mg/dL Final    LDL Cholesterol 09/09/2022 154.8  63.0 - 159.0 mg/dL Final    HDL/Cholesterol Ratio 09/09/2022 25.5  20.0 - 50.0 % Final    Total Cholesterol/HDL Ratio 09/09/2022 3.9  2.0 - 5.0 Final    Non-HDL Cholesterol 09/09/2022 172  mg/dL Final    Magnesium 09/09/2022 2.0  1.6 - 2.6 mg/dL Final    BNP 09/09/2022 175 (H)  0 - 99 pg/mL Final    TSH 09/09/2022 0.870  0.340 - 5.600 uIU/mL Final    CPK 09/09/2022 48  20 - 180 U/L Final         Plan:           Mild memory disturbance  Comments:  Able to recall 3 objects initially and after distraction. Did simple calculations. ( was nervous doing calculations)  Named 4/6 prior presidents.      Other insomnia  Comments:  C/O Insomnia leading to stress and fatigue. Cause multifactorial. Trial of Tazodone.  Orders:  -     traZODone (DESYREL) 50 MG tablet; Take 1 tablet (50 mg total) by mouth every evening.  Dispense: 30 tablet; Refill: 2    Lesion of lip  Comments:  Not sure if tongue bite or breakdown in mucus lining due to some auto immune condition  Orders:  -     Ambulatory referral/consult to Dermatology; Future; Expected date: 09/28/2022    Chronic heart failure with preserved ejection fraction    Benign essential hypertension    Paroxysmal atrial fibrillation    Multiple-type hyperlipidemia    Chronic anticoagulation    Normocytic anemia    Mixed stress and urge urinary incontinence    Gastroesophageal reflux disease without esophagitis    Patient's multiple medical issues have been reviewed.    1.-as far as potential of dementia is concerned, she could recall 4/6 presidents, could do simple calculations, could recall 3 objects immediately, intermediately and after a prolonged delay.  Her conversation was goal oriented and appropriate.  She was oriented to the day, month, time and year.  Based upon these simple questioning and her logical responses I do not think she has dementia.  She is probably overwhelmed with her social situation at home,  situation and her medical conditions.  Lack of sleep might be contributed.  Patient is delighted to hear this and seem to be very elated.  Her daughter validates my logic and reasoning.    2.-as far as insomnia is concerned, I will give a trial of trazodone 50 mg to be escalated gradually.  She has tried over-the-counter melatonin gummies and other assorted products.  I will try to avoid Ambien or Valiums.    3. See has some lesions on the lips and I am not sure if it represents some form of stomatitis, cheilitis and she requests a dermatology evaluation.  Thus far I have not diagnosed her with any autoimmune disorder or  Behcets syndrome.    Other medical conditions including chronic  heart failure with preserved ejection fraction has been reviewed.    Paroxysmal atrial fibrillation and chronic anticoagulation also has been noted.  Injury precautions have been discussed.      Chronic normocytic anemia has been noted.      She continues on Lipitor at 20 mg for her lipids.  No major changes indicated.      Continue with fall and injury precautions.      Long-term goals of life and her stress with the  situation also has been noted.  I feel she needs to break and I have encouraged her daughter to give her a vacation for about couple of weeks or so.  Other family members should step also to help them out and take care of father separately.    Patient's daughter will let me know how she is doing on trazodone for the 1st couple of weeks or so till we consider escalating the dosage.    Daytime nap or siesta is okay and this might be more rejuvenating.    PATIENT'S RECENT LABS HAVE BEEN REVIEWED AND THEY ARE IN ACCEPTABLE RANGE.  BNP SLIGHTLY ELEVATED WHICH IS CONSISTENT WITH HER KNOWN HEART ISSUES.  TSH LEVELS ARE WITHIN NORMAL RANGE    If memory issues continue, will consider further investigations are neurology investigations.      At this point I do not think there is any inflammatory disorder going on to merit further attention.    Follow-up in 3-4 months time or earlier as needed.      Spent sim 30 minutes with patient which involved review of pts medical conditions, labs, medications and with 50% of time face-to-face discussion about medical problems, management and any applicable changes.      Current Outpatient Medications:     atorvastatin (LIPITOR) 20 MG tablet, TAKE 1 TABLET (20 MG TOTAL) BY MOUTH ONCE DAILY., Disp: 90 tablet, Rfl: 1    diltiaZEM (CARDIZEM CD) 120 MG Cp24, Take 1 capsule (120 mg total) by mouth once daily., Disp: 90 capsule, Rfl: 1    montelukast (SINGULAIR) 10 mg tablet, Take 10 mg by mouth every  evening., Disp: , Rfl:     pantoprazole (PROTONIX) 20 MG tablet, TAKE 1 TABLET (20 MG TOTAL) BY MOUTH ONCE DAILY., Disp: 90 tablet, Rfl: 1    polyethylene glycol (GLYCOLAX) 17 gram PwPk, Take 17 g by mouth once daily., Disp: 90 packet, Rfl: 3    sertraline (ZOLOFT) 100 MG tablet, Take 1 tablet (100 mg total) by mouth once daily., Disp: 90 tablet, Rfl: 3    valsartan-hydrochlorothiazide (DIOVAN-HCT) 320-12.5 mg per tablet, Take 1 tablet by mouth every morning., Disp: , Rfl:     XARELTO 20 mg Tab, , Disp: , Rfl:     traZODone (DESYREL) 50 MG tablet, Take 1 tablet (50 mg total) by mouth every evening., Disp: 30 tablet, Rfl: 2

## 2022-09-12 ENCOUNTER — TELEPHONE (OUTPATIENT)
Dept: FAMILY MEDICINE | Facility: CLINIC | Age: 82
End: 2022-09-12

## 2022-09-12 NOTE — TELEPHONE ENCOUNTER
Notified patient    she went back on statin because that was not her leg issue  she has appt with ese in a few weeks  will keep her fup with [place]

## 2022-09-14 ENCOUNTER — OFFICE VISIT (OUTPATIENT)
Dept: FAMILY MEDICINE | Facility: CLINIC | Age: 82
End: 2022-09-14
Payer: MEDICARE

## 2022-09-14 VITALS
DIASTOLIC BLOOD PRESSURE: 79 MMHG | BODY MASS INDEX: 34.23 KG/M2 | HEIGHT: 66 IN | SYSTOLIC BLOOD PRESSURE: 133 MMHG | HEART RATE: 74 BPM | WEIGHT: 213 LBS

## 2022-09-14 DIAGNOSIS — I10 BENIGN ESSENTIAL HYPERTENSION: ICD-10-CM

## 2022-09-14 DIAGNOSIS — R41.3 MILD MEMORY DISTURBANCE: Primary | ICD-10-CM

## 2022-09-14 DIAGNOSIS — E78.2 MULTIPLE-TYPE HYPERLIPIDEMIA: Chronic | ICD-10-CM

## 2022-09-14 DIAGNOSIS — N39.46 MIXED STRESS AND URGE URINARY INCONTINENCE: ICD-10-CM

## 2022-09-14 DIAGNOSIS — K21.9 GASTROESOPHAGEAL REFLUX DISEASE WITHOUT ESOPHAGITIS: ICD-10-CM

## 2022-09-14 DIAGNOSIS — G47.09 OTHER INSOMNIA: ICD-10-CM

## 2022-09-14 DIAGNOSIS — I48.0 PAROXYSMAL ATRIAL FIBRILLATION: ICD-10-CM

## 2022-09-14 DIAGNOSIS — D64.9 NORMOCYTIC ANEMIA: ICD-10-CM

## 2022-09-14 DIAGNOSIS — Z79.01 CHRONIC ANTICOAGULATION: Chronic | ICD-10-CM

## 2022-09-14 DIAGNOSIS — I50.32 CHRONIC HEART FAILURE WITH PRESERVED EJECTION FRACTION: ICD-10-CM

## 2022-09-14 DIAGNOSIS — K13.0 LESION OF LIP: Chronic | ICD-10-CM

## 2022-09-14 PROCEDURE — 1101F PT FALLS ASSESS-DOCD LE1/YR: CPT | Mod: CPTII,S$GLB,, | Performed by: INTERNAL MEDICINE

## 2022-09-14 PROCEDURE — 99214 PR OFFICE/OUTPT VISIT, EST, LEVL IV, 30-39 MIN: ICD-10-PCS | Mod: S$GLB,,, | Performed by: INTERNAL MEDICINE

## 2022-09-14 PROCEDURE — 3075F SYST BP GE 130 - 139MM HG: CPT | Mod: CPTII,S$GLB,, | Performed by: INTERNAL MEDICINE

## 2022-09-14 PROCEDURE — 3075F PR MOST RECENT SYSTOLIC BLOOD PRESS GE 130-139MM HG: ICD-10-PCS | Mod: CPTII,S$GLB,, | Performed by: INTERNAL MEDICINE

## 2022-09-14 PROCEDURE — 1159F PR MEDICATION LIST DOCUMENTED IN MEDICAL RECORD: ICD-10-PCS | Mod: CPTII,S$GLB,, | Performed by: INTERNAL MEDICINE

## 2022-09-14 PROCEDURE — 1101F PR PT FALLS ASSESS DOC 0-1 FALLS W/OUT INJ PAST YR: ICD-10-PCS | Mod: CPTII,S$GLB,, | Performed by: INTERNAL MEDICINE

## 2022-09-14 PROCEDURE — 3078F DIAST BP <80 MM HG: CPT | Mod: CPTII,S$GLB,, | Performed by: INTERNAL MEDICINE

## 2022-09-14 PROCEDURE — 99214 OFFICE O/P EST MOD 30 MIN: CPT | Mod: S$GLB,,, | Performed by: INTERNAL MEDICINE

## 2022-09-14 PROCEDURE — 1126F PR PAIN SEVERITY QUANTIFIED, NO PAIN PRESENT: ICD-10-PCS | Mod: CPTII,S$GLB,, | Performed by: INTERNAL MEDICINE

## 2022-09-14 PROCEDURE — 3288F PR FALLS RISK ASSESSMENT DOCUMENTED: ICD-10-PCS | Mod: CPTII,S$GLB,, | Performed by: INTERNAL MEDICINE

## 2022-09-14 PROCEDURE — 1160F RVW MEDS BY RX/DR IN RCRD: CPT | Mod: CPTII,S$GLB,, | Performed by: INTERNAL MEDICINE

## 2022-09-14 PROCEDURE — 1160F PR REVIEW ALL MEDS BY PRESCRIBER/CLIN PHARMACIST DOCUMENTED: ICD-10-PCS | Mod: CPTII,S$GLB,, | Performed by: INTERNAL MEDICINE

## 2022-09-14 PROCEDURE — 1159F MED LIST DOCD IN RCRD: CPT | Mod: CPTII,S$GLB,, | Performed by: INTERNAL MEDICINE

## 2022-09-14 PROCEDURE — 3078F PR MOST RECENT DIASTOLIC BLOOD PRESSURE < 80 MM HG: ICD-10-PCS | Mod: CPTII,S$GLB,, | Performed by: INTERNAL MEDICINE

## 2022-09-14 PROCEDURE — 3288F FALL RISK ASSESSMENT DOCD: CPT | Mod: CPTII,S$GLB,, | Performed by: INTERNAL MEDICINE

## 2022-09-14 PROCEDURE — 1126F AMNT PAIN NOTED NONE PRSNT: CPT | Mod: CPTII,S$GLB,, | Performed by: INTERNAL MEDICINE

## 2022-09-14 RX ORDER — TRAZODONE HYDROCHLORIDE 50 MG/1
50 TABLET ORAL NIGHTLY
Qty: 30 TABLET | Refills: 2 | Status: SHIPPED | OUTPATIENT
Start: 2022-09-14 | End: 2023-09-06

## 2022-09-14 RX ORDER — MONTELUKAST SODIUM 10 MG/1
10 TABLET ORAL NIGHTLY
COMMUNITY
Start: 2022-05-09 | End: 2023-09-06

## 2022-09-15 ENCOUNTER — TELEPHONE (OUTPATIENT)
Dept: FAMILY MEDICINE | Facility: CLINIC | Age: 82
End: 2022-09-15
Payer: MEDICARE

## 2022-11-18 ENCOUNTER — HOSPITAL ENCOUNTER (EMERGENCY)
Facility: HOSPITAL | Age: 82
Discharge: HOME | End: 2022-11-19
Attending: STUDENT IN AN ORGANIZED HEALTH CARE EDUCATION/TRAINING PROGRAM
Payer: MEDICARE

## 2022-11-18 VITALS
HEART RATE: 65 BPM | BODY MASS INDEX: 33.75 KG/M2 | HEIGHT: 66 IN | RESPIRATION RATE: 14 BRPM | WEIGHT: 210 LBS | OXYGEN SATURATION: 98 % | DIASTOLIC BLOOD PRESSURE: 88 MMHG | SYSTOLIC BLOOD PRESSURE: 181 MMHG | TEMPERATURE: 98 F

## 2022-11-18 DIAGNOSIS — R07.9 CHEST PAIN: Primary | ICD-10-CM

## 2022-11-18 DIAGNOSIS — J44.9 CHRONIC OBSTRUCTIVE PULMONARY DISEASE, UNSPECIFIED COPD TYPE: ICD-10-CM

## 2022-11-18 DIAGNOSIS — R91.1 RIGHT UPPER LOBE PULMONARY NODULE: ICD-10-CM

## 2022-11-18 LAB
ALBUMIN SERPL BCP-MCNC: 4.1 G/DL (ref 3.5–5.2)
ALP SERPL-CCNC: 73 U/L (ref 55–135)
ALT SERPL W/O P-5'-P-CCNC: 15 U/L (ref 10–44)
ANION GAP SERPL CALC-SCNC: 7 MMOL/L (ref 8–16)
AST SERPL-CCNC: 25 U/L (ref 10–40)
BASOPHILS # BLD AUTO: 0.09 K/UL (ref 0–0.2)
BASOPHILS NFR BLD: 0.9 % (ref 0–1.9)
BILIRUB SERPL-MCNC: 0.7 MG/DL (ref 0.1–1)
BNP SERPL-MCNC: 171 PG/ML (ref 0–99)
BUN SERPL-MCNC: 19 MG/DL (ref 8–23)
CALCIUM SERPL-MCNC: 9.4 MG/DL (ref 8.7–10.5)
CHLORIDE SERPL-SCNC: 98 MMOL/L (ref 95–110)
CO2 SERPL-SCNC: 30 MMOL/L (ref 23–29)
CREAT SERPL-MCNC: 0.6 MG/DL (ref 0.5–1.4)
DIFFERENTIAL METHOD: ABNORMAL
EOSINOPHIL # BLD AUTO: 0.2 K/UL (ref 0–0.5)
EOSINOPHIL NFR BLD: 2 % (ref 0–8)
ERYTHROCYTE [DISTWIDTH] IN BLOOD BY AUTOMATED COUNT: 13 % (ref 11.5–14.5)
EST. GFR  (NO RACE VARIABLE): >60 ML/MIN/1.73 M^2
GLUCOSE SERPL-MCNC: 92 MG/DL (ref 70–110)
HCT VFR BLD AUTO: 42.2 % (ref 37–48.5)
HGB BLD-MCNC: 13.3 G/DL (ref 12–16)
IMM GRANULOCYTES # BLD AUTO: 0.03 K/UL (ref 0–0.04)
IMM GRANULOCYTES NFR BLD AUTO: 0.3 % (ref 0–0.5)
LYMPHOCYTES # BLD AUTO: 2.9 K/UL (ref 1–4.8)
LYMPHOCYTES NFR BLD: 30.5 % (ref 18–48)
MCH RBC QN AUTO: 29.2 PG (ref 27–31)
MCHC RBC AUTO-ENTMCNC: 31.5 G/DL (ref 32–36)
MCV RBC AUTO: 93 FL (ref 82–98)
MONOCYTES # BLD AUTO: 0.6 K/UL (ref 0.3–1)
MONOCYTES NFR BLD: 6.6 % (ref 4–15)
NEUTROPHILS # BLD AUTO: 5.7 K/UL (ref 1.8–7.7)
NEUTROPHILS NFR BLD: 59.7 % (ref 38–73)
NRBC BLD-RTO: 0 /100 WBC
PLATELET # BLD AUTO: 243 K/UL (ref 150–450)
PMV BLD AUTO: 11 FL (ref 9.2–12.9)
POTASSIUM SERPL-SCNC: 4.4 MMOL/L (ref 3.5–5.1)
PROT SERPL-MCNC: 7.7 G/DL (ref 6–8.4)
RBC # BLD AUTO: 4.55 M/UL (ref 4–5.4)
SODIUM SERPL-SCNC: 135 MMOL/L (ref 136–145)
TROPONIN I SERPL HS-MCNC: 4.1 PG/ML (ref 0–14.9)
TROPONIN I SERPL HS-MCNC: 5.2 PG/ML (ref 0–14.9)
WBC # BLD AUTO: 9.58 K/UL (ref 3.9–12.7)

## 2022-11-18 PROCEDURE — 93010 EKG 12-LEAD: ICD-10-PCS | Mod: ,,, | Performed by: SPECIALIST

## 2022-11-18 PROCEDURE — 83880 ASSAY OF NATRIURETIC PEPTIDE: CPT | Performed by: PHYSICIAN ASSISTANT

## 2022-11-18 PROCEDURE — 80053 COMPREHEN METABOLIC PANEL: CPT | Performed by: PHYSICIAN ASSISTANT

## 2022-11-18 PROCEDURE — 99285 EMERGENCY DEPT VISIT HI MDM: CPT | Mod: 25

## 2022-11-18 PROCEDURE — 85025 COMPLETE CBC W/AUTO DIFF WBC: CPT | Performed by: PHYSICIAN ASSISTANT

## 2022-11-18 PROCEDURE — 25000003 PHARM REV CODE 250: Performed by: STUDENT IN AN ORGANIZED HEALTH CARE EDUCATION/TRAINING PROGRAM

## 2022-11-18 PROCEDURE — 96375 TX/PRO/DX INJ NEW DRUG ADDON: CPT

## 2022-11-18 PROCEDURE — 25000003 PHARM REV CODE 250: Performed by: PHYSICIAN ASSISTANT

## 2022-11-18 PROCEDURE — 84484 ASSAY OF TROPONIN QUANT: CPT | Performed by: PHYSICIAN ASSISTANT

## 2022-11-18 PROCEDURE — 93010 ELECTROCARDIOGRAM REPORT: CPT | Mod: ,,, | Performed by: SPECIALIST

## 2022-11-18 PROCEDURE — 63600175 PHARM REV CODE 636 W HCPCS: Performed by: STUDENT IN AN ORGANIZED HEALTH CARE EDUCATION/TRAINING PROGRAM

## 2022-11-18 PROCEDURE — 96374 THER/PROPH/DIAG INJ IV PUSH: CPT

## 2022-11-18 PROCEDURE — 93005 ELECTROCARDIOGRAM TRACING: CPT | Performed by: SPECIALIST

## 2022-11-18 RX ORDER — FUROSEMIDE 10 MG/ML
40 INJECTION INTRAMUSCULAR; INTRAVENOUS
Status: COMPLETED | OUTPATIENT
Start: 2022-11-18 | End: 2022-11-18

## 2022-11-18 RX ORDER — ENALAPRILAT 1.25 MG/ML
2.5 INJECTION INTRAVENOUS
Status: COMPLETED | OUTPATIENT
Start: 2022-11-18 | End: 2022-11-18

## 2022-11-18 RX ORDER — MAG HYDROX/ALUMINUM HYD/SIMETH 200-200-20
30 SUSPENSION, ORAL (FINAL DOSE FORM) ORAL ONCE
Status: COMPLETED | OUTPATIENT
Start: 2022-11-18 | End: 2022-11-18

## 2022-11-18 RX ORDER — FAMOTIDINE 10 MG/ML
20 INJECTION INTRAVENOUS
Status: COMPLETED | OUTPATIENT
Start: 2022-11-18 | End: 2022-11-18

## 2022-11-18 RX ORDER — RIVAROXABAN 15 MG/1
15 TABLET, FILM COATED ORAL DAILY
COMMUNITY
Start: 2022-10-14 | End: 2022-12-28

## 2022-11-18 RX ORDER — AMOXICILLIN 500 MG/1
500 CAPSULE ORAL EVERY 8 HOURS
COMMUNITY
Start: 2022-11-03 | End: 2022-12-28

## 2022-11-18 RX ORDER — ASPIRIN 325 MG
325 TABLET ORAL
Status: COMPLETED | OUTPATIENT
Start: 2022-11-18 | End: 2022-11-18

## 2022-11-18 RX ORDER — AZELASTINE 1 MG/ML
1 SPRAY, METERED NASAL 2 TIMES DAILY
COMMUNITY
Start: 2022-11-03 | End: 2023-09-06

## 2022-11-18 RX ORDER — PROMETHAZINE HYDROCHLORIDE AND DEXTROMETHORPHAN HYDROBROMIDE 6.25; 15 MG/5ML; MG/5ML
5 SYRUP ORAL EVERY 6 HOURS PRN
COMMUNITY
Start: 2022-11-03 | End: 2023-03-01

## 2022-11-18 RX ORDER — BENZONATATE 100 MG/1
100 CAPSULE ORAL 3 TIMES DAILY
COMMUNITY
Start: 2022-11-03 | End: 2022-12-01

## 2022-11-18 RX ORDER — LIDOCAINE HYDROCHLORIDE 20 MG/ML
15 SOLUTION OROPHARYNGEAL ONCE
Status: COMPLETED | OUTPATIENT
Start: 2022-11-18 | End: 2022-11-18

## 2022-11-18 RX ADMIN — LIDOCAINE HYDROCHLORIDE 15 ML: 20 SOLUTION ORAL; TOPICAL at 10:11

## 2022-11-18 RX ADMIN — ENALAPRILAT 2.5 MG: 1.25 INJECTION INTRAVENOUS at 09:11

## 2022-11-18 RX ADMIN — FAMOTIDINE 20 MG: 10 INJECTION INTRAVENOUS at 09:11

## 2022-11-18 RX ADMIN — FUROSEMIDE 40 MG: 10 INJECTION, SOLUTION INTRAMUSCULAR; INTRAVENOUS at 09:11

## 2022-11-18 RX ADMIN — ALUMINA, MAGNESIA, AND SIMETHICONE ORAL SUSPENSION REGULAR STRENGTH 30 ML: 1200; 1200; 120 SUSPENSION ORAL at 10:11

## 2022-11-18 RX ADMIN — ASPIRIN 325 MG ORAL TABLET 325 MG: 325 PILL ORAL at 08:11

## 2022-11-19 PROCEDURE — 25500020 PHARM REV CODE 255: Performed by: STUDENT IN AN ORGANIZED HEALTH CARE EDUCATION/TRAINING PROGRAM

## 2022-11-19 RX ADMIN — IOHEXOL 100 ML: 350 INJECTION, SOLUTION INTRAVENOUS at 12:11

## 2022-11-19 NOTE — ED PROVIDER NOTES
Encounter Date: 11/18/2022       History     Chief Complaint   Patient presents with    Chest Pain     States started half hour      82-year-old female presents for acute onset left-sided, nonradiating chest pain which started about 30 minutes prior to arrival and is constant.  Nothing makes it better or worse.  She denies cough, fevers or chills.  She denies shortness of breath.  She is a history of hypertension, GERD, CHF preserved ejection fraction, AFib on chronic anticoagulation.     Review of patient's allergies indicates:   Allergen Reactions    Meperidine     Promethazine     Bactrim [sulfamethoxazole-trimethoprim] Rash     Past Medical History:   Diagnosis Date    Anemia     Basal cell carcinoma     nose     Cancer     breast    Depression     DVT (deep venous thrombosis)     Fall 3/20/2018    Gastric ulceration     GERD (gastroesophageal reflux disease)     History of frequent urinary tract infections     Hyperlipidemia     Hypertension     Melanoma 2004?    left forearm    MRSA (methicillin resistant staph aureus) culture positive     Neuropathy     PE (pulmonary embolism)     Post-nasal drip 11/15/2018    Reflux      Past Surgical History:   Procedure Laterality Date    HYSTERECTOMY      MASTECTOMY, RADICAL Bilateral     TOTAL HIP ARTHROPLASTY Left 11/13/2017     Family History   Problem Relation Age of Onset    Cancer Mother     Cancer Father     Heart disease Father     Melanoma Neg Hx     Psoriasis Neg Hx     Lupus Neg Hx     Eczema Neg Hx      Social History     Tobacco Use    Smoking status: Former    Smokeless tobacco: Never   Substance Use Topics    Alcohol use: No    Drug use: No     Review of Systems   Constitutional:  Negative for activity change, appetite change, chills, fever and unexpected weight change.   HENT:  Negative for dental problem and drooling.    Eyes:  Negative for discharge and itching.   Respiratory:  Negative for cough, chest tightness, shortness of breath, wheezing and  stridor.    Cardiovascular:  Positive for chest pain. Negative for palpitations and leg swelling.   Gastrointestinal:  Negative for abdominal distention, abdominal pain, diarrhea and nausea.   Genitourinary:  Negative for difficulty urinating, dysuria, frequency and urgency.   Musculoskeletal:  Negative for back pain, gait problem and joint swelling.   Neurological:  Negative for dizziness, syncope, numbness and headaches.   Psychiatric/Behavioral:  Negative for agitation, behavioral problems and confusion.      Physical Exam     Initial Vitals [11/18/22 1930]   BP Pulse Resp Temp SpO2   (!) 193/81 86 18 98.4 °F (36.9 °C) (!) 94 %      MAP       --         Physical Exam    Nursing note and vitals reviewed.  Constitutional: She appears well-developed and well-nourished. She is not diaphoretic.   HENT:   Head: Normocephalic and atraumatic.   Mouth/Throat: Oropharynx is clear and moist.   Eyes: EOM are normal. Pupils are equal, round, and reactive to light. Right eye exhibits no discharge. Left eye exhibits no discharge.   Neck: No tracheal deviation present.   Normal range of motion.  Cardiovascular:  Normal rate, regular rhythm and intact distal pulses.           Pulmonary/Chest: No respiratory distress. She has no wheezes. She exhibits no tenderness.   Patient requires her home 2L nasal cannula to maintain oxygenation of 92%.   Abdominal: Abdomen is soft. She exhibits no distension. There is no abdominal tenderness.   Musculoskeletal:         General: No tenderness or edema. Normal range of motion.      Cervical back: Normal range of motion.     Neurological: She is alert and oriented to person, place, and time. She has normal strength. No cranial nerve deficit or sensory deficit. GCS eye subscore is 4. GCS verbal subscore is 5. GCS motor subscore is 6.   Skin: Skin is warm and dry. No rash noted.   Psychiatric: She has a normal mood and affect. Her behavior is normal. Thought content normal.       ED Course    Procedures  Labs Reviewed   CBC W/ AUTO DIFFERENTIAL - Abnormal; Notable for the following components:       Result Value    MCHC 31.5 (*)     All other components within normal limits   COMPREHENSIVE METABOLIC PANEL - Abnormal; Notable for the following components:    Sodium 135 (*)     CO2 30 (*)     Anion Gap 7 (*)     All other components within normal limits   B-TYPE NATRIURETIC PEPTIDE - Abnormal; Notable for the following components:     (*)     All other components within normal limits   TROPONIN I HIGH SENSITIVITY   TROPONIN I HIGH SENSITIVITY     EKG Readings: (Independently Interpreted)   EKG with regular rate, regular rhythm, normal axis, no acute ST elevations or depressions, normal [place], QRS and QT interval. Interpreted by me.       Imaging Results              CTA Chest Non-Coronary (PE Studies) (Edited Result - FINAL)  Result time 11/19/22 01:01:14      Edited Result - FINAL by Tammie Peng DO (11/19/22 01:01:14)                   Narrative:         ADDENDUM #1       The presence of right upper lobe lung nodule needing further evaluation or follow-up has been discussed with Dr. Rick Salgado 11/19/2022 12:50 AM CST.    Electronically signed by:  Sharlene Du MD  11/19/2022 1:01 AM CST Workstation: [id number]     ORIGINAL REPORT       EXAM:  CT Angiography Chest With Intravenous Contrast    CLINICAL HISTORY:  Pulmonary embolism (PE) suspected, unknown D-dimer. Chest pain.    TECHNIQUE:  Axial, coronal, and sagittal computed tomographic angiography images of the chest with intravenous contrast.  This CT exam was performed using one or more of the following dose reduction techniques:  automated exposure control, adjustment of the mA and/or kV according to patient size, and/or use of iterative reconstruction technique.  MIP reconstructed images were created and reviewed.    COMPARISON:  1/25/2019    FINDINGS:  Pulmonary arteries:  No pulmonary arterial filling defects.  Aorta:  No  acute findings.  No thoracic aortic aneurysm.  Lungs:  No focal consolidation. Mild interstitial septal thickening and subpleural reticulation, increased. Mild centrilobular emphysema.  Nodule in the anterior right upper lobe measuring 2.1 cm in diameter, series 4 image 101, new compared to previous.  Pleural space:  Unremarkable.  No significant effusion.  No pneumothorax.  Heart:  Enlarged heart. Coronary calcification. No significant pericardial effusion.  Bones/joints:  No acute skeletal abnormality. Degenerative changes.  Radiopaque cement within L1.  Soft tissues:  Unremarkable.  Lymph nodes: No definite pathologically enlarged lymph nodes. Small mediastinal and hilar lymph nodes noted.    IMPRESSION:  1.  No pulmonary embolus detected.  2.  Interstitial abnormality, suspect mild edema superimposed upon chronic changes.  3.  Right upper lobe 2.1 cm nodule. Consider a non-contrast Chest CT at 3 months, a PET/CT, or tissue sampling. These guidelines do not apply to immunocompromised patients and patients with cancer. Follow up in patients with significant comorbidities as clinically warranted. For lung cancer screening, adhere to Lung-RADS guidelines. Reference: Radiology. 2017; 284(1):228-43.    Electronically signed by:  Sharlene Du MD  11/19/2022 12:46 AM CST Workstation: [id number]                      Final result by Tammie Peng DO (11/19/22 00:46:30)                   Narrative:    EXAM:  CT Angiography Chest With Intravenous Contrast    CLINICAL HISTORY:  Pulmonary embolism (PE) suspected, unknown D-dimer. Chest pain.    TECHNIQUE:  Axial, coronal, and sagittal computed tomographic angiography images of the chest with intravenous contrast.  This CT exam was performed using one or more of the following dose reduction techniques:  automated exposure control, adjustment of the mA and/or kV according to patient size, and/or use of iterative reconstruction technique.  MIP reconstructed images were  created and reviewed.    COMPARISON:  1/25/2019    FINDINGS:  Pulmonary arteries:  No pulmonary arterial filling defects.  Aorta:  No acute findings.  No thoracic aortic aneurysm.  Lungs:  No focal consolidation. Mild interstitial septal thickening and subpleural reticulation, increased. Mild centrilobular emphysema.  Nodule in the anterior right upper lobe measuring 2.1 cm in diameter, series 4 image 101, new compared to previous.  Pleural space:  Unremarkable.  No significant effusion.  No pneumothorax.  Heart:  Enlarged heart. Coronary calcification. No significant pericardial effusion.  Bones/joints:  No acute skeletal abnormality. Degenerative changes.  Radiopaque cement within L1.  Soft tissues:  Unremarkable.  Lymph nodes: No definite pathologically enlarged lymph nodes. Small mediastinal and hilar lymph nodes noted.    IMPRESSION:  1.  No pulmonary embolus detected.  2.  Interstitial abnormality, suspect mild edema superimposed upon chronic changes.  3.  Right upper lobe 2.1 cm nodule. Consider a non-contrast Chest CT at 3 months, a PET/CT, or tissue sampling. These guidelines do not apply to immunocompromised patients and patients with cancer. Follow up in patients with significant comorbidities as clinically warranted. For lung cancer screening, adhere to Lung-RADS guidelines. Reference: Radiology. 2017; 284(1):228-43.    Electronically signed by:  Sharlene Du MD  11/19/2022 12:46 AM CST Workstation: [id number]                                     X-Ray Chest AP Portable (Final result)  Result time 11/19/22 06:59:16      Final result by Moisés Martinez Jr., MD (11/19/22 06:59:16)                   Narrative:    HISTORY: Chest Pain    COMPARISON:6/10/2020    FINDINGS: Lungs remain slightly hyperinflated with evidence stable and slightly prominent perihilar and lower lobe interstitial markings with minimal reticulonodular background that is basilar predominant without definable change upon  comparison. No evidence of confluent infiltrate, pleural effusion, atelectasis, or mass lesion. Pulmonary vascularity is not engorged. The heart is at the upper edge of normal. The thoracic aorta is tortuous as a manifestation of systemic hypertension. Chronic degenerative spondylosis changes of the thoracic spine. Upper lumbar spine has undergone previous kyphoplasty.    IMPRESSION:  1. Stable appearance the chest as compared to previous diffuse perihilar and lower lobe pulmonary scarring.  2. No evidence of adverse change upon comparison.    Electronically signed by:  Moisés Martinez MD  11/19/2022 6:59 AM CST Workstation: [id number]                                     Medications   aspirin tablet 325 mg (325 mg Oral Given 11/18/22 2003)   famotidine (PF) injection 20 mg (20 mg Intravenous Given 11/18/22 2115)   aluminum-magnesium hydroxide-simethicone 200-200-20 mg/5 mL suspension 30 mL (30 mLs Oral Given 11/18/22 2215)     And   LIDOcaine HCl 2% oral solution 15 mL (15 mLs Oral Given 11/18/22 2215)   furosemide injection 40 mg (40 mg Intravenous Given 11/18/22 2115)   enalaprilat injection 2.5 mg (2.5 mg Intravenous Given 11/18/22 2115)   iohexoL (OMNIPAQUE 350) injection 100 mL (100 mLs Intravenous Given 11/19/22 0006)     Medical Decision Making:   Initial Assessment:   82-year-old female history of hypertension and hyperlipidemia presents for left-sided chest pain, heavy in nature.  Vitals notable for hypertension, otherwise within normal limits.  Exam notable for well-appearing female no acute distress.  Additional MDM:   Heart Score:    History:          Moderately suspicious.  ECG:             Normal  Age:               >65 years  Risk factors: 1-2 risk factors  Troponin:       Less than or equal to normal limit  Final Score: 4          ED Course as of 11/19/22 1255   Fri Nov 18, 2022   2100 Troponin I High Sensitivity #1 [BS]   2100 CBC auto differential(!) [BS]   2100 Comprehensive metabolic panel(!)  [BS]   2108 B-Type natriuretic peptide (BNP)(!) [BS]   2108 Comprehensive metabolic panel(!) [BS]   2108 CBC auto differential(!) [BS]      ED Course User Index  [BS] Rick Salgado MD               82 year old patient with hx of HTN, CHF presents secondary to chest pain. Vitals notable for hypertension to 181 systolic and hypoxia, patient on 2L NC at home for COPD. Patient well appearing and non-toxic on my exam.     Chest pain less likely to be ACS given EKG without acute ischemic changes, two normal troponin. Heart score is 4. Advise close outpatient follow-up. , mildly elevated, patient diuresed with good urine output. CTPE w/ evidence of interstitial pulmonary edema, likely cardiac in origin, possible cause of her chest pain. BP treated with enalapril w/ adequate response. Chest xray without infiltrate, patient has no fever and no cough, lessening concern for pneumonia. Breath sounds equal, respirations unlabored, CXR w/o evidence of pneumothorax. Patient well appearing with normal vital signs, no muffled heart tones, doubt tamponade. No emesis, dyspaghia, no mediastinal air on CXR, doubt esophageal rupture. Doubt pulmonary embolus, given no PE on CT_PE study. Doubt aortic dissection given patient is well appearing with equal pulses bilaterally, no abdominal pain, no peripheral pulse deficit, no new neurological deficit and no tearing chest pain to the back. Chest pain likely due to mild volume overload, but symptoms have resolved and she is in no respiratory distress. PE study also notable for 2.1 cm right lung nodule, patient advised to follow-up with PCP.Vitals improved after diuresis and BP control. She is feeling well and wants to go home. Stable for discharge.    Return precautions given for new or worsening pain, exertional chest pain, worsening shortness of breath, or other new or worsening symptoms, patient understands and agrees with plan. All questions answered.  Instructed to follow up  with her cardiologist and return if symptoms worsen.     Clinical Impression:   Final diagnoses:  [R07.9] Chest pain (Primary)  [R91.1] Right upper lobe pulmonary nodule  [J44.9] Chronic obstructive pulmonary disease, unspecified COPD type        ED Disposition Condition    Discharge Stable          ED Prescriptions    None       Follow-up Information       Follow up With Specialties Details Why Contact Info    Shilo Perez MD Internal Medicine Call in 3 days To discuss right upper lobe lung nodule measuring 2.1 cm [address] [facility]  [address] [place] [id number]  [phone]               Rick Salgado MD  11/19/22 4530

## 2022-11-19 NOTE — DISCHARGE INSTRUCTIONS

## 2022-11-19 NOTE — FIRST PROVIDER EVALUATION
Emergency Department TeleTriage Encounter Note      CHIEF COMPLAINT    Chief Complaint   Patient presents with    Chest Pain     States started half hour        VITAL SIGNS   Initial Vitals [11/18/22 1930]   BP Pulse Resp Temp SpO2   (!) 193/81 86 18 98.4 °F (36.9 °C) (!) 94 %      MAP       --            ALLERGIES    Review of patient's allergies indicates:   Allergen Reactions    Meperidine     Promethazine     Bactrim [sulfamethoxazole-trimethoprim] Rash       PROVIDER TRIAGE NOTE  This is a teletriage evaluation of a 82 y.o. female presenting to the ED complaining of chest pain. Patient reports chest pain started approximately 1.5 hours ago. Pain has been constant. She took mylanta without relief. She denies shortness of breath.     Initial orders will be placed and care will be transferred to an alternate provider when patient is roomed for a full evaluation. Any additional orders and the final disposition will be determined by that provider.         ORDERS  Labs Reviewed   CBC W/ AUTO DIFFERENTIAL   COMPREHENSIVE METABOLIC PANEL   TROPONIN I HIGH SENSITIVITY   TROPONIN I HIGH SENSITIVITY   B-TYPE NATRIURETIC PEPTIDE       ED Orders (720h ago, onward)      Start Ordered     Status Ordering Provider    11/18/22 2136 11/18/22 1936  Troponin I High Sensitivity #2  Once Timed         Ordered GURVINDER, SAVANNAH G.    11/18/22 1945 11/18/22 1936  aspirin tablet 325 mg  ED 1 Time         Ordered GURVINDER, SAVANNAH G.    11/18/22 1936 11/18/22 1936  Vital signs  Every 15 min         Ordered GURVINDER, SAVANNAH G.    11/18/22 1936 11/18/22 1936  Cardiac Monitoring - Adult  Continuous        Comments: Notify Physician If:    Ordered GURVINDER, SAVANNAH G.    11/18/22 1936 11/18/22 1936  Pulse Oximetry Continuous  Continuous         Ordered GURVINDER, SAVANNAH G.    11/18/22 1936 11/18/22 1936  Diet NPO  Diet effective now         Ordered GURVINDER, SAVANNAH G.    11/18/22 1936 11/18/22 1936  Saline lock IV  Once         Ordered GURVINDER, SAVANNAH G.    11/18/22  1936 11/18/22 1936  EKG 12-lead  Once        Comments: Do not perform if previously done during this visit/ triage    Ordered SAVANNAH HOLLINS.    11/18/22 1936 11/18/22 1936  CBC auto differential  STAT         Ordered SAVANNAH HOLLINS.    11/18/22 1936 11/18/22 1936  Comprehensive metabolic panel  STAT         Ordered SAVANNAH HOLLINS.    11/18/22 1936 11/18/22 1936  Troponin I High Sensitivity #1  STAT         Ordered SAVANNAH HOLLINS.    11/18/22 1936 11/18/22 1936  B-Type natriuretic peptide (BNP)  STAT         Ordered SAVANNAH HOLLINS.    11/18/22 1936 11/18/22 1936  X-Ray Chest AP Portable  1 time imaging         Ordered SAVANNAH HOLLINSNorma              Virtual Visit Note: The provider triage portion of this emergency department evaluation and documentation was performed via [employer], a HIPAA-compliant telemedicine application, in concert with a tele-presenter in the room. A face to face patient evaluation with one of my colleagues will occur once the patient is placed in an emergency department room.      DISCLAIMER: This note was prepared with [employer] voice recognition transcription software. Garbled syntax, mangled pronouns, and other bizarre constructions may be attributed to that software system.

## 2022-12-01 ENCOUNTER — OFFICE VISIT (OUTPATIENT)
Dept: FAMILY MEDICINE | Facility: CLINIC | Age: 82
End: 2022-12-01
Payer: MEDICARE

## 2022-12-01 VITALS
RESPIRATION RATE: 18 BRPM | HEART RATE: 87 BPM | TEMPERATURE: 98 F | DIASTOLIC BLOOD PRESSURE: 70 MMHG | SYSTOLIC BLOOD PRESSURE: 123 MMHG | OXYGEN SATURATION: 94 % | HEIGHT: 66 IN | BODY MASS INDEX: 34.04 KG/M2 | WEIGHT: 211.81 LBS

## 2022-12-01 DIAGNOSIS — J30.2 SEASONAL ALLERGIES: ICD-10-CM

## 2022-12-01 DIAGNOSIS — R91.1 SOLITARY PULMONARY NODULE: Primary | ICD-10-CM

## 2022-12-01 PROCEDURE — 1101F PR PT FALLS ASSESS DOC 0-1 FALLS W/OUT INJ PAST YR: ICD-10-PCS | Mod: CPTII,S$GLB,, | Performed by: NURSE PRACTITIONER

## 2022-12-01 PROCEDURE — 3078F DIAST BP <80 MM HG: CPT | Mod: CPTII,S$GLB,, | Performed by: NURSE PRACTITIONER

## 2022-12-01 PROCEDURE — 3288F PR FALLS RISK ASSESSMENT DOCUMENTED: ICD-10-PCS | Mod: CPTII,S$GLB,, | Performed by: NURSE PRACTITIONER

## 2022-12-01 PROCEDURE — 1159F PR MEDICATION LIST DOCUMENTED IN MEDICAL RECORD: ICD-10-PCS | Mod: CPTII,S$GLB,, | Performed by: NURSE PRACTITIONER

## 2022-12-01 PROCEDURE — 99213 OFFICE O/P EST LOW 20 MIN: CPT | Mod: S$GLB,,, | Performed by: NURSE PRACTITIONER

## 2022-12-01 PROCEDURE — 3074F PR MOST RECENT SYSTOLIC BLOOD PRESSURE < 130 MM HG: ICD-10-PCS | Mod: CPTII,S$GLB,, | Performed by: NURSE PRACTITIONER

## 2022-12-01 PROCEDURE — 3074F SYST BP LT 130 MM HG: CPT | Mod: CPTII,S$GLB,, | Performed by: NURSE PRACTITIONER

## 2022-12-01 PROCEDURE — 1159F MED LIST DOCD IN RCRD: CPT | Mod: CPTII,S$GLB,, | Performed by: NURSE PRACTITIONER

## 2022-12-01 PROCEDURE — 3078F PR MOST RECENT DIASTOLIC BLOOD PRESSURE < 80 MM HG: ICD-10-PCS | Mod: CPTII,S$GLB,, | Performed by: NURSE PRACTITIONER

## 2022-12-01 PROCEDURE — 99213 PR OFFICE/OUTPT VISIT, EST, LEVL III, 20-29 MIN: ICD-10-PCS | Mod: S$GLB,,, | Performed by: NURSE PRACTITIONER

## 2022-12-01 PROCEDURE — 3288F FALL RISK ASSESSMENT DOCD: CPT | Mod: CPTII,S$GLB,, | Performed by: NURSE PRACTITIONER

## 2022-12-01 PROCEDURE — 1101F PT FALLS ASSESS-DOCD LE1/YR: CPT | Mod: CPTII,S$GLB,, | Performed by: NURSE PRACTITIONER

## 2022-12-01 RX ORDER — CEFDINIR 300 MG/1
300 CAPSULE ORAL 2 TIMES DAILY
COMMUNITY
Start: 2022-10-01 | End: 2023-03-01

## 2022-12-01 RX ORDER — FLUTICASONE PROPIONATE 50 MCG
SPRAY, SUSPENSION (ML) NASAL
COMMUNITY
Start: 2022-10-01 | End: 2023-09-06

## 2022-12-01 RX ORDER — LORATADINE 10 MG/1
10 TABLET ORAL DAILY
Qty: 30 TABLET | Refills: 0 | Status: SHIPPED | OUTPATIENT
Start: 2022-12-01 | End: 2024-01-25

## 2022-12-01 NOTE — PROGRESS NOTES
Patient ID: Lety Herbert is a 82 y.o. female.    Chief Complaint: ER f/u (ER visit 11/18/22 for right lung nodule)    Ms. Herbert is a a very pleasant 83 yo patient of Dr. Perez who  presents today s/p ED visit for follow up for lung nodule. She states she is doing better since being discharged from hospital but she wanted to see what follow up was necessary in regards to incidental finding. She denies any shortness of breath or chest pain at this time.She is a history of hypertension, GERD, CHF preserved ejection fraction, AFib on chronic anticoagulation.      She denies any other issues or complaints at this time.     Past Medical History:   Diagnosis Date    Anemia     Basal cell carcinoma     nose     Cancer     breast    Depression     DVT (deep venous thrombosis)     Fall 3/20/2018    Gastric ulceration     GERD (gastroesophageal reflux disease)     History of frequent urinary tract infections     Hyperlipidemia     Hypertension     Melanoma 2004?    left forearm    MRSA (methicillin resistant staph aureus) culture positive     Neuropathy     PE (pulmonary embolism)     Post-nasal drip 11/15/2018    Reflux      Past Surgical History:   Procedure Laterality Date    HYSTERECTOMY      MASTECTOMY, RADICAL Bilateral     TOTAL HIP ARTHROPLASTY Left 11/13/2017         Tobacco History:  reports that she has quit smoking. She has never used smokeless tobacco.      Review of patient's allergies indicates:   Allergen Reactions    Meperidine     Promethazine     Bactrim [sulfamethoxazole-trimethoprim] Rash       Current Outpatient Medications:     atorvastatin (LIPITOR) 20 MG tablet, TAKE 1 TABLET (20 MG TOTAL) BY MOUTH ONCE DAILY., Disp: 90 tablet, Rfl: 1    diltiaZEM (CARDIZEM CD) 120 MG Cp24, Take 1 capsule (120 mg total) by mouth once daily., Disp: 90 capsule, Rfl: 1    multivit-min/folic ac/collagen (WOMEN'S MULTIVITAMIN COLLAGEN ORAL), Take by mouth once daily., Disp: , Rfl:     pantoprazole (PROTONIX) 20 MG  tablet, TAKE 1 TABLET (20 MG TOTAL) BY MOUTH ONCE DAILY., Disp: 90 tablet, Rfl: 1    sertraline (ZOLOFT) 100 MG tablet, Take 1 tablet (100 mg total) by mouth once daily., Disp: 90 tablet, Rfl: 3    valsartan-hydrochlorothiazide (DIOVAN-HCT) 320-12.5 mg per tablet, Take 1 tablet by mouth every morning., Disp: , Rfl:     XARELTO 20 mg Tab, , Disp: , Rfl:     amoxicillin (AMOXIL) 500 MG capsule, Take 500 mg by mouth every 8 (eight) hours., Disp: , Rfl:     azelastine (ASTELIN) 137 mcg (0.1 %) nasal spray, 1 spray 2 (two) times daily., Disp: , Rfl:     cefdinir (OMNICEF) 300 MG capsule, Take 300 mg by mouth 2 (two) times daily., Disp: , Rfl:     fluticasone propionate (FLONASE) 50 mcg/actuation nasal spray, by Each Nostril route., Disp: , Rfl:     loratadine (CLARITIN) 10 mg tablet, Take 1 tablet (10 mg total) by mouth once daily., Disp: 30 tablet, Rfl: 0    montelukast (SINGULAIR) 10 mg tablet, Take 10 mg by mouth every evening., Disp: , Rfl:     promethazine-dextromethorphan (PROMETHAZINE-DM) 6.25-15 mg/5 mL Syrp, Take 5 mLs by mouth every 6 (six) hours as needed., Disp: , Rfl:     traZODone (DESYREL) 50 MG tablet, Take 1 tablet (50 mg total) by mouth every evening. (Patient not taking: Reported on 12/1/2022), Disp: 30 tablet, Rfl: 2    XARELTO 15 mg Tab, Take 15 mg by mouth once daily., Disp: , Rfl:     Review of Systems   Constitutional:  Positive for fatigue. Negative for activity change, appetite change and chills. Unexpected weight change: GAINED 7 LB OF WEIGHT..  HENT:  Negative for congestion.    Eyes:  Negative for pain, discharge and visual disturbance.   Respiratory:  Negative for cough, choking, chest tightness, shortness of breath and wheezing.    Cardiovascular:  Negative for chest pain, palpitations and leg swelling.        HTN   Gastrointestinal:  Negative for abdominal distention, abdominal pain, anal bleeding, constipation and diarrhea.        Constipation seems to be stable with Amitiza 24 mcg  "capsule.   Endocrine: Positive for polyuria. Negative for cold intolerance and polydipsia.   Genitourinary:  Positive for enuresis. Negative for difficulty urinating and vaginal bleeding.        Incontinence symptoms   Musculoskeletal:  Positive for arthralgias, back pain and myalgias. Negative for joint swelling.        Left hip fracture S/P Arthroplasty NoV 2017   Skin:  Negative for color change, pallor, rash and wound.        Complains of somewhat brittle nails.   Allergic/Immunologic: Negative for environmental allergies, food allergies and immunocompromised state.   Neurological:  Positive for headaches. Negative for dizziness, light-headedness and numbness.   Hematological:  Does not bruise/bleed easily.   Psychiatric/Behavioral:  Negative for confusion and suicidal ideas. The patient is not nervous/anxious.         Patient has a history of depression. Stable on sertraline. She however denies that she is depressed.        Objective:      Vitals:    12/01/22 1327   BP: 123/70   Pulse: 87   Resp: 18   Temp: 97.5 °F (36.4 °C)   SpO2: (!) 94%   Weight: 96.1 kg (211 lb 12.8 oz)   Height: 5' 6" (1.676 m)         Physical assessment remains unchanged.     Physical Exam     Nursing note and vitals reviewed.  Constitutional: She appears well-developed and well-nourished. She is not diaphoretic.   HENT:   Head: Normocephalic and atraumatic.   Mouth/Throat: Oropharynx is clear and moist.   Eyes: EOM are normal. Pupils are equal, round, and reactive to light. Right eye exhibits no discharge. Left eye exhibits no discharge.   Neck: No tracheal deviation present.   Normal range of motion.  Cardiovascular:  Normal rate, regular rhythm and intact distal pulses.           Pulmonary/Chest: No respiratory distress. She has no wheezes. She exhibits no tenderness.   Patient requires her home 2L nasal cannula to maintain oxygenation of 92%.   Abdominal: Abdomen is soft. She exhibits no distension. There is no abdominal tenderness. "   Musculoskeletal:         General: No tenderness or edema. Normal range of motion.      Cervical back: Normal range of motion.      Neurological: She is alert and oriented to person, place, and time. She has normal strength. No cranial nerve deficit or sensory deficit. GCS eye subscore is 4. GCS verbal subscore is 5. GCS motor subscore is 6.   Skin: Skin is warm and dry. No rash noted.   Psychiatric: She has a normal mood and affect. Her behavior is normal. Thought content normal.      Assessment:       1. Solitary pulmonary nodule    2. Seasonal allergies           Plan:       Solitary pulmonary nodule  -     Cancel: CT Chest Without Contrast; Future; Expected date: 12/01/2022  -     CT Chest Without Contrast; Future; Expected date: 02/22/2023    Seasonal allergies  -     loratadine (CLARITIN) 10 mg tablet; Take 1 tablet (10 mg total) by mouth once daily.  Dispense: 30 tablet; Refill: 0      Follow up in about 3 months (around 2/27/2023), or if symptoms worsen or fail to improve, for With Dr. Perez Lung nodule follow up .        12/5/2022 Rebekah Sneed NP

## 2022-12-14 DIAGNOSIS — J98.4 OTHER DISORDERS OF LUNG: ICD-10-CM

## 2022-12-14 DIAGNOSIS — J01.00 SINUSITIS, ACUTE MAXILLARY: Primary | ICD-10-CM

## 2022-12-14 DIAGNOSIS — J18.9 PNEUMONIA, UNSPECIFIED ORGANISM: ICD-10-CM

## 2022-12-14 DIAGNOSIS — J18.9 PNEUMONIA: ICD-10-CM

## 2022-12-14 DIAGNOSIS — J20.9 ACUTE BRONCHITIS: ICD-10-CM

## 2022-12-14 DIAGNOSIS — R06.2 WHEEZING: ICD-10-CM

## 2022-12-14 DIAGNOSIS — J01.10 SINUSITIS, ACUTE FRONTAL: ICD-10-CM

## 2022-12-15 ENCOUNTER — TELEPHONE (OUTPATIENT)
Dept: FAMILY MEDICINE | Facility: CLINIC | Age: 82
End: 2022-12-15

## 2022-12-15 ENCOUNTER — HOSPITAL ENCOUNTER (OUTPATIENT)
Dept: RADIOLOGY | Facility: HOSPITAL | Age: 82
Discharge: HOME | End: 2022-12-15
Attending: EMERGENCY MEDICINE
Payer: MEDICARE

## 2022-12-15 DIAGNOSIS — J01.00 SINUSITIS, ACUTE MAXILLARY: ICD-10-CM

## 2022-12-15 DIAGNOSIS — R06.2 WHEEZING: ICD-10-CM

## 2022-12-15 DIAGNOSIS — J18.9 PNEUMONIA, UNSPECIFIED ORGANISM: ICD-10-CM

## 2022-12-15 DIAGNOSIS — J01.10 SINUSITIS, ACUTE FRONTAL: ICD-10-CM

## 2022-12-15 DIAGNOSIS — R91.8 ABNORMAL CT SCAN OF LUNG: ICD-10-CM

## 2022-12-15 DIAGNOSIS — J98.4 OTHER DISORDERS OF LUNG: ICD-10-CM

## 2022-12-15 DIAGNOSIS — R91.1 LUNG NODULE SEEN ON IMAGING STUDY: Primary | ICD-10-CM

## 2022-12-15 DIAGNOSIS — J20.9 ACUTE BRONCHITIS: ICD-10-CM

## 2022-12-15 PROCEDURE — 71250 CT THORAX DX C-: CPT | Mod: TC

## 2022-12-15 NOTE — TELEPHONE ENCOUNTER
Pt's daughter called [facility] They sent her for a ct scan of the chest and it came back with a spot on her lung.They told her to call her pcp and get a ref to Dr. Ahn.  I pended the ref. For you.

## 2022-12-20 ENCOUNTER — TELEPHONE (OUTPATIENT)
Dept: PULMONOLOGY | Facility: CLINIC | Age: 82
End: 2022-12-20

## 2022-12-22 NOTE — TELEPHONE ENCOUNTER
Patient states she already has an appointment with Dr. Villalpando for this issue and will follow up with her.

## 2022-12-28 ENCOUNTER — OFFICE VISIT (OUTPATIENT)
Dept: PULMONOLOGY | Facility: CLINIC | Age: 82
End: 2022-12-28
Payer: MEDICARE

## 2022-12-28 VITALS
SYSTOLIC BLOOD PRESSURE: 166 MMHG | WEIGHT: 215.25 LBS | OXYGEN SATURATION: 90 % | DIASTOLIC BLOOD PRESSURE: 80 MMHG | HEART RATE: 69 BPM | BODY MASS INDEX: 34.75 KG/M2

## 2022-12-28 DIAGNOSIS — G89.29 CHRONIC RIGHT-SIDED THORACIC BACK PAIN: ICD-10-CM

## 2022-12-28 DIAGNOSIS — R91.1 LUNG NODULE SEEN ON IMAGING STUDY: ICD-10-CM

## 2022-12-28 DIAGNOSIS — R91.8 MASS OF RIGHT LUNG: ICD-10-CM

## 2022-12-28 DIAGNOSIS — J41.1 BRONCHITIS, CHRONIC, MUCOPURULENT: Primary | ICD-10-CM

## 2022-12-28 DIAGNOSIS — R68.3 CLUBBING OF FINGERS: ICD-10-CM

## 2022-12-28 DIAGNOSIS — R59.0 MEDIASTINAL ADENOPATHY: ICD-10-CM

## 2022-12-28 DIAGNOSIS — M54.6 CHRONIC RIGHT-SIDED THORACIC BACK PAIN: ICD-10-CM

## 2022-12-28 DIAGNOSIS — R91.8 ABNORMAL CT SCAN OF LUNG: ICD-10-CM

## 2022-12-28 DIAGNOSIS — J47.9 BRONCHIECTASIS WITHOUT COMPLICATION: ICD-10-CM

## 2022-12-28 DIAGNOSIS — J84.10 PULMONARY FIBROSIS: ICD-10-CM

## 2022-12-28 PROCEDURE — 99999 PR PBB SHADOW E&M-EST. PATIENT-LVL III: ICD-10-PCS | Mod: PBBFAC,,, | Performed by: INTERNAL MEDICINE

## 2022-12-28 PROCEDURE — 3079F PR MOST RECENT DIASTOLIC BLOOD PRESSURE 80-89 MM HG: ICD-10-PCS | Mod: CPTII,S$GLB,, | Performed by: INTERNAL MEDICINE

## 2022-12-28 PROCEDURE — 1101F PR PT FALLS ASSESS DOC 0-1 FALLS W/OUT INJ PAST YR: ICD-10-PCS | Mod: CPTII,S$GLB,, | Performed by: INTERNAL MEDICINE

## 2022-12-28 PROCEDURE — 3079F DIAST BP 80-89 MM HG: CPT | Mod: CPTII,S$GLB,, | Performed by: INTERNAL MEDICINE

## 2022-12-28 PROCEDURE — 99999 PR PBB SHADOW E&M-EST. PATIENT-LVL III: CPT | Mod: PBBFAC,,, | Performed by: INTERNAL MEDICINE

## 2022-12-28 PROCEDURE — 3288F FALL RISK ASSESSMENT DOCD: CPT | Mod: CPTII,S$GLB,, | Performed by: INTERNAL MEDICINE

## 2022-12-28 PROCEDURE — 3288F PR FALLS RISK ASSESSMENT DOCUMENTED: ICD-10-PCS | Mod: CPTII,S$GLB,, | Performed by: INTERNAL MEDICINE

## 2022-12-28 PROCEDURE — 3077F SYST BP >= 140 MM HG: CPT | Mod: CPTII,S$GLB,, | Performed by: INTERNAL MEDICINE

## 2022-12-28 PROCEDURE — 99215 PR OFFICE/OUTPT VISIT, EST, LEVL V, 40-54 MIN: ICD-10-PCS | Mod: S$GLB,,, | Performed by: INTERNAL MEDICINE

## 2022-12-28 PROCEDURE — 3077F PR MOST RECENT SYSTOLIC BLOOD PRESSURE >= 140 MM HG: ICD-10-PCS | Mod: CPTII,S$GLB,, | Performed by: INTERNAL MEDICINE

## 2022-12-28 PROCEDURE — 99215 OFFICE O/P EST HI 40 MIN: CPT | Mod: S$GLB,,, | Performed by: INTERNAL MEDICINE

## 2022-12-28 PROCEDURE — 1101F PT FALLS ASSESS-DOCD LE1/YR: CPT | Mod: CPTII,S$GLB,, | Performed by: INTERNAL MEDICINE

## 2022-12-28 RX ORDER — HYDROCODONE BITARTRATE AND ACETAMINOPHEN 5; 325 MG/1; MG/1
1 TABLET ORAL EVERY 6 HOURS PRN
Qty: 28 TABLET | Refills: 0 | Status: SHIPPED | OUTPATIENT
Start: 2022-12-28 | End: 2023-02-07 | Stop reason: SDUPTHER

## 2022-12-28 RX ORDER — FLUTICASONE FUROATE, UMECLIDINIUM BROMIDE AND VILANTEROL TRIFENATATE 200; 62.5; 25 UG/1; UG/1; UG/1
1 POWDER RESPIRATORY (INHALATION) DAILY
Qty: 60 EACH | Refills: 11 | Status: SHIPPED | OUTPATIENT
Start: 2022-12-28 | End: 2023-09-06

## 2022-12-28 NOTE — PATIENT INSTRUCTIONS
Bronchiectasis with yellow mucous-- cultures needed    Trial trelegy in place of current inhaler. Use aerobika for 10 breaths after inhaler.      Lung tissue with progression fibrosis-- slow process.  Consider treatment later.    Lung lesion right upper lung worrisome for cancer-- new over last 4 yrs-- enlarging lymph node in middle chest is still small.    Check pet scan.     May need biopsy -  will need to skip xarelto prior biopsy- needle of lung but may need ebus.      Back pain noted on right -- will send in Slade for as needed use.  Pet scan may help determine if cancer related.               Will direct care with result of pet scan.  Could do phone follow up to discuss in detail if complex.

## 2022-12-28 NOTE — PROGRESS NOTES
12/28/2022    Lety Herbert  Follow up    Chief Complaint   Patient presents with    Abnormal Chest X-ray       HPI:   12/28/2022     Pt developed left chest pain acute onset-- lasted few days, pt has had lingering back pain.  Coughs a lot with yellow mucous-- tablespoon daily.  Inhalers no help in past    Had right shoulder area achey pain about 7/10 pain-- comes and goes.    chr hypoxic resp failure post pe and pulm htn assoc cor dz and osas.    Ct this month with 23 mm rul lesion cw ca. The lesion is new from 2019. Ild dz with bronchiectasis assoc mucous plugging seen.   Upper lung honeycombing suggested ct 12/15/2022 -- viewed directly.     No asbestos exposure but father worked [employer].  Pt was smoker    Pt had double mastectomy 15 yrs ago-- no oncology follow up    Below from Dr Villalpando  12/28/2021- breathing stable, able to walk around store w/ oxygen. Tries to walk 20 min per day. Denies chest congestion/cough. Sometimes nose congested, uses astelin. She takes singulair nightly.  Patient Instructions   Oxygen - continue with activity and any time sats <92%  Clubbing of fingernails is not harmful- due to chronic hypoxia  06/30/2021-   Continue oxygen  Follow up with cardiologist  Stop claritin and try singulair one pill every night for sinus drip  continue flonase  while seated feels fine but gets breathless w/ minimal activity. Daughter reports dyspnea is getting worse over time. Reports cardiologist wants to place pacemaker for her but insurance denied it. Does not feel well and sits in recliner all the time. Reports constant sinus drip and cough w/ green/yellow mucous.    12/30/2020-   Call medical supply for the oxygen and ask about thinner or more comfortable nasal cannulas  Start claritin daily  Start flonase 2 sprays in each nostril daily  feels better w/ O2, wears 3L continuously except when sitting still at home. Trouble breathing w/ fatigue is episodic. Gets headaches- tension from wearing O2,  "glasses and mask behind ear. Has constant sinus drip. Other night R ear blocked up while wearing CPAP.  Tries mucinex DM but doesn't last long. She is on xarelto for atrial fib now.     6/30/20-   Oxygen for home use ordered- would wear continuously  Use CPAP machine- can try nasal pillows. Would use oxygen with your CPAP too.  Get lung function test and 6 minute walk to check to see how much oxygen you need.  Pt is an 78 yo female with DVT/PE, GERD, breast ca s/p double mastectomy- no chemo or XRT, melanoma of arm, AUSTIN, obesity presenting for initial evaluation. Pt reports oxygen desaturation for over a year but no one has ordered her any oxygen for home. First noted low O2 sat when she had sinus infection. O2 sats will drop when exerting herself or resting. Feels short of breath worsening over time. She coughs w/ phlegm from sinuses. Sees ENT who cauterized nose for bleeding. Daughter is here as well and assists w/ history.  Reports not using CPAP because she had tip of her nose removed for cancer in past and she is concerned mask will cause more cancer to come back. She wakes up a lot at night and feels drowsy during the day.  Denies any hx of lung problems. She is a past smoker- quit 40 yrs ago 1-2 PPD x 25 yrs. Used to have recurrent bronchitis and cough which stopped after she quit smoking.    Outside records from pt's cardiologist Jeremy Sargent in [facility] were faxed to us and have been reviewed- with dx including heart block, atrial fib & flutter, CAD, diastolic dysfunction, HTN, HLD, and obesity. Per that note ASAP consult was placed to pulmonary for "shortness of breath and O2 sat of 70s-80s."    The chief compliant  problem is varies w/ instability at times    PFSH:  Past Medical History:   Diagnosis Date    Anemia     Basal cell carcinoma     nose     Cancer     breast    Depression     DVT (deep venous thrombosis)     Fall 3/20/2018    Gastric ulceration     GERD (gastroesophageal reflux disease)     " History of frequent urinary tract infections     Hyperlipidemia     Hypertension     Melanoma 2004?    left forearm    MRSA (methicillin resistant staph aureus) culture positive     Neuropathy     PE (pulmonary embolism)     Post-nasal drip 11/15/2018    Reflux          Past Surgical History:   Procedure Laterality Date    HYSTERECTOMY      MASTECTOMY, RADICAL Bilateral     TOTAL HIP ARTHROPLASTY Left 11/13/2017     Social History     Tobacco Use    Smoking status: Former    Smokeless tobacco: Never   Substance Use Topics    Alcohol use: No    Drug use: No     Family History   Problem Relation Age of Onset    Cancer Mother     Cancer Father     Heart disease Father     Melanoma Neg Hx     Psoriasis Neg Hx     Lupus Neg Hx     Eczema Neg Hx      Review of patient's allergies indicates:   Allergen Reactions    Meperidine     Promethazine     Bactrim [sulfamethoxazole-trimethoprim] Rash       Performance Status:The patient's activity level is functions out of house.  Feels SOB any exertion out of house.     Review of Systems:  a review of eleven systems covering constitutional, Eye, HEENT, Psych, Respiratory, Cardiac, GI, , Musculoskeletal, Endocrine, Dermatologic was negative except for pertinent findings as listed ABOVE and below:  All negative with pertinent positives as above       Exam:Comprehensive exam done. BP (!) 166/80 (BP Location: Right arm, Patient Position: Sitting, BP Method: Small (Automatic))   Pulse 69   Wt 97.7 kg (215 lb 4.5 oz)   SpO2 (!) 90% Comment: 2L  BMI 34.75 kg/m²   Exam included Vitals as listed, and patient's appearance and affect and alertness and mood, oral exam for yeast and hygiene and pharynx lesions and Mallapatti (M) score, neck with inspection for jvd and masses and thyroid abnormalities and lymph nodes (supraclavicular and infraclavicular nodes and axillary also examined and noted if abn), chest exam included symmetry and effort and fremitus and percussion and auscultation,  cardiac exam included rhythm and gallops and murmur and rubs and jvd and edema, abdominal exam for mass and hepatosplenomegaly and tenderness and hernias and bowel sounds, Musculoskeletal exam with muscle tone and posture and mobility/gait and  strength, and skin for rashes and cyanosis and pallor and turgor, extremity for clubbing.  Findings were normal except for pertinent findings listed below:  M1, oropharynx clear  HR irregularly irreg  Lungs rales bilaterally  No edema  Varicose veins of legs  Clubbing, no neck nodes      Radiographs (ct chest and cxr) reviewed: view by direct vision     Ct chest 12/15/2022 c/w 2019 -- progressive ild, bronchiectasis with mucous plugging, new lung lesion rul c/w ca.  Viewed.      CXR 6/8/20- interstitial markings prominent  VQ scan 6/8/20- IMPRESSION:  Normal VQ scan perfusion imaging  TTE 6/8/20-   Concentric left ventricular hypertrophy.  Normal left ventricular systolic function. The estimated ejection fraction is 65%.  Normal LV diastolic function.  Normal right ventricular systolic function.  Mild left atrial enlargement.  Mild aortic regurgitation.  Mild mitral regurgitation.  Mild tricuspid regurgitation.  Mild pulmonic regurgitation.  Normal central venous pressure (3 mmHg).  The estimated PA systolic pressure is 36 mmHg.    Labs reviewed    Results for RONN SOLORZANO (MRN [id number]) as of 6/30/2020 14:08   Ref. Range 3/11/2020 07:45   CO2 Latest Ref Range: 23 - 29 mmol/L 32 (H)        PFT 7/14/20 reviewed- mild restriction, reduced DLCO      6MWT 7/14/20- 85m, desat to 83% at rest, req 3L NC    Plan:  Clinical impression is resonably certain and repeated evaluation prn +/- follow up will be needed as below. Chronic hypoxemic resp failure due to cardiac comorbidities. PH likely groups 2/3 due to AUSTIN and HFpEF.    Lety was seen today for abnormal chest x-ray.    Diagnoses and all orders for this visit:    Bronchitis, chronic, mucopurulent  -     Culture,  Respiratory with Gram Stain; Future  -     fluticasone-umeclidin-vilanter (TRELEGY ELLIPTA) 200-62.5-25 mcg inhaler; Inhale 1 puff into the lungs once daily.    Lung nodule seen on imaging study  -     Ambulatory referral/consult to Pulmonology  -     Culture, Respiratory with Gram Stain; Future  -     AFB Culture & Smear; Standing  -     NM PET CT Routine FDG; Future    Abnormal CT scan of lung  -     Ambulatory referral/consult to Pulmonology    Bronchiectasis without complication  -     Culture, Respiratory with Gram Stain; Future  -     fluticasone-umeclidin-vilanter (TRELEGY ELLIPTA) 200-62.5-25 mcg inhaler; Inhale 1 puff into the lungs once daily.    Pulmonary fibrosis    Clubbing of fingers    Mass of right lung  -     Culture, Respiratory with Gram Stain; Future  -     AFB Culture & Smear; Standing  -     NM PET CT Routine FDG; Future    Chronic right-sided thoracic back pain  -     HYDROcodone-acetaminophen (NORCO) 5-325 mg per tablet; Take 1 tablet by mouth every 6 (six) hours as needed for Pain.  -     NM PET CT Routine FDG; Future    Mediastinal adenopathy  -     NM PET CT Routine FDG; Future          Follow up in about 3 weeks (around 1/18/2023), or if symptoms worsen or fail to improve.    Discussed with patient above for education the following:      Patient Instructions   Bronchiectasis with yellow mucous-- cultures needed    Trial trelegy in place of current inhaler. Use aerobika for 10 breaths after inhaler.      Lung tissue with progression fibrosis-- slow process.  Consider treatment later.    Lung lesion right upper lung worrisome for cancer-- new over last 4 yrs-- enlarging lymph node in middle chest is still small.    Check pet scan.     May need biopsy -  will need to skip xarelto prior biopsy- needle of lung but may need ebus.      Back pain noted on right -- will send in norco for as needed use.  Pet scan may help determine if cancer related.               Will direct care with result of  "pet scan.  Could do phone follow up to discuss in detail if complex.            Number and complexity of problems 5     progression of chronic illness and an illness that poses a threat to life or body function  Amount and/or complexity of Data - need 2 of 3  Catagory 1- test, documents or independent historians: review of results of each unique test and ordering each unique test   Catagory 2 -independent interpretation of test: "independent review of radiographs   Catagory 3- discussion of management or test or interpretation: none   Risk of Complication  High risk from testing or treatment - prescription drug monitoring for toxicity and decision for surgery or procedures with major risk                Eval took 60 min        "

## 2022-12-29 ENCOUNTER — LAB VISIT (OUTPATIENT)
Dept: LAB | Facility: HOSPITAL | Age: 82
End: 2022-12-29
Attending: INTERNAL MEDICINE
Payer: MEDICARE

## 2022-12-29 DIAGNOSIS — R91.1 LUNG NODULE SEEN ON IMAGING STUDY: ICD-10-CM

## 2022-12-29 DIAGNOSIS — J47.9 BRONCHIECTASIS WITHOUT COMPLICATION: ICD-10-CM

## 2022-12-29 DIAGNOSIS — J41.1 BRONCHITIS, CHRONIC, MUCOPURULENT: ICD-10-CM

## 2022-12-29 DIAGNOSIS — R91.8 MASS OF RIGHT LUNG: ICD-10-CM

## 2022-12-29 PROCEDURE — 87116 MYCOBACTERIA CULTURE: CPT | Performed by: INTERNAL MEDICINE

## 2022-12-29 PROCEDURE — 87070 CULTURE OTHR SPECIMN AEROBIC: CPT | Performed by: INTERNAL MEDICINE

## 2022-12-29 PROCEDURE — 87205 SMEAR GRAM STAIN: CPT | Performed by: INTERNAL MEDICINE

## 2022-12-29 PROCEDURE — 87206 SMEAR FLUORESCENT/ACID STAI: CPT | Performed by: INTERNAL MEDICINE

## 2022-12-31 LAB
BACTERIA SPEC AEROBE CULT: NORMAL
GRAM STN SPEC: NORMAL

## 2023-01-09 ENCOUNTER — LAB VISIT (OUTPATIENT)
Dept: LAB | Facility: HOSPITAL | Age: 83
End: 2023-01-09
Payer: MEDICARE

## 2023-01-09 DIAGNOSIS — R91.1 COIN LESION: Primary | ICD-10-CM

## 2023-01-09 DIAGNOSIS — R91.8 LUNG MASS: ICD-10-CM

## 2023-01-09 PROCEDURE — 87206 SMEAR FLUORESCENT/ACID STAI: CPT | Performed by: INTERNAL MEDICINE

## 2023-01-09 PROCEDURE — 87116 MYCOBACTERIA CULTURE: CPT | Performed by: INTERNAL MEDICINE

## 2023-01-11 ENCOUNTER — HOSPITAL ENCOUNTER (OUTPATIENT)
Dept: RADIOLOGY | Facility: HOSPITAL | Age: 83
Discharge: HOME | End: 2023-01-11
Attending: INTERNAL MEDICINE
Payer: MEDICARE

## 2023-01-11 VITALS — WEIGHT: 210 LBS | BODY MASS INDEX: 33.75 KG/M2 | HEIGHT: 66 IN

## 2023-01-11 DIAGNOSIS — R91.8 MASS OF RIGHT LUNG: Primary | ICD-10-CM

## 2023-01-11 DIAGNOSIS — R59.0 MEDIASTINAL ADENOPATHY: ICD-10-CM

## 2023-01-11 DIAGNOSIS — G89.29 CHRONIC RIGHT-SIDED THORACIC BACK PAIN: ICD-10-CM

## 2023-01-11 DIAGNOSIS — M54.6 CHRONIC RIGHT-SIDED THORACIC BACK PAIN: ICD-10-CM

## 2023-01-11 DIAGNOSIS — R91.8 MASS OF RIGHT LUNG: ICD-10-CM

## 2023-01-11 DIAGNOSIS — R91.1 LUNG NODULE SEEN ON IMAGING STUDY: ICD-10-CM

## 2023-01-11 LAB — GLUCOSE SERPL-MCNC: 108 MG/DL (ref 70–110)

## 2023-01-11 PROCEDURE — 78815 PET IMAGE W/CT SKULL-THIGH: CPT | Mod: TC,PO

## 2023-01-11 PROCEDURE — A9552 F18 FDG: HCPCS | Mod: PO

## 2023-01-12 RX ORDER — VALSARTAN AND HYDROCHLOROTHIAZIDE 320; 12.5 MG/1; MG/1
1 TABLET, FILM COATED ORAL EVERY MORNING
Qty: 90 TABLET | Refills: 0 | Status: SHIPPED | OUTPATIENT
Start: 2023-01-12 | End: 2023-04-10 | Stop reason: SDUPTHER

## 2023-01-13 ENCOUNTER — LAB VISIT (OUTPATIENT)
Dept: LAB | Facility: HOSPITAL | Age: 83
End: 2023-01-13
Payer: MEDICARE

## 2023-01-13 DIAGNOSIS — J41.1 BRONCHITIS, CHRONIC, MUCOPURULENT: ICD-10-CM

## 2023-01-13 DIAGNOSIS — R91.1 LUNG NODULE SEEN ON IMAGING STUDY: ICD-10-CM

## 2023-01-13 DIAGNOSIS — R91.8 MASS OF RIGHT LUNG: ICD-10-CM

## 2023-01-13 DIAGNOSIS — J47.9 BRONCHIECTASIS WITHOUT COMPLICATION: ICD-10-CM

## 2023-01-13 PROCEDURE — 87206 SMEAR FLUORESCENT/ACID STAI: CPT | Performed by: INTERNAL MEDICINE

## 2023-01-13 PROCEDURE — 87116 MYCOBACTERIA CULTURE: CPT | Performed by: INTERNAL MEDICINE

## 2023-01-18 ENCOUNTER — TELEPHONE (OUTPATIENT)
Dept: PULMONOLOGY | Facility: CLINIC | Age: 83
End: 2023-01-18
Payer: MEDICARE

## 2023-01-18 ENCOUNTER — HOSPITAL ENCOUNTER (OUTPATIENT)
Dept: PREADMISSION TESTING | Facility: HOSPITAL | Age: 83
Discharge: HOME | End: 2023-01-18
Attending: INTERNAL MEDICINE
Payer: MEDICARE

## 2023-01-18 ENCOUNTER — OFFICE VISIT (OUTPATIENT)
Dept: PULMONOLOGY | Facility: CLINIC | Age: 83
End: 2023-01-18
Payer: MEDICARE

## 2023-01-18 ENCOUNTER — HOSPITAL ENCOUNTER (OUTPATIENT)
Dept: RADIOLOGY | Facility: HOSPITAL | Age: 83
Discharge: HOME | End: 2023-01-18
Attending: INTERNAL MEDICINE
Payer: MEDICARE

## 2023-01-18 VITALS
DIASTOLIC BLOOD PRESSURE: 82 MMHG | TEMPERATURE: 99 F | BODY MASS INDEX: 34.74 KG/M2 | WEIGHT: 216.19 LBS | HEART RATE: 78 BPM | OXYGEN SATURATION: 94 % | SYSTOLIC BLOOD PRESSURE: 165 MMHG | HEIGHT: 66 IN

## 2023-01-18 DIAGNOSIS — R94.2 ABNORMAL PET SCAN OF LUNG: ICD-10-CM

## 2023-01-18 DIAGNOSIS — Z51.81 THERAPEUTIC DRUG MONITORING: ICD-10-CM

## 2023-01-18 DIAGNOSIS — R04.2 HEMOPTYSIS: ICD-10-CM

## 2023-01-18 DIAGNOSIS — J47.1 BRONCHIECTASIS WITH (ACUTE) EXACERBATION: ICD-10-CM

## 2023-01-18 DIAGNOSIS — E87.6 HYPOKALEMIA: ICD-10-CM

## 2023-01-18 DIAGNOSIS — R07.89 OTHER CHEST PAIN: ICD-10-CM

## 2023-01-18 DIAGNOSIS — R61 NIGHT SWEATS: ICD-10-CM

## 2023-01-18 DIAGNOSIS — J41.1 BRONCHITIS, CHRONIC, MUCOPURULENT: ICD-10-CM

## 2023-01-18 DIAGNOSIS — J18.9 PNEUMONIA OF RIGHT LOWER LOBE DUE TO INFECTIOUS ORGANISM: Primary | ICD-10-CM

## 2023-01-18 DIAGNOSIS — J18.9 PNEUMONIA OF RIGHT LOWER LOBE DUE TO INFECTIOUS ORGANISM: ICD-10-CM

## 2023-01-18 PROCEDURE — 71046 X-RAY EXAM CHEST 2 VIEWS: CPT | Mod: TC

## 2023-01-18 PROCEDURE — 1125F PR PAIN SEVERITY QUANTIFIED, PAIN PRESENT: ICD-10-PCS | Mod: CPTII,S$GLB,, | Performed by: INTERNAL MEDICINE

## 2023-01-18 PROCEDURE — 3079F PR MOST RECENT DIASTOLIC BLOOD PRESSURE 80-89 MM HG: ICD-10-PCS | Mod: CPTII,S$GLB,, | Performed by: INTERNAL MEDICINE

## 2023-01-18 PROCEDURE — 99215 PR OFFICE/OUTPT VISIT, EST, LEVL V, 40-54 MIN: ICD-10-PCS | Mod: S$GLB,,, | Performed by: INTERNAL MEDICINE

## 2023-01-18 PROCEDURE — 1101F PR PT FALLS ASSESS DOC 0-1 FALLS W/OUT INJ PAST YR: ICD-10-PCS | Mod: CPTII,S$GLB,, | Performed by: INTERNAL MEDICINE

## 2023-01-18 PROCEDURE — 1125F AMNT PAIN NOTED PAIN PRSNT: CPT | Mod: CPTII,S$GLB,, | Performed by: INTERNAL MEDICINE

## 2023-01-18 PROCEDURE — 99417 PROLNG OP E/M EACH 15 MIN: CPT | Mod: S$GLB,,, | Performed by: INTERNAL MEDICINE

## 2023-01-18 PROCEDURE — 99215 OFFICE O/P EST HI 40 MIN: CPT | Mod: S$GLB,,, | Performed by: INTERNAL MEDICINE

## 2023-01-18 PROCEDURE — 99417 PR PROLONGED SVC, OUTPT, W/WO DIRECT PT CONTACT,  EA ADDTL 15 MIN: ICD-10-PCS | Mod: S$GLB,,, | Performed by: INTERNAL MEDICINE

## 2023-01-18 PROCEDURE — 3077F SYST BP >= 140 MM HG: CPT | Mod: CPTII,S$GLB,, | Performed by: INTERNAL MEDICINE

## 2023-01-18 PROCEDURE — 93010 EKG 12-LEAD: ICD-10-PCS | Mod: ,,, | Performed by: SPECIALIST

## 2023-01-18 PROCEDURE — 3077F PR MOST RECENT SYSTOLIC BLOOD PRESSURE >= 140 MM HG: ICD-10-PCS | Mod: CPTII,S$GLB,, | Performed by: INTERNAL MEDICINE

## 2023-01-18 PROCEDURE — 3288F PR FALLS RISK ASSESSMENT DOCUMENTED: ICD-10-PCS | Mod: CPTII,S$GLB,, | Performed by: INTERNAL MEDICINE

## 2023-01-18 PROCEDURE — 99999 PR PBB SHADOW E&M-EST. PATIENT-LVL V: ICD-10-PCS | Mod: PBBFAC,,, | Performed by: INTERNAL MEDICINE

## 2023-01-18 PROCEDURE — 1101F PT FALLS ASSESS-DOCD LE1/YR: CPT | Mod: CPTII,S$GLB,, | Performed by: INTERNAL MEDICINE

## 2023-01-18 PROCEDURE — 3079F DIAST BP 80-89 MM HG: CPT | Mod: CPTII,S$GLB,, | Performed by: INTERNAL MEDICINE

## 2023-01-18 PROCEDURE — 1159F PR MEDICATION LIST DOCUMENTED IN MEDICAL RECORD: ICD-10-PCS | Mod: CPTII,S$GLB,, | Performed by: INTERNAL MEDICINE

## 2023-01-18 PROCEDURE — 1159F MED LIST DOCD IN RCRD: CPT | Mod: CPTII,S$GLB,, | Performed by: INTERNAL MEDICINE

## 2023-01-18 PROCEDURE — 93010 ELECTROCARDIOGRAM REPORT: CPT | Mod: ,,, | Performed by: SPECIALIST

## 2023-01-18 PROCEDURE — 3288F FALL RISK ASSESSMENT DOCD: CPT | Mod: CPTII,S$GLB,, | Performed by: INTERNAL MEDICINE

## 2023-01-18 PROCEDURE — 99999 PR PBB SHADOW E&M-EST. PATIENT-LVL V: CPT | Mod: PBBFAC,,, | Performed by: INTERNAL MEDICINE

## 2023-01-18 PROCEDURE — 93005 ELECTROCARDIOGRAM TRACING: CPT | Performed by: SPECIALIST

## 2023-01-18 RX ORDER — RIVAROXABAN 15 MG/1
15 TABLET, FILM COATED ORAL
COMMUNITY
Start: 2023-01-06 | End: 2023-09-06

## 2023-01-18 RX ORDER — FLUTICASONE PROPIONATE AND SALMETEROL 100; 50 UG/1; UG/1
1 POWDER RESPIRATORY (INHALATION) 2 TIMES DAILY
COMMUNITY
Start: 2022-12-13 | End: 2023-09-06

## 2023-01-18 RX ORDER — LEVOFLOXACIN 500 MG/1
500 TABLET, FILM COATED ORAL DAILY
Qty: 10 TABLET | Refills: 1 | Status: SHIPPED | OUTPATIENT
Start: 2023-01-18 | End: 2023-03-01

## 2023-01-18 RX ORDER — PREDNISONE 20 MG/1
TABLET ORAL
Qty: 20 TABLET | Refills: 0 | Status: SHIPPED | OUTPATIENT
Start: 2023-01-18 | End: 2023-02-07 | Stop reason: SDUPTHER

## 2023-01-18 RX ORDER — POTASSIUM CHLORIDE 750 MG/1
10 TABLET, EXTENDED RELEASE ORAL 2 TIMES DAILY
Qty: 60 TABLET | Refills: 2 | Status: SHIPPED | OUTPATIENT
Start: 2023-01-18 | End: 2023-03-01

## 2023-01-18 RX ORDER — HYDROCODONE BITARTRATE AND ACETAMINOPHEN 5; 325 MG/1; MG/1
1 TABLET ORAL EVERY 4 HOURS PRN
Qty: 90 TABLET | Refills: 0 | Status: SHIPPED | OUTPATIENT
Start: 2023-01-18 | End: 2023-02-07 | Stop reason: SDUPTHER

## 2023-01-18 RX ORDER — ALBUTEROL SULFATE 0.83 MG/ML
2.5 SOLUTION RESPIRATORY (INHALATION) EVERY 6 HOURS PRN
Qty: 120 EACH | Refills: 11 | Status: SHIPPED | OUTPATIENT
Start: 2023-01-18 | End: 2023-09-06

## 2023-01-18 RX ORDER — ALBUTEROL SULFATE 90 UG/1
AEROSOL, METERED RESPIRATORY (INHALATION)
COMMUNITY
Start: 2022-12-13 | End: 2023-09-06

## 2023-01-18 RX ORDER — ALBUTEROL SULFATE 90 UG/1
AEROSOL, METERED RESPIRATORY (INHALATION)
Qty: 18 G | Refills: 11 | Status: SHIPPED | OUTPATIENT
Start: 2023-01-18 | End: 2023-09-06

## 2023-01-18 NOTE — PATIENT INSTRUCTIONS
Re check for regular germs    Dose levaquin    Would check temp at night    Big large pet active Spot in lung could be infection-- infection is apparent with mucous but culture had been negative-- afb can take 2 months.    New spot in right lower lung should be infection-- having yellow mucous and blood and sweats at night    Prednisone 20 mg may help cough-- dose for 5 days --- may repeat as needed for cough.    Use norco as needed to suppress pain and cough.      Cancer typically slow-- infection quick.  Infection active.   Biopsy would be recommended (unless afb + -- regular germ not expected to cause spot in upper lung.     Would check cxr now to assure no rapid  pneumonia as right lower  lung spot new.       I would anticipate may be able to do biopsy-- if mycobacterial culture positive (typical germs that cause this problem are slow growers and take a month at least-- early February??) might hold off but would like to be sure about cancer ??    Qtc was 432 November 2022-- need to check ekg on 23rd.  F/u 23 office    Cxr stable 1/18/2023 , wbc 21k, no impressive infiltrates.  K 3.2 -- discussion with pt -- sent in potassium 10 meq bid, will discuss bx at f/u on 23rd if pt doing well.

## 2023-01-18 NOTE — PROGRESS NOTES
1/18/2023    Lety Herbert  Follow up    Chief Complaint   Patient presents with    3wk f/u    Cough    Shortness of Breath       HPI:       1/18/2023 having cough and fatigue -- coughs all day with yellow mucous with streaks blood.  Poor appetite, having back and rib pain -- lower ribs attributed to violent coughing.   Used trelegy with min help.  Walks about house with no falls-- no weak.  No percieved wt loss.  Used norco for pains -- helped min.   Has albuterol in past but not using.  Used prednisone in past-- some benefit appreciated.  Declines hosp.     Pt had pet scan viwed today-- new rll 13 mm density with min pet activity --new from 12/15/2022.      Pt now with night sweats-new last wk or so- sputum culture pending afb and nl krista 12/29.    May have fever nighttime          12/28/2022     Pt developed left chest pain acute onset-- lasted few days, pt has had lingering back pain.  Coughs a lot with yellow mucous-- tablespoon daily.  Inhalers no help in past    Had right shoulder area achey pain about 7/10 pain-- comes and goes.    chr hypoxic resp failure post pe and pulm htn assoc cor dz and osas.    Ct this month with 23 mm rul lesion cw ca. The lesion is new from 2019. Ild dz with bronchiectasis assoc mucous plugging seen.   Upper lung honeycombing suggested ct 12/15/2022 -- viewed directly.     No asbestos exposure but father worked [employer].  Pt was smoker    Pt had double mastectomy 15 yrs ago-- no oncology follow up  Patient Instructions   Bronchiectasis with yellow mucous-- cultures needed    Trial trelegy in place of current inhaler. Use aerobika for 10 breaths after inhaler.      Lung tissue with progression fibrosis-- slow process.  Consider treatment later.    Lung lesion right upper lung worrisome for cancer-- new over last 4 yrs-- enlarging lymph node in middle chest is still small.    Check pet scan.     May need biopsy -  will need to skip xarelto prior biopsy- needle of lung but may  need ebus.      Back pain noted on right -- will send in [place] for as needed use.  Pet scan may help determine if cancer related.               Will direct care with result of pet scan.  Could do phone follow up to discuss in detail if complex.   Below from Dr Villalpando  12/28/2021- breathing stable, able to walk around store w/ oxygen. Tries to walk 20 min per day. Denies chest congestion/cough. Sometimes nose congested, uses astelin. She takes singulair nightly.  Patient Instructions   Oxygen - continue with activity and any time sats <92%  Clubbing of fingernails is not harmful- due to chronic hypoxia  06/30/2021-   Continue oxygen  Follow up with cardiologist  Stop claritin and try singulair one pill every night for sinus drip  continue flonase  while seated feels fine but gets breathless w/ minimal activity. Daughter reports dyspnea is getting worse over time. Reports cardiologist wants to place pacemaker for her but insurance denied it. Does not feel well and sits in recliner all the time. Reports constant sinus drip and cough w/ green/yellow mucous.    12/30/2020-   Call medical supply for the oxygen and ask about thinner or more comfortable nasal cannulas  Start claritin daily  Start flonase 2 sprays in each nostril daily  feels better w/ O2, wears 3L continuously except when sitting still at home. Trouble breathing w/ fatigue is episodic. Gets headaches- tension from wearing O2, glasses and mask behind ear. Has constant sinus drip. Other night R ear blocked up while wearing CPAP.  Tries mucinex DM but doesn't last long. She is on xarelto for atrial fib now.     6/30/20-   Oxygen for home use ordered- would wear continuously  Use CPAP machine- can try nasal pillows. Would use oxygen with your CPAP too.  Get lung function test and 6 minute walk to check to see how much oxygen you need.  Pt is an 78 yo female with DVT/PE, GERD, breast ca s/p double mastectomy- no chemo or XRT, melanoma of arm, AUSTIN, obesity  "presenting for initial evaluation. Pt reports oxygen desaturation for over a year but no one has ordered her any oxygen for home. First noted low O2 sat when she had sinus infection. O2 sats will drop when exerting herself or resting. Feels short of breath worsening over time. She coughs w/ phlegm from sinuses. Sees ENT who cauterized nose for bleeding. Daughter is here as well and assists w/ history.  Reports not using CPAP because she had tip of her nose removed for cancer in past and she is concerned mask will cause more cancer to come back. She wakes up a lot at night and feels drowsy during the day.  Denies any hx of lung problems. She is a past smoker- quit 40 yrs ago 1-2 PPD x 25 yrs. Used to have recurrent bronchitis and cough which stopped after she quit smoking.    Outside records from pt's cardiologist Jeremy Sargent in [facility] were faxed to us and have been reviewed- with dx including heart block, atrial fib & flutter, CAD, diastolic dysfunction, HTN, HLD, and obesity. Per that note ASAP consult was placed to pulmonary for "shortness of breath and O2 sat of 70s-80s."    The chief compliant  problem is varies w/ instability at times    PFSH:  Past Medical History:   Diagnosis Date    Anemia     Basal cell carcinoma     nose     Cancer     breast    Depression     DVT (deep venous thrombosis)     Fall 3/20/2018    Gastric ulceration     GERD (gastroesophageal reflux disease)     History of frequent urinary tract infections     Hyperlipidemia     Hypertension     Melanoma 2004?    left forearm    MRSA (methicillin resistant staph aureus) culture positive     Neuropathy     PE (pulmonary embolism)     Post-nasal drip 11/15/2018    Reflux          Past Surgical History:   Procedure Laterality Date    HYSTERECTOMY      MASTECTOMY, RADICAL Bilateral     TOTAL HIP ARTHROPLASTY Left 11/13/2017     Social History     Tobacco Use    Smoking status: Former    Smokeless tobacco: Never   Substance Use Topics    " "Alcohol use: No    Drug use: No     Family History   Problem Relation Age of Onset    Cancer Mother     Cancer Father     Heart disease Father     Melanoma Neg Hx     Psoriasis Neg Hx     Lupus Neg Hx     Eczema Neg Hx      Review of patient's allergies indicates:   Allergen Reactions    Meperidine     Promethazine     Bactrim [sulfamethoxazole-trimethoprim] Rash       Performance Status:The patient's activity level is functions out of house.  Feels SOB any exertion out of house.     Review of Systems:  a review of eleven systems covering constitutional, Eye, HEENT, Psych, Respiratory, Cardiac, GI, , Musculoskeletal, Endocrine, Dermatologic was negative except for pertinent findings as listed ABOVE and below:  All negative with pertinent positives as above       Exam:Comprehensive exam done. BP (!) 165/82 (BP Location: Right arm, Patient Position: Sitting, BP Method: Medium (Automatic))   Pulse 78   Temp 98.7 °F (37.1 °C)   Ht 5' 6" (1.676 m)   Wt 98.1 kg (216 lb 2.6 oz)   SpO2 (!) 94% Comment: on room air at rest  BMI 34.89 kg/m²   Exam included Vitals as listed, and patient's appearance and affect and alertness and mood, oral exam for yeast and hygiene and pharynx lesions and Mallapatti (M) score, neck with inspection for jvd and masses and thyroid abnormalities and lymph nodes (supraclavicular and infraclavicular nodes and axillary also examined and noted if abn), chest exam included symmetry and effort and fremitus and percussion and auscultation, cardiac exam included rhythm and gallops and murmur and rubs and jvd and edema, abdominal exam for mass and hepatosplenomegaly and tenderness and hernias and bowel sounds, Musculoskeletal exam with muscle tone and posture and mobility/gait and  strength, and skin for rashes and cyanosis and pallor and turgor, extremity for clubbing.  Findings were normal except for pertinent findings listed below:  M1, oropharynx clear  HR irregularly irreg  Lungs rales " bilaterally  No edema  Varicose veins of legs  Clubbing, no neck nodes      Radiographs (ct chest and cxr) reviewed: view by direct vision     Ct chest 12/15/2022 c/w 2019 -- progressive ild, bronchiectasis with mucous plugging, new lung lesion rul c/w ca.  Viewed.      CXR 6/8/20- interstitial markings prominent  VQ scan 6/8/20- IMPRESSION:  Normal VQ scan perfusion imaging  TTE 6/8/20-   Concentric left ventricular hypertrophy.  Normal left ventricular systolic function. The estimated ejection fraction is 65%.  Normal LV diastolic function.  Normal right ventricular systolic function.  Mild left atrial enlargement.  Mild aortic regurgitation.  Mild mitral regurgitation.  Mild tricuspid regurgitation.  Mild pulmonic regurgitation.  Normal central venous pressure (3 mmHg).  The estimated PA systolic pressure is 36 mmHg.    Labs reviewed    Results for RONN SOLORZANO (MRN [id number]) as of 6/30/2020 14:08   Ref. Range 3/11/2020 07:45   CO2 Latest Ref Range: 23 - 29 mmol/L 32 (H)        PFT 7/14/20 reviewed- mild restriction, reduced DLCO      6MWT 7/14/20- 85m, desat to 83% at rest, req 3L NC    Plan:  Clinical impression is resonably certain and repeated evaluation prn +/- follow up will be needed as below. Chronic hypoxemic resp failure due to cardiac comorbidities. PH likely groups 2/3 due to AUSTIN and HFpEF.    Lety was seen today for 3wk f/u, cough and shortness of breath.    Diagnoses and all orders for this visit:    Pneumonia of right lower lobe due to infectious organism  -     Culture, Respiratory with Gram Stain; Future  -     levoFLOXacin (LEVAQUIN) 500 MG tablet; Take 1 tablet (500 mg total) by mouth once daily.  -     X-Ray Chest PA And Lateral; Future  -     CBC auto differential; Future  -     Comprehensive Metabolic Panel; Future  -     Procalcitonin; Future    Bronchiectasis with (acute) exacerbation  -     NEBULIZER FOR HOME USE  -     albuterol (PROVENTIL) 2.5 mg /3 mL (0.083 %) nebulizer  solution; Take 3 mLs (2.5 mg total) by nebulization every 6 (six) hours as needed (cough).  -     predniSONE (DELTASONE) 20 MG tablet; Take one daily for 5 days and may repeat for shortness of breath or cough  -     albuterol (PROVENTIL/VENTOLIN HFA) 90 mcg/actuation inhaler; 2 puffs every 4 hours as needed for cough, wheeze, or shortness of breath    Abnormal PET scan of lung    Night sweats  -     Culture, Respiratory with Gram Stain; Future    Hemoptysis    Bronchitis, chronic, mucopurulent  -     Culture, Respiratory with Gram Stain; Future  -     NEBULIZER FOR HOME USE  -     albuterol (PROVENTIL) 2.5 mg /3 mL (0.083 %) nebulizer solution; Take 3 mLs (2.5 mg total) by nebulization every 6 (six) hours as needed (cough).  -     albuterol (PROVENTIL/VENTOLIN HFA) 90 mcg/actuation inhaler; 2 puffs every 4 hours as needed for cough, wheeze, or shortness of breath    Other chest pain  -     HYDROcodone-acetaminophen (NORCO) 5-325 mg per tablet; Take 1 tablet by mouth every 4 (four) hours as needed for Pain.    Therapeutic drug monitoring  -     EKG 12-lead; Future    Hypokalemia  -     potassium chloride (KLOR-CON) 10 MEQ TbSR; Take 1 tablet (10 mEq total) by mouth 2 (two) times daily.            Follow up in about 5 days (around 1/23/2023).    Discussed with patient above for education the following:      Patient Instructions   Re check for regular germs    Dose levaquin    Would check temp at night    Big large pet active Spot in lung could be infection-- infection is apparent with mucous but culture had been negative-- afb can take 2 months.    New spot in right lower lung should be infection-- having yellow mucous and blood and sweats at night    Prednisone 20 mg may help cough-- dose for 5 days --- may repeat as needed for cough.    Use norco as needed to suppress pain and cough.      Cancer typically slow-- infection quick.  Infection active.   Biopsy would be recommended (unless afb + -- regular germ not  expected to cause spot in upper lung.     Would check cxr now to assure no rapid  pneumonia as right lower  lung spot new.       I would anticipate may be able to do biopsy-- if mycobacterial culture positive (typical germs that cause this problem are slow growers and take a month at least-- early February??) might hold off but would like to be sure about cancer ??    Qtc was 432 November 2022-- need to check ekg on 23rd.  F/u 23 office    Cxr stable 1/18/2023 , wbc 21k, no impressive infiltrates.  K 3.2 -- discussion with pt -- sent in potassium 10 meq bid, will discuss bx at f/u on 23rd if pt doing well.         Trupti took 95 min

## 2023-01-19 ENCOUNTER — TELEPHONE (OUTPATIENT)
Dept: PULMONOLOGY | Facility: CLINIC | Age: 83
End: 2023-01-19
Payer: MEDICARE

## 2023-01-19 NOTE — TELEPHONE ENCOUNTER
Script printed. Will fax to albuterol to [facility].     ----- Message from Tera Alamo sent at 1/19/2023 12:34 PM CST -----  Type: Needs Medical Advice  Who Called:  Patient    Pharmacy name and phone #:    [facility] - [place], [place] - [address] [facility]  [address]  [place] [address]  Phone: [phone] Fax: [phone]      Best Call Back Number: [phone]  Additional Information: Patient states that her medication isn't at the pharmacy:  albuterol (PROVENTIL) 2.5 mg /3 mL (0.083 %) nebulizer solution    Please call to advise

## 2023-01-23 ENCOUNTER — OFFICE VISIT (OUTPATIENT)
Dept: PULMONOLOGY | Facility: CLINIC | Age: 83
End: 2023-01-23
Payer: MEDICARE

## 2023-01-23 VITALS
OXYGEN SATURATION: 90 % | WEIGHT: 209.88 LBS | HEART RATE: 88 BPM | SYSTOLIC BLOOD PRESSURE: 131 MMHG | HEIGHT: 66 IN | DIASTOLIC BLOOD PRESSURE: 70 MMHG | BODY MASS INDEX: 33.73 KG/M2

## 2023-01-23 DIAGNOSIS — F41.9 ANXIETY: Primary | ICD-10-CM

## 2023-01-23 PROCEDURE — 99214 PR OFFICE/OUTPT VISIT, EST, LEVL IV, 30-39 MIN: ICD-10-PCS | Mod: S$GLB,,, | Performed by: INTERNAL MEDICINE

## 2023-01-23 PROCEDURE — 1101F PR PT FALLS ASSESS DOC 0-1 FALLS W/OUT INJ PAST YR: ICD-10-PCS | Mod: CPTII,S$GLB,, | Performed by: INTERNAL MEDICINE

## 2023-01-23 PROCEDURE — 3288F PR FALLS RISK ASSESSMENT DOCUMENTED: ICD-10-PCS | Mod: CPTII,S$GLB,, | Performed by: INTERNAL MEDICINE

## 2023-01-23 PROCEDURE — 99999 PR PBB SHADOW E&M-EST. PATIENT-LVL V: ICD-10-PCS | Mod: PBBFAC,,, | Performed by: INTERNAL MEDICINE

## 2023-01-23 PROCEDURE — 3288F FALL RISK ASSESSMENT DOCD: CPT | Mod: CPTII,S$GLB,, | Performed by: INTERNAL MEDICINE

## 2023-01-23 PROCEDURE — 1101F PT FALLS ASSESS-DOCD LE1/YR: CPT | Mod: CPTII,S$GLB,, | Performed by: INTERNAL MEDICINE

## 2023-01-23 PROCEDURE — 1126F PR PAIN SEVERITY QUANTIFIED, NO PAIN PRESENT: ICD-10-PCS | Mod: CPTII,S$GLB,, | Performed by: INTERNAL MEDICINE

## 2023-01-23 PROCEDURE — 1126F AMNT PAIN NOTED NONE PRSNT: CPT | Mod: CPTII,S$GLB,, | Performed by: INTERNAL MEDICINE

## 2023-01-23 PROCEDURE — 1159F MED LIST DOCD IN RCRD: CPT | Mod: CPTII,S$GLB,, | Performed by: INTERNAL MEDICINE

## 2023-01-23 PROCEDURE — 3078F PR MOST RECENT DIASTOLIC BLOOD PRESSURE < 80 MM HG: ICD-10-PCS | Mod: CPTII,S$GLB,, | Performed by: INTERNAL MEDICINE

## 2023-01-23 PROCEDURE — 99214 OFFICE O/P EST MOD 30 MIN: CPT | Mod: S$GLB,,, | Performed by: INTERNAL MEDICINE

## 2023-01-23 PROCEDURE — 3078F DIAST BP <80 MM HG: CPT | Mod: CPTII,S$GLB,, | Performed by: INTERNAL MEDICINE

## 2023-01-23 PROCEDURE — 3075F PR MOST RECENT SYSTOLIC BLOOD PRESS GE 130-139MM HG: ICD-10-PCS | Mod: CPTII,S$GLB,, | Performed by: INTERNAL MEDICINE

## 2023-01-23 PROCEDURE — 1159F PR MEDICATION LIST DOCUMENTED IN MEDICAL RECORD: ICD-10-PCS | Mod: CPTII,S$GLB,, | Performed by: INTERNAL MEDICINE

## 2023-01-23 PROCEDURE — 99999 PR PBB SHADOW E&M-EST. PATIENT-LVL V: CPT | Mod: PBBFAC,,, | Performed by: INTERNAL MEDICINE

## 2023-01-23 PROCEDURE — 3075F SYST BP GE 130 - 139MM HG: CPT | Mod: CPTII,S$GLB,, | Performed by: INTERNAL MEDICINE

## 2023-01-23 RX ORDER — ALPRAZOLAM 0.25 MG/1
0.25 TABLET ORAL 3 TIMES DAILY PRN
Qty: 45 TABLET | Refills: 1 | Status: SHIPPED | OUTPATIENT
Start: 2023-01-23 | End: 2023-02-07 | Stop reason: SDUPTHER

## 2023-01-23 NOTE — PATIENT INSTRUCTIONS
May need to repeat prednisone if cough returns.    Need to do biopsy right upper density.    You still have night sweats-- apparent pneumonia cleared from last visit.      Oxygen  needs seem very high with activity?      Use oxygen more --ok to use 1-2 lpm for restt and 3 lpm activity.  Lung tissue disease looks somewhat stable since 2019.  Oxygen needs are high but able to get by at rest with room air but marginally    Repeat prednisone now.     Will need to do needle biopsy.    Will direct over phone biopsy results

## 2023-01-23 NOTE — PROGRESS NOTES
1/23/2023    Lety Herbert  Follow up    Chief Complaint   Patient presents with    Shortness of Breath     Everyday.    Wheezing     Due to congestive, pt states its getting better.     Cough     Coughs up yellow mucus.        HPI:     1/23/2023-- no fever, coughing min mucous yellow.  Ribs ok with no sign pain.  Sob still and still night sweats needing to change night gown.  Poor appetite.  Pt relates felt like she was dying last visit-- doing better now.  Occ blood streak hemoptysis.    Pt has home ox at 3 lpm --- uses intermittently with sat 90 rest and 80 or so with activity.  Pt vague on If breathing worsening last yr.  Six min walk 2020 with sat low resting.      1/18/2023 having cough and fatigue -- coughs all day with yellow mucous with streaks blood.  Poor appetite, having back and rib pain -- lower ribs attributed to violent coughing.   Used trelegy with min help.  Walks about house with no falls-- no weak.  No percieved wt loss.  Used norco for pains -- helped min.   Has albuterol in past but not using.  Used prednisone in past-- some benefit appreciated.  Declines hosp.   Pt had pet scan viwed today-- new rll 13 mm density with min pet activity --new from 12/15/2022.  Pt now with night sweats-new last wk or so- sputum culture pending afb and nl krista 12/29.  May have fever nighttime  Patient Instructions   Re check for regular germs  Dose levaquin  Would check temp at night  Big large pet active Spot in lung could be infection-- infection is apparent with mucous but culture had been negative-- afb can take 2 months.  New spot in right lower lung should be infection-- having yellow mucous and blood and sweats at night  Prednisone 20 mg may help cough-- dose for 5 days --- may repeat as needed for cough.  Use norco as needed to suppress pain and cough.  Cancer typically slow-- infection quick.  Infection active.   Biopsy would be recommended (unless afb + -- regular germ not expected to cause spot  in upper lung.   Would check cxr now to assure no rapid  pneumonia as right lower  lung spot new.     I would anticipate may be able to do biopsy-- if mycobacterial culture positive (typical germs that cause this problem are slow growers and take a month at least-- early February??) might hold off but would like to be sure about cancer ??  Qtc was 432 November 2022-- need to check ekg on 23rd.  F/u 23 office  Cx stable 1/18/2023 , wbc 21k, no impressive infiltrates.  K 3.2 -- discussion with pt -- sent in potassium 10 meq bid, will discuss bx at f/u on 23rd if pt doing well.     12/28/2022   Pt developed left chest pain acute onset-- lasted few days, pt has had lingering back pain.  Coughs a lot with yellow mucous-- tablespoon daily.  Inhalers no help in past  Had right shoulder area achey pain about 7/10 pain-- comes and goes.  chr hypoxic resp failure post pe and pulm htn assoc cor dz and osas.    Ct this month with 23 mm rul lesion cw ca. The lesion is new from 2019. Ild dz with bronchiectasis assoc mucous plugging seen.   Upper lung honeycombing suggested ct 12/15/2022 -- viewed directly.   No asbestos exposure but father worked [employer].  Pt was smoker  Pthad double mastectomy 15 yrs ago-- no oncology follow up  Patient Instructions   Bronchiectasis with yellow mucous-- cultures needed  Trial trelegy in place of current inhaler. Use aerobika for 10 breaths after inhaler.    Lung tissue with progression fibrosis-- slow process.  Consider treatment later.  Lung lesion right upper lung worrisome for cancer-- new over last 4 yrs-- enlarging lymph node in middle chest is still small.  Check pet scan.   May need biopsy -  will need to skip xarelto prior biopsy- needle of lung but may need ebus.    Back pain noted on right -- will send in [employer] for as needed use.  Pet scan may help determine if cancer related.   Will direct care with result of pet scan.  Could do phone follow up to discuss in detail if complex.   Below  from Dr Villalpando  12/28/2021- breathing stable, able to walk around store w/ oxygen. Tries to walk 20 min per day. Denies chest congestion/cough. Sometimes nose congested, uses astelin. She takes singulair nightly.  Patient Instructions   Oxygen - continue with activity and any time sats <92%  Clubbing of fingernails is not harmful- due to chronic hypoxia  06/30/2021-   Continue oxygen  Follow up with cardiologist  Stop claritin and try singulair one pill every night for sinus drip  continue flonase  while seated feels fine but gets breathless w/ minimal activity. Daughter reports dyspnea is getting worse over time. Reports cardiologist wants to place pacemaker for her but insurance denied it. Does not feel well and sits in recliner all the time. Reports constant sinus drip and cough w/ green/yellow mucous.    12/30/2020-   Call medical supply for the oxygen and ask about thinner or more comfortable nasal cannulas  Start claritin daily  Start flonase 2 sprays in each nostril daily  feels better w/ O2, wears 3L continuously except when sitting still at home. Trouble breathing w/ fatigue is episodic. Gets headaches- tension from wearing O2, glasses and mask behind ear. Has constant sinus drip. Other night R ear blocked up while wearing CPAP.  Tries mucinex DM but doesn't last long. She is on xarelto for atrial fib now.     6/30/20-   Oxygen for home use ordered- would wear continuously  Use CPAP machine- can try nasal pillows. Would use oxygen with your CPAP too.  Get lung function test and 6 minute walk to check to see how much oxygen you need.  Pt is an 78 yo female with DVT/PE, GERD, breast ca s/p double mastectomy- no chemo or XRT, melanoma of arm, [place], obesity presenting for initial evaluation. Pt reports oxygen desaturation for over a year but no one has ordered her any oxygen for home. First noted low O2 sat when she had sinus infection. O2 sats will drop when exerting herself or resting. Feels short of breath  "worsening over time. She coughs w/ phlegm from sinuses. Sees ENT who cauterized nose for bleeding. Daughter is here as well and assists w/ history.  Reports not using CPAP because she had tip of her nose removed for cancer in past and she is concerned mask will cause more cancer to come back. She wakes up a lot at night and feels drowsy during the day.  Denies any hx of lung problems. She is a past smoker- quit 40 yrs ago 1-2 PPD x 25 yrs. Used to have recurrent bronchitis and cough which stopped after she quit smoking.    Outside records from pt's cardiologist Jeremy Sargent in [facility] were faxed to us and have been reviewed- with dx including heart block, atrial fib & flutter, CAD, diastolic dysfunction, HTN, HLD, and obesity. Per that note ASAP consult was placed to pulmonary for "shortness of breath and O2 sat of 70s-80s."    The chief compliant  problem is varies w/ instability at times    PFSH:  Past Medical History:   Diagnosis Date    Anemia     Basal cell carcinoma     nose     Cancer     breast    Depression     DVT (deep venous thrombosis)     Fall 3/20/2018    Gastric ulceration     GERD (gastroesophageal reflux disease)     History of frequent urinary tract infections     Hyperlipidemia     Hypertension     Melanoma 2004?    left forearm    MRSA (methicillin resistant staph aureus) culture positive     Neuropathy     PE (pulmonary embolism)     Post-nasal drip 11/15/2018    Reflux          Past Surgical History:   Procedure Laterality Date    HYSTERECTOMY      MASTECTOMY, RADICAL Bilateral     TOTAL HIP ARTHROPLASTY Left 11/13/2017     Social History     Tobacco Use    Smoking status: Former    Smokeless tobacco: Never   Substance Use Topics    Alcohol use: No    Drug use: No     Family History   Problem Relation Age of Onset    Cancer Mother     Cancer Father     Heart disease Father     Melanoma Neg Hx     Psoriasis Neg Hx     Lupus Neg Hx     Eczema Neg Hx      Review of patient's allergies " "indicates:   Allergen Reactions    Meperidine     Promethazine     Bactrim [sulfamethoxazole-trimethoprim] Rash       Performance Status:The patient's activity level is functions out of house.  Feels SOB any exertion out of house.     Review of Systems:  a review of eleven systems covering constitutional, Eye, HEENT, Psych, Respiratory, Cardiac, GI, , Musculoskeletal, Endocrine, Dermatologic was negative except for pertinent findings as listed ABOVE and below:  All negative with pertinent positives as above       Exam:Comprehensive exam done. /70 (BP Location: Left arm, Patient Position: Sitting, BP Method: Pediatric (Automatic))   Pulse 88   Ht 5' 6" (1.676 m)   Wt 95.2 kg (209 lb 14.1 oz)   SpO2 (!) 90% Comment: rest sat  BMI 33.88 kg/m²   Exam included Vitals as listed, and patient's appearance and affect and alertness and mood, oral exam for yeast and hygiene and pharynx lesions and Mallapatti (M) score, neck with inspection for jvd and masses and thyroid abnormalities and lymph nodes (supraclavicular and infraclavicular nodes and axillary also examined and noted if abn), chest exam included symmetry and effort and fremitus and percussion and auscultation, cardiac exam included rhythm and gallops and murmur and rubs and jvd and edema, abdominal exam for mass and hepatosplenomegaly and tenderness and hernias and bowel sounds, Musculoskeletal exam with muscle tone and posture and mobility/gait and  strength, and skin for rashes and cyanosis and pallor and turgor, extremity for clubbing.  Findings were normal except for pertinent findings listed below:  M1, oropharynx clear  HR irregularly irreg  Lungs rales bilaterally  No edema  Varicose veins of legs  Clubbing, no neck nodes      Radiographs (ct chest and cxr) reviewed: view by direct vision     Ct chest 12/15/2022 c/w 2019 -- progressive ild, bronchiectasis with mucous plugging, new lung lesion rul c/w ca.  Viewed.      CXR 6/8/20- interstitial " markings prominent  VQ scan 6/8/20- IMPRESSION:  Normal VQ scan perfusion imaging  TTE 6/8/20-   Concentric left ventricular hypertrophy.  Normal left ventricular systolic function. The estimated ejection fraction is 65%.  Normal LV diastolic function.  Normal right ventricular systolic function.  Mild left atrial enlargement.  Mild aortic regurgitation.  Mild mitral regurgitation.  Mild tricuspid regurgitation.  Mild pulmonic regurgitation.  Normal central venous pressure (3 mmHg).  The estimated PA systolic pressure is 36 mmHg.    Labs reviewed    Results for RONN SOLORZANO (MRN [id number]) as of 6/30/2020 14:08   Ref. Range 3/11/2020 07:45   CO2 Latest Ref Range: 23 - 29 mmol/L 32 (H)        PFT 7/14/20 reviewed- mild restriction, reduced DLCO      6MWT 7/14/20- 85m, desat to 83% at rest, req 3L NC    Plan:  Clinical impression is resonably certain and repeated evaluation prn +/- follow up will be needed as below. Chronic hypoxemic resp failure due to cardiac comorbidities. PH likely groups 2/3 due to AUST[place] and HFpEF.    Lety was seen today for shortness of breath, wheezing and cough.    Diagnoses and all orders for this visit:    Anxiety    Other orders  -     ALPRAZolam (XANAX) 0.25 MG tablet; Take 1 tablet (0.25 mg total) by mouth 3 (three) times daily as needed for Anxiety.              Follow up in about 2 weeks (around 2/6/2023), or if symptoms worsen or fail to improve.    Discussed with patient above for education the following:      Patient Instructions   May need to repeat prednisone if cough returns.    Need to do biopsy right upper density.    You still have night sweats-- apparent pneumonia cleared from last visit.      Oxygen  needs seem very high with activity?      Use oxygen more --ok to use 1-2 lpm for restt and 3 lpm activity.  Lung tissue disease looks somewhat stable since 2019.  Oxygen needs are high but able to get by at rest with room air but marginally    Repeat prednisone now.      Will need to do needle biopsy.    Will direct over phone biopsy results          Eval took 39 min

## 2023-02-07 ENCOUNTER — OFFICE VISIT (OUTPATIENT)
Dept: PULMONOLOGY | Facility: CLINIC | Age: 83
End: 2023-02-07
Payer: MEDICARE

## 2023-02-07 VITALS
WEIGHT: 212.44 LBS | HEIGHT: 66 IN | HEART RATE: 69 BPM | OXYGEN SATURATION: 95 % | SYSTOLIC BLOOD PRESSURE: 123 MMHG | DIASTOLIC BLOOD PRESSURE: 75 MMHG | BODY MASS INDEX: 34.14 KG/M2

## 2023-02-07 DIAGNOSIS — G47.09 OTHER INSOMNIA: ICD-10-CM

## 2023-02-07 DIAGNOSIS — J18.9 PNEUMONIA OF RIGHT LOWER LOBE DUE TO INFECTIOUS ORGANISM: ICD-10-CM

## 2023-02-07 DIAGNOSIS — G89.29 CHRONIC RIGHT-SIDED THORACIC BACK PAIN: ICD-10-CM

## 2023-02-07 DIAGNOSIS — R91.8 MASS OF UPPER LOBE OF RIGHT LUNG: Primary | ICD-10-CM

## 2023-02-07 DIAGNOSIS — J44.9 CHRONIC OBSTRUCTIVE PULMONARY DISEASE, UNSPECIFIED COPD TYPE: ICD-10-CM

## 2023-02-07 DIAGNOSIS — F41.9 ANXIETY: ICD-10-CM

## 2023-02-07 DIAGNOSIS — R94.2 ABNORMAL PET SCAN OF LUNG: ICD-10-CM

## 2023-02-07 DIAGNOSIS — R07.89 OTHER CHEST PAIN: ICD-10-CM

## 2023-02-07 DIAGNOSIS — M54.6 CHRONIC RIGHT-SIDED THORACIC BACK PAIN: ICD-10-CM

## 2023-02-07 DIAGNOSIS — J47.1 BRONCHIECTASIS WITH (ACUTE) EXACERBATION: ICD-10-CM

## 2023-02-07 DIAGNOSIS — J96.11 CHRONIC HYPOXEMIC RESPIRATORY FAILURE: ICD-10-CM

## 2023-02-07 PROCEDURE — 1159F MED LIST DOCD IN RCRD: CPT | Mod: CPTII,S$GLB,, | Performed by: INTERNAL MEDICINE

## 2023-02-07 PROCEDURE — 3078F PR MOST RECENT DIASTOLIC BLOOD PRESSURE < 80 MM HG: ICD-10-PCS | Mod: CPTII,S$GLB,, | Performed by: INTERNAL MEDICINE

## 2023-02-07 PROCEDURE — 1125F AMNT PAIN NOTED PAIN PRSNT: CPT | Mod: CPTII,S$GLB,, | Performed by: INTERNAL MEDICINE

## 2023-02-07 PROCEDURE — 1101F PR PT FALLS ASSESS DOC 0-1 FALLS W/OUT INJ PAST YR: ICD-10-PCS | Mod: CPTII,S$GLB,, | Performed by: INTERNAL MEDICINE

## 2023-02-07 PROCEDURE — 3078F DIAST BP <80 MM HG: CPT | Mod: CPTII,S$GLB,, | Performed by: INTERNAL MEDICINE

## 2023-02-07 PROCEDURE — 1159F PR MEDICATION LIST DOCUMENTED IN MEDICAL RECORD: ICD-10-PCS | Mod: CPTII,S$GLB,, | Performed by: INTERNAL MEDICINE

## 2023-02-07 PROCEDURE — 3288F FALL RISK ASSESSMENT DOCD: CPT | Mod: CPTII,S$GLB,, | Performed by: INTERNAL MEDICINE

## 2023-02-07 PROCEDURE — 3074F PR MOST RECENT SYSTOLIC BLOOD PRESSURE < 130 MM HG: ICD-10-PCS | Mod: CPTII,S$GLB,, | Performed by: INTERNAL MEDICINE

## 2023-02-07 PROCEDURE — 3288F PR FALLS RISK ASSESSMENT DOCUMENTED: ICD-10-PCS | Mod: CPTII,S$GLB,, | Performed by: INTERNAL MEDICINE

## 2023-02-07 PROCEDURE — 1101F PT FALLS ASSESS-DOCD LE1/YR: CPT | Mod: CPTII,S$GLB,, | Performed by: INTERNAL MEDICINE

## 2023-02-07 PROCEDURE — 99214 OFFICE O/P EST MOD 30 MIN: CPT | Mod: S$GLB,,, | Performed by: INTERNAL MEDICINE

## 2023-02-07 PROCEDURE — 99999 PR PBB SHADOW E&M-EST. PATIENT-LVL IV: ICD-10-PCS | Mod: PBBFAC,,, | Performed by: INTERNAL MEDICINE

## 2023-02-07 PROCEDURE — 99999 PR PBB SHADOW E&M-EST. PATIENT-LVL IV: CPT | Mod: PBBFAC,,, | Performed by: INTERNAL MEDICINE

## 2023-02-07 PROCEDURE — 3074F SYST BP LT 130 MM HG: CPT | Mod: CPTII,S$GLB,, | Performed by: INTERNAL MEDICINE

## 2023-02-07 PROCEDURE — 1125F PR PAIN SEVERITY QUANTIFIED, PAIN PRESENT: ICD-10-PCS | Mod: CPTII,S$GLB,, | Performed by: INTERNAL MEDICINE

## 2023-02-07 PROCEDURE — 99214 PR OFFICE/OUTPT VISIT, EST, LEVL IV, 30-39 MIN: ICD-10-PCS | Mod: S$GLB,,, | Performed by: INTERNAL MEDICINE

## 2023-02-07 RX ORDER — HYDROCODONE BITARTRATE AND ACETAMINOPHEN 5; 325 MG/1; MG/1
1 TABLET ORAL EVERY 6 HOURS PRN
Qty: 90 TABLET | Refills: 0 | Status: SHIPPED | OUTPATIENT
Start: 2023-04-07 | End: 2023-08-21

## 2023-02-07 RX ORDER — PREDNISONE 20 MG/1
TABLET ORAL
Qty: 20 TABLET | Refills: 0 | Status: SHIPPED | OUTPATIENT
Start: 2023-02-07 | End: 2023-03-01

## 2023-02-07 RX ORDER — ALPRAZOLAM 0.25 MG/1
0.25 TABLET ORAL 3 TIMES DAILY PRN
Qty: 45 TABLET | Refills: 4 | Status: SHIPPED | OUTPATIENT
Start: 2023-02-07 | End: 2023-05-25 | Stop reason: SDUPTHER

## 2023-02-07 RX ORDER — HYDROCODONE BITARTRATE AND ACETAMINOPHEN 5; 325 MG/1; MG/1
1 TABLET ORAL EVERY 6 HOURS PRN
Qty: 90 TABLET | Refills: 0 | Status: SHIPPED | OUTPATIENT
Start: 2023-03-07 | End: 2023-05-25

## 2023-02-07 RX ORDER — HYDROCODONE BITARTRATE AND ACETAMINOPHEN 5; 325 MG/1; MG/1
1 TABLET ORAL EVERY 6 HOURS PRN
Qty: 90 TABLET | Refills: 0 | Status: SHIPPED | OUTPATIENT
Start: 2023-02-07 | End: 2023-05-25

## 2023-02-07 NOTE — PATIENT INSTRUCTIONS
Use levaquin prompt for infection.    May use prednisone if cough or breathing worsens    Will send in norco 90/month for 3 months.    Renewed xanax for as needed.    Will order needle bx.  Pet scan  and cxr viewed.  Will have cxr after bx.      Check six min walk and breathing test -- suspect surgery may not be offered as oxygen level had been low.   If cancer may refer to oncology doc.  May refer to Maryse Lange MD in [place]    Will call and discuss bx results over phone.

## 2023-02-07 NOTE — PROGRESS NOTES
2/7/2023    Leyt Finley Herbert  Follow up    Chief Complaint   Patient presents with    Follow-up     2 week f/u        HPI:     2/7/2023--  feels better, no cough nor mucous,  no night sweats, appetite ok but not strong.   Uses oxygen occasionally.     1/23/2023-- no fever, coughing min mucous yellow.  Ribs ok with no sign pain.  Sob still and still night sweats needing to change night gown.  Poor appetite.  Pt relates felt like she was dying last visit-- doing better now.  Occ blood streak hemoptysis.    Pt has home ox at 3 lpm --- uses intermittently with sat 90 rest and 80 or so with activity.  Pt vague on If breathing worsening last yr.  Six min walk 2020 with sat low resting.  Patient Instructions   May need to repeat prednisone if cough returns.    Need to do biopsy right upper density.    You still have night sweats-- apparent pneumonia cleared from last visit.      Oxygen  needs seem very high with activity?      Use oxygen more --ok to use 1-2 lpm for restt and 3 lpm activity.  Lung tissue disease looks somewhat stable since 2019.  Oxygen needs are high but able to get by at rest with room air but marginally    Repeat prednisone now.     Will need to do needle biopsy.    Will direct over phone biopsy results       1/18/2023 having cough and fatigue -- coughs all day with yellow mucous with streaks blood.  Poor appetite, having back and rib pain -- lower ribs attributed to violent coughing.   Used trelegy with min help.  Walks about house with no falls-- no weak.  No percieved wt loss.  Used norco for pains -- helped min.   Has albuterol in past but not using.  Used prednisone in past-- some benefit appreciated.  Declines hosp.   Pt had pet scan viwed today-- new rll 13 mm density with min pet activity --new from 12/15/2022.  Pt now with night sweats-new last wk or so- sputum culture pending afb and nl krista 12/29.  May have fever nighttime  Patient Instructions   Re check for regular germs  Dose  levaquin  Would check temp at night  Big large pet active Spot in lung could be infection-- infection is apparent with mucous but culture had been negative-- afb can take 2 months.  New spot in right lower lung should be infection-- having yellow mucous and blood and sweats at night  Prednisone 20 mg may help cough-- dose for 5 days --- may repeat as needed for cough.  Use norco as needed to suppress pain and cough.  Cancer typically slow-- infection quick.  Infection active.   Biopsy would be recommended (unless afb + -- regular germ not expected to cause spot in upper lung.   Would check cxr now to assure no rapid  pneumonia as right lower  lung spot new.     I would anticipate may be able to do biopsy-- if mycobacterial culture positive (typical germs that cause this problem are slow growers and take a month at least-- early February??) might hold off but would like to be sure about cancer ??  Qtc was 432 November 2022-- need to check ekg on 23rd.  F/u 23 office  Cx stable 1/18/2023 , wbc 21k, no impressive infiltrates.  K 3.2 -- discussion with pt -- sent in potassium 10 meq bid, will discuss bx at f/u on 23rd if pt doing well.     12/28/2022   Pt developed left chest pain acute onset-- lasted few days, pt has had lingering back pain.  Coughs a lot with yellow mucous-- tablespoon daily.  Inhalers no help in past  Had right shoulder area achey pain about 7/10 pain-- comes and goes.  chr hypoxic resp failure post pe and pulm htn assoc cor dz and osas.    Ct this month with 23 mm rul lesion cw ca. The lesion is new from 2019. Ild dz with bronchiectasis assoc mucous plugging seen.   Upper lung honeycombing suggested ct 12/15/2022 -- viewed directly.   No asbestos exposure but father worked [employer].  Pt was smoker  Pthad double mastectomy 15 yrs ago-- no oncology follow up  Patient Instructions   Bronchiectasis with yellow mucous-- cultures needed  Trial trelegy in place of current inhaler. Use aerobika for 10 breaths  after inhaler.    Lung tissue with progression fibrosis-- slow process.  Consider treatment later.  Lung lesion right upper lung worrisome for cancer-- new over last 4 yrs-- enlarging lymph node in middle chest is still small.  Check pet scan.   May need biopsy -  will need to skip xarelto prior biopsy- needle of lung but may need ebus.    Back pain noted on right -- will send in [place] for as needed use.  Pet scan may help determine if cancer related.   Will direct care with result of pet scan.  Could do phone follow up to discuss in detail if complex.   Below from Dr Villalpando  12/28/2021- breathing stable, able to walk around store w/ oxygen. Tries to walk 20 min per day. Denies chest congestion/cough. Sometimes nose congested, uses astelin. She takes singulair nightly.  Patient Instructions   Oxygen - continue with activity and any time sats <92%  Clubbing of fingernails is not harmful- due to chronic hypoxia  06/30/2021-   Continue oxygen  Follow up with cardiologist  Stop claritin and try singulair one pill every night for sinus drip  continue flonase  while seated feels fine but gets breathless w/ minimal activity. Daughter reports dyspnea is getting worse over time. Reports cardiologist wants to place pacemaker for her but insurance denied it. Does not feel well and sits in recliner all the time. Reports constant sinus drip and cough w/ green/yellow mucous.    12/30/2020-   Call medical supply for the oxygen and ask about thinner or more comfortable nasal cannulas  Start claritin daily  Start flonase 2 sprays in each nostril daily  feels better w/ O2, wears 3L continuously except when sitting still at home. Trouble breathing w/ fatigue is episodic. Gets headaches- tension from wearing O2, glasses and mask behind ear. Has constant sinus drip. Other night R ear blocked up while wearing CPAP.  Tries mucinex DM but doesn't last long. She is on xarelto for atrial fib now.     6/30/20-   Oxygen for home use ordered-  "would wear continuously  Use CPAP machine- can try nasal pillows. Would use oxygen with your CPAP too.  Get lung function test and 6 minute walk to check to see how much oxygen you need.  Pt is an 78 yo female with DVT/PE, GERD, breast ca s/p double mastectomy- no chemo or XRT, melanoma of arm, AUSTIN, obesity presenting for initial evaluation. Pt reports oxygen desaturation for over a year but no one has ordered her any oxygen for home. First noted low O2 sat when she had sinus infection. O2 sats will drop when exerting herself or resting. Feels short of breath worsening over time. She coughs w/ phlegm from sinuses. Sees ENT who cauterized nose for bleeding. Daughter is here as well and assists w/ history.  Reports not using CPAP because she had tip of her nose removed for cancer in past and she is concerned mask will cause more cancer to come back. She wakes up a lot at night and feels drowsy during the day.  Denies any hx of lung problems. She is a past smoker- quit 40 yrs ago 1-2 PPD x 25 yrs. Used to have recurrent bronchitis and cough which stopped after she quit smoking.    Outside records from pt's cardiologist Jeremy Sargent in [facility] were faxed to us and have been reviewed- with dx including heart block, atrial fib & flutter, CAD, diastolic dysfunction, HTN, HLD, and obesity. Per that note ASAP consult was placed to pulmonary for "shortness of breath and O2 sat of 70s-80s."    The chief compliant  problem is varies w/ instability at times    PFSH:  Past Medical History:   Diagnosis Date    Anemia     Basal cell carcinoma     nose     Cancer     breast    Depression     DVT (deep venous thrombosis)     Fall 3/20/2018    Gastric ulceration     GERD (gastroesophageal reflux disease)     History of frequent urinary tract infections     Hyperlipidemia     Hypertension     Melanoma 2004?    left forearm    MRSA (methicillin resistant staph aureus) culture positive     Neuropathy     PE (pulmonary embolism)     " "Post-nasal drip 11/15/2018    Reflux          Past Surgical History:   Procedure Laterality Date    HYSTERECTOMY      MASTECTOMY, RADICAL Bilateral     TOTAL HIP ARTHROPLASTY Left 11/13/2017     Social History     Tobacco Use    Smoking status: Former    Smokeless tobacco: Never   Substance Use Topics    Alcohol use: No    Drug use: No     Family History   Problem Relation Age of Onset    Cancer Mother     Cancer Father     Heart disease Father     Melanoma Neg Hx     Psoriasis Neg Hx     Lupus Neg Hx     Eczema Neg Hx      Review of patient's allergies indicates:   Allergen Reactions    Meperidine     Promethazine     Bactrim [sulfamethoxazole-trimethoprim] Rash       Performance Status:The patient's activity level is functions out of house.  Feels SOB any exertion out of house.     Review of Systems:  a review of eleven systems covering constitutional, Eye, HEENT, Psych, Respiratory, Cardiac, GI, , Musculoskeletal, Endocrine, Dermatologic was negative except for pertinent findings as listed ABOVE and below:  All negative with pertinent positives as above       Exam:Comprehensive exam done. /75 (BP Location: Left arm, Patient Position: Sitting, BP Method: Small (Automatic)) Comment (BP Location): wrist  Pulse 69   Ht 5' 6" (1.676 m)   Wt 96.3 kg (212 lb 6.6 oz)   SpO2 95% Comment: 3 L pulse  BMI 34.28 kg/m²   Exam included Vitals as listed, and patient's appearance and affect and alertness and mood, oral exam for yeast and hygiene and pharynx lesions and Mallapatti (M) score, neck with inspection for jvd and masses and thyroid abnormalities and lymph nodes (supraclavicular and infraclavicular nodes and axillary also examined and noted if abn), chest exam included symmetry and effort and fremitus and percussion and auscultation, cardiac exam included rhythm and gallops and murmur and rubs and jvd and edema, abdominal exam for mass and hepatosplenomegaly and tenderness and hernias and bowel sounds, " Musculoskeletal exam with muscle tone and posture and mobility/gait and  strength, and skin for rashes and cyanosis and pallor and turgor, extremity for clubbing.  Findings were normal except for pertinent findings listed below:  M1, oropharynx clear  HR irregularly irreg  Lungs rales bilaterally  No edema  Varicose veins of legs  Clubbing, no neck nodes      Radiographs (ct chest and cxr) reviewed: view by direct vision   Cxr 1/18/2023 patchy pneumonia  Ct chest 12/15/2022 c/w 2019 -- progressive ild, bronchiectasis with mucous plugging, new lung lesion rul c/w ca.  Viewed.      CXR 6/8/20- interstitial markings prominent  VQ scan 6/8/20- IMPRESSION:  Normal VQ scan perfusion imaging  TTE 6/8/20-   Concentric left ventricular hypertrophy.  Normal left ventricular systolic function. The estimated ejection fraction is 65%.  Normal LV diastolic function.  Normal right ventricular systolic function.  Mild left atrial enlargement.  Mild aortic regurgitation.  Mild mitral regurgitation.  Mild tricuspid regurgitation.  Mild pulmonic regurgitation.  Normal central venous pressure (3 mmHg).  The estimated PA systolic pressure is 36 mmHg.    Labs reviewed    Results for RONN SOLORZANO (MRN [id number]) as of 6/30/2020 14:08   Ref. Range 3/11/2020 07:45   CO2 Latest Ref Range: 23 - 29 mmol/L 32 (H)        PFT 7/14/20 reviewed- mild restriction, reduced DLCO      6MWT 7/14/20- 85m, desat to 83% at rest, req 3L NC    Plan:  Clinical impression is resonably certain and repeated evaluation prn +/- follow up will be needed as below. Chronic hypoxemic resp failure due to cardiac comorbidities. PH likely groups 2/3 due to AUSTIN and HFpEF.    Lety was seen today for follow-up.    Diagnoses and all orders for this visit:    Mass of upper lobe of right lung  -     CT Guided Needle Placement; Future    Other chest pain  -     HYDROcodone-acetaminophen (NORCO) 5-325 mg per tablet; Take 1 tablet by mouth every 6 (six) hours as  needed for Pain.  -     HYDROcodone-acetaminophen (NORCO) 5-325 mg per tablet; Take 1 tablet by mouth every 6 (six) hours as needed for Pain.  -     HYDROcodone-acetaminophen (NORCO) 5-325 mg per tablet; Take 1 tablet by mouth every 6 (six) hours as needed for Pain.    Chronic right-sided thoracic back pain  -     HYDROcodone-acetaminophen (NORCO) 5-325 mg per tablet; Take 1 tablet by mouth every 6 (six) hours as needed for Pain.  -     HYDROcodone-acetaminophen (NORCO) 5-325 mg per tablet; Take 1 tablet by mouth every 6 (six) hours as needed for Pain.  -     HYDROcodone-acetaminophen (NORCO) 5-325 mg per tablet; Take 1 tablet by mouth every 6 (six) hours as needed for Pain.    Bronchiectasis with (acute) exacerbation  -     predniSONE (DELTASONE) 20 MG tablet; Take one daily for 5 days and may repeat for shortness of breath or cough    Chronic hypoxemic respiratory failure  -     Six Minute Walk Test to qualify for Home Oxygen; Future  -     Spirometry without bronchodilator; Future    Pneumonia of right lower lobe due to infectious organism    Chronic obstructive pulmonary disease, unspecified COPD type  -     Six Minute Walk Test to qualify for Home Oxygen; Future  -     Spirometry without bronchodilator; Future    Abnormal PET scan of lung  -     CT Guided Needle Placement; Future    Other insomnia  -     ALPRAZolam (XANAX) 0.25 MG tablet; Take 1 tablet (0.25 mg total) by mouth 3 (three) times daily as needed for Anxiety.    Anxiety  -     ALPRAZolam (XANAX) 0.25 MG tablet; Take 1 tablet (0.25 mg total) by mouth 3 (three) times daily as needed for Anxiety.                Follow up if symptoms worsen or fail to improve.    Discussed with patient above for education the following:      Patient Instructions   Use levaquin prompt for infection.    May use prednisone if cough or breathing worsens    Will send in norco 90/month for 3 months.    Renewed xanax for as needed.    Will order needle bx.  Pet scan  and cxr  viewed.  Will have cxr after bx.      Check six min walk and breathing test -- suspect surgery may not be offered as oxygen level had been low.   If cancer may refer to oncology doc.  [place] [place] [facility]    Will call and discuss bx results over phone.

## 2023-02-12 LAB
ACID FAST MOD KINY STN SPEC: NORMAL
MYCOBACTERIUM SPEC QL CULT: NORMAL

## 2023-02-14 DIAGNOSIS — R91.1 LUNG NODULE: Primary | ICD-10-CM

## 2023-02-14 NOTE — NURSING
Radiology Pre-Procedural Instructions- [facility]    Radiology Nurse contacted patient to provide instructions for scheduled CT Lung Biopsy Wednesday Feb 15th at 11 am .   Patient instructed to arrive no later than 800 am to outpatient registration.   *Patient has an appointment for a pulmonary function test (8am) prior to pre-op   Registration will direct patient to outpatient lab for pre-op lab work if required.  Following labs, patient needs to check in at the surgery waiting room desk located on the second floor.   Patient is currently taking xeralto . Prior approval to withhold meds received from [facility] . Pts last dose 2/7/23 .  Patient instructed to remain NPO after midnight with the exception of approved essential meds taken with a small sip of water.  Instructed patient to bathe the night before and the morning of their procedure with an anti bacterial soap or Hibiclens.   Patient is aware of their responsibility to make post transportation arrangements home by a responsible adult.   Patient educated on all additional pre-op instructions per policy and verbalized understanding.

## 2023-02-15 ENCOUNTER — HOSPITAL ENCOUNTER (OUTPATIENT)
Dept: PULMONOLOGY | Facility: HOSPITAL | Age: 83
Discharge: HOME | End: 2023-02-15
Attending: INTERNAL MEDICINE
Payer: MEDICARE

## 2023-02-15 ENCOUNTER — HOSPITAL ENCOUNTER (OUTPATIENT)
Dept: RADIOLOGY | Facility: HOSPITAL | Age: 83
Discharge: HOME | End: 2023-02-15
Attending: INTERNAL MEDICINE
Payer: MEDICARE

## 2023-02-15 ENCOUNTER — HOSPITAL ENCOUNTER (OUTPATIENT)
Dept: RADIOLOGY | Facility: HOSPITAL | Age: 83
Discharge: HOME | End: 2023-02-15
Attending: RADIOLOGY
Payer: MEDICARE

## 2023-02-15 VITALS
HEART RATE: 61 BPM | SYSTOLIC BLOOD PRESSURE: 150 MMHG | TEMPERATURE: 98 F | RESPIRATION RATE: 16 BRPM | HEIGHT: 66 IN | OXYGEN SATURATION: 96 % | WEIGHT: 212 LBS | DIASTOLIC BLOOD PRESSURE: 70 MMHG | BODY MASS INDEX: 34.07 KG/M2

## 2023-02-15 DIAGNOSIS — J96.11 CHRONIC HYPOXEMIC RESPIRATORY FAILURE: ICD-10-CM

## 2023-02-15 DIAGNOSIS — J44.9 CHRONIC OBSTRUCTIVE PULMONARY DISEASE, UNSPECIFIED COPD TYPE: ICD-10-CM

## 2023-02-15 DIAGNOSIS — R94.2 ABNORMAL PET SCAN OF LUNG: ICD-10-CM

## 2023-02-15 DIAGNOSIS — R91.8 MASS OF UPPER LOBE OF RIGHT LUNG: ICD-10-CM

## 2023-02-15 DIAGNOSIS — J44.9 CHRONIC OBSTRUCTIVE PULMONARY DISEASE, UNSPECIFIED COPD TYPE: Primary | ICD-10-CM

## 2023-02-15 LAB
BR6MWT: NORMAL
BRPFT: ABNORMAL
ERV LLN: -16449.54
ERV PRE REF: 258.7 %
ERV REF: 0.46
FEF 25 75 LLN: 0.6
FEF 25 75 PRE REF: 105 %
FEF 25 75 REF: 1.51
FEV1 FVC LLN: 61
FEV1 FVC PRE REF: 98 %
FEV1 FVC REF: 76
FEV1 LLN: 1.34
FEV1 PRE REF: 104.1 %
FEV1 REF: 1.95
FRCPLETH LLN: 2.02
FRCPLETH PREREF: 83.2 %
FRCPLETH REF: 2.84
FVC LLN: 1.79
FVC PRE REF: 104.4 %
FVC REF: 2.6
MVV LLN: 65
MVV PRE REF: 94.1 %
MVV REF: 77
PEF LLN: 2.89
PEF PRE REF: 102.6 %
PEF REF: 4.71
PRE ERV: 1.19 L (ref -16449.54–16450.46)
PRE FEF 25 75: 1.58 L/S (ref 0.76–3.56)
PRE FET 100: 11.75 SEC
PRE FEV1 FVC: 74.82 %
PRE FEV1: 2.03 L
PRE FRC PL: 2.36 L (ref 2.02–3.66)
PRE FVC: 2.72 L
PRE MVV: 72.31 L/MIN (ref 65.34–88.4)
PRE PEF: 4.83 L/S (ref 4.11–7.07)
PRE RV: 1.17 L (ref 1.8–2.95)
PRE TLC: 3.89 L (ref 4.29–6.26)
RAW LLN: 3.06
RAW PRE REF: 177.5 %
RAW PRE: 5.43 CMH2O*S/L (ref 3.06–3.06)
RAW REF: 3.06
RV LLN: 1.8
RV PRE REF: 49.2 %
RV REF: 2.38
RVTLC LLN: 38
RVTLC PRE REF: 63.4 %
RVTLC PRE: 30.12 % (ref 37.93–57.11)
RVTLC REF: 48
TLC LLN: 4.29
TLC PRE REF: 73.7 %
TLC REF: 5.27
VC LLN: 1.79
VC PRE REF: 104.4 %
VC PRE: 2.72 L
VC REF: 2.6

## 2023-02-15 PROCEDURE — 63600175 PHARM REV CODE 636 W HCPCS: Performed by: RADIOLOGY

## 2023-02-15 PROCEDURE — 71045 X-RAY EXAM CHEST 1 VIEW: CPT | Mod: 26,76,, | Performed by: RADIOLOGY

## 2023-02-15 PROCEDURE — 71045 X-RAY EXAM CHEST 1 VIEW: CPT | Mod: TC,FY

## 2023-02-15 PROCEDURE — 71045 XR CHEST 1 VIEW: ICD-10-PCS | Mod: 26,,, | Performed by: RADIOLOGY

## 2023-02-15 PROCEDURE — 71045 XR CHEST 1 VIEW: ICD-10-PCS | Mod: 26,76,, | Performed by: RADIOLOGY

## 2023-02-15 PROCEDURE — 94618 PULMONARY STRESS TESTING: CPT | Mod: 26,,, | Performed by: INTERNAL MEDICINE

## 2023-02-15 PROCEDURE — 94010 BREATHING CAPACITY TEST: ICD-10-PCS | Mod: 26,59,, | Performed by: INTERNAL MEDICINE

## 2023-02-15 PROCEDURE — 94010 BREATHING CAPACITY TEST: CPT

## 2023-02-15 PROCEDURE — 94726 PLETHYSMOGRAPHY LUNG VOLUMES: CPT | Mod: 26,,, | Performed by: INTERNAL MEDICINE

## 2023-02-15 PROCEDURE — 99900103 DSU ONLY-NO CHARGE-INITIAL HR (STAT)

## 2023-02-15 PROCEDURE — 94618 PULMONARY STRESS TESTING: CPT

## 2023-02-15 PROCEDURE — 32408 CORE NDL BX LNG/MED PERQ: CPT

## 2023-02-15 PROCEDURE — 94726 PULM FUNCT TST PLETHYSMOGRAP: ICD-10-PCS | Mod: 26,,, | Performed by: INTERNAL MEDICINE

## 2023-02-15 PROCEDURE — 32408 CT BIOPSY LUNG W/ GUIDANCE: ICD-10-PCS | Mod: ,,, | Performed by: RADIOLOGY

## 2023-02-15 PROCEDURE — 32408 CORE NDL BX LNG/MED PERQ: CPT | Mod: ,,, | Performed by: RADIOLOGY

## 2023-02-15 PROCEDURE — 99900104 DSU ONLY-NO CHARGE-EA ADD'L HR (STAT)

## 2023-02-15 PROCEDURE — 71045 X-RAY EXAM CHEST 1 VIEW: CPT | Mod: 26,,, | Performed by: RADIOLOGY

## 2023-02-15 PROCEDURE — 94727 GAS DIL/WSHOT DETER LNG VOL: CPT

## 2023-02-15 PROCEDURE — 94618 PULMONARY STRESS TESTING: ICD-10-PCS | Mod: 26,,, | Performed by: INTERNAL MEDICINE

## 2023-02-15 PROCEDURE — 94010 BREATHING CAPACITY TEST: CPT | Mod: 26,59,, | Performed by: INTERNAL MEDICINE

## 2023-02-15 RX ORDER — MIDAZOLAM HYDROCHLORIDE 1 MG/ML
INJECTION INTRAMUSCULAR; INTRAVENOUS
Status: COMPLETED | OUTPATIENT
Start: 2023-02-15 | End: 2023-02-15

## 2023-02-15 RX ORDER — FENTANYL CITRATE 50 UG/ML
INJECTION, SOLUTION INTRAMUSCULAR; INTRAVENOUS
Status: COMPLETED | OUTPATIENT
Start: 2023-02-15 | End: 2023-02-15

## 2023-02-15 RX ORDER — SODIUM CHLORIDE 9 MG/ML
INJECTION, SOLUTION INTRAVENOUS CONTINUOUS
Status: DISCONTINUED | OUTPATIENT
Start: 2023-02-15 | End: 2023-02-16 | Stop reason: HOSPADM

## 2023-02-15 RX ORDER — LIDOCAINE HYDROCHLORIDE 10 MG/ML
1 INJECTION, SOLUTION EPIDURAL; INFILTRATION; INTRACAUDAL; PERINEURAL ONCE AS NEEDED
Status: DISCONTINUED | OUTPATIENT
Start: 2023-02-15 | End: 2023-02-16 | Stop reason: HOSPADM

## 2023-02-15 RX ADMIN — FENTANYL CITRATE 25 MCG: 50 INJECTION, SOLUTION INTRAMUSCULAR; INTRAVENOUS at 01:02

## 2023-02-15 RX ADMIN — MIDAZOLAM HYDROCHLORIDE 1 MG: 1 INJECTION, SOLUTION INTRAMUSCULAR; INTRAVENOUS at 01:02

## 2023-02-15 NOTE — H&P
[facility]  History & Physical    Subjective:      Chief Complaint/Reason for Admission: right lung mass     Lety Herbert is a 82 y.o. female.    Past Medical History:   Diagnosis Date    Anemia     Basal cell carcinoma     nose     Cancer     breast    Depression     DVT (deep venous thrombosis)     Fall 3/20/2018    Gastric ulceration     GERD (gastroesophageal reflux disease)     History of frequent urinary tract infections     Hyperlipidemia     Hypertension     Melanoma 2004?    left forearm    MRSA (methicillin resistant staph aureus) culture positive     Neuropathy     PE (pulmonary embolism)     Post-nasal drip 11/15/2018    Reflux      Past Surgical History:   Procedure Laterality Date    HYSTERECTOMY      MASTECTOMY, RADICAL Bilateral     TOTAL HIP ARTHROPLASTY Left 11/13/2017     Family History   Problem Relation Age of Onset    Cancer Mother     Cancer Father     Heart disease Father     Melanoma Neg Hx     Psoriasis Neg Hx     Lupus Neg Hx     Eczema Neg Hx      Social History     Tobacco Use    Smoking status: Former    Smokeless tobacco: Never   Substance Use Topics    Alcohol use: No    Drug use: No       (Not in a hospital admission)    Review of patient's allergies indicates:   Allergen Reactions    Meperidine     Promethazine     Bactrim [sulfamethoxazole-trimethoprim] Rash        Review of Systems   Constitutional:  Negative for chills and fever.   Eyes:  Negative for blurred vision.   Respiratory:  Positive for cough and shortness of breath (chronic).         Occasional home O2 use   Cardiovascular:  Negative for chest pain.   Gastrointestinal:  Negative for abdominal pain and nausea.   Neurological:  Positive for weakness. Negative for dizziness.   Endo/Heme/Allergies:  Bruises/bleeds easily.   Psychiatric/Behavioral:  Negative for memory loss.      Objective:      Vital Signs (Most Recent)  Temp: 97.8 °F (36.6 °C) (02/15/23 1041)  Pulse: 67 (02/15/23 1041)  Resp: 16  (02/15/23 1041)  BP: (!) 197/88 (02/15/23 1044)  SpO2: 96 % (02/15/23 1041)    Vital Signs Range (Last 24H):  Temp:  [97.8 °F (36.6 °C)]   Pulse:  [67]   Resp:  [16]   BP: (191-197)/(88)   SpO2:  [96 %]     Physical Exam  Constitutional:       Appearance: Normal appearance.   HENT:      Head: Normocephalic.      Mouth/Throat:      Mouth: Mucous membranes are moist.   Eyes:      Pupils: Pupils are equal, round, and reactive to light.   Cardiovascular:      Rate and Rhythm: Normal rate.   Pulmonary:      Effort: Pulmonary effort is normal.      Comments: Sat 90 room air, pt placed on 1L o2 via nasal cannula   Abdominal:      Palpations: Abdomen is soft.   Musculoskeletal:         General: Normal range of motion.   Skin:     General: Skin is warm and dry.   Neurological:      Mental Status: She is alert and oriented to person, place, and time.   Psychiatric:         Mood and Affect: Mood normal.       Data Review:  CBC:   Lab Results   Component Value Date    WBC 13.14 (H) 02/15/2023    RBC 4.63 02/15/2023    HGB 13.5 02/15/2023    HCT 42.9 02/15/2023     02/15/2023     BMP:   Lab Results   Component Value Date     (H) 01/18/2023     (H) 03/04/2019     (L) 01/18/2023     03/04/2019    K 3.2 (L) 01/18/2023    CL 97 01/18/2023     03/04/2019    CO2 29 01/18/2023    BUN 10 01/18/2023    CREATININE 0.7 01/18/2023    CREATININE 0.48 (L) 03/04/2019    CALCIUM 9.1 01/18/2023     Coagulation:   Lab Results   Component Value Date    INR 1.1 11/15/2017      ECG:  reviewed .     Assessment:      There are no hospital problems to display for this patient.      Plan:    CT guided right lung biopsy  The procedure was discussed in detail, including risks and alternatives. The patient voices understanding and all questions were answered. The patient agrees to proceed as planned.  ASA 3  Mallampati 1

## 2023-02-15 NOTE — PLAN OF CARE
Pt awake, alert, oriented. Vital signs stable. Discharge instructions reviewed with pt and pt's daughter. Pt and pt's daughter verbalized understanding. IV removed, catheter intact. Dressing clean, dry, and intact. All belongings returned to patient. Pt transferred to car via wheelchair. Safety maintained.

## 2023-02-15 NOTE — DISCHARGE INSTRUCTIONS
"Remove dressing in 24 hours.      Discharge Instructions: After Your Surgery/Procedure  Youve just had surgery. During surgery you were given medicine called anesthesia to keep you relaxed and free of pain. After surgery you may have some pain or nausea. This is common. Here are some tips for feeling better and getting well after surgery.     Stay on schedule with your medication.   Going home  Your doctor or nurse will show you how to take care of yourself when you go home. He or she will also answer your questions. Have an adult family member or friend drive you home.      For your safety we recommend these precaution for the first 24 hours after your procedure:  Do not drive or use heavy equipment.  Do not make important decisions or sign legal papers.  Do not drink alcohol.  Have someone stay with you, if needed. He or she can watch for problems and help keep you safe.  Your concentration, balance, coordination, and judgement may be impaired for many hours after anesthesia.  Use caution when ambulating or standing up.     You may feel weak and "washed out" after anesthesia and surgery.      Subtle residual effects of general anesthesia or sedation with regional / local anesthesia can last more than 24 hours.  Rest for the remainder of the day or longer if your Doctor/Surgeon has advised you to do so.  Although you may feel normal within the first 24 hours, your reflexes and mental ability may be impaired without you realizing it.  You may feel dizzy, lightheaded or sleepy for 24 hours or longer.      Be sure to go to all follow-up visits with your doctor. And rest after your surgery for as long as your doctor tells you to.  Coping with pain  If you have pain after surgery, pain medicine will help you feel better. Take it as told, before pain becomes severe. Also, ask your doctor or pharmacist about other ways to control pain. This might be with heat, ice, or relaxation. And follow any other instructions your " surgeon or nurse gives you.  Tips for taking pain medicine  To get the best relief possible, remember these points:  Pain medicines can upset your stomach. Taking them with a little food may help.  Most pain relievers taken by mouth need at least 20 to 30 minutes to start to work.  Taking medicine on a schedule can help you remember to take it. Try to time your medicine so that you can take it before starting an activity. This might be before you get dressed, go for a walk, or sit down for dinner.  Constipation is a common side effect of pain medicines. Call your doctor before taking any medicines such as laxatives or stool softeners to help ease constipation. Also ask if you should skip any foods. Drinking lots of fluids and eating foods such as fruits and vegetables that are high in fiber can also help. Remember, do not take laxatives unless your surgeon has prescribed them.  Drinking alcohol and taking pain medicine can cause dizziness and slow your breathing. It can even be deadly. Do not drink alcohol while taking pain medicine.  Pain medicine can make you react more slowly to things. Do not drive or run machinery while taking pain medicine.  Your health care provider may tell you to take acetaminophen to help ease your pain. Ask him or her how much you are supposed to take each day. Acetaminophen or other pain relievers may interact with your prescription medicines or other over-the-counter (OTC) drugs. Some prescription medicines have acetaminophen and other ingredients. Using both prescription and OTC acetaminophen for pain can cause you to overdose. Read the labels on your OTC medicines with care. This will help you to clearly know the list of ingredients, how much to take, and any warnings. It may also help you not take too much acetaminophen. If you have questions or do not understand the information, ask your pharmacist or health care provider to explain it to you before you take the OTC  medicine.  Managing nausea  Some people have an upset stomach after surgery. This is often because of anesthesia, pain, or pain medicine, or the stress of surgery. These tips will help you handle nausea and eat healthy foods as you get better. If you were on a special food plan before surgery, ask your doctor if you should follow it while you get better. These tips may help:  Do not push yourself to eat. Your body will tell you when to eat and how much.  Start off with clear liquids and soup. They are easier to digest.  Next try semi-solid foods, such as mashed potatoes, applesauce, and gelatin, as you feel ready.  Slowly move to solid foods. Dont eat fatty, rich, or spicy foods at first.  Do not force yourself to have 3 large meals a day. Instead eat smaller amounts more often.  Take pain medicines with a small amount of solid food, such as crackers or toast, to avoid nausea.     Call your surgeon if  You still have pain an hour after taking medicine. The medicine may not be strong enough.  You feel too sleepy, dizzy, or groggy. The medicine may be too strong.  You have side effects like nausea, vomiting, or skin changes, such as rash, itching, or hives.       If you have obstructive sleep apnea  You were given anesthesia medicine during surgery to keep you comfortable and free of pain. After surgery, you may have more apnea spells because of this medicine and other medicines you were given. The spells may last longer than usual.   At home:  Keep using the continuous positive airway pressure (CPAP) device when you sleep. Unless your health care provider tells you not to, use it when you sleep, day or night. CPAP is a common device used to treat obstructive sleep apnea.  Talk with your provider before taking any pain medicine, muscle relaxants, or sedatives. Your provider will tell you about the possible dangers of taking these medicines.  © 9938-6322 [employer], [employer]. [address] [place] [address], [place]  [address]. All rights reserved. This information is not intended as a substitute for professional medical care. Always follow your healthcare professional's instructions.       We hope your stay was comfortable as you heal now, mend and rest.    For we have enjoyed taking care of you by giving your our best.    And as you get better, by regaining your health and strength;   We count it as a privilege to have served you and hope your time at [facility] was well spent.      Thank  You!!!

## 2023-02-15 NOTE — DISCHARGE SUMMARY
Radiology Post-Procedure Note    Pre Op Diagnosis: Lung mass  Post Op Diagnosis: Same    Procedure: CT guided lung biopsy    Procedure performed by: MD Dustin    Written Informed Consent Obtained: Yes  Specimen Removed: YES 3 cores  Estimated Blood Loss: Minimal    Findings:   Successful biopsy without immediate complication.    Patient tolerated procedure well.    Buster Chan MD  Department of Radiology

## 2023-02-15 NOTE — NURSING
Procedure explained and pt consented in DSU by Radiologist.   Pt arrived via stretcher to CT for CT Guided Lung Biopsy.   AAOx4,- Time out performed.     Pt received Fentanyl 100 mcg and Versed 3 mg IV. Vital signs monitored and remained stable for duration of procedure. Biopsies collected By Radiologist.   Specimens submitted to lab for Pathology.   Bandage applied to pts anterior left thorax . CDI- Report called to   Pt transported to DSU  STAT Post- Chest CT - showed minimal bleeding   STAT Post- Chest  Xray - completed and awaiting dictation.  Timed chest Xray ordered and due at 1645 and must be cleared by radiologist prior to pts discharge

## 2023-02-15 NOTE — PLAN OF CARE
Pt's second CXR completed. Pt denies any complaints. Dressing clean, dry, and intact. Dr. Chan updated. Per Dr. Chan, OK for pt to discharge home.

## 2023-02-16 ENCOUNTER — TELEPHONE (OUTPATIENT)
Dept: PULMONOLOGY | Facility: CLINIC | Age: 83
End: 2023-02-16
Payer: MEDICARE

## 2023-02-16 NOTE — TELEPHONE ENCOUNTER
----- Message from Ashish Henley MD sent at 2/15/2023  6:08 PM CST -----  Breathing test was not bad, lungs measured pretty reasonable.  Notify.  No change in plan.

## 2023-02-21 LAB
FINAL PATHOLOGIC DIAGNOSIS: NORMAL
GROSS: NORMAL
Lab: NORMAL

## 2023-02-22 DIAGNOSIS — C34.91 MALIGNANT NEOPLASM OF RIGHT LUNG, UNSPECIFIED PART OF LUNG: Primary | ICD-10-CM

## 2023-02-22 NOTE — PROGRESS NOTES
Pt called by Dr Henley,pt has ild and desaturates walking.  Pt diagnosed as lung cancer -- called daughter and patient-- will have  pt f/u Dr Maryse Lange as planned. Pt may need to have f/u for norco or abx??   To call prn.   Pt should have lung f/u in6 months or so.  Pt has ild and not optimal for surgery.

## 2023-02-23 ENCOUNTER — TELEPHONE (OUTPATIENT)
Dept: PULMONOLOGY | Facility: CLINIC | Age: 83
End: 2023-02-23
Payer: MEDICARE

## 2023-02-23 LAB
ACID FAST MOD KINY STN SPEC: NORMAL
MYCOBACTERIUM SPEC QL CULT: NORMAL

## 2023-02-24 ENCOUNTER — TELEPHONE (OUTPATIENT)
Dept: HEMATOLOGY/ONCOLOGY | Facility: CLINIC | Age: 83
End: 2023-02-24
Payer: MEDICARE

## 2023-02-24 DIAGNOSIS — R91.8 MASS OF RIGHT LUNG: Primary | ICD-10-CM

## 2023-02-28 LAB
ACID FAST MOD KINY STN SPEC: NORMAL
MYCOBACTERIUM SPEC QL CULT: NORMAL

## 2023-03-01 ENCOUNTER — OFFICE VISIT (OUTPATIENT)
Dept: FAMILY MEDICINE | Facility: CLINIC | Age: 83
End: 2023-03-01
Payer: MEDICARE

## 2023-03-01 ENCOUNTER — TELEPHONE (OUTPATIENT)
Dept: HEMATOLOGY/ONCOLOGY | Facility: CLINIC | Age: 83
End: 2023-03-01
Payer: MEDICARE

## 2023-03-01 VITALS
HEART RATE: 88 BPM | DIASTOLIC BLOOD PRESSURE: 100 MMHG | HEIGHT: 66 IN | SYSTOLIC BLOOD PRESSURE: 196 MMHG | WEIGHT: 218 LBS | BODY MASS INDEX: 35.03 KG/M2

## 2023-03-01 DIAGNOSIS — C34.91 NON-SMALL CELL CANCER OF RIGHT LUNG: Primary | ICD-10-CM

## 2023-03-01 DIAGNOSIS — I10 BENIGN ESSENTIAL HYPERTENSION: ICD-10-CM

## 2023-03-01 DIAGNOSIS — I48.0 PAROXYSMAL ATRIAL FIBRILLATION: ICD-10-CM

## 2023-03-01 DIAGNOSIS — I50.32 CHRONIC HEART FAILURE WITH PRESERVED EJECTION FRACTION: ICD-10-CM

## 2023-03-01 PROCEDURE — 1160F PR REVIEW ALL MEDS BY PRESCRIBER/CLIN PHARMACIST DOCUMENTED: ICD-10-PCS | Mod: CPTII,S$GLB,, | Performed by: INTERNAL MEDICINE

## 2023-03-01 PROCEDURE — 99214 OFFICE O/P EST MOD 30 MIN: CPT | Mod: S$GLB,,, | Performed by: INTERNAL MEDICINE

## 2023-03-01 PROCEDURE — 1125F AMNT PAIN NOTED PAIN PRSNT: CPT | Mod: CPTII,S$GLB,, | Performed by: INTERNAL MEDICINE

## 2023-03-01 PROCEDURE — 1159F MED LIST DOCD IN RCRD: CPT | Mod: CPTII,S$GLB,, | Performed by: INTERNAL MEDICINE

## 2023-03-01 PROCEDURE — 3077F SYST BP >= 140 MM HG: CPT | Mod: CPTII,S$GLB,, | Performed by: INTERNAL MEDICINE

## 2023-03-01 PROCEDURE — 1160F RVW MEDS BY RX/DR IN RCRD: CPT | Mod: CPTII,S$GLB,, | Performed by: INTERNAL MEDICINE

## 2023-03-01 PROCEDURE — 3077F PR MOST RECENT SYSTOLIC BLOOD PRESSURE >= 140 MM HG: ICD-10-PCS | Mod: CPTII,S$GLB,, | Performed by: INTERNAL MEDICINE

## 2023-03-01 PROCEDURE — 99214 PR OFFICE/OUTPT VISIT, EST, LEVL IV, 30-39 MIN: ICD-10-PCS | Mod: S$GLB,,, | Performed by: INTERNAL MEDICINE

## 2023-03-01 PROCEDURE — 3080F DIAST BP >= 90 MM HG: CPT | Mod: CPTII,S$GLB,, | Performed by: INTERNAL MEDICINE

## 2023-03-01 PROCEDURE — 3080F PR MOST RECENT DIASTOLIC BLOOD PRESSURE >= 90 MM HG: ICD-10-PCS | Mod: CPTII,S$GLB,, | Performed by: INTERNAL MEDICINE

## 2023-03-01 PROCEDURE — 1125F PR PAIN SEVERITY QUANTIFIED, PAIN PRESENT: ICD-10-PCS | Mod: CPTII,S$GLB,, | Performed by: INTERNAL MEDICINE

## 2023-03-01 PROCEDURE — 1159F PR MEDICATION LIST DOCUMENTED IN MEDICAL RECORD: ICD-10-PCS | Mod: CPTII,S$GLB,, | Performed by: INTERNAL MEDICINE

## 2023-03-01 NOTE — PROGRESS NOTES
Subjective:       Patient ID: Lety Herbert is a 82 y.o. female.    Chief Complaint: Lung Mass, Non-small cell lung cancer, Hyperlipidemia, Heart failure with preserved ejection fraction, and Hypertension    She had gone to the urgent care center sometimes in the month of December with cough and congestion and findings on pneumonia.  She had a x-ray which was abnormal for lesion.  Thereafter she was referred to Dr. Ashish Henley confirmed the lung nodule seen on the imaging study and ordered cultures AFB and nuclear medicine PET CT scan also.      Further investigations raise the possibility of cancer also.  She did have a bronchoscopic biopsy and the results come back as non-small cell carcinoma.    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     Family History:  Breast cancer- Mother (60s)  Colorectal cancer- n/a  Ovarian cancer- n/a  Prostate cancer- n/a  Pancreatic cancer- n/a    She had a CT-guided biopsy on 02/15/2023 at Radiology by Dr. Buster Chan.    Hyperlipidemia  This is a chronic problem. The current episode started more than 1 year ago. The problem is controlled. Associated symptoms include myalgias and shortness of breath. Current antihyperlipidemic treatment includes statins. The current treatment provides moderate improvement of lipids. Compliance problems include psychosocial issues.  Risk factors for coronary artery disease include a sedentary lifestyle, hypertension, post-menopausal, obesity and dyslipidemia.   Hypertension  This is a chronic problem. The current episode started more than 1 year ago. The problem is uncontrolled. Associated symptoms include headaches, malaise/fatigue, peripheral edema and shortness of breath. Pertinent negatives include no palpitations. Risk factors for coronary artery disease include sedentary lifestyle,  obesity, dyslipidemia and post-menopausal state. Past treatments include beta blockers, angiotensin blockers and diuretics. The current treatment provides moderate improvement. Compliance problems include psychosocial issues.  There is no history of coarctation of the aorta, hyperaldosteronism, pheochromocytoma or renovascular disease.   Atrial Fibrillation  Presents for follow-up visit. Symptoms include shortness of breath. Symptoms are negative for bradycardia, dizziness and palpitations. The symptoms have been stable. Past medical history includes hyperlipidemia.   Depression  Visit Type: follow-up  Patient presents with the following symptoms: anhedonia, depressed mood and shortness of breath.  Patient is not experiencing: confusion, nervousness/anxiety, palpitations, suicidal ideas, suicidal planning and weight gain.  Frequency of symptoms: occasionally      Headache   This is a chronic problem. The current episode started more than 1 year ago. The problem occurs constantly. The problem has been gradually worsening. The pain is located in the Occipital region. The pain radiates to the left neck and right neck. The pain quality is not similar to prior headaches. The quality of the pain is described as aching. The pain is moderate. Associated symptoms include back pain. Pertinent negatives include no abdominal pain, dizziness, eye pain or numbness. Nothing aggravates the symptoms. She has tried acetaminophen for the symptoms.   Congestive Heart Failure  Presents for follow-up visit. Associated symptoms include fatigue, shortness of breath and unexpected weight change (GAINED 6 LB OF WEIGHT..). Pertinent negatives include no abdominal pain or palpitations. Compliance with total regimen is 26-50%. Compliance with diet is 26-50%. Compliance with exercise is 0-25%. Compliance with medications is 51-75%.   Anemia  Symptoms include malaise/fatigue. There has been no abdominal pain, bruising/bleeding easily, confusion,  light-headedness, pallor or palpitations. Signs of blood loss that are not present include vaginal bleeding.     Past Medical History:   Diagnosis Date    Anemia     Basal cell carcinoma     nose     Cancer     breast    Depression     DVT (deep venous thrombosis)     Fall 3/20/2018    Gastric ulceration     GERD (gastroesophageal reflux disease)     History of frequent urinary tract infections     Hyperlipidemia     Hypertension     Melanoma 2004?    left forearm    MRSA (methicillin resistant staph aureus) culture positive     Neuropathy     PE (pulmonary embolism)     Post-nasal drip 11/15/2018    Reflux      Social History     Socioeconomic History    Marital status:      Spouse name: Jean    Number of children: 3   Tobacco Use    Smoking status: Former    Smokeless tobacco: Never   Substance and Sexual Activity    Alcohol use: No    Drug use: No    Sexual activity: Not Currently     Partners: Male   Social History Narrative    3 Children- 9 GC, 7 GGrands     Social Determinants of Health     Social Connections: Moderately Integrated    Frequency of Communication with Friends and Family: Three times a week    Frequency of Social Gatherings with Friends and Family: Once a week    Attends Rastafarian Services: More than 4 times per year    Active Member of Clubs or Organizations: No    Attends Club or Organization Meetings: Never    Marital Status:      Past Surgical History:   Procedure Laterality Date    HYSTERECTOMY      MASTECTOMY, RADICAL Bilateral     TOTAL HIP ARTHROPLASTY Left 11/13/2017     Family History   Problem Relation Age of Onset    Cancer Mother     Cancer Father     Heart disease Father     Melanoma Neg Hx     Psoriasis Neg Hx     Lupus Neg Hx     Eczema Neg Hx        Review of Systems   Constitutional:  Positive for activity change (reducing activities), fatigue, malaise/fatigue and unexpected weight change (GAINED 6 LB OF WEIGHT..). Negative for appetite change and weight gain.  "       She has lost 20 lb of weight.  Patient is overall not feeling well.  This should not be the case.  Generally when somebody loses weight by watching diet they really feel well.  Patient on the contrary feels more sick and tired and headaches.   HENT:  Positive for congestion.    Eyes:  Negative for pain, discharge and visual disturbance.   Respiratory:  Positive for shortness of breath. Negative for chest tightness.         Recent diagnosis of lung cancer non-small cell.   Cardiovascular:  Negative for palpitations and leg swelling.        HTN   Gastrointestinal:  Negative for abdominal pain and blood in stool.        Constipation seems to be stable with Amitiza 24 mcg capsule.   Endocrine: Positive for polyuria. Negative for cold intolerance and polydipsia.   Genitourinary:  Positive for enuresis. Negative for vaginal bleeding.        Incontinence symptoms   Musculoskeletal:  Positive for arthralgias, back pain and myalgias. Negative for joint swelling.        Left hip fracture S/P Arthroplasty NoV 2017  Fall in October/November 2019.  L1 compression fracture status post vertebroplasty.  LOWER EXTREMITY PAIN AND DURATION UNKNOWN.   Skin:  Negative for color change, pallor, rash and wound.        Complains of somewhat brittle nails.   Allergic/Immunologic: Negative for environmental allergies, food allergies and immunocompromised state.   Neurological:  Positive for headaches. Negative for dizziness, light-headedness and numbness.   Hematological:  Does not bruise/bleed easily.   Psychiatric/Behavioral:  Positive for behavioral problems. Negative for confusion and suicidal ideas. The patient is not nervous/anxious.         Patient has a history of depression. Stable on sertraline. She however denies that she is depressed.       Objective:      Blood pressure (!) 196/100, pulse 88, height 5' 6" (1.676 m), weight 98.9 kg (218 lb). Body mass index is 35.19 kg/m².  Physical Exam  Vitals and nursing note reviewed. "   Constitutional:       General: She is not in acute distress.     Appearance: She is well-developed. She is obese. She is ill-appearing. She is not toxic-appearing or diaphoretic.      Comments: BMI is 35.19   HENT:      Head: Normocephalic and atraumatic.   Neck:      Thyroid: No thyromegaly.      Vascular: No JVD.      Trachea: Trachea normal. No tracheal deviation.   Cardiovascular:      Rate and Rhythm: Normal rate and regular rhythm.      Heart sounds: Normal heart sounds, S1 normal and S2 normal.     No friction rub. No gallop.      Comments: Varicosities in legs-significant varicosities.  Pulse appears to be regular.  Pulmonary:      Breath sounds: Normal breath sounds. No stridor.   Abdominal:      General: There is no distension.      Palpations: Abdomen is soft. Abdomen is not rigid.      Tenderness: There is no abdominal tenderness.      Comments: Patient is obese.   Musculoskeletal:      Right lower leg: Edema present.      Left lower leg: Edema present.   Lymphadenopathy:      Cervical: No cervical adenopathy.   Skin:     General: Skin is warm and dry.      Coloration: Skin is not pale.      Findings: No rash.      Comments: Varicose veins are noted in the inferior extremities.   Neurological:      Mental Status: She is alert. Mental status is at baseline.      Motor: No abnormal muscle tone.      Gait: Gait abnormal.   Psychiatric:         Attention and Perception: She is attentive.         Behavior: Behavior is slowed.         Cognition and Memory: Memory is impaired.      Comments: Anhedonic.  Cognition is intact to issues of her daily need and personal management.  However details preclude her.         Assessment:       1. Non-small cell cancer of right lung    2. Chronic heart failure with preserved ejection fraction    3. Paroxysmal atrial fibrillation    4. Benign essential hypertension         Final Pathologic Diagnosis LUNG, RIGHT, BIOPSY:   Non-small cell carcinoma consistent with squamous  features, see comment   The biopsy show a poorly differentiated non-small cell carcinoma with   immunohistochemical features supporting the above diagnosis. Patient's remote   clinical history of breast carcinoma is noticed. Based on the   immunohistochemical features, the current tumor is favored to be of lung   primary. Please also correlate clinically. Specimen will be sent for TEMPUS   and PDL1 and results will be reported separately.   P40, GATA3, - Positive   TTF-1, ER - Negative    Comment: Interp By Tesha Borges M.D., Signed on 02/21/2023 at 13:44           Plan:           Non-small cell cancer of right lung  -     Ambulatory referral/consult to Hematology / Oncology; Future; Expected date: 03/08/2023    Chronic heart failure with preserved ejection fraction    Paroxysmal atrial fibrillation    Benign essential hypertension      Patient's recent diagnosis of normal small cell cancer of lung has been reviewed.  Potential interventions including radiation therapy, with the without chemotherapy have been discussed.  The details of these will be discussed by Oncology.  Referral has been made for the same.    Long-term issues and care have been reviewed again and patient desires no extreme aggressive care.    Chronic heart failure with preserved ejection fraction has been noted    Blood pressure control with valsartan hydrochlorothiazide and COPD with Trelegy Ellipta has been noted.    Overall general performance is on the borderline.    Patient is aware of her medical issues and challenges of complex diagnosis.    Follow-up with me in 4-6 months time or earlier based upon further workup for cancer status.    Spent sim 35 minutes with patient which involved review of pts medical conditions, labs, medications and with 50% of time face-to-face discussion about medical problems, management and any applicable changes.      Current Outpatient Medications:     albuterol (PROVENTIL) 2.5 mg /3 mL (0.083 %) nebulizer  solution, Take 3 mLs (2.5 mg total) by nebulization every 6 (six) hours as needed (cough)., Disp: 120 each, Rfl: 11    albuterol (PROVENTIL/VENTOLIN HFA) 90 mcg/actuation inhaler, inhale 1 to 2 puffs every 4 hours as needed for wheezing, Disp: , Rfl:     albuterol (PROVENTIL/VENTOLIN HFA) 90 mcg/actuation inhaler, 2 puffs every 4 hours as needed for cough, wheeze, or shortness of breath, Disp: 18 g, Rfl: 11    ALPRAZolam (XANAX) 0.25 MG tablet, Take 1 tablet (0.25 mg total) by mouth 3 (three) times daily as needed for Anxiety., Disp: 45 tablet, Rfl: 4    atorvastatin (LIPITOR) 20 MG tablet, TAKE 1 TABLET (20 MG TOTAL) BY MOUTH ONCE DAILY., Disp: 90 tablet, Rfl: 1    azelastine (ASTELIN) 137 mcg (0.1 %) nasal spray, 1 spray 2 (two) times daily., Disp: , Rfl:     diltiaZEM (CARDIZEM CD) 120 MG Cp24, Take 1 capsule (120 mg total) by mouth once daily., Disp: 90 capsule, Rfl: 1    fluticasone propionate (FLONASE) 50 mcg/actuation nasal spray, by Each Nostril route., Disp: , Rfl:     fluticasone-umeclidin-vilanter (TRELEGY ELLIPTA) 200-62.5-25 mcg inhaler, Inhale 1 puff into the lungs once daily., Disp: 60 each, Rfl: 11    HYDROcodone-acetaminophen (NORCO) 5-325 mg per tablet, Take 1 tablet by mouth every 6 (six) hours as needed for Pain., Disp: 90 tablet, Rfl: 0    [START ON 4/7/2023] HYDROcodone-acetaminophen (NORCO) 5-325 mg per tablet, Take 1 tablet by mouth every 6 (six) hours as needed for Pain., Disp: 90 tablet, Rfl: 0    [START ON 3/7/2023] HYDROcodone-acetaminophen (NORCO) 5-325 mg per tablet, Take 1 tablet by mouth every 6 (six) hours as needed for Pain., Disp: 90 tablet, Rfl: 0    loratadine (CLARITIN) 10 mg tablet, Take 1 tablet (10 mg total) by mouth once daily., Disp: 30 tablet, Rfl: 0    montelukast (SINGULAIR) 10 mg tablet, Take 10 mg by mouth every evening., Disp: , Rfl:     multivit-min/folic ac/collagen (WOMEN'S MULTIVITAMIN COLLAGEN ORAL), Take by mouth once daily., Disp: , Rfl:     pantoprazole  (PROTONIX) 20 MG tablet, TAKE 1 TABLET (20 MG TOTAL) BY MOUTH ONCE DAILY., Disp: 90 tablet, Rfl: 1    sertraline (ZOLOFT) 100 MG tablet, Take 1 tablet (100 mg total) by mouth once daily., Disp: 90 tablet, Rfl: 3    traZODone (DESYREL) 50 MG tablet, Take 1 tablet (50 mg total) by mouth every evening., Disp: 30 tablet, Rfl: 2    valsartan-hydrochlorothiazide (DIOVAN-HCT) 320-12.5 mg per tablet, Take 1 tablet by mouth every morning., Disp: 90 tablet, Rfl: 0    WIXELA INHUB 100-50 mcg/dose diskus inhaler, Inhale 1 puff into the lungs 2 (two) times daily., Disp: , Rfl:     XARELTO 15 mg Tab, Take 15 mg by mouth., Disp: , Rfl:     XARELTO 20 mg Tab, , Disp: , Rfl:

## 2023-03-02 NOTE — TELEPHONE ENCOUNTER
Called patient to inform her of [employer] and to complete financial assistance form over the phone. Unfortunately, no answer, no vmail, and no mychart access.

## 2023-03-03 DIAGNOSIS — F41.9 ANXIETY: ICD-10-CM

## 2023-03-03 DIAGNOSIS — G47.09 OTHER INSOMNIA: ICD-10-CM

## 2023-03-03 NOTE — TELEPHONE ENCOUNTER
Medication requesting - Alprazolam 0.25 mg tab   Last Rx-02/23/2023 quanity -45 refill-1   Last office visit -02/07/2023  Next Office Visit-None

## 2023-03-06 RX ORDER — ALPRAZOLAM 0.25 MG/1
0.25 TABLET ORAL 3 TIMES DAILY PRN
Qty: 45 TABLET | Refills: 4 | OUTPATIENT
Start: 2023-03-06 | End: 2023-04-05

## 2023-03-09 PROBLEM — Z87.891 FORMER SMOKER: Status: ACTIVE | Noted: 2023-03-09

## 2023-03-09 PROBLEM — Z90.13 S/P BILATERAL MASTECTOMY: Status: ACTIVE | Noted: 2023-03-09

## 2023-03-09 PROBLEM — R93.89 ABNORMAL CHEST CT: Status: ACTIVE | Noted: 2023-03-09

## 2023-03-09 PROBLEM — C34.11 MALIGNANT NEOPLASM OF UPPER LOBE OF RIGHT LUNG: Status: ACTIVE | Noted: 2023-03-09

## 2023-03-09 PROBLEM — Z85.3 HISTORY OF BREAST CANCER: Status: ACTIVE | Noted: 2023-03-09

## 2023-03-09 NOTE — PROGRESS NOTES
[facility] Hematolgy/Oncology  History & Physical    Subjective:      Patient ID:   NAME: Lety Herbert : 1940     82 y.o. female    Referring Doc: Shilo Perez MD  Other Physicians: Ashish Henley; Maryse Beckwith        Chief Complaint: RUL mass      HPI:  82 y.o. female with diagnosis of RUL lung mass who has been referred by Shilo Perez MD for evaluation by medical hematology/oncology. She is here with her son who is also a patient of dallin. She has been followed by Dr Ashish Henley with pulmonary for her COPD and chronic hypoxemia/bronchitis, who was a former smoker. She had a CTA in 2022 which was negative for a PE but showed a RUL lung mass, which was followed by a CT Chest on 12/15/2022 which confirmed a 2.3cm RUL mass. She had a PET scan on 2023 which showed a 2.3 cm hypermetabolic mass in the anterior right upper lobe abutting the superior vena cava along with development of a 13 mm subpleural nodule within the right lower lobe.       She had CT guided biopsy of the RUL lung mass on 2/15/2023 with pathology coming back NSCLC favoring a lung primary. She has history of breast cancer in past around   for which she has had bilateral mastectomies and is followed by Dr Beckwith. She did not require chemotherapy or radiation. She had reconstruction surgery with Dr Grimaldo. She saw Dr Beckwith couple of weeks ago.    She is supposed to be on oxygen at home. She has portable tank and has a lot of GAVIRIA. She has chronic SOB. She is former smoker and quit when she was 43yrs old.     No weight loss. No CP, HA's or N/V.     She is retired from [employer] department    Her mom had breast cancer. Dad  from [place].    Dicussed covid precautions - she has been vaccinated              ROS:   GEN: normal without any fever, night sweats or weight loss  HEENT: normal with no HA's, sore throat, stiff neck, changes in vision  CV: normal with no CP, SOB, PND, GAVIRIA or orthopnea  PULM: chronic SOB/GAVIRIA; O2 dependent  at home, cough, hemoptysis, sputum or pleuritic pain  GI: normal with no abdominal pain, nausea, vomiting, constipation, diarrhea, melanotic stools, BRBPR, or hematemesis  : normal with no hematuria, dysuria  BREAST: normal with no mass, discharge, pain  SKIN: normal with no rash, erythema, bruising, or swelling       Past Medical/Surgical History:  Past Medical History:   Diagnosis Date    Abnormal chest CT 3/9/2023    Anemia     Basal cell carcinoma     nose     Cancer     breast    Depression     DVT (deep venous thrombosis)     Fall 3/20/2018    Former smoker 3/9/2023    Gastric ulceration     GERD (gastroesophageal reflux disease)     History of breast cancer 3/9/2023    History of frequent urinary tract infections     Hyperlipidemia     Hypertension     Malignant neoplasm of upper lobe of right lung (NSCLC favoring lung primary) 3/9/2023    Melanoma 2004?    left forearm    MRSA (methicillin resistant staph aureus) culture positive     Neuropathy     PE (pulmonary embolism)     Post-nasal drip 11/15/2018    Reflux     S/P bilateral mastectomy 3/9/2023     Past Surgical History:   Procedure Laterality Date    HYSTERECTOMY      MASTECTOMY, RADICAL Bilateral     TOTAL HIP ARTHROPLASTY Left 11/13/2017         Allergies:  Review of patient's allergies indicates:   Allergen Reactions    Meperidine     Promethazine     Bactrim [sulfamethoxazole-trimethoprim] Rash       Social/Family History:  Social History     Socioeconomic History    Marital status:      Spouse name: Jean    Number of children: 3   Tobacco Use    Smoking status: Former    Smokeless tobacco: Never   Substance and Sexual Activity    Alcohol use: No    Drug use: No    Sexual activity: Not Currently     Partners: Male   Social History Narrative    3 Children- 9 GC, 7 GGrands     Social Determinants of Health     Social Connections: Moderately Integrated    Frequency of Communication with Friends and Family: Three times a week    Frequency  of Social Gatherings with Friends and Family: Once a week    Attends Rastafari Services: More than 4 times per year    Active Member of Clubs or Organizations: No    Attends Club or Organization Meetings: Never    Marital Status:      Family History   Problem Relation Age of Onset    Cancer Mother     Cancer Father     Heart disease Father     Melanoma Neg Hx     Psoriasis Neg Hx     Lupus Neg Hx     Eczema Neg Hx          Medications:    Current Outpatient Medications:     albuterol (PROVENTIL) 2.5 mg /3 mL (0.083 %) nebulizer solution, Take 3 mLs (2.5 mg total) by nebulization every 6 (six) hours as needed (cough)., Disp: 120 each, Rfl: 11    albuterol (PROVENTIL/VENTOLIN HFA) 90 mcg/actuation inhaler, inhale 1 to 2 puffs every 4 hours as needed for wheezing, Disp: , Rfl:     albuterol (PROVENTIL/VENTOLIN HFA) 90 mcg/actuation inhaler, 2 puffs every 4 hours as needed for cough, wheeze, or shortness of breath, Disp: 18 g, Rfl: 11    atorvastatin (LIPITOR) 20 MG tablet, TAKE 1 TABLET (20 MG TOTAL) BY MOUTH ONCE DAILY., Disp: 90 tablet, Rfl: 1    azelastine (ASTELIN) 137 mcg (0.1 %) nasal spray, 1 spray 2 (two) times daily., Disp: , Rfl:     diltiaZEM (CARDIZEM CD) 120 MG Cp24, Take 1 capsule (120 mg total) by mouth once daily., Disp: 90 capsule, Rfl: 1    fluticasone propionate (FLONASE) 50 mcg/actuation nasal spray, by Each Nostril route., Disp: , Rfl:     fluticasone-umeclidin-vilanter (TRELEGY ELLIPTA) 200-62.5-25 mcg inhaler, Inhale 1 puff into the lungs once daily., Disp: 60 each, Rfl: 11    HYDROcodone-acetaminophen (NORCO) 5-325 mg per tablet, Take 1 tablet by mouth every 6 (six) hours as needed for Pain., Disp: 90 tablet, Rfl: 0    [START ON 4/7/2023] HYDROcodone-acetaminophen (NORCO) 5-325 mg per tablet, Take 1 tablet by mouth every 6 (six) hours as needed for Pain., Disp: 90 tablet, Rfl: 0    HYDROcodone-acetaminophen (NORCO) 5-325 mg per tablet, Take 1 tablet by mouth every 6 (six) hours as needed  "for Pain., Disp: 90 tablet, Rfl: 0    montelukast (SINGULAIR) 10 mg tablet, Take 10 mg by mouth every evening., Disp: , Rfl:     multivit-min/folic ac/collagen (WOMEN'S MULTIVITAMIN COLLAGEN ORAL), Take by mouth once daily., Disp: , Rfl:     pantoprazole (PROTONIX) 20 MG tablet, TAKE 1 TABLET (20 MG TOTAL) BY MOUTH ONCE DAILY., Disp: 90 tablet, Rfl: 1    sertraline (ZOLOFT) 100 MG tablet, Take 1 tablet (100 mg total) by mouth once daily., Disp: 90 tablet, Rfl: 3    traZODone (DESYREL) 50 MG tablet, Take 1 tablet (50 mg total) by mouth every evening., Disp: 30 tablet, Rfl: 2    valsartan-hydrochlorothiazide (DIOVAN-HCT) 320-12.5 mg per tablet, Take 1 tablet by mouth every morning., Disp: 90 tablet, Rfl: 0    WIXELA INHUB 100-50 mcg/dose diskus inhaler, Inhale 1 puff into the lungs 2 (two) times daily., Disp: , Rfl:     XARELTO 15 mg Tab, Take 15 mg by mouth., Disp: , Rfl:     XARELTO 20 mg Tab, , Disp: , Rfl:     ALPRAZolam (XANAX) 0.25 MG tablet, Take 1 tablet (0.25 mg total) by mouth 3 (three) times daily as needed for Anxiety., Disp: 45 tablet, Rfl: 4    loratadine (CLARITIN) 10 mg tablet, Take 1 tablet (10 mg total) by mouth once daily., Disp: 30 tablet, Rfl: 0      Pathology:   Cancer Staging   No matching staging information was found for the patient.    CT guided lung biopsy  2/15/2023:    Final Pathologic Diagnosis LUNG, RIGHT, BIOPSY:   Non-small cell carcinoma consistent with squamous features, see comment   The biopsy show a poorly differentiated non-small cell carcinoma with   immunohistochemical features supporting the above diagnosis. Patient's remote   clinical history of breast carcinoma is noticed. Based on the   immunohistochemical features, the current tumor is favored to be of lung   primary.           Objective:   Vitals:  Blood pressure (!) 156/89, pulse 85, temperature 97.4 °F (36.3 °C), resp. rate 18, height 5' 6" (1.676 m), weight 99.6 kg (219 lb 8 oz).    Physical Examination:   GEN: no " apparent distress, comfortable; AAOx3; overweight; elderly  HEAD: atraumatic and normocephalic  EYES: no pallor, no icterus, PERRLA  ENT: OMM, no pharyngeal erythema, external ears WNL; no nasal discharge; no thrush  NECK: no masses, thyroid normal, trachea midline, no LAD/LN's, supple  CV: RRR with no murmur; normal pulse; normal S1 and S2; no pedal edema  CHEST: Normal respiratory effort; CTAB; normal breath sounds; no wheeze or crackles  ABDOM: nontender and nondistended; soft; normal bowel sounds; no rebound/guarding  MUSC/Skeletal: ROM normal; no crepitus; joints normal; no deformities; +arthropathy of hip/knees, hands  EXTREM: no clubbing, cyanosis, inflammation or swelling; varicosities in bilateral lower extremities  SKIN: no rashes, lesions, ulcers, petechiae or subcutaneous nodules; chronic age related skin changes  : no carrillo  NEURO: grossly intact; motor/sensory WNL; AAOx3; no tremors  PSYCH: normal mood, affect and behavior  LYMPH: normal cervical, supraclavicular, axillary and groin LN's      Labs:   Lab Results   Component Value Date    WBC 13.14 (H) 02/15/2023    HGB 13.5 02/15/2023    HCT 42.9 02/15/2023    MCV 93 02/15/2023     02/15/2023    CMP  Sodium   Date Value Ref Range Status   01/18/2023 135 (L) 136 - 145 mmol/L Final   03/04/2019 142 134 - 144 mmol/L      Potassium   Date Value Ref Range Status   01/18/2023 3.2 (L) 3.5 - 5.1 mmol/L Final     Chloride   Date Value Ref Range Status   01/18/2023 97 95 - 110 mmol/L Final   03/04/2019 104 98 - 110 mmol/L      CO2   Date Value Ref Range Status   01/18/2023 29 23 - 29 mmol/L Final     Glucose   Date Value Ref Range Status   01/18/2023 176 (H) 70 - 110 mg/dL Final   03/04/2019 102 (H) 70 - 99 mg/dL      BUN   Date Value Ref Range Status   01/18/2023 10 8 - 23 mg/dL Final     Creatinine   Date Value Ref Range Status   01/18/2023 0.7 0.5 - 1.4 mg/dL Final   03/04/2019 0.48 (L) 0.60 - 1.40 mg/dL      Calcium   Date Value Ref Range Status    01/18/2023 9.1 8.7 - 10.5 mg/dL Final     Total Protein   Date Value Ref Range Status   01/18/2023 8.1 6.0 - 8.4 g/dL Final     Albumin   Date Value Ref Range Status   01/18/2023 3.7 3.5 - 5.2 g/dL Final   03/04/2019 4.0 3.1 - 4.7 g/dL      Total Bilirubin   Date Value Ref Range Status   01/18/2023 0.8 0.1 - 1.0 mg/dL Final     Comment:     For infants and newborns, interpretation of results should be based  on gestational age, weight and in agreement with clinical  observations.    Premature Infant recommended reference ranges:  Up to 24 hours.............<8.0 mg/dL  Up to 48 hours............<12.0 mg/dL  3-5 days..................<15.0 mg/dL  6-29 days.................<15.0 mg/dL       Alkaline Phosphatase   Date Value Ref Range Status   01/18/2023 125 55 - 135 U/L Final     AST   Date Value Ref Range Status   01/18/2023 53 (H) 10 - 40 U/L Final     ALT   Date Value Ref Range Status   01/18/2023 43 10 - 44 U/L Final     Anion Gap   Date Value Ref Range Status   01/18/2023 9 8 - 16 mmol/L Final     eGFR if    Date Value Ref Range Status   03/09/2022 >60.0 >60 mL/min/1.73 m^2 Final     eGFR if non    Date Value Ref Range Status   03/09/2022 >60.0 >60 mL/min/1.73 m^2 Final     Comment:     Calculation used to obtain the estimated glomerular filtration  rate (eGFR) is the CKD-EPI equation.            Radiology/Diagnostic Studies:      PET 1/11/2023:    IMPRESSION: Hypermetabolic 2.3 cm mass within the anterior right upper lobe suspicious for primary lung malignancy     Faint groundglass opacities throughout the lungs with prominence of the pulmonary vasculature and cardiomegaly which may be represent pulmonary edema.  Development of a 13 mm subpleural nodule in the right lower lobe. Since this is new since the most recent CT findings most likely are secondary to infectious or inflammatory process however metastatic lesion cannot be excluded     Increased FDG activity in the region of  the right external iliac vein without corresponding adenopathy. This may be physiologic there is physiologic activity within a superficial vein in the upper right thigh and findings may be secondary to thrombophlebitis.. Follow-up CT abdomen and pelvis with contrast is recommended     Degenerative changes of the spine         CT Chest 12/15/2022:    IMPRESSION:  1. A 23 mm right upper lobe noncalcified pulmonary nodule is highly suspicious for primary pulmonary neoplasm. Tissue diagnosis is recommended.  2. No findings of regional metastatic disease in the chest.  3. Enlarged pulmonary arteries, suggesting pulmonary arterial hypertension.  4. Cardiomegaly, with aortic and coronary arterial calcifications.    CTA 11/19/2022:    IMPRESSION:  1.  No pulmonary embolus detected.  2.  Interstitial abnormality, suspect mild edema superimposed upon chronic changes.  3.  Right upper lobe 2.1 cm nodule. Consider a non-contrast Chest CT at 3 months, a PET/CT, or tissue sampling. These guidelines do not apply to immunocompromised patients and patients with cancer   ADDENDUM #1         The presence of right upper lobe lung nodule needing further evaluation or follow-up has been discussed with Dr. Rick Salgado 11/19/2022 12:50 AM CST.    All lab results and imaging results have been reviewed and discussed with the patient    Assessment:   (1) 82 y.o. female  with diagnosis of RUL lung mass who has been referred by Shilo Perez MD for evaluation by medical hematology/oncology. She has been followed by Dr Ashish Henley with pulmonary for her COPD and chronic hypoxemia/bronchitis, who was a former smoker.     - She had a CTA in Nov 2022 which was negative for a PE but showed a RUL lung mass, which was followed by a CT Chest on 12/15/2022 which confirmed a 2.3cm RUL mass.   - She had a PET scan on 1/11/2023 which showed a 2.3 cm hypermetabolic mass in the anterior right upper lobe abutting the superior vena cava along with  development of a 13 mm subpleural nodule within the right lower lobe.       - She had CT guided biopsy of the RUL lung mass on 2/15/2023 with pathology coming back NSCLC favoring a lung primary.    - She is supposed to be on oxygen at home. She has portable tank and has a lot of GAVIRIA. She has chronic SOB.   - She is former smoker and quit when she was 43yrs old.     - discussed the pathology and reviewed and discussed the latest NCCN guidelines (vs. 2-2023)  - unlikely candidate for any surgical intervention  - will refer to Rad/onc to see if there are any radiation options either monotherapy or in combination with low dose weekly chemotherapy  - CARIS on the pathology  - check CEA and up to date labs        (2) Hx/of PE and DVT    (3) HTN and hypercholesterolemia    (4) COPD/chronic hypoxemia; bronchiectasis; chronic bronchitis - followed by Dr Ashish Henley/Shelbie Villalpando    (5) CHF and dysrhythmia    (6) GERD; gastric ulcer    (7) Hx/of breast cancer s/p bilateral mastectomies- followed by Dr Maryse Beckwith  - She has history of breast cancer in past around 2005  for which she has had bilateral mastectomies and is followed by Dr Beckwith. She did not require chemotherapy or radiation. She had reconstruction surgery with Dr Grimaldo. She saw Dr Beckwith couple of weeks ago.       (8) OA (Knees); chronic back pain; s/p Left total hip after fracture    (9) Urinary urgency/incontinence    (10) Migraine HA's    (11) Anxiety and Depression    (12) Hx/of melanoma left forearm, BCC    (13) Former smoker    VISIT DIAGNOSES:               Abnormal PET scan of lung    Mass of upper lobe of right lung    Malignant neoplasm of upper lobe of right lung (NSCLC favoring lung primary)    Abnormal chest CT    Former smoker    History of breast cancer    S/P bilateral mastectomy    Non-small cell cancer of right lung  -     Ambulatory referral/consult to Hematology / Oncology            Plan:     PLAN:  Refer to Rad/onc - see if there  are any radiation options either monotherapy or in combination with low dose weekly chemotherapy  Ask pathology to do CARIS studies  Check up to date labs incl. CEA level  F/u with PCP, Pulm, etc    RTC in  2 weeks   Fax note to Shilo Perez MD; Amena Mejia Mannina, [place]    COVID-19 Discussion:    I had long discussion with patient and any applicable family about the COVID-19 coronavirus epidemic and the recommended precautions with regard to cancer and/or hematology patients. I have re-iterated the CDC recommendations for adequate hand washing, use of hand -like products, and coughing into elbow, etc. In addition, especially for our patients who are on chemotherapy and/or our otherwise immunocompromised patients, I have recommended avoidance of crowds, including movie theaters, restaurants, churches, etc. I have recommended avoidance of any sick or symptomatic family members and/or friends. I have also recommended avoidance of any raw and unwashed food products, and general avoidance of food items that have not been prepared by themselves. The patient has been asked to call us immediately with any symptom developments, issues, questions or other general concerns.       Pathology Discussion:    I reviewed and discussed the pathology report(s) and radiograph reports (if available) in as simple to understand and/or laymen's terms to the best of my ability. I had an indepth conversation with the patient and went over the patient's individual diagnosis based on the information that was currently available. I discussed the TNM staging process with regard to the patient's particular cancer type, and the calculated stage based on the currently available TNM data and literature. I discussed the available prognostic data with regard to the current staging information and how it relates to the prognosis of their particular neoplastic process.        NCCN Guidelines:    I discussed the available  "treatment option(s) in accordance with the latest literature from the NCCN Clinical Practice Guidelines for the patient's particular type of cancer disorder. The NCCN Guidelines provide a "document evidence-based (and) consensus-driven management" of the care of oncology patients. The treatment recommendations were made not only in accordance to the NCCN guidelines, but also factored in to account the patient's overall age, condition, performance status and their medical co-morbidities. I went over the risks and benefits of the the treatment options (if any could be made) with regard to their particular cancer type, their cancer stage, their age, and their co-morbidities.       I have explained and the patient understands all of  the current recommendation(s). I have answered all of their questions to the best of my ability and to their complete satisfaction.             Thank you for allowing me to participate in this patient's care. Please call with any questions or concerns.    Electronically signed Isacc Vogt MD    "

## 2023-03-10 ENCOUNTER — OFFICE VISIT (OUTPATIENT)
Dept: HEMATOLOGY/ONCOLOGY | Facility: CLINIC | Age: 83
End: 2023-03-10
Payer: MEDICARE

## 2023-03-10 ENCOUNTER — LAB VISIT (OUTPATIENT)
Dept: LAB | Facility: HOSPITAL | Age: 83
End: 2023-03-10
Attending: INTERNAL MEDICINE
Payer: MEDICARE

## 2023-03-10 VITALS
HEIGHT: 66 IN | DIASTOLIC BLOOD PRESSURE: 89 MMHG | RESPIRATION RATE: 18 BRPM | BODY MASS INDEX: 35.27 KG/M2 | SYSTOLIC BLOOD PRESSURE: 156 MMHG | WEIGHT: 219.5 LBS | HEART RATE: 85 BPM | TEMPERATURE: 97 F

## 2023-03-10 DIAGNOSIS — Z87.891 FORMER SMOKER: ICD-10-CM

## 2023-03-10 DIAGNOSIS — R93.89 ABNORMAL CHEST CT: ICD-10-CM

## 2023-03-10 DIAGNOSIS — R91.8 MASS OF UPPER LOBE OF RIGHT LUNG: ICD-10-CM

## 2023-03-10 DIAGNOSIS — R94.2 ABNORMAL PET SCAN OF LUNG: Primary | ICD-10-CM

## 2023-03-10 DIAGNOSIS — Z90.13 S/P BILATERAL MASTECTOMY: ICD-10-CM

## 2023-03-10 DIAGNOSIS — C34.11 MALIGNANT NEOPLASM OF UPPER LOBE OF RIGHT LUNG: ICD-10-CM

## 2023-03-10 DIAGNOSIS — Z85.3 HISTORY OF BREAST CANCER: ICD-10-CM

## 2023-03-10 DIAGNOSIS — C34.91 NON-SMALL CELL CANCER OF RIGHT LUNG: ICD-10-CM

## 2023-03-10 LAB
ALBUMIN SERPL BCP-MCNC: 4.2 G/DL (ref 3.5–5.2)
ALP SERPL-CCNC: 57 U/L (ref 55–135)
ALT SERPL W/O P-5'-P-CCNC: 20 U/L (ref 10–44)
ANION GAP SERPL CALC-SCNC: 8 MMOL/L (ref 8–16)
AST SERPL-CCNC: 24 U/L (ref 10–40)
BASOPHILS # BLD AUTO: 0.05 K/UL (ref 0–0.2)
BASOPHILS NFR BLD: 0.5 % (ref 0–1.9)
BILIRUB SERPL-MCNC: 0.9 MG/DL (ref 0.1–1)
BUN SERPL-MCNC: 14 MG/DL (ref 8–23)
CALCIUM SERPL-MCNC: 9.4 MG/DL (ref 8.7–10.5)
CEA SERPL-MCNC: 3.7 NG/ML (ref 0–5)
CHLORIDE SERPL-SCNC: 102 MMOL/L (ref 95–110)
CO2 SERPL-SCNC: 32 MMOL/L (ref 23–29)
CREAT SERPL-MCNC: 0.6 MG/DL (ref 0.5–1.4)
DIFFERENTIAL METHOD: ABNORMAL
EOSINOPHIL # BLD AUTO: 0.2 K/UL (ref 0–0.5)
EOSINOPHIL NFR BLD: 1.6 % (ref 0–8)
ERYTHROCYTE [DISTWIDTH] IN BLOOD BY AUTOMATED COUNT: 14.4 % (ref 11.5–14.5)
EST. GFR  (NO RACE VARIABLE): >60 ML/MIN/1.73 M^2
GLUCOSE SERPL-MCNC: 101 MG/DL (ref 70–110)
HCT VFR BLD AUTO: 41.6 % (ref 37–48.5)
HGB BLD-MCNC: 13.2 G/DL (ref 12–16)
IMM GRANULOCYTES # BLD AUTO: 0.04 K/UL (ref 0–0.04)
IMM GRANULOCYTES NFR BLD AUTO: 0.4 % (ref 0–0.5)
LYMPHOCYTES # BLD AUTO: 1.9 K/UL (ref 1–4.8)
LYMPHOCYTES NFR BLD: 19.1 % (ref 18–48)
MCH RBC QN AUTO: 29.1 PG (ref 27–31)
MCHC RBC AUTO-ENTMCNC: 31.7 G/DL (ref 32–36)
MCV RBC AUTO: 92 FL (ref 82–98)
MONOCYTES # BLD AUTO: 0.7 K/UL (ref 0.3–1)
MONOCYTES NFR BLD: 7.4 % (ref 4–15)
NEUTROPHILS # BLD AUTO: 7 K/UL (ref 1.8–7.7)
NEUTROPHILS NFR BLD: 71 % (ref 38–73)
NRBC BLD-RTO: 0 /100 WBC
PLATELET # BLD AUTO: 211 K/UL (ref 150–450)
PMV BLD AUTO: 10.1 FL (ref 9.2–12.9)
POTASSIUM SERPL-SCNC: 4 MMOL/L (ref 3.5–5.1)
PROT SERPL-MCNC: 7.7 G/DL (ref 6–8.4)
RBC # BLD AUTO: 4.54 M/UL (ref 4–5.4)
SODIUM SERPL-SCNC: 142 MMOL/L (ref 136–145)
WBC # BLD AUTO: 9.87 K/UL (ref 3.9–12.7)

## 2023-03-10 PROCEDURE — 99204 PR OFFICE/OUTPT VISIT, NEW, LEVL IV, 45-59 MIN: ICD-10-PCS | Mod: S$GLB,,, | Performed by: INTERNAL MEDICINE

## 2023-03-10 PROCEDURE — 99204 OFFICE O/P NEW MOD 45 MIN: CPT | Mod: S$GLB,,, | Performed by: INTERNAL MEDICINE

## 2023-03-10 PROCEDURE — 85025 COMPLETE CBC W/AUTO DIFF WBC: CPT | Performed by: INTERNAL MEDICINE

## 2023-03-10 PROCEDURE — 1159F MED LIST DOCD IN RCRD: CPT | Mod: CPTII,S$GLB,, | Performed by: INTERNAL MEDICINE

## 2023-03-10 PROCEDURE — 3079F DIAST BP 80-89 MM HG: CPT | Mod: CPTII,S$GLB,, | Performed by: INTERNAL MEDICINE

## 2023-03-10 PROCEDURE — 1101F PR PT FALLS ASSESS DOC 0-1 FALLS W/OUT INJ PAST YR: ICD-10-PCS | Mod: CPTII,S$GLB,, | Performed by: INTERNAL MEDICINE

## 2023-03-10 PROCEDURE — 36415 COLL VENOUS BLD VENIPUNCTURE: CPT | Performed by: INTERNAL MEDICINE

## 2023-03-10 PROCEDURE — 82378 CARCINOEMBRYONIC ANTIGEN: CPT | Performed by: INTERNAL MEDICINE

## 2023-03-10 PROCEDURE — 1159F PR MEDICATION LIST DOCUMENTED IN MEDICAL RECORD: ICD-10-PCS | Mod: CPTII,S$GLB,, | Performed by: INTERNAL MEDICINE

## 2023-03-10 PROCEDURE — 1160F PR REVIEW ALL MEDS BY PRESCRIBER/CLIN PHARMACIST DOCUMENTED: ICD-10-PCS | Mod: CPTII,S$GLB,, | Performed by: INTERNAL MEDICINE

## 2023-03-10 PROCEDURE — 1125F AMNT PAIN NOTED PAIN PRSNT: CPT | Mod: CPTII,S$GLB,, | Performed by: INTERNAL MEDICINE

## 2023-03-10 PROCEDURE — 1125F PR PAIN SEVERITY QUANTIFIED, PAIN PRESENT: ICD-10-PCS | Mod: CPTII,S$GLB,, | Performed by: INTERNAL MEDICINE

## 2023-03-10 PROCEDURE — 3288F FALL RISK ASSESSMENT DOCD: CPT | Mod: CPTII,S$GLB,, | Performed by: INTERNAL MEDICINE

## 2023-03-10 PROCEDURE — 3077F PR MOST RECENT SYSTOLIC BLOOD PRESSURE >= 140 MM HG: ICD-10-PCS | Mod: CPTII,S$GLB,, | Performed by: INTERNAL MEDICINE

## 2023-03-10 PROCEDURE — 1101F PT FALLS ASSESS-DOCD LE1/YR: CPT | Mod: CPTII,S$GLB,, | Performed by: INTERNAL MEDICINE

## 2023-03-10 PROCEDURE — 3079F PR MOST RECENT DIASTOLIC BLOOD PRESSURE 80-89 MM HG: ICD-10-PCS | Mod: CPTII,S$GLB,, | Performed by: INTERNAL MEDICINE

## 2023-03-10 PROCEDURE — 3077F SYST BP >= 140 MM HG: CPT | Mod: CPTII,S$GLB,, | Performed by: INTERNAL MEDICINE

## 2023-03-10 PROCEDURE — 1160F RVW MEDS BY RX/DR IN RCRD: CPT | Mod: CPTII,S$GLB,, | Performed by: INTERNAL MEDICINE

## 2023-03-10 PROCEDURE — 80053 COMPREHEN METABOLIC PANEL: CPT | Performed by: INTERNAL MEDICINE

## 2023-03-10 PROCEDURE — 3288F PR FALLS RISK ASSESSMENT DOCUMENTED: ICD-10-PCS | Mod: CPTII,S$GLB,, | Performed by: INTERNAL MEDICINE

## 2023-03-14 LAB
DNA RANGE(S) EXAMINED NAR: NORMAL
GENE DIS ANL INTERP-IMP: POSITIVE
GENE DIS ASSESSED: NORMAL
GENE MUT TESTED BLD/T: 5.8 M/MB
MSI CA SPEC-IMP: NORMAL
PD-L1 BY 22C3 TISS IMSTN DOC: NEGATIVE
REASON FOR STUDY: NORMAL
TEMPUS FUSIONADDENDUM: NORMAL
TEMPUS LCA: NORMAL
TEMPUS PD-L1 (22C3) COMBINED POSITIVE SCORE: <1
TEMPUS PD-L1 (22C3) TUMOR PROPORTION SCORE: <1 %
TEMPUS PERTINENTNEGATIVES: NORMAL
TEMPUS PORTAL: NORMAL

## 2023-03-20 ENCOUNTER — OFFICE VISIT (OUTPATIENT)
Dept: RADIATION ONCOLOGY | Facility: CLINIC | Age: 83
End: 2023-03-20
Payer: MEDICARE

## 2023-03-20 ENCOUNTER — DOCUMENTATION ONLY (OUTPATIENT)
Dept: HEMATOLOGY/ONCOLOGY | Facility: CLINIC | Age: 83
End: 2023-03-20

## 2023-03-20 VITALS
RESPIRATION RATE: 16 BRPM | DIASTOLIC BLOOD PRESSURE: 76 MMHG | BODY MASS INDEX: 35.96 KG/M2 | SYSTOLIC BLOOD PRESSURE: 129 MMHG | OXYGEN SATURATION: 87 % | HEART RATE: 78 BPM | WEIGHT: 222.81 LBS | TEMPERATURE: 98 F

## 2023-03-20 DIAGNOSIS — C34.11 MALIGNANT NEOPLASM OF UPPER LOBE OF RIGHT LUNG: ICD-10-CM

## 2023-03-20 PROCEDURE — 3288F FALL RISK ASSESSMENT DOCD: CPT | Mod: CPTII,S$GLB,, | Performed by: RADIOLOGY

## 2023-03-20 PROCEDURE — 1125F AMNT PAIN NOTED PAIN PRSNT: CPT | Mod: CPTII,S$GLB,, | Performed by: RADIOLOGY

## 2023-03-20 PROCEDURE — 1160F RVW MEDS BY RX/DR IN RCRD: CPT | Mod: CPTII,S$GLB,, | Performed by: RADIOLOGY

## 2023-03-20 PROCEDURE — 1159F PR MEDICATION LIST DOCUMENTED IN MEDICAL RECORD: ICD-10-PCS | Mod: CPTII,S$GLB,, | Performed by: RADIOLOGY

## 2023-03-20 PROCEDURE — 3074F PR MOST RECENT SYSTOLIC BLOOD PRESSURE < 130 MM HG: ICD-10-PCS | Mod: CPTII,S$GLB,, | Performed by: RADIOLOGY

## 2023-03-20 PROCEDURE — 3078F PR MOST RECENT DIASTOLIC BLOOD PRESSURE < 80 MM HG: ICD-10-PCS | Mod: CPTII,S$GLB,, | Performed by: RADIOLOGY

## 2023-03-20 PROCEDURE — 1159F MED LIST DOCD IN RCRD: CPT | Mod: CPTII,S$GLB,, | Performed by: RADIOLOGY

## 2023-03-20 PROCEDURE — 3074F SYST BP LT 130 MM HG: CPT | Mod: CPTII,S$GLB,, | Performed by: RADIOLOGY

## 2023-03-20 PROCEDURE — 1101F PR PT FALLS ASSESS DOC 0-1 FALLS W/OUT INJ PAST YR: ICD-10-PCS | Mod: CPTII,S$GLB,, | Performed by: RADIOLOGY

## 2023-03-20 PROCEDURE — 99205 PR OFFICE/OUTPT VISIT, NEW, LEVL V, 60-74 MIN: ICD-10-PCS | Mod: S$GLB,,, | Performed by: RADIOLOGY

## 2023-03-20 PROCEDURE — 1125F PR PAIN SEVERITY QUANTIFIED, PAIN PRESENT: ICD-10-PCS | Mod: CPTII,S$GLB,, | Performed by: RADIOLOGY

## 2023-03-20 PROCEDURE — 1160F PR REVIEW ALL MEDS BY PRESCRIBER/CLIN PHARMACIST DOCUMENTED: ICD-10-PCS | Mod: CPTII,S$GLB,, | Performed by: RADIOLOGY

## 2023-03-20 PROCEDURE — 3288F PR FALLS RISK ASSESSMENT DOCUMENTED: ICD-10-PCS | Mod: CPTII,S$GLB,, | Performed by: RADIOLOGY

## 2023-03-20 PROCEDURE — 1101F PT FALLS ASSESS-DOCD LE1/YR: CPT | Mod: CPTII,S$GLB,, | Performed by: RADIOLOGY

## 2023-03-20 PROCEDURE — 3078F DIAST BP <80 MM HG: CPT | Mod: CPTII,S$GLB,, | Performed by: RADIOLOGY

## 2023-03-20 PROCEDURE — 99205 OFFICE O/P NEW HI 60 MIN: CPT | Mod: S$GLB,,, | Performed by: RADIOLOGY

## 2023-03-20 RX ORDER — POTASSIUM CHLORIDE 750 MG/1
10 TABLET, EXTENDED RELEASE ORAL 2 TIMES DAILY
COMMUNITY
Start: 2023-03-02 | End: 2023-09-06

## 2023-03-20 NOTE — PROGRESS NOTES
Lety Herbert  [id number]  1940  3/20/2023  Isacc Vogt Md  [address] [facility]  [address]  [place] [address]    REASON FOR CONSULTATION: IA3, [id number] poor diff SCCa RUL    TREATMENT GOAL: primary    HISTORY OF PRESENT ILLNESS:   Lety Herbert is a 82 y.o. female former smoker (quit >40yrs ago) following Dr. Henley for diagnosis of COPD and ILD on supplemental home O2 who presented with chest pain with CTA chest noting 2.1 cm right upper lobe nodule demonstrating enlargement on short interval CT of the chest to 2.3 cm.  PET-CT confirmed SUV of 16.4 with development of a 2nd 1.3 mm subpleural right lower lobe nodule, SUV 2.3 thought infectious/inflammatory etiology.  No FDG uptake in the mediastinum or distant.  CT-guided biopsy returned non-small cell carcinoma, poorly differentiated with squamous features, lung primary favored.    Patient was seen by Dr. Henley who advised she is not operative candidate.  She was also seen by Dr. Vogt and is referred to discuss definitive radiotherapy options.    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  Has chronic shortness of breath and fatigue.  Uses supplemental oxygen.  Presents with her daughter.    Review of systems otherwise negative unless indicated in HPI.    Past Medical History:   Diagnosis Date    Abnormal chest CT 3/9/2023    Anemia     Basal cell carcinoma     nose     Cancer     breast    Depression     DVT (deep venous thrombosis)     Fall 3/20/2018    Former smoker 3/9/2023    Gastric ulceration     GERD (gastroesophageal reflux disease)     History of breast cancer 3/9/2023    History of frequent urinary tract infections     Hyperlipidemia     Hypertension     Malignant neoplasm of upper lobe of right lung (NSCLC favoring lung primary) 3/9/2023    Melanoma 2004?     left forearm    MRSA (methicillin resistant staph aureus) culture positive     Neuropathy     PE (pulmonary embolism)     Post-nasal drip 11/15/2018    Reflux     S/P bilateral mastectomy 3/9/2023     Past Surgical History:   Procedure Laterality Date    HYSTERECTOMY      MASTECTOMY, RADICAL Bilateral     TOTAL HIP ARTHROPLASTY Left 11/13/2017     Social History     Socioeconomic History    Marital status:      Spouse name: Jean    Number of children: 3   Tobacco Use    Smoking status: Former    Smokeless tobacco: Never   Substance and Sexual Activity    Alcohol use: No    Drug use: No    Sexual activity: Not Currently     Partners: Male   Social History Narrative    3 Children- 9 GC, 7 GGrands     Social Determinants of Health     Social Connections: Moderately Integrated    Frequency of Communication with Friends and Family: Three times a week    Frequency of Social Gatherings with Friends and Family: Once a week    Attends Zoroastrianism Services: More than 4 times per year    Active Member of Clubs or Organizations: No    Attends Club or Organization Meetings: Never    Marital Status:      Family History   Problem Relation Age of Onset    Cancer Mother     Cancer Father     Heart disease Father     Melanoma Neg Hx     Psoriasis Neg Hx     Lupus Neg Hx     Eczema Neg Hx        PRIOR HISTORY OF CHEMOTHERAPY OR RADIOTHERAPY: Please see HPI for patients prior oncologic history.    Medication List with Changes/Refills   Current Medications    ALBUTEROL (PROVENTIL) 2.5 MG /3 ML (0.083 %) NEBULIZER SOLUTION    Take 3 mLs (2.5 mg total) by nebulization every 6 (six) hours as needed (cough).    ALBUTEROL (PROVENTIL/VENTOLIN HFA) 90 MCG/ACTUATION INHALER    inhale 1 to 2 puffs every 4 hours as needed for wheezing    ALBUTEROL (PROVENTIL/VENTOLIN HFA) 90 MCG/ACTUATION INHALER    2 puffs every 4 hours as needed for cough, wheeze, or shortness of breath    ALPRAZOLAM (XANAX) 0.25 MG TABLET    Take 1 tablet  (0.25 mg total) by mouth 3 (three) times daily as needed for Anxiety.    ATORVASTATIN (LIPITOR) 20 MG TABLET    TAKE 1 TABLET (20 MG TOTAL) BY MOUTH ONCE DAILY.    AZELASTINE (ASTELIN) 137 MCG (0.1 %) NASAL SPRAY    1 spray 2 (two) times daily.    DILTIAZEM (CARDIZEM CD) 120 MG CP24    Take 1 capsule (120 mg total) by mouth once daily.    FLUTICASONE PROPIONATE (FLONASE) 50 MCG/ACTUATION NASAL SPRAY    by Each Nostril route.    FLUTICASONE-UMECLIDIN-VILANTER (TRELEGY ELLIPTA) 200-62.5-25 MCG INHALER    Inhale 1 puff into the lungs once daily.    HYDROCODONE-ACETAMINOPHEN (NORCO) 5-325 MG PER TABLET    Take 1 tablet by mouth every 6 (six) hours as needed for Pain.    HYDROCODONE-ACETAMINOPHEN (NORCO) 5-325 MG PER TABLET    Take 1 tablet by mouth every 6 (six) hours as needed for Pain.    HYDROCODONE-ACETAMINOPHEN (NORCO) 5-325 MG PER TABLET    Take 1 tablet by mouth every 6 (six) hours as needed for Pain.    LORATADINE (CLARITIN) 10 MG TABLET    Take 1 tablet (10 mg total) by mouth once daily.    MONTELUKAST (SINGULAIR) 10 MG TABLET    Take 10 mg by mouth every evening.    MULTIVIT-MIN/FOLIC AC/COLLAGEN (WOMEN'S MULTIVITAMIN COLLAGEN ORAL)    Take by mouth once daily.    PANTOPRAZOLE (PROTONIX) 20 MG TABLET    TAKE 1 TABLET (20 MG TOTAL) BY MOUTH ONCE DAILY.    POTASSIUM CHLORIDE (KLOR-CON) 10 MEQ TBSR    Take 10 mEq by mouth 2 (two) times daily.    SERTRALINE (ZOLOFT) 100 MG TABLET    Take 1 tablet (100 mg total) by mouth once daily.    TRAZODONE (DESYREL) 50 MG TABLET    Take 1 tablet (50 mg total) by mouth every evening.    VALSARTAN-HYDROCHLOROTHIAZIDE (DIOVAN-HCT) 320-12.5 MG PER TABLET    Take 1 tablet by mouth every morning.    WIXELA INHUB 100-50 MCG/DOSE DISKUS INHALER    Inhale 1 puff into the lungs 2 (two) times daily.    XARELTO 15 MG TAB    Take 15 mg by mouth.    XARELTO 20 MG TAB         Review of patient's allergies indicates:   Allergen Reactions    Meperidine     Promethazine     Bactrim  [sulfamethoxazole-trimethoprim] Rash       QUALITY OF LIFE: 80%- Normal Activity with Effort: Some Symptoms of Disease    Vitals:    03/20/23 0826   BP: 129/76   Pulse: 78   Resp: 16   Temp: 97.9 °F (36.6 °C)   TempSrc: Oral   SpO2: (!) 87%   Weight: 101.1 kg (222 lb 12.8 oz)   PainSc:   7   PainLoc: Back     Body mass index is 35.96 kg/m².    PHYSICAL EXAM:   GENERAL: alert; in no apparent distress.   HEAD: normocephalic, atraumatic.  EYES: pupils are equal, round, reactive to light and accommodation. Sclera anicteric. Conjunctiva not injected.   NOSE/THROAT: no nasal erythema or rhinorrhea. Oropharynx pink, without erythema, ulcerations or thrush.   NECK: no cervical motion rigidity; supple with no masses.    CHEST: Patient is speaking comfortably on supplemental O2 with normal work of breathing without using accessory muscles of respiration.  CARDIOVASCULAR: regular rate and rhythm  ABDOMEN: soft, nontender, nondistended.   MUSCULOSKELETAL: no tenderness to palpation along the spine or scapulae. Normal range of motion.  NEUROLOGIC: cranial nerves II-XII intact bilaterally. Strength 5/5 in bilateral upper and lower extremities. No sensory deficits appreciated. Using walker  EXTREMITIES: no clubbing, cyanosis, edema.  SKIN: no erythema, rashes, ulcerations noted.     REVIEW OF IMAGING/PATHOLOGY/LABS: Please see HPI. All images reviewed personally by dictating physician.     ASSESSMENT: Lety Herbert is a 82 y.o. 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  She has very poor lung function requiring supplemental oxygen and is not a surgical candidate.  Today we discussed definitive intent therapy using SBRT. I described the process of 4D CT simulation to monitor respiratory motion, abdominal compression to minimize excursion with  IMRT treatment planning and intent to deliver ablative high-dose per fraction treatments every other day using advanced image guidance of cone beam CT.  Published literature expected local control of 90%+, similar to surgery.  Will advise surveillance CTs at completion.  Discussed expectation for fatigue, dosing to the bronchovascular structures/great vessels, ribs and possible exacerbation of her pre-existing lung disease.    I carefully explained the process of simulation and treatment delivery with weekly physician visits. Patient wishes to proceed.     We discussed the risks and benefits of the above treatment and have gone over in detail the acute and late toxicities of radiation therapy to the right upper lobe. The patient expressed  understanding and has signed a consent form which is included in the patients chart.      The patient has our contact information and understands that they are free to contact us at any time with questions or concerns regarding radiation therapy.     DISPOSITION: RTC FOR CT SIM     I have personally seen and evaluated this patient with a high complexity diagnosis.      Greater than 60 minutes were dedicated to reviewing/interpreting pertinent laboratory/imaging/pathology as well as prior consultations; reviewing and performing history and physical; counseling patient on oncologic recommendations; documentation in the electronic medical record including ordering of additional tests and/or radiation treatment protocol; and coordination of care with physicians with referrals placed as appropriate.     COVID-19 precautions and [facility] policy discussed.      PHYSICIAN: Pedrito Ralph Jr, MD    Thank you for the opportunity to meet and consult with Lety Herbert.   Please feel free to contact me to discuss the above recommendation further.

## 2023-03-26 NOTE — PROGRESS NOTES
[facility] Hematology/Oncology  PROGRESS NOTE - 2nd Follow-up Visit      Subjective:       Patient ID:   NAME: Lety Herbert : 1940     82 y.o. female    Referring Doc: Shilo Perez MD  Other Physicians: Ashish Henley; Maryse Beckwith           Chief Complaint: RUL mass/lung ca f/u       History of Present Illness:     Patient returns today for a 2nd regularly scheduled follow-up visit.  The patient is here today to go over the results of the recently ordered labs, tests and studies. She is here with her daughter Buffy today.     She saw Dr Ralph with rad/onc and she is starting XRT today with day#1    Breathing is stable. No CP, HA's or N/V.    Dicussed covid precautions - she has been vaccinated    ROS:   GEN: normal without any fever, night sweats or weight loss  HEENT: normal with no HA's, sore throat, stiff neck, changes in vision  CV: normal with no CP, SOB, PND, GAVIRIA or orthopnea  PULM: chronic SOB/GAVIRIA; O2 dependent at home, hemoptysis, sputum or pleuritic pain  GI: normal with no abdominal pain, nausea, vomiting, constipation, diarrhea, melanotic stools, BRBPR, or hematemesis  : normal with no hematuria, dysuria  BREAST: normal with no mass, discharge, pain  SKIN: normal with no rash, erythema, bruising, or swelling    Pain Scale:    Allergies:  Review of patient's allergies indicates:   Allergen Reactions    Meperidine     Promethazine     Bactrim [sulfamethoxazole-trimethoprim] Rash       Medications:    Current Outpatient Medications:     albuterol (PROVENTIL) 2.5 mg /3 mL (0.083 %) nebulizer solution, Take 3 mLs (2.5 mg total) by nebulization every 6 (six) hours as needed (cough)., Disp: 120 each, Rfl: 11    albuterol (PROVENTIL/VENTOLIN HFA) 90 mcg/actuation inhaler, inhale 1 to 2 puffs every 4 hours as needed for wheezing, Disp: , Rfl:     albuterol (PROVENTIL/VENTOLIN HFA) 90 mcg/actuation inhaler, 2 puffs every 4 hours as needed for cough, wheeze, or shortness of breath, Disp: 18 g, Rfl:  11    atorvastatin (LIPITOR) 20 MG tablet, TAKE 1 TABLET (20 MG TOTAL) BY MOUTH ONCE DAILY., Disp: 90 tablet, Rfl: 1    azelastine (ASTELIN) 137 mcg (0.1 %) nasal spray, 1 spray 2 (two) times daily., Disp: , Rfl:     diltiaZEM (CARDIZEM CD) 120 MG Cp24, Take 1 capsule (120 mg total) by mouth once daily., Disp: 90 capsule, Rfl: 1    fluticasone propionate (FLONASE) 50 mcg/actuation nasal spray, by Each Nostril route., Disp: , Rfl:     fluticasone-umeclidin-vilanter (TRELEGY ELLIPTA) 200-62.5-25 mcg inhaler, Inhale 1 puff into the lungs once daily., Disp: 60 each, Rfl: 11    HYDROcodone-acetaminophen (NORCO) 5-325 mg per tablet, Take 1 tablet by mouth every 6 (six) hours as needed for Pain., Disp: 90 tablet, Rfl: 0    [START ON 4/7/2023] HYDROcodone-acetaminophen (NORCO) 5-325 mg per tablet, Take 1 tablet by mouth every 6 (six) hours as needed for Pain., Disp: 90 tablet, Rfl: 0    HYDROcodone-acetaminophen (NORCO) 5-325 mg per tablet, Take 1 tablet by mouth every 6 (six) hours as needed for Pain., Disp: 90 tablet, Rfl: 0    montelukast (SINGULAIR) 10 mg tablet, Take 10 mg by mouth every evening., Disp: , Rfl:     multivit-min/folic ac/collagen (WOMEN'S MULTIVITAMIN COLLAGEN ORAL), Take by mouth once daily., Disp: , Rfl:     pantoprazole (PROTONIX) 20 MG tablet, TAKE 1 TABLET (20 MG TOTAL) BY MOUTH ONCE DAILY., Disp: 90 tablet, Rfl: 1    potassium chloride (KLOR-CON) 10 MEQ TbSR, Take 10 mEq by mouth 2 (two) times daily., Disp: , Rfl:     sertraline (ZOLOFT) 100 MG tablet, Take 1 tablet (100 mg total) by mouth once daily., Disp: 90 tablet, Rfl: 3    traZODone (DESYREL) 50 MG tablet, Take 1 tablet (50 mg total) by mouth every evening., Disp: 30 tablet, Rfl: 2    valsartan-hydrochlorothiazide (DIOVAN-HCT) 320-12.5 mg per tablet, Take 1 tablet by mouth every morning., Disp: 90 tablet, Rfl: 0    WIXELA INHUB 100-50 mcg/dose diskus inhaler, Inhale 1 puff into the lungs 2 (two) times daily., Disp: , Rfl:     XARELTO 15 mg  "Tab, Take 15 mg by mouth., Disp: , Rfl:     XARELTO 20 mg Tab, , Disp: , Rfl:     ALPRAZolam (XANAX) 0.25 MG tablet, Take 1 tablet (0.25 mg total) by mouth 3 (three) times daily as needed for Anxiety., Disp: 45 tablet, Rfl: 4    loratadine (CLARITIN) 10 mg tablet, Take 1 tablet (10 mg total) by mouth once daily., Disp: 30 tablet, Rfl: 0    PMHx/PSHx Updates:  See patient's last visit with me on 3/10/2023.  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 Patient's remote   clinical history of breast carcinoma is noticed. Based on the   immunohistochemical features, the current tumor is favored to be of lung   primary.            Objective:     Vitals:  Blood pressure (!) 163/91, pulse 92, temperature 97.8 °F (36.6 °C), resp. rate 18, height 5' 6" (1.676 m), weight 100.7 kg (222 lb 1.6 oz).    Physical Examination:   GEN: no apparent distress, comfortable; AAOx3; overweight; elderly  HEAD: atraumatic and normocephalic  EYES: no pallor, no icterus, PERRLA  ENT: OMM, no pharyngeal erythema, external ears WNL; no nasal discharge; no thrush  NECK: no masses, thyroid normal, trachea midline, no LAD/LN's, supple  CV: RRR with no murmur; normal pulse; normal S1 and S2; no pedal edema  CHEST: Normal respiratory effort; CTAB; normal breath sounds; no wheeze or crackles  ABDOM: nontender and nondistended; soft; normal bowel sounds; no rebound/guarding  MUSC/Skeletal: ROM normal; no crepitus; joints normal; no deformities; " +arthropathy of hip/knees, hands  EXTREM: no clubbing, cyanosis, inflammation or swelling; varicosities in bilateral lower extremities  SKIN: no rashes, lesions, ulcers, petechiae or subcutaneous nodules; chronic age related skin changes  : no carrillo  NEURO: grossly intact; motor/sensory WNL; AAOx3; no tremors  PSYCH: normal mood, affect and behavior  LYMPH: normal cervical, supraclavicular, axillary and groin LN's          Labs:     Lab Results   Component Value Date    WBC 9.87 03/10/2023    HGB 13.2 03/10/2023    HCT 41.6 03/10/2023    MCV 92 03/10/2023     03/10/2023       CMP  Sodium   Date Value Ref Range Status   03/10/2023 142 136 - 145 mmol/L Final   03/04/2019 142 134 - 144 mmol/L      Potassium   Date Value Ref Range Status   03/10/2023 4.0 3.5 - 5.1 mmol/L Final     Chloride   Date Value Ref Range Status   03/10/2023 102 95 - 110 mmol/L Final   03/04/2019 104 98 - 110 mmol/L      CO2   Date Value Ref Range Status   03/10/2023 32 (H) 23 - 29 mmol/L Final     Glucose   Date Value Ref Range Status   03/10/2023 101 70 - 110 mg/dL Final   03/04/2019 102 (H) 70 - 99 mg/dL      BUN   Date Value Ref Range Status   03/10/2023 14 8 - 23 mg/dL Final     Creatinine   Date Value Ref Range Status   03/10/2023 0.6 0.5 - 1.4 mg/dL Final   03/04/2019 0.48 (L) 0.60 - 1.40 mg/dL      Calcium   Date Value Ref Range Status   03/10/2023 9.4 8.7 - 10.5 mg/dL Final     Total Protein   Date Value Ref Range Status   03/10/2023 7.7 6.0 - 8.4 g/dL Final     Albumin   Date Value Ref Range Status   03/10/2023 4.2 3.5 - 5.2 g/dL Final   03/04/2019 4.0 3.1 - 4.7 g/dL      Total Bilirubin   Date Value Ref Range Status   03/10/2023 0.9 0.1 - 1.0 mg/dL Final     Comment:     For infants and newborns, interpretation of results should be based  on gestational age, weight and in agreement with clinical  observations.    Premature Infant recommended reference ranges:  Up to 24 hours.............<8.0 mg/dL  Up to 48  hours............<12.0 mg/dL  3-5 days..................<15.0 mg/dL  6-29 days.................<15.0 mg/dL       Alkaline Phosphatase   Date Value Ref Range Status   03/10/2023 57 55 - 135 U/L Final     AST   Date Value Ref Range Status   03/10/2023 24 10 - 40 U/L Final     ALT   Date Value Ref Range Status   03/10/2023 20 10 - 44 U/L Final     Anion Gap   Date Value Ref Range Status   03/10/2023 8 8 - 16 mmol/L Final     eGFR   Date Value Ref Range Status   03/10/2023 >60.0 >60 mL/min/1.73 m^2 Final     Lab Results   Component Value Date    CEA 3.7 03/10/2023           Radiology/Diagnostic Studies:    PET 1/11/2023:     IMPRESSION: Hypermetabolic 2.3 cm mass within the anterior right upper lobe suspicious for primary lung malignancy     Faint groundglass opacities throughout the lungs with prominence of the pulmonary vasculature and cardiomegaly which may be represent pulmonary edema.  Development of a 13 mm subpleural nodule in the right lower lobe. Since this is new since the most recent CT findings most likely are secondary to infectious or inflammatory process however metastatic lesion cannot be excluded     Increased FDG activity in the region of the right external iliac vein without corresponding adenopathy. This may be physiologic there is physiologic activity within a superficial vein in the upper right thigh and findings may be secondary to thrombophlebitis.. Follow-up CT abdomen and pelvis with contrast is recommended     Degenerative changes of the spine           CT Chest 12/15/2022:     IMPRESSION:  1. A 23 mm right upper lobe noncalcified pulmonary nodule is highly suspicious for primary pulmonary neoplasm. Tissue diagnosis is recommended.  2. No findings of regional metastatic disease in the chest.  3. Enlarged pulmonary arteries, suggesting pulmonary arterial hypertension.  4. Cardiomegaly, with aortic and coronary arterial calcifications.     CTA 11/19/2022:     IMPRESSION:  1.  No pulmonary  embolus detected.  2.  Interstitial abnormality, suspect mild edema superimposed upon chronic changes.  3.  Right upper lobe 2.1 cm nodule. Consider a non-contrast Chest CT at 3 months, a PET/CT, or tissue sampling. These guidelines do not apply to immunocompromised patients and patients with cancer   ADDENDUM #1         The presence of right upper lobe lung nodule needing further evaluation or follow-up has been discussed with Dr. Rick Salgado 11/19/2022 12:50 AM CST.     All lab results and imaging results have been reviewed and     I have reviewed all available lab results and radiology reports.    Assessment/Plan:   (1) 82 y.o. female with diagnosis of RUL lung mass who has been referred by Shilo Perez MD for evaluation by medical hematology/oncology. She has been followed by Dr Ashish Henley with pulmonary for her COPD and chronic hypoxemia/bronchitis, who was a former smoker.      - She had a CTA in Nov 2022 which was negative for a PE but showed a RUL lung mass, which was followed by a CT Chest on 12/15/2022 which confirmed a 2.3cm RUL mass.   - She had a PET scan on 1/11/2023 which showed a 2.3 cm hypermetabolic mass in the anterior right upper lobe abutting the superior vena cava along with development of a 13 mm subpleural nodule within the right lower lobe.         - She had CT guided biopsy of the RUL lung mass on 2/15/2023 with pathology coming back NSCLC favoring a lung primary.     - She is supposed to be on oxygen at home. She has portable tank and has a lot of GAVIRIA. She has chronic SOB.   - She is former smoker and quit when she was 43yrs old.      - discussed the pathology and reviewed and discussed the latest NCCN guidelines (vs. 2-2023)  - unlikely candidate for any surgical intervention  - will refer to Rad/onc to see if there are any radiation options either monotherapy or in combination with low dose weekly chemotherapy  - CARIS on the pathology  - check CEA and up to date  labs    3/27/2023:  - She saw Dr Ralph with rad/onc on 3/20/2023 and she is starting XRT monotherapy today with day#1           (2) Hx/of PE and DVT     (3) HTN and hypercholesterolemia     (4) COPD/chronic hypoxemia; bronchiectasis; chronic bronchitis - followed by Dr Ashish Henley/Shelbie Villalpando     (5) CHF and dysrhythmia     (6) GERD; gastric ulcer     (7) Hx/of breast cancer s/p bilateral mastectomies- followed by Dr Maryse Beckwith  - She has history of breast cancer in past around 2005  for which she has had bilateral mastectomies and is followed by Dr Beckwith. She did not require chemotherapy or radiation. She had reconstruction surgery with Dr Grimaldo. She saw Dr Beckwith couple of weeks ago.        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 CEA level every 3 months  F/u with PCP, Pulm, etc     RTC in  4 weeks  Fax note to Shilo Perez MD; Amena Mejia Mannina, [place]       Discussion:     COVID-19 Discussion:     I had long discussion with patient and any applicable family about the COVID-19 coronavirus epidemic and the recommended precautions with regard to cancer and/or hematology patients. I have re-iterated the CDC recommendations for adequate hand washing, use of hand -like products, and coughing into elbow, etc. In addition, especially for our patients who are on chemotherapy and/or our otherwise  "immunocompromised patients, I have recommended avoidance of crowds, including movie theaters, restaurants, churches, etc. I have recommended avoidance of any sick or symptomatic family members and/or friends. I have also recommended avoidance of any raw and unwashed food products, and general avoidance of food items that have not been prepared by themselves. The patient has been asked to call us immediately with any symptom developments, issues, questions or other general concerns.         Pathology Discussion:     I reviewed and discussed the pathology report(s) and radiograph reports (if available) in as simple to understand and/or laymen's terms to the best of my ability. I had an indepth conversation with the patient and went over the patient's individual diagnosis based on the information that was currently available. I discussed the TNM staging process with regard to the patient's particular cancer type, and the calculated stage based on the currently available TNM data and literature. I discussed the available prognostic data with regard to the current staging information and how it relates to the prognosis of their particular neoplastic process.          NCCN Guidelines:     I discussed the available treatment option(s) in accordance with the latest literature from the NCCN Clinical Practice Guidelines for the patient's particular type of cancer disorder. The NCCN Guidelines provide a "document evidence-based (and) consensus-driven management" of the care of oncology patients. The treatment recommendations were made not only in accordance to the NCCN guidelines, but also factored in to account the patient's overall age, condition, performance status and their medical co-morbidities. I went over the risks and benefits of the the treatment options (if any could be made) with regard to their particular cancer type, their cancer stage, their age, and their co-morbidities.         I have explained and the patient " understands all of  the current recommendation(s). I have answered all of their questions to the best of my ability and to their complete satisfaction.      I spent over 25 mins of time with the patient. Reviewed results of the recently ordered labs, tests and studies; made directives with regards to the results. Over half of this time was spent couseling and coordinating care.    I have explained all of the above in detail and the patient understands all of the current recommendation(s). I have answered all of their questions to the best of my ability and to their complete satisfaction.   The patient is to continue with the current management plan.            Electronically signed by Isacc Vogt MD

## 2023-03-27 ENCOUNTER — OFFICE VISIT (OUTPATIENT)
Dept: HEMATOLOGY/ONCOLOGY | Facility: CLINIC | Age: 83
End: 2023-03-27
Payer: MEDICARE

## 2023-03-27 VITALS
SYSTOLIC BLOOD PRESSURE: 163 MMHG | RESPIRATION RATE: 18 BRPM | HEIGHT: 66 IN | HEART RATE: 92 BPM | TEMPERATURE: 98 F | BODY MASS INDEX: 35.7 KG/M2 | DIASTOLIC BLOOD PRESSURE: 91 MMHG | WEIGHT: 222.13 LBS

## 2023-03-27 DIAGNOSIS — Z87.891 FORMER SMOKER: ICD-10-CM

## 2023-03-27 DIAGNOSIS — Z85.3 HISTORY OF BREAST CANCER: ICD-10-CM

## 2023-03-27 DIAGNOSIS — Z90.13 S/P BILATERAL MASTECTOMY: ICD-10-CM

## 2023-03-27 DIAGNOSIS — R93.89 ABNORMAL CHEST CT: ICD-10-CM

## 2023-03-27 DIAGNOSIS — C34.11 MALIGNANT NEOPLASM OF UPPER LOBE OF RIGHT LUNG: ICD-10-CM

## 2023-03-27 DIAGNOSIS — R91.8 MASS OF UPPER LOBE OF RIGHT LUNG: ICD-10-CM

## 2023-03-27 DIAGNOSIS — R94.2 ABNORMAL PET SCAN OF LUNG: Primary | ICD-10-CM

## 2023-03-27 DIAGNOSIS — I48.0 PAROXYSMAL ATRIAL FIBRILLATION: Chronic | ICD-10-CM

## 2023-03-27 PROCEDURE — 3288F FALL RISK ASSESSMENT DOCD: CPT | Mod: CPTII,S$GLB,, | Performed by: INTERNAL MEDICINE

## 2023-03-27 PROCEDURE — 1160F RVW MEDS BY RX/DR IN RCRD: CPT | Mod: CPTII,S$GLB,, | Performed by: INTERNAL MEDICINE

## 2023-03-27 PROCEDURE — 1160F PR REVIEW ALL MEDS BY PRESCRIBER/CLIN PHARMACIST DOCUMENTED: ICD-10-PCS | Mod: CPTII,S$GLB,, | Performed by: INTERNAL MEDICINE

## 2023-03-27 PROCEDURE — 1125F AMNT PAIN NOTED PAIN PRSNT: CPT | Mod: CPTII,S$GLB,, | Performed by: INTERNAL MEDICINE

## 2023-03-27 PROCEDURE — 3077F SYST BP >= 140 MM HG: CPT | Mod: CPTII,S$GLB,, | Performed by: INTERNAL MEDICINE

## 2023-03-27 PROCEDURE — 1101F PR PT FALLS ASSESS DOC 0-1 FALLS W/OUT INJ PAST YR: ICD-10-PCS | Mod: CPTII,S$GLB,, | Performed by: INTERNAL MEDICINE

## 2023-03-27 PROCEDURE — 3288F PR FALLS RISK ASSESSMENT DOCUMENTED: ICD-10-PCS | Mod: CPTII,S$GLB,, | Performed by: INTERNAL MEDICINE

## 2023-03-27 PROCEDURE — 3080F DIAST BP >= 90 MM HG: CPT | Mod: CPTII,S$GLB,, | Performed by: INTERNAL MEDICINE

## 2023-03-27 PROCEDURE — 3077F PR MOST RECENT SYSTOLIC BLOOD PRESSURE >= 140 MM HG: ICD-10-PCS | Mod: CPTII,S$GLB,, | Performed by: INTERNAL MEDICINE

## 2023-03-27 PROCEDURE — 1101F PT FALLS ASSESS-DOCD LE1/YR: CPT | Mod: CPTII,S$GLB,, | Performed by: INTERNAL MEDICINE

## 2023-03-27 PROCEDURE — 1159F MED LIST DOCD IN RCRD: CPT | Mod: CPTII,S$GLB,, | Performed by: INTERNAL MEDICINE

## 2023-03-27 PROCEDURE — 1159F PR MEDICATION LIST DOCUMENTED IN MEDICAL RECORD: ICD-10-PCS | Mod: CPTII,S$GLB,, | Performed by: INTERNAL MEDICINE

## 2023-03-27 PROCEDURE — 1125F PR PAIN SEVERITY QUANTIFIED, PAIN PRESENT: ICD-10-PCS | Mod: CPTII,S$GLB,, | Performed by: INTERNAL MEDICINE

## 2023-03-27 PROCEDURE — 3080F PR MOST RECENT DIASTOLIC BLOOD PRESSURE >= 90 MM HG: ICD-10-PCS | Mod: CPTII,S$GLB,, | Performed by: INTERNAL MEDICINE

## 2023-03-27 PROCEDURE — 99215 OFFICE O/P EST HI 40 MIN: CPT | Mod: S$GLB,,, | Performed by: INTERNAL MEDICINE

## 2023-03-27 PROCEDURE — 99215 PR OFFICE/OUTPT VISIT, EST, LEVL V, 40-54 MIN: ICD-10-PCS | Mod: S$GLB,,, | Performed by: INTERNAL MEDICINE

## 2023-03-27 RX ORDER — DILTIAZEM HYDROCHLORIDE 120 MG/1
120 CAPSULE, COATED, EXTENDED RELEASE ORAL DAILY
Qty: 90 CAPSULE | Refills: 1 | Status: SHIPPED | OUTPATIENT
Start: 2023-03-27 | End: 2023-09-20 | Stop reason: SDUPTHER

## 2023-04-05 ENCOUNTER — TREATMENT (OUTPATIENT)
Dept: RADIATION ONCOLOGY | Facility: CLINIC | Age: 83
End: 2023-04-05
Payer: MEDICARE

## 2023-04-05 PROCEDURE — 77373 PR  RADN RX DELIV,BODY, EACH FRACTION: ICD-10-PCS | Mod: S$GLB,,, | Performed by: RADIOLOGY

## 2023-04-05 PROCEDURE — 77373 STRTCTC BDY RAD THER TX DLVR: CPT | Mod: S$GLB,,, | Performed by: RADIOLOGY

## 2023-04-06 PROCEDURE — 77336 RADIATION PHYSICS CONSULT: CPT | Mod: S$GLB,,, | Performed by: RADIOLOGY

## 2023-04-06 PROCEDURE — 77336 PR  RADN PHYSICS CONSULT CONTINUING: ICD-10-PCS | Mod: S$GLB,,, | Performed by: RADIOLOGY

## 2023-04-11 RX ORDER — VALSARTAN AND HYDROCHLOROTHIAZIDE 320; 12.5 MG/1; MG/1
1 TABLET, FILM COATED ORAL EVERY MORNING
Qty: 90 TABLET | Refills: 4 | Status: SHIPPED | OUTPATIENT
Start: 2023-04-11

## 2023-04-17 ENCOUNTER — LAB VISIT (OUTPATIENT)
Dept: LAB | Facility: HOSPITAL | Age: 83
End: 2023-04-17
Attending: INTERNAL MEDICINE
Payer: MEDICARE

## 2023-04-17 DIAGNOSIS — C34.11 MALIGNANT NEOPLASM OF UPPER LOBE OF RIGHT LUNG: ICD-10-CM

## 2023-04-17 LAB
ALBUMIN SERPL BCP-MCNC: 4.3 G/DL (ref 3.5–5.2)
ALP SERPL-CCNC: 85 U/L (ref 55–135)
ALT SERPL W/O P-5'-P-CCNC: 18 U/L (ref 10–44)
ANION GAP SERPL CALC-SCNC: 6 MMOL/L (ref 8–16)
AST SERPL-CCNC: 26 U/L (ref 10–40)
BASOPHILS # BLD AUTO: 0.07 K/UL (ref 0–0.2)
BASOPHILS NFR BLD: 0.9 % (ref 0–1.9)
BILIRUB SERPL-MCNC: 1 MG/DL (ref 0.1–1)
BUN SERPL-MCNC: 11 MG/DL (ref 8–23)
CALCIUM SERPL-MCNC: 8.6 MG/DL (ref 8.7–10.5)
CEA SERPL-MCNC: 2.4 NG/ML (ref 0–5)
CHLORIDE SERPL-SCNC: 97 MMOL/L (ref 95–110)
CO2 SERPL-SCNC: 30 MMOL/L (ref 23–29)
CREAT SERPL-MCNC: 0.5 MG/DL (ref 0.5–1.4)
DIFFERENTIAL METHOD: ABNORMAL
EOSINOPHIL # BLD AUTO: 0.2 K/UL (ref 0–0.5)
EOSINOPHIL NFR BLD: 2.4 % (ref 0–8)
ERYTHROCYTE [DISTWIDTH] IN BLOOD BY AUTOMATED COUNT: 14.2 % (ref 11.5–14.5)
EST. GFR  (NO RACE VARIABLE): >60 ML/MIN/1.73 M^2
GLUCOSE SERPL-MCNC: 120 MG/DL (ref 70–110)
HCT VFR BLD AUTO: 40.9 % (ref 37–48.5)
HGB BLD-MCNC: 12.3 G/DL (ref 12–16)
IMM GRANULOCYTES # BLD AUTO: 0.02 K/UL (ref 0–0.04)
IMM GRANULOCYTES NFR BLD AUTO: 0.3 % (ref 0–0.5)
LYMPHOCYTES # BLD AUTO: 1.3 K/UL (ref 1–4.8)
LYMPHOCYTES NFR BLD: 16.8 % (ref 18–48)
MCH RBC QN AUTO: 28 PG (ref 27–31)
MCHC RBC AUTO-ENTMCNC: 30.1 G/DL (ref 32–36)
MCV RBC AUTO: 93 FL (ref 82–98)
MONOCYTES # BLD AUTO: 0.5 K/UL (ref 0.3–1)
MONOCYTES NFR BLD: 6.3 % (ref 4–15)
NEUTROPHILS # BLD AUTO: 5.7 K/UL (ref 1.8–7.7)
NEUTROPHILS NFR BLD: 73.3 % (ref 38–73)
NRBC BLD-RTO: 0 /100 WBC
PLATELET # BLD AUTO: 245 K/UL (ref 150–450)
PMV BLD AUTO: 9.9 FL (ref 9.2–12.9)
POTASSIUM SERPL-SCNC: 4.3 MMOL/L (ref 3.5–5.1)
PROT SERPL-MCNC: 7.5 G/DL (ref 6–8.4)
RBC # BLD AUTO: 4.39 M/UL (ref 4–5.4)
SODIUM SERPL-SCNC: 133 MMOL/L (ref 136–145)
WBC # BLD AUTO: 7.76 K/UL (ref 3.9–12.7)

## 2023-04-17 PROCEDURE — 36415 COLL VENOUS BLD VENIPUNCTURE: CPT | Performed by: INTERNAL MEDICINE

## 2023-04-17 PROCEDURE — 85025 COMPLETE CBC W/AUTO DIFF WBC: CPT | Performed by: INTERNAL MEDICINE

## 2023-04-17 PROCEDURE — 82378 CARCINOEMBRYONIC ANTIGEN: CPT | Performed by: INTERNAL MEDICINE

## 2023-04-17 PROCEDURE — 80053 COMPREHEN METABOLIC PANEL: CPT | Performed by: INTERNAL MEDICINE

## 2023-04-24 ENCOUNTER — LAB VISIT (OUTPATIENT)
Dept: LAB | Facility: HOSPITAL | Age: 83
End: 2023-04-24
Attending: INTERNAL MEDICINE
Payer: MEDICARE

## 2023-04-24 DIAGNOSIS — C34.11 MALIGNANT NEOPLASM OF UPPER LOBE OF RIGHT LUNG: ICD-10-CM

## 2023-04-24 PROBLEM — J18.9 PNEUMONIA OF RIGHT LOWER LOBE DUE TO INFECTIOUS ORGANISM: Status: RESOLVED | Noted: 2023-01-18 | Resolved: 2023-04-24

## 2023-04-24 LAB
ALBUMIN SERPL BCP-MCNC: 4.2 G/DL (ref 3.5–5.2)
ALP SERPL-CCNC: 81 U/L (ref 55–135)
ALT SERPL W/O P-5'-P-CCNC: 15 U/L (ref 10–44)
ANION GAP SERPL CALC-SCNC: 10 MMOL/L (ref 8–16)
AST SERPL-CCNC: 23 U/L (ref 10–40)
BASOPHILS # BLD AUTO: 0.09 K/UL (ref 0–0.2)
BASOPHILS NFR BLD: 1.4 % (ref 0–1.9)
BILIRUB SERPL-MCNC: 0.8 MG/DL (ref 0.1–1)
BUN SERPL-MCNC: 10 MG/DL (ref 8–23)
CALCIUM SERPL-MCNC: 9.4 MG/DL (ref 8.7–10.5)
CEA SERPL-MCNC: 2.5 NG/ML (ref 0–5)
CHLORIDE SERPL-SCNC: 103 MMOL/L (ref 95–110)
CO2 SERPL-SCNC: 29 MMOL/L (ref 23–29)
CREAT SERPL-MCNC: 0.6 MG/DL (ref 0.5–1.4)
DIFFERENTIAL METHOD: ABNORMAL
EOSINOPHIL # BLD AUTO: 0.1 K/UL (ref 0–0.5)
EOSINOPHIL NFR BLD: 2.1 % (ref 0–8)
ERYTHROCYTE [DISTWIDTH] IN BLOOD BY AUTOMATED COUNT: 14 % (ref 11.5–14.5)
EST. GFR  (NO RACE VARIABLE): >60 ML/MIN/1.73 M^2
GLUCOSE SERPL-MCNC: 93 MG/DL (ref 70–110)
HCT VFR BLD AUTO: 39.3 % (ref 37–48.5)
HGB BLD-MCNC: 12 G/DL (ref 12–16)
IMM GRANULOCYTES # BLD AUTO: 0.01 K/UL (ref 0–0.04)
IMM GRANULOCYTES NFR BLD AUTO: 0.2 % (ref 0–0.5)
LYMPHOCYTES # BLD AUTO: 1.3 K/UL (ref 1–4.8)
LYMPHOCYTES NFR BLD: 20.4 % (ref 18–48)
MCH RBC QN AUTO: 27.6 PG (ref 27–31)
MCHC RBC AUTO-ENTMCNC: 30.5 G/DL (ref 32–36)
MCV RBC AUTO: 90 FL (ref 82–98)
MONOCYTES # BLD AUTO: 0.5 K/UL (ref 0.3–1)
MONOCYTES NFR BLD: 7.5 % (ref 4–15)
NEUTROPHILS # BLD AUTO: 4.5 K/UL (ref 1.8–7.7)
NEUTROPHILS NFR BLD: 68.4 % (ref 38–73)
NRBC BLD-RTO: 0 /100 WBC
PLATELET # BLD AUTO: 243 K/UL (ref 150–450)
PMV BLD AUTO: 9.9 FL (ref 9.2–12.9)
POTASSIUM SERPL-SCNC: 4 MMOL/L (ref 3.5–5.1)
PROT SERPL-MCNC: 7.6 G/DL (ref 6–8.4)
RBC # BLD AUTO: 4.35 M/UL (ref 4–5.4)
SODIUM SERPL-SCNC: 142 MMOL/L (ref 136–145)
WBC # BLD AUTO: 6.53 K/UL (ref 3.9–12.7)

## 2023-04-24 PROCEDURE — 85025 COMPLETE CBC W/AUTO DIFF WBC: CPT | Performed by: INTERNAL MEDICINE

## 2023-04-24 PROCEDURE — 36415 COLL VENOUS BLD VENIPUNCTURE: CPT | Performed by: INTERNAL MEDICINE

## 2023-04-24 PROCEDURE — 80053 COMPREHEN METABOLIC PANEL: CPT | Performed by: INTERNAL MEDICINE

## 2023-04-24 PROCEDURE — 82378 CARCINOEMBRYONIC ANTIGEN: CPT | Performed by: INTERNAL MEDICINE

## 2023-04-24 NOTE — PROGRESS NOTES
[facility] Hematology/Oncology  PROGRESS NOTE -   Follow-up Visit      Subjective:       Patient ID:   NAME: Lety Herbert : 1940     82 y.o. female    Referring Doc: Shilo Perez MD  Other Physicians: Ashish Henley; Maryse Beckwith           Chief Complaint: RUL mass/lung ca f/u       History of Present Illness:     Patient returns today for a regularly scheduled follow-up visit.  The patient is here today to go over the results of the recently ordered labs, tests and studies. She is here with her daughter Buffy today.     She saw Dr Ralph with rad/onc and completed XRT  and has some residual fatigue    Breathing is stable. No CP, HA's or N/V.    Dicussed covid precautions - she has been vaccinated    ROS:   GEN: normal without any fever, night sweats or weight loss; fatigue  HEENT: normal with no HA's, sore throat, stiff neck, changes in vision  CV: normal with no CP, SOB, PND, GAVIRIA or orthopnea  PULM: chronic SOB/GAVIRIA; O2 dependent at home, hemoptysis, sputum or pleuritic pain  GI: normal with no abdominal pain, nausea, vomiting, constipation, diarrhea, melanotic stools, BRBPR, or hematemesis  : normal with no hematuria, dysuria  BREAST: normal with no mass, discharge, pain  SKIN: normal with no rash, erythema, bruising, or swelling    Pain Scale:  0    Allergies:  Review of patient's allergies indicates:   Allergen Reactions    Meperidine     Promethazine     Bactrim [sulfamethoxazole-trimethoprim] Rash       Medications:    Current Outpatient Medications:     albuterol (PROVENTIL) 2.5 mg /3 mL (0.083 %) nebulizer solution, Take 3 mLs (2.5 mg total) by nebulization every 6 (six) hours as needed (cough)., Disp: 120 each, Rfl: 11    albuterol (PROVENTIL/VENTOLIN HFA) 90 mcg/actuation inhaler, inhale 1 to 2 puffs every 4 hours as needed for wheezing, Disp: , Rfl:     albuterol (PROVENTIL/VENTOLIN HFA) 90 mcg/actuation inhaler, 2 puffs every 4 hours as needed for cough, wheeze, or shortness of breath,  Disp: 18 g, Rfl: 11    atorvastatin (LIPITOR) 20 MG tablet, TAKE 1 TABLET (20 MG TOTAL) BY MOUTH ONCE DAILY., Disp: 90 tablet, Rfl: 1    azelastine (ASTELIN) 137 mcg (0.1 %) nasal spray, 1 spray 2 (two) times daily., Disp: , Rfl:     diltiaZEM (CARDIZEM CD) 120 MG Cp24, Take 1 capsule (120 mg total) by mouth once daily., Disp: 90 capsule, Rfl: 1    fluticasone propionate (FLONASE) 50 mcg/actuation nasal spray, by Each Nostril route., Disp: , Rfl:     fluticasone-umeclidin-vilanter (TRELEGY ELLIPTA) 200-62.5-25 mcg inhaler, Inhale 1 puff into the lungs once daily., Disp: 60 each, Rfl: 11    HYDROcodone-acetaminophen (NORCO) 5-325 mg per tablet, Take 1 tablet by mouth every 6 (six) hours as needed for Pain., Disp: 90 tablet, Rfl: 0    HYDROcodone-acetaminophen (NORCO) 5-325 mg per tablet, Take 1 tablet by mouth every 6 (six) hours as needed for Pain., Disp: 90 tablet, Rfl: 0    HYDROcodone-acetaminophen (NORCO) 5-325 mg per tablet, Take 1 tablet by mouth every 6 (six) hours as needed for Pain., Disp: 90 tablet, Rfl: 0    montelukast (SINGULAIR) 10 mg tablet, Take 10 mg by mouth every evening., Disp: , Rfl:     multivit-min/folic ac/collagen (WOMEN'S MULTIVITAMIN COLLAGEN ORAL), Take by mouth once daily., Disp: , Rfl:     pantoprazole (PROTONIX) 20 MG tablet, TAKE 1 TABLET (20 MG TOTAL) BY MOUTH ONCE DAILY., Disp: 90 tablet, Rfl: 1    potassium chloride (KLOR-CON) 10 MEQ TbSR, Take 10 mEq by mouth 2 (two) times daily., Disp: , Rfl:     sertraline (ZOLOFT) 100 MG tablet, Take 1 tablet (100 mg total) by mouth once daily., Disp: 90 tablet, Rfl: 3    traZODone (DESYREL) 50 MG tablet, Take 1 tablet (50 mg total) by mouth every evening., Disp: 30 tablet, Rfl: 2    valsartan-hydrochlorothiazide (DIOVAN-HCT) 320-12.5 mg per tablet, Take 1 tablet by mouth every morning., Disp: 90 tablet, Rfl: 4    WIXELA INHUB 100-50 mcg/dose diskus inhaler, Inhale 1 puff into the lungs 2 (two) times daily., Disp: , Rfl:     XARELTO 15 mg Tab,  "Take 15 mg by mouth., Disp: , Rfl:     XARELTO 20 mg Tab, , Disp: , Rfl:     ALPRAZolam (XANAX) 0.25 MG tablet, Take 1 tablet (0.25 mg total) by mouth 3 (three) times daily as needed for Anxiety., Disp: 45 tablet, Rfl: 4    loratadine (CLARITIN) 10 mg tablet, Take 1 tablet (10 mg total) by mouth once daily., Disp: 30 tablet, Rfl: 0    PMHx/PSHx Updates:  See patient's last visit with me on 3/27/2023.  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 Patient's remote   clinical history of breast carcinoma is noticed. Based on the   immunohistochemical features, the current tumor is favored to be of lung   primary.            Objective:     Vitals:  Blood pressure (!) 156/73, pulse 94, temperature 98 °F (36.7 °C), resp. rate 18, height 5' 6" (1.676 m), weight 100.9 kg (222 lb 6.4 oz).    Physical Examination:   GEN: no apparent distress, comfortable; AAOx3; overweight; elderly  HEAD: atraumatic and normocephalic  EYES: no pallor, no icterus, PERRLA  ENT: OMM, no pharyngeal erythema, external ears WNL; no nasal discharge; no thrush  NECK: no masses, thyroid normal, trachea midline, no LAD/LN's, supple  CV: RRR with no murmur; normal pulse; normal S1 and S2; no pedal edema  CHEST: Normal respiratory effort; CTAB; normal breath sounds; no wheeze or crackles  ABDOM: nontender and nondistended; soft; normal bowel sounds; no rebound/guarding  MUSC/Skeletal: ROM normal; no crepitus; joints normal; no deformities; " +arthropathy of hip/knees, hands  EXTREM: no clubbing, cyanosis, inflammation or swelling; varicosities in bilateral lower extremities  SKIN: no rashes, lesions, ulcers, petechiae or subcutaneous nodules; chronic age related skin changes  : no carrillo  NEURO: grossly intact; motor/sensory WNL; AAOx3; no tremors  PSYCH: normal mood, affect and behavior  LYMPH: normal cervical, supraclavicular, axillary and groin LN's          Labs:     Lab Results   Component Value Date    WBC 6.53 04/24/2023    HGB 12.0 04/24/2023    HCT 39.3 04/24/2023    MCV 90 04/24/2023     04/24/2023       CMP  Sodium   Date Value Ref Range Status   04/24/2023 142 136 - 145 mmol/L Final   03/04/2019 142 134 - 144 mmol/L      Potassium   Date Value Ref Range Status   04/24/2023 4.0 3.5 - 5.1 mmol/L Final     Chloride   Date Value Ref Range Status   04/24/2023 103 95 - 110 mmol/L Final   03/04/2019 104 98 - 110 mmol/L      CO2   Date Value Ref Range Status   04/24/2023 29 23 - 29 mmol/L Final     Glucose   Date Value Ref Range Status   04/24/2023 93 70 - 110 mg/dL Final   03/04/2019 102 (H) 70 - 99 mg/dL      BUN   Date Value Ref Range Status   04/24/2023 10 8 - 23 mg/dL Final     Creatinine   Date Value Ref Range Status   04/24/2023 0.6 0.5 - 1.4 mg/dL Final   03/04/2019 0.48 (L) 0.60 - 1.40 mg/dL      Calcium   Date Value Ref Range Status   04/24/2023 9.4 8.7 - 10.5 mg/dL Final     Total Protein   Date Value Ref Range Status   04/24/2023 7.6 6.0 - 8.4 g/dL Final     Albumin   Date Value Ref Range Status   04/24/2023 4.2 3.5 - 5.2 g/dL Final   03/04/2019 4.0 3.1 - 4.7 g/dL      Total Bilirubin   Date Value Ref Range Status   04/24/2023 0.8 0.1 - 1.0 mg/dL Final     Comment:     For infants and newborns, interpretation of results should be based  on gestational age, weight and in agreement with clinical  observations.    Premature Infant recommended reference ranges:  Up to 24 hours.............<8.0 mg/dL  Up to 48 hours............<12.0  mg/dL  3-5 days..................<15.0 mg/dL  6-29 days.................<15.0 mg/dL       Alkaline Phosphatase   Date Value Ref Range Status   04/24/2023 81 55 - 135 U/L Final     AST   Date Value Ref Range Status   04/24/2023 23 10 - 40 U/L Final     ALT   Date Value Ref Range Status   04/24/2023 15 10 - 44 U/L Final     Anion Gap   Date Value Ref Range Status   04/24/2023 10 8 - 16 mmol/L Final     eGFR   Date Value Ref Range Status   04/24/2023 >60.0 >60 mL/min/1.73 m^2 Final     Lab Results   Component Value Date    CEA 2.5 04/24/2023           Radiology/Diagnostic Studies:    PET 1/11/2023:     IMPRESSION: Hypermetabolic 2.3 cm mass within the anterior right upper lobe suspicious for primary lung malignancy     Faint groundglass opacities throughout the lungs with prominence of the pulmonary vasculature and cardiomegaly which may be represent pulmonary edema.  Development of a 13 mm subpleural nodule in the right lower lobe. Since this is new since the most recent CT findings most likely are secondary to infectious or inflammatory process however metastatic lesion cannot be excluded     Increased FDG activity in the region of the right external iliac vein without corresponding adenopathy. This may be physiologic there is physiologic activity within a superficial vein in the upper right thigh and findings may be secondary to thrombophlebitis.. Follow-up CT abdomen and pelvis with contrast is recommended     Degenerative changes of the spine           CT Chest 12/15/2022:     IMPRESSION:  1. A 23 mm right upper lobe noncalcified pulmonary nodule is highly suspicious for primary pulmonary neoplasm. Tissue diagnosis is recommended.  2. No findings of regional metastatic disease in the chest.  3. Enlarged pulmonary arteries, suggesting pulmonary arterial hypertension.  4. Cardiomegaly, with aortic and coronary arterial calcifications.     CTA 11/19/2022:     IMPRESSION:  1.  No pulmonary embolus detected.  2.   Interstitial abnormality, suspect mild edema superimposed upon chronic changes.  3.  Right upper lobe 2.1 cm nodule. Consider a non-contrast Chest CT at 3 months, a PET/CT, or tissue sampling. These guidelines do not apply to immunocompromised patients and patients with cancer   ADDENDUM #1         The presence of right upper lobe lung nodule needing further evaluation or follow-up has been discussed with Dr. Rick Salgado 11/19/2022 12:50 AM CST.     All lab results and imaging results have been reviewed and     I have reviewed all available lab results and radiology reports.    Assessment/Plan:   (1) 82 y.o. female with diagnosis of RUL lung mass who has been referred by Shilo Perez MD for evaluation by medical hematology/oncology. She has been followed by Dr Ashish Henley with pulmonary for her COPD and chronic hypoxemia/bronchitis, who was a former smoker.      - She had a CTA in Nov 2022 which was negative for a PE but showed a RUL lung mass, which was followed by a CT Chest on 12/15/2022 which confirmed a 2.3cm RUL mass.   - She had a PET scan on 1/11/2023 which showed a 2.3 cm hypermetabolic mass in the anterior right upper lobe abutting the superior vena cava along with development of a 13 mm subpleural nodule within the right lower lobe.         - She had CT guided biopsy of the RUL lung mass on 2/15/2023 with pathology coming back NSCLC favoring a lung primary.     - She is supposed to be on oxygen at home. She has portable tank and has a lot of GAVIRIA. She has chronic SOB.   - She is former smoker and quit when she was 43yrs old.      - discussed the pathology and reviewed and discussed the latest NCCN guidelines (vs. 2-2023)  - unlikely candidate for any surgical intervention  - will refer to Rad/onc to see if there are any radiation options either monotherapy or in combination with low dose weekly chemotherapy  - CARIS on the pathology  - check CEA and up to date labs    3/27/2023:  - She saw Dr Ralph  with rad/onc on 3/20/2023 and she is starting XRT monotherapy today with day#1    4/25/2023:  - she completed XRT and has some fatigue  - she will need post-XRT evaluation in about 4 weeks with rad/onc and expect her to need repeat scans in 6-8 weeks           (2) Hx/of PE and DVT     (3) HTN and hypercholesterolemia     (4) COPD/chronic hypoxemia; bronchiectasis; chronic bronchitis - followed by Dr Ashish Henley/Shelbie Villalpando     (5) CHF and dysrhythmia     (6) GERD; gastric ulcer     (7) Hx/of breast cancer s/p bilateral mastectomies- followed by Dr Maryse Beckwith  - She has history of breast cancer in past around 2005  for which she has had bilateral mastectomies and is followed by Dr Beckwith. She did not require chemotherapy or radiation. She had reconstruction surgery with Dr Grimaldo. She saw Dr Beckwith couple of weeks ago.        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 CEA level every 3 months  F/u with PCP, Pulm, etc     RTC in  4 weeks  Fax note to Shilo Perez MD; Amena Mejia Mannina, [place]       Discussion:     COVID-19 Discussion:     I had long discussion with patient and any applicable family about the COVID-19 coronavirus epidemic and the recommended precautions with regard to cancer and/or hematology patients. I have re-iterated the CDC recommendations for adequate hand washing, use of hand  "-like products, and coughing into elbow, etc. In addition, especially for our patients who are on chemotherapy and/or our otherwise immunocompromised patients, I have recommended avoidance of crowds, including movie theaters, restaurants, churches, etc. I have recommended avoidance of any sick or symptomatic family members and/or friends. I have also recommended avoidance of any raw and unwashed food products, and general avoidance of food items that have not been prepared by themselves. The patient has been asked to call us immediately with any symptom developments, issues, questions or other general concerns.         Pathology Discussion:     I reviewed and discussed the pathology report(s) and radiograph reports (if available) in as simple to understand and/or laymen's terms to the best of my ability. I had an indepth conversation with the patient and went over the patient's individual diagnosis based on the information that was currently available. I discussed the TNM staging process with regard to the patient's particular cancer type, and the calculated stage based on the currently available TNM data and literature. I discussed the available prognostic data with regard to the current staging information and how it relates to the prognosis of their particular neoplastic process.          NCCN Guidelines:     I discussed the available treatment option(s) in accordance with the latest literature from the NCCN Clinical Practice Guidelines for the patient's particular type of cancer disorder. The NCCN Guidelines provide a "document evidence-based (and) consensus-driven management" of the care of oncology patients. The treatment recommendations were made not only in accordance to the NCCN guidelines, but also factored in to account the patient's overall age, condition, performance status and their medical co-morbidities. I went over the risks and benefits of the the treatment options (if any could be made) " with regard to their particular cancer type, their cancer stage, their age, and their co-morbidities.         I have explained and the patient understands all of  the current recommendation(s). I have answered all of their questions to the best of my ability and to their complete satisfaction.      I spent over 25 mins of time with the patient. Reviewed results of the recently ordered labs, tests and studies; made directives with regards to the results. Over half of this time was spent couseling and coordinating care.    I have explained all of the above in detail and the patient understands all of the current recommendation(s). I have answered all of their questions to the best of my ability and to their complete satisfaction.   The patient is to continue with the current management plan.            Electronically signed by Isacc Vogt MD

## 2023-04-25 ENCOUNTER — OFFICE VISIT (OUTPATIENT)
Dept: HEMATOLOGY/ONCOLOGY | Facility: CLINIC | Age: 83
End: 2023-04-25
Payer: MEDICARE

## 2023-04-25 VITALS
BODY MASS INDEX: 35.74 KG/M2 | HEART RATE: 94 BPM | SYSTOLIC BLOOD PRESSURE: 156 MMHG | TEMPERATURE: 98 F | HEIGHT: 66 IN | RESPIRATION RATE: 18 BRPM | DIASTOLIC BLOOD PRESSURE: 73 MMHG | WEIGHT: 222.38 LBS

## 2023-04-25 DIAGNOSIS — Z87.891 FORMER SMOKER: ICD-10-CM

## 2023-04-25 DIAGNOSIS — R93.89 ABNORMAL CHEST CT: ICD-10-CM

## 2023-04-25 DIAGNOSIS — Z85.3 HISTORY OF BREAST CANCER: ICD-10-CM

## 2023-04-25 DIAGNOSIS — R91.8 MASS OF UPPER LOBE OF RIGHT LUNG: ICD-10-CM

## 2023-04-25 DIAGNOSIS — C34.11 MALIGNANT NEOPLASM OF UPPER LOBE OF RIGHT LUNG: ICD-10-CM

## 2023-04-25 DIAGNOSIS — R94.2 ABNORMAL PET SCAN OF LUNG: Primary | ICD-10-CM

## 2023-04-25 DIAGNOSIS — Z90.13 S/P BILATERAL MASTECTOMY: ICD-10-CM

## 2023-04-25 PROCEDURE — 1159F PR MEDICATION LIST DOCUMENTED IN MEDICAL RECORD: ICD-10-PCS | Mod: CPTII,S$GLB,, | Performed by: INTERNAL MEDICINE

## 2023-04-25 PROCEDURE — 3288F FALL RISK ASSESSMENT DOCD: CPT | Mod: CPTII,S$GLB,, | Performed by: INTERNAL MEDICINE

## 2023-04-25 PROCEDURE — 3077F SYST BP >= 140 MM HG: CPT | Mod: CPTII,S$GLB,, | Performed by: INTERNAL MEDICINE

## 2023-04-25 PROCEDURE — 3077F PR MOST RECENT SYSTOLIC BLOOD PRESSURE >= 140 MM HG: ICD-10-PCS | Mod: CPTII,S$GLB,, | Performed by: INTERNAL MEDICINE

## 2023-04-25 PROCEDURE — 1101F PR PT FALLS ASSESS DOC 0-1 FALLS W/OUT INJ PAST YR: ICD-10-PCS | Mod: CPTII,S$GLB,, | Performed by: INTERNAL MEDICINE

## 2023-04-25 PROCEDURE — 1160F RVW MEDS BY RX/DR IN RCRD: CPT | Mod: CPTII,S$GLB,, | Performed by: INTERNAL MEDICINE

## 2023-04-25 PROCEDURE — 3288F PR FALLS RISK ASSESSMENT DOCUMENTED: ICD-10-PCS | Mod: CPTII,S$GLB,, | Performed by: INTERNAL MEDICINE

## 2023-04-25 PROCEDURE — 3078F DIAST BP <80 MM HG: CPT | Mod: CPTII,S$GLB,, | Performed by: INTERNAL MEDICINE

## 2023-04-25 PROCEDURE — 1160F PR REVIEW ALL MEDS BY PRESCRIBER/CLIN PHARMACIST DOCUMENTED: ICD-10-PCS | Mod: CPTII,S$GLB,, | Performed by: INTERNAL MEDICINE

## 2023-04-25 PROCEDURE — 1159F MED LIST DOCD IN RCRD: CPT | Mod: CPTII,S$GLB,, | Performed by: INTERNAL MEDICINE

## 2023-04-25 PROCEDURE — 1125F PR PAIN SEVERITY QUANTIFIED, PAIN PRESENT: ICD-10-PCS | Mod: CPTII,S$GLB,, | Performed by: INTERNAL MEDICINE

## 2023-04-25 PROCEDURE — 99214 PR OFFICE/OUTPT VISIT, EST, LEVL IV, 30-39 MIN: ICD-10-PCS | Mod: S$GLB,,, | Performed by: INTERNAL MEDICINE

## 2023-04-25 PROCEDURE — 99214 OFFICE O/P EST MOD 30 MIN: CPT | Mod: S$GLB,,, | Performed by: INTERNAL MEDICINE

## 2023-04-25 PROCEDURE — 3078F PR MOST RECENT DIASTOLIC BLOOD PRESSURE < 80 MM HG: ICD-10-PCS | Mod: CPTII,S$GLB,, | Performed by: INTERNAL MEDICINE

## 2023-04-25 PROCEDURE — 1101F PT FALLS ASSESS-DOCD LE1/YR: CPT | Mod: CPTII,S$GLB,, | Performed by: INTERNAL MEDICINE

## 2023-04-25 PROCEDURE — 1125F AMNT PAIN NOTED PAIN PRSNT: CPT | Mod: CPTII,S$GLB,, | Performed by: INTERNAL MEDICINE

## 2023-05-02 ENCOUNTER — CLINICAL SUPPORT (OUTPATIENT)
Dept: RADIATION ONCOLOGY | Facility: CLINIC | Age: 83
End: 2023-05-02
Payer: MEDICARE

## 2023-05-02 DIAGNOSIS — C34.11 MALIGNANT NEOPLASM OF UPPER LOBE OF RIGHT LUNG: Primary | ICD-10-CM

## 2023-05-02 NOTE — PROGRESS NOTES
DIAGNOSIS:  IA3, [id number] poor diff SCCa RUL    TREATMENT  Completed SBRT, 50 Gy in 5 fractions to the right upper lobe ending April 5, 2023.  Treatment was well tolerated with mild fatigue.    Contacted patient today for 3 week checkup.  Patient endorses residual fatigue that is correcting.  She is otherwise without complaints.    A/P  1.  Good tolerance of SBRT. Assured that fatigue will subside in the coming weeks.  2.  Follow-up with Dr. Vogt, Dr. Henlye  3.  Return to clinic 6mos. CT C at 3,6,12mos

## 2023-05-22 DIAGNOSIS — K21.9 GASTROESOPHAGEAL REFLUX DISEASE WITHOUT ESOPHAGITIS: ICD-10-CM

## 2023-05-22 RX ORDER — PANTOPRAZOLE SODIUM 20 MG/1
20 TABLET, DELAYED RELEASE ORAL DAILY
Qty: 90 TABLET | Refills: 1 | Status: SHIPPED | OUTPATIENT
Start: 2023-05-22 | End: 2023-11-20

## 2023-05-24 NOTE — PROGRESS NOTES
[facility] Hematology/Oncology  PROGRESS NOTE -   Follow-up Visit      Subjective:       Patient ID:   NAME: Lety Herbert : 1940     82 y.o. female    Referring Doc: Shlio Perez MD  Other Physicians: Ashish Henley; Maryse Beckwith           Chief Complaint: RUL mass/lung ca f/u       History of Present Illness:     Patient returns today for a regularly scheduled follow-up visit.  The patient is here today to go over the results of the recently ordered labs, tests and studies. She is here with her daughter Buffy today.     She saw Dr Ralph with rad/onc last on  and completed XRT and has some residual fatigue. Some back pain.     Breathing is stable. No CP, HA's or N/V.    Dicussed covid precautions - she has been vaccinated    ROS:   GEN: normal without any fever, night sweats or weight loss; fatigue  HEENT: normal with no HA's, sore throat, stiff neck, changes in vision  CV: normal with no CP, SOB, PND, GAVIRIA or orthopnea  PULM: chronic SOB/GAVIRIA; O2 dependent at home, hemoptysis, sputum or pleuritic pain  GI: normal with no abdominal pain, nausea, vomiting, constipation, diarrhea, melanotic stools, BRBPR, or hematemesis  : normal with no hematuria, dysuria  BREAST: normal with no mass, discharge, pain  SKIN: normal with no rash, erythema, bruising, or swelling    Pain Scale:  0    Allergies:  Review of patient's allergies indicates:   Allergen Reactions    Meperidine     Promethazine     Bactrim [sulfamethoxazole-trimethoprim] Rash       Medications:    Current Outpatient Medications:     albuterol (PROVENTIL) 2.5 mg /3 mL (0.083 %) nebulizer solution, Take 3 mLs (2.5 mg total) by nebulization every 6 (six) hours as needed (cough)., Disp: 120 each, Rfl: 11    albuterol (PROVENTIL/VENTOLIN HFA) 90 mcg/actuation inhaler, inhale 1 to 2 puffs every 4 hours as needed for wheezing, Disp: , Rfl:     ALPRAZolam (XANAX) 0.25 MG tablet, Take 1 tablet (0.25 mg total) by mouth 3 (three) times daily as needed for  Anxiety., Disp: 45 tablet, Rfl: 4    atorvastatin (LIPITOR) 20 MG tablet, TAKE 1 TABLET (20 MG TOTAL) BY MOUTH ONCE DAILY., Disp: 90 tablet, Rfl: 1    azelastine (ASTELIN) 137 mcg (0.1 %) nasal spray, 1 spray 2 (two) times daily., Disp: , Rfl:     diltiaZEM (CARDIZEM CD) 120 MG Cp24, Take 1 capsule (120 mg total) by mouth once daily., Disp: 90 capsule, Rfl: 1    fluticasone propionate (FLONASE) 50 mcg/actuation nasal spray, by Each Nostril route., Disp: , Rfl:     fluticasone-umeclidin-vilanter (TRELEGY ELLIPTA) 200-62.5-25 mcg inhaler, Inhale 1 puff into the lungs once daily., Disp: 60 each, Rfl: 11    gabapentin (NEURONTIN) 100 MG capsule, Take 100 mg by mouth., Disp: , Rfl:     HYDROcodone-acetaminophen (NORCO) 5-325 mg per tablet, Take 1 tablet by mouth every 6 (six) hours as needed for Pain., Disp: 90 tablet, Rfl: 0    montelukast (SINGULAIR) 10 mg tablet, Take 10 mg by mouth every evening., Disp: , Rfl:     multivit-min/folic ac/collagen (WOMEN'S MULTIVITAMIN COLLAGEN ORAL), Take by mouth once daily., Disp: , Rfl:     pantoprazole (PROTONIX) 20 MG tablet, Take 1 tablet (20 mg total) by mouth once daily., Disp: 90 tablet, Rfl: 1    potassium chloride (KLOR-CON) 10 MEQ TbSR, Take 10 mEq by mouth 2 (two) times daily., Disp: , Rfl:     sertraline (ZOLOFT) 100 MG tablet, Take 1 tablet (100 mg total) by mouth once daily., Disp: 90 tablet, Rfl: 3    traZODone (DESYREL) 50 MG tablet, Take 1 tablet (50 mg total) by mouth every evening., Disp: 30 tablet, Rfl: 2    valsartan-hydrochlorothiazide (DIOVAN-HCT) 320-12.5 mg per tablet, Take 1 tablet by mouth every morning., Disp: 90 tablet, Rfl: 4    WIXELA INHUB 100-50 mcg/dose diskus inhaler, Inhale 1 puff into the lungs 2 (two) times daily., Disp: , Rfl:     XARELTO 15 mg Tab, Take 15 mg by mouth., Disp: , Rfl:     albuterol (PROVENTIL/VENTOLIN HFA) 90 mcg/actuation inhaler, 2 puffs every 4 hours as needed for cough, wheeze, or shortness of breath (Patient not taking:  "Reported on 5/25/2023), Disp: 18 g, Rfl: 11    HYDROcodone-acetaminophen (NORCO) 5-325 mg per tablet, Take 1 tablet by mouth every 6 (six) hours as needed for Pain. (Patient not taking: Reported on 5/25/2023), Disp: 90 tablet, Rfl: 0    HYDROcodone-acetaminophen (NORCO) 5-325 mg per tablet, Take 1 tablet by mouth every 6 (six) hours as needed for Pain. (Patient not taking: Reported on 5/25/2023), Disp: 90 tablet, Rfl: 0    loratadine (CLARITIN) 10 mg tablet, Take 1 tablet (10 mg total) by mouth once daily., Disp: 30 tablet, Rfl: 0    XARELTO 20 mg Tab, , Disp: , Rfl:     PMHx/PSHx Updates:  See patient's last visit with me on 4/25/2023.  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 Patient's remote   clinical history of breast carcinoma is noticed. Based on the   immunohistochemical features, the current tumor is favored to be of lung   primary.            Objective:     Vitals:  Blood pressure (!) 161/81, pulse 88, temperature 98 °F (36.7 °C), resp. rate 18, height 5' 6" (1.676 m), weight 100.2 kg (220 lb 12.8 oz).    Physical Examination:   GEN: no apparent distress, comfortable; AAOx3; overweight; elderly  HEAD: atraumatic and normocephalic  EYES: no pallor, no icterus, PERRLA  ENT: OMM, no pharyngeal erythema, external ears WNL; no nasal discharge; no thrush  NECK: no masses, thyroid normal, trachea midline, no LAD/LN's, supple  CV: RRR with no murmur; normal pulse; normal S1 and S2; no pedal edema  CHEST: " Normal respiratory effort; CTAB; normal breath sounds; no wheeze or crackles  ABDOM: nontender and nondistended; soft; normal bowel sounds; no rebound/guarding  MUSC/Skeletal: ROM normal; no crepitus; joints normal; no deformities; +arthropathy of hip/knees, hands  EXTREM: no clubbing, cyanosis, inflammation or swelling; varicosities in bilateral lower extremities  SKIN: no rashes, lesions, ulcers, petechiae or subcutaneous nodules; chronic age related skin changes  : no carrillo  NEURO: grossly intact; motor/sensory WNL; AAOx3; no tremors  PSYCH: normal mood, affect and behavior  LYMPH: normal cervical, supraclavicular, axillary and groin LN's          Labs:     Lab Results   Component Value Date    WBC 6.53 04/24/2023    HGB 12.0 04/24/2023    HCT 39.3 04/24/2023    MCV 90 04/24/2023     04/24/2023       CMP  Sodium   Date Value Ref Range Status   04/24/2023 142 136 - 145 mmol/L Final   03/04/2019 142 134 - 144 mmol/L      Potassium   Date Value Ref Range Status   04/24/2023 4.0 3.5 - 5.1 mmol/L Final     Chloride   Date Value Ref Range Status   04/24/2023 103 95 - 110 mmol/L Final   03/04/2019 104 98 - 110 mmol/L      CO2   Date Value Ref Range Status   04/24/2023 29 23 - 29 mmol/L Final     Glucose   Date Value Ref Range Status   04/24/2023 93 70 - 110 mg/dL Final   03/04/2019 102 (H) 70 - 99 mg/dL      BUN   Date Value Ref Range Status   04/24/2023 10 8 - 23 mg/dL Final     Creatinine   Date Value Ref Range Status   04/24/2023 0.6 0.5 - 1.4 mg/dL Final   03/04/2019 0.48 (L) 0.60 - 1.40 mg/dL      Calcium   Date Value Ref Range Status   04/24/2023 9.4 8.7 - 10.5 mg/dL Final     Total Protein   Date Value Ref Range Status   04/24/2023 7.6 6.0 - 8.4 g/dL Final     Albumin   Date Value Ref Range Status   04/24/2023 4.2 3.5 - 5.2 g/dL Final   03/04/2019 4.0 3.1 - 4.7 g/dL      Total Bilirubin   Date Value Ref Range Status   04/24/2023 0.8 0.1 - 1.0 mg/dL Final     Comment:     For infants and newborns,  interpretation of results should be based  on gestational age, weight and in agreement with clinical  observations.    Premature Infant recommended reference ranges:  Up to 24 hours.............<8.0 mg/dL  Up to 48 hours............<12.0 mg/dL  3-5 days..................<15.0 mg/dL  6-29 days.................<15.0 mg/dL       Alkaline Phosphatase   Date Value Ref Range Status   04/24/2023 81 55 - 135 U/L Final     AST   Date Value Ref Range Status   04/24/2023 23 10 - 40 U/L Final     ALT   Date Value Ref Range Status   04/24/2023 15 10 - 44 U/L Final     Anion Gap   Date Value Ref Range Status   04/24/2023 10 8 - 16 mmol/L Final     eGFR   Date Value Ref Range Status   04/24/2023 >60.0 >60 mL/min/1.73 m^2 Final     Lab Results   Component Value Date    CEA 2.5 04/24/2023           Radiology/Diagnostic Studies:    PET 1/11/2023:     IMPRESSION: Hypermetabolic 2.3 cm mass within the anterior right upper lobe suspicious for primary lung malignancy     Faint groundglass opacities throughout the lungs with prominence of the pulmonary vasculature and cardiomegaly which may be represent pulmonary edema.  Development of a 13 mm subpleural nodule in the right lower lobe. Since this is new since the most recent CT findings most likely are secondary to infectious or inflammatory process however metastatic lesion cannot be excluded     Increased FDG activity in the region of the right external iliac vein without corresponding adenopathy. This may be physiologic there is physiologic activity within a superficial vein in the upper right thigh and findings may be secondary to thrombophlebitis.. Follow-up CT abdomen and pelvis with contrast is recommended     Degenerative changes of the spine           CT Chest 12/15/2022:     IMPRESSION:  1. A 23 mm right upper lobe noncalcified pulmonary nodule is highly suspicious for primary pulmonary neoplasm. Tissue diagnosis is recommended.  2. No findings of regional metastatic disease in  the chest.  3. Enlarged pulmonary arteries, suggesting pulmonary arterial hypertension.  4. Cardiomegaly, with aortic and coronary arterial calcifications.     CTA 11/19/2022:     IMPRESSION:  1.  No pulmonary embolus detected.  2.  Interstitial abnormality, suspect mild edema superimposed upon chronic changes.  3.  Right upper lobe 2.1 cm nodule. Consider a non-contrast Chest CT at 3 months, a PET/CT, or tissue sampling. These guidelines do not apply to immunocompromised patients and patients with cancer   ADDENDUM #1         The presence of right upper lobe lung nodule needing further evaluation or follow-up has been discussed with Dr. Rick Salgado 11/19/2022 12:50 AM CST.     All lab results and imaging results have been reviewed and     I have reviewed all available lab results and radiology reports.    Assessment/Plan:   (1) 82 y.o. female with diagnosis of RUL lung mass who has been referred by Shilo Perez MD for evaluation by medical hematology/oncology. She has been followed by Dr Ashish Henley with pulmonary for her COPD and chronic hypoxemia/bronchitis, who was a former smoker.      - She had a CTA in Nov 2022 which was negative for a PE but showed a RUL lung mass, which was followed by a CT Chest on 12/15/2022 which confirmed a 2.3cm RUL mass.   - She had a PET scan on 1/11/2023 which showed a 2.3 cm hypermetabolic mass in the anterior right upper lobe abutting the superior vena cava along with development of a 13 mm subpleural nodule within the right lower lobe.         - She had CT guided biopsy of the RUL lung mass on 2/15/2023 with pathology coming back NSCLC favoring a lung primary.     - She is supposed to be on oxygen at home. She has portable tank and has a lot of GAVIRIA. She has chronic SOB.   - She is former smoker and quit when she was 43yrs old.      - discussed the pathology and reviewed and discussed the latest NCCN guidelines (vs. 2-2023)  - unlikely candidate for any surgical  intervention  - will refer to Rad/onc to see if there are any radiation options either monotherapy or in combination with low dose weekly chemotherapy  - ROCHELLE on the pathology  - check CEA and up to date labs    3/27/2023:  - She saw Dr Ralph with rad/onc on 3/20/2023 and she is starting XRT monotherapy today with day#1    4/25/2023:  - she completed XRT and has some fatigue  - she will need post-XRT evaluation in about 4 weeks with rad/onc and expect her to need repeat scans in 6-8 weeks    5/25/2023:  - she saw Dr Ralph on 5/2 and he has CT Chest ordered post-XRT  - some residual fatigue and back pain  - due to see Dr Henley  - labs adequate           (2) Hx/of PE and DVT     (3) HTN and hypercholesterolemia     (4) COPD/chronic hypoxemia; bronchiectasis; chronic bronchitis - followed by Dr Ashish Henley/Shelbie Villalpando     (5) CHF and dysrhythmia     (6) GERD; gastric ulcer     (7) Hx/of breast cancer s/p bilateral mastectomies- followed by Dr Maryse Beckwith  - She has history of breast cancer in past around 2005  for which she has had bilateral mastectomies and is followed by Dr Beckwith. She did not require chemotherapy or radiation. She had reconstruction surgery with Dr Grimaldo. She saw Dr Beckwith couple of weeks ago.        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 CEA level every 3 months  Check on f/u with Dr Henley  F/u with PCP, Pulm, etc     RTC in  6 weeks with Bety, 12 weeks with  myself  Fax note to Shilo Perez MD; Amena Mejia Mannina, [place]       Discussion:     COVID-19 Discussion:     I had long discussion with patient and any applicable family about the COVID-19 coronavirus epidemic and the recommended precautions with regard to cancer and/or hematology patients. I have re-iterated the CDC recommendations for adequate hand washing, use of hand -like products, and coughing into elbow, etc. In addition, especially for our patients who are on chemotherapy and/or our otherwise immunocompromised patients, I have recommended avoidance of crowds, including movie theaters, restaurants, churches, etc. I have recommended avoidance of any sick or symptomatic family members and/or friends. I have also recommended avoidance of any raw and unwashed food products, and general avoidance of food items that have not been prepared by themselves. The patient has been asked to call us immediately with any symptom developments, issues, questions or other general concerns.         Pathology Discussion:     I reviewed and discussed the pathology report(s) and radiograph reports (if available) in as simple to understand and/or laymen's terms to the best of my ability. I had an indepth conversation with the patient and went over the patient's individual diagnosis based on the information that was currently available. I discussed the TNM staging process with regard to the patient's particular cancer type, and the calculated stage based on the currently available TNM data and literature. I discussed the available prognostic data with regard to the current staging information and how it relates to the prognosis of their particular neoplastic process.          NCCN Guidelines:     I discussed the available treatment option(s) in accordance with the latest literature from the NCCN Clinical Practice Guidelines for the patient's particular type of cancer disorder. The NCCN Guidelines provide a  ""document evidence-based (and) consensus-driven management" of the care of oncology patients. The treatment recommendations were made not only in accordance to the NCCN guidelines, but also factored in to account the patient's overall age, condition, performance status and their medical co-morbidities. I went over the risks and benefits of the the treatment options (if any could be made) with regard to their particular cancer type, their cancer stage, their age, and their co-morbidities.         I have explained and the patient understands all of  the current recommendation(s). I have answered all of their questions to the best of my ability and to their complete satisfaction.      I spent over 25 mins of time with the patient. Reviewed results of the recently ordered labs, tests and studies; made directives with regards to the results. Over half of this time was spent couseling and coordinating care.    I have explained all of the above in detail and the patient understands all of the current recommendation(s). I have answered all of their questions to the best of my ability and to their complete satisfaction.   The patient is to continue with the current management plan.            Electronically signed by Isacc Vogt MD              "

## 2023-05-25 ENCOUNTER — OFFICE VISIT (OUTPATIENT)
Dept: HEMATOLOGY/ONCOLOGY | Facility: CLINIC | Age: 83
End: 2023-05-25
Payer: MEDICARE

## 2023-05-25 VITALS
SYSTOLIC BLOOD PRESSURE: 161 MMHG | HEART RATE: 88 BPM | DIASTOLIC BLOOD PRESSURE: 81 MMHG | RESPIRATION RATE: 18 BRPM | WEIGHT: 220.81 LBS | TEMPERATURE: 98 F | HEIGHT: 66 IN | BODY MASS INDEX: 35.49 KG/M2

## 2023-05-25 DIAGNOSIS — G89.29 CHRONIC RIGHT-SIDED THORACIC BACK PAIN: ICD-10-CM

## 2023-05-25 DIAGNOSIS — G47.09 OTHER INSOMNIA: ICD-10-CM

## 2023-05-25 DIAGNOSIS — R93.89 ABNORMAL CHEST CT: ICD-10-CM

## 2023-05-25 DIAGNOSIS — M54.6 CHRONIC RIGHT-SIDED THORACIC BACK PAIN: ICD-10-CM

## 2023-05-25 DIAGNOSIS — Z90.13 S/P BILATERAL MASTECTOMY: ICD-10-CM

## 2023-05-25 DIAGNOSIS — R07.89 OTHER CHEST PAIN: ICD-10-CM

## 2023-05-25 DIAGNOSIS — R91.8 MASS OF UPPER LOBE OF RIGHT LUNG: Primary | ICD-10-CM

## 2023-05-25 DIAGNOSIS — F41.9 ANXIETY: ICD-10-CM

## 2023-05-25 DIAGNOSIS — R94.2 ABNORMAL PET SCAN OF LUNG: ICD-10-CM

## 2023-05-25 DIAGNOSIS — C34.11 MALIGNANT NEOPLASM OF UPPER LOBE OF RIGHT LUNG: ICD-10-CM

## 2023-05-25 PROCEDURE — 1101F PR PT FALLS ASSESS DOC 0-1 FALLS W/OUT INJ PAST YR: ICD-10-PCS | Mod: CPTII,S$GLB,, | Performed by: INTERNAL MEDICINE

## 2023-05-25 PROCEDURE — 1159F MED LIST DOCD IN RCRD: CPT | Mod: CPTII,S$GLB,, | Performed by: INTERNAL MEDICINE

## 2023-05-25 PROCEDURE — 3077F PR MOST RECENT SYSTOLIC BLOOD PRESSURE >= 140 MM HG: ICD-10-PCS | Mod: CPTII,S$GLB,, | Performed by: INTERNAL MEDICINE

## 2023-05-25 PROCEDURE — 1160F PR REVIEW ALL MEDS BY PRESCRIBER/CLIN PHARMACIST DOCUMENTED: ICD-10-PCS | Mod: CPTII,S$GLB,, | Performed by: INTERNAL MEDICINE

## 2023-05-25 PROCEDURE — 3079F PR MOST RECENT DIASTOLIC BLOOD PRESSURE 80-89 MM HG: ICD-10-PCS | Mod: CPTII,S$GLB,, | Performed by: INTERNAL MEDICINE

## 2023-05-25 PROCEDURE — 99214 OFFICE O/P EST MOD 30 MIN: CPT | Mod: S$GLB,,, | Performed by: INTERNAL MEDICINE

## 2023-05-25 PROCEDURE — 3077F SYST BP >= 140 MM HG: CPT | Mod: CPTII,S$GLB,, | Performed by: INTERNAL MEDICINE

## 2023-05-25 PROCEDURE — 3288F PR FALLS RISK ASSESSMENT DOCUMENTED: ICD-10-PCS | Mod: CPTII,S$GLB,, | Performed by: INTERNAL MEDICINE

## 2023-05-25 PROCEDURE — 1125F PR PAIN SEVERITY QUANTIFIED, PAIN PRESENT: ICD-10-PCS | Mod: CPTII,S$GLB,, | Performed by: INTERNAL MEDICINE

## 2023-05-25 PROCEDURE — 1125F AMNT PAIN NOTED PAIN PRSNT: CPT | Mod: CPTII,S$GLB,, | Performed by: INTERNAL MEDICINE

## 2023-05-25 PROCEDURE — 1159F PR MEDICATION LIST DOCUMENTED IN MEDICAL RECORD: ICD-10-PCS | Mod: CPTII,S$GLB,, | Performed by: INTERNAL MEDICINE

## 2023-05-25 PROCEDURE — 1160F RVW MEDS BY RX/DR IN RCRD: CPT | Mod: CPTII,S$GLB,, | Performed by: INTERNAL MEDICINE

## 2023-05-25 PROCEDURE — 3079F DIAST BP 80-89 MM HG: CPT | Mod: CPTII,S$GLB,, | Performed by: INTERNAL MEDICINE

## 2023-05-25 PROCEDURE — 3288F FALL RISK ASSESSMENT DOCD: CPT | Mod: CPTII,S$GLB,, | Performed by: INTERNAL MEDICINE

## 2023-05-25 PROCEDURE — 99214 PR OFFICE/OUTPT VISIT, EST, LEVL IV, 30-39 MIN: ICD-10-PCS | Mod: S$GLB,,, | Performed by: INTERNAL MEDICINE

## 2023-05-25 PROCEDURE — 1101F PT FALLS ASSESS-DOCD LE1/YR: CPT | Mod: CPTII,S$GLB,, | Performed by: INTERNAL MEDICINE

## 2023-05-25 RX ORDER — GABAPENTIN 100 MG/1
200 CAPSULE ORAL NIGHTLY
COMMUNITY
Start: 2023-05-24

## 2023-05-25 RX ORDER — HYDROCODONE BITARTRATE AND ACETAMINOPHEN 5; 325 MG/1; MG/1
1 TABLET ORAL EVERY 6 HOURS PRN
Qty: 90 TABLET | Refills: 0 | Status: SHIPPED | OUTPATIENT
Start: 2023-05-25 | End: 2023-08-21

## 2023-05-25 RX ORDER — ALPRAZOLAM 0.25 MG/1
0.25 TABLET ORAL 2 TIMES DAILY PRN
Qty: 60 TABLET | Refills: 1 | Status: SHIPPED | OUTPATIENT
Start: 2023-05-25 | End: 2023-07-17

## 2023-06-16 ENCOUNTER — HOSPITAL ENCOUNTER (OUTPATIENT)
Dept: RADIOLOGY | Facility: HOSPITAL | Age: 83
Discharge: HOME | End: 2023-06-16
Attending: RADIOLOGY
Payer: MEDICARE

## 2023-06-16 DIAGNOSIS — G89.3 CANCER ASSOCIATED PAIN: Primary | ICD-10-CM

## 2023-06-16 DIAGNOSIS — C34.11 MALIGNANT NEOPLASM OF UPPER LOBE OF RIGHT LUNG: ICD-10-CM

## 2023-06-16 PROCEDURE — 71250 CT THORAX DX C-: CPT | Mod: TC,PO

## 2023-06-19 DIAGNOSIS — C34.11 MALIGNANT NEOPLASM OF UPPER LOBE OF RIGHT LUNG: Primary | ICD-10-CM

## 2023-06-19 RX ORDER — HYDROCODONE BITARTRATE AND ACETAMINOPHEN 5; 325 MG/1; MG/1
TABLET ORAL
Qty: 90 TABLET | Refills: 0 | Status: SHIPPED | OUTPATIENT
Start: 2023-06-19 | End: 2023-08-21

## 2023-06-19 RX ORDER — METHYLPREDNISOLONE 4 MG/1
TABLET ORAL
Qty: 21 EACH | Refills: 0 | Status: SHIPPED | OUTPATIENT
Start: 2023-06-19 | End: 2023-07-10

## 2023-06-20 DIAGNOSIS — C34.11 MALIGNANT NEOPLASM OF UPPER LOBE OF RIGHT LUNG: Primary | ICD-10-CM

## 2023-07-10 ENCOUNTER — OFFICE VISIT (OUTPATIENT)
Dept: HEMATOLOGY/ONCOLOGY | Facility: CLINIC | Age: 83
End: 2023-07-10
Payer: MEDICARE

## 2023-07-10 ENCOUNTER — LAB VISIT (OUTPATIENT)
Dept: LAB | Facility: HOSPITAL | Age: 83
End: 2023-07-10
Attending: INTERNAL MEDICINE
Payer: MEDICARE

## 2023-07-10 VITALS
DIASTOLIC BLOOD PRESSURE: 75 MMHG | HEART RATE: 81 BPM | BODY MASS INDEX: 35.36 KG/M2 | TEMPERATURE: 98 F | SYSTOLIC BLOOD PRESSURE: 121 MMHG | HEIGHT: 66 IN | WEIGHT: 220 LBS | RESPIRATION RATE: 18 BRPM

## 2023-07-10 DIAGNOSIS — C34.11 MALIGNANT NEOPLASM OF UPPER LOBE OF RIGHT LUNG: ICD-10-CM

## 2023-07-10 DIAGNOSIS — C34.11 MALIGNANT NEOPLASM OF UPPER LOBE OF RIGHT LUNG: Primary | ICD-10-CM

## 2023-07-10 DIAGNOSIS — Z90.13 S/P BILATERAL MASTECTOMY: ICD-10-CM

## 2023-07-10 LAB
ALBUMIN SERPL BCP-MCNC: 4.3 G/DL (ref 3.5–5.2)
ALP SERPL-CCNC: 69 U/L (ref 55–135)
ALT SERPL W/O P-5'-P-CCNC: 16 U/L (ref 10–44)
ANION GAP SERPL CALC-SCNC: 5 MMOL/L (ref 8–16)
AST SERPL-CCNC: 23 U/L (ref 10–40)
BASOPHILS # BLD AUTO: 0.08 K/UL (ref 0–0.2)
BASOPHILS NFR BLD: 0.9 % (ref 0–1.9)
BILIRUB SERPL-MCNC: 0.5 MG/DL (ref 0.1–1)
BUN SERPL-MCNC: 21 MG/DL (ref 8–23)
CALCIUM SERPL-MCNC: 9.1 MG/DL (ref 8.7–10.5)
CEA SERPL-MCNC: 2 NG/ML (ref 0–5)
CHLORIDE SERPL-SCNC: 98 MMOL/L (ref 95–110)
CO2 SERPL-SCNC: 31 MMOL/L (ref 23–29)
CREAT SERPL-MCNC: 0.7 MG/DL (ref 0.5–1.4)
DIFFERENTIAL METHOD: ABNORMAL
EOSINOPHIL # BLD AUTO: 0.1 K/UL (ref 0–0.5)
EOSINOPHIL NFR BLD: 1.4 % (ref 0–8)
ERYTHROCYTE [DISTWIDTH] IN BLOOD BY AUTOMATED COUNT: 16.3 % (ref 11.5–14.5)
EST. GFR  (NO RACE VARIABLE): >60 ML/MIN/1.73 M^2
GLUCOSE SERPL-MCNC: 102 MG/DL (ref 70–110)
HCT VFR BLD AUTO: 40.3 % (ref 37–48.5)
HGB BLD-MCNC: 12.2 G/DL (ref 12–16)
IMM GRANULOCYTES # BLD AUTO: 0.02 K/UL (ref 0–0.04)
IMM GRANULOCYTES NFR BLD AUTO: 0.2 % (ref 0–0.5)
LYMPHOCYTES # BLD AUTO: 1.8 K/UL (ref 1–4.8)
LYMPHOCYTES NFR BLD: 21.5 % (ref 18–48)
MCH RBC QN AUTO: 24.7 PG (ref 27–31)
MCHC RBC AUTO-ENTMCNC: 30.3 G/DL (ref 32–36)
MCV RBC AUTO: 82 FL (ref 82–98)
MONOCYTES # BLD AUTO: 0.5 K/UL (ref 0.3–1)
MONOCYTES NFR BLD: 6.4 % (ref 4–15)
NEUTROPHILS # BLD AUTO: 5.9 K/UL (ref 1.8–7.7)
NEUTROPHILS NFR BLD: 69.6 % (ref 38–73)
NRBC BLD-RTO: 0 /100 WBC
PLATELET # BLD AUTO: 238 K/UL (ref 150–450)
PMV BLD AUTO: 9.9 FL (ref 9.2–12.9)
POTASSIUM SERPL-SCNC: 4.3 MMOL/L (ref 3.5–5.1)
PROT SERPL-MCNC: 7.6 G/DL (ref 6–8.4)
RBC # BLD AUTO: 4.94 M/UL (ref 4–5.4)
SODIUM SERPL-SCNC: 134 MMOL/L (ref 136–145)
WBC # BLD AUTO: 8.46 K/UL (ref 3.9–12.7)

## 2023-07-10 PROCEDURE — 1159F PR MEDICATION LIST DOCUMENTED IN MEDICAL RECORD: ICD-10-PCS | Mod: CPTII,S$GLB,, | Performed by: NURSE PRACTITIONER

## 2023-07-10 PROCEDURE — 99214 PR OFFICE/OUTPT VISIT, EST, LEVL IV, 30-39 MIN: ICD-10-PCS | Mod: S$GLB,,, | Performed by: NURSE PRACTITIONER

## 2023-07-10 PROCEDURE — 1101F PT FALLS ASSESS-DOCD LE1/YR: CPT | Mod: CPTII,S$GLB,, | Performed by: NURSE PRACTITIONER

## 2023-07-10 PROCEDURE — 85025 COMPLETE CBC W/AUTO DIFF WBC: CPT | Performed by: INTERNAL MEDICINE

## 2023-07-10 PROCEDURE — 1101F PR PT FALLS ASSESS DOC 0-1 FALLS W/OUT INJ PAST YR: ICD-10-PCS | Mod: CPTII,S$GLB,, | Performed by: NURSE PRACTITIONER

## 2023-07-10 PROCEDURE — 3078F DIAST BP <80 MM HG: CPT | Mod: CPTII,S$GLB,, | Performed by: NURSE PRACTITIONER

## 2023-07-10 PROCEDURE — 80053 COMPREHEN METABOLIC PANEL: CPT | Performed by: INTERNAL MEDICINE

## 2023-07-10 PROCEDURE — 3074F PR MOST RECENT SYSTOLIC BLOOD PRESSURE < 130 MM HG: ICD-10-PCS | Mod: CPTII,S$GLB,, | Performed by: NURSE PRACTITIONER

## 2023-07-10 PROCEDURE — 3078F PR MOST RECENT DIASTOLIC BLOOD PRESSURE < 80 MM HG: ICD-10-PCS | Mod: CPTII,S$GLB,, | Performed by: NURSE PRACTITIONER

## 2023-07-10 PROCEDURE — 1125F AMNT PAIN NOTED PAIN PRSNT: CPT | Mod: CPTII,S$GLB,, | Performed by: NURSE PRACTITIONER

## 2023-07-10 PROCEDURE — 3074F SYST BP LT 130 MM HG: CPT | Mod: CPTII,S$GLB,, | Performed by: NURSE PRACTITIONER

## 2023-07-10 PROCEDURE — 82378 CARCINOEMBRYONIC ANTIGEN: CPT | Performed by: INTERNAL MEDICINE

## 2023-07-10 PROCEDURE — 36415 COLL VENOUS BLD VENIPUNCTURE: CPT | Performed by: INTERNAL MEDICINE

## 2023-07-10 PROCEDURE — 1125F PR PAIN SEVERITY QUANTIFIED, PAIN PRESENT: ICD-10-PCS | Mod: CPTII,S$GLB,, | Performed by: NURSE PRACTITIONER

## 2023-07-10 PROCEDURE — 99214 OFFICE O/P EST MOD 30 MIN: CPT | Mod: S$GLB,,, | Performed by: NURSE PRACTITIONER

## 2023-07-10 PROCEDURE — 3288F PR FALLS RISK ASSESSMENT DOCUMENTED: ICD-10-PCS | Mod: CPTII,S$GLB,, | Performed by: NURSE PRACTITIONER

## 2023-07-10 PROCEDURE — 3288F FALL RISK ASSESSMENT DOCD: CPT | Mod: CPTII,S$GLB,, | Performed by: NURSE PRACTITIONER

## 2023-07-10 PROCEDURE — 1159F MED LIST DOCD IN RCRD: CPT | Mod: CPTII,S$GLB,, | Performed by: NURSE PRACTITIONER

## 2023-07-10 NOTE — PROGRESS NOTES
[facility] Hematology/Oncology  PROGRESS NOTE -   Follow-up Visit      Subjective:       Patient ID:   NAME: Lety Herbert : 1940     82 y.o. female    Referring Doc: Shilo Perez MD  Other Physicians: Ashish Henley; Maryse Beckwith           Chief Complaint: RUL mass/lung ca f/u       History of Present Illness:     Patient returns today for a regularly scheduled follow-up visit.  The patient is here today to go over the results of the recently ordered labs, tests and studies. She is here with her grandson.     She saw Dr Ralph with rad/onc as a follow up after she finished XRT to the lung.     Breathing is stable. No CP, HA's or N/V.    She is a poor historian.     Dicussed covid precautions - she has been vaccinated    ROS:   GEN: normal without any fever, night sweats or weight loss; fatigue, + back pain chronic with movement   HEENT: normal with no HA's, sore throat, stiff neck, changes in vision  CV: normal with no CP, SOB, PND, GAVIRIA or orthopnea  PULM: chronic SOB/GAVIRIA; O2 dependent at home, hemoptysis, sputum or pleuritic pain  GI: normal with no abdominal pain, nausea, vomiting, constipation, diarrhea, melanotic stools, BRBPR, or hematemesis  : normal with no hematuria, dysuria  BREAST: normal with no mass, discharge, pain  SKIN: normal with no rash, erythema, bruising, or swelling    Pain Scale:  0    Allergies:  Review of patient's allergies indicates:   Allergen Reactions    Meperidine     Promethazine     Bactrim [sulfamethoxazole-trimethoprim] Rash       Medications:    Current Outpatient Medications:     albuterol (PROVENTIL) 2.5 mg /3 mL (0.083 %) nebulizer solution, Take 3 mLs (2.5 mg total) by nebulization every 6 (six) hours as needed (cough)., Disp: 120 each, Rfl: 11    albuterol (PROVENTIL/VENTOLIN HFA) 90 mcg/actuation inhaler, inhale 1 to 2 puffs every 4 hours as needed for wheezing, Disp: , Rfl:     ALPRAZolam (XANAX) 0.25 MG tablet, Take 1 tablet (0.25 mg total) by mouth 2 (two)  times daily as needed for Anxiety., Disp: 60 tablet, Rfl: 1    atorvastatin (LIPITOR) 20 MG tablet, TAKE 1 TABLET (20 MG TOTAL) BY MOUTH ONCE DAILY., Disp: 90 tablet, Rfl: 1    azelastine (ASTELIN) 137 mcg (0.1 %) nasal spray, 1 spray 2 (two) times daily., Disp: , Rfl:     diltiaZEM (CARDIZEM CD) 120 MG Cp24, Take 1 capsule (120 mg total) by mouth once daily., Disp: 90 capsule, Rfl: 1    fluticasone propionate (FLONASE) 50 mcg/actuation nasal spray, by Each Nostril route., Disp: , Rfl:     fluticasone-umeclidin-vilanter (TRELEGY ELLIPTA) 200-62.5-25 mcg inhaler, Inhale 1 puff into the lungs once daily., Disp: 60 each, Rfl: 11    gabapentin (NEURONTIN) 100 MG capsule, Take 100 mg by mouth., Disp: , Rfl:     HYDROcodone-acetaminophen (NORCO) 5-325 mg per tablet, Take 1 tablet by mouth every 6 (six) hours as needed for Pain., Disp: 90 tablet, Rfl: 0    montelukast (SINGULAIR) 10 mg tablet, Take 10 mg by mouth every evening., Disp: , Rfl:     multivit-min/folic ac/collagen (WOMEN'S MULTIVITAMIN COLLAGEN ORAL), Take by mouth once daily., Disp: , Rfl:     pantoprazole (PROTONIX) 20 MG tablet, Take 1 tablet (20 mg total) by mouth once daily., Disp: 90 tablet, Rfl: 1    potassium chloride (KLOR-CON) 10 MEQ TbSR, Take 10 mEq by mouth 2 (two) times daily., Disp: , Rfl:     sertraline (ZOLOFT) 100 MG tablet, Take 1 tablet (100 mg total) by mouth once daily., Disp: 90 tablet, Rfl: 3    traZODone (DESYREL) 50 MG tablet, Take 1 tablet (50 mg total) by mouth every evening., Disp: 30 tablet, Rfl: 2    valsartan-hydrochlorothiazide (DIOVAN-HCT) 320-12.5 mg per tablet, Take 1 tablet by mouth every morning., Disp: 90 tablet, Rfl: 4    XARELTO 15 mg Tab, Take 15 mg by mouth., Disp: , Rfl:     albuterol (PROVENTIL/VENTOLIN HFA) 90 mcg/actuation inhaler, 2 puffs every 4 hours as needed for cough, wheeze, or shortness of breath (Patient not taking: Reported on 5/25/2023), Disp: 18 g, Rfl: 11    HYDROcodone-acetaminophen (NORCO) 5-325 mg  "per tablet, Take 1 tablet by mouth every 6 (six) hours as needed for Pain. (Patient not taking: Reported on 7/10/2023), Disp: 90 tablet, Rfl: 0    HYDROcodone-acetaminophen (NORCO) 5-325 mg per tablet, TAKE one TABLET BY MOUTH EVERY SIX hours AS NEEDED FOR PAIN (Patient not taking: Reported on 7/10/2023), Disp: 90 tablet, Rfl: 0    loratadine (CLARITIN) 10 mg tablet, Take 1 tablet (10 mg total) by mouth once daily., Disp: 30 tablet, Rfl: 0    methylPREDNISolone (MEDROL DOSEPACK) 4 mg tablet, use as directed (Patient not taking: Reported on 7/10/2023), Disp: 21 each, Rfl: 0    WIXELA INHUB 100-50 mcg/dose diskus inhaler, Inhale 1 puff into the lungs 2 (two) times daily., Disp: , Rfl:     XARELTO 20 mg Tab, , Disp: , Rfl:     PMHx/PSHx Updates:  See patient's last visit with Dr. 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 Patient's remote   clinical history of breast carcinoma is noticed. Based on the   immunohistochemical features, the current tumor is favored to be of lung   primary.            Objective:     Vitals:  Blood pressure 121/75, pulse 81, temperature 97.7 °F (36.5 °C), resp. rate 18, height 5' 6" (1.676 m), weight 99.8 kg (220 lb).    Physical Examination:   GEN: no apparent distress, comfortable; AAOx3;  elderly  HEAD: atraumatic and normocephalic  EYES: no pallor, no icterus, PERRLA  ENT: OMM, no pharyngeal erythema, external ears WNL; no nasal discharge; no thrush  NECK: " no masses, thyroid normal, trachea midline, no LAD/LN's, supple  CV: RRR with no murmur; normal pulse; normal S1 and S2; no pedal edema  CHEST: Normal respiratory effort; CTAB; normal breath sounds; no wheeze or crackles  ABDOM: nontender and nondistended; soft; normal bowel sounds; no rebound/guarding  MUSC/Skeletal: ROM normal; no crepitus; joints normal; no deformities; +arthropathy of hip/knees, hands, back pain  EXTREM: no clubbing, cyanosis, inflammation or swelling; varicosities in bilateral lower extremities  SKIN: no rashes, lesions, ulcers, petechiae or subcutaneous nodules; chronic age related skin changes  : no carrillo  NEURO: grossly intact; motor/sensory WNL; AAOx3; no tremors, + apathetic today   PSYCH: normal mood, affect and behavior  LYMPH: normal cervical, supraclavicular, axillary and groin LN's          Labs:     Due today     Lab Results   Component Value Date    WBC 6.53 04/24/2023    HGB 12.0 04/24/2023    HCT 39.3 04/24/2023    MCV 90 04/24/2023     04/24/2023       CMP  Sodium   Date Value Ref Range Status   04/24/2023 142 136 - 145 mmol/L Final   03/04/2019 142 134 - 144 mmol/L      Potassium   Date Value Ref Range Status   04/24/2023 4.0 3.5 - 5.1 mmol/L Final     Chloride   Date Value Ref Range Status   04/24/2023 103 95 - 110 mmol/L Final   03/04/2019 104 98 - 110 mmol/L      CO2   Date Value Ref Range Status   04/24/2023 29 23 - 29 mmol/L Final     Glucose   Date Value Ref Range Status   04/24/2023 93 70 - 110 mg/dL Final   03/04/2019 102 (H) 70 - 99 mg/dL      BUN   Date Value Ref Range Status   04/24/2023 10 8 - 23 mg/dL Final     Creatinine   Date Value Ref Range Status   04/24/2023 0.6 0.5 - 1.4 mg/dL Final   03/04/2019 0.48 (L) 0.60 - 1.40 mg/dL      Calcium   Date Value Ref Range Status   04/24/2023 9.4 8.7 - 10.5 mg/dL Final     Total Protein   Date Value Ref Range Status   04/24/2023 7.6 6.0 - 8.4 g/dL Final     Albumin   Date Value Ref Range Status   04/24/2023 4.2 3.5  - 5.2 g/dL Final   03/04/2019 4.0 3.1 - 4.7 g/dL      Total Bilirubin   Date Value Ref Range Status   04/24/2023 0.8 0.1 - 1.0 mg/dL Final     Comment:     For infants and newborns, interpretation of results should be based  on gestational age, weight and in agreement with clinical  observations.    Premature Infant recommended reference ranges:  Up to 24 hours.............<8.0 mg/dL  Up to 48 hours............<12.0 mg/dL  3-5 days..................<15.0 mg/dL  6-29 days.................<15.0 mg/dL       Alkaline Phosphatase   Date Value Ref Range Status   04/24/2023 81 55 - 135 U/L Final     AST   Date Value Ref Range Status   04/24/2023 23 10 - 40 U/L Final     ALT   Date Value Ref Range Status   04/24/2023 15 10 - 44 U/L Final     Anion Gap   Date Value Ref Range Status   04/24/2023 10 8 - 16 mmol/L Final     eGFR   Date Value Ref Range Status   04/24/2023 >60.0 >60 mL/min/1.73 m^2 Final     Lab Results   Component Value Date    CEA 2.5 04/24/2023           Radiology/Diagnostic Studies:    CT Chest 6/16/2023:  IMPRESSION:  Decrease in size of the mass within the anterior right upper lobe     Development of peripheral groundglass opacities in the right upper lobe and adjacent to the mass compatible with post radiation changes     No evidence of metastatic disease     Cardiomegaly     Electronically signed by:  Sofía Bryan MD  6/16/2023 4:37 PM CDT Workstation: [id number]    PET 1/11/2023:     IMPRESSION: Hypermetabolic 2.3 cm mass within the anterior right upper lobe suspicious for primary lung malignancy     Faint groundglass opacities throughout the lungs with prominence of the pulmonary vasculature and cardiomegaly which may be represent pulmonary edema.  Development of a 13 mm subpleural nodule in the right lower lobe. Since this is new since the most recent CT findings most likely are secondary to infectious or inflammatory process however metastatic lesion cannot be excluded     Increased FDG activity  in the region of the right external iliac vein without corresponding adenopathy. This may be physiologic there is physiologic activity within a superficial vein in the upper right thigh and findings may be secondary to thrombophlebitis.. Follow-up CT abdomen and pelvis with contrast is recommended     Degenerative changes of the spine           CT Chest 12/15/2022:     IMPRESSION:  1. A 23 mm right upper lobe noncalcified pulmonary nodule is highly suspicious for primary pulmonary neoplasm. Tissue diagnosis is recommended.  2. No findings of regional metastatic disease in the chest.  3. Enlarged pulmonary arteries, suggesting pulmonary arterial hypertension.  4. Cardiomegaly, with aortic and coronary arterial calcifications.     CTA 11/19/2022:     IMPRESSION:  1.  No pulmonary embolus detected.  2.  Interstitial abnormality, suspect mild edema superimposed upon chronic changes.  3.  Right upper lobe 2.1 cm nodule. Consider a non-contrast Chest CT at 3 months, a PET/CT, or tissue sampling. These guidelines do not apply to immunocompromised patients and patients with cancer   ADDENDUM #1         The presence of right upper lobe lung nodule needing further evaluation or follow-up has been discussed with Dr. Rick Salgado 11/19/2022 12:50 AM CST.     All lab results and imaging results have been reviewed and     I have reviewed all available lab results and radiology reports.    Assessment/Plan:   (1) 82 y.o. female with diagnosis of RUL lung mass who has been referred by Shilo Perez MD for evaluation by medical hematology/oncology. She has been followed by Dr Ashish Henley with pulmonary for her COPD and chronic hypoxemia/bronchitis, who was a former smoker.      - She had a CTA in Nov 2022 which was negative for a PE but showed a RUL lung mass, which was followed by a CT Chest on 12/15/2022 which confirmed a 2.3cm RUL mass.   - She had a PET scan on 1/11/2023 which showed a 2.3 cm hypermetabolic mass in the  anterior right upper lobe abutting the superior vena cava along with development of a 13 mm subpleural nodule within the right lower lobe.         - She had CT guided biopsy of the RUL lung mass on 2/15/2023 with pathology coming back NSCLC favoring a lung primary.     - She is supposed to be on oxygen at home. She has portable tank and has a lot of GAVIRIA. She has chronic SOB.   - She is former smoker and quit when she was 43yrs old.      - discussed the pathology and reviewed and discussed the latest NCCN guidelines (vs. 2-2023)  - unlikely candidate for any surgical intervention  - will refer to Rad/onc to see if there are any radiation options either monotherapy or in combination with low dose weekly chemotherapy  - CARIS on the pathology  - check CEA and up to date labs    3/27/2023:  - She saw Dr Ralph with rad/onc on 3/20/2023 and she is starting XRT monotherapy today with day#1    4/25/2023:  - she completed XRT and has some fatigue  - she will need post-XRT evaluation in about 4 weeks with rad/onc and expect her to need repeat scans in 6-8 weeks    5/25/2023:  - she saw Dr Ralph on 5/2 and he has CT Chest ordered post-XRT  - some residual fatigue and back pain  - due to see Dr Henley  - labs adequate    7/10/2023:  - labs due today   -patient has some fatigue and chronic back pain, she denies further imaging at this time   - not scheduled to see Dr. Henley             (2) Hx/of PE and DVT     (3) HTN and hypercholesterolemia     (4) COPD/chronic hypoxemia; bronchiectasis; chronic bronchitis - followed by Dr Ashish Henley/Shelbie Villalpando     (5) CHF and dysrhythmia     (6) GERD; gastric ulcer     (7) Hx/of breast cancer s/p bilateral mastectomies- followed by Dr Maryse Beckwith  - She has history of breast cancer in past around 2005  for which she has had bilateral mastectomies and is followed by Dr Beckwith. She did not require chemotherapy or radiation. She had reconstruction surgery with Dr Grimaldo. She saw   Cherelle couple of weeks ago.    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 CEA level every 3 months - labs due today   Check on f/u with Dr Henley  F/u with PCP, Pulm, etc     RTC in  6 weeks with Dr. Vogt     Discussion:     COVID-19 Discussion:     I had long discussion with patient and any applicable family about the COVID-19 coronavirus epidemic and the recommended precautions with regard to cancer and/or hematology patients. I have re-iterated the CDC recommendations for adequate hand washing, use of hand -like products, and coughing into elbow, etc. In addition, especially for our patients who are on chemotherapy and/or our otherwise immunocompromised patients, I have recommended avoidance of crowds, including movie theaters, restaurants, churches, etc. I have recommended avoidance of any sick or symptomatic family members and/or friends. I have also recommended avoidance of any raw and unwashed food products, and general avoidance of food items that have not been prepared by themselves. The patient has been asked to call us immediately with any symptom developments, issues, questions or other general concerns.         Pathology Discussion:     I reviewed and discussed the pathology report(s) and radiograph reports (if available) in as simple to understand and/or laymen's terms to the best of my ability. I had an indepth conversation with the patient and went over the  "patient's individual diagnosis based on the information that was currently available. I discussed the TNM staging process with regard to the patient's particular cancer type, and the calculated stage based on the currently available TNM data and literature. I discussed the available prognostic data with regard to the current staging information and how it relates to the prognosis of their particular neoplastic process.          NCCN Guidelines:     I discussed the available treatment option(s) in accordance with the latest literature from the NCCN Clinical Practice Guidelines for the patient's particular type of cancer disorder. The NCCN Guidelines provide a "document evidence-based (and) consensus-driven management" of the care of oncology patients. The treatment recommendations were made not only in accordance to the NCCN guidelines, but also factored in to account the patient's overall age, condition, performance status and their medical co-morbidities. I went over the risks and benefits of the the treatment options (if any could be made) with regard to their particular cancer type, their cancer stage, their age, and their co-morbidities.         I have explained and the patient understands all of  the current recommendation(s). I have answered all of their questions to the best of my ability and to their complete satisfaction.      I spent over 25 mins of time with the patient. Reviewed results of the recently ordered labs, tests and studies; made directives with regards to the results. Over half of this time was spent couseling and coordinating care.    I have explained all of the above in detail and the patient understands all of the current recommendation(s). I have answered all of their questions to the best of my ability and to their complete satisfaction.   The patient is to continue with the current management plan.            Electronically signed by Domonique Ortega, MSN,APRN,AGNP-C                "

## 2023-07-12 DIAGNOSIS — E78.2 MULTIPLE-TYPE HYPERLIPIDEMIA: ICD-10-CM

## 2023-07-12 RX ORDER — ATORVASTATIN CALCIUM 20 MG/1
20 TABLET, FILM COATED ORAL DAILY
Qty: 90 TABLET | Refills: 1 | Status: SHIPPED | OUTPATIENT
Start: 2023-07-12 | End: 2024-03-05

## 2023-07-16 DIAGNOSIS — G47.09 OTHER INSOMNIA: ICD-10-CM

## 2023-07-16 DIAGNOSIS — F41.9 ANXIETY: ICD-10-CM

## 2023-07-16 DIAGNOSIS — F34.1 DYSTHYMIA: ICD-10-CM

## 2023-07-17 RX ORDER — SERTRALINE HYDROCHLORIDE 100 MG/1
100 TABLET, FILM COATED ORAL DAILY
Qty: 90 TABLET | Refills: 3 | Status: SHIPPED | OUTPATIENT
Start: 2023-07-17

## 2023-07-17 RX ORDER — ALPRAZOLAM 0.25 MG/1
TABLET ORAL
Qty: 60 TABLET | Refills: 1 | Status: SHIPPED | OUTPATIENT
Start: 2023-07-17 | End: 2024-01-24 | Stop reason: SDUPTHER

## 2023-07-18 ENCOUNTER — TELEPHONE (OUTPATIENT)
Dept: HEMATOLOGY/ONCOLOGY | Facility: CLINIC | Age: 83
End: 2023-07-18

## 2023-07-18 NOTE — TELEPHONE ENCOUNTER
Patient made aware that script for xanax ready for pickup from this office, verbalized understanding.

## 2023-08-20 NOTE — PROGRESS NOTES
[facility] Hematology/Oncology  PROGRESS NOTE -   Follow-up Visit      Subjective:       Patient ID:   NAME: Lety Herbert : 1940     82 y.o. female    Referring Doc: Shilo Perez MD  Other Physicians: Ashish Henley; Maryse Beckwith           Chief Complaint: RUL mass/lung ca f/u       History of Present Illness:     Patient returns today for a regularly scheduled follow-up visit.  The patient is here today to go over the results of the recently ordered labs, tests and studies. She is here with her daughter Buffy today.     She has been having more back issues    She saw Dr Ralph with rad/onc last on  and completed XRT and has some residual fatigue. Some back pain.     Breathing is stable. No CP, HA's or N/V.    She saw Domonique Talley on 7/10/2023    Dicussed covid precautions - she has been vaccinated    ROS:   GEN: normal without any fever, night sweats or weight loss; fatigue; some more back pains  HEENT: normal with no HA's, sore throat, stiff neck, changes in vision  CV: normal with no CP, SOB, PND, GAVIRIA or orthopnea  PULM: chronic SOB/GAVIRIA; O2 dependent at home, hemoptysis, sputum or pleuritic pain  GI: normal with no abdominal pain, nausea, vomiting, constipation, diarrhea, melanotic stools, BRBPR, or hematemesis  : normal with no hematuria, dysuria  BREAST: normal with no mass, discharge, pain  SKIN: normal with no rash, erythema, bruising, or swelling    Pain Scale:  9-10 on back    Allergies:  Review of patient's allergies indicates:   Allergen Reactions    Meperidine     Promethazine     Bactrim [sulfamethoxazole-trimethoprim] Rash       Medications:    Current Outpatient Medications:     albuterol (PROVENTIL) 2.5 mg /3 mL (0.083 %) nebulizer solution, Take 3 mLs (2.5 mg total) by nebulization every 6 (six) hours as needed (cough)., Disp: 120 each, Rfl: 11    albuterol (PROVENTIL/VENTOLIN HFA) 90 mcg/actuation inhaler, inhale 1 to 2 puffs every 4 hours as needed for wheezing, Disp: , Rfl:      albuterol (PROVENTIL/VENTOLIN HFA) 90 mcg/actuation inhaler, 2 puffs every 4 hours as needed for cough, wheeze, or shortness of breath, Disp: 18 g, Rfl: 11    ALPRAZolam (XANAX) 0.25 MG tablet, TAKE one TABLET BY MOUTH TWICE DAILY AS NEEDED FOR ANXIETY, Disp: 60 tablet, Rfl: 1    atorvastatin (LIPITOR) 20 MG tablet, Take 1 tablet (20 mg total) by mouth once daily., Disp: 90 tablet, Rfl: 1    azelastine (ASTELIN) 137 mcg (0.1 %) nasal spray, 1 spray 2 (two) times daily., Disp: , Rfl:     diltiaZEM (CARDIZEM CD) 120 MG Cp24, Take 1 capsule (120 mg total) by mouth once daily., Disp: 90 capsule, Rfl: 1    fluticasone propionate (FLONASE) 50 mcg/actuation nasal spray, by Each Nostril route., Disp: , Rfl:     fluticasone-umeclidin-vilanter (TRELEGY ELLIPTA) 200-62.5-25 mcg inhaler, Inhale 1 puff into the lungs once daily., Disp: 60 each, Rfl: 11    gabapentin (NEURONTIN) 100 MG capsule, Take 100 mg by mouth., Disp: , Rfl:     HYDROcodone-acetaminophen (NORCO) 5-325 mg per tablet, Take 1 tablet by mouth every 6 (six) hours as needed for Pain., Disp: 90 tablet, Rfl: 0    HYDROcodone-acetaminophen (NORCO) 5-325 mg per tablet, Take 1 tablet by mouth every 6 (six) hours as needed for Pain., Disp: 90 tablet, Rfl: 0    HYDROcodone-acetaminophen (NORCO) 5-325 mg per tablet, TAKE one TABLET BY MOUTH EVERY SIX hours AS NEEDED FOR PAIN, Disp: 90 tablet, Rfl: 0    montelukast (SINGULAIR) 10 mg tablet, Take 10 mg by mouth every evening., Disp: , Rfl:     multivit-min/folic ac/collagen (WOMEN'S MULTIVITAMIN COLLAGEN ORAL), Take by mouth once daily., Disp: , Rfl:     pantoprazole (PROTONIX) 20 MG tablet, Take 1 tablet (20 mg total) by mouth once daily., Disp: 90 tablet, Rfl: 1    potassium chloride (KLOR-CON) 10 MEQ TbSR, Take 10 mEq by mouth 2 (two) times daily., Disp: , Rfl:     sertraline (ZOLOFT) 100 MG tablet, TAKE 1 TABLET (100 MG TOTAL) BY MOUTH ONCE DAILY., Disp: 90 tablet, Rfl: 3    traZODone (DESYREL) 50 MG tablet, Take 1  "tablet (50 mg total) by mouth every evening., Disp: 30 tablet, Rfl: 2    valsartan-hydrochlorothiazide (DIOVAN-HCT) 320-12.5 mg per tablet, Take 1 tablet by mouth every morning., Disp: 90 tablet, Rfl: 4    WIXELA INHUB 100-50 mcg/dose diskus inhaler, Inhale 1 puff into the lungs 2 (two) times daily., Disp: , Rfl:     XARELTO 15 mg Tab, Take 15 mg by mouth., Disp: , Rfl:     XARELTO 20 mg Tab, , Disp: , Rfl:     loratadine (CLARITIN) 10 mg tablet, Take 1 tablet (10 mg total) by mouth once daily., Disp: 30 tablet, Rfl: 0    PMHx/PSHx Updates:  See patient's last visit with me on 5/25/2023.  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 Patient's remote   clinical history of breast carcinoma is noticed. Based on the   immunohistochemical features, the current tumor is favored to be of lung   primary.            Objective:     Vitals:  Blood pressure 134/74, pulse 88, temperature 98 °F (36.7 °C), resp. rate 18, height 5' 6" (1.676 m), weight 99.8 kg (220 lb).    Physical Examination:   GEN: no apparent distress, comfortable; AAOx3; overweight; elderly  HEAD: atraumatic and normocephalic  EYES: no pallor, no icterus, PERRLA  ENT: OMM, no pharyngeal erythema, external ears WNL; no nasal discharge; no thrush  NECK: no masses, thyroid normal, trachea midline, no LAD/LN's, supple  CV: RRR with no murmur; normal pulse; normal S1 and S2; no pedal edema  CHEST: Normal respiratory effort; CTAB; normal breath sounds; no " wheeze or crackles  ABDOM: nontender and nondistended; soft; normal bowel sounds; no rebound/guarding  MUSC/Skeletal: ROM normal; no crepitus; joints normal; no deformities; +arthropathy of hip/knees, hands  EXTREM: no clubbing, cyanosis, inflammation or swelling; varicosities in bilateral lower extremities  SKIN: no rashes, lesions, ulcers, petechiae or subcutaneous nodules; chronic age related skin changes  : no carrillo  NEURO: grossly intact; motor/sensory WNL; AAOx3; no tremors  PSYCH: normal mood, affect and behavior  LYMPH: normal cervical, supraclavicular, axillary and groin LN's          Labs:     Lab Results   Component Value Date    WBC 8.46 07/10/2023    HGB 12.2 07/10/2023    HCT 40.3 07/10/2023    MCV 82 07/10/2023     07/10/2023       CMP  Sodium   Date Value Ref Range Status   07/10/2023 134 (L) 136 - 145 mmol/L Final   03/04/2019 142 134 - 144 mmol/L      Potassium   Date Value Ref Range Status   07/10/2023 4.3 3.5 - 5.1 mmol/L Final     Chloride   Date Value Ref Range Status   07/10/2023 98 95 - 110 mmol/L Final   03/04/2019 104 98 - 110 mmol/L      CO2   Date Value Ref Range Status   07/10/2023 31 (H) 23 - 29 mmol/L Final     Glucose   Date Value Ref Range Status   07/10/2023 102 70 - 110 mg/dL Final   03/04/2019 102 (H) 70 - 99 mg/dL      BUN   Date Value Ref Range Status   07/10/2023 21 8 - 23 mg/dL Final     Creatinine   Date Value Ref Range Status   07/10/2023 0.7 0.5 - 1.4 mg/dL Final   03/04/2019 0.48 (L) 0.60 - 1.40 mg/dL      Calcium   Date Value Ref Range Status   07/10/2023 9.1 8.7 - 10.5 mg/dL Final     Total Protein   Date Value Ref Range Status   07/10/2023 7.6 6.0 - 8.4 g/dL Final     Albumin   Date Value Ref Range Status   07/10/2023 4.3 3.5 - 5.2 g/dL Final   03/04/2019 4.0 3.1 - 4.7 g/dL      Total Bilirubin   Date Value Ref Range Status   07/10/2023 0.5 0.1 - 1.0 mg/dL Final     Comment:     For infants and newborns, interpretation of results should be based  on gestational  age, weight and in agreement with clinical  observations.    Premature Infant recommended reference ranges:  Up to 24 hours.............<8.0 mg/dL  Up to 48 hours............<12.0 mg/dL  3-5 days..................<15.0 mg/dL  6-29 days.................<15.0 mg/dL       Alkaline Phosphatase   Date Value Ref Range Status   07/10/2023 69 55 - 135 U/L Final     AST   Date Value Ref Range Status   07/10/2023 23 10 - 40 U/L Final     ALT   Date Value Ref Range Status   07/10/2023 16 10 - 44 U/L Final     Anion Gap   Date Value Ref Range Status   07/10/2023 5 (L) 8 - 16 mmol/L Final     eGFR   Date Value Ref Range Status   07/10/2023 >60.0 >60 mL/min/1.73 m^2 Final     Lab Results   Component Value Date    CEA 2.0 07/10/2023           Radiology/Diagnostic Studies:    CT chest 6/16/2023:    IMPRESSION:  Decrease in size of the mass within the anterior right upper lobe     Development of peripheral groundglass opacities in the right upper lobe and adjacent to the mass compatible with post radiation changes     No evidence of metastatic disease     Cardiomegaly        PET 1/11/2023:     IMPRESSION: Hypermetabolic 2.3 cm mass within the anterior right upper lobe suspicious for primary lung malignancy     Faint groundglass opacities throughout the lungs with prominence of the pulmonary vasculature and cardiomegaly which may be represent pulmonary edema.  Development of a 13 mm subpleural nodule in the right lower lobe. Since this is new since the most recent CT findings most likely are secondary to infectious or inflammatory process however metastatic lesion cannot be excluded     Increased FDG activity in the region of the right external iliac vein without corresponding adenopathy. This may be physiologic there is physiologic activity within a superficial vein in the upper right thigh and findings may be secondary to thrombophlebitis.. Follow-up CT abdomen and pelvis with contrast is recommended     Degenerative changes of the  spine           CT Chest 12/15/2022:     IMPRESSION:  1. A 23 mm right upper lobe noncalcified pulmonary nodule is highly suspicious for primary pulmonary neoplasm. Tissue diagnosis is recommended.  2. No findings of regional metastatic disease in the chest.  3. Enlarged pulmonary arteries, suggesting pulmonary arterial hypertension.  4. Cardiomegaly, with aortic and coronary arterial calcifications.     CTA 11/19/2022:     IMPRESSION:  1.  No pulmonary embolus detected.  2.  Interstitial abnormality, suspect mild edema superimposed upon chronic changes.  3.  Right upper lobe 2.1 cm nodule. Consider a non-contrast Chest CT at 3 months, a PET/CT, or tissue sampling. These guidelines do not apply to immunocompromised patients and patients with cancer   ADDENDUM #1         The presence of right upper lobe lung nodule needing further evaluation or follow-up has been discussed with Dr. Rick Salgado 11/19/2022 12:50 AM CST.     All lab results and imaging results have been reviewed and     I have reviewed all available lab results and radiology reports.    Assessment/Plan:   (1) 82 y.o. female with diagnosis of RUL lung mass who has been referred by Shilo Perez MD for evaluation by medical hematology/oncology. She has been followed by Dr Ashish Henley with pulmonary for her COPD and chronic hypoxemia/bronchitis, who was a former smoker.      - She had a CTA in Nov 2022 which was negative for a PE but showed a RUL lung mass, which was followed by a CT Chest on 12/15/2022 which confirmed a 2.3cm RUL mass.   - She had a PET scan on 1/11/2023 which showed a 2.3 cm hypermetabolic mass in the anterior right upper lobe abutting the superior vena cava along with development of a 13 mm subpleural nodule within the right lower lobe.         - She had CT guided biopsy of the RUL lung mass on 2/15/2023 with pathology coming back NSCLC favoring a lung primary.     - She is supposed to be on oxygen at home. She has portable tank and  has a lot of GAVIRIA. She has chronic SOB.   - She is former smoker and quit when she was 43yrs old.      - discussed the pathology and reviewed and discussed the latest NCCN guidelines (vs. 2-2023)  - unlikely candidate for any surgical intervention  - will refer to Rad/onc to see if there are any radiation options either monotherapy or in combination with low dose weekly chemotherapy  - CARIS on the pathology  - check CEA and up to date labs    3/27/2023:  - She saw Dr Ralph with rad/onc on 3/20/2023 and she is starting XRT monotherapy today with day#1    4/25/2023:  - she completed XRT and has some fatigue  - she will need post-XRT evaluation in about 4 weeks with rad/onc and expect her to need repeat scans in 6-8 weeks    5/25/2023:  - she saw Dr Ralph on 5/2 and he has CT Chest ordered post-XRT  - some residual fatigue and back pain  - due to see Dr Henley  - labs adequate    8/21/2023:  - she has been having some more back pain  - due for repeat CT with Dr Ralph in Sept/oct 2023, will go ahead and order PET now  - she needs to f/u with Dr Henley with pulmonary as well  - await PEt results, consider other scans if necessary  - CEA at 2.0           (2) Hx/of PE and DVT     (3) HTN and hypercholesterolemia     (4) COPD/chronic hypoxemia; bronchiectasis; chronic bronchitis - followed by Dr Ashish Henley/Shelbie Villalpando     (5) CHF and dysrhythmia     (6) GERD; gastric ulcer     (7) Hx/of breast cancer s/p bilateral mastectomies- followed by Dr Maryse Beckwith  - She has history of breast cancer in past around 2005  for which she has had bilateral mastectomies and is followed by Dr Beckwith. She did not require chemotherapy or radiation. She had reconstruction surgery with Dr Grimaldo. She saw Dr Beckwith couple of weeks ago.        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 CEA level every 3 months  Encouarged f/u with Dr Henley  F/u with PCP, Pulm, etc     RTC in  4 weeks with Whit, 8 weeks with myself  Fax note to Shilo Perez MD; Amena Mejia Mannina, [place]       Discussion:     COVID-19 Discussion:     I had long discussion with patient and any applicable family about the COVID-19 coronavirus epidemic and the recommended precautions with regard to cancer and/or hematology patients. I have re-iterated the CDC recommendations for adequate hand washing, use of hand -like products, and coughing into elbow, etc. In addition, especially for our patients who are on chemotherapy and/or our otherwise immunocompromised patients, I have recommended avoidance of crowds, including movie theaters, restaurants, churches, etc. I have recommended avoidance of any sick or symptomatic family members and/or friends. I have also recommended avoidance of any raw and unwashed food products, and general avoidance of food items that have not been prepared by themselves. The patient has been asked to call us immediately with any symptom developments, issues, questions or other general concerns.         Pathology Discussion:     I reviewed and discussed the pathology report(s) and radiograph reports (if available) in as simple to understand and/or laymen's terms to the best of my ability. I had an indepth conversation with the patient and went over the patient's individual diagnosis based on the information that was  "currently available. I discussed the TNM staging process with regard to the patient's particular cancer type, and the calculated stage based on the currently available TNM data and literature. I discussed the available prognostic data with regard to the current staging information and how it relates to the prognosis of their particular neoplastic process.          NCCN Guidelines:     I discussed the available treatment option(s) in accordance with the latest literature from the NCCN Clinical Practice Guidelines for the patient's particular type of cancer disorder. The NCCN Guidelines provide a "document evidence-based (and) consensus-driven management" of the care of oncology patients. The treatment recommendations were made not only in accordance to the NCCN guidelines, but also factored in to account the patient's overall age, condition, performance status and their medical co-morbidities. I went over the risks and benefits of the the treatment options (if any could be made) with regard to their particular cancer type, their cancer stage, their age, and their co-morbidities.         I have explained and the patient understands all of  the current recommendation(s). I have answered all of their questions to the best of my ability and to their complete satisfaction.      I spent over 25 mins of time with the patient. Reviewed results of the recently ordered labs, tests and studies; made directives with regards to the results. Over half of this time was spent couseling and coordinating care.    I have explained all of the above in detail and the patient understands all of the current recommendation(s). I have answered all of their questions to the best of my ability and to their complete satisfaction.   The patient is to continue with the current management plan.            Electronically signed by Isacc Vogt MD              "

## 2023-08-21 ENCOUNTER — OFFICE VISIT (OUTPATIENT)
Dept: HEMATOLOGY/ONCOLOGY | Facility: CLINIC | Age: 83
End: 2023-08-21
Payer: MEDICARE

## 2023-08-21 VITALS
HEART RATE: 88 BPM | RESPIRATION RATE: 18 BRPM | SYSTOLIC BLOOD PRESSURE: 134 MMHG | WEIGHT: 220 LBS | BODY MASS INDEX: 35.36 KG/M2 | TEMPERATURE: 98 F | DIASTOLIC BLOOD PRESSURE: 74 MMHG | HEIGHT: 66 IN

## 2023-08-21 DIAGNOSIS — Z90.13 S/P BILATERAL MASTECTOMY: ICD-10-CM

## 2023-08-21 DIAGNOSIS — M54.6 CHRONIC RIGHT-SIDED THORACIC BACK PAIN: ICD-10-CM

## 2023-08-21 DIAGNOSIS — R94.2 ABNORMAL PET SCAN OF LUNG: ICD-10-CM

## 2023-08-21 DIAGNOSIS — G89.29 CHRONIC RIGHT-SIDED THORACIC BACK PAIN: ICD-10-CM

## 2023-08-21 DIAGNOSIS — C34.11 MALIGNANT NEOPLASM OF UPPER LOBE OF RIGHT LUNG: ICD-10-CM

## 2023-08-21 DIAGNOSIS — R07.89 OTHER CHEST PAIN: ICD-10-CM

## 2023-08-21 DIAGNOSIS — R93.89 ABNORMAL CHEST CT: ICD-10-CM

## 2023-08-21 DIAGNOSIS — Z87.891 FORMER SMOKER: ICD-10-CM

## 2023-08-21 DIAGNOSIS — R91.8 MASS OF UPPER LOBE OF RIGHT LUNG: Primary | ICD-10-CM

## 2023-08-21 PROCEDURE — 99214 PR OFFICE/OUTPT VISIT, EST, LEVL IV, 30-39 MIN: ICD-10-PCS | Mod: S$GLB,,, | Performed by: INTERNAL MEDICINE

## 2023-08-21 PROCEDURE — 3078F PR MOST RECENT DIASTOLIC BLOOD PRESSURE < 80 MM HG: ICD-10-PCS | Mod: CPTII,S$GLB,, | Performed by: INTERNAL MEDICINE

## 2023-08-21 PROCEDURE — 1125F AMNT PAIN NOTED PAIN PRSNT: CPT | Mod: CPTII,S$GLB,, | Performed by: INTERNAL MEDICINE

## 2023-08-21 PROCEDURE — 3288F PR FALLS RISK ASSESSMENT DOCUMENTED: ICD-10-PCS | Mod: CPTII,S$GLB,, | Performed by: INTERNAL MEDICINE

## 2023-08-21 PROCEDURE — 3288F FALL RISK ASSESSMENT DOCD: CPT | Mod: CPTII,S$GLB,, | Performed by: INTERNAL MEDICINE

## 2023-08-21 PROCEDURE — 1101F PT FALLS ASSESS-DOCD LE1/YR: CPT | Mod: CPTII,S$GLB,, | Performed by: INTERNAL MEDICINE

## 2023-08-21 PROCEDURE — 1159F MED LIST DOCD IN RCRD: CPT | Mod: CPTII,S$GLB,, | Performed by: INTERNAL MEDICINE

## 2023-08-21 PROCEDURE — 99214 OFFICE O/P EST MOD 30 MIN: CPT | Mod: S$GLB,,, | Performed by: INTERNAL MEDICINE

## 2023-08-21 PROCEDURE — 3075F SYST BP GE 130 - 139MM HG: CPT | Mod: CPTII,S$GLB,, | Performed by: INTERNAL MEDICINE

## 2023-08-21 PROCEDURE — 3078F DIAST BP <80 MM HG: CPT | Mod: CPTII,S$GLB,, | Performed by: INTERNAL MEDICINE

## 2023-08-21 PROCEDURE — 1159F PR MEDICATION LIST DOCUMENTED IN MEDICAL RECORD: ICD-10-PCS | Mod: CPTII,S$GLB,, | Performed by: INTERNAL MEDICINE

## 2023-08-21 PROCEDURE — 1125F PR PAIN SEVERITY QUANTIFIED, PAIN PRESENT: ICD-10-PCS | Mod: CPTII,S$GLB,, | Performed by: INTERNAL MEDICINE

## 2023-08-21 PROCEDURE — 1101F PR PT FALLS ASSESS DOC 0-1 FALLS W/OUT INJ PAST YR: ICD-10-PCS | Mod: CPTII,S$GLB,, | Performed by: INTERNAL MEDICINE

## 2023-08-21 PROCEDURE — 1160F PR REVIEW ALL MEDS BY PRESCRIBER/CLIN PHARMACIST DOCUMENTED: ICD-10-PCS | Mod: CPTII,S$GLB,, | Performed by: INTERNAL MEDICINE

## 2023-08-21 PROCEDURE — 3075F PR MOST RECENT SYSTOLIC BLOOD PRESS GE 130-139MM HG: ICD-10-PCS | Mod: CPTII,S$GLB,, | Performed by: INTERNAL MEDICINE

## 2023-08-21 PROCEDURE — 1160F RVW MEDS BY RX/DR IN RCRD: CPT | Mod: CPTII,S$GLB,, | Performed by: INTERNAL MEDICINE

## 2023-08-21 RX ORDER — HYDROCODONE BITARTRATE AND ACETAMINOPHEN 5; 325 MG/1; MG/1
1 TABLET ORAL EVERY 6 HOURS PRN
Qty: 90 TABLET | Refills: 0 | Status: SHIPPED | OUTPATIENT
Start: 2023-08-21 | End: 2023-10-24 | Stop reason: SDUPTHER

## 2023-09-04 NOTE — PROGRESS NOTES
Subjective:       Patient ID: Lety Herbert is a 82 y.o. female.    Chief Complaint: Hypertension, Hyperlipidemia, Atrial Fibrillation, Lung Cancer, and Body pain (Flipping between different places-left chest wall, right chest wall, muscles.)    Patient is a chronically ill 82 y old  female comes for follow-up 4 month fup .      Underlying medical issues are as below:-    1.         Chronic hypoxemic respiratory failure   2.         Chronic heart failure with preserved ejection fraction   3.         Multiple-type hyperlipidemia   4.         Benign essential hypertension   5.         Paroxysmal atrial fibrillation -seems to be stable and she has seen Dr. Lepe with no major changes  6.         Normocytic anemia   7.         Mixed stress and urge urinary incontinence   8.         Gastroesophageal reflux disease without esophagitis   9.         Chronic tension-type headache, not intractable -the headaches seem to be getting better and not as bad as before.  10.       Compression fracture of L1 vertebra, sequela     SHE HAS BEEN RECOMMENDED TO GET A EYE UPDATE CHECKUP AND ALSO FLU VACCINE FOR THIS YEAR.      ONE OTHER MAIN CHALLENGES SEEMS TO BE THESE INORDINATE PAINS FOR WHICH THERE IS NO DEFINITE EXPLANATION.  THE OTHER DAY SHE HAD A SEVERE PAIN IN THE LEFT LOWER CHEST WALL UNDER THE BREAST AND SHE HAD BEEN TO THE CARDIOLOGIST OFFICE.  SHE WAS ADVISED TO GO TO THE EMERGENCY ROOM BUT SHE HAD DECIDED TO BEER IT OUT AND AFTER THAT THE PAIN SUBSIDED.  THEN SHE HAS ANOTHER PAIN IN THE EPIGASTRIC REGION.  HE STAYED BRINGS HAS A NEW SET OF CHALLENGES AND SHE IS LEARNING TO LIVE WITH IT.    Hyperlipidemia  This is a chronic problem. The current episode started more than 1 year ago. The problem is controlled. Associated symptoms include chest pain, myalgias and shortness of breath. Current antihyperlipidemic treatment includes statins. The current treatment provides moderate improvement of lipids.  Compliance problems include psychosocial issues.  Risk factors for coronary artery disease include a sedentary lifestyle, hypertension, post-menopausal, obesity and dyslipidemia.   Hypertension  This is a chronic problem. The current episode started more than 1 year ago. The problem is uncontrolled. Associated symptoms include chest pain, headaches, malaise/fatigue, peripheral edema and shortness of breath. Pertinent negatives include no palpitations. Risk factors for coronary artery disease include sedentary lifestyle, obesity, dyslipidemia and post-menopausal state. Past treatments include beta blockers, angiotensin blockers and diuretics. The current treatment provides moderate improvement. Compliance problems include psychosocial issues.  There is no history of coarctation of the aorta, hyperaldosteronism, pheochromocytoma or renovascular disease.   Atrial Fibrillation  Presents for follow-up visit. Symptoms include chest pain and shortness of breath. Symptoms are negative for bradycardia, dizziness and palpitations. The symptoms have been stable. Past medical history includes atrial fibrillation, CHF and hyperlipidemia.   Depression  Visit Type: follow-up  Patient presents with the following symptoms: anhedonia, depressed mood and shortness of breath.  Patient is not experiencing: choking sensation, confusion, nervousness/anxiety, palpitations, suicidal ideas, suicidal planning and weight gain.  Frequency of symptoms: occasionally    Patient has a history of: CHF    Headache   This is a chronic problem. The current episode started more than 1 year ago. The problem occurs constantly. The problem has been gradually worsening. The pain is located in the Occipital region. The pain radiates to the left neck and right neck. The pain quality is not similar to prior headaches. The quality of the pain is described as aching. The pain is moderate. Associated symptoms include back pain. Pertinent negatives include no  abdominal pain, dizziness, eye pain or numbness. Nothing aggravates the symptoms. She has tried acetaminophen for the symptoms.   Congestive Heart Failure  Presents for follow-up visit. Associated symptoms include chest pain, fatigue, shortness of breath and unexpected weight change (GAINED 6 LB OF WEIGHT..). Pertinent negatives include no abdominal pain or palpitations. Compliance with total regimen is 26-50%. Compliance with diet is 26-50%. Compliance with exercise is 0-25%. Compliance with medications is 51-75%.   Anemia  Symptoms include malaise/fatigue. There has been no abdominal pain, bruising/bleeding easily, confusion, light-headedness, pallor or palpitations. Signs of blood loss that are not present include vaginal bleeding.   Chronic Pain  Past Medical History Includes::  Cancer  Chronicity:  Chronic  Onset:  More than 1 year ago  Frequency:  Constantly  Progression since onset:  Waxing and waning  Pain location:  Generalized (Recently chest wall)  Pain - numeric:  5/10  Treatments tried: Hydrocodone.  Previous Imaging:  CT Scan  Current treatment:  Hydrocodone/acetaminophen (Hycet, LorcetLortab, Norco, Vicodin)  Improvement on Current Treatment:  Mild  Goals of therapy:  Improve ADL's  Associated symptoms: chest pain, constipation, dyspnea and headaches    Associated symptoms: no abdominal pain, no dizziness and no numbness    Drug Screen: No    Pain Contract: No    Prescription Monitoring Program  reviewed: Yes        Past Medical History:   Diagnosis Date    Abnormal chest CT 3/9/2023    Anemia     Basal cell carcinoma     nose     Cancer     breast    Depression     DVT (deep venous thrombosis)     Fall 3/20/2018    Former smoker 3/9/2023    Gastric ulceration     GERD (gastroesophageal reflux disease)     History of breast cancer 3/9/2023    History of frequent urinary tract infections     Hyperlipidemia     Hypertension     Malignant neoplasm of upper lobe of right lung (NSCLC favoring lung primary)  3/9/2023    Melanoma 2004?    left forearm    MRSA (methicillin resistant staph aureus) culture positive     Neuropathy     PE (pulmonary embolism)     Post-nasal drip 11/15/2018    Reflux     S/P bilateral mastectomy 3/9/2023     Social History     Socioeconomic History    Marital status:      Spouse name: Jean    Number of children: 3   Tobacco Use    Smoking status: Former    Smokeless tobacco: Never   Substance and Sexual Activity    Alcohol use: No    Drug use: No    Sexual activity: Not Currently     Partners: Male   Social History Narrative    3 Children- 9 GC, 7 GGrands     Social Determinants of Health     Financial Resource Strain: Medium Risk (11/10/2021)    Overall Financial Resource Strain (CARDIA)     Difficulty of Paying Living Expenses: Somewhat hard   Food Insecurity: No Food Insecurity (11/10/2021)    Hunger Vital Sign     Worried About Running Out of Food in the Last Year: Never true     Ran Out of Food in the Last Year: Never true   Transportation Needs: No Transportation Needs (11/10/2021)    PRAPARE - Transportation     Lack of Transportation (Medical): No     Lack of Transportation (Non-Medical): No   Physical Activity: Inactive (11/10/2021)    Exercise Vital Sign     Days of Exercise per Week: 0 days     Minutes of Exercise per Session: 0 min   Stress: Stress Concern Present (11/10/2021)    [employer] - Occupational Stress Questionnaire     Feeling of Stress : To some extent   Social Connections: Moderately Integrated (9/14/2022)    Social Connection and Isolation Panel [NHANES]     Frequency of Communication with Friends and Family: Three times a week     Frequency of Social Gatherings with Friends and Family: Once a week     Attends Sikh Services: More than 4 times per year     Active Member of Clubs or Organizations: No     Attends Club or Organization Meetings: Never     Marital Status:    Housing Stability: Low Risk  (11/10/2021)     Housing Stability Vital Sign     Unable to Pay for Housing in the Last Year: No     Number of Places Lived in the Last Year: 1     Unstable Housing in the Last Year: No     Past Surgical History:   Procedure Laterality Date    HYSTERECTOMY      MASTECTOMY, RADICAL Bilateral     TOTAL HIP ARTHROPLASTY Left 11/13/2017     Family History   Problem Relation Age of Onset    Cancer Mother     Cancer Father     Heart disease Father     Melanoma Neg Hx     Psoriasis Neg Hx     Lupus Neg Hx     Eczema Neg Hx        Review of Systems   Constitutional:  Positive for activity change (reducing activities), fatigue, malaise/fatigue and unexpected weight change (GAINED 6 LB OF WEIGHT..). Negative for appetite change, chills and weight gain.        She has lost 20 lb of weight.  Patient is overall not feeling well.  This should not be the case.  Generally when somebody loses weight by watching diet they really feel well.  Patient on the contrary feels more sick and tired and headaches.   HENT:  Positive for congestion.    Eyes:  Negative for pain, discharge and visual disturbance.   Respiratory:  Positive for shortness of breath. Negative for choking and chest tightness.         Recent diagnosis of lung cancer non-small cell.   Cardiovascular:  Positive for chest pain. Negative for palpitations and leg swelling.        HTN   Gastrointestinal:  Positive for constipation. Negative for abdominal pain and blood in stool.        Constipation seems to be stable with Amitiza 24 mcg capsule.   Endocrine: Positive for polyuria. Negative for cold intolerance and polydipsia.   Genitourinary:  Positive for enuresis. Negative for vaginal bleeding.        Incontinence symptoms   Musculoskeletal:  Positive for arthralgias, back pain and myalgias. Negative for joint swelling.        Left hip fracture S/P Arthroplasty NoV 2017  Fall in October/November 2019.  L1 compression fracture status post vertebroplasty.  LOWER EXTREMITY PAIN AND DURATION  "UNKNOWN.   Skin:  Negative for color change, pallor, rash and wound.        Complains of somewhat brittle nails.   Allergic/Immunologic: Negative for environmental allergies, food allergies and immunocompromised state.   Neurological:  Positive for headaches. Negative for dizziness, light-headedness and numbness.   Hematological:  Does not bruise/bleed easily.   Psychiatric/Behavioral:  Positive for behavioral problems and depression. Negative for confusion and suicidal ideas. The patient is not nervous/anxious.         Patient has a history of depression. Stable on sertraline. She however denies that she is depressed.         Objective:      Blood pressure 138/76, pulse 81, height 5' 6" (1.676 m), weight 100.2 kg (221 lb). Body mass index is 35.67 kg/m².  Physical Exam  Vitals and nursing note reviewed.   Constitutional:       General: She is not in acute distress.     Appearance: She is well-developed. She is obese. She is ill-appearing. She is not toxic-appearing or diaphoretic.      Comments: BMI is 35.67   HENT:      Head: Normocephalic and atraumatic.   Neck:      Thyroid: No thyromegaly.      Vascular: No JVD.      Trachea: Trachea normal. No tracheal deviation.   Cardiovascular:      Rate and Rhythm: Normal rate and regular rhythm.      Heart sounds: Normal heart sounds, S1 normal and S2 normal.      No friction rub. No gallop.      Comments: Varicosities in legs-significant varicosities.  Pulse appears to be regular.  Pulmonary:      Breath sounds: Normal breath sounds. No stridor.   Abdominal:      General: There is no distension.      Palpations: Abdomen is soft. Abdomen is not rigid.      Tenderness: There is no abdominal tenderness.      Comments: Patient is obese.   Musculoskeletal:      Right lower leg: Edema present.      Left lower leg: Edema present.   Lymphadenopathy:      Cervical: No cervical adenopathy.   Skin:     General: Skin is warm and dry.      Coloration: Skin is not pale.      Findings: " No rash.      Comments: Varicose veins are noted in the inferior extremities.   Neurological:      Mental Status: She is alert. Mental status is at baseline.      Motor: No abnormal muscle tone.      Gait: Gait abnormal.   Psychiatric:         Attention and Perception: She is attentive.         Behavior: Behavior is slowed.         Cognition and Memory: Memory is impaired.      Comments: Anhedonic.  Cognition is intact to issues of her daily need and personal management.  However details preclude her.           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 Patient's remote   clinical history of breast carcinoma is noticed. Based on the   immunohistochemical features, the current tumor is favored to be of lung   primary.       Lab Visit on 07/10/2023   Component Date Value Ref Range Status    WBC 07/10/2023 8.46  3.90 - 12.70 K/uL Final    RBC 07/10/2023 4.94  4.00 - 5.40 M/uL Final    Hemoglobin 07/10/2023 12.2  12.0 - 16.0 g/dL Final    Hematocrit 07/10/2023 40.3  37.0 - 48.5 % Final    MCV 07/10/2023 82  82 - 98 fL Final    MCH 07/10/2023 24.7 (L)  27.0 - 31.0 pg Final    MCHC 07/10/2023 30.3 (L)  32.0 - 36.0 g/dL Final    RDW 07/10/2023 16.3 (H)  11.5 - 14.5 % Final    Platelets 07/10/2023 238  150 - 450 K/uL Final    MPV 07/10/2023 9.9  9.2 - 12.9 fL Final    Immature Granulocytes 07/10/2023 0.2  0.0 - 0.5 % Final    Gran # (ANC) 07/10/2023 5.9  1.8 - 7.7 K/uL Final    Immature Grans (Abs) 07/10/2023 0.02  0.00 - 0.04 K/uL Final    Lymph # 07/10/2023 1.8  1.0 - 4.8 K/uL Final    Mono # 07/10/2023 0.5  0.3 - 1.0 K/uL Final    Eos # 07/10/2023 0.1  0.0 - 0.5 K/uL Final    Baso # 07/10/2023 0.08  0.00 - 0.20 K/uL Final    nRBC 07/10/2023 0  0 /100  WBC Final    Gran % 07/10/2023 69.6  38.0 - 73.0 % Final    Lymph % 07/10/2023 21.5  18.0 - 48.0 % Final    Mono % 07/10/2023 6.4  4.0 - 15.0 % Final    Eosinophil % 07/10/2023 1.4  0.0 - 8.0 % Final    Basophil % 07/10/2023 0.9  0.0 - 1.9 % Final    Differential Method 07/10/2023 Automated   Final    Sodium 07/10/2023 134 (L)  136 - 145 mmol/L Final    Potassium 07/10/2023 4.3  3.5 - 5.1 mmol/L Final    Chloride 07/10/2023 98  95 - 110 mmol/L Final    CO2 07/10/2023 31 (H)  23 - 29 mmol/L Final    Glucose 07/10/2023 102  70 - 110 mg/dL Final    BUN 07/10/2023 21  8 - 23 mg/dL Final    Creatinine 07/10/2023 0.7  0.5 - 1.4 mg/dL Final    Calcium 07/10/2023 9.1  8.7 - 10.5 mg/dL Final    Total Protein 07/10/2023 7.6  6.0 - 8.4 g/dL Final    Albumin 07/10/2023 4.3  3.5 - 5.2 g/dL Final    Total Bilirubin 07/10/2023 0.5  0.1 - 1.0 mg/dL Final    Alkaline Phosphatase 07/10/2023 69  55 - 135 U/L Final    AST 07/10/2023 23  10 - 40 U/L Final    ALT 07/10/2023 16  10 - 44 U/L Final    eGFR 07/10/2023 >60.0  >60 mL/min/1.73 m^2 Final    Anion Gap 07/10/2023 5 (L)  8 - 16 mmol/L Final    CEA 07/10/2023 2.0  0.0 - 5.0 ng/mL Final   Oncology consultation from Dr. Vogt.  PLAN:  She previously completed monotherapy XRT - repeat Ct Chest ordered already by rad/onc for Sept/Oct 2023  Order PET due to increase pain issues; refill pain meds  Check up to date labs incl. CEA level every 3 months  Encouarged f/u with Dr Henley  F/u with PCP, Pulm, etc     Chronic heart failure with preserved ejection fraction    Paroxysmal atrial fibrillation  Comments:  Slightly irregular rhythm.    Benign essential hypertension  Comments:  Borderline blood pressure.    Mild memory disturbance  Comments:  Fairly coherent calm sent to issues of daily living.  Details reviewed her.  Probably age-appropriate and situation appropriate.    Non-small cell cancer of right lung  Comments:  Followed by Dr. Vogt.    Complaints of total body  pain  Comments:  Various aches and pains at different parts of body.  Some myalgias some pressure on the muscles.  Statin holiday for 1 month.       Plan:           Chronic heart failure with preserved ejection fraction    Paroxysmal atrial fibrillation  Comments:  Slightly irregular rhythm.    Benign essential hypertension  Comments:  Borderline blood pressure.    Mild memory disturbance  Comments:  Fairly coherent calm sent to issues of daily living.  Details reviewed her.  Probably age-appropriate and situation appropriate.    Non-small cell cancer of right lung  Comments:  Followed by Dr. Vogt.    Complaints of total body pain  Comments:  Various aches and pains at different parts of body.  Some myalgias some pressure on the muscles.  Statin holiday for 1 month.    Patient's overall condition seems to be stable and chronically on the borderline.      General performance, endurance and tolerance seems to be diminishing as days go by.      She is following with Oncology for lung cancer with the next PET scan CT scan probably providing further guidance for treatment and if it may include some form of chemotherapy, radiation therapy or targeted treatment.      Generalized body aches and pains continue to affect the quality of her life.      I would like to give a trial of statin holiday if it is agreeable to the patient for 1 month and see if it mitigate some of the chronic pain and aches issues.  I cannot pinpoint any specific area when she is hurting and with proof of any pathological or anatomical change like a fracture or likewise.      Thus far she has not multiple myeloma.      He did have a thoracic spine fracture.      Again if the statin medications stopping book provider relief then will have consider alternatives but again in the setting of a lung cancer at this point, I am not sure how aggressive we should be with secondary preventive point for potential coronary artery disease.  She continues with  treatment for atrial fibrillation.  Blood pressures are under borderline control.      Immunizations discussed.      Fall and injury precautions discussed as she continues to use a walker.      Follow-up in 4-6 months time.      Consider flu shot.      Spent sim 37 minutes with patient which involved review of pts medical conditions, labs, medications and with 50% of time face-to-face discussion about medical problems, management and any applicable changes.        Current Outpatient Medications:     ALPRAZolam (XANAX) 0.25 MG tablet, TAKE one TABLET BY MOUTH TWICE DAILY AS NEEDED FOR ANXIETY, Disp: 60 tablet, Rfl: 1    atorvastatin (LIPITOR) 20 MG tablet, Take 1 tablet (20 mg total) by mouth once daily., Disp: 90 tablet, Rfl: 1    diltiaZEM (CARDIZEM CD) 120 MG Cp24, Take 1 capsule (120 mg total) by mouth once daily., Disp: 90 capsule, Rfl: 1    gabapentin (NEURONTIN) 100 MG capsule, Take 100 mg by mouth., Disp: , Rfl:     HYDROcodone-acetaminophen (NORCO) 5-325 mg per tablet, Take 1 tablet by mouth every 6 (six) hours as needed for Pain., Disp: 90 tablet, Rfl: 0    pantoprazole (PROTONIX) 20 MG tablet, Take 1 tablet (20 mg total) by mouth once daily., Disp: 90 tablet, Rfl: 1    sertraline (ZOLOFT) 100 MG tablet, TAKE 1 TABLET (100 MG TOTAL) BY MOUTH ONCE DAILY., Disp: 90 tablet, Rfl: 3    valsartan-hydrochlorothiazide (DIOVAN-HCT) 320-12.5 mg per tablet, Take 1 tablet by mouth every morning., Disp: 90 tablet, Rfl: 4    loratadine (CLARITIN) 10 mg tablet, Take 1 tablet (10 mg total) by mouth once daily., Disp: 30 tablet, Rfl: 0

## 2023-09-06 ENCOUNTER — OFFICE VISIT (OUTPATIENT)
Dept: FAMILY MEDICINE | Facility: CLINIC | Age: 83
End: 2023-09-06
Payer: MEDICARE

## 2023-09-06 VITALS
HEART RATE: 81 BPM | SYSTOLIC BLOOD PRESSURE: 138 MMHG | HEIGHT: 66 IN | BODY MASS INDEX: 35.52 KG/M2 | WEIGHT: 221 LBS | DIASTOLIC BLOOD PRESSURE: 76 MMHG

## 2023-09-06 DIAGNOSIS — I48.0 PAROXYSMAL ATRIAL FIBRILLATION: ICD-10-CM

## 2023-09-06 DIAGNOSIS — I50.32 CHRONIC HEART FAILURE WITH PRESERVED EJECTION FRACTION: Primary | ICD-10-CM

## 2023-09-06 DIAGNOSIS — R52 COMPLAINTS OF TOTAL BODY PAIN: Chronic | ICD-10-CM

## 2023-09-06 DIAGNOSIS — R41.3 MILD MEMORY DISTURBANCE: ICD-10-CM

## 2023-09-06 DIAGNOSIS — I10 BENIGN ESSENTIAL HYPERTENSION: ICD-10-CM

## 2023-09-06 DIAGNOSIS — C34.91 NON-SMALL CELL CANCER OF RIGHT LUNG: ICD-10-CM

## 2023-09-06 PROCEDURE — 1160F PR REVIEW ALL MEDS BY PRESCRIBER/CLIN PHARMACIST DOCUMENTED: ICD-10-PCS | Mod: CPTII,S$GLB,, | Performed by: INTERNAL MEDICINE

## 2023-09-06 PROCEDURE — 99214 OFFICE O/P EST MOD 30 MIN: CPT | Mod: S$GLB,,, | Performed by: INTERNAL MEDICINE

## 2023-09-06 PROCEDURE — 1126F AMNT PAIN NOTED NONE PRSNT: CPT | Mod: CPTII,S$GLB,, | Performed by: INTERNAL MEDICINE

## 2023-09-06 PROCEDURE — 1126F PR PAIN SEVERITY QUANTIFIED, NO PAIN PRESENT: ICD-10-PCS | Mod: CPTII,S$GLB,, | Performed by: INTERNAL MEDICINE

## 2023-09-06 PROCEDURE — 1101F PR PT FALLS ASSESS DOC 0-1 FALLS W/OUT INJ PAST YR: ICD-10-PCS | Mod: CPTII,S$GLB,, | Performed by: INTERNAL MEDICINE

## 2023-09-06 PROCEDURE — 3078F PR MOST RECENT DIASTOLIC BLOOD PRESSURE < 80 MM HG: ICD-10-PCS | Mod: CPTII,S$GLB,, | Performed by: INTERNAL MEDICINE

## 2023-09-06 PROCEDURE — 99214 PR OFFICE/OUTPT VISIT, EST, LEVL IV, 30-39 MIN: ICD-10-PCS | Mod: S$GLB,,, | Performed by: INTERNAL MEDICINE

## 2023-09-06 PROCEDURE — 3075F SYST BP GE 130 - 139MM HG: CPT | Mod: CPTII,S$GLB,, | Performed by: INTERNAL MEDICINE

## 2023-09-06 PROCEDURE — 1101F PT FALLS ASSESS-DOCD LE1/YR: CPT | Mod: CPTII,S$GLB,, | Performed by: INTERNAL MEDICINE

## 2023-09-06 PROCEDURE — 3075F PR MOST RECENT SYSTOLIC BLOOD PRESS GE 130-139MM HG: ICD-10-PCS | Mod: CPTII,S$GLB,, | Performed by: INTERNAL MEDICINE

## 2023-09-06 PROCEDURE — 3078F DIAST BP <80 MM HG: CPT | Mod: CPTII,S$GLB,, | Performed by: INTERNAL MEDICINE

## 2023-09-06 PROCEDURE — 1159F MED LIST DOCD IN RCRD: CPT | Mod: CPTII,S$GLB,, | Performed by: INTERNAL MEDICINE

## 2023-09-06 PROCEDURE — 1159F PR MEDICATION LIST DOCUMENTED IN MEDICAL RECORD: ICD-10-PCS | Mod: CPTII,S$GLB,, | Performed by: INTERNAL MEDICINE

## 2023-09-06 PROCEDURE — 3288F PR FALLS RISK ASSESSMENT DOCUMENTED: ICD-10-PCS | Mod: CPTII,S$GLB,, | Performed by: INTERNAL MEDICINE

## 2023-09-06 PROCEDURE — 3288F FALL RISK ASSESSMENT DOCD: CPT | Mod: CPTII,S$GLB,, | Performed by: INTERNAL MEDICINE

## 2023-09-06 PROCEDURE — 1160F RVW MEDS BY RX/DR IN RCRD: CPT | Mod: CPTII,S$GLB,, | Performed by: INTERNAL MEDICINE

## 2023-09-19 ENCOUNTER — HOSPITAL ENCOUNTER (OUTPATIENT)
Dept: RADIOLOGY | Facility: HOSPITAL | Age: 83
Discharge: HOME | End: 2023-09-19
Attending: RADIOLOGY
Payer: MEDICARE

## 2023-09-19 ENCOUNTER — HOSPITAL ENCOUNTER (OUTPATIENT)
Dept: RADIOLOGY | Facility: HOSPITAL | Age: 83
Discharge: HOME | End: 2023-09-19
Attending: INTERNAL MEDICINE
Payer: MEDICARE

## 2023-09-19 VITALS — WEIGHT: 205 LBS | BODY MASS INDEX: 34.16 KG/M2 | HEIGHT: 65 IN

## 2023-09-19 DIAGNOSIS — C34.11 MALIGNANT NEOPLASM OF UPPER LOBE OF RIGHT LUNG: ICD-10-CM

## 2023-09-19 DIAGNOSIS — R94.2 ABNORMAL PET SCAN OF LUNG: ICD-10-CM

## 2023-09-19 DIAGNOSIS — R91.8 MASS OF UPPER LOBE OF RIGHT LUNG: ICD-10-CM

## 2023-09-19 LAB — GLUCOSE SERPL-MCNC: 97 MG/DL (ref 70–110)

## 2023-09-19 PROCEDURE — 71250 CT THORAX DX C-: CPT | Mod: TC,PO

## 2023-09-19 PROCEDURE — A9552 F18 FDG: HCPCS | Mod: PO

## 2023-09-20 DIAGNOSIS — I48.0 PAROXYSMAL ATRIAL FIBRILLATION: Chronic | ICD-10-CM

## 2023-09-20 RX ORDER — DILTIAZEM HYDROCHLORIDE 120 MG/1
120 CAPSULE, COATED, EXTENDED RELEASE ORAL
Qty: 90 CAPSULE | Refills: 1 | Status: SHIPPED | OUTPATIENT
Start: 2023-09-20

## 2023-09-22 DIAGNOSIS — C34.11 MALIGNANT NEOPLASM OF UPPER LOBE OF RIGHT LUNG: Primary | ICD-10-CM

## 2023-10-05 NOTE — PROGRESS NOTES
Subjective:       Patient ID: Lety Herbert is a 77 y.o. female.    Chief Complaint: Hypertension (lab review ); Hyperlipidemia; Depression; Gastroesophageal Reflux; and Urinary Incontinence    Ms. Lety Herbert is a pleasant 77-year-old  female who comes for follow-up. Her blood pressure seemed to be stable. Her lipid panel is in acceptable range with a normal total cholesterol but a slightly elevated LDL cholesterol level.    She also has persistent sinus drainage symptoms. She would like to try as well as steamed nasal spray as prescribed by her ENT specialist.    She also complains of incontinence symptoms and would prefer to see a urologist for the same. She might have some burning sensation also intermittently.      Hypertension   This is a chronic problem. The current episode started more than 1 year ago. Associated symptoms include headaches, malaise/fatigue and neck pain. Pertinent negatives include no chest pain, palpitations or shortness of breath. Past treatments include beta blockers, angiotensin blockers and diuretics. There is no history of pheochromocytoma or renovascular disease.   Hyperlipidemia   This is a chronic problem. The current episode started more than 1 year ago. Exacerbating diseases include obesity. Pertinent negatives include no chest pain or shortness of breath. Risk factors for coronary artery disease include hypertension, obesity and dyslipidemia.   Gastroesophageal Reflux   She complains of heartburn. She reports no chest pain or no coughing. This is a chronic problem. The current episode started more than 1 year ago. The problem has been gradually improving. Associated symptoms include fatigue. She has tried a PPI for the symptoms. Past invasive treatments do not include gastroplasty or gastroplication.       Past Medical History:   Diagnosis Date    Anemia     Cancer     breast    Depression     DVT (deep venous thrombosis)     Gastric ulceration     GERD  (gastroesophageal reflux disease)     History of frequent urinary tract infections     Hyperlipidemia     Hypertension     MRSA (methicillin resistant staph aureus) culture positive     Neuropathy     PE (pulmonary embolism)     Reflux      Social History     Social History    Marital status:      Spouse name: N/A    Number of children: N/A    Years of education: N/A     Occupational History    Not on file.     Social History Main Topics    Smoking status: Former Smoker    Smokeless tobacco: Never Used    Alcohol use No    Drug use: No    Sexual activity: No     Other Topics Concern    Not on file     Social History Narrative    No narrative on file     Past Surgical History:   Procedure Laterality Date    HYSTERECTOMY      MASTECTOMY, RADICAL Bilateral     TOTAL HIP ARTHROPLASTY Left 11/13/2017     Family History   Problem Relation Age of Onset    Cancer Mother     Cancer Father     Heart disease Father        Review of Systems   Constitutional: Positive for fatigue and malaise/fatigue. Negative for activity change, chills, fever and unexpected weight change.   HENT: Negative for congestion, postnasal drip and sinus pressure.    Eyes: Negative for pain, discharge and visual disturbance.   Respiratory: Negative for cough, chest tightness and shortness of breath.    Cardiovascular: Negative for chest pain, palpitations and leg swelling.        HTN   Gastrointestinal: Positive for heartburn. Negative for abdominal distention, anal bleeding, constipation and diarrhea.   Genitourinary: Positive for frequency. Negative for difficulty urinating, dysuria, flank pain, hematuria and vaginal bleeding.   Musculoskeletal: Positive for neck pain. Negative for arthralgias and joint swelling.        Left hip fracture S/P Arthroplasty NoV 2017   Skin: Negative for color change, pallor and rash.   Allergic/Immunologic: Negative for environmental allergies, food allergies and immunocompromised state.  "  Neurological: Positive for headaches. Negative for dizziness, tremors, seizures, syncope and light-headedness.   Hematological: Negative for adenopathy. Does not bruise/bleed easily.   Psychiatric/Behavioral: Positive for sleep disturbance. Negative for agitation, confusion and dysphoric mood. The patient is not nervous/anxious.         Patient has a history of depression. Stable on sertraline. She however denies that she is depressed.       Objective:       Vitals:    05/21/18 1110   BP: 133/77   Pulse: 64   Weight: 100.7 kg (222 lb)   Height: 5' 6" (1.676 m)       Physical Exam   Constitutional: Vital signs are normal. She appears well-developed. She is cooperative. No distress.   HENT:   Head: Normocephalic and atraumatic.   Eyes: Conjunctivae, EOM and lids are normal. Pupils are equal, round, and reactive to light. Lids are everted and swept, no foreign bodies found. Right pupil is round and reactive. Left pupil is round and reactive.   Neck: Trachea normal and normal range of motion. Neck supple.   Cardiovascular: Normal rate, regular rhythm, S1 normal, S2 normal and normal heart sounds.    Pulmonary/Chest: Breath sounds normal.   Abdominal: Soft. Bowel sounds are normal. There is no rigidity and no guarding.   Patient is obese.   Lymphadenopathy:     She has no cervical adenopathy.   Neurological: She is alert.   Skin: Skin is warm and dry.   Varicose veins are noted in the inferior extremities.   Psychiatric: Her affect is not inappropriate. She exhibits a depressed mood.   Anhedonic She is attentive.   Nursing note and vitals reviewed.      Assessment:     Cholesterol                   199                         mg/dL       Triglycerides                 172 H                       mg/dL       HDL Cholesterol                53                        23-75  mg/dL       LDL Cholesterol               112 H                      0-100  mg/dL       VLDL Cholesterol               34 H                 "      12-27  mg/dL       Cholesterol Ratio            4.00                                           1. Benign essential hypertension    2. Multiple-type hyperlipidemia    3. Mixed stress and urge urinary incontinence    4. Dysthymia    5. Urinary urgency    6. Non-allergic rhinitis         Glucose 70 - 99 mg/dL 95    BUN, Bld 8 - 20 mg/dL 9    Creatinine 0.60 - 1.40 mg/dL 0.45     Calcium 7.7 - 10.4 mg/dL 9.1    Sodium 134 - 144 mmol/L 138    Potassium 3.5 - 5.0 mmol/L 4.2    Chloride 98 - 110 mmol/L 102    CO2 22.8 - 31.6 mmol/L 29.1    Albumin 3.1 - 4.7 g/dL 4.0    Total Bilirubin 0.3 - 1.0 mg/dL 0.7    Alkaline Phosphatase 40 - 104 IU/L 69    Total Protein 6.0 - 8.2 g/dL 7.6    ALT (SGPT) 3 - 33 IU/L 14    AST 10 - 40 IU/L 21      Microalbum.,U,Random 0.0 - 19.9 mcg/ml 13.6    Creatinine, Random Ur mg/dl 79.00    Comment: The reference range and other method            Plan:           Benign essential hypertension    Multiple-type hyperlipidemia    Mixed stress and urge urinary incontinence  -     Ambulatory referral to Urology    Dysthymia  -     sertraline (ZOLOFT) 100 MG tablet; Take 1 tablet (100 mg total) by mouth once daily.  Dispense: 6 tablet; Refill: 2    Urinary urgency  -     Urinalysis; Future  -     Ambulatory referral to Urology    Non-allergic rhinitis  -     azelastine (ASTELIN) 137 mcg (0.1 %) nasal spray; 1 spray (137 mcg total) by Nasal route 2 (two) times daily.  Dispense: 30 mL; Refill: 1    Patient blood pressures are stable. Medications have been reviewed. Lipid panel isn't acceptable range.    She needs a small prescription for sertraline to her mail order prescription comes back.    New prescription has been given for azelatine   Patient has no objection to blood transfusions.

## 2023-10-23 NOTE — PROGRESS NOTES
[facility] Hematology/Oncology  PROGRESS NOTE -   Follow-up Visit      Subjective:       Patient ID:   NAME: Lety Herbert : 1940     82 y.o. female    Referring Doc: Shilo Perez MD  Other Physicians: Ashish Henley; Maryse Beckwith           Chief Complaint: RUL mass/lung ca f/u       History of Present Illness:     Patient returns today for a regularly scheduled follow-up visit.  The patient is here today to go over the results of the recently ordered labs, tests and studies. She is here with her grandson today.     She has been having more back issues, aches and pains in legs especially at night    She saw Dr Perez on 2023    She saw Dr Ralph with rad/onc last on 2023 and previously completed XRT; she sees him again on 2023    Breathing is stable. No CP, HA's or N/V.    She saw Domonique Navas on 7/10/2023    Dicussed covid precautions - she has been vaccinated    ROS:   GEN: normal without any fever, night sweats or weight loss; fatigue; chronic   back pains and leg pains at night  HEENT: normal with no HA's, sore throat, stiff neck, changes in vision  CV: normal with no CP, SOB, PND, GAVIRIA or orthopnea  PULM: chronic SOB/GAVIRIA; O2 dependent at home, hemoptysis, sputum or pleuritic pain  GI: normal with no abdominal pain, nausea, vomiting, constipation, diarrhea, melanotic stools, BRBPR, or hematemesis  : normal with no hematuria, dysuria  BREAST: normal with no mass, discharge, pain  SKIN: normal with no rash, erythema, bruising, or swelling    Pain Scale:  9-10 on back    Allergies:  Review of patient's allergies indicates:   Allergen Reactions    Meperidine     Promethazine     Bactrim [sulfamethoxazole-trimethoprim] Rash       Medications:    Current Outpatient Medications:     ALPRAZolam (XANAX) 0.25 MG tablet, TAKE one TABLET BY MOUTH TWICE DAILY AS NEEDED FOR ANXIETY, Disp: 60 tablet, Rfl: 1    atorvastatin (LIPITOR) 20 MG tablet, Take 1 tablet (20 mg total) by mouth once daily.,  "Disp: 90 tablet, Rfl: 1    diltiaZEM (CARDIZEM CD) 120 MG Cp24, TAKE ONE CAPSULE BY MOUTH EVERY DAY, Disp: 90 capsule, Rfl: 1    HYDROcodone-acetaminophen (NORCO) 5-325 mg per tablet, Take 1 tablet by mouth every 6 (six) hours as needed for Pain., Disp: 90 tablet, Rfl: 0    pantoprazole (PROTONIX) 20 MG tablet, Take 1 tablet (20 mg total) by mouth once daily., Disp: 90 tablet, Rfl: 1    sertraline (ZOLOFT) 100 MG tablet, TAKE 1 TABLET (100 MG TOTAL) BY MOUTH ONCE DAILY., Disp: 90 tablet, Rfl: 3    valsartan-hydrochlorothiazide (DIOVAN-HCT) 320-12.5 mg per tablet, Take 1 tablet by mouth every morning., Disp: 90 tablet, Rfl: 4    gabapentin (NEURONTIN) 100 MG capsule, Take 100 mg by mouth., Disp: , Rfl:     loratadine (CLARITIN) 10 mg tablet, Take 1 tablet (10 mg total) by mouth once daily., Disp: 30 tablet, Rfl: 0    PMHx/PSHx Updates:  See patient's last visit with me on 8/21/2023.  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 Patient's remote   clinical history of breast carcinoma is noticed. Based on the   immunohistochemical features, the current tumor is favored to be of lung   primary.            Objective:     Vitals:  Blood pressure (!) 176/88, pulse 98, temperature 97.5 °F (36.4 °C), resp. rate 16, height 5' 5" (1.651 m), weight 98.9 kg (218 lb 1.6 oz).    Physical Examination:   GEN: no apparent distress, comfortable; AAOx3; overweight; elderly  HEAD: atraumatic and normocephalic  EYES: no pallor, no " icterus, PERRLA  ENT: OMM, no pharyngeal erythema, external ears WNL; no nasal discharge; no thrush  NECK: no masses, thyroid normal, trachea midline, no LAD/LN's, supple  CV: RRR with no murmur; normal pulse; normal S1 and S2; no pedal edema  CHEST: Normal respiratory effort; CTAB; normal breath sounds; no wheeze or crackles  ABDOM: nontender and nondistended; soft; normal bowel sounds; no rebound/guarding  MUSC/Skeletal: ROM normal; no crepitus; joints normal; no deformities; +arthropathy of hip/knees, hands  EXTREM: no clubbing, cyanosis, inflammation or swelling; varicosities in bilateral lower extremities  SKIN: no rashes, lesions, ulcers, petechiae or subcutaneous nodules; chronic age related skin changes  : no carrillo  NEURO: grossly intact; motor/sensory WNL; AAOx3; no tremors  PSYCH: normal mood, affect and behavior  LYMPH: normal cervical, supraclavicular, axillary and groin LN's          Labs:     Lab Results   Component Value Date    WBC 8.46 07/10/2023    HGB 12.2 07/10/2023    HCT 40.3 07/10/2023    MCV 82 07/10/2023     07/10/2023       CMP  Sodium   Date Value Ref Range Status   07/10/2023 134 (L) 136 - 145 mmol/L Final   03/04/2019 142 134 - 144 mmol/L      Potassium   Date Value Ref Range Status   07/10/2023 4.3 3.5 - 5.1 mmol/L Final     Chloride   Date Value Ref Range Status   07/10/2023 98 95 - 110 mmol/L Final   03/04/2019 104 98 - 110 mmol/L      CO2   Date Value Ref Range Status   07/10/2023 31 (H) 23 - 29 mmol/L Final     Glucose   Date Value Ref Range Status   07/10/2023 102 70 - 110 mg/dL Final   03/04/2019 102 (H) 70 - 99 mg/dL      BUN   Date Value Ref Range Status   07/10/2023 21 8 - 23 mg/dL Final     Creatinine   Date Value Ref Range Status   07/10/2023 0.7 0.5 - 1.4 mg/dL Final   03/04/2019 0.48 (L) 0.60 - 1.40 mg/dL      Calcium   Date Value Ref Range Status   07/10/2023 9.1 8.7 - 10.5 mg/dL Final     Total Protein   Date Value Ref Range Status   07/10/2023 7.6 6.0 - 8.4 g/dL  Final     Albumin   Date Value Ref Range Status   07/10/2023 4.3 3.5 - 5.2 g/dL Final   03/04/2019 4.0 3.1 - 4.7 g/dL      Total Bilirubin   Date Value Ref Range Status   07/10/2023 0.5 0.1 - 1.0 mg/dL Final     Comment:     For infants and newborns, interpretation of results should be based  on gestational age, weight and in agreement with clinical  observations.    Premature Infant recommended reference ranges:  Up to 24 hours.............<8.0 mg/dL  Up to 48 hours............<12.0 mg/dL  3-5 days..................<15.0 mg/dL  6-29 days.................<15.0 mg/dL       Alkaline Phosphatase   Date Value Ref Range Status   07/10/2023 69 55 - 135 U/L Final     AST   Date Value Ref Range Status   07/10/2023 23 10 - 40 U/L Final     ALT   Date Value Ref Range Status   07/10/2023 16 10 - 44 U/L Final     Anion Gap   Date Value Ref Range Status   07/10/2023 5 (L) 8 - 16 mmol/L Final     eGFR   Date Value Ref Range Status   07/10/2023 >60.0 >60 mL/min/1.73 m^2 Final     Lab Results   Component Value Date    CEA 2.0 07/10/2023           Radiology/Diagnostic Studies:    PET 9/19/2023:  IMPRESSION:  Decreased size and FDG activity of the spiculated nodule in the anterior right upper lobe     Increased groundglass and interstitial opacities within the right upper lobe and superior segment right lower lobe compatible with post radiation changes     Bilateral mastectomies with reconstruction with interval increased FDG activity in the right pectoralis minor muscle compatible with postradiation changes     Stable FDG activity in the region of the right external iliac vein without adenopathy.     Degenerative changes of the spine  Cardiomegaly with coronary artery calcification         CT chest 6/16/2023:    IMPRESSION:  Decrease in size of the mass within the anterior right upper lobe     Development of peripheral groundglass opacities in the right upper lobe and adjacent to the mass compatible with post radiation changes      No evidence of metastatic disease     Cardiomegaly        PET 1/11/2023:     IMPRESSION: Hypermetabolic 2.3 cm mass within the anterior right upper lobe suspicious for primary lung malignancy     Faint groundglass opacities throughout the lungs with prominence of the pulmonary vasculature and cardiomegaly which may be represent pulmonary edema.  Development of a 13 mm subpleural nodule in the right lower lobe. Since this is new since the most recent CT findings most likely are secondary to infectious or inflammatory process however metastatic lesion cannot be excluded     Increased FDG activity in the region of the right external iliac vein without corresponding adenopathy. This may be physiologic there is physiologic activity within a superficial vein in the upper right thigh and findings may be secondary to thrombophlebitis.. Follow-up CT abdomen and pelvis with contrast is recommended     Degenerative changes of the spine           CT Chest 12/15/2022:     IMPRESSION:  1. A 23 mm right upper lobe noncalcified pulmonary nodule is highly suspicious for primary pulmonary neoplasm. Tissue diagnosis is recommended.  2. No findings of regional metastatic disease in the chest.  3. Enlarged pulmonary arteries, suggesting pulmonary arterial hypertension.  4. Cardiomegaly, with aortic and coronary arterial calcifications.     CTA 11/19/2022:     IMPRESSION:  1.  No pulmonary embolus detected.  2.  Interstitial abnormality, suspect mild edema superimposed upon chronic changes.  3.  Right upper lobe 2.1 cm nodule. Consider a non-contrast Chest CT at 3 months, a PET/CT, or tissue sampling. These guidelines do not apply to immunocompromised patients and patients with cancer   ADDENDUM #1         The presence of right upper lobe lung nodule needing further evaluation or follow-up has been discussed with Dr. Rick Salgado 11/19/2022 12:50 AM CST.     All lab results and imaging results have been reviewed and     I have  reviewed all available lab results and radiology reports.    Assessment/Plan:   (1) 82 y.o. female with diagnosis of RUL lung mass who has been referred by Shiol Perez MD for evaluation by medical hematology/oncology. She has been followed by Dr Ashish Henley with pulmonary for her COPD and chronic hypoxemia/bronchitis, who was a former smoker.      - She had a CTA in Nov 2022 which was negative for a PE but showed a RUL lung mass, which was followed by a CT Chest on 12/15/2022 which confirmed a 2.3cm RUL mass.   - She had a PET scan on 1/11/2023 which showed a 2.3 cm hypermetabolic mass in the anterior right upper lobe abutting the superior vena cava along with development of a 13 mm subpleural nodule within the right lower lobe.         - She had CT guided biopsy of the RUL lung mass on 2/15/2023 with pathology coming back NSCLC favoring a lung primary.     - She is supposed to be on oxygen at home. She has portable tank and has a lot of GAVIRIA. She has chronic SOB.   - She is former smoker and quit when she was 43yrs old.      - discussed the pathology and reviewed and discussed the latest NCCN guidelines (vs. 2-2023)  - unlikely candidate for any surgical intervention  - will refer to Rad/onc to see if there are any radiation options either monotherapy or in combination with low dose weekly chemotherapy  - CARIS on the pathology  - check CEA and up to date labs    3/27/2023:  - She saw Dr Ralph with rad/onc on 3/20/2023 and she is starting XRT monotherapy today with day#1    4/25/2023:  - she completed XRT and has some fatigue  - she will need post-XRT evaluation in about 4 weeks with rad/onc and expect her to need repeat scans in 6-8 weeks    5/25/2023:  - she saw Dr Ralph on 5/2 and he has CT Chest ordered post-XRT  - some residual fatigue and back pain  - due to see Dr Henley  - labs adequate    8/21/2023:  - she has been having some more back pain  - due for repeat CT with Dr Ralph in Sept/oct 2023, will  go ahead and order PET now  - she needs to f/u with Dr Henley with pulmonary as well  - await PEt results, consider other scans if necessary  - CEA at 2.0      10/24/2023:  - she had PET scan in Sept 2023 with overall improved report  - she sees rad/onc again on 11/6  - rad/onc has already ordered the next Chest CT  - due for up to date labs incl. CEA     (2) Hx/of PE and DVT     (3) HTN and hypercholesterolemia     (4) COPD/chronic hypoxemia; bronchiectasis; chronic bronchitis - followed by Dr Ashish Henley/Shelbie Villalpando     (5) CHF and dysrhythmia     (6) GERD; gastric ulcer     (7) Hx/of breast cancer s/p bilateral mastectomies- followed by Dr Maryse Beckwith  - She has history of breast cancer in past around 2005  for which she has had bilateral mastectomies and is followed by Dr Beckwith. She did not require chemotherapy or radiation. She had reconstruction surgery with Dr Grimaldo. She saw Dr Beckwith couple of weeks ago.        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 CEA level every 3 months  Encouarged f/u with Dr Henley  F/u with PCP, Pulm, etc     RTC in  12 weeks with myself  Fax note to Shilo Perez MD; Amena Mejia Mannina, [place]       Discussion:     COVID-19 Discussion:     I had long discussion with patient and any applicable family about the COVID-19 coronavirus epidemic and the  "recommended precautions with regard to cancer and/or hematology patients. I have re-iterated the CDC recommendations for adequate hand washing, use of hand -like products, and coughing into elbow, etc. In addition, especially for our patients who are on chemotherapy and/or our otherwise immunocompromised patients, I have recommended avoidance of crowds, including movie theaters, restaurants, churches, etc. I have recommended avoidance of any sick or symptomatic family members and/or friends. I have also recommended avoidance of any raw and unwashed food products, and general avoidance of food items that have not been prepared by themselves. The patient has been asked to call us immediately with any symptom developments, issues, questions or other general concerns.         Pathology Discussion:     I reviewed and discussed the pathology report(s) and radiograph reports (if available) in as simple to understand and/or laymen's terms to the best of my ability. I had an indepth conversation with the patient and went over the patient's individual diagnosis based on the information that was currently available. I discussed the TNM staging process with regard to the patient's particular cancer type, and the calculated stage based on the currently available TNM data and literature. I discussed the available prognostic data with regard to the current staging information and how it relates to the prognosis of their particular neoplastic process.          NCCN Guidelines:     I discussed the available treatment option(s) in accordance with the latest literature from the NCCN Clinical Practice Guidelines for the patient's particular type of cancer disorder. The NCCN Guidelines provide a "document evidence-based (and) consensus-driven management" of the care of oncology patients. The treatment recommendations were made not only in accordance to the NCCN guidelines, but also factored in to account the patient's overall " age, condition, performance status and their medical co-morbidities. I went over the risks and benefits of the the treatment options (if any could be made) with regard to their particular cancer type, their cancer stage, their age, and their co-morbidities.         I have explained and the patient understands all of  the current recommendation(s). I have answered all of their questions to the best of my ability and to their complete satisfaction.      I spent over 25 mins of time with the patient. Reviewed results of the recently ordered labs, tests and studies; made directives with regards to the results. Over half of this time was spent couseling and coordinating care.    I have explained all of the above in detail and the patient understands all of the current recommendation(s). I have answered all of their questions to the best of my ability and to their complete satisfaction.   The patient is to continue with the current management plan.            Electronically signed by Isacc Vogt MD

## 2023-10-24 ENCOUNTER — LAB VISIT (OUTPATIENT)
Dept: LAB | Facility: HOSPITAL | Age: 83
End: 2023-10-24
Attending: INTERNAL MEDICINE
Payer: MEDICARE

## 2023-10-24 ENCOUNTER — OFFICE VISIT (OUTPATIENT)
Dept: HEMATOLOGY/ONCOLOGY | Facility: CLINIC | Age: 83
End: 2023-10-24
Payer: MEDICARE

## 2023-10-24 VITALS
DIASTOLIC BLOOD PRESSURE: 88 MMHG | HEIGHT: 65 IN | BODY MASS INDEX: 36.34 KG/M2 | RESPIRATION RATE: 16 BRPM | TEMPERATURE: 98 F | SYSTOLIC BLOOD PRESSURE: 176 MMHG | WEIGHT: 218.13 LBS | HEART RATE: 98 BPM

## 2023-10-24 DIAGNOSIS — R07.89 OTHER CHEST PAIN: ICD-10-CM

## 2023-10-24 DIAGNOSIS — C34.11 MALIGNANT NEOPLASM OF UPPER LOBE OF RIGHT LUNG: ICD-10-CM

## 2023-10-24 DIAGNOSIS — R94.2 ABNORMAL PET SCAN OF LUNG: ICD-10-CM

## 2023-10-24 DIAGNOSIS — R70.0 ELEVATED ERYTHROCYTE SEDIMENTATION RATE: ICD-10-CM

## 2023-10-24 DIAGNOSIS — M54.6 CHRONIC RIGHT-SIDED THORACIC BACK PAIN: ICD-10-CM

## 2023-10-24 DIAGNOSIS — R94.2 ABNORMAL PET SCAN OF LUNG: Primary | ICD-10-CM

## 2023-10-24 DIAGNOSIS — G89.29 CHRONIC RIGHT-SIDED THORACIC BACK PAIN: ICD-10-CM

## 2023-10-24 DIAGNOSIS — Z90.13 S/P BILATERAL MASTECTOMY: ICD-10-CM

## 2023-10-24 DIAGNOSIS — Z87.891 FORMER SMOKER: ICD-10-CM

## 2023-10-24 DIAGNOSIS — R93.89 ABNORMAL CHEST CT: ICD-10-CM

## 2023-10-24 DIAGNOSIS — Z85.3 HISTORY OF BREAST CANCER: ICD-10-CM

## 2023-10-24 LAB
ALBUMIN SERPL BCP-MCNC: 4.2 G/DL (ref 3.5–5.2)
ALP SERPL-CCNC: 74 U/L (ref 55–135)
ALT SERPL W/O P-5'-P-CCNC: 9 U/L (ref 10–44)
ANION GAP SERPL CALC-SCNC: 6 MMOL/L (ref 8–16)
AST SERPL-CCNC: 16 U/L (ref 10–40)
BASOPHILS # BLD AUTO: 0.05 K/UL (ref 0–0.2)
BASOPHILS NFR BLD: 0.8 % (ref 0–1.9)
BILIRUB SERPL-MCNC: 0.8 MG/DL (ref 0.1–1)
BUN SERPL-MCNC: 12 MG/DL (ref 8–23)
CALCIUM SERPL-MCNC: 9.2 MG/DL (ref 8.7–10.5)
CEA SERPL-MCNC: 1.6 NG/ML (ref 0–5)
CHLORIDE SERPL-SCNC: 100 MMOL/L (ref 95–110)
CO2 SERPL-SCNC: 33 MMOL/L (ref 23–29)
CREAT SERPL-MCNC: 0.5 MG/DL (ref 0.5–1.4)
DIFFERENTIAL METHOD: ABNORMAL
EOSINOPHIL # BLD AUTO: 0.1 K/UL (ref 0–0.5)
EOSINOPHIL NFR BLD: 1.9 % (ref 0–8)
ERYTHROCYTE [DISTWIDTH] IN BLOOD BY AUTOMATED COUNT: 16 % (ref 11.5–14.5)
EST. GFR  (NO RACE VARIABLE): >60 ML/MIN/1.73 M^2
GLUCOSE SERPL-MCNC: 99 MG/DL (ref 70–110)
HCT VFR BLD AUTO: 38.6 % (ref 37–48.5)
HGB BLD-MCNC: 11.3 G/DL (ref 12–16)
IMM GRANULOCYTES # BLD AUTO: 0.03 K/UL (ref 0–0.04)
IMM GRANULOCYTES NFR BLD AUTO: 0.5 % (ref 0–0.5)
LYMPHOCYTES # BLD AUTO: 1.1 K/UL (ref 1–4.8)
LYMPHOCYTES NFR BLD: 17.8 % (ref 18–48)
MCH RBC QN AUTO: 24.3 PG (ref 27–31)
MCHC RBC AUTO-ENTMCNC: 29.3 G/DL (ref 32–36)
MCV RBC AUTO: 83 FL (ref 82–98)
MONOCYTES # BLD AUTO: 0.5 K/UL (ref 0.3–1)
MONOCYTES NFR BLD: 7.7 % (ref 4–15)
NEUTROPHILS # BLD AUTO: 4.5 K/UL (ref 1.8–7.7)
NEUTROPHILS NFR BLD: 71.3 % (ref 38–73)
NRBC BLD-RTO: 0 /100 WBC
PLATELET # BLD AUTO: 236 K/UL (ref 150–450)
PMV BLD AUTO: 9.8 FL (ref 9.2–12.9)
POTASSIUM SERPL-SCNC: 4.2 MMOL/L (ref 3.5–5.1)
PROT SERPL-MCNC: 7.2 G/DL (ref 6–8.4)
RBC # BLD AUTO: 4.65 M/UL (ref 4–5.4)
SODIUM SERPL-SCNC: 139 MMOL/L (ref 136–145)
WBC # BLD AUTO: 6.36 K/UL (ref 3.9–12.7)

## 2023-10-24 PROCEDURE — 1101F PT FALLS ASSESS-DOCD LE1/YR: CPT | Mod: CPTII,S$GLB,, | Performed by: INTERNAL MEDICINE

## 2023-10-24 PROCEDURE — 1160F RVW MEDS BY RX/DR IN RCRD: CPT | Mod: CPTII,S$GLB,, | Performed by: INTERNAL MEDICINE

## 2023-10-24 PROCEDURE — 1101F PR PT FALLS ASSESS DOC 0-1 FALLS W/OUT INJ PAST YR: ICD-10-PCS | Mod: CPTII,S$GLB,, | Performed by: INTERNAL MEDICINE

## 2023-10-24 PROCEDURE — 3077F SYST BP >= 140 MM HG: CPT | Mod: CPTII,S$GLB,, | Performed by: INTERNAL MEDICINE

## 2023-10-24 PROCEDURE — 3288F PR FALLS RISK ASSESSMENT DOCUMENTED: ICD-10-PCS | Mod: CPTII,S$GLB,, | Performed by: INTERNAL MEDICINE

## 2023-10-24 PROCEDURE — 80053 COMPREHEN METABOLIC PANEL: CPT | Performed by: INTERNAL MEDICINE

## 2023-10-24 PROCEDURE — 1125F PR PAIN SEVERITY QUANTIFIED, PAIN PRESENT: ICD-10-PCS | Mod: CPTII,S$GLB,, | Performed by: INTERNAL MEDICINE

## 2023-10-24 PROCEDURE — 1160F PR REVIEW ALL MEDS BY PRESCRIBER/CLIN PHARMACIST DOCUMENTED: ICD-10-PCS | Mod: CPTII,S$GLB,, | Performed by: INTERNAL MEDICINE

## 2023-10-24 PROCEDURE — 85025 COMPLETE CBC W/AUTO DIFF WBC: CPT | Performed by: INTERNAL MEDICINE

## 2023-10-24 PROCEDURE — 3288F FALL RISK ASSESSMENT DOCD: CPT | Mod: CPTII,S$GLB,, | Performed by: INTERNAL MEDICINE

## 2023-10-24 PROCEDURE — 99214 OFFICE O/P EST MOD 30 MIN: CPT | Mod: S$GLB,,, | Performed by: INTERNAL MEDICINE

## 2023-10-24 PROCEDURE — 99214 PR OFFICE/OUTPT VISIT, EST, LEVL IV, 30-39 MIN: ICD-10-PCS | Mod: S$GLB,,, | Performed by: INTERNAL MEDICINE

## 2023-10-24 PROCEDURE — 1159F MED LIST DOCD IN RCRD: CPT | Mod: CPTII,S$GLB,, | Performed by: INTERNAL MEDICINE

## 2023-10-24 PROCEDURE — 1159F PR MEDICATION LIST DOCUMENTED IN MEDICAL RECORD: ICD-10-PCS | Mod: CPTII,S$GLB,, | Performed by: INTERNAL MEDICINE

## 2023-10-24 PROCEDURE — 82378 CARCINOEMBRYONIC ANTIGEN: CPT | Performed by: INTERNAL MEDICINE

## 2023-10-24 PROCEDURE — 3079F PR MOST RECENT DIASTOLIC BLOOD PRESSURE 80-89 MM HG: ICD-10-PCS | Mod: CPTII,S$GLB,, | Performed by: INTERNAL MEDICINE

## 2023-10-24 PROCEDURE — 36415 COLL VENOUS BLD VENIPUNCTURE: CPT | Performed by: INTERNAL MEDICINE

## 2023-10-24 PROCEDURE — 3079F DIAST BP 80-89 MM HG: CPT | Mod: CPTII,S$GLB,, | Performed by: INTERNAL MEDICINE

## 2023-10-24 PROCEDURE — 3077F PR MOST RECENT SYSTOLIC BLOOD PRESSURE >= 140 MM HG: ICD-10-PCS | Mod: CPTII,S$GLB,, | Performed by: INTERNAL MEDICINE

## 2023-10-24 PROCEDURE — 1125F AMNT PAIN NOTED PAIN PRSNT: CPT | Mod: CPTII,S$GLB,, | Performed by: INTERNAL MEDICINE

## 2023-10-24 RX ORDER — HYDROCODONE BITARTRATE AND ACETAMINOPHEN 5; 325 MG/1; MG/1
1 TABLET ORAL EVERY 6 HOURS PRN
Qty: 90 TABLET | Refills: 0 | Status: SHIPPED | OUTPATIENT
Start: 2023-10-24 | End: 2023-11-28 | Stop reason: DRUGHIGH

## 2023-11-19 DIAGNOSIS — K21.9 GASTROESOPHAGEAL REFLUX DISEASE WITHOUT ESOPHAGITIS: ICD-10-CM

## 2023-11-20 RX ORDER — PANTOPRAZOLE SODIUM 20 MG/1
20 TABLET, DELAYED RELEASE ORAL
Qty: 90 TABLET | Refills: 1 | Status: SHIPPED | OUTPATIENT
Start: 2023-11-20

## 2023-11-28 ENCOUNTER — PATIENT MESSAGE (OUTPATIENT)
Dept: FAMILY MEDICINE | Facility: CLINIC | Age: 83
End: 2023-11-28

## 2023-11-28 ENCOUNTER — OFFICE VISIT (OUTPATIENT)
Dept: FAMILY MEDICINE | Facility: CLINIC | Age: 83
End: 2023-11-28
Payer: MEDICARE

## 2023-11-28 VITALS
OXYGEN SATURATION: 84 % | BODY MASS INDEX: 36.65 KG/M2 | WEIGHT: 220 LBS | HEART RATE: 102 BPM | SYSTOLIC BLOOD PRESSURE: 154 MMHG | DIASTOLIC BLOOD PRESSURE: 81 MMHG | HEIGHT: 65 IN

## 2023-11-28 DIAGNOSIS — M79.605 PAIN IN BOTH LOWER EXTREMITIES: Primary | ICD-10-CM

## 2023-11-28 DIAGNOSIS — M79.89 PAIN AND SWELLING OF LEFT LOWER LEG: ICD-10-CM

## 2023-11-28 DIAGNOSIS — M79.604 PAIN IN BOTH LOWER EXTREMITIES: Primary | ICD-10-CM

## 2023-11-28 DIAGNOSIS — I87.2 CHRONIC VENOUS INSUFFICIENCY: ICD-10-CM

## 2023-11-28 DIAGNOSIS — M79.662 PAIN AND SWELLING OF LEFT LOWER LEG: ICD-10-CM

## 2023-11-28 DIAGNOSIS — C34.91 NON-SMALL CELL CANCER OF RIGHT LUNG: ICD-10-CM

## 2023-11-28 PROCEDURE — 1160F RVW MEDS BY RX/DR IN RCRD: CPT | Mod: CPTII,S$GLB,, | Performed by: INTERNAL MEDICINE

## 2023-11-28 PROCEDURE — 1125F PR PAIN SEVERITY QUANTIFIED, PAIN PRESENT: ICD-10-PCS | Mod: CPTII,S$GLB,, | Performed by: INTERNAL MEDICINE

## 2023-11-28 PROCEDURE — 1159F PR MEDICATION LIST DOCUMENTED IN MEDICAL RECORD: ICD-10-PCS | Mod: CPTII,S$GLB,, | Performed by: INTERNAL MEDICINE

## 2023-11-28 PROCEDURE — 3077F SYST BP >= 140 MM HG: CPT | Mod: CPTII,S$GLB,, | Performed by: INTERNAL MEDICINE

## 2023-11-28 PROCEDURE — 3079F DIAST BP 80-89 MM HG: CPT | Mod: CPTII,S$GLB,, | Performed by: INTERNAL MEDICINE

## 2023-11-28 PROCEDURE — 1160F PR REVIEW ALL MEDS BY PRESCRIBER/CLIN PHARMACIST DOCUMENTED: ICD-10-PCS | Mod: CPTII,S$GLB,, | Performed by: INTERNAL MEDICINE

## 2023-11-28 PROCEDURE — 3079F PR MOST RECENT DIASTOLIC BLOOD PRESSURE 80-89 MM HG: ICD-10-PCS | Mod: CPTII,S$GLB,, | Performed by: INTERNAL MEDICINE

## 2023-11-28 PROCEDURE — 99215 PR OFFICE/OUTPT VISIT, EST, LEVL V, 40-54 MIN: ICD-10-PCS | Mod: S$GLB,,, | Performed by: INTERNAL MEDICINE

## 2023-11-28 PROCEDURE — 1159F MED LIST DOCD IN RCRD: CPT | Mod: CPTII,S$GLB,, | Performed by: INTERNAL MEDICINE

## 2023-11-28 PROCEDURE — 99215 OFFICE O/P EST HI 40 MIN: CPT | Mod: S$GLB,,, | Performed by: INTERNAL MEDICINE

## 2023-11-28 PROCEDURE — 1125F AMNT PAIN NOTED PAIN PRSNT: CPT | Mod: CPTII,S$GLB,, | Performed by: INTERNAL MEDICINE

## 2023-11-28 PROCEDURE — 3077F PR MOST RECENT SYSTOLIC BLOOD PRESSURE >= 140 MM HG: ICD-10-PCS | Mod: CPTII,S$GLB,, | Performed by: INTERNAL MEDICINE

## 2023-11-28 NOTE — PROGRESS NOTES
Subjective:       Patient ID: Lety Herbert is a 83 y.o. female.    Chief Complaint: Leg Pain, Chronic venous insufficiency, and Lung Cancer (r)    Miss Davidson comes for early follow-up.  She complains of excruciating pain in both the lower extremities and more on the left side.  The duration of this discomfort is uncertain but off late it is believed that this is getting worse.  Yesterday night was the worst and she comes for a same-day appointment today.      She does not recall any fall or injury.    She does have chronic varicose veins in the lower extremities but none of them seem to have burst.      Her recent diagnosis of lung cancer has been noted for which she has gone through chemotherapy and targeted radiation therapy.  She is been told that the tumor is shrinking in size.    Overall she is not feeling well and seems to be declining slowly and steadily.  She feels tired and fatigued easily.  Previous labs had shown mild anemia or beginnings of a anemia.    She is supposed to have a appointment for a CT scan of lung sometimes in the mid December.      She does state that her legs get extremely cold.  Denies any widening of the legs or bluish toes.  (on touching the legs and feeling, it seems to be normal temperature and in fact I felt them to be warm.  She states that the cold.  Her son did touch her legs and he confirmed that they are not cold.).  She is also supposed to follow-up with cardiology.  Underlying medical issues are as below:-    1.         Chronic hypoxemic respiratory failure   2.         Chronic heart failure with preserved ejection fraction   3.         Multiple-type hyperlipidemia   4.         Benign essential hypertension   5.         Paroxysmal atrial fibrillation -seems to be stable and she has seen Dr. Lepe with no major changes  6.         Normocytic anemia   7.         Mixed stress and urge urinary incontinence   8.         Gastroesophageal reflux disease without  esophagitis   9.         Chronic tension-type headache, not intractable -the headaches seem to be getting better and not as bad as before.  10.       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 There was no injury mechanism. The pain is present in the left leg and right leg. The quality of the pain is described as aching and cramping. The pain is at a severity of 10/10. The pain is severe. The pain has been Fluctuating since onset. Pertinent negatives include no inability to bear weight, loss of motion or numbness. She reports no foreign bodies present. Nothing aggravates the symptoms. She has tried rest for the symptoms. The treatment provided no relief.   Cancer  This is a chronic (Lung Cancer) problem. The current episode started more than 1 year ago. The problem occurs constantly. The problem has been unchanged. Associated symptoms include arthralgias, congestion, fatigue, headaches and myalgias. Pertinent negatives include no abdominal pain, chest pain, chills, joint swelling, numbness, rash or sore throat. Treatments tried: RT and  chemo.       Past Medical History:   Diagnosis Date    Abnormal chest CT 3/9/2023    Anemia     Basal cell carcinoma     nose     Cancer     breast    Depression     DVT (deep venous thrombosis)     Fall 3/20/2018    Former smoker 3/9/2023    Gastric ulceration     GERD (gastroesophageal reflux disease)     History of breast cancer 3/9/2023    History of frequent urinary tract infections     Hyperlipidemia     Hypertension     Malignant neoplasm of upper lobe of right lung (NSCLC favoring lung primary) 3/9/2023    Melanoma 2004?    left forearm    MRSA (methicillin resistant  staph aureus) culture positive     Neuropathy     PE (pulmonary embolism)     Post-nasal drip 11/15/2018    Reflux     S/P bilateral mastectomy 3/9/2023     Social History     Socioeconomic History    Marital status:      Spouse name: Jean    Number of children: 3   Tobacco Use    Smoking status: Former    Smokeless tobacco: Never   Substance and Sexual Activity    Alcohol use: No    Drug use: No    Sexual activity: Not Currently     Partners: Male   Social History Narrative    3 Children- 9 GC, 7 GGrands     Social Determinants of Health     Financial Resource Strain: Medium Risk (11/10/2021)    Overall Financial Resource Strain (CARDIA)     Difficulty of Paying Living Expenses: Somewhat hard   Food Insecurity: No Food Insecurity (11/10/2021)    Hunger Vital Sign     Worried About Running Out of Food in the Last Year: Never true     Ran Out of Food in the Last Year: Never true   Transportation Needs: No Transportation Needs (11/10/2021)    PRAPARE - Transportation     Lack of Transportation (Medical): No     Lack of Transportation (Non-Medical): No   Physical Activity: Inactive (11/10/2021)    Exercise Vital Sign     Days of Exercise per Week: 0 days     Minutes of Exercise per Session: 0 min   Stress: Stress Concern Present (11/10/2021)    [employer] [place] [employer] - Occupational Stress Questionnaire     Feeling of Stress : To some extent   Social Connections: Moderately Integrated (9/14/2022)    Social Connection and Isolation Panel [NHANES]     Frequency of Communication with Friends and Family: Three times a week     Frequency of Social Gatherings with Friends and Family: Once a week     Attends [employer] Services: More than 4 times per year     Active Member of Clubs or Organizations: No     Attends Club or Organization Meetings: Never     Marital Status:    Housing Stability: Low Risk  (11/10/2021)    Housing Stability Vital Sign     Unable to Pay for Housing in the Last Year:  No     Number of Places Lived in the Last Year: 1     Unstable Housing in the Last Year: No     Past Surgical History:   Procedure Laterality Date    HYSTERECTOMY      MASTECTOMY, RADICAL Bilateral     TOTAL HIP ARTHROPLASTY Left 11/13/2017     Family History   Problem Relation Age of Onset    Cancer Mother     Cancer Father     Heart disease Father     Melanoma Neg Hx     Psoriasis Neg Hx     Lupus Neg Hx     Eczema Neg Hx        Review of Systems   Constitutional:  Positive for activity change (reducing activities), fatigue and unexpected weight change (GAINED 6 LB OF WEIGHT..). Negative for appetite change and chills.        She has lost 20 lb of weight.  Patient is overall not feeling well.  This should not be the case.  Generally when somebody loses weight by watching diet they really feel well.  Patient on the contrary feels more sick and tired and headaches.   HENT:  Positive for congestion. Negative for nosebleeds and sore throat.    Eyes:  Negative for pain, discharge and visual disturbance.   Respiratory:  Positive for shortness of breath. Negative for choking and chest tightness.         Recent diagnosis of lung cancer non-small cell.  Has undergone chemotherapy.   Cardiovascular:  Positive for leg swelling. Negative for chest pain and palpitations.        HTN   Gastrointestinal:  Positive for constipation. Negative for abdominal pain and blood in stool.        Constipation seems to be stable with Amitiza 24 mcg capsule.   Endocrine: Positive for polyuria. Negative for cold intolerance and polydipsia.   Genitourinary:  Positive for enuresis. Negative for vaginal bleeding.        Incontinence symptoms   Musculoskeletal:  Positive for arthralgias, back pain and myalgias. Negative for joint swelling.        Left hip fracture S/P Arthroplasty NoV 2017  Fall in October/November 2019.  L1 compression fracture status post vertebroplasty.  LOWER EXTREMITY PAIN AND DURATION UNKNOWN.  The pain in the lower  "extremities has got worse.   Skin:  Positive for pallor. Negative for color change, rash and wound.        Complains of somewhat brittle nails.   Allergic/Immunologic: Negative for environmental allergies, food allergies and immunocompromised state.   Neurological:  Positive for headaches. Negative for dizziness, light-headedness and numbness.   Hematological:  Does not bruise/bleed easily.   Psychiatric/Behavioral:  Negative for confusion and self-injury. The patient is not hyperactive.         Patient has a history of depression. Stable on sertraline. She however denies that she is depressed.         Objective:      Blood pressure (!) 154/81, pulse 102, height 5' 5" (1.651 m), weight 99.8 kg (220 lb), SpO2 (!) 84 %. Body mass index is 36.61 kg/m².  Physical Exam  Vitals and nursing note reviewed.   Constitutional:       General: She is not in acute distress.     Appearance: She is well-developed. She is obese. She is ill-appearing. She is not toxic-appearing or diaphoretic.      Comments: BMI is 35.67   HENT:      Head: Normocephalic and atraumatic.   Neck:      Thyroid: No thyromegaly.      Vascular: No JVD.      Trachea: Trachea normal. No tracheal deviation.   Cardiovascular:      Rate and Rhythm: Normal rate and regular rhythm.      Heart sounds: Normal heart sounds, S1 normal and S2 normal.      No friction rub. No gallop.      Comments: Varicosities in legs-significant varicosities.  Pulse appears to be regular.  Pulmonary:      Breath sounds: Normal breath sounds. No stridor.   Abdominal:      General: There is no distension.      Palpations: Abdomen is soft. Abdomen is not rigid.      Tenderness: There is no abdominal tenderness.      Comments: Patient is obese.   Musculoskeletal:      Right lower leg: Edema present.      Left lower leg: Edema present.   Lymphadenopathy:      Cervical: No cervical adenopathy.   Skin:     General: Skin is warm and dry.      Coloration: Skin is not pale.      Findings: No " rash.      Comments: Varicose veins are noted in the inferior extremities.   Neurological:      Mental Status: She is alert. Mental status is at baseline.      Motor: No abnormal muscle tone.      Gait: Gait abnormal.   Psychiatric:         Attention and Perception: She is attentive.         Behavior: Behavior is slowed.         Cognition and Memory: Memory is impaired.      Comments: Anhedonic.  Cognition is intact to issues of her daily need and personal management.  However details preclude her.           Assessment:             Pain in both lower extremities  -     X-Ray Tibia Fibula 2 View Left; Future; Expected date: 11/28/2023  -     Cancel: VAS US Venous Leg Left; Future; Expected date: 11/29/2023  -     Ambulatory referral/consult to Vascular Surgery; Future; Expected date: 12/05/2023    Chronic venous insufficiency    Non-small cell cancer of right lung  -     US Lower Extremity Veins Left; Future; Expected date: 11/29/2023    Pain and swelling of left lower leg  -     US Lower Extremity Veins Left; Future; Expected date: 11/29/2023         Lab Visit on 10/24/2023   Component Date Value Ref Range Status    WBC 10/24/2023 6.36  3.90 - 12.70 K/uL Final    RBC 10/24/2023 4.65  4.00 - 5.40 M/uL Final    Hemoglobin 10/24/2023 11.3 (L)  12.0 - 16.0 g/dL Final    Hematocrit 10/24/2023 38.6  37.0 - 48.5 % Final    MCV 10/24/2023 83  82 - 98 fL Final    MCH 10/24/2023 24.3 (L)  27.0 - 31.0 pg Final    MCHC 10/24/2023 29.3 (L)  32.0 - 36.0 g/dL Final    RDW 10/24/2023 16.0 (H)  11.5 - 14.5 % Final    Platelets 10/24/2023 236  150 - 450 K/uL Final    MPV 10/24/2023 9.8  9.2 - 12.9 fL Final    Immature Granulocytes 10/24/2023 0.5  0.0 - 0.5 % Final    Gran # (ANC) 10/24/2023 4.5  1.8 - 7.7 K/uL Final    Immature Grans (Abs) 10/24/2023 0.03  0.00 - 0.04 K/uL Final    Lymph # 10/24/2023 1.1  1.0 - 4.8 K/uL Final    Mono # 10/24/2023 0.5  0.3 - 1.0 K/uL Final    Eos # 10/24/2023 0.1  0.0 - 0.5 K/uL Final    Baso #  10/24/2023 0.05  0.00 - 0.20 K/uL Final    nRBC 10/24/2023 0  0 /100 WBC Final    Gran % 10/24/2023 71.3  38.0 - 73.0 % Final    Lymph % 10/24/2023 17.8 (L)  18.0 - 48.0 % Final    Mono % 10/24/2023 7.7  4.0 - 15.0 % Final    Eosinophil % 10/24/2023 1.9  0.0 - 8.0 % Final    Basophil % 10/24/2023 0.8  0.0 - 1.9 % Final    Differential Method 10/24/2023 Automated   Final    Sodium 10/24/2023 139  136 - 145 mmol/L Final    Potassium 10/24/2023 4.2  3.5 - 5.1 mmol/L Final    Chloride 10/24/2023 100  95 - 110 mmol/L Final    CO2 10/24/2023 33 (H)  23 - 29 mmol/L Final    Glucose 10/24/2023 99  70 - 110 mg/dL Final    BUN 10/24/2023 12  8 - 23 mg/dL Final    Creatinine 10/24/2023 0.5  0.5 - 1.4 mg/dL Final    Calcium 10/24/2023 9.2  8.7 - 10.5 mg/dL Final    Total Protein 10/24/2023 7.2  6.0 - 8.4 g/dL Final    Albumin 10/24/2023 4.2  3.5 - 5.2 g/dL Final    Total Bilirubin 10/24/2023 0.8  0.1 - 1.0 mg/dL Final    Alkaline Phosphatase 10/24/2023 74  55 - 135 U/L Final    AST 10/24/2023 16  10 - 40 U/L Final    ALT 10/24/2023 9 (L)  10 - 44 U/L Final    eGFR 10/24/2023 >60.0  >60 mL/min/1.73 m^2 Final    Anion Gap 10/24/2023 6 (L)  8 - 16 mmol/L Final    CEA 10/24/2023 1.6  0.0 - 5.0 ng/mL Final   Hospital Outpatient Visit on 09/19/2023   Component Date Value Ref Range Status    POC Glucose 09/19/2023 97  70 - 110 Final         Plan:           Pain in both lower extremities  -     X-Ray Tibia Fibula 2 View Left; Future; Expected date: 11/28/2023  -     Cancel: VAS US Venous Leg Left; Future; Expected date: 11/29/2023  -     Ambulatory referral/consult to Vascular Surgery; Future; Expected date: 12/05/2023    Chronic venous insufficiency    Non-small cell cancer of right lung  -     US Lower Extremity Veins Left; Future; Expected date: 11/29/2023    Pain and swelling of left lower leg  -     US Lower Extremity Veins Left; Future; Expected date: 11/29/2023      Patient's reported excruciating pain in both the lower  extremities have been reviewed.  Not sure if this is chronic pain with some worsening and probably patient's perception of pain is 10/10.    On squeezing the right calf muscles and pressing on the shin bones there was no elicited tenderness.    On pressing on the shin bones of the left side, she did experience tenderness and there is some discomfort on the calf muscles.  This is on the left side.    Extensive varicosities have been noted in both the lower extremities which are chronic in nature.    Please note that she is not on any anticoagulation or aspirin.    Underlying lung cancer has been noted and status post radiation therapy.      Her overall general condition continues to be poor and prognosis is guarded.    I am not sure about the goal of treatment at least from the patient's point of view as she looks forward to completion of treatment perhaps get better.  Not sure if there is any consideration for palliative type of care or hospice type of care at this point.  Probably this might be premature discussion.    She does take statin medications and probably it might be point list to give her any statins at this point and holding this medication as a statin holiday maybe worth it.    I will get an x-ray of left tibia fibula to see for any abnormality though I doubt since shin splints will not show as any positive finding.    Will get an ultrasound of the left lower extremity in case there is a concern about a blood clot.    Keep the legs elevated.      Consider compression stockings.      Patient stated that her feet are cold and legs are cold but I felt the temperature to be normal or perhaps slightly more on the warmer side.  I do not see any evidence of cellulitis, mottling, color discoloration or change.    Patient's general condition continues to be somewhat poor with multiple other comorbidities.      She was recently prescribed hydrocodone about 90 pills after review of state  database couple of  weeks back and I am not sure if she is almost about to finish the prescriptions or closer to Fridge the prescriptions.    It seems she has having some difficulty keeping track of her prescriptions.  Stewardship of her medical care seems to be getting somewhat more truncated and difficult.    If she has finished the prescriptions appropriately, then I will send a new prescription.    Will also get a vascular consultation from Dr. Puri to see if there is any vascular reason for her pain of significant nature.      Will await the x-ray of shin and ultrasound reports.    She will keep her previously scheduled follow-up subject to outcome of present situation.  And earlier if needed.    Spent sim 40 minutes with patient which involved review of pts medical conditions, labs, medications and with 50% of time face-to-face discussion about medical problems, management and any applicable changes.        Current Outpatient Medications:     ALPRAZolam (XANAX) 0.25 MG tablet, TAKE one TABLET BY MOUTH TWICE DAILY AS NEEDED FOR ANXIETY, Disp: 60 tablet, Rfl: 1    atorvastatin (LIPITOR) 20 MG tablet, Take 1 tablet (20 mg total) by mouth once daily., Disp: 90 tablet, Rfl: 1    diltiaZEM (CARDIZEM CD) 120 MG Cp24, TAKE ONE CAPSULE BY MOUTH EVERY DAY, Disp: 90 capsule, Rfl: 1    gabapentin (NEURONTIN) 100 MG capsule, Take 100 mg by mouth., Disp: , Rfl:     loratadine (CLARITIN) 10 mg tablet, Take 1 tablet (10 mg total) by mouth once daily., Disp: 30 tablet, Rfl: 0    pantoprazole (PROTONIX) 20 MG tablet, TAKE ONE TABLET BY MOUTH EVERY DAY, Disp: 90 tablet, Rfl: 1    sertraline (ZOLOFT) 100 MG tablet, TAKE 1 TABLET (100 MG TOTAL) BY MOUTH ONCE DAILY., Disp: 90 tablet, Rfl: 3    valsartan-hydrochlorothiazide (DIOVAN-HCT) 320-12.5 mg per tablet, Take 1 tablet by mouth every morning., Disp: 90 tablet, Rfl: 4

## 2023-11-29 ENCOUNTER — HOSPITAL ENCOUNTER (OUTPATIENT)
Dept: RADIOLOGY | Facility: HOSPITAL | Age: 83
Discharge: HOME | End: 2023-11-29
Attending: INTERNAL MEDICINE
Payer: MEDICARE

## 2023-11-29 DIAGNOSIS — M79.662 PAIN AND SWELLING OF LEFT LOWER LEG: ICD-10-CM

## 2023-11-29 DIAGNOSIS — M79.604 PAIN IN BOTH LOWER EXTREMITIES: ICD-10-CM

## 2023-11-29 DIAGNOSIS — M79.89 PAIN AND SWELLING OF LEFT LOWER LEG: ICD-10-CM

## 2023-11-29 DIAGNOSIS — C34.91 NON-SMALL CELL CANCER OF RIGHT LUNG: ICD-10-CM

## 2023-11-29 DIAGNOSIS — M79.605 PAIN IN BOTH LOWER EXTREMITIES: ICD-10-CM

## 2023-11-29 PROCEDURE — 93971 EXTREMITY STUDY: CPT | Mod: TC,PO,LT

## 2023-11-29 PROCEDURE — 73590 X-RAY EXAM OF LOWER LEG: CPT | Mod: TC,PO,LT

## 2023-11-30 RX ORDER — HYDROCODONE BITARTRATE AND ACETAMINOPHEN 7.5; 325 MG/1; MG/1
1 TABLET ORAL EVERY 12 HOURS PRN
Qty: 60 TABLET | Refills: 0 | Status: SHIPPED | OUTPATIENT
Start: 2023-11-30

## 2023-11-30 NOTE — PROGRESS NOTES
Thankfully the left lower leg does not show any evidence of blood clot or tiny fractures in the bone.  Probably circulation problem.  Will have to see a vascular specialist soon.  Keep legs elevated.  Consider compression stockings.  I have sent a limited supply for pain medications.

## 2023-11-30 NOTE — TELEPHONE ENCOUNTER
State [facility] database has been reviewed.    /////////////////////////    I have sent 60 pills of Norco at 7.5 mg twice a day.  Please see that she can get with the vascular specialist or orthopedics back for her pain.  I am limited by my speciality to take care of long-term opiate or pain medications.  May need to refer to chronic pain management very soon if no answers.    Hope she feels better and stay safe.      Dr. Ana JAVED    Pain in both lower extremities  -     HYDROcodone-acetaminophen (NORCO) 7.5-325 mg per tablet; Take 1 tablet by mouth every 12 (twelve) hours as needed for Pain.  Dispense: 60 tablet; Refill: 0    Pain and swelling of left lower leg  -     HYDROcodone-acetaminophen (NORCO) 7.5-325 mg per tablet; Take 1 tablet by mouth every 12 (twelve) hours as needed for Pain.  Dispense: 60 tablet; Refill: 0        ===View-only below this line===      ----- Message -----       From:Lety Herbert       Sent:11/30/2023  3:37 PM CST         To:User Message Message List    Subject:Today's office visit.    I only have a few left. can you please call them in.      ----- Message -----       From:Shilo Perez MD       Sent:11/28/2023  9:01 PM CST         To:Lety Herbert    Subject:Today's office visit.    Let me know about the pain medications.      Dr. Ana JAVED

## 2023-12-19 ENCOUNTER — HOSPITAL ENCOUNTER (OUTPATIENT)
Dept: RADIOLOGY | Facility: HOSPITAL | Age: 83
Discharge: HOME | End: 2023-12-19
Attending: RADIOLOGY
Payer: MEDICARE

## 2023-12-19 DIAGNOSIS — C34.11 MALIGNANT NEOPLASM OF UPPER LOBE OF RIGHT LUNG: ICD-10-CM

## 2023-12-19 PROCEDURE — 71250 CT THORAX DX C-: CPT | Mod: TC,PO

## 2023-12-29 ENCOUNTER — TELEPHONE (OUTPATIENT)
Dept: PAIN MEDICINE | Facility: CLINIC | Age: 83
End: 2023-12-29
Payer: MEDICARE

## 2023-12-29 ENCOUNTER — OFFICE VISIT (OUTPATIENT)
Dept: FAMILY MEDICINE | Facility: CLINIC | Age: 83
End: 2023-12-29
Payer: MEDICARE

## 2023-12-29 VITALS
SYSTOLIC BLOOD PRESSURE: 120 MMHG | DIASTOLIC BLOOD PRESSURE: 76 MMHG | WEIGHT: 220.31 LBS | RESPIRATION RATE: 20 BRPM | TEMPERATURE: 98 F | HEIGHT: 65 IN | HEART RATE: 80 BPM | BODY MASS INDEX: 36.71 KG/M2

## 2023-12-29 DIAGNOSIS — M79.605 PAIN IN BOTH LOWER EXTREMITIES: Primary | ICD-10-CM

## 2023-12-29 DIAGNOSIS — M79.604 PAIN IN BOTH LOWER EXTREMITIES: Primary | ICD-10-CM

## 2023-12-29 PROCEDURE — 99213 PR OFFICE/OUTPT VISIT, EST, LEVL III, 20-29 MIN: ICD-10-PCS | Mod: ,,, | Performed by: NURSE PRACTITIONER

## 2023-12-29 PROCEDURE — 1125F PR PAIN SEVERITY QUANTIFIED, PAIN PRESENT: ICD-10-PCS | Mod: CPTII,,, | Performed by: NURSE PRACTITIONER

## 2023-12-29 PROCEDURE — 99213 OFFICE O/P EST LOW 20 MIN: CPT | Mod: ,,, | Performed by: NURSE PRACTITIONER

## 2023-12-29 PROCEDURE — 1101F PR PT FALLS ASSESS DOC 0-1 FALLS W/OUT INJ PAST YR: ICD-10-PCS | Mod: CPTII,,, | Performed by: NURSE PRACTITIONER

## 2023-12-29 PROCEDURE — 3078F DIAST BP <80 MM HG: CPT | Mod: CPTII,,, | Performed by: NURSE PRACTITIONER

## 2023-12-29 PROCEDURE — 1125F AMNT PAIN NOTED PAIN PRSNT: CPT | Mod: CPTII,,, | Performed by: NURSE PRACTITIONER

## 2023-12-29 PROCEDURE — 1159F PR MEDICATION LIST DOCUMENTED IN MEDICAL RECORD: ICD-10-PCS | Mod: CPTII,,, | Performed by: NURSE PRACTITIONER

## 2023-12-29 PROCEDURE — 3074F SYST BP LT 130 MM HG: CPT | Mod: CPTII,,, | Performed by: NURSE PRACTITIONER

## 2023-12-29 PROCEDURE — 3078F PR MOST RECENT DIASTOLIC BLOOD PRESSURE < 80 MM HG: ICD-10-PCS | Mod: CPTII,,, | Performed by: NURSE PRACTITIONER

## 2023-12-29 PROCEDURE — 3288F FALL RISK ASSESSMENT DOCD: CPT | Mod: CPTII,,, | Performed by: NURSE PRACTITIONER

## 2023-12-29 PROCEDURE — 3074F PR MOST RECENT SYSTOLIC BLOOD PRESSURE < 130 MM HG: ICD-10-PCS | Mod: CPTII,,, | Performed by: NURSE PRACTITIONER

## 2023-12-29 PROCEDURE — 3288F PR FALLS RISK ASSESSMENT DOCUMENTED: ICD-10-PCS | Mod: CPTII,,, | Performed by: NURSE PRACTITIONER

## 2023-12-29 PROCEDURE — 1159F MED LIST DOCD IN RCRD: CPT | Mod: CPTII,,, | Performed by: NURSE PRACTITIONER

## 2023-12-29 PROCEDURE — 1101F PT FALLS ASSESS-DOCD LE1/YR: CPT | Mod: CPTII,,, | Performed by: NURSE PRACTITIONER

## 2023-12-29 RX ORDER — RIVAROXABAN 20 MG/1
20 TABLET, FILM COATED ORAL 3 TIMES DAILY
Status: ON HOLD | COMMUNITY
Start: 2023-12-13 | End: 2024-01-29

## 2023-12-29 RX ORDER — AZELASTINE 1 MG/ML
1 SPRAY, METERED NASAL DAILY PRN
COMMUNITY
Start: 2023-12-19

## 2023-12-29 NOTE — PROGRESS NOTES
Patient ID: Lety Herbert is a 83 y.o. female.    Chief Complaint: [facility] and Leg Pain (82 yo female here c/o bilateral leg pain from knee down times years. KM)    Ms. Herbert is an established patient of Dr. Perez who presents today for evaluation of chronic lower extremity pain. She has been seen by Dr. Perez in the past for this and she was prescribed pain medication. During chart review Dr. Perez sent her to Vascular surgeon Dr. Puri and he did not have any solution or could not find cause  for excruciating pain. We discussed alternative treatment options such as magnesium lotions etc to see if that would help. She states she felt pain medication is the only thing that helped.  I will place a referral to pain specialist for further management of her pain.        Leg Pain   The incident occurred more than 1 week ago. There was no injury mechanism. The pain is present in the left leg and right leg. The quality of the pain is described as aching. The pain is at a severity of 7/10. The pain is moderate. The pain has been Fluctuating since onset. Associated symptoms include muscle weakness. Pertinent negatives include no inability to bear weight, loss of motion, loss of sensation, numbness or tingling. She reports no foreign bodies present. The symptoms are aggravated by movement, palpation and weight bearing. She has tried non-weight bearing, NSAIDs, immobilization, acetaminophen, elevation and heat for the symptoms. The treatment provided mild relief.       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    left forearm    MRSA (methicillin resistant staph aureus) culture positive     Neuropathy     PE (pulmonary embolism)     Post-nasal drip 11/15/2018    Reflux     S/P bilateral mastectomy 3/9/2023     Past Surgical History:   Procedure Laterality Date    HYSTERECTOMY      MASTECTOMY, RADICAL Bilateral     TOTAL HIP ARTHROPLASTY Left 11/13/2017         Tobacco History:  reports that she has quit smoking. She has been exposed to tobacco smoke. She has never used smokeless tobacco.      Review of patient's allergies indicates:   Allergen Reactions    Meperidine     Promethazine     Bactrim [sulfamethoxazole-trimethoprim] Rash       Current Outpatient Medications:     ALPRAZolam (XANAX) 0.25 MG tablet, TAKE one TABLET BY MOUTH TWICE DAILY AS NEEDED FOR ANXIETY, Disp: 60 tablet, Rfl: 1    atorvastatin (LIPITOR) 20 MG tablet, Take 1 tablet (20 mg total) by mouth once daily., Disp: 90 tablet, Rfl: 1    azelastine (ASTELIN) 137 mcg (0.1 %) nasal spray, SMARTSIG:Both Nares, Disp: , Rfl:     diltiaZEM (CARDIZEM CD) 120 MG Cp24, TAKE ONE CAPSULE BY MOUTH EVERY DAY, Disp: 90 capsule, Rfl: 1    HYDROcodone-acetaminophen (NORCO) 7.5-325 mg per tablet, Take 1 tablet by mouth every 12 (twelve) hours as needed for Pain., Disp: 60 tablet, Rfl: 0    pantoprazole (PROTONIX) 20 MG tablet, TAKE ONE TABLET BY MOUTH EVERY DAY, Disp: 90 tablet, Rfl: 1    sertraline (ZOLOFT) 100 MG tablet, TAKE 1 TABLET (100 MG TOTAL) BY MOUTH ONCE DAILY., Disp: 90 tablet, Rfl: 3    valsartan-hydrochlorothiazide (DIOVAN-HCT) 320-12.5 mg per tablet, Take 1 tablet by mouth every morning., Disp: 90 tablet, Rfl: 4    XARELTO 20 mg Tab, Take 20 mg by mouth 3 (three) times daily., Disp: , Rfl:     gabapentin (NEURONTIN) 100 MG capsule, Take 100 mg by mouth., Disp: , Rfl:     loratadine (CLARITIN) 10 mg tablet, Take 1 tablet (10 mg total) by mouth once daily., Disp: 30 tablet, Rfl:  "0    Review of Systems   Constitutional:  Positive for activity change and diaphoresis. Negative for appetite change, fatigue, fever and unexpected weight change.   HENT:  Negative for congestion, mouth sores, nosebleeds, postnasal drip, rhinorrhea, sinus pressure, sinus pain, sneezing, sore throat and trouble swallowing.    Eyes:  Negative for pain, redness and itching.   Respiratory:  Negative for chest tightness and shortness of breath.    Cardiovascular:  Negative for chest pain and palpitations.   Gastrointestinal:  Negative for abdominal pain, blood in stool, constipation, diarrhea, nausea and vomiting.   Endocrine: Negative for cold intolerance, heat intolerance, polydipsia, polyphagia and polyuria.   Genitourinary:  Negative for difficulty urinating, dysuria, flank pain, frequency, genital sores, menstrual problem, urgency, vaginal bleeding and vaginal discharge.   Musculoskeletal:  Positive for arthralgias. Negative for myalgias.   Skin:  Negative for color change, pallor, rash and wound.   Allergic/Immunologic: Negative for environmental allergies and food allergies.   Neurological:  Negative for dizziness, tingling, light-headedness, numbness and headaches.   Hematological:  Negative for adenopathy. Does not bruise/bleed easily.   Psychiatric/Behavioral:  Positive for dysphoric mood and sleep disturbance. Negative for agitation, confusion, hallucinations and self-injury. The patient is nervous/anxious.           Objective:      Vitals:    12/29/23 0832   BP: 120/76   Pulse: 80   Resp: 20   Temp: 98.1 °F (36.7 °C)   TempSrc: Oral   Weight: 99.9 kg (220 lb 4.8 oz)   Height: 5' 5" (1.651 m)     Physical Exam  Constitutional:       Appearance: She is obese. She is ill-appearing.   Cardiovascular:      Rate and Rhythm: Normal rate and regular rhythm.      Heart sounds: Normal heart sounds.   Pulmonary:      Effort: Pulmonary effort is normal.      Breath sounds: Normal breath sounds.   Abdominal:      General: " Bowel sounds are normal.   Musculoskeletal:      Right hip: Decreased range of motion.      Left hip: Decreased range of motion.   Skin:     General: Skin is warm.   Neurological:      Mental Status: She is alert and oriented to person, place, and time.   Psychiatric:         Mood and Affect: Mood normal.         Behavior: Behavior normal.         Thought Content: Thought content normal.         Judgment: Judgment normal.           Assessment:       1. Pain in both lower extremities           Plan:       Pain in both lower extremities  -     Ambulatory referral/consult to Pain Clinic; Future; Expected date: 01/05/2024  Patient referred to pain specialist due to needing pain medication for extended period of time. She is to keep follow up with Dr. Perez in the coming weeks.     No follow-ups on file.        12/29/2023 Rebekah Sneed NP

## 2024-01-03 NOTE — PROGRESS NOTES
[facility]  PROGRESS NOTE -   Follow-up Visit      Subjective:       Patient ID:   NAME: Lety Herbert : 1940     83 y.o. female    Referring Doc: Shilo Perez MD  Other Physicians: Ashish Henley; Maryse Beckwith           Chief Complaint: RUL mass/lung ca f/u       History of Present Illness:     Patient returns today for a regularly scheduled follow-up visit.  The patient is here today to go over the results of the recently ordered labs, tests and studies. She is here with her grandson today.     She has residual chronic back pain issues, aches and pains in legs especially at night; doesn't sleep well at night    She saw Dr Perez in 2023    She last saw Dr Ralph with rad/onc on 2023 and previously completed XRT; she was supposed to see him again on 2023 but did not    She is on portable O2; breathing up and down. No CP, HA's or N/V.    She has not seen Dr Henley with pulmonary in some time    Dicussed covid precautions - she has been vaccinated    ROS:   GEN: normal without any fever, night sweats or weight loss; fatigue; chronic back pains and leg pains at night  HEENT: normal with no HA's, sore throat, stiff neck, changes in vision  CV: normal with no CP, SOB, PND, GAVIRIA or orthopnea  PULM: chronic SOB/GAVIRIA; O2 dependent , hemoptysis, sputum or pleuritic pain  GI: normal with no abdominal pain, nausea, vomiting, constipation, diarrhea, melanotic stools, BRBPR, or hematemesis  : normal with no hematuria, dysuria  BREAST: normal with no mass, discharge, pain  SKIN: normal with no rash, erythema, bruising, or swelling    Pain Scale:  9-10 on back    Allergies:  Review of patient's allergies indicates:   Allergen Reactions    Meperidine     Promethazine     Bactrim [sulfamethoxazole-trimethoprim] Rash       Medications:    Current Outpatient Medications:     ALPRAZolam (XANAX) 0.25 MG tablet, TAKE one TABLET BY MOUTH TWICE DAILY AS NEEDED FOR ANXIETY, Disp: 60 tablet, Rfl: 1     atorvastatin (LIPITOR) 20 MG tablet, Take 1 tablet (20 mg total) by mouth once daily., Disp: 90 tablet, Rfl: 1    azelastine (ASTELIN) 137 mcg (0.1 %) nasal spray, SMARTSIG:Both Nares, Disp: , Rfl:     diltiaZEM (CARDIZEM CD) 120 MG Cp24, TAKE ONE CAPSULE BY MOUTH EVERY DAY, Disp: 90 capsule, Rfl: 1    gabapentin (NEURONTIN) 100 MG capsule, Take 100 mg by mouth., Disp: , Rfl:     HYDROcodone-acetaminophen (NORCO) 7.5-325 mg per tablet, Take 1 tablet by mouth every 12 (twelve) hours as needed for Pain., Disp: 60 tablet, Rfl: 0    pantoprazole (PROTONIX) 20 MG tablet, TAKE ONE TABLET BY MOUTH EVERY DAY, Disp: 90 tablet, Rfl: 1    sertraline (ZOLOFT) 100 MG tablet, TAKE 1 TABLET (100 MG TOTAL) BY MOUTH ONCE DAILY., Disp: 90 tablet, Rfl: 3    valsartan-hydrochlorothiazide (DIOVAN-HCT) 320-12.5 mg per tablet, Take 1 tablet by mouth every morning., Disp: 90 tablet, Rfl: 4    XARELTO 20 mg Tab, Take 20 mg by mouth 3 (three) times daily., Disp: , Rfl:     loratadine (CLARITIN) 10 mg tablet, Take 1 tablet (10 mg total) by mouth once daily., Disp: 30 tablet, Rfl: 0    PMHx/PSHx Updates:  See patient's last visit with me on 10/24/2023.  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 Patient's remote   clinical history of breast carcinoma is noticed. Based on the   immunohistochemical features, the current tumor is favored to be of lung   primary.            Objective:     Vitals:  Blood pressure (!) 165/90, pulse 83,  "temperature 97.7 °F (36.5 °C), resp. rate 18, height 5' 5" (1.651 m), weight 99.7 kg (219 lb 11.2 oz).    Physical Examination:   GEN: no apparent distress, comfortable; AAOx3; overweight; elderly  HEAD: atraumatic and normocephalic  EYES: no pallor, no icterus, PERRLA  ENT: OMM, no pharyngeal erythema, external ears WNL; no nasal discharge; no thrush  NECK: no masses, thyroid normal, trachea midline, no LAD/LN's, supple  CV: RRR with no murmur; normal pulse; normal S1 and S2; no pedal edema  CHEST: Normal respiratory effort; CTAB; normal breath sounds; no wheeze or crackles  ABDOM: nontender and nondistended; soft; normal bowel sounds; no rebound/guarding  MUSC/Skeletal: ROM normal; no crepitus; joints normal; no deformities; +arthropathy of hip/knees, hands  EXTREM: no clubbing, cyanosis, inflammation or swelling; varicosities in bilateral lower extremities  SKIN: no rashes, lesions, ulcers, petechiae or subcutaneous nodules; chronic age related skin changes  : no carrillo  NEURO: grossly intact; motor/sensory WNL; AAOx3; no tremors  PSYCH: normal mood, affect and behavior  LYMPH: normal cervical, supraclavicular, axillary and groin LN's          Labs:     Lab Results   Component Value Date    WBC 6.36 10/24/2023    HGB 11.3 (L) 10/24/2023    HCT 38.6 10/24/2023    MCV 83 10/24/2023     10/24/2023       CMP  Sodium   Date Value Ref Range Status   10/24/2023 139 136 - 145 mmol/L Final   03/04/2019 142 134 - 144 mmol/L      Potassium   Date Value Ref Range Status   10/24/2023 4.2 3.5 - 5.1 mmol/L Final     Chloride   Date Value Ref Range Status   10/24/2023 100 95 - 110 mmol/L Final   03/04/2019 104 98 - 110 mmol/L      CO2   Date Value Ref Range Status   10/24/2023 33 (H) 23 - 29 mmol/L Final     Glucose   Date Value Ref Range Status   10/24/2023 99 70 - 110 mg/dL Final   03/04/2019 102 (H) 70 - 99 mg/dL      BUN   Date Value Ref Range Status   10/24/2023 12 8 - 23 mg/dL Final     Creatinine   Date Value Ref " Range Status   10/24/2023 0.5 0.5 - 1.4 mg/dL Final   03/04/2019 0.48 (L) 0.60 - 1.40 mg/dL      Calcium   Date Value Ref Range Status   10/24/2023 9.2 8.7 - 10.5 mg/dL Final     Total Protein   Date Value Ref Range Status   10/24/2023 7.2 6.0 - 8.4 g/dL Final     Albumin   Date Value Ref Range Status   10/24/2023 4.2 3.5 - 5.2 g/dL Final   03/04/2019 4.0 3.1 - 4.7 g/dL      Total Bilirubin   Date Value Ref Range Status   10/24/2023 0.8 0.1 - 1.0 mg/dL Final     Comment:     For infants and newborns, interpretation of results should be based  on gestational age, weight and in agreement with clinical  observations.    Premature Infant recommended reference ranges:  Up to 24 hours.............<8.0 mg/dL  Up to 48 hours............<12.0 mg/dL  3-5 days..................<15.0 mg/dL  6-29 days.................<15.0 mg/dL       Alkaline Phosphatase   Date Value Ref Range Status   10/24/2023 74 55 - 135 U/L Final     AST   Date Value Ref Range Status   10/24/2023 16 10 - 40 U/L Final     ALT   Date Value Ref Range Status   10/24/2023 9 (L) 10 - 44 U/L Final     Anion Gap   Date Value Ref Range Status   10/24/2023 6 (L) 8 - 16 mmol/L Final     eGFR   Date Value Ref Range Status   10/24/2023 >60.0 >60 mL/min/1.73 m^2 Final     Lab Results   Component Value Date    CEA 1.6 10/24/2023           Radiology/Diagnostic Studies:    Chest CT 12/19/2023:    IMPRESSION:  1.  Reference right upper lobe spiculated opacity is obscured by a broad area of linear density/consolidative change that abuts the anterior pleural surface. The degree of opacification has mildly increased upon comparison.  2.  Subpleural linear opacities in the right lower lobe superior segment.  3.  Small right pleural effusion with subjacent atelectasis.  4.  No evidence of pathologic lymphadenopathy in the right sury hepatis.  5.  Small sliding type hiatus hernia.  6.  Coronary artery calcification.  7.  L2 compression fracture that has undergone previous  kyphoplasty.    PET 9/19/2023:  IMPRESSION:  Decreased size and FDG activity of the spiculated nodule in the anterior right upper lobe     Increased groundglass and interstitial opacities within the right upper lobe and superior segment right lower lobe compatible with post radiation changes     Bilateral mastectomies with reconstruction with interval increased FDG activity in the right pectoralis minor muscle compatible with postradiation changes     Stable FDG activity in the region of the right external iliac vein without adenopathy.     Degenerative changes of the spine  Cardiomegaly with coronary artery calcification         CT chest 6/16/2023:    IMPRESSION:  Decrease in size of the mass within the anterior right upper lobe     Development of peripheral groundglass opacities in the right upper lobe and adjacent to the mass compatible with post radiation changes     No evidence of metastatic disease     Cardiomegaly        PET 1/11/2023:     IMPRESSION: Hypermetabolic 2.3 cm mass within the anterior right upper lobe suspicious for primary lung malignancy     Faint groundglass opacities throughout the lungs with prominence of the pulmonary vasculature and cardiomegaly which may be represent pulmonary edema.  Development of a 13 mm subpleural nodule in the right lower lobe. Since this is new since the most recent CT findings most likely are secondary to infectious or inflammatory process however metastatic lesion cannot be excluded     Increased FDG activity in the region of the right external iliac vein without corresponding adenopathy. This may be physiologic there is physiologic activity within a superficial vein in the upper right thigh and findings may be secondary to thrombophlebitis.. Follow-up CT abdomen and pelvis with contrast is recommended     Degenerative changes of the spine           CT Chest 12/15/2022:     IMPRESSION:  1. A 23 mm right upper lobe noncalcified pulmonary nodule is highly suspicious  for primary pulmonary neoplasm. Tissue diagnosis is recommended.  2. No findings of regional metastatic disease in the chest.  3. Enlarged pulmonary arteries, suggesting pulmonary arterial hypertension.  4. Cardiomegaly, with aortic and coronary arterial calcifications.     CTA 11/19/2022:     IMPRESSION:  1.  No pulmonary embolus detected.  2.  Interstitial abnormality, suspect mild edema superimposed upon chronic changes.  3.  Right upper lobe 2.1 cm nodule. Consider a non-contrast Chest CT at 3 months, a PET/CT, or tissue sampling. These guidelines do not apply to immunocompromised patients and patients with cancer   ADDENDUM #1         The presence of right upper lobe lung nodule needing further evaluation or follow-up has been discussed with Dr. Rick Salgado 11/19/2022 12:50 AM CST.     All lab results and imaging results have been reviewed and     I have reviewed all available lab results and radiology reports.    Assessment/Plan:   (1) 83 y.o. female with diagnosis of RUL lung mass who has been referred by Shilo Perez MD for evaluation by medical hematology/oncology. She has been followed by Dr Ashish Henley with pulmonary for her COPD and chronic hypoxemia/bronchitis, who was a former smoker.      - She had a CTA in Nov 2022 which was negative for a PE but showed a RUL lung mass, which was followed by a CT Chest on 12/15/2022 which confirmed a 2.3cm RUL mass.   - She had a PET scan on 1/11/2023 which showed a 2.3 cm hypermetabolic mass in the anterior right upper lobe abutting the superior vena cava along with development of a 13 mm subpleural nodule within the right lower lobe.         - She had CT guided biopsy of the RUL lung mass on 2/15/2023 with pathology coming back NSCLC favoring a lung primary.     - She is supposed to be on oxygen at home. She has portable tank and has a lot of GAVIRIA. She has chronic SOB.   - She is former smoker and quit when she was 43yrs old.      - discussed the pathology and  reviewed and discussed the latest NCCN guidelines (vs. 2-2023)  - unlikely candidate for any surgical intervention  - will refer to Rad/onc to see if there are any radiation options either monotherapy or in combination with low dose weekly chemotherapy  - ROCHELLE on the pathology  - check CEA and up to date labs    3/27/2023:  - She saw Dr Ralph with rad/onc on 3/20/2023 and she is starting XRT monotherapy today with day#1    4/25/2023:  - she completed XRT and has some fatigue  - she will need post-XRT evaluation in about 4 weeks with rad/onc and expect her to need repeat scans in 6-8 weeks    5/25/2023:  - she saw Dr Ralph on 5/2 and he has CT Chest ordered post-XRT  - some residual fatigue and back pain  - due to see Dr Henley  - labs adequate    8/21/2023:  - she has been having some more back pain  - due for repeat CT with Dr Ralph in Sept/oct 2023, will go ahead and order PET now  - she needs to f/u with Dr Henley with pulmonary as well  - await PEt results, consider other scans if necessary  - CEA at 2.0      10/24/2023:  - she had PET scan in Sept 2023 with overall improved report  - she sees rad/onc again on 11/6  - rad/onc has already ordered the next Chest CT  - due for up to date labs incl. CEA    1/8/2024:  - She has residual chronic back pain issues, aches and pains in legs especially at night; doesn't sleep well at night  - She saw Dr Perez in Nov 2023  - She last saw Dr Ralph with rad/onc on 5/2/2023 and previously completed XRT; she was supposed to see him again on 11/6/2023 but did not  - she had Chest CT on 12/19/2023  - She is on portable O2; breathing up and down. No CP, HA's or N/V.  - She has not seen Dr Henley with pulmonary in some time     (2) Hx/of PE and DVT     (3) HTN and hypercholesterolemia     (4) COPD/chronic hypoxemia; bronchiectasis; chronic bronchitis - followed by Dr Ashish Henley/Shelbie Villalpando     (5) CHF and dysrhythmia     (6) GERD; gastric ulcer     (7) Hx/of breast cancer  s/p bilateral mastectomies- followed by Dr Maryse Beckwith  - She has history of breast cancer in past around 2005  for which she has had bilateral mastectomies and is followed by Dr Beckwith. She did not require chemotherapy or radiation. She had reconstruction surgery with Dr Grimaldo. She saw Dr Beckwith couple of weeks ago.        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 CEA level every 3 months (due now)  Encouarged f/u with Dr Henley  F/u with PCP, Pulm, etc     RTC in  12 weeks with myself  Fax note to Shilo Perez MD; Amena Mejia Mannina, [place]       Discussion:     COVID-19 Discussion:     I had long discussion with patient and any applicable family about the COVID-19 coronavirus epidemic and the recommended precautions with regard to cancer and/or hematology patients. I have re-iterated the CDC recommendations for adequate hand washing, use of hand -like products, and coughing into elbow, etc. In addition, especially for our patients who are on chemotherapy and/or our otherwise immunocompromised patients, I have recommended avoidance of crowds, including movie theaters, restaurants, churches, etc. I have recommended avoidance of any sick or symptomatic family members and/or friends. I  "have also recommended avoidance of any raw and unwashed food products, and general avoidance of food items that have not been prepared by themselves. The patient has been asked to call us immediately with any symptom developments, issues, questions or other general concerns.         Pathology Discussion:     I reviewed and discussed the pathology report(s) and radiograph reports (if available) in as simple to understand and/or laymen's terms to the best of my ability. I had an indepth conversation with the patient and went over the patient's individual diagnosis based on the information that was currently available. I discussed the TNM staging process with regard to the patient's particular cancer type, and the calculated stage based on the currently available TNM data and literature. I discussed the available prognostic data with regard to the current staging information and how it relates to the prognosis of their particular neoplastic process.          NCCN Guidelines:     I discussed the available treatment option(s) in accordance with the latest literature from the NCCN Clinical Practice Guidelines for the patient's particular type of cancer disorder. The NCCN Guidelines provide a "document evidence-based (and) consensus-driven management" of the care of oncology patients. The treatment recommendations were made not only in accordance to the NCCN guidelines, but also factored in to account the patient's overall age, condition, performance status and their medical co-morbidities. I went over the risks and benefits of the the treatment options (if any could be made) with regard to their particular cancer type, their cancer stage, their age, and their co-morbidities.         I have explained and the patient understands all of  the current recommendation(s). I have answered all of their questions to the best of my ability and to their complete satisfaction.      I spent over 25 mins of time with the patient. " Reviewed results of the recently ordered labs, tests and studies; made directives with regards to the results. Over half of this time was spent couseling and coordinating care.    I have explained all of the above in detail and the patient understands all of the current recommendation(s). I have answered all of their questions to the best of my ability and to their complete satisfaction.   The patient is to continue with the current management plan.            Electronically signed by Isacc Vogt MD

## 2024-01-08 ENCOUNTER — OFFICE VISIT (OUTPATIENT)
Dept: HEMATOLOGY/ONCOLOGY | Facility: CLINIC | Age: 84
End: 2024-01-08
Payer: MEDICARE

## 2024-01-08 ENCOUNTER — LAB VISIT (OUTPATIENT)
Dept: LAB | Facility: HOSPITAL | Age: 84
End: 2024-01-08
Attending: INTERNAL MEDICINE
Payer: MEDICARE

## 2024-01-08 VITALS
HEIGHT: 65 IN | HEART RATE: 83 BPM | WEIGHT: 219.69 LBS | BODY MASS INDEX: 36.6 KG/M2 | SYSTOLIC BLOOD PRESSURE: 165 MMHG | RESPIRATION RATE: 18 BRPM | DIASTOLIC BLOOD PRESSURE: 90 MMHG | TEMPERATURE: 98 F

## 2024-01-08 DIAGNOSIS — C34.11 MALIGNANT NEOPLASM OF UPPER LOBE OF RIGHT LUNG: ICD-10-CM

## 2024-01-08 DIAGNOSIS — Z90.13 S/P BILATERAL MASTECTOMY: ICD-10-CM

## 2024-01-08 DIAGNOSIS — C34.11 MALIGNANT NEOPLASM OF UPPER LOBE OF RIGHT LUNG: Primary | ICD-10-CM

## 2024-01-08 DIAGNOSIS — R91.8 MASS OF UPPER LOBE OF RIGHT LUNG: ICD-10-CM

## 2024-01-08 DIAGNOSIS — Z87.891 FORMER SMOKER: ICD-10-CM

## 2024-01-08 DIAGNOSIS — R93.89 ABNORMAL CHEST CT: ICD-10-CM

## 2024-01-08 DIAGNOSIS — Z85.3 HISTORY OF BREAST CANCER: ICD-10-CM

## 2024-01-08 LAB
ALBUMIN SERPL BCP-MCNC: 4.2 G/DL (ref 3.5–5.2)
ALP SERPL-CCNC: 76 U/L (ref 55–135)
ALT SERPL W/O P-5'-P-CCNC: 8 U/L (ref 10–44)
ANION GAP SERPL CALC-SCNC: 6 MMOL/L (ref 8–16)
AST SERPL-CCNC: 19 U/L (ref 10–40)
BASOPHILS # BLD AUTO: 0.08 K/UL (ref 0–0.2)
BASOPHILS NFR BLD: 1.2 % (ref 0–1.9)
BILIRUB SERPL-MCNC: 1 MG/DL (ref 0.1–1)
BUN SERPL-MCNC: 16 MG/DL (ref 8–23)
CALCIUM SERPL-MCNC: 9.2 MG/DL (ref 8.7–10.5)
CEA SERPL-MCNC: 1.4 NG/ML (ref 0–5)
CHLORIDE SERPL-SCNC: 100 MMOL/L (ref 95–110)
CO2 SERPL-SCNC: 33 MMOL/L (ref 23–29)
CREAT SERPL-MCNC: 0.6 MG/DL (ref 0.5–1.4)
DIFFERENTIAL METHOD BLD: ABNORMAL
EOSINOPHIL # BLD AUTO: 0.1 K/UL (ref 0–0.5)
EOSINOPHIL NFR BLD: 2.2 % (ref 0–8)
ERYTHROCYTE [DISTWIDTH] IN BLOOD BY AUTOMATED COUNT: 18.9 % (ref 11.5–14.5)
EST. GFR  (NO RACE VARIABLE): >60 ML/MIN/1.73 M^2
GLUCOSE SERPL-MCNC: 99 MG/DL (ref 70–110)
HCT VFR BLD AUTO: 38.4 % (ref 37–48.5)
HGB BLD-MCNC: 11 G/DL (ref 12–16)
IMM GRANULOCYTES # BLD AUTO: 0.01 K/UL (ref 0–0.04)
IMM GRANULOCYTES NFR BLD AUTO: 0.2 % (ref 0–0.5)
LYMPHOCYTES # BLD AUTO: 1.7 K/UL (ref 1–4.8)
LYMPHOCYTES NFR BLD: 26.1 % (ref 18–48)
MCH RBC QN AUTO: 22.4 PG (ref 27–31)
MCHC RBC AUTO-ENTMCNC: 28.6 G/DL (ref 32–36)
MCV RBC AUTO: 78 FL (ref 82–98)
MONOCYTES # BLD AUTO: 0.6 K/UL (ref 0.3–1)
MONOCYTES NFR BLD: 8.6 % (ref 4–15)
NEUTROPHILS # BLD AUTO: 4 K/UL (ref 1.8–7.7)
NEUTROPHILS NFR BLD: 61.7 % (ref 38–73)
NRBC BLD-RTO: 0 /100 WBC
PLATELET # BLD AUTO: 297 K/UL (ref 150–450)
PMV BLD AUTO: 11 FL (ref 9.2–12.9)
POTASSIUM SERPL-SCNC: 3.6 MMOL/L (ref 3.5–5.1)
PROT SERPL-MCNC: 7.1 G/DL (ref 6–8.4)
RBC # BLD AUTO: 4.92 M/UL (ref 4–5.4)
SODIUM SERPL-SCNC: 139 MMOL/L (ref 136–145)
WBC # BLD AUTO: 6.41 K/UL (ref 3.9–12.7)

## 2024-01-08 PROCEDURE — 3288F FALL RISK ASSESSMENT DOCD: CPT | Mod: CPTII,S$GLB,, | Performed by: INTERNAL MEDICINE

## 2024-01-08 PROCEDURE — 1101F PT FALLS ASSESS-DOCD LE1/YR: CPT | Mod: CPTII,S$GLB,, | Performed by: INTERNAL MEDICINE

## 2024-01-08 PROCEDURE — 85025 COMPLETE CBC W/AUTO DIFF WBC: CPT | Performed by: INTERNAL MEDICINE

## 2024-01-08 PROCEDURE — 3077F SYST BP >= 140 MM HG: CPT | Mod: CPTII,S$GLB,, | Performed by: INTERNAL MEDICINE

## 2024-01-08 PROCEDURE — 80053 COMPREHEN METABOLIC PANEL: CPT | Performed by: INTERNAL MEDICINE

## 2024-01-08 PROCEDURE — 1125F AMNT PAIN NOTED PAIN PRSNT: CPT | Mod: CPTII,S$GLB,, | Performed by: INTERNAL MEDICINE

## 2024-01-08 PROCEDURE — 36415 COLL VENOUS BLD VENIPUNCTURE: CPT | Performed by: INTERNAL MEDICINE

## 2024-01-08 PROCEDURE — 99214 OFFICE O/P EST MOD 30 MIN: CPT | Mod: S$GLB,,, | Performed by: INTERNAL MEDICINE

## 2024-01-08 PROCEDURE — 3080F DIAST BP >= 90 MM HG: CPT | Mod: CPTII,S$GLB,, | Performed by: INTERNAL MEDICINE

## 2024-01-08 PROCEDURE — 82378 CARCINOEMBRYONIC ANTIGEN: CPT | Performed by: INTERNAL MEDICINE

## 2024-01-08 PROCEDURE — 1159F MED LIST DOCD IN RCRD: CPT | Mod: CPTII,S$GLB,, | Performed by: INTERNAL MEDICINE

## 2024-01-08 PROCEDURE — 1160F RVW MEDS BY RX/DR IN RCRD: CPT | Mod: CPTII,S$GLB,, | Performed by: INTERNAL MEDICINE

## 2024-01-16 ENCOUNTER — OFFICE VISIT (OUTPATIENT)
Dept: FAMILY MEDICINE | Facility: CLINIC | Age: 84
End: 2024-01-16
Payer: MEDICARE

## 2024-01-16 VITALS
BODY MASS INDEX: 36.99 KG/M2 | HEART RATE: 92 BPM | RESPIRATION RATE: 22 BRPM | DIASTOLIC BLOOD PRESSURE: 71 MMHG | WEIGHT: 222 LBS | HEIGHT: 65 IN | SYSTOLIC BLOOD PRESSURE: 129 MMHG

## 2024-01-16 DIAGNOSIS — I48.0 PAROXYSMAL ATRIAL FIBRILLATION: ICD-10-CM

## 2024-01-16 DIAGNOSIS — Z79.01 CHRONIC ANTICOAGULATION: ICD-10-CM

## 2024-01-16 DIAGNOSIS — I50.32 CHRONIC HEART FAILURE WITH PRESERVED EJECTION FRACTION: Primary | ICD-10-CM

## 2024-01-16 DIAGNOSIS — Z23 NEEDS FLU SHOT: ICD-10-CM

## 2024-01-16 DIAGNOSIS — E78.2 MULTIPLE-TYPE HYPERLIPIDEMIA: ICD-10-CM

## 2024-01-16 DIAGNOSIS — R52 COMPLAINTS OF TOTAL BODY PAIN: ICD-10-CM

## 2024-01-16 DIAGNOSIS — I87.2 CHRONIC VENOUS INSUFFICIENCY: ICD-10-CM

## 2024-01-16 DIAGNOSIS — M79.604 PAIN IN BOTH LOWER EXTREMITIES: ICD-10-CM

## 2024-01-16 DIAGNOSIS — M79.605 PAIN IN BOTH LOWER EXTREMITIES: ICD-10-CM

## 2024-01-16 DIAGNOSIS — I10 BENIGN ESSENTIAL HYPERTENSION: ICD-10-CM

## 2024-01-16 DIAGNOSIS — J96.11 CHRONIC HYPOXEMIC RESPIRATORY FAILURE: ICD-10-CM

## 2024-01-16 DIAGNOSIS — R09.82 POST-NASAL DRIP: ICD-10-CM

## 2024-01-16 PROCEDURE — 1160F RVW MEDS BY RX/DR IN RCRD: CPT | Mod: CPTII,S$GLB,, | Performed by: INTERNAL MEDICINE

## 2024-01-16 PROCEDURE — 99999 PR PBB SHADOW E&M-EST. PATIENT-LVL III: CPT | Mod: PBBFAC,,, | Performed by: INTERNAL MEDICINE

## 2024-01-16 PROCEDURE — 3288F FALL RISK ASSESSMENT DOCD: CPT | Mod: CPTII,S$GLB,, | Performed by: INTERNAL MEDICINE

## 2024-01-16 PROCEDURE — 1101F PT FALLS ASSESS-DOCD LE1/YR: CPT | Mod: CPTII,S$GLB,, | Performed by: INTERNAL MEDICINE

## 2024-01-16 PROCEDURE — 3078F DIAST BP <80 MM HG: CPT | Mod: CPTII,S$GLB,, | Performed by: INTERNAL MEDICINE

## 2024-01-16 PROCEDURE — 99214 OFFICE O/P EST MOD 30 MIN: CPT | Mod: S$GLB,,, | Performed by: INTERNAL MEDICINE

## 2024-01-16 PROCEDURE — 3074F SYST BP LT 130 MM HG: CPT | Mod: CPTII,S$GLB,, | Performed by: INTERNAL MEDICINE

## 2024-01-16 PROCEDURE — 1125F AMNT PAIN NOTED PAIN PRSNT: CPT | Mod: CPTII,S$GLB,, | Performed by: INTERNAL MEDICINE

## 2024-01-16 PROCEDURE — 1159F MED LIST DOCD IN RCRD: CPT | Mod: CPTII,S$GLB,, | Performed by: INTERNAL MEDICINE

## 2024-01-16 NOTE — PROGRESS NOTES
Subjective:       Patient ID: Lety Herbert is a 83 y.o. female.    Chief Complaint: Hyperlipidemia, Congestive Heart Failure, Hypoxia, Atrial Fibrillation, Gastroesophageal Reflux, Headache, Compression fracture L, and General debility    Miss Davidson is a 83 comes for follow-up.  She has been chronically and steadily declining over months in years with her overall health, endurance and general sense of well-being.    Underlying medical issues are as below:-      1.         Chronic hypoxemic respiratory failure   2.         Chronic heart failure with preserved ejection fraction   3.         Multiple-type hyperlipidemia   4.         Benign essential hypertension   5.         Paroxysmal atrial fibrillation -seems to be stable and she has seen Dr. Lepe with no major changes  6.         Normocytic anemia   7.         Mixed stress and urge urinary incontinence   8.         Gastroesophageal reflux disease without esophagitis   9.         Chronic tension-type headache, not intractable -the headaches seem to be getting better and not as bad as before.  10.       Compression fracture of L1 vertebra, sequela     Previous visit we had reviewed her excruciating pain 10/10 in the lower extremities.  Imaging studies were negative for blood clot.  Vascular referral was made to see if could be caused by peripheral vascular disease.  X-rays did not show any fracture.    I would continue prescription for hydrocodone for interim.  And now with interim request from family members to refill her medications.    Hyperlipidemia  This is a chronic problem. The current episode started more than 1 year ago. The problem is controlled. Associated symptoms include myalgias and shortness of breath. Pertinent negatives include no chest pain. Current antihyperlipidemic treatment includes statins. The current treatment provides moderate improvement of lipids. Compliance problems include psychosocial issues.  Risk factors for coronary  artery disease include a sedentary lifestyle, hypertension, post-menopausal, obesity and dyslipidemia.   Hypertension  This is a chronic problem. The current episode started more than 1 year ago. The problem is uncontrolled. Associated symptoms include headaches, malaise/fatigue, peripheral edema and shortness of breath. Pertinent negatives include no chest pain or palpitations. Risk factors for coronary artery disease include sedentary lifestyle, obesity, dyslipidemia and post-menopausal state. Past treatments include beta blockers, angiotensin blockers and diuretics. The current treatment provides moderate improvement. Compliance problems include psychosocial issues.  There is no history of coarctation of the aorta, hyperaldosteronism, pheochromocytoma or renovascular disease.   Atrial Fibrillation  Presents for follow-up visit. Symptoms include shortness of breath. Symptoms are negative for bradycardia, chest pain, dizziness and palpitations. The symptoms have been stable. Past medical history includes atrial fibrillation, CHF and hyperlipidemia.   Depression  Visit Type: follow-up  Patient presents with the following symptoms: anhedonia, depressed mood and shortness of breath.  Patient is not experiencing: choking sensation, confusion, nervousness/anxiety, palpitations, suicidal ideas, suicidal planning and weight gain.  Frequency of symptoms: occasionally    Patient has a history of: CHF    Headache   This is a chronic problem. The current episode started more than 1 year ago. The problem occurs constantly. The problem has been gradually worsening. The pain is located in the Occipital region. The pain radiates to the left neck and right neck. The pain quality is not similar to prior headaches. The quality of the pain is described as aching. The pain is moderate. Associated symptoms include back pain. Pertinent negatives include no abdominal pain, dizziness, eye pain, numbness or sore throat. Nothing aggravates  the symptoms. She has tried acetaminophen for the symptoms.   Congestive Heart Failure  Presents for follow-up visit. Associated symptoms include fatigue, shortness of breath and unexpected weight change (GAINED 6 LB OF WEIGHT..). Pertinent negatives include no abdominal pain, chest pain or palpitations. Compliance with total regimen is 26-50%. Compliance with diet is 26-50%. Compliance with exercise is 0-25%. Compliance with medications is 51-75%.   Anemia  Symptoms include malaise/fatigue and pallor. There has been no abdominal pain, bruising/bleeding easily, confusion, light-headedness or palpitations. Signs of blood loss that are not present include vaginal bleeding.   Chronic Pain  Past Medical History Includes::  Cancer  Chronicity:  Chronic  Onset:  More than 1 year ago  Frequency:  Constantly  Progression since onset:  Waxing and waning  Pain location:  Generalized (Recently chest wall)  Pain - numeric:  5/10  Treatments tried: Hydrocodone.  Previous Imaging:  CT Scan  Current treatment:  Hydrocodone/acetaminophen (Hycet, LorcetLortab, Norco, Vicodin)  Improvement on Current Treatment:  Mild  Goals of therapy:  Improve ADL's  Associated symptoms: constipation, dyspnea and headaches    Associated symptoms: no abdominal pain, no chest pain, no dizziness and no numbness    Drug Screen: No    Pain Contract: No    Prescription Monitoring Program  reviewed: Yes        Past Medical History:   Diagnosis Date    Abnormal chest CT 3/9/2023    Anemia     Basal cell carcinoma     nose     Cancer     breast    Depression     DVT (deep venous thrombosis)     Fall 3/20/2018    Former smoker 3/9/2023    Gastric ulceration     GERD (gastroesophageal reflux disease)     History of breast cancer 3/9/2023    History of frequent urinary tract infections     Hyperlipidemia     Hypertension     Malignant neoplasm of upper lobe of right lung (NSCLC favoring lung primary) 3/9/2023    Melanoma 2004?    left forearm    MRSA  (methicillin resistant staph aureus) culture positive     Neuropathy     PE (pulmonary embolism)     Post-nasal drip 11/15/2018    Reflux     S/P bilateral mastectomy 3/9/2023     Social History     Socioeconomic History    Marital status:      Spouse name: Jean    Number of children: 3   Tobacco Use    Smoking status: Former     Passive exposure: Past    Smokeless tobacco: Never   Substance and Sexual Activity    Alcohol use: No    Drug use: No    Sexual activity: Not Currently     Partners: Male   Social History Narrative    3 Children- 9 GC, 7 GGrands     Social Determinants of Health     Financial Resource Strain: Medium Risk (11/10/2021)    Overall Financial Resource Strain (CARDIA)     Difficulty of Paying Living Expenses: Somewhat hard   Food Insecurity: No Food Insecurity (11/10/2021)    Hunger Vital Sign     Worried About Running Out of Food in the Last Year: Never true     Ran Out of Food in the Last Year: Never true   Transportation Needs: No Transportation Needs (11/10/2021)    PRAPARE - Transportation     Lack of Transportation (Medical): No     Lack of Transportation (Non-Medical): No   Physical Activity: Inactive (11/10/2021)    Exercise Vital Sign     Days of Exercise per Week: 0 days     Minutes of Exercise per Session: 0 min   Stress: Stress Concern Present (11/10/2021)    [place] [employer] - Occupational Stress Questionnaire     Feeling of Stress : To some extent   Social Connections: Moderately Integrated (9/14/2022)    Social Connection and Isolation Panel [NHANES]     Frequency of Communication with Friends and Family: Three times a week     Frequency of Social Gatherings with Friends and Family: Once a week     Attends Yarsanism Services: More than 4 times per year     Active Member of Clubs or Organizations: No     Attends Club or Organization Meetings: Never     Marital Status:    Housing Stability: Low Risk  (11/10/2021)    Housing Stability Vital  Sign     Unable to Pay for Housing in the Last Year: No     Number of Places Lived in the Last Year: 1     Unstable Housing in the Last Year: No     Past Surgical History:   Procedure Laterality Date    HYSTERECTOMY      MASTECTOMY, RADICAL Bilateral     TOTAL HIP ARTHROPLASTY Left 11/13/2017     Family History   Problem Relation Age of Onset    Cancer Mother     Cancer Father     Heart disease Father     Melanoma Neg Hx     Psoriasis Neg Hx     Lupus Neg Hx     Eczema Neg Hx        Review of Systems   Constitutional:  Positive for activity change (reducing activities), fatigue, malaise/fatigue and unexpected weight change (GAINED 6 LB OF WEIGHT..). Negative for appetite change, chills and weight gain.        She has lost 20 lb of weight.  Patient is overall not feeling well.  This should not be the case.  Generally when somebody loses weight by watching diet they really feel well.  Patient on the contrary feels more sick and tired and headaches.   HENT:  Positive for congestion. Negative for nosebleeds and sore throat.    Eyes:  Negative for pain, discharge and visual disturbance.   Respiratory:  Positive for shortness of breath. Negative for choking and chest tightness.         Recent diagnosis of lung cancer non-small cell.  Has undergone chemotherapy.   Cardiovascular:  Positive for leg swelling. Negative for chest pain and palpitations.        HTN   Gastrointestinal:  Positive for constipation. Negative for abdominal pain and blood in stool.        Constipation seems to be stable with Amitiza 24 mcg capsule.   Endocrine: Positive for polyuria. Negative for cold intolerance and polydipsia.   Genitourinary:  Positive for enuresis. Negative for vaginal bleeding.        Incontinence symptoms   Musculoskeletal:  Positive for arthralgias, back pain and myalgias. Negative for joint swelling.        Left hip fracture S/P Arthroplasty NoV 2017  Fall in October/November 2019.  L1 compression fracture status post  "vertebroplasty.  LOWER EXTREMITY PAIN AND DURATION UNKNOWN.  The pain in the lower extremities has got worse.   Skin:  Positive for pallor. Negative for color change, rash and wound.        Complains of somewhat brittle nails.   Allergic/Immunologic: Negative for environmental allergies, food allergies and immunocompromised state.   Neurological:  Positive for headaches. Negative for dizziness, light-headedness and numbness.   Hematological:  Does not bruise/bleed easily.   Psychiatric/Behavioral:  Positive for depression. Negative for confusion, self-injury and suicidal ideas. The patient is not nervous/anxious and is not hyperactive.         Patient has a history of depression. Stable on sertraline. She however denies that she is depressed.         Objective:      Blood pressure 129/71, pulse 92, resp. rate (!) 22, height 5' 5" (1.651 m), weight 100.7 kg (222 lb). Body mass index is 36.94 kg/m².  Physical Exam  Constitutional:       Appearance: She is obese. She is ill-appearing.      Comments: BMI 36.94   Eyes:      General: No scleral icterus.  Cardiovascular:      Rate and Rhythm: Normal rate and regular rhythm.      Heart sounds: Normal heart sounds.   Pulmonary:      Effort: Pulmonary effort is normal.      Breath sounds: Normal breath sounds.      Comments: Occasional harsh sound  Abdominal:      General: Bowel sounds are normal.   Musculoskeletal:      Cervical back: No rigidity.      Right hip: Decreased range of motion.      Left hip: Decreased range of motion.   Skin:     General: Skin is warm.      Coloration: Skin is pale.      Findings: No bruising or lesion.   Neurological:      Mental Status: She is alert. Mental status is at baseline.   Psychiatric:      Comments: Anhedonic dysthymic           Assessment/Plan:       1. Chronic heart failure with preserved ejection fraction    2. Paroxysmal atrial fibrillation    3. Benign essential hypertension  Overview:  BP elevated    Orders:  -     Lipid Panel; " Future; Expected date: 02/16/2024    4. Complaints of total body pain    5. Chronic venous insufficiency    6. Pain in both lower extremities  -     Ambulatory referral/consult to Pain Clinic; Future; Expected date: 01/30/2024    7. Multiple-type hyperlipidemia  Overview:  Elevated lipids      8. Chronic anticoagulation    9. Post-nasal drip    10. Needs flu shot    11. Chronic hypoxemic respiratory failure                     Component Ref Range & Units 8 d ago 2 mo ago 6 mo ago 8 mo ago 9 mo ago 10 mo ago   CEA 0.0 - 5.0 ng/mL 1.4 1.6 CM 2.0 CM 2.5 CM 2.4 CM 3.7 CM       Lab Visit on 01/08/2024   Component Date Value Ref Range Status    WBC 01/08/2024 6.41  3.90 - 12.70 K/uL Final    RBC 01/08/2024 4.92  4.00 - 5.40 M/uL Final    Hemoglobin 01/08/2024 11.0 (L)  12.0 - 16.0 g/dL Final    Hematocrit 01/08/2024 38.4  37.0 - 48.5 % Final    MCV 01/08/2024 78 (L)  82 - 98 fL Final    MCH 01/08/2024 22.4 (L)  27.0 - 31.0 pg Final    MCHC 01/08/2024 28.6 (L)  32.0 - 36.0 g/dL Final    RDW 01/08/2024 18.9 (H)  11.5 - 14.5 % Final    Platelets 01/08/2024 297  150 - 450 K/uL Final    MPV 01/08/2024 11.0  9.2 - 12.9 fL Final    Immature Granulocytes 01/08/2024 0.2  0.0 - 0.5 % Final    Gran # (ANC) 01/08/2024 4.0  1.8 - 7.7 K/uL Final    Immature Grans (Abs) 01/08/2024 0.01  0.00 - 0.04 K/uL Final    Lymph # 01/08/2024 1.7  1.0 - 4.8 K/uL Final    Mono # 01/08/2024 0.6  0.3 - 1.0 K/uL Final    Eos # 01/08/2024 0.1  0.0 - 0.5 K/uL Final    Baso # 01/08/2024 0.08  0.00 - 0.20 K/uL Final    nRBC 01/08/2024 0  0 /100 WBC Final    Gran % 01/08/2024 61.7  38.0 - 73.0 % Final    Lymph % 01/08/2024 26.1  18.0 - 48.0 % Final    Mono % 01/08/2024 8.6  4.0 - 15.0 % Final    Eosinophil % 01/08/2024 2.2  0.0 - 8.0 % Final    Basophil % 01/08/2024 1.2  0.0 - 1.9 % Final    Differential Method 01/08/2024 Automated   Final    Sodium 01/08/2024 139  136 - 145 mmol/L Final    Potassium 01/08/2024 3.6  3.5 - 5.1 mmol/L Final    Chloride  01/08/2024 100  95 - 110 mmol/L Final    CO2 01/08/2024 33 (H)  23 - 29 mmol/L Final    Glucose 01/08/2024 99  70 - 110 mg/dL Final    BUN 01/08/2024 16  8 - 23 mg/dL Final    Creatinine 01/08/2024 0.6  0.5 - 1.4 mg/dL Final    Calcium 01/08/2024 9.2  8.7 - 10.5 mg/dL Final    Total Protein 01/08/2024 7.1  6.0 - 8.4 g/dL Final    Albumin 01/08/2024 4.2  3.5 - 5.2 g/dL Final    Total Bilirubin 01/08/2024 1.0  0.1 - 1.0 mg/dL Final    Alkaline Phosphatase 01/08/2024 76  55 - 135 U/L Final    AST 01/08/2024 19  10 - 40 U/L Final    ALT 01/08/2024 8 (L)  10 - 44 U/L Final    eGFR 01/08/2024 >60.0  >60 mL/min/1.73 m^2 Final    Anion Gap 01/08/2024 6 (L)  8 - 16 mmol/L Final    CEA 01/08/2024 1.4  0.0 - 5.0 ng/mL Final   Lab Visit on 10/24/2023   Component Date Value Ref Range Status    WBC 10/24/2023 6.36  3.90 - 12.70 K/uL Final    RBC 10/24/2023 4.65  4.00 - 5.40 M/uL Final    Hemoglobin 10/24/2023 11.3 (L)  12.0 - 16.0 g/dL Final    Hematocrit 10/24/2023 38.6  37.0 - 48.5 % Final    MCV 10/24/2023 83  82 - 98 fL Final    MCH 10/24/2023 24.3 (L)  27.0 - 31.0 pg Final    MCHC 10/24/2023 29.3 (L)  32.0 - 36.0 g/dL Final    RDW 10/24/2023 16.0 (H)  11.5 - 14.5 % Final    Platelets 10/24/2023 236  150 - 450 K/uL Final    MPV 10/24/2023 9.8  9.2 - 12.9 fL Final    Immature Granulocytes 10/24/2023 0.5  0.0 - 0.5 % Final    Gran # (ANC) 10/24/2023 4.5  1.8 - 7.7 K/uL Final    Immature Grans (Abs) 10/24/2023 0.03  0.00 - 0.04 K/uL Final    Lymph # 10/24/2023 1.1  1.0 - 4.8 K/uL Final    Mono # 10/24/2023 0.5  0.3 - 1.0 K/uL Final    Eos # 10/24/2023 0.1  0.0 - 0.5 K/uL Final    Baso # 10/24/2023 0.05  0.00 - 0.20 K/uL Final    nRBC 10/24/2023 0  0 /100 WBC Final    Gran % 10/24/2023 71.3  38.0 - 73.0 % Final    Lymph % 10/24/2023 17.8 (L)  18.0 - 48.0 % Final    Mono % 10/24/2023 7.7  4.0 - 15.0 % Final    Eosinophil % 10/24/2023 1.9  0.0 - 8.0 % Final    Basophil % 10/24/2023 0.8  0.0 - 1.9 % Final    Differential Method  10/24/2023 Automated   Final    Sodium 10/24/2023 139  136 - 145 mmol/L Final    Potassium 10/24/2023 4.2  3.5 - 5.1 mmol/L Final    Chloride 10/24/2023 100  95 - 110 mmol/L Final    CO2 10/24/2023 33 (H)  23 - 29 mmol/L Final    Glucose 10/24/2023 99  70 - 110 mg/dL Final    BUN 10/24/2023 12  8 - 23 mg/dL Final    Creatinine 10/24/2023 0.5  0.5 - 1.4 mg/dL Final    Calcium 10/24/2023 9.2  8.7 - 10.5 mg/dL Final    Total Protein 10/24/2023 7.2  6.0 - 8.4 g/dL Final    Albumin 10/24/2023 4.2  3.5 - 5.2 g/dL Final    Total Bilirubin 10/24/2023 0.8  0.1 - 1.0 mg/dL Final    Alkaline Phosphatase 10/24/2023 74  55 - 135 U/L Final    AST 10/24/2023 16  10 - 40 U/L Final    ALT 10/24/2023 9 (L)  10 - 44 U/L Final    eGFR 10/24/2023 >60.0  >60 mL/min/1.73 m^2 Final    Anion Gap 10/24/2023 6 (L)  8 - 16 mmol/L Final    CEA 10/24/2023 1.6  0.0 - 5.0 ng/mL Final     Pain continues in the legs and thighs.  No localizing sign.  Will now get pain management and see what is the best course of managing her pain.    Following preventive care measures have been discussed:-  1.-influenza vaccine she got it from the pharmacy.  Not documented in epic or links.  2.-pneumonia vaccine-updated.  3.-COVID precautions and vaccination-got at least 3 vaccines.  4.-RSV vaccine which is available at this point  5.-shingles vaccine-completed in 2021.  6.-colonoscopy or colorectal -aged out at this point.    Overall patient's condition continues to decline as she diminishes in her strength, endurance, stamina tolerance of medical issues palliation seems to be more of a thrust rather than cure and had a discussion about these issues.  Somehow patient's seems to wonder what happened to her at the age of 83 and she is supposed to feel good and all nice like she was feeling about 20 or 30 years ago.    She has this persistent cough with some mucus production.  She probably has a nasal drip but continues on Astelin and Flonase without much relief.  She  also takes Claritin.    Pain in the legs continue.  She had seen Dr. Puri recently with no particular diagnosis of peripheral vascular disease.  X-ray of leg was okay.  Ultrasound was negative for blood clot.  Pain seems to be little more tolerable at least today.  Follow up in about 4 months (around 5/16/2024), or if symptoms worsen or fail to improve, for Hypertension/lipids.    Spent sim 30 minutes with patient which involved review of pts medical conditions, labs, medications and with 50% of time face-to-face discussion about medical problems, management and any applicable changes.        Current Outpatient Medications:     ALPRAZolam (XANAX) 0.25 MG tablet, TAKE one TABLET BY MOUTH TWICE DAILY AS NEEDED FOR ANXIETY, Disp: 60 tablet, Rfl: 1    atorvastatin (LIPITOR) 20 MG tablet, Take 1 tablet (20 mg total) by mouth once daily., Disp: 90 tablet, Rfl: 1    azelastine (ASTELIN) 137 mcg (0.1 %) nasal spray, SMARTSIG:Both Nares, Disp: , Rfl:     diltiaZEM (CARDIZEM CD) 120 MG Cp24, TAKE ONE CAPSULE BY MOUTH EVERY DAY, Disp: 90 capsule, Rfl: 1    gabapentin (NEURONTIN) 100 MG capsule, Take 100 mg by mouth., Disp: , Rfl:     HYDROcodone-acetaminophen (NORCO) 7.5-325 mg per tablet, Take 1 tablet by mouth every 12 (twelve) hours as needed for Pain., Disp: 60 tablet, Rfl: 0    loratadine (CLARITIN) 10 mg tablet, Take 1 tablet (10 mg total) by mouth once daily., Disp: 30 tablet, Rfl: 0    pantoprazole (PROTONIX) 20 MG tablet, TAKE ONE TABLET BY MOUTH EVERY DAY, Disp: 90 tablet, Rfl: 1    sertraline (ZOLOFT) 100 MG tablet, TAKE 1 TABLET (100 MG TOTAL) BY MOUTH ONCE DAILY., Disp: 90 tablet, Rfl: 3    valsartan-hydrochlorothiazide (DIOVAN-HCT) 320-12.5 mg per tablet, Take 1 tablet by mouth every morning., Disp: 90 tablet, Rfl: 4    XARELTO 20 mg Tab, Take 20 mg by mouth 3 (three) times daily., Disp: , Rfl:

## 2024-01-22 DIAGNOSIS — F41.9 ANXIETY: ICD-10-CM

## 2024-01-22 DIAGNOSIS — G47.09 OTHER INSOMNIA: ICD-10-CM

## 2024-01-22 RX ORDER — ALPRAZOLAM 0.25 MG/1
TABLET ORAL
Qty: 60 TABLET | Refills: 1 | OUTPATIENT
Start: 2024-01-22

## 2024-01-24 ENCOUNTER — TELEPHONE (OUTPATIENT)
Dept: FAMILY MEDICINE | Facility: CLINIC | Age: 84
End: 2024-01-24
Payer: MEDICARE

## 2024-01-24 ENCOUNTER — CLINICAL SUPPORT (OUTPATIENT)
Dept: RADIATION ONCOLOGY | Facility: CLINIC | Age: 84
End: 2024-01-24
Payer: MEDICARE

## 2024-01-24 DIAGNOSIS — C34.11 MALIGNANT NEOPLASM OF UPPER LOBE OF RIGHT LUNG: Primary | ICD-10-CM

## 2024-01-24 PROCEDURE — 99441 PR PHYSICIAN TELEPHONE EVALUATION 5-10 MIN: CPT | Mod: 95,,, | Performed by: RADIOLOGY

## 2024-01-24 RX ORDER — ALPRAZOLAM 0.25 MG/1
0.25 TABLET ORAL NIGHTLY PRN
Qty: 60 TABLET | Refills: 1 | Status: SHIPPED | OUTPATIENT
Start: 2024-01-24

## 2024-01-24 NOTE — Clinical Note
AC-- She is not doing well (chronic conditions) and difficulty getting out of the house. Can ya'll push back PET to 6/2024 (6m after 12/23 clear CT C)?

## 2024-01-24 NOTE — PROGRESS NOTES
Lety Herbert  [id number]  1940 1/24/2024  No referring provider defined for this encounter.    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    The patient location is:  home  Visit type: Virtual visit with audio ONLY  The reason for the audio only service rather than synchronous audio and video virtual visit was related to technical difficulties or patient preference/necessity.    HISTORY OF PRESENT ILLNESS:   Lety Herbert is a 84 y.o. female former smoker (quit >40yrs ago) following Dr. Henley for diagnosis of COPD and ILD on supplemental home O2 who presented with chest pain with CTA chest noting 2.1 cm right upper lobe nodule demonstrating enlargement on short interval CT of the chest to 2.3 cm.  PET-CT confirmed SUV of 16.4 with development of a 2nd 1.3 mm subpleural right lower lobe nodule, SUV 2.3 thought infectious/inflammatory etiology.  No FDG uptake in the mediastinum or distant.  CT-guided biopsy returned non-small cell carcinoma, poorly differentiated with squamous features, lung primary favored.    Patient was seen by Dr. Henley who advised she is not operative candidate.  She was also seen by Dr. Vogt and is referred to discuss definitive radiotherapy options.    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  Treatment was well tolerated with mild fatigue.    INTERVAL HISTORY:   Patient continues to suffer from several chronic conditions including CHF and COPD.  She has severe peripheral vascular disease, deemed inoperable per family.  She is pain in  the bilateral lower extremities and is unable to ambulate.  She is being cared for by family.  Today she endorses chronic shortness of breath requiring supplemental oxygen.  She denies fever or chills.    Review of systems otherwise negative unless indicated in HPI/interval history.    Past Medical History:   Diagnosis Date    Abnormal chest CT 3/9/2023    Anemia     Basal cell carcinoma     nose     Cancer     breast    Depression     DVT (deep venous thrombosis)     Fall 3/20/2018    Former smoker 3/9/2023    Gastric ulceration     GERD (gastroesophageal reflux disease)     History of breast cancer 3/9/2023    History of frequent urinary tract infections     Hyperlipidemia     Hypertension     Malignant neoplasm of upper lobe of right lung (NSCLC favoring lung primary) 3/9/2023    Melanoma 2004?    left forearm    MRSA (methicillin resistant staph aureus) culture positive     Neuropathy     PE (pulmonary embolism)     Post-nasal drip 11/15/2018    Reflux     S/P bilateral mastectomy 3/9/2023     Past Surgical History:   Procedure Laterality Date    HYSTERECTOMY      MASTECTOMY, RADICAL Bilateral     TOTAL HIP ARTHROPLASTY Left 11/13/2017     Social History     Socioeconomic History    Marital status:      Spouse name: Jean    Number of children: 3   Tobacco Use    Smoking status: Former     Passive exposure: Past    Smokeless tobacco: Never   Substance and Sexual Activity    Alcohol use: No    Drug use: No    Sexual activity: Not Currently     Partners: Male   Social History Narrative    3 Children- 9 GC, 7 GGrands     Social Determinants of Health     Financial Resource Strain: Medium Risk (11/10/2021)    Overall Financial Resource Strain (CARDIA)     Difficulty of Paying Living Expenses: Somewhat hard   Food Insecurity: No Food Insecurity (11/10/2021)    Hunger Vital Sign     Worried About Running Out of Food in the Last Year: Never true     Ran Out of Food in the Last Year: Never true   Transportation  Needs: No Transportation Needs (11/10/2021)    PRAPARE - Transportation     Lack of Transportation (Medical): No     Lack of Transportation (Non-Medical): No   Physical Activity: Inactive (11/10/2021)    Exercise Vital Sign     Days of Exercise per Week: 0 days     Minutes of Exercise per Session: 0 min   Stress: Stress Concern Present (11/10/2021)    [place] [employer] - Occupational Stress Questionnaire     Feeling of Stress : To some extent   Social Connections: Moderately Integrated (9/14/2022)    Social Connection and Isolation Panel [NHANES]     Frequency of Communication with Friends and Family: Three times a week     Frequency of Social Gatherings with Friends and Family: Once a week     Attends [employer] Services: More than 4 times per year     Active Member of Clubs or Organizations: No     Attends Club or Organization Meetings: Never     Marital Status:    Housing Stability: Low Risk  (11/10/2021)    Housing Stability Vital Sign     Unable to Pay for Housing in the Last Year: No     Number of Places Lived in the Last Year: 1     Unstable Housing in the Last Year: No     Family History   Problem Relation Age of Onset    Cancer Mother     Cancer Father     Heart disease Father     Melanoma Neg Hx     Psoriasis Neg Hx     Lupus Neg Hx     Eczema Neg Hx      Medication List with Changes/Refills   Current Medications    ALPRAZOLAM (XANAX) 0.25 MG TABLET    Take 1 tablet (0.25 mg total) by mouth nightly as needed for Anxiety.    ATORVASTATIN (LIPITOR) 20 MG TABLET    Take 1 tablet (20 mg total) by mouth once daily.    AZELASTINE (ASTELIN) 137 MCG (0.1 %) NASAL SPRAY    SMARTSIG:Both Nares    DILTIAZEM (CARDIZEM CD) 120 MG CP24    TAKE ONE CAPSULE BY MOUTH EVERY DAY    GABAPENTIN (NEURONTIN) 100 MG CAPSULE    Take 100 mg by mouth.    HYDROCODONE-ACETAMINOPHEN (NORCO) 7.5-325 MG PER TABLET    Take 1 tablet by mouth every 12 (twelve) hours as needed for Pain.    LORATADINE (CLARITIN)  10 MG TABLET    Take 1 tablet (10 mg total) by mouth once daily.    PANTOPRAZOLE (PROTONIX) 20 MG TABLET    TAKE ONE TABLET BY MOUTH EVERY DAY    SERTRALINE (ZOLOFT) 100 MG TABLET    TAKE 1 TABLET (100 MG TOTAL) BY MOUTH ONCE DAILY.    VALSARTAN-HYDROCHLOROTHIAZIDE (DIOVAN-HCT) 320-12.5 MG PER TABLET    Take 1 tablet by mouth every morning.    XARELTO 20 MG TAB    Take 20 mg by mouth 3 (three) times daily.     Review of patient's allergies indicates:   Allergen Reactions    Meperidine     Promethazine     Bactrim [sulfamethoxazole-trimethoprim] Rash       PHYSICAL EXAM:   (telemed visit)    ANCILLARY DATA:   12/19/23 CT C  IMPRESSION:  1.  Reference right upper lobe spiculated opacity is obscured by a broad area of linear density/consolidative change that abuts the anterior pleural surface. The degree of opacification has mildly increased upon comparison.  2.  Subpleural linear opacities in the right lower lobe superior segment.  3.  Small right pleural effusion with subjacent atelectasis.  4.  No evidence of pathologic lymphadenopathy in the right sury hepatis.  5.  Small sliding type hiatus hernia.  6.  Coronary artery calcification.  7.  L2 compression fracture that has undergone previous kyphoplasty.    9/19/23 PET  IMPRESSION:  Decreased size and FDG activity of the spiculated nodule in the anterior right upper lobe  Increased groundglass and interstitial opacities within the right upper lobe and superior segment right lower lobe compatible with post radiation changes  Bilateral mastectomies with reconstruction with interval increased FDG activity in the right pectoralis minor muscle compatible with postradiation changes  Stable FDG activity in the region of the right external iliac vein without adenopathy.  Degenerative changes of the spine  Cardiomegaly with coronary artery calcification    ASSESSMENT: 83 y.o. female with stage IA3, [id number] poor diff SCCa RUL  PLAN:     - RT well tolerated.  Patient  continues to suffer with several chronic conditions.  Advised consideration of home health if family unable to take care of her.  - CT appears stable  - follow up with Dr. Vogt; will request PET delayed to 6/24  - Return to clinic 1yr for audio f/u with noncon CT C 12/24.    All questions answered and contact information provided. Patient understands free to call us anytime with any questions or concerns regarding radiation therapy.    I have personally evaluated this patient with a moderate to high complexity diagnosis.     Length of phone call: 10 minutes  Total time: 20 minutes dedicated to reviewing/interpreting pertinent laboratory/imaging/pathology as well as follow-up with concurrent consultants; reviewing and performing history; counseling patient on continuing oncologic recommendations; documentation in the electronic medical record including ordering of additional tests and/or radiation treatment protocol; and coordination of care with physicians with referrals placed as appropriate.    Each patient to whom he or she provides medical services by telemedicine is:  (1) informed of the relationship between the physician and patient and the respective role of any other health care provider with respect to management of the patient; and (2) notified that he or she may decline to receive medical services by telemedicine and may withdraw from such care at any time. Patient verbally consented to receive this service via voice-only telephone call.    This service was not originating from a related E/M service provided within the previous 7 days nor will  to an E/M service or procedure within the next 24 hours or my soonest available appointment.  Prevailing standard of care was able to be met in this audio-only visit.      PHYSICIAN: Pedrito Ralph Jr, MD

## 2024-01-25 ENCOUNTER — HOSPITAL ENCOUNTER (INPATIENT)
Facility: HOSPITAL | Age: 84
LOS: 4 days | Discharge: HOME HEALTH | DRG: 196 | End: 2024-01-29
Attending: EMERGENCY MEDICINE | Admitting: INTERNAL MEDICINE
Payer: MEDICARE

## 2024-01-25 DIAGNOSIS — R06.02 SHORTNESS OF BREATH: ICD-10-CM

## 2024-01-25 DIAGNOSIS — J44.1 COPD EXACERBATION: ICD-10-CM

## 2024-01-25 DIAGNOSIS — R07.9 CHEST PAIN: ICD-10-CM

## 2024-01-25 DIAGNOSIS — I50.9 HEART FAILURE: ICD-10-CM

## 2024-01-25 DIAGNOSIS — R09.02 HYPOXIA: ICD-10-CM

## 2024-01-25 DIAGNOSIS — J18.9 MULTIFOCAL PNEUMONIA: Primary | ICD-10-CM

## 2024-01-25 LAB
ADENOVIRUS: NOT DETECTED
ALBUMIN SERPL BCP-MCNC: 4.3 G/DL (ref 3.5–5.2)
ALP SERPL-CCNC: 84 U/L (ref 55–135)
ALT SERPL W/O P-5'-P-CCNC: 10 U/L (ref 10–44)
ANION GAP SERPL CALC-SCNC: 6 MMOL/L (ref 8–16)
AST SERPL-CCNC: 24 U/L (ref 10–40)
BASOPHILS # BLD AUTO: 0.08 K/UL (ref 0–0.2)
BASOPHILS NFR BLD: 1.3 % (ref 0–1.9)
BILIRUB SERPL-MCNC: 1 MG/DL (ref 0.1–1)
BILIRUB UR QL STRIP: NEGATIVE
BNP SERPL-MCNC: 805 PG/ML (ref 0–99)
BORDETELLA PARAPERTUSSIS (IS1001): NOT DETECTED
BORDETELLA PERTUSSIS (PTXP): NOT DETECTED
BUN SERPL-MCNC: 13 MG/DL (ref 8–23)
CALCIUM SERPL-MCNC: 9.3 MG/DL (ref 8.7–10.5)
CHLAMYDIA PNEUMONIAE: NOT DETECTED
CHLORIDE SERPL-SCNC: 101 MMOL/L (ref 95–110)
CLARITY UR: CLEAR
CO2 SERPL-SCNC: 32 MMOL/L (ref 23–29)
COLOR UR: YELLOW
CORONAVIRUS 229E, COMMON COLD VIRUS: NOT DETECTED
CORONAVIRUS HKU1, COMMON COLD VIRUS: NOT DETECTED
CORONAVIRUS NL63, COMMON COLD VIRUS: NOT DETECTED
CORONAVIRUS OC43, COMMON COLD VIRUS: NOT DETECTED
CREAT SERPL-MCNC: 0.6 MG/DL (ref 0.5–1.4)
DIFFERENTIAL METHOD BLD: ABNORMAL
EOSINOPHIL # BLD AUTO: 0.1 K/UL (ref 0–0.5)
EOSINOPHIL NFR BLD: 1.5 % (ref 0–8)
ERYTHROCYTE [DISTWIDTH] IN BLOOD BY AUTOMATED COUNT: 19.9 % (ref 11.5–14.5)
EST. GFR  (NO RACE VARIABLE): >60 ML/MIN/1.73 M^2
FLUBV RNA NPH QL NAA+NON-PROBE: NOT DETECTED
GLUCOSE SERPL-MCNC: 105 MG/DL (ref 70–110)
GLUCOSE UR QL STRIP: NEGATIVE
HCT VFR BLD AUTO: 39 % (ref 37–48.5)
HGB BLD-MCNC: 11.1 G/DL (ref 12–16)
HGB UR QL STRIP: NEGATIVE
HPIV1 RNA NPH QL NAA+NON-PROBE: NOT DETECTED
HPIV2 RNA NPH QL NAA+NON-PROBE: NOT DETECTED
HPIV3 RNA NPH QL NAA+NON-PROBE: NOT DETECTED
HPIV4 RNA NPH QL NAA+NON-PROBE: NOT DETECTED
HUMAN METAPNEUMOVIRUS: NOT DETECTED
IMM GRANULOCYTES # BLD AUTO: 0.01 K/UL (ref 0–0.04)
IMM GRANULOCYTES NFR BLD AUTO: 0.2 % (ref 0–0.5)
INFLUENZA A (SUBTYPES H1,H1-2009,H3): NOT DETECTED
INFLUENZA A, MOLECULAR: NEGATIVE
INFLUENZA B, MOLECULAR: NEGATIVE
KETONES UR QL STRIP: NEGATIVE
LACTATE SERPL-SCNC: 0.9 MMOL/L (ref 0.5–1.9)
LACTATE SERPL-SCNC: 1.1 MMOL/L (ref 0.5–1.9)
LEUKOCYTE ESTERASE UR QL STRIP: NEGATIVE
LYMPHOCYTES # BLD AUTO: 1.3 K/UL (ref 1–4.8)
LYMPHOCYTES NFR BLD: 21.4 % (ref 18–48)
MCH RBC QN AUTO: 22 PG (ref 27–31)
MCHC RBC AUTO-ENTMCNC: 28.5 G/DL (ref 32–36)
MCV RBC AUTO: 77 FL (ref 82–98)
MONOCYTES # BLD AUTO: 0.3 K/UL (ref 0.3–1)
MONOCYTES NFR BLD: 5.7 % (ref 4–15)
MYCOPLASMA PNEUMONIAE: NOT DETECTED
NEUTROPHILS # BLD AUTO: 4.1 K/UL (ref 1.8–7.7)
NEUTROPHILS NFR BLD: 69.9 % (ref 38–73)
NITRITE UR QL STRIP: NEGATIVE
NRBC BLD-RTO: 0 /100 WBC
PH UR STRIP: 7 [PH] (ref 5–8)
PLATELET # BLD AUTO: 252 K/UL (ref 150–450)
PMV BLD AUTO: 10.3 FL (ref 9.2–12.9)
POTASSIUM SERPL-SCNC: 3.9 MMOL/L (ref 3.5–5.1)
PROCALCITONIN SERPL IA-MCNC: <0.05 NG/ML (ref 0–0.5)
PROT SERPL-MCNC: 7.3 G/DL (ref 6–8.4)
PROT UR QL STRIP: NEGATIVE
RBC # BLD AUTO: 5.05 M/UL (ref 4–5.4)
RESPIRATORY INFECTION PANEL SOURCE: NORMAL
RSV RNA NPH QL NAA+NON-PROBE: NOT DETECTED
RV+EV RNA NPH QL NAA+NON-PROBE: NOT DETECTED
SARS-COV-2 RDRP RESP QL NAA+PROBE: NEGATIVE
SARS-COV-2 RNA RESP QL NAA+PROBE: NOT DETECTED
SODIUM SERPL-SCNC: 139 MMOL/L (ref 136–145)
SP GR UR STRIP: 1.01 (ref 1–1.03)
SPECIMEN SOURCE: NORMAL
TROPONIN I SERPL HS-MCNC: 24.1 PG/ML (ref 0–14.9)
TROPONIN I SERPL HS-MCNC: 27 PG/ML (ref 0–14.9)
URN SPEC COLLECT METH UR: NORMAL
UROBILINOGEN UR STRIP-ACNC: NEGATIVE EU/DL
WBC # BLD AUTO: 5.93 K/UL (ref 3.9–12.7)

## 2024-01-25 PROCEDURE — 93010 ELECTROCARDIOGRAM REPORT: CPT | Mod: ,,, | Performed by: GENERAL PRACTICE

## 2024-01-25 PROCEDURE — 36415 COLL VENOUS BLD VENIPUNCTURE: CPT | Performed by: EMERGENCY MEDICINE

## 2024-01-25 PROCEDURE — 87502 INFLUENZA DNA AMP PROBE: CPT | Performed by: EMERGENCY MEDICINE

## 2024-01-25 PROCEDURE — 63600175 PHARM REV CODE 636 W HCPCS: Performed by: INTERNAL MEDICINE

## 2024-01-25 PROCEDURE — 93005 ELECTROCARDIOGRAM TRACING: CPT | Performed by: GENERAL PRACTICE

## 2024-01-25 PROCEDURE — 96375 TX/PRO/DX INJ NEW DRUG ADDON: CPT

## 2024-01-25 PROCEDURE — 99285 EMERGENCY DEPT VISIT HI MDM: CPT | Mod: 25

## 2024-01-25 PROCEDURE — 84484 ASSAY OF TROPONIN QUANT: CPT | Mod: 91 | Performed by: INTERNAL MEDICINE

## 2024-01-25 PROCEDURE — 87154 CUL TYP ID BLD PTHGN 6+ TRGT: CPT | Performed by: EMERGENCY MEDICINE

## 2024-01-25 PROCEDURE — 84145 PROCALCITONIN (PCT): CPT | Performed by: INTERNAL MEDICINE

## 2024-01-25 PROCEDURE — 25000003 PHARM REV CODE 250: Performed by: INTERNAL MEDICINE

## 2024-01-25 PROCEDURE — 27000221 HC OXYGEN, UP TO 24 HOURS

## 2024-01-25 PROCEDURE — 25000242 PHARM REV CODE 250 ALT 637 W/ HCPCS: Performed by: INTERNAL MEDICINE

## 2024-01-25 PROCEDURE — 81003 URINALYSIS AUTO W/O SCOPE: CPT | Performed by: EMERGENCY MEDICINE

## 2024-01-25 PROCEDURE — 21400001 HC TELEMETRY ROOM

## 2024-01-25 PROCEDURE — 96366 THER/PROPH/DIAG IV INF ADDON: CPT

## 2024-01-25 PROCEDURE — 80053 COMPREHEN METABOLIC PANEL: CPT | Performed by: EMERGENCY MEDICINE

## 2024-01-25 PROCEDURE — U0002 COVID-19 LAB TEST NON-CDC: HCPCS | Performed by: EMERGENCY MEDICINE

## 2024-01-25 PROCEDURE — 96365 THER/PROPH/DIAG IV INF INIT: CPT

## 2024-01-25 PROCEDURE — 87040 BLOOD CULTURE FOR BACTERIA: CPT | Mod: 59 | Performed by: EMERGENCY MEDICINE

## 2024-01-25 PROCEDURE — 36415 COLL VENOUS BLD VENIPUNCTURE: CPT | Performed by: INTERNAL MEDICINE

## 2024-01-25 PROCEDURE — 85025 COMPLETE CBC W/AUTO DIFF WBC: CPT | Performed by: EMERGENCY MEDICINE

## 2024-01-25 PROCEDURE — 96367 TX/PROPH/DG ADDL SEQ IV INF: CPT

## 2024-01-25 PROCEDURE — 99900035 HC TECH TIME PER 15 MIN (STAT)

## 2024-01-25 PROCEDURE — 83605 ASSAY OF LACTIC ACID: CPT | Mod: 91 | Performed by: EMERGENCY MEDICINE

## 2024-01-25 PROCEDURE — 25500020 PHARM REV CODE 255: Performed by: EMERGENCY MEDICINE

## 2024-01-25 PROCEDURE — 63600175 PHARM REV CODE 636 W HCPCS: Performed by: EMERGENCY MEDICINE

## 2024-01-25 PROCEDURE — 94640 AIRWAY INHALATION TREATMENT: CPT | Mod: XB

## 2024-01-25 PROCEDURE — 25000242 PHARM REV CODE 250 ALT 637 W/ HCPCS: Performed by: EMERGENCY MEDICINE

## 2024-01-25 PROCEDURE — 87633 RESP VIRUS 12-25 TARGETS: CPT | Performed by: INTERNAL MEDICINE

## 2024-01-25 PROCEDURE — 83880 ASSAY OF NATRIURETIC PEPTIDE: CPT | Performed by: EMERGENCY MEDICINE

## 2024-01-25 PROCEDURE — 84484 ASSAY OF TROPONIN QUANT: CPT | Performed by: EMERGENCY MEDICINE

## 2024-01-25 PROCEDURE — 25000003 PHARM REV CODE 250: Performed by: EMERGENCY MEDICINE

## 2024-01-25 RX ORDER — NALOXONE HCL 0.4 MG/ML
0.02 VIAL (ML) INJECTION
Status: DISCONTINUED | OUTPATIENT
Start: 2024-01-25 | End: 2024-01-29 | Stop reason: HOSPADM

## 2024-01-25 RX ORDER — IBUPROFEN 200 MG
24 TABLET ORAL
Status: DISCONTINUED | OUTPATIENT
Start: 2024-01-25 | End: 2024-01-29 | Stop reason: HOSPADM

## 2024-01-25 RX ORDER — VALSARTAN AND HYDROCHLOROTHIAZIDE 320; 12.5 MG/1; MG/1
1 TABLET, FILM COATED ORAL EVERY MORNING
Status: DISCONTINUED | OUTPATIENT
Start: 2024-01-26 | End: 2024-01-25

## 2024-01-25 RX ORDER — ATORVASTATIN CALCIUM 20 MG/1
20 TABLET, FILM COATED ORAL NIGHTLY
Status: DISCONTINUED | OUTPATIENT
Start: 2024-01-26 | End: 2024-01-29 | Stop reason: HOSPADM

## 2024-01-25 RX ORDER — ENOXAPARIN SODIUM 100 MG/ML
40 INJECTION SUBCUTANEOUS EVERY 24 HOURS
Status: DISCONTINUED | OUTPATIENT
Start: 2024-01-25 | End: 2024-01-25

## 2024-01-25 RX ORDER — METHYLPREDNISOLONE SOD SUCC 125 MG
125 VIAL (EA) INJECTION
Status: COMPLETED | OUTPATIENT
Start: 2024-01-25 | End: 2024-01-25

## 2024-01-25 RX ORDER — SODIUM CHLORIDE 0.9 % (FLUSH) 0.9 %
10 SYRINGE (ML) INJECTION EVERY 12 HOURS PRN
Status: DISCONTINUED | OUTPATIENT
Start: 2024-01-25 | End: 2024-01-29 | Stop reason: HOSPADM

## 2024-01-25 RX ORDER — IBUPROFEN 200 MG
16 TABLET ORAL
Status: DISCONTINUED | OUTPATIENT
Start: 2024-01-25 | End: 2024-01-29 | Stop reason: HOSPADM

## 2024-01-25 RX ORDER — SERTRALINE HYDROCHLORIDE 50 MG/1
100 TABLET, FILM COATED ORAL DAILY
Status: DISCONTINUED | OUTPATIENT
Start: 2024-01-26 | End: 2024-01-29 | Stop reason: HOSPADM

## 2024-01-25 RX ORDER — DILTIAZEM HYDROCHLORIDE 120 MG/1
120 CAPSULE, COATED, EXTENDED RELEASE ORAL DAILY
Status: DISCONTINUED | OUTPATIENT
Start: 2024-01-26 | End: 2024-01-29 | Stop reason: HOSPADM

## 2024-01-25 RX ORDER — FUROSEMIDE 10 MG/ML
40 INJECTION INTRAMUSCULAR; INTRAVENOUS
Status: DISCONTINUED | OUTPATIENT
Start: 2024-01-25 | End: 2024-01-25

## 2024-01-25 RX ORDER — VALSARTAN 80 MG/1
320 TABLET ORAL DAILY
Status: DISCONTINUED | OUTPATIENT
Start: 2024-01-26 | End: 2024-01-25

## 2024-01-25 RX ORDER — PANTOPRAZOLE SODIUM 20 MG/1
20 TABLET, DELAYED RELEASE ORAL DAILY
Status: DISCONTINUED | OUTPATIENT
Start: 2024-01-26 | End: 2024-01-29 | Stop reason: HOSPADM

## 2024-01-25 RX ORDER — METHYLPREDNISOLONE SOD SUCC 125 MG
20 VIAL (EA) INJECTION EVERY 8 HOURS
Status: DISCONTINUED | OUTPATIENT
Start: 2024-01-25 | End: 2024-01-27

## 2024-01-25 RX ORDER — HYDROCHLOROTHIAZIDE 12.5 MG/1
12.5 TABLET ORAL DAILY
Status: DISCONTINUED | OUTPATIENT
Start: 2024-01-26 | End: 2024-01-25

## 2024-01-25 RX ORDER — HYDRALAZINE HYDROCHLORIDE 20 MG/ML
5 INJECTION INTRAMUSCULAR; INTRAVENOUS
Status: DISPENSED | OUTPATIENT
Start: 2024-01-25 | End: 2024-01-26

## 2024-01-25 RX ORDER — HYDRALAZINE HYDROCHLORIDE 20 MG/ML
10 INJECTION INTRAMUSCULAR; INTRAVENOUS
Status: DISCONTINUED | OUTPATIENT
Start: 2024-01-25 | End: 2024-01-25

## 2024-01-25 RX ORDER — IPRATROPIUM BROMIDE AND ALBUTEROL SULFATE 2.5; .5 MG/3ML; MG/3ML
3 SOLUTION RESPIRATORY (INHALATION) EVERY 6 HOURS
Status: DISCONTINUED | OUTPATIENT
Start: 2024-01-25 | End: 2024-01-29 | Stop reason: HOSPADM

## 2024-01-25 RX ORDER — GLUCAGON 1 MG
1 KIT INJECTION
Status: DISCONTINUED | OUTPATIENT
Start: 2024-01-25 | End: 2024-01-29 | Stop reason: HOSPADM

## 2024-01-25 RX ORDER — MAGNESIUM SULFATE HEPTAHYDRATE 40 MG/ML
2 INJECTION, SOLUTION INTRAVENOUS ONCE
Status: COMPLETED | OUTPATIENT
Start: 2024-01-25 | End: 2024-01-25

## 2024-01-25 RX ORDER — HYDROCODONE BITARTRATE AND ACETAMINOPHEN 7.5; 325 MG/1; MG/1
1 TABLET ORAL EVERY 12 HOURS PRN
Status: DISCONTINUED | OUTPATIENT
Start: 2024-01-25 | End: 2024-01-25

## 2024-01-25 RX ORDER — ASPIRIN 325 MG
325 TABLET ORAL
Status: COMPLETED | OUTPATIENT
Start: 2024-01-25 | End: 2024-01-25

## 2024-01-25 RX ORDER — ACETAMINOPHEN 500 MG
1000 TABLET ORAL
Status: COMPLETED | OUTPATIENT
Start: 2024-01-25 | End: 2024-01-25

## 2024-01-25 RX ORDER — TALC
9 POWDER (GRAM) TOPICAL NIGHTLY
Status: DISCONTINUED | OUTPATIENT
Start: 2024-01-25 | End: 2024-01-29 | Stop reason: HOSPADM

## 2024-01-25 RX ORDER — GABAPENTIN 100 MG/1
200 CAPSULE ORAL NIGHTLY
Status: DISCONTINUED | OUTPATIENT
Start: 2024-01-25 | End: 2024-01-27

## 2024-01-25 RX ORDER — FUROSEMIDE 10 MG/ML
10 INJECTION INTRAMUSCULAR; INTRAVENOUS DAILY
Status: DISCONTINUED | OUTPATIENT
Start: 2024-01-25 | End: 2024-01-26

## 2024-01-25 RX ORDER — IPRATROPIUM BROMIDE AND ALBUTEROL SULFATE 2.5; .5 MG/3ML; MG/3ML
3 SOLUTION RESPIRATORY (INHALATION)
Status: COMPLETED | OUTPATIENT
Start: 2024-01-25 | End: 2024-01-25

## 2024-01-25 RX ORDER — HYDROCODONE BITARTRATE AND ACETAMINOPHEN 5; 325 MG/1; MG/1
1 TABLET ORAL EVERY 6 HOURS PRN
Status: DISCONTINUED | OUTPATIENT
Start: 2024-01-25 | End: 2024-01-26

## 2024-01-25 RX ADMIN — CEFTRIAXONE SODIUM 2 G: 2 INJECTION, POWDER, FOR SOLUTION INTRAMUSCULAR; INTRAVENOUS at 02:01

## 2024-01-25 RX ADMIN — GABAPENTIN 200 MG: 100 CAPSULE ORAL at 08:01

## 2024-01-25 RX ADMIN — IPRATROPIUM BROMIDE AND ALBUTEROL SULFATE 3 ML: 2.5; .5 SOLUTION RESPIRATORY (INHALATION) at 02:01

## 2024-01-25 RX ADMIN — ACETAMINOPHEN 1000 MG: 500 TABLET ORAL at 05:01

## 2024-01-25 RX ADMIN — FUROSEMIDE 10 MG: 10 INJECTION, SOLUTION INTRAMUSCULAR; INTRAVENOUS at 07:01

## 2024-01-25 RX ADMIN — HYDROCODONE BITARTRATE AND ACETAMINOPHEN 1 TABLET: 5; 325 TABLET ORAL at 08:01

## 2024-01-25 RX ADMIN — METHYLPREDNISOLONE SODIUM SUCCINATE 20 MG: 125 INJECTION, POWDER, FOR SOLUTION INTRAMUSCULAR; INTRAVENOUS at 07:01

## 2024-01-25 RX ADMIN — DOXYCYCLINE 100 MG: 100 INJECTION, POWDER, LYOPHILIZED, FOR SOLUTION INTRAVENOUS at 03:01

## 2024-01-25 RX ADMIN — ASPIRIN 325 MG ORAL TABLET 325 MG: 325 PILL ORAL at 05:01

## 2024-01-25 RX ADMIN — IOHEXOL 100 ML: 350 INJECTION, SOLUTION INTRAVENOUS at 04:01

## 2024-01-25 RX ADMIN — METHYLPREDNISOLONE SODIUM SUCCINATE 125 MG: 125 INJECTION, POWDER, FOR SOLUTION INTRAMUSCULAR; INTRAVENOUS at 02:01

## 2024-01-25 RX ADMIN — IPRATROPIUM BROMIDE AND ALBUTEROL SULFATE 3 ML: 2.5; .5 SOLUTION RESPIRATORY (INHALATION) at 07:01

## 2024-01-25 RX ADMIN — MAGNESIUM SULFATE HEPTAHYDRATE 2 G: 40 INJECTION, SOLUTION INTRAVENOUS at 05:01

## 2024-01-25 RX ADMIN — Medication 9 MG: at 11:01

## 2024-01-25 NOTE — H&P
"[facility] - Emergency Dept    History & Physical      Patient Name: Lety Herbert  MRN: [id number]  Admission Date: 1/25/2024  Attending Physician: Malik Villagran MD   Primary Care Provider: Shilo Perez MD         Patient information was obtained from patient and ER records.     Subjective:     Principal Problem:Shortness of breath    Chief Complaint:   Chief Complaint   Patient presents with    Shortness of Breath     Always on 02 when she ambulates, states increasing SOB requiring 02 at rest, states "I feel like something is sitting on my chest". Pt 86 on 3Ls in triage.           HPI: 82 y/o F hx HTN, hx ILD, COPD, chronic hypoxemic respiratory failure on p.r.n. oxygen, bilateral mastectomy, AFib, RUL NSCLC, former smoker.     Pt presents to the ER for SOB and worsening cough. She also c/o felt like something sitting on my chest. In the ER 86% on 3L in triage.     Pt to be admitted for SOB    Past Medical History:   Diagnosis Date    Abnormal chest CT 3/9/2023    Anemia     Basal cell carcinoma     nose     Cancer     breast    Depression     DVT (deep venous thrombosis)     Fall 3/20/2018    Former smoker 3/9/2023    Gastric ulceration     GERD (gastroesophageal reflux disease)     History of breast cancer 3/9/2023    History of frequent urinary tract infections     Hyperlipidemia     Hypertension     Malignant neoplasm of upper lobe of right lung (NSCLC favoring lung primary) 3/9/2023    Melanoma 2004?    left forearm    MRSA (methicillin resistant staph aureus) culture positive     Neuropathy     PE (pulmonary embolism)     Post-nasal drip 11/15/2018    Reflux     S/P bilateral mastectomy 3/9/2023       Past Surgical History:   Procedure Laterality Date    HYSTERECTOMY      MASTECTOMY, RADICAL Bilateral     TOTAL HIP ARTHROPLASTY Left 11/13/2017       Review of patient's allergies indicates:   Allergen Reactions    Meperidine     Promethazine     Bactrim " [sulfamethoxazole-trimethoprim] Rash       No current facility-administered medications on file prior to encounter.     Current Outpatient Medications on File Prior to Encounter   Medication Sig    ALPRAZolam (XANAX) 0.25 MG tablet Take 1 tablet (0.25 mg total) by mouth nightly as needed for Anxiety. (Patient taking differently: Take 0.25 mg by mouth daily as needed for Anxiety.)    atorvastatin (LIPITOR) 20 MG tablet Take 1 tablet (20 mg total) by mouth once daily.    azelastine (ASTELIN) 137 mcg (0.1 %) nasal spray 1 spray by Nasal route daily as needed for Rhinitis.    diltiaZEM (CARDIZEM CD) 120 MG Cp24 TAKE ONE CAPSULE BY MOUTH EVERY DAY (Patient taking differently: Take 120 mg by mouth once daily.)    gabapentin (NEURONTIN) 100 MG capsule Take 200 mg by mouth every evening.    HYDROcodone-acetaminophen (NORCO) 7.5-325 mg per tablet Take 1 tablet by mouth every 12 (twelve) hours as needed for Pain.    pantoprazole (PROTONIX) 20 MG tablet TAKE ONE TABLET BY MOUTH EVERY DAY (Patient taking differently: Take 20 mg by mouth once daily.)    sertraline (ZOLOFT) 100 MG tablet TAKE 1 TABLET (100 MG TOTAL) BY MOUTH ONCE DAILY.    XARELTO 20 mg Tab Take 20 mg by mouth 3 (three) times daily.    valsartan-hydrochlorothiazide (DIOVAN-HCT) 320-12.5 mg per tablet Take 1 tablet by mouth every morning.    [DISCONTINUED] loratadine (CLARITIN) 10 mg tablet Take 1 tablet (10 mg total) by mouth once daily.     Family History       Problem Relation (Age of Onset)    Cancer Mother, Father    Heart disease Father          Tobacco Use    Smoking status: Former     Passive exposure: Past    Smokeless tobacco: Never   Substance and Sexual Activity    Alcohol use: No    Drug use: No    Sexual activity: Not Currently     Partners: Male     Review of Systems  Objective:     Vital Signs (Most Recent):  Temp: 98.6 °F (37 °C) (01/25/24 1346)  Pulse: 93 (01/25/24 1536)  Resp: 19 (01/25/24 1454)  BP: (!) 194/103 (01/25/24 1536)  SpO2: 97 %  (01/25/24 1536) Vital Signs (24h Range):  Temp:  [98.6 °F (37 °C)] 98.6 °F (37 °C)  Pulse:  [72-93] 93  Resp:  [18-26] 19  SpO2:  [76 %-100 %] 97 %  BP: (174-201)/() 194/103     Weight: 95.3 kg (210 lb)  Body mass index is 34.95 kg/m².    Physical Exam  HENT:      Right Ear: External ear normal.      Left Ear: External ear normal.      Nose: Nose normal.      Mouth/Throat:      Mouth: Mucous membranes are moist.   Eyes:      Extraocular Movements: Extraocular movements intact.      Pupils: Pupils are equal, round, and reactive to light.   Cardiovascular:      Rate and Rhythm: Normal rate and regular rhythm.   Pulmonary:      Comments: B/l crackles  Abdominal:      General: Abdomen is flat.   Skin:     General: Skin is warm.      Capillary Refill: Capillary refill takes less than 2 seconds.   Neurological:      General: No focal deficit present.      Mental Status: She is alert.           CRANIAL NERVES     CN III, IV, VI   Pupils are equal, round, and reactive to light.      Significant Labs: All pertinent labs within the past 24 hours have been reviewed.    Significant Imaging: I have reviewed all pertinent imaging results/findings within the past 24 hours.    Assessment/Plan:     Active Diagnoses:    Diagnosis Date Noted POA    PRINCIPAL PROBLEM:  Shortness of breath [R06.02] 03/15/2019 Yes      Problems Resolved During this Admission:     VTE Risk Mitigation (From admission, onward)           Ordered     rivaroxaban tablet 20 mg  Daily         01/25/24 1608     IP VTE HIGH RISK PATIENT  Once         01/25/24 1606     Place sequential compression device  Until discontinued         01/25/24 1606                    #SOB/Hypoxia  #acute on chronic hypoxic respiratory failure s/t R pl effusion, pulm HTN, COPD and ILD  #worsening RUL consolidation, hx RUL NSCLC  #acute on chronic HFpEF exac  #chest pain  #HA  #cirrhosis on CT   #HTN  #Afib  #hx bilateral mastectomy  #RUL NSCLC  #former smoker  #very high dose of  xarelto (20mg tid), unclear reason    Admit to tele  Prednisone, gentle lasix, IV abx  Trend CE  Check PCT, respiratory panel, sputum cx  Check echo  IR consult for thora R pl effusion  Pulm csx  Check INR/ammonia  Need to clarify appropriate dose of xarelto that she is on  Hold PTA xarelto for need for thoracentesis        Malik Villagran MD  Department of Hospital Medicine   [facility] - Emergency Dept

## 2024-01-25 NOTE — ED PROVIDER NOTES
"Encounter Date: 1/25/2024       History     Chief Complaint   Patient presents with    Shortness of Breath     Always on 02 when she ambulates, states increasing SOB requiring 02 at rest, states "I feel like something is sitting on my chest". Pt 86 on 3Ls in triage.        83-year-old female presented emergency department with shortness of breath.  Patient on home oxygen.  Patient has history of COPD.  Patient uses home oxygen only when she needs it.  Patient denies chest pain however extremely short of breath and patient has cough and wheezing and generalized malaise and weakness.  Patient has history of atrial fibrillation.  Denies dysuria or hematuria.  Patient extremely short of breath with minimal exertion.  Upon arrival patient was hypoxic at 83% on room air.  Patient uses supplemental oxygen as needed      Review of patient's allergies indicates:   Allergen Reactions    Meperidine     Promethazine     Bactrim [sulfamethoxazole-trimethoprim] Rash     Past Medical History:   Diagnosis Date    Abnormal chest CT 3/9/2023    Anemia     Basal cell carcinoma     nose     Cancer     breast    Depression     DVT (deep venous thrombosis)     Fall 3/20/2018    Former smoker 3/9/2023    Gastric ulceration     GERD (gastroesophageal reflux disease)     History of breast cancer 3/9/2023    History of frequent urinary tract infections     Hyperlipidemia     Hypertension     Malignant neoplasm of upper lobe of right lung (NSCLC favoring lung primary) 3/9/2023    Melanoma 2004?    left forearm    MRSA (methicillin resistant staph aureus) culture positive     Neuropathy     PE (pulmonary embolism)     Post-nasal drip 11/15/2018    Reflux     S/P bilateral mastectomy 3/9/2023     Past Surgical History:   Procedure Laterality Date    HYSTERECTOMY      MASTECTOMY, RADICAL Bilateral     TOTAL HIP ARTHROPLASTY Left 11/13/2017     Family History   Problem Relation Age of Onset    Cancer Mother     Cancer Father     Heart disease " Father     Melanoma Neg Hx     Psoriasis Neg Hx     Lupus Neg Hx     Eczema Neg Hx      Social History     Tobacco Use    Smoking status: Former     Passive exposure: Past    Smokeless tobacco: Never   Substance Use Topics    Alcohol use: No    Drug use: No     Review of Systems   Constitutional:  Positive for fatigue.   HENT: Negative.     Eyes: Negative.    Respiratory:  Positive for cough, shortness of breath and wheezing.    Cardiovascular: Negative.  Negative for chest pain.   Gastrointestinal: Negative.    Endocrine: Negative.    Genitourinary: Negative.    Musculoskeletal: Negative.    Skin: Negative.    Allergic/Immunologic: Negative.    Neurological: Negative.    Hematological: Negative.    Psychiatric/Behavioral: Negative.     All other systems reviewed and are negative.      Physical Exam     Initial Vitals [01/25/24 1346]   BP Pulse Resp Temp SpO2   (!) 201/94 85 (!) 26 98.6 °F (37 °C) (!) 76 %      MAP       --         Physical Exam    Nursing note and vitals reviewed.  Constitutional: She appears well-developed and well-nourished.   HENT:   Head: Normocephalic and atraumatic.   Nose: Nose normal.   Mouth/Throat: Oropharynx is clear and moist.   Eyes: Conjunctivae and EOM are normal. Pupils are equal, round, and reactive to light.   Neck: Neck supple. No thyromegaly present. No tracheal deviation present. No JVD present.   Normal range of motion.  Cardiovascular:  Normal rate, regular rhythm, normal heart sounds and intact distal pulses.           No murmur heard.  Pulmonary/Chest: No stridor. She is in respiratory distress. She has wheezes. She has rhonchi. She has no rales.   Abdominal: Abdomen is soft. Bowel sounds are normal. She exhibits no distension. There is no abdominal tenderness.   Musculoskeletal:         General: No tenderness or edema. Normal range of motion.      Cervical back: Normal range of motion and neck supple.     Neurological: She is alert and oriented to person, place, and time.  She has normal strength. GCS score is 15. GCS eye subscore is 4. GCS verbal subscore is 5. GCS motor subscore is 6.   Skin: Skin is warm. Capillary refill takes less than 2 seconds.   Psychiatric: She has a normal mood and affect. Thought content normal.         ED Course   Procedures  Labs Reviewed   CBC W/ AUTO DIFFERENTIAL - Abnormal; Notable for the following components:       Result Value    Hemoglobin 11.1 (*)     MCV 77 (*)     MCH 22.0 (*)     MCHC 28.5 (*)     RDW 19.9 (*)     All other components within normal limits   COMPREHENSIVE METABOLIC PANEL - Abnormal; Notable for the following components:    CO2 32 (*)     Anion Gap 6 (*)     All other components within normal limits   TROPONIN I HIGH SENSITIVITY - Abnormal; Notable for the following components:    Troponin I High Sensitivity 27.0 (*)     All other components within normal limits   B-TYPE NATRIURETIC PEPTIDE - Abnormal; Notable for the following components:     (*)     All other components within normal limits   CULTURE, BLOOD   CULTURE, BLOOD   LACTIC ACID, PLASMA   URINALYSIS, REFLEX TO URINE CULTURE    Narrative:     Specimen Source->Urine   SARS-COV-2 RNA AMPLIFICATION, QUAL   INFLUENZA A AND B ANTIGEN    Narrative:     Specimen Source->Nasopharyngeal Swab   LACTIC ACID, PLASMA     EKG Readings: (Independently Interpreted)   Initial Reading: No STEMI. Rhythm: Atrial Fibrillation. Ectopy: No Ectopy. Conduction: Normal.     ECG Results              EKG 12-lead (In process)  Result time 01/25/24 13:52:37      In process by Interface, Lab In [facility] (01/25/24 13:52:37)                   Narrative:    Test Reason : R06.02,    Vent. Rate : 084 BPM     Atrial Rate : 096 BPM     P-R Int : 000 ms          QRS Dur : 078 ms      QT Int : 396 ms       P-R-T Axes : 000 037 -37 degrees     QTc Int : 467 ms    Atrial fibrillation  Low voltage QRS  Cannot rule out Anterior infarct ,age undetermined  Abnormal ECG  When compared with ECG of 18-JAN-2023  11:15,  Minimal criteria for Anterior infarct are now Present  T wave inversion now evident in Inferior leads  Inverted T waves have replaced nonspecific T wave abnormality in Anterior  leads    Referred By:             Confirmed By:                                   Imaging Results              X-Ray Chest AP Portable (Final result)  Result time 01/25/24 14:19:16      Final result by Hector Falcon MD (01/25/24 14:19:16)                   Narrative:    Reason: CHF CHF / sob    FINDINGS:  Portable chest at 1353 compared with 2/15/2023 radiograph and 12/19/2023 CT shows unchanged cardiomediastinal silhouette.    Focal alveolar opacity occurs in right superior to mid lung zone, new. Slight prominence of interstitial markings bilaterally is not significantly changed. Lung volumes have decreased. No pleural effusion or pneumothorax. Central pulmonary vascular prominence is mild and unchanged. No acute osseous abnormality.    IMPRESSION:    1. Unchanged central pulmonary vascular prominence and diffuse mild pulmonary interstitial opacities which could reflect mild interstitial edema or sequelae of chronic interstitial lung disease.  2. Confluent right mid to superior lung zone alveolar opacity, not significantly changed since 12/19/2023 CT and best evaluated on recent CT.    Electronically signed by:  Hector Falcon MD  01/25/2024 02:19 PM CST Workstation: [id number]                                  X-Rays:   Independently Interpreted Readings:   Other Readings:  Chest x-ray shows right upper lobe infiltrate which could be mass versus infiltrate and diffuse patchy infiltrates    Medications   cefTRIAXone (ROCEPHIN) 2 g in dextrose 5 % 100 mL IVPB (ready to mix) (0 g Intravenous Stopped 1/25/24 1507)   doxycycline (VIBRAMYCIN) 100 mg in dextrose 5% 100 mL IVPB (ready to mix) (100 mg Intravenous New Bag 1/25/24 1541)   iohexoL (OMNIPAQUE 350) injection 100 mL (has no administration in time range)   magnesium sulfate 2g in  water 50mL IVPB (premix) (has no administration in time range)   hydrALAZINE injection 5 mg (has no administration in time range)   sodium chloride 0.9% flush 10 mL (has no administration in time range)   naloxone 0.4 mg/mL injection 0.02 mg (has no administration in time range)   glucose chewable tablet 16 g (has no administration in time range)   glucose chewable tablet 24 g (has no administration in time range)   dextrose 50% injection 12.5 g (has no administration in time range)   dextrose 50% injection 25 g (has no administration in time range)   glucagon (human recombinant) injection 1 mg (has no administration in time range)   aspirin tablet 325 mg (has no administration in time range)   atorvastatin tablet 20 mg (has no administration in time range)   diltiaZEM 24 hr capsule 120 mg (has no administration in time range)   gabapentin capsule 200 mg (has no administration in time range)   pantoprazole EC tablet 20 mg (has no administration in time range)   sertraline tablet 100 mg (has no administration in time range)   rivaroxaban tablet 20 mg (has no administration in time range)   valsartan tablet 320 mg (has no administration in time range)     And   hydroCHLOROthiazide tablet 12.5 mg (has no administration in time range)   albuterol-ipratropium 2.5 mg-0.5 mg/3 mL nebulizer solution 3 mL (3 mLs Nebulization Given 1/25/24 1437)   methylPREDNISolone sodium succinate injection 125 mg (125 mg Intravenous Given 1/25/24 1410)     Medical Decision Making  83-year-old female with productive cough and wheezing and shortness of breath.  Patient's presentation consistent with COPD exacerbation.  Patient is hypoxic.  Patient also likely has a component of pulmonary infection.  Patient is extremely hypertensive.  Blood pressure managed in the emergency department.  Screening labs and workup reviewed and Hospital Medicine consulted for evaluation for further management.  Antibiotics steroids and breathing treatments  given and patient did have improvement of symptoms.  Supplemental oxygen used.  Hypoxia resolved with treatment.  Hospital Medicine consulted for evaluation for further management and treatment.  CT of the chest for further evaluation of patient's lesions and to rule out PE as well.  Patient on blood thinners unlikely that this is pulmonary embolism and more likely that it is infectious in nature    Amount and/or Complexity of Data Reviewed  Labs: ordered. Decision-making details documented in ED Course.  Radiology: ordered. Decision-making details documented in ED Course.  ECG/medicine tests: ordered and independent interpretation performed. Decision-making details documented in ED Course.    Risk  OTC drugs.  Prescription drug management.  Decision regarding hospitalization.                                      Clinical Impression:  Final diagnoses:  [R06.02] Shortness of breath  [J18.9] Multifocal pneumonia (Primary)  [R09.02] Hypoxia  [J44.1] COPD exacerbation          ED Disposition Condition    Admit Serious                Vibha Pierre MD  01/25/24 3571

## 2024-01-25 NOTE — Clinical Note
Diagnosis: Multifocal pneumonia [[id number]]   Future Attending Provider: STANFORD BENAVIDES [[id number]]   Admit to which facility:: [facility] [[id number]]   Reason for IP Medical Treatment  (Clinical interventions that can only be accomplished in the IP setting? ) :: respiratory failure   I certify that Inpatient services for greater than or equal to 2 midnights are medically necessary:: Yes   Plans for Post-Acute care--if anticipated (pick the single best option):: A. No post acute care anticipated at this time

## 2024-01-25 NOTE — ED NOTES
Pt requested to go to the bathroom but refused to wait. By the time I got to the room the pt was ambulating to the bathroom with family member without any oxygen. Pt was adamant about walking herself to the bathroom. Pt had a steady gait and daughter was helping her to the bathroom. When pt came back to the room and was connected to the monitor she was sating 62% at room air, pt was severely SOB. Put oxy mask on pt with 10L O2. Pts sats came back to the 98%. Gabby JAVED was notified. Pt was educated about the importance of wearing her oxygen and the dangers of low O2 sats. Bedside commode is now at the bedside. Pt is connected to the monitor and currently on 5L NS sating at 98%.

## 2024-01-26 ENCOUNTER — CLINICAL SUPPORT (OUTPATIENT)
Dept: CARDIOLOGY | Facility: HOSPITAL | Age: 84
DRG: 196 | End: 2024-01-26
Attending: INTERNAL MEDICINE
Payer: MEDICARE

## 2024-01-26 VITALS — WEIGHT: 210 LBS | HEIGHT: 65 IN | BODY MASS INDEX: 34.99 KG/M2

## 2024-01-26 PROBLEM — J96.11 CHRONIC HYPOXEMIC RESPIRATORY FAILURE: Status: RESOLVED | Noted: 2020-06-30 | Resolved: 2024-01-26

## 2024-01-26 PROBLEM — I50.32 CHRONIC HEART FAILURE WITH PRESERVED EJECTION FRACTION: Status: RESOLVED | Noted: 2020-06-30 | Resolved: 2024-01-26

## 2024-01-26 PROBLEM — C34.11 MALIGNANT NEOPLASM OF UPPER LOBE OF RIGHT LUNG: Status: RESOLVED | Noted: 2023-03-09 | Resolved: 2024-01-26

## 2024-01-26 LAB
ACINETOBACTER CALCOACETICUS/BAUMANNII COMPLEX: NOT DETECTED
AMMONIA PLAS-SCNC: 42 UMOL/L (ref 10–50)
ANION GAP SERPL CALC-SCNC: 8 MMOL/L (ref 8–16)
AORTIC ROOT ANNULUS: 3.4 CM
AORTIC VALVE CUSP SEPERATION: 2 CM
APTT PPP: 30.2 SEC (ref 21–32)
ASCENDING AORTA: 3.4 CM
AV INDEX (PROSTH): 0.72
AV MEAN GRADIENT: 3 MMHG
AV PEAK GRADIENT: 6 MMHG
AV VALVE AREA BY VELOCITY RATIO: 2.54 CM²
AV VALVE AREA: 2.26 CM²
AV VELOCITY RATIO: 0.81
BACTEROIDES FRAGILIS: NOT DETECTED
BSA FOR ECHO PROCEDURE: 2.09 M2
BUN SERPL-MCNC: 8 MG/DL (ref 8–23)
CALCIUM SERPL-MCNC: 8.8 MG/DL (ref 8.7–10.5)
CANDIDA ALBICANS: NOT DETECTED
CANDIDA AURIS: NOT DETECTED
CANDIDA GLABRATA: NOT DETECTED
CANDIDA KRUSEI: NOT DETECTED
CANDIDA PARAPSILOSIS: NOT DETECTED
CANDIDA TROPICALIS: NOT DETECTED
CHLORIDE SERPL-SCNC: 101 MMOL/L (ref 95–110)
CO2 SERPL-SCNC: 29 MMOL/L (ref 23–29)
CREAT SERPL-MCNC: 0.5 MG/DL (ref 0.5–1.4)
CRYPTOCOCCUS NEOFORMANS/GATTII: NOT DETECTED
CTX-M GENE (ESBL PRODUCER): ABNORMAL
CV ECHO LV RWT: 0.3 CM
DOP CALC AO PEAK VEL: 1.26 M/S
DOP CALC AO VTI: 22.2 CM
DOP CALC LVOT AREA: 3.1 CM2
DOP CALC LVOT DIAMETER: 2 CM
DOP CALC LVOT PEAK VEL: 1.02 M/S
DOP CALC LVOT STROKE VOLUME: 50.24 CM3
DOP CALC MV VTI: 27.1 CM
DOP CALCLVOT PEAK VEL VTI: 16 CM
E WAVE DECELERATION TIME: 183 MSEC
E/A RATIO: 2.93
E/E' RATIO: 9.64 M/S
ECHO LV POSTERIOR WALL: 0.81 CM (ref 0.6–1.1)
ENTEROBACTER CLOACAE COMPLEX: NOT DETECTED
ENTEROBACTERALES: NOT DETECTED
ENTEROCOCCUS FAECALIS: NOT DETECTED
ENTEROCOCCUS FAECIUM: NOT DETECTED
ERYTHROCYTE [DISTWIDTH] IN BLOOD BY AUTOMATED COUNT: 19.6 % (ref 11.5–14.5)
ESCHERICHIA COLI: NOT DETECTED
EST. GFR  (NO RACE VARIABLE): >60 ML/MIN/1.73 M^2
FRACTIONAL SHORTENING: 35 % (ref 28–44)
GLUCOSE SERPL-MCNC: 163 MG/DL (ref 70–110)
HAEMOPHILUS INFLUENZAE: NOT DETECTED
HCT VFR BLD AUTO: 35.9 % (ref 37–48.5)
HGB BLD-MCNC: 10.6 G/DL (ref 12–16)
IMP GENE (CARBAPENEM RESISTANT): ABNORMAL
INR PPP: 1.3 (ref 0.8–1.2)
INTERVENTRICULAR SEPTUM: 1.06 CM (ref 0.6–1.1)
IVC DIAMETER: 3.23 CM
KLEBSIELLA AEROGENES: NOT DETECTED
KLEBSIELLA OXYTOCA: NOT DETECTED
KLEBSIELLA PNEUMONIAE GROUP: NOT DETECTED
KPC RESISTANCE GENE (CARBAPENEM): ABNORMAL
LDH SERPL L TO P-CCNC: 210 U/L (ref 110–260)
LEFT ATRIUM SIZE: 5.2 CM
LEFT INTERNAL DIMENSION IN SYSTOLE: 3.53 CM (ref 2.1–4)
LEFT VENTRICLE DIASTOLIC VOLUME INDEX: 70.3 ML/M2
LEFT VENTRICLE DIASTOLIC VOLUME: 142 ML
LEFT VENTRICLE MASS INDEX: 94 G/M2
LEFT VENTRICLE SYSTOLIC VOLUME INDEX: 25.7 ML/M2
LEFT VENTRICLE SYSTOLIC VOLUME: 51.9 ML
LEFT VENTRICULAR INTERNAL DIMENSION IN DIASTOLE: 5.41 CM (ref 3.5–6)
LEFT VENTRICULAR MASS: 189.87 G
LISTERIA MONOCYTOGENES: NOT DETECTED
LV LATERAL E/E' RATIO: 9 M/S
LV SEPTAL E/E' RATIO: 10.38 M/S
LVOT MG: 2 MMHG
LVOT MV: 0.66 CM/S
MCH RBC QN AUTO: 22.2 PG (ref 27–31)
MCHC RBC AUTO-ENTMCNC: 29.5 G/DL (ref 32–36)
MCR-1: ABNORMAL
MCV RBC AUTO: 75 FL (ref 82–98)
MEC A/C AND MREJ (MRSA): ABNORMAL
MEC A/C: ABNORMAL
MRSA SCREEN BY PCR: NEGATIVE
MV MEAN GRADIENT: 3 MMHG
MV PEAK A VEL: 0.46 M/S
MV PEAK E VEL: 1.35 M/S
MV PEAK GRADIENT: 8 MMHG
MV STENOSIS PRESSURE HALF TIME: 67 MS
MV VALVE AREA BY CONTINUITY EQUATION: 1.85 CM2
MV VALVE AREA P 1/2 METHOD: 3.28 CM2
NDM GENE (CARBAPENEM RESISTANT): ABNORMAL
NEISSERIA MENINGITIDIS: NOT DETECTED
OXA-48-LIKE (CARBAPENEM RESISTANT): ABNORMAL
PISA MRMAX VEL: 5.64 M/S
PISA TR MAX VEL: 3.46 M/S
PLATELET # BLD AUTO: 212 K/UL (ref 150–450)
PMV BLD AUTO: 9.9 FL (ref 9.2–12.9)
POTASSIUM SERPL-SCNC: 3.4 MMOL/L (ref 3.5–5.1)
PROTEUS SPECIES: NOT DETECTED
PROTHROMBIN TIME: 14 SEC (ref 9–12.5)
PSEUDOMONAS AERUGINOSA: NOT DETECTED
PV MV: 0.82 M/S
PV PEAK GRADIENT: 6 MMHG
PV PEAK VELOCITY: 1.18 M/S
RA PRESSURE ESTIMATED: 15 MMHG
RA PRESSURE: 15 MMHG
RBC # BLD AUTO: 4.78 M/UL (ref 4–5.4)
RIGHT VENTRICULAR END-DIASTOLIC DIMENSION: 3.9 CM
RV TB RVSP: 18 MMHG
RV TISSUE DOPPLER FREE WALL SYSTOLIC VELOCITY 1 (APICAL 4 CHAMBER VIEW): 10.7 CM/S
SALMONELLA SP: NOT DETECTED
SERRATIA MARCESCENS: NOT DETECTED
SODIUM SERPL-SCNC: 138 MMOL/L (ref 136–145)
STAPHYLOCOCCUS AUREUS: NOT DETECTED
STAPHYLOCOCCUS EPIDERMIDIS: NOT DETECTED
STAPHYLOCOCCUS LUGDUNESIS: NOT DETECTED
STAPHYLOCOCCUS SPECIES: NOT DETECTED
STENOTROPHOMONAS MALTOPHILIA: NOT DETECTED
STREPTOCOCCUS AGALACTIAE: NOT DETECTED
STREPTOCOCCUS PNEUMONIAE: NOT DETECTED
STREPTOCOCCUS PYOGENES: NOT DETECTED
STREPTOCOCCUS SPECIES: DETECTED
TDI LATERAL: 0.15 M/S
TDI SEPTAL: 0.13 M/S
TDI: 0.14 M/S
TR MAX PG: 48 MMHG
TV REST PULMONARY ARTERY PRESSURE: 63 MMHG
VAN A/B (VRE GENE): ABNORMAL
VIM GENE (CARBAPENEM RESISTANT): ABNORMAL
WBC # BLD AUTO: 3.49 K/UL (ref 3.9–12.7)
Z-SCORE OF LEFT VENTRICULAR DIMENSION IN END DIASTOLE: -0.95
Z-SCORE OF LEFT VENTRICULAR DIMENSION IN END SYSTOLE: -0.26

## 2024-01-26 PROCEDURE — 85610 PROTHROMBIN TIME: CPT | Performed by: INTERNAL MEDICINE

## 2024-01-26 PROCEDURE — 63600175 PHARM REV CODE 636 W HCPCS: Performed by: INTERNAL MEDICINE

## 2024-01-26 PROCEDURE — 83615 LACTATE (LD) (LDH) ENZYME: CPT | Performed by: INTERNAL MEDICINE

## 2024-01-26 PROCEDURE — 63600175 PHARM REV CODE 636 W HCPCS: Performed by: STUDENT IN AN ORGANIZED HEALTH CARE EDUCATION/TRAINING PROGRAM

## 2024-01-26 PROCEDURE — 99900031 HC PATIENT EDUCATION (STAT)

## 2024-01-26 PROCEDURE — 25000003 PHARM REV CODE 250: Performed by: STUDENT IN AN ORGANIZED HEALTH CARE EDUCATION/TRAINING PROGRAM

## 2024-01-26 PROCEDURE — 25000003 PHARM REV CODE 250: Performed by: INTERNAL MEDICINE

## 2024-01-26 PROCEDURE — 93306 TTE W/DOPPLER COMPLETE: CPT

## 2024-01-26 PROCEDURE — 21400001 HC TELEMETRY ROOM

## 2024-01-26 PROCEDURE — 93306 TTE W/DOPPLER COMPLETE: CPT | Mod: 26,,, | Performed by: GENERAL PRACTICE

## 2024-01-26 PROCEDURE — 99222 1ST HOSP IP/OBS MODERATE 55: CPT | Mod: ,,, | Performed by: INTERNAL MEDICINE

## 2024-01-26 PROCEDURE — 36415 COLL VENOUS BLD VENIPUNCTURE: CPT | Performed by: INTERNAL MEDICINE

## 2024-01-26 PROCEDURE — 85027 COMPLETE CBC AUTOMATED: CPT | Performed by: INTERNAL MEDICINE

## 2024-01-26 PROCEDURE — 87641 MR-STAPH DNA AMP PROBE: CPT | Performed by: INTERNAL MEDICINE

## 2024-01-26 PROCEDURE — 25000242 PHARM REV CODE 250 ALT 637 W/ HCPCS: Performed by: INTERNAL MEDICINE

## 2024-01-26 PROCEDURE — 94761 N-INVAS EAR/PLS OXIMETRY MLT: CPT

## 2024-01-26 PROCEDURE — 99900035 HC TECH TIME PER 15 MIN (STAT)

## 2024-01-26 PROCEDURE — 87070 CULTURE OTHR SPECIMN AEROBIC: CPT | Performed by: INTERNAL MEDICINE

## 2024-01-26 PROCEDURE — 80048 BASIC METABOLIC PNL TOTAL CA: CPT | Performed by: INTERNAL MEDICINE

## 2024-01-26 PROCEDURE — 94640 AIRWAY INHALATION TREATMENT: CPT

## 2024-01-26 PROCEDURE — 27000221 HC OXYGEN, UP TO 24 HOURS

## 2024-01-26 PROCEDURE — 87205 SMEAR GRAM STAIN: CPT | Performed by: INTERNAL MEDICINE

## 2024-01-26 PROCEDURE — 82140 ASSAY OF AMMONIA: CPT | Performed by: INTERNAL MEDICINE

## 2024-01-26 PROCEDURE — 85730 THROMBOPLASTIN TIME PARTIAL: CPT | Performed by: INTERNAL MEDICINE

## 2024-01-26 RX ORDER — POTASSIUM CHLORIDE 20 MEQ/1
40 TABLET, EXTENDED RELEASE ORAL ONCE
Status: COMPLETED | OUTPATIENT
Start: 2024-01-26 | End: 2024-01-26

## 2024-01-26 RX ORDER — LANOLIN ALCOHOL/MO/W.PET/CERES
800 CREAM (GRAM) TOPICAL
Status: DISCONTINUED | OUTPATIENT
Start: 2024-01-26 | End: 2024-01-29 | Stop reason: HOSPADM

## 2024-01-26 RX ORDER — HYDROCODONE BITARTRATE AND ACETAMINOPHEN 7.5; 325 MG/1; MG/1
1 TABLET ORAL EVERY 4 HOURS PRN
Status: DISCONTINUED | OUTPATIENT
Start: 2024-01-26 | End: 2024-01-29 | Stop reason: HOSPADM

## 2024-01-26 RX ORDER — FUROSEMIDE 10 MG/ML
20 INJECTION INTRAMUSCULAR; INTRAVENOUS DAILY
Status: DISCONTINUED | OUTPATIENT
Start: 2024-01-26 | End: 2024-01-29 | Stop reason: HOSPADM

## 2024-01-26 RX ORDER — SODIUM,POTASSIUM PHOSPHATES 280-250MG
2 POWDER IN PACKET (EA) ORAL
Status: DISCONTINUED | OUTPATIENT
Start: 2024-01-26 | End: 2024-01-29 | Stop reason: HOSPADM

## 2024-01-26 RX ADMIN — IPRATROPIUM BROMIDE AND ALBUTEROL SULFATE 3 ML: 2.5; .5 SOLUTION RESPIRATORY (INHALATION) at 01:01

## 2024-01-26 RX ADMIN — HYDROCODONE BITARTRATE AND ACETAMINOPHEN 1 TABLET: 5; 325 TABLET ORAL at 12:01

## 2024-01-26 RX ADMIN — HYDROCODONE BITARTRATE AND ACETAMINOPHEN 1 TABLET: 5; 325 TABLET ORAL at 06:01

## 2024-01-26 RX ADMIN — DOXYCYCLINE 100 MG: 100 INJECTION, POWDER, LYOPHILIZED, FOR SOLUTION INTRAVENOUS at 07:01

## 2024-01-26 RX ADMIN — PANTOPRAZOLE SODIUM 20 MG: 20 TABLET, DELAYED RELEASE ORAL at 05:01

## 2024-01-26 RX ADMIN — IPRATROPIUM BROMIDE AND ALBUTEROL SULFATE 3 ML: 2.5; .5 SOLUTION RESPIRATORY (INHALATION) at 12:01

## 2024-01-26 RX ADMIN — VANCOMYCIN HYDROCHLORIDE 1500 MG: 1.5 INJECTION, POWDER, LYOPHILIZED, FOR SOLUTION INTRAVENOUS at 05:01

## 2024-01-26 RX ADMIN — IPRATROPIUM BROMIDE AND ALBUTEROL SULFATE 3 ML: 2.5; .5 SOLUTION RESPIRATORY (INHALATION) at 07:01

## 2024-01-26 RX ADMIN — SERTRALINE HYDROCHLORIDE 100 MG: 50 TABLET ORAL at 08:01

## 2024-01-26 RX ADMIN — METHYLPREDNISOLONE SODIUM SUCCINATE 20 MG: 125 INJECTION, POWDER, FOR SOLUTION INTRAMUSCULAR; INTRAVENOUS at 09:01

## 2024-01-26 RX ADMIN — HYDROCODONE BITARTRATE AND ACETAMINOPHEN 1 TABLET: 7.5; 325 TABLET ORAL at 09:01

## 2024-01-26 RX ADMIN — METHYLPREDNISOLONE SODIUM SUCCINATE 20 MG: 125 INJECTION, POWDER, FOR SOLUTION INTRAMUSCULAR; INTRAVENOUS at 05:01

## 2024-01-26 RX ADMIN — ATORVASTATIN CALCIUM 20 MG: 20 TABLET, FILM COATED ORAL at 09:01

## 2024-01-26 RX ADMIN — POTASSIUM CHLORIDE 40 MEQ: 1500 TABLET, EXTENDED RELEASE ORAL at 09:01

## 2024-01-26 RX ADMIN — GABAPENTIN 200 MG: 100 CAPSULE ORAL at 09:01

## 2024-01-26 RX ADMIN — DILTIAZEM HYDROCHLORIDE 120 MG: 120 CAPSULE, COATED, EXTENDED RELEASE ORAL at 08:01

## 2024-01-26 RX ADMIN — DOXYCYCLINE 100 MG: 100 INJECTION, POWDER, LYOPHILIZED, FOR SOLUTION INTRAVENOUS at 08:01

## 2024-01-26 RX ADMIN — METHYLPREDNISOLONE SODIUM SUCCINATE 20 MG: 125 INJECTION, POWDER, FOR SOLUTION INTRAMUSCULAR; INTRAVENOUS at 03:01

## 2024-01-26 RX ADMIN — FUROSEMIDE 20 MG: 10 INJECTION, SOLUTION INTRAMUSCULAR; INTRAVENOUS at 08:01

## 2024-01-26 RX ADMIN — Medication 9 MG: at 09:01

## 2024-01-26 RX ADMIN — HYDROCODONE BITARTRATE AND ACETAMINOPHEN 1 TABLET: 7.5; 325 TABLET ORAL at 04:01

## 2024-01-26 RX ADMIN — CEFTRIAXONE SODIUM 1 G: 1 INJECTION, POWDER, FOR SOLUTION INTRAMUSCULAR; INTRAVENOUS at 04:01

## 2024-01-26 RX ADMIN — VANCOMYCIN HYDROCHLORIDE 2000 MG: 500 INJECTION, POWDER, LYOPHILIZED, FOR SOLUTION INTRAVENOUS at 06:01

## 2024-01-26 NOTE — PROGRESS NOTES
VANCOMYCIN PHARMACOKINETIC NOTE:  Vancomycin Day # 1    Objective/Assessment:    Diagnosis/Indication for Vancomycin:Bacteremia      83 y.o., female; Actual Body Weight = 95.3 kg (210 lb).    The patient has the following labs:  1/26/2024 Estimated Creatinine Clearance: 93.9 mL/min (based on SCr of 0.5 mg/dL). Lab Results   Component Value Date    BUN 8 01/26/2024     Lab Results   Component Value Date    WBC 3.49 (L) 01/26/2024          Plan:  Adjust vancomycin dose and/or frequency based on the patient's actual weight and renal function:  Initiate Vancomycin 1500 mg IV every 12 hours following 2000 mg loading dose.  Orders have been entered into patient's chart.        Vancomycin trough level has been ordered for 1/27 at 1700.    Pharmacy will manage vancomycin therapy, monitor serum vancomycin levels, monitor renal function and adjust regimen as necessary.    Thank you for allowing us to participate in this patient's care.     Amarilis Cheng 1/26/2024   Department of Pharmacy  Ext [phone]

## 2024-01-26 NOTE — CARE UPDATE
01/26/24 0742   Patient Assessment/Suction   Level of Consciousness (AVPU) alert   Respiratory Effort Normal;Unlabored   Expansion/Accessory Muscles/Retractions no use of accessory muscles   All Lung Fields Breath Sounds clear;diminished;equal bilaterally   Rhythm/Pattern, Respiratory unlabored;pattern regular   Cough Frequency infrequent   Cough Type dry;good;nonproductive   Skin Integrity   $ Wound Care Tech Time 15 min   Area Observed Left;Behind ear;Right;Cheek;Upper lip;Nares   Skin Appearance without discoloration   PRE-TX-O2   Device (Oxygen Therapy) nasal cannula with humidification   $ Is the patient on Low Flow Oxygen? Yes   Flow (L/min) (S)  3  (Pt wears 3L at home, weaned from 5L)   SpO2 98 %   Pulse Oximetry Type Intermittent   $ Pulse Oximetry - Multiple Charge Pulse Oximetry - Multiple   Pulse 88   Resp (!) 22   Education   $ Education Bronchodilator;15 min

## 2024-01-26 NOTE — NURSING
Nurses Note -- 4 Eyes      1/25/2024   10:19 PM      Skin assessed during: Admit      [x] No Altered Skin Integrity Present    []Prevention Measures Documented      [] Yes- Altered Skin Integrity Present or Discovered   [] LDA Added if Not in Epic (Describe Wound)   [] New Altered Skin Integrity was Present on Admit and Documented in LDA   [] Wound Image Taken    Wound Care Consulted? No    Attending Nurse:  Agustin Dumont RN/Staff Member:   [id number]

## 2024-01-26 NOTE — CONSULTS
Pulmonary/Critical Care Consult      Patient name: Lety Herbert  MRN: [id number]  Date: 01/26/2024    Admit Date: 1/25/2024  Consult Requested By: Cristina Ibrahim MD    Reason for Consult: dyspnea, COPD, lung cancer    HPI:    1/26/2024- 82 yo ho ILD, COPD (last spirometry 2/2023 - NO obstruction, mild restriction), hypoxemia (on home O2), NSCLC (s/p XRT), h/o bilateral mastectomy, AF presented to ER with increased SOB and now admitted for further treatment.  ROS as below.  By report she was on a high dose of xarelto at home    Review of Systems    Review of Systems   Constitutional:  Positive for malaise/fatigue. Negative for chills, diaphoresis, fever and weight loss.   HENT:  Negative for congestion.    Eyes:  Negative for pain.   Respiratory:  Positive for cough and shortness of breath. Negative for hemoptysis, sputum production, wheezing and stridor.         Some chest tightness   Cardiovascular:  Negative for chest pain, palpitations, orthopnea, claudication, leg swelling and PND.   Gastrointestinal:  Negative for abdominal pain, constipation, diarrhea, heartburn, nausea and vomiting.   Genitourinary:  Negative for dysuria, frequency and urgency.   Musculoskeletal:  Negative for falls and myalgias.   Neurological:  Positive for weakness. Negative for sensory change and focal weakness.   Psychiatric/Behavioral:  Negative for depression, substance abuse and suicidal ideas. The patient is not nervous/anxious.        Past Medical History    Past Medical History:   Diagnosis Date    Abnormal chest CT 3/9/2023    Anemia     Basal cell carcinoma     nose     Cancer     breast    Depression     DVT (deep venous thrombosis)     Fall 3/20/2018    Former smoker 3/9/2023    Gastric ulceration     GERD (gastroesophageal reflux disease)     History of breast cancer 3/9/2023    History of frequent urinary tract infections     Hyperlipidemia     Hypertension     Malignant neoplasm of upper lobe of right lung (NSCLC  favoring lung primary) 3/9/2023    Melanoma 2004?    left forearm    MRSA (methicillin resistant staph aureus) culture positive     Neuropathy     PE (pulmonary embolism)     Post-nasal drip 11/15/2018    Reflux     S/P bilateral mastectomy 3/9/2023       Past Surgical History    Past Surgical History:   Procedure Laterality Date    HYSTERECTOMY      MASTECTOMY, RADICAL Bilateral     TOTAL HIP ARTHROPLASTY Left 11/13/2017       Medications (scheduled):      albuterol-ipratropium  3 mL Nebulization Q6H    atorvastatin  20 mg Oral QHS    cefTRIAXone (Rocephin) IV (PEDS and ADULTS)  1 g Intravenous Q24H    diltiaZEM  120 mg Oral Daily    doxycycline (VIBRAMYCIN) IVPB  100 mg Intravenous Q12H    furosemide (LASIX) injection  20 mg Intravenous Daily    gabapentin  200 mg Oral QHS    melatonin  9 mg Oral Nightly    methylPREDNISolone sodium succinate injection  20 mg Intravenous Q8H    pantoprazole  20 mg Oral Daily    sertraline  100 mg Oral Daily    vancomycin (VANCOCIN) IV (PEDS and ADULTS)  1,500 mg Intravenous Q12H       Medications (infusions):         Medications (prn):     dextrose 50%, dextrose 50%, glucagon (human recombinant), glucose, glucose, HYDROcodone-acetaminophen, magnesium oxide, magnesium oxide, naloxone, potassium bicarbonate, potassium bicarbonate, potassium bicarbonate, potassium, sodium phosphates, potassium, sodium phosphates, potassium, sodium phosphates, sodium chloride 0.9%, Pharmacy to dose Vancomycin consult **AND** vancomycin - pharmacy to dose    Family History:   Family History   Problem Relation Age of Onset    Cancer Mother     Cancer Father     Heart disease Father     Melanoma Neg Hx     Psoriasis Neg Hx     Lupus Neg Hx     Eczema Neg Hx        Social History: Tobacco:   Social History     Tobacco Use   Smoking Status Former    Passive exposure: Past   Smokeless Tobacco Never                                EtOH:   Social History     Substance and Sexual Activity   Alcohol Use No        "                         Drugs:   Social History     Substance and Sexual Activity   Drug Use No       Physical Exam    Vital signs:  Temp:  [97.5 °F (36.4 °C)-99.3 °F (37.4 °C)]   Pulse:  [72-96]   Resp:  [17-26]   BP: (144-201)/()   SpO2:  [76 %-100 %]     Intake/Output:   Intake/Output Summary (Last 24 hours) at 1/26/2024 1343  Last data filed at 1/26/2024 0947  Gross per 24 hour   Intake 340 ml   Output 250 ml   Net 90 ml        BMI: Estimated body mass index is 34.95 kg/m² as calculated from the following:    Height as of 1/16/24: 5' 5" (1.651 m).    Weight as of this encounter: 95.3 kg (210 lb).    Physical Exam  Vitals and nursing note reviewed.   Constitutional:       General: She is not in acute distress.     Appearance: Normal appearance. She is ill-appearing. She is not toxic-appearing or diaphoretic.   HENT:      Head: Normocephalic and atraumatic.      Right Ear: External ear normal.      Left Ear: External ear normal.      Nose: Nose normal. No congestion or rhinorrhea.      Mouth/Throat:      Mouth: Mucous membranes are moist.      Pharynx: Oropharynx is clear. No oropharyngeal exudate or posterior oropharyngeal erythema.   Eyes:      General: No scleral icterus.        Right eye: No discharge.         Left eye: No discharge.      Extraocular Movements: Extraocular movements intact.      Conjunctiva/sclera: Conjunctivae normal.      Pupils: Pupils are equal, round, and reactive to light.   Neck:      Vascular: No carotid bruit.   Cardiovascular:      Rate and Rhythm: Normal rate and regular rhythm.      Pulses: Normal pulses.      Heart sounds: No murmur heard.     No friction rub. No gallop.   Pulmonary:      Effort: Pulmonary effort is normal. No respiratory distress.      Breath sounds: No stridor. Rales present. No wheezing or rhonchi.      Comments: Mild labored breathing  Decreased at bases  Chest:      Chest wall: No tenderness.   Abdominal:      General: Bowel sounds are normal. There is " "no distension.      Tenderness: There is no abdominal tenderness. There is no guarding.   Musculoskeletal:         General: No swelling. Normal range of motion.      Cervical back: Normal range of motion and neck supple. No rigidity or tenderness.      Right lower leg: No edema.      Left lower leg: No edema.   Lymphadenopathy:      Cervical: No cervical adenopathy.   Skin:     General: Skin is warm and dry.      Capillary Refill: Capillary refill takes less than 2 seconds.      Coloration: Skin is not jaundiced.      Findings: No bruising.   Neurological:      General: No focal deficit present.      Mental Status: She is alert and oriented to person, place, and time. Mental status is at baseline.      Cranial Nerves: No cranial nerve deficit.      Sensory: No sensory deficit.      Motor: No weakness.   Psychiatric:         Mood and Affect: Mood normal.         Behavior: Behavior normal.         Thought Content: Thought content normal.         Judgment: Judgment normal.         Laboratory    Recent Labs   Lab 01/26/24  0509   WBC 3.49*   RBC 4.78   HGB 10.6*   HCT 35.9*      MCV 75*   MCH 22.2*   MCHC 29.5*       Recent Labs   Lab 01/25/24  1403 01/26/24  0508   CALCIUM 9.3 8.8   ALBUMIN 4.3  --    PROT 7.3  --     138   K 3.9 3.4*   CO2 32* 29    101   BUN 13 8   CREATININE 0.6 0.5   ALKPHOS 84  --    ALT 10  --    AST 24  --    BILITOT 1.0  --        Recent Labs   Lab 01/26/24  0509   INR 1.3*   APTT 30.2       No results for input(s): "CPK", "CPKMB", "TROPONINI", "MB" in the last 24 hours.    Additional labs: reviewed    Microbiology:       Microbiology Results (last 7 days)       Procedure Component Value Units Date/Time    MRSA Screen by PCR [[id number]]     Order Status: No result Specimen: Nasopharyngeal Swab from Nasal     Rapid Organism ID by PCR (from Blood culture) [[id number]]  (Abnormal) Collected: 01/25/24 1415    Order Status: Completed Updated: 01/26/24 0552     Enterococcus " faecalis Not Detected     Enterococcus faecium Not Detected     Listeria monocytogenes Not Detected     Staphylococcus spp. Not Detected     Staphylococcus aureus Not Detected     Staphylococcus epidermidis Not Detected     Staphylococcus lugdunensis Not Detected     Streptococcus species Detected     Comment: STREP SPP.  result(s) called and verbal readback obtained from MAGUE MILTON by TC1 01/26/2024 05:52          Streptococcus agalactiae Not Detected     Streptococcus pneumoniae Not Detected     Streptococcus pyogenes Not Detected     Acinetobacter calcoaceticus/baumannii complex Not Detected     Bacteroides fragilis Not Detected     Enterobacterales Not Detected     Enterobacter cloacae complex Not Detected     Escherichia coli Not Detected     Klebsiella aerogenes Not Detected     Klebsiella oxytoca Not Detected     Klebsiella pneumoniae group Not Detected     Proteus Not Detected     Salmonella sp Not Detected     Serratia marcescens Not Detected     Haemophilus influenzae Not Detected     Neisseria meningtidis Not Detected     Pseudomonas aeruginosa Not Detected     Stenotrophomonas maltophilia Not Detected     Candida albicans Not Detected     Candida auris Not Detected     Candida glabrata Not Detected     Candida krusei Not Detected     Candida parapsilosis Not Detected     Candida tropicalis Not Detected     Cryptococcus neoformans/gattii Not Detected     CTX-M (ESBL ) Test not applicable     IMP (Carbapenem resistant) Test not applicable     KPC resistance gene (Carbapenem resistant) Test not applicable     mcr-1  Test not applicable     mec A/C  Test not applicable     mec A/C and MREJ (MRSA) gene Test not applicable     NDM (Carbapenem resistant) Test not applicable     OXA-48-like (Carbapenem resistant) Test not applicable     van A/B (VRE gene) Test not applicable     VIM (Carbapenem resistant) Test not applicable    Narrative:      Aerobic and anaerobic  STREP SPP.  result(s)  called and verbal readback obtained from MAGUE MILTON. by TC1 01/26/2024 05:52    Blood culture x two cultures. Draw prior to antibiotics. [[id number]] Collected: 01/25/24 1427    Order Status: Completed Specimen: Blood Updated: 01/26/24 0526     Blood Culture, Routine Gram stain herrera bottle: Gram positive cocci      Results called to and read back by:MAGUE MARCOS.      01/26/2024  05:25 TGC    Narrative:      Aerobic and anaerobic    Blood culture x two cultures. Draw prior to antibiotics. [[id number]] Collected: 01/25/24 1427    Order Status: Completed Specimen: Blood Updated: 01/25/24 2117     Blood Culture, Routine No Growth to date    Narrative:      Aerobic and anaerobic    Respiratory Infection Panel (PCR), Nasopharyngeal [[id number]] Collected: 01/25/24 1709    Order Status: Completed Specimen: Nasopharyngeal Swab Updated: 01/25/24 1930     Respiratory Infection Panel Source NP swab     Adenovirus Not Detected     Coronavirus 229E, Common Cold Virus Not Detected     Coronavirus HKU1, Common Cold Virus Not Detected     Coronavirus NL63, Common Cold Virus Not Detected     Coronavirus OC43, Common Cold Virus Not Detected     Comment: Coronavirus strains 229E, HKU1, NL63, and OC43 can cause the common   cold   and are not associated with the respiratory disease outbreak caused   by  the COVID-19 (SARS-CoV-2 novel Coronavirus) strain.           SARS-CoV2 (COVID-19) Qualitative PCR Not Detected     Human Metapneumovirus Not Detected     Human Rhinovirus/Enterovirus Not Detected     Influenza A (subtypes H1, H1-2009,H3) Not Detected     Influenza B Not Detected     Parainfluenza Virus 1 Not Detected     Parainfluenza Virus 2 Not Detected     Parainfluenza Virus 3 Not Detected     Parainfluenza Virus 4 Not Detected     Respiratory Syncytial Virus Not Detected     Bordetella Parapertussis (ME1191) Not Detected     Bordetella pertussis (ptxP) Not Detected     Chlamydia pneumoniae Not Detected      Mycoplasma pneumoniae Not Detected     Comment: Respiratory Infection Panel testing performed by Multiplex PCR.       Narrative:      Respiratory Infection Panel source->NP Swab    Gram stain [[id number]]     Order Status: No result Specimen: Body Fluid from Pleural Fluid     Fungus culture [[id number]]     Order Status: No result Specimen: Body Fluid from Pleural Fluid     AFB culture (includes stain) [[id number]]     Order Status: No result Specimen: Body Fluid from Pleural Fluid     Culture, Respiratory with Gram Stain [[id number]]     Order Status: No result Specimen: Respiratory             Radiology    CT Head Without Contrast  EXAM: CT HEAD WITHOUT CONTRAST    HISTORY: HA on blood thinners    COMPARISON: None    TECHNIQUE: Multiple axial 3 mm thick images were acquired from the base of the skull to the vertex without IV contrast.    This exam was performed according to our departmental dose-optimization program, which includes automated exposure control, adjustment of the mA and/or kV according to patient size and/or use of iterative reconstruction technique.    Unless otherwise stated, incidental findings do not require dedicated follow-up imaging.    FINDINGS: The brain and ventricular system are structurally within normal limits for a patient of this age. There is patchy ill-defined diminished attenuation in the white matter of both cerebral hemispheres that is consistent with sequela of moderately extensive small vessel chronic ischemic change. No mass effect is identified. There is no cerebral edema. There is no evidence of acute infarct or hemorrhage. Bone window images demonstrate no evidence of skull fracture. Mild mucosal thickening is noted in the sphenoid sinus. The paranasal sinuses are otherwise well aerated.    IMPRESSION:   Evidence of moderately extensive small vessel chronic ischemic change as described. Otherwise unremarkable CT scan of the head. No acute intracranial abnormalities are  identified.    Electronically signed by:  Vernon Mondragon MD  01/25/2024 07:37 PM CST Workstation: [id number]  CTA Chest Non-Coronary (PE Studies)  CMS MANDATED QUALITY DATA-CT RADIATION DOSE-436  All CT scans at this facility use dose modulation, iterative reconstruction, and or weight-based dosing when appropriate to reduce radiation dose to as low as reasonably achievable.    HISTORY: Pulmonary embolism (PE) suspected, high probability,  dyspnea.    FINDINGS: Thin axial imaging through the chest was performed with 100 mL Omnipaque 350 IV contrast, with sagittal and coronal reformatted images and MIP reconstructions performed, and images stored in the patient's permanent electronic medical record.    Comparison to multiple prior exams. The exam is limited by poor timing of the contrast bolus, which prevents evaluation of the peripheral pulmonary arteries. There are no central pulmonary artery filling defects to suggest pulmonary thromboembolism. The central pulmonary arteries are enlarged, with the pulmonary arterial trunk measuring 41 mm in diameter.    Scattered calcified plaque involves normal caliber thoracic aorta. The heart is enlarged, with global chamber enlargement, and no pericardial effusion. There are three-vessel coronary arterial calcifications. No enlarged mediastinal or hilar lymph nodes.    There is worsening right upper lobe consolidation and volume loss, which could reflect worsening atelectasis although pneumonia is not excluded. Scattered reticular opacities and geographic groundglass densities in both lungs are nonspecific, with scattered interlobular septal thickening. There is a moderate sized low density right pleural effusion, with trace left pleural effusion. The central airways are patent, with no pneumothorax.    Images of the upper abdomen show hepatic capsular nodularity reflecting cirrhosis, with perihepatic and perisplenic ascites. There are no acute fractures or destructive  osseous lesions. There is a chronic compression fracture deformity of L1 with bone cement.    IMPRESSION:  1. Limited exam as described, with no central pulmonary thromboembolism.  2. Enlarged central pulmonary arteries, suggestive of pulmonary arterial hypertension.  3. Cardiomegaly, with aortic and coronary arterial calcifications.  4. Moderate sized right pleural effusion, increased compared to CT of 12/19/2023, with trace left pleural effusion.  5. Worsening right upper lobe consolidation, suggesting worsening atelectasis and/or pneumonia. Follow-up is recommended.  5. Scattered interstitial opacities and mosaic attenuation throughout both lungs, suggesting subsegmental atelectasis and/or small airways inflammation.  6. Hepatic cirrhosis, with ascites in the visualized upper abdomen.    Electronically signed by:  Fitz Elizalde MD  01/25/2024 04:59 PM CST Workstation: [id number]  X-Ray Chest AP Portable  Reason: CHF CHF / sob    FINDINGS:  Portable chest at 1353 compared with 2/15/2023 radiograph and 12/19/2023 CT shows unchanged cardiomediastinal silhouette.    Focal alveolar opacity occurs in right superior to mid lung zone, new. Slight prominence of interstitial markings bilaterally is not significantly changed. Lung volumes have decreased. No pleural effusion or pneumothorax. Central pulmonary vascular prominence is mild and unchanged. No acute osseous abnormality.    IMPRESSION:    1. Unchanged central pulmonary vascular prominence and diffuse mild pulmonary interstitial opacities which could reflect mild interstitial edema or sequelae of chronic interstitial lung disease.  2. Confluent right mid to superior lung zone alveolar opacity, not significantly changed since 12/19/2023 CT and best evaluated on recent CT.    Electronically signed by:  Hector Falcon MD  01/25/2024 02:19 PM CST Workstation: [id number]        Additional Studies    reviewed    Ventilator Information                  No results for  "input(s): "PH", "PCO2", "PO2", "HCO3", "POCSATURATED", "BE" in the last 72 hours.      Impression    Active Hospital Problems    Diagnosis  POA    *Shortness of breath [R06.02]  Yes    S/P bilateral mastectomy [Z90.13]  Not Applicable    Mass of upper lobe of right lung [R91.8]  Yes    Bronchitis, chronic, mucopurulent [J41.1]  Yes      Resolved Hospital Problems   No resolved problems to display.       Plan    Continue antibiotics, steroids, respiratory treatments  Follow up on cultures  Gentle diuresis and follow  Hold xarelto  Follow effusion and may need to consider tap in a few days  ? Reason for the higher xarelto dose at home  Check MRSA screen and if negative stop vancomycin  Follow H/H  Increase activity as able    Thank you for this consult.  I will follow with you while the patient is hospitalized.        Buster Grajeda MD  [facility] Pulmonary/Critical Care  01/26/2024          "

## 2024-01-26 NOTE — PLAN OF CARE
[facility]  Initial Discharge Assessment       Primary Care Provider: Shilo Perez MD    Admission Diagnosis: Multifocal pneumonia [J18.9]    Admission Date: 1/25/2024  Expected Discharge Date: 1/28/2024    Met with pt at bedside to complete discharge assessment, daughter, Buffy present, verified PCP, pharmacy and information on facesheet.  See DME below.  No HH, dialysis or coumadin.  Daughter will drive pt home.  No needs identified at this time.    Transition of Care Barriers: None    Payor: [employer] MANAGED [employer] / Plan: [employer] GROUP MEDICARE ADVANTAGE / Product Type: Medicare Advantage /     Extended Emergency Contact Information  Primary Emergency Contact: HerbertJean  Address: [address] [place] [address] [facility]  Home Phone: [phone]  Relation: Spouse   needed? No  Secondary Emergency Contact: PrinceBuffy Keon  Address: [address] [place] [address] [facility]  Home Phone: [phone]  Mobile Phone: [phone]  Relation: Daughter  Preferred language: English   needed? No    Discharge Plan A: Home with family  Discharge Plan B: Home with family      [facility] [place], [place] - [address] [facility]  [address] [facility] [address]  Phone: [phone] Fax: [phone]      Initial Assessment (most recent)       Adult Discharge Assessment - 01/26/24 1107          Discharge Assessment    Assessment Type Discharge Planning Assessment     Confirmed/corrected address, phone number and insurance Yes     Confirmed Demographics Correct on Facesheet     Source of Information patient;family     Communicated JOSÉ MIGUEL with patient/caregiver No     People in Home child(agustín), adult;grandchild(agustín);spouse     Do you expect to return to your current living situation? Yes     Prior to hospitilization cognitive status: Alert/Oriented     Current cognitive status: Alert/Oriented     Walking  or Climbing Stairs Difficulty yes     Walking or Climbing Stairs ambulation difficulty, requires equipment     Mobility Management rollator     Dressing/Bathing Difficulty no     Home Accessibility wheelchair accessible     Home Layout Able to live on 1st floor     Equipment Currently Used at Home nebulizer;oxygen;wheelchair;shower chair;bedside commode;rollator     Readmission within 30 days? No     Patient currently being followed by outpatient case management? No     Do you currently have service(s) that help you manage your care at home? No     Do you take prescription medications? Yes     Do you have prescription coverage? Yes     Coverage [facility]     Do you have any problems affording any of your prescribed medications? No     Is the patient taking medications as prescribed? yes     Who is going to help you get home at discharge? Buffy, daughter     How do you get to doctors appointments? family or friend will provide     Are you on dialysis? No     Do you take coumadin? No     Discharge Plan A Home with family     Discharge Plan B Home with family     DME Needed Upon Discharge  none     Discharge Plan discussed with: Patient;Adult children     Transition of Care Barriers None

## 2024-01-27 LAB
ANION GAP SERPL CALC-SCNC: 7 MMOL/L (ref 8–16)
BUN SERPL-MCNC: 14 MG/DL (ref 8–23)
CALCIUM SERPL-MCNC: 9.1 MG/DL (ref 8.7–10.5)
CHLORIDE SERPL-SCNC: 100 MMOL/L (ref 95–110)
CO2 SERPL-SCNC: 30 MMOL/L (ref 23–29)
CREAT SERPL-MCNC: 0.5 MG/DL (ref 0.5–1.4)
ERYTHROCYTE [DISTWIDTH] IN BLOOD BY AUTOMATED COUNT: 20 % (ref 11.5–14.5)
EST. GFR  (NO RACE VARIABLE): >60 ML/MIN/1.73 M^2
GLUCOSE SERPL-MCNC: 149 MG/DL (ref 70–110)
HCT VFR BLD AUTO: 36.3 % (ref 37–48.5)
HGB BLD-MCNC: 10.4 G/DL (ref 12–16)
MCH RBC QN AUTO: 21.6 PG (ref 27–31)
MCHC RBC AUTO-ENTMCNC: 28.7 G/DL (ref 32–36)
MCV RBC AUTO: 75 FL (ref 82–98)
PLATELET # BLD AUTO: 249 K/UL (ref 150–450)
PMV BLD AUTO: 10.7 FL (ref 9.2–12.9)
POTASSIUM SERPL-SCNC: 4.2 MMOL/L (ref 3.5–5.1)
RBC # BLD AUTO: 4.82 M/UL (ref 4–5.4)
SODIUM SERPL-SCNC: 137 MMOL/L (ref 136–145)
TROPONIN I SERPL HS-MCNC: 30 PG/ML (ref 0–14.9)
WBC # BLD AUTO: 13.22 K/UL (ref 3.9–12.7)

## 2024-01-27 PROCEDURE — 94640 AIRWAY INHALATION TREATMENT: CPT

## 2024-01-27 PROCEDURE — 63600175 PHARM REV CODE 636 W HCPCS: Performed by: STUDENT IN AN ORGANIZED HEALTH CARE EDUCATION/TRAINING PROGRAM

## 2024-01-27 PROCEDURE — 63600175 PHARM REV CODE 636 W HCPCS: Performed by: INTERNAL MEDICINE

## 2024-01-27 PROCEDURE — 94799 UNLISTED PULMONARY SVC/PX: CPT

## 2024-01-27 PROCEDURE — 36415 COLL VENOUS BLD VENIPUNCTURE: CPT | Performed by: INTERNAL MEDICINE

## 2024-01-27 PROCEDURE — 25000003 PHARM REV CODE 250: Performed by: STUDENT IN AN ORGANIZED HEALTH CARE EDUCATION/TRAINING PROGRAM

## 2024-01-27 PROCEDURE — 21400001 HC TELEMETRY ROOM

## 2024-01-27 PROCEDURE — 99900035 HC TECH TIME PER 15 MIN (STAT)

## 2024-01-27 PROCEDURE — 80048 BASIC METABOLIC PNL TOTAL CA: CPT | Performed by: INTERNAL MEDICINE

## 2024-01-27 PROCEDURE — 85027 COMPLETE CBC AUTOMATED: CPT | Performed by: INTERNAL MEDICINE

## 2024-01-27 PROCEDURE — 25000003 PHARM REV CODE 250: Performed by: INTERNAL MEDICINE

## 2024-01-27 PROCEDURE — 84484 ASSAY OF TROPONIN QUANT: CPT | Performed by: INTERNAL MEDICINE

## 2024-01-27 PROCEDURE — 93010 ELECTROCARDIOGRAM REPORT: CPT | Mod: ,,, | Performed by: GENERAL PRACTICE

## 2024-01-27 PROCEDURE — 94761 N-INVAS EAR/PLS OXIMETRY MLT: CPT

## 2024-01-27 PROCEDURE — 25000242 PHARM REV CODE 250 ALT 637 W/ HCPCS: Performed by: INTERNAL MEDICINE

## 2024-01-27 PROCEDURE — 93005 ELECTROCARDIOGRAM TRACING: CPT | Performed by: GENERAL PRACTICE

## 2024-01-27 PROCEDURE — 27000221 HC OXYGEN, UP TO 24 HOURS

## 2024-01-27 PROCEDURE — 99233 SBSQ HOSP IP/OBS HIGH 50: CPT | Mod: ,,, | Performed by: INTERNAL MEDICINE

## 2024-01-27 PROCEDURE — 99900031 HC PATIENT EDUCATION (STAT)

## 2024-01-27 RX ORDER — ADHESIVE BANDAGE
30 BANDAGE TOPICAL DAILY PRN
Status: DISCONTINUED | OUTPATIENT
Start: 2024-01-27 | End: 2024-01-29 | Stop reason: HOSPADM

## 2024-01-27 RX ORDER — GABAPENTIN 300 MG/1
300 CAPSULE ORAL NIGHTLY
Status: DISCONTINUED | OUTPATIENT
Start: 2024-01-27 | End: 2024-01-29 | Stop reason: HOSPADM

## 2024-01-27 RX ORDER — FLUTICASONE PROPIONATE 50 MCG
2 SPRAY, SUSPENSION (ML) NASAL DAILY
Status: DISCONTINUED | OUTPATIENT
Start: 2024-01-27 | End: 2024-01-29 | Stop reason: HOSPADM

## 2024-01-27 RX ORDER — BENZONATATE 100 MG/1
100 CAPSULE ORAL 3 TIMES DAILY PRN
Status: DISCONTINUED | OUTPATIENT
Start: 2024-01-27 | End: 2024-01-29 | Stop reason: HOSPADM

## 2024-01-27 RX ADMIN — DOXYCYCLINE 100 MG: 100 INJECTION, POWDER, LYOPHILIZED, FOR SOLUTION INTRAVENOUS at 08:01

## 2024-01-27 RX ADMIN — SERTRALINE HYDROCHLORIDE 100 MG: 50 TABLET ORAL at 08:01

## 2024-01-27 RX ADMIN — IPRATROPIUM BROMIDE AND ALBUTEROL SULFATE 3 ML: 2.5; .5 SOLUTION RESPIRATORY (INHALATION) at 12:01

## 2024-01-27 RX ADMIN — Medication 9 MG: at 08:01

## 2024-01-27 RX ADMIN — PANTOPRAZOLE SODIUM 20 MG: 20 TABLET, DELAYED RELEASE ORAL at 06:01

## 2024-01-27 RX ADMIN — GABAPENTIN 300 MG: 300 CAPSULE ORAL at 08:01

## 2024-01-27 RX ADMIN — FUROSEMIDE 20 MG: 10 INJECTION, SOLUTION INTRAMUSCULAR; INTRAVENOUS at 09:01

## 2024-01-27 RX ADMIN — IPRATROPIUM BROMIDE AND ALBUTEROL SULFATE 3 ML: 2.5; .5 SOLUTION RESPIRATORY (INHALATION) at 07:01

## 2024-01-27 RX ADMIN — IPRATROPIUM BROMIDE AND ALBUTEROL SULFATE 3 ML: 2.5; .5 SOLUTION RESPIRATORY (INHALATION) at 08:01

## 2024-01-27 RX ADMIN — IPRATROPIUM BROMIDE AND ALBUTEROL SULFATE 3 ML: 2.5; .5 SOLUTION RESPIRATORY (INHALATION) at 01:01

## 2024-01-27 RX ADMIN — CEFTRIAXONE SODIUM 1 G: 1 INJECTION, POWDER, FOR SOLUTION INTRAMUSCULAR; INTRAVENOUS at 04:01

## 2024-01-27 RX ADMIN — DILTIAZEM HYDROCHLORIDE 120 MG: 120 CAPSULE, COATED, EXTENDED RELEASE ORAL at 08:01

## 2024-01-27 RX ADMIN — VANCOMYCIN HYDROCHLORIDE 1500 MG: 1.5 INJECTION, POWDER, LYOPHILIZED, FOR SOLUTION INTRAVENOUS at 06:01

## 2024-01-27 RX ADMIN — HYDROCODONE BITARTRATE AND ACETAMINOPHEN 1 TABLET: 7.5; 325 TABLET ORAL at 11:01

## 2024-01-27 RX ADMIN — HYDROCODONE BITARTRATE AND ACETAMINOPHEN 1 TABLET: 7.5; 325 TABLET ORAL at 03:01

## 2024-01-27 RX ADMIN — ATORVASTATIN CALCIUM 20 MG: 20 TABLET, FILM COATED ORAL at 08:01

## 2024-01-27 RX ADMIN — METHYLPREDNISOLONE SODIUM SUCCINATE 125 MG: 125 INJECTION, POWDER, FOR SOLUTION INTRAMUSCULAR; INTRAVENOUS at 06:01

## 2024-01-27 RX ADMIN — BENZONATATE 100 MG: 100 CAPSULE ORAL at 11:01

## 2024-01-27 NOTE — NURSING
"Patient called this nurses station at approximately 1230 with complaints of 8/10 chest pain. Patient up in chair grabbing her chest. /100, , o2 saturation 94%.  Assisted patient back to bed. Dr. Ibrahim notified. EKG obtained. While performing EKG, patient shaking her legs and stated "I get like this when I'm nervous".   "

## 2024-01-27 NOTE — PROGRESS NOTES
[facility] Medicine  Progress Note    Patient name: Lety Herbert  MRN: [id number]  Admit Date: 1/25/2024   LOS: 1 day     SUBJECTIVE:     Principal problem: Shortness of breath    Interval History:  82 y/o F hx HTN, hx ILD, COPD, chronic hypoxemic respiratory failure on p.r.n. oxygen, bilateral mastectomy, AFib, RUL NSCLC, former smoker admitted with acute on chronic hypoxic respiratory failure. CTA chest negative for PE, does reveal signs of PHTN along with moderate right effusion (increased from 12/2023) and worsening RUL consolidation and bilateral increased interstitial opacities. Hepatic cirrhosis also noted.  She was noted to be hypoxic into the 60s when ambulating to the bathroom on room air.  She uses her supplemental oxygen on an as needed basis at home but has required it more so this past week due to symptoms. She has been started on IV abx in the event this is infection related. She has been started on IV steroids in the event this is ILD flare or bronchitis flare or radiation pneumonitis.  She has been started on IV lasix in the event this is volume mediated. Echo ordered with read pending. BNP is 805.  Troponin trend is static. Procal is normal. BCx growing strep. Covid and Flu screen is negative.  Pulmonary consulted and following.  Thoracentesis placed on hold to see if pleural effusion responds to diuretics.  Her xarelto has been held. Family kindly called her outpatient pharmacy and she takes Xarelto 20mg daily not TID as listed in chart.     Hospital Course:    Scheduled Meds:   albuterol-ipratropium  3 mL Nebulization Q6H    atorvastatin  20 mg Oral QHS    cefTRIAXone (Rocephin) IV (PEDS and ADULTS)  1 g Intravenous Q24H    diltiaZEM  120 mg Oral Daily    doxycycline (VIBRAMYCIN) IVPB  100 mg Intravenous Q12H    furosemide (LASIX) injection  20 mg Intravenous Daily    gabapentin  200 mg Oral QHS    melatonin  9 mg Oral Nightly    methylPREDNISolone sodium succinate  injection  20 mg Intravenous Q8H    pantoprazole  20 mg Oral Daily    sertraline  100 mg Oral Daily    vancomycin (VANCOCIN) IV (PEDS and ADULTS)  1,500 mg Intravenous Q12H     Continuous Infusions:  PRN Meds:dextrose 50%, dextrose 50%, glucagon (human recombinant), glucose, glucose, HYDROcodone-acetaminophen, magnesium oxide, magnesium oxide, naloxone, potassium bicarbonate, potassium bicarbonate, potassium bicarbonate, potassium, sodium phosphates, potassium, sodium phosphates, potassium, sodium phosphates, sodium chloride 0.9%, Pharmacy to dose Vancomycin consult **AND** vancomycin - pharmacy to dose    Review of patient's allergies indicates:   Allergen Reactions    Meperidine     Promethazine     Bactrim [sulfamethoxazole-trimethoprim] Rash       Review of Systems: As per interval history    OBJECTIVE:     Vital Signs (Most Recent)  Temp: 97.7 °F (36.5 °C) (01/26/24 1500)  Pulse: 90 (01/26/24 1500)  Resp: 17 (01/26/24 1653)  BP: (!) 147/69 (01/26/24 1500)  SpO2: 95 % (01/26/24 1500)    Vital Signs Range (Last 24H):  Temp:  [97.5 °F (36.4 °C)-99.3 °F (37.4 °C)]   Pulse:  [88-96]   Resp:  [16-22]   BP: (144-171)/(69-88)   SpO2:  [93 %-98 %]     I & O (Last 24H):  Intake/Output Summary (Last 24 hours) at 1/26/2024 1815  Last data filed at 1/26/2024 0947  Gross per 24 hour   Intake 240 ml   Output 250 ml   Net -10 ml       Physical Exam:    Vitals and nursing note reviewed.     Constitutional:       General: Not in acute distress.     Appearance: Well-developed.   HENT:      Head: Normocephalic and atraumatic.   Eyes:      Pupils: Pupils are equal, round, and reactive to light.   Cardiovascular:      Rate and Rhythm: Regular rhythm.   Pulmonary:      Effort: Pulmonary effort is normal.      Breath sounds: Normal breath sounds. No wheezing.   O2 per NC  Abdominal:      General: There is no distension.      Palpations: Abdomen is soft.      Tenderness: There is no abdominal tenderness. There is no guarding or  "rebound.   Musculoskeletal:         General: Normal range of motion.      Cervical back: Normal range of motion.   Skin:     Findings: No rash.   Neurological:      Mental Status: Alert and oriented to person, place, and time.      Cranial Nerves: No cranial nerve deficit.      Sensory: No sensory deficit.     Laboratory:  I have reviewed all pertinent lab results within the past 24 hours.  CBC:   Recent Labs   Lab 01/26/24  0509   WBC 3.49*   RBC 4.78   HGB 10.6*   HCT 35.9*      MCV 75*   MCH 22.2*   MCHC 29.5*     CMP:   Recent Labs   Lab 01/25/24  1403 01/26/24  0508    163*   CALCIUM 9.3 8.8   ALBUMIN 4.3  --    PROT 7.3  --     138   K 3.9 3.4*   CO2 32* 29    101   BUN 13 8   CREATININE 0.6 0.5   ALKPHOS 84  --    ALT 10  --    AST 24  --    BILITOT 1.0  --      Cardiac markers: No results for input(s): "CKMB", "CPKMB", "TROPONINT", "TROPONINI", "MYOGLOBIN" in the last 168 hours.  Microbiology Results (last 7 days)       Procedure Component Value Units Date/Time    Blood culture x two cultures. Draw prior to antibiotics. [[id number]] Collected: 01/25/24 1427    Order Status: Completed Specimen: Blood Updated: 01/26/24 1632     Blood Culture, Routine No Growth to date      No Growth to date    Narrative:      Aerobic and anaerobic    MRSA Screen by PCR [[id number]] Collected: 01/26/24 1519    Order Status: Sent Specimen: Nasopharyngeal Swab from Nasal Updated: 01/26/24 1529    Culture, Respiratory with Gram Stain [[id number]] Collected: 01/26/24 1520    Order Status: Sent Specimen: Respiratory from Sputum Updated: 01/26/24 1529    Rapid Organism ID by PCR (from Blood culture) [[id number]]  (Abnormal) Collected: 01/25/24 1415    Order Status: Completed Updated: 01/26/24 0552     Enterococcus faecalis Not Detected     Enterococcus faecium Not Detected     Listeria monocytogenes Not Detected     Staphylococcus spp. Not Detected     Staphylococcus aureus Not Detected     Staphylococcus " epidermidis Not Detected     Staphylococcus lugdunensis Not Detected     Streptococcus species Detected     Comment: STREP SPP.  result(s) called and verbal readback obtained from MAGUE MILTON. by [facility] 01/26/2024 05:52          Streptococcus agalactiae Not Detected     Streptococcus pneumoniae Not Detected     Streptococcus pyogenes Not Detected     Acinetobacter calcoaceticus/baumannii complex Not Detected     Bacteroides fragilis Not Detected     Enterobacterales Not Detected     Enterobacter cloacae complex Not Detected     Escherichia coli Not Detected     Klebsiella aerogenes Not Detected     Klebsiella oxytoca Not Detected     Klebsiella pneumoniae group Not Detected     Proteus Not Detected     Salmonella sp Not Detected     Serratia marcescens Not Detected     Haemophilus influenzae Not Detected     Neisseria meningtidis Not Detected     Pseudomonas aeruginosa Not Detected     Stenotrophomonas maltophilia Not Detected     Candida albicans Not Detected     Candida auris Not Detected     Candida glabrata Not Detected     Candida krusei Not Detected     Candida parapsilosis Not Detected     Candida tropicalis Not Detected     Cryptococcus neoformans/gattii Not Detected     CTX-M (ESBL ) Test not applicable     IMP (Carbapenem resistant) Test not applicable     KPC resistance gene (Carbapenem resistant) Test not applicable     mcr-1  Test not applicable     mec A/C  Test not applicable     mec A/C and MREJ (MRSA) gene Test not applicable     NDM (Carbapenem resistant) Test not applicable     OXA-48-like (Carbapenem resistant) Test not applicable     van A/B (VRE gene) Test not applicable     VIM (Carbapenem resistant) Test not applicable    Narrative:      Aerobic and anaerobic  STREP SPP.  result(s) called and verbal readback obtained from MAGUE MILTON. by [facility] 01/26/2024 05:52    Blood culture x two cultures. Draw prior to antibiotics. [[id number]] Collected: 01/25/24 7357     Order Status: Completed Specimen: Blood Updated: 01/26/24 0526     Blood Culture, Routine Gram stain herrera bottle: Gram positive cocci      Results called to and read back by:MAGUE MARCOS.      01/26/2024  05:25 TGC    Narrative:      Aerobic and anaerobic    Respiratory Infection Panel (PCR), Nasopharyngeal [[id number]] Collected: 01/25/24 1709    Order Status: Completed Specimen: Nasopharyngeal Swab Updated: 01/25/24 1930     Respiratory Infection Panel Source NP swab     Adenovirus Not Detected     Coronavirus 229E, Common Cold Virus Not Detected     Coronavirus HKU1, Common Cold Virus Not Detected     Coronavirus NL63, Common Cold Virus Not Detected     Coronavirus OC43, Common Cold Virus Not Detected     Comment: Coronavirus strains 229E, HKU1, NL63, and OC43 can cause the common   cold   and are not associated with the respiratory disease outbreak caused   by  the COVID-19 (SARS-CoV-2 novel Coronavirus) strain.           SARS-CoV2 (COVID-19) Qualitative PCR Not Detected     Human Metapneumovirus Not Detected     Human Rhinovirus/Enterovirus Not Detected     Influenza A (subtypes H1, H1-2009,H3) Not Detected     Influenza B Not Detected     Parainfluenza Virus 1 Not Detected     Parainfluenza Virus 2 Not Detected     Parainfluenza Virus 3 Not Detected     Parainfluenza Virus 4 Not Detected     Respiratory Syncytial Virus Not Detected     Bordetella Parapertussis (CZ4361) Not Detected     Bordetella pertussis (ptxP) Not Detected     Chlamydia pneumoniae Not Detected     Mycoplasma pneumoniae Not Detected     Comment: Respiratory Infection Panel testing performed by Multiplex PCR.       Narrative:      Respiratory Infection Panel source->NP Swab    Gram stain [[id number]]     Order Status: No result Specimen: Body Fluid from Pleural Fluid     Fungus culture [[id number]]     Order Status: No result Specimen: Body Fluid from Pleural Fluid     AFB culture (includes stain) [[id number]]     Order Status: No  result Specimen: Body Fluid from Pleural Fluid             Diagnostic Results:      ASSESSMENT/PLAN:         Active Hospital Problems    Diagnosis  POA    *Shortness of breath [R06.02]  Yes    S/P bilateral mastectomy [Z90.13]  Not Applicable    Mass of upper lobe of right lung [R91.8]  Yes    Bronchitis, chronic, mucopurulent [J41.1]  Yes      Resolved Hospital Problems   No resolved problems to display.         Plan:     -Mrs. Herbert has been admitted with acute on chronic hypoxic respiratory failure.  -Differential as outlined in HPI:  Pneumonia, ILD flare, radiation pneumonitis, hypervolemia, other  -IV abx. Bcx growing strep. Will follow to see if real infection versus skin contaminant.   -IV steroids, Nebs  -IV lasix.  Echo ordered, done with read pending. BNP elevated. Strict ins and outs.  -Supplemental oxygen.  -Home xarelto held in the event she may need a thoracentesis for her pleural effusion.  Trial of diuresis first.        VTE Risk Mitigation (From admission, onward)           Ordered     IP VTE HIGH RISK PATIENT  Once         01/25/24 1606     Place sequential compression device  Until discontinued         01/25/24 1606                      Department Hospital Medicine  [facility]  Cristina Ibrahim MD  Date of service: 01/26/2024

## 2024-01-27 NOTE — CARE UPDATE
01/26/24 1934   Patient Assessment/Suction   Level of Consciousness (AVPU) alert   Respiratory Effort Normal;Unlabored   Expansion/Accessory Muscles/Retractions no retractions;no use of accessory muscles   DAREK Breath Sounds clear   LLL Breath Sounds crackles, fine   RUL Breath Sounds clear   RML Breath Sounds clear   RLL Breath Sounds clear   Rhythm/Pattern, Respiratory unlabored;pattern regular   Cough Frequency infrequent   Cough Type dry;good;nonproductive   PRE-TX-O2   SpO2 96 %   Pulse 90   Resp 14   Peak Flow   PEFR PREtreatment (L/Min) 3   Education   $ Education Bronchodilator;15 min

## 2024-01-27 NOTE — PROGRESS NOTES
Pulmonary/Critical Care  Progress Note      Patient name: Lety Herbert  MRN: [id number]  Date: 01/27/2024    Admit Date: 1/25/2024  Consult Requested By: Cristina Ibrahim MD    Reason for Consult: dyspnea, COPD, lung cancer    HPI:    1/26/2024- 82 yo ho ILD, COPD (last spirometry 2/2023 - NO obstruction, mild restriction), hypoxemia (on home O2), NSCLC (s/p XRT), h/o bilateral mastectomy, AF presented to ER with increased SOB and now admitted for further treatment.  ROS as below.  By report she was on a high dose of xarelto at home    1/27/2024 - Feels better this AM, did have episode of increased chest pain (see nursing note) which was addressed.  Has some sinus congestion and drainage.  BC + strept viridans - likely contaminant    Review of Systems    Review of Systems   Constitutional:  Positive for malaise/fatigue. Negative for chills, diaphoresis, fever and weight loss.   HENT:  Negative for congestion.    Eyes:  Negative for pain.   Respiratory:  Positive for cough and shortness of breath. Negative for hemoptysis, sputum production, wheezing and stridor.         Some chest tightness   Cardiovascular:  Negative for chest pain, palpitations, orthopnea, claudication, leg swelling and PND.   Gastrointestinal:  Negative for abdominal pain, constipation, diarrhea, heartburn, nausea and vomiting.   Genitourinary:  Negative for dysuria, frequency and urgency.   Musculoskeletal:  Negative for falls and myalgias.   Neurological:  Positive for weakness. Negative for sensory change and focal weakness.   Psychiatric/Behavioral:  Negative for depression, substance abuse and suicidal ideas. The patient is not nervous/anxious.        Past Medical History    Past Medical History:   Diagnosis Date    Abnormal chest CT 3/9/2023    Anemia     Basal cell carcinoma     nose     Cancer     breast    Depression     DVT (deep venous thrombosis)     Fall 3/20/2018    Former smoker 3/9/2023    Gastric ulceration     GERD  (gastroesophageal reflux disease)     History of breast cancer 3/9/2023    History of frequent urinary tract infections     Hyperlipidemia     Hypertension     Malignant neoplasm of upper lobe of right lung (NSCLC favoring lung primary) 3/9/2023    Melanoma 2004?    left forearm    MRSA (methicillin resistant staph aureus) culture positive     Neuropathy     PE (pulmonary embolism)     Post-nasal drip 11/15/2018    Reflux     S/P bilateral mastectomy 3/9/2023       Past Surgical History    Past Surgical History:   Procedure Laterality Date    HYSTERECTOMY      MASTECTOMY, RADICAL Bilateral     TOTAL HIP ARTHROPLASTY Left 11/13/2017       Medications (scheduled):      albuterol-ipratropium  3 mL Nebulization Q6H    atorvastatin  20 mg Oral QHS    cefTRIAXone (Rocephin) IV (PEDS and ADULTS)  1 g Intravenous Q24H    diltiaZEM  120 mg Oral Daily    doxycycline (VIBRAMYCIN) IVPB  100 mg Intravenous Q12H    furosemide (LASIX) injection  20 mg Intravenous Daily    gabapentin  300 mg Oral QHS    melatonin  9 mg Oral Nightly    pantoprazole  20 mg Oral Daily    sertraline  100 mg Oral Daily    vancomycin (VANCOCIN) IV (PEDS and ADULTS)  1,500 mg Intravenous Q12H       Medications (infusions):         Medications (prn):     dextrose 50%, dextrose 50%, glucagon (human recombinant), glucose, glucose, HYDROcodone-acetaminophen, magnesium oxide, magnesium oxide, naloxone, potassium bicarbonate, potassium bicarbonate, potassium bicarbonate, potassium, sodium phosphates, potassium, sodium phosphates, potassium, sodium phosphates, sodium chloride 0.9%, Pharmacy to dose Vancomycin consult **AND** vancomycin - pharmacy to dose    Family History:   Family History   Problem Relation Age of Onset    Cancer Mother     Cancer Father     Heart disease Father     Melanoma Neg Hx     Psoriasis Neg Hx     Lupus Neg Hx     Eczema Neg Hx        Social History: Tobacco:   Social History     Tobacco Use   Smoking Status Former    Passive exposure:  "Past   Smokeless Tobacco Never                                EtOH:   Social History     Substance and Sexual Activity   Alcohol Use No                                Drugs:   Social History     Substance and Sexual Activity   Drug Use No       Physical Exam    Vital signs:  Temp:  [97.7 °F (36.5 °C)-98.9 °F (37.2 °C)]   Pulse:  []   Resp:  [14-20]   BP: (147-191)/()   SpO2:  [93 %-97 %]     Intake/Output:   Intake/Output Summary (Last 24 hours) at 1/27/2024 1338  Last data filed at 1/27/2024 1121  Gross per 24 hour   Intake 950 ml   Output 2250 ml   Net -1300 ml          BMI: Estimated body mass index is 34.95 kg/m² as calculated from the following:    Height as of 1/16/24: 5' 5" (1.651 m).    Weight as of this encounter: 95.3 kg (210 lb).    Physical Exam  Vitals and nursing note reviewed.   Constitutional:       General: She is not in acute distress.     Appearance: Normal appearance. She is ill-appearing. She is not toxic-appearing or diaphoretic.   HENT:      Head: Normocephalic and atraumatic.      Right Ear: External ear normal.      Left Ear: External ear normal.      Nose: Nose normal. No congestion or rhinorrhea.      Mouth/Throat:      Mouth: Mucous membranes are moist.      Pharynx: Oropharynx is clear. No oropharyngeal exudate or posterior oropharyngeal erythema.   Eyes:      General: No scleral icterus.        Right eye: No discharge.         Left eye: No discharge.      Extraocular Movements: Extraocular movements intact.      Conjunctiva/sclera: Conjunctivae normal.      Pupils: Pupils are equal, round, and reactive to light.   Neck:      Vascular: No carotid bruit.   Cardiovascular:      Rate and Rhythm: Normal rate and regular rhythm.      Pulses: Normal pulses.      Heart sounds: No murmur heard.     No friction rub. No gallop.   Pulmonary:      Effort: Pulmonary effort is normal. No respiratory distress.      Breath sounds: No stridor. Rales present. No wheezing or rhonchi.      " "Comments: Mild labored breathing  Decreased at bases  Chest:      Chest wall: No tenderness.   Abdominal:      General: Bowel sounds are normal. There is no distension.      Tenderness: There is no abdominal tenderness. There is no guarding.   Musculoskeletal:         General: No swelling. Normal range of motion.      Cervical back: Normal range of motion and neck supple. No rigidity or tenderness.      Right lower leg: No edema.      Left lower leg: No edema.   Lymphadenopathy:      Cervical: No cervical adenopathy.   Skin:     General: Skin is warm and dry.      Capillary Refill: Capillary refill takes less than 2 seconds.      Coloration: Skin is not jaundiced.      Findings: No bruising.   Neurological:      General: No focal deficit present.      Mental Status: She is alert and oriented to person, place, and time. Mental status is at baseline.      Cranial Nerves: No cranial nerve deficit.      Sensory: No sensory deficit.      Motor: No weakness.   Psychiatric:         Mood and Affect: Mood normal.         Behavior: Behavior normal.         Thought Content: Thought content normal.         Judgment: Judgment normal.         Laboratory    Recent Labs   Lab 01/27/24  0455   WBC 13.22*   RBC 4.82   HGB 10.4*   HCT 36.3*      MCV 75*   MCH 21.6*   MCHC 28.7*         Recent Labs   Lab 01/27/24  0455   CALCIUM 9.1      K 4.2   CO2 30*      BUN 14   CREATININE 0.5         No results for input(s): "PT", "INR", "APTT" in the last 24 hours.      No results for input(s): "CPK", "CPKMB", "TROPONINI", "MB" in the last 24 hours.    Additional labs: reviewed    Microbiology:       Microbiology Results (last 7 days)       Procedure Component Value Units Date/Time    Blood culture x two cultures. Draw prior to antibiotics. [[id number]]  (Abnormal) Collected: 01/25/24 1427    Order Status: Completed Specimen: Blood Updated: 01/27/24 1111     Blood Culture, Routine Gram stain herrera bottle: Gram positive cocci     "  Results called to and read back by:MAGUE MARCOS.      01/26/2024  05:25 [facility]      VIRIDANS STREPTOCOCCUS GROUP  Organism is a probable contaminant      Narrative:      Aerobic and anaerobic    Culture, Respiratory with Gram Stain [[id number]] Collected: 01/26/24 1520    Order Status: Completed Specimen: Respiratory from Sputum Updated: 01/27/24 1004     Respiratory Culture Normal respiratory krista     Gram Stain (Respiratory) <10 epithelial cells per low power field.     Gram Stain (Respiratory) Rare WBC's     Gram Stain (Respiratory) Few Gram positive cocci     Gram Stain (Respiratory) Rare Gram negative rods    MRSA Screen by PCR [[id number]] Collected: 01/26/24 1519    Order Status: Completed Specimen: Nasopharyngeal Swab from Nasal Updated: 01/26/24 1833     MRSA SCREEN BY PCR Negative    Blood culture x two cultures. Draw prior to antibiotics. [[id number]] Collected: 01/25/24 1427    Order Status: Completed Specimen: Blood Updated: 01/26/24 1632     Blood Culture, Routine No Growth to date      No Growth to date    Narrative:      Aerobic and anaerobic    Rapid Organism ID by PCR (from Blood culture) [[id number]]  (Abnormal) Collected: 01/25/24 1415    Order Status: Completed Updated: 01/26/24 0552     Enterococcus faecalis Not Detected     Enterococcus faecium Not Detected     Listeria monocytogenes Not Detected     Staphylococcus spp. Not Detected     Staphylococcus aureus Not Detected     Staphylococcus epidermidis Not Detected     Staphylococcus lugdunensis Not Detected     Streptococcus species Detected     Comment: STREP SPP.  result(s) called and verbal readback obtained from MAGUE MILTON. by TC1 01/26/2024 05:52          Streptococcus agalactiae Not Detected     Streptococcus pneumoniae Not Detected     Streptococcus pyogenes Not Detected     Acinetobacter calcoaceticus/baumannii complex Not Detected     Bacteroides fragilis Not Detected     Enterobacterales Not Detected      Enterobacter cloacae complex Not Detected     Escherichia coli Not Detected     Klebsiella aerogenes Not Detected     Klebsiella oxytoca Not Detected     Klebsiella pneumoniae group Not Detected     Proteus Not Detected     Salmonella sp Not Detected     Serratia marcescens Not Detected     Haemophilus influenzae Not Detected     Neisseria meningtidis Not Detected     Pseudomonas aeruginosa Not Detected     Stenotrophomonas maltophilia Not Detected     Candida albicans Not Detected     Candida auris Not Detected     Candida glabrata Not Detected     Candida krusei Not Detected     Candida parapsilosis Not Detected     Candida tropicalis Not Detected     Cryptococcus neoformans/gattii Not Detected     CTX-M (ESBL ) Test not applicable     IMP (Carbapenem resistant) Test not applicable     KPC resistance gene (Carbapenem resistant) Test not applicable     mcr-1  Test not applicable     mec A/C  Test not applicable     mec A/C and MREJ (MRSA) gene Test not applicable     NDM (Carbapenem resistant) Test not applicable     OXA-48-like (Carbapenem resistant) Test not applicable     van A/B (VRE gene) Test not applicable     VIM (Carbapenem resistant) Test not applicable    Narrative:      Aerobic and anaerobic  STREP SPP.  result(s) called and verbal readback obtained from MAGUE IMLTON. by TC1 01/26/2024 05:52    Respiratory Infection Panel (PCR), Nasopharyngeal [[id number]] Collected: 01/25/24 1709    Order Status: Completed Specimen: Nasopharyngeal Swab Updated: 01/25/24 1930     Respiratory Infection Panel Source NP swab     Adenovirus Not Detected     Coronavirus 229E, Common Cold Virus Not Detected     Coronavirus HKU1, Common Cold Virus Not Detected     Coronavirus NL63, Common Cold Virus Not Detected     Coronavirus OC43, Common Cold Virus Not Detected     Comment: Coronavirus strains 229E, HKU1, NL63, and OC43 can cause the common   cold   and are not associated with the respiratory disease  outbreak caused   by  the COVID-19 (SARS-CoV-2 novel Coronavirus) strain.           SARS-CoV2 (COVID-19) Qualitative PCR Not Detected     Human Metapneumovirus Not Detected     Human Rhinovirus/Enterovirus Not Detected     Influenza A (subtypes H1, H1-2009,H3) Not Detected     Influenza B Not Detected     Parainfluenza Virus 1 Not Detected     Parainfluenza Virus 2 Not Detected     Parainfluenza Virus 3 Not Detected     Parainfluenza Virus 4 Not Detected     Respiratory Syncytial Virus Not Detected     Bordetella Parapertussis (NU0044) Not Detected     Bordetella pertussis (ptxP) Not Detected     Chlamydia pneumoniae Not Detected     Mycoplasma pneumoniae Not Detected     Comment: Respiratory Infection Panel testing performed by Multiplex PCR.       Narrative:      Respiratory Infection Panel source->NP Swab    Gram stain [[id number]]     Order Status: No result Specimen: Body Fluid from Pleural Fluid     Fungus culture [[id number]]     Order Status: No result Specimen: Body Fluid from Pleural Fluid     AFB culture (includes stain) [[id number]]     Order Status: No result Specimen: Body Fluid from Pleural Fluid             Radiology    X-Ray Chest AP Portable  CLINICAL HISTORY:  83 years (1940) Female hypoxia Shortness of Breath    TECHNIQUE:  XR CHEST 1 VIEW. 1 images obtained.    COMPARISON:  1/25/2024    FINDINGS:    The cardiomediastinal silhouette appears prominent in size the maintain cecal parameters upon comparison. Mild central vascular congestion changes are established with borderline overt pulmonary edema. Focal linear opacity in the right upper lobe on the basis of scarring or likely atelectasis with significant volume loss the right upper lobe shifting of the trachea towards the right. There is no evidence of pneumothorax. There is no evidence of mass lesion. Small calcific density projects over the left greater tubercle of the basis of calcific tendinitis.    IMPRESSION:  1.  Mild central  "vascular congestion changes.  2.  Linear opacity in the right upper lobe on the basis of scarring or atelectasis.  3.  No evidence of adverse change upon comparison.    Electronically signed by:  Moisés Martinez MD  01/27/2024 10:35 AM [facility] Workstation: [id number]        Additional Studies    reviewed    Ventilator Information                  No results for input(s): "PH", "PCO2", "PO2", "HCO3", "POCSATURATED", "BE" in the last 72 hours.      Impression    Active Hospital Problems    Diagnosis  POA    *Shortness of breath [R06.02]  Yes    S/P bilateral mastectomy [Z90.13]  Not Applicable    Mass of upper lobe of right lung [R91.8]  Yes    Bronchitis, chronic, mucopurulent [J41.1]  Yes      Resolved Hospital Problems   No resolved problems to display.       Plan    Continue antibiotics but stop vanco,  respiratory treatments  Follow up on cultures  Gentle diuresis and follow  Hold xarelto  Follow effusion and may need to consider tap in a few days (could be done as oupt)  ? Reason for the higher xarelto dose at home  Follow H/H  Increase activity as able    Thank you for this consult.  I will follow with you while the patient is hospitalized.        Buster Grajeda MD  [facility] Pulmonary/Critical Care  01/27/2024          "

## 2024-01-27 NOTE — CARE UPDATE
01/27/24 0058   Patient Assessment/Suction   Level of Consciousness (AVPU) alert   Respiratory Effort Normal;Unlabored   Expansion/Accessory Muscles/Retractions no retractions;no use of accessory muscles   DAREK Breath Sounds clear   LLL Breath Sounds crackles, fine   RUL Breath Sounds clear   RML Breath Sounds clear   RLL Breath Sounds diminished;crackles, fine   Rhythm/Pattern, Respiratory pattern regular;unlabored   Cough Frequency no cough   PRE-TX-O2   Device (Oxygen Therapy) nasal cannula   Flow (L/min) 3   SpO2 97 %   Pulse Oximetry Type Intermittent   Aerosol Therapy   $ Aerosol Therapy Charges Aerosol Treatment   Daily Review of Necessity (SVN) completed   Respiratory Treatment Status (SVN) given   Treatment Route (SVN) mouthpiece   Patient Position (SVN) semi-Dinh's   Post Treatment Assessment (SVN) breath sounds unchanged   Signs of Intolerance (SVN) none   Education   $ Education Bronchodilator;15 min

## 2024-01-27 NOTE — CARE UPDATE
01/27/24 0723   Patient Assessment/Suction   Level of Consciousness (AVPU) alert   Respiratory Effort Unlabored;Normal   Expansion/Accessory Muscles/Retractions no use of accessory muscles;no retractions   All Lung Fields Breath Sounds Anterior:;diminished;clear;equal bilaterally   Skin Integrity   $ Wound Care Tech Time 30 min   Area Observed Cheek;Behind ear;Right;Left   Skin Appearance without discoloration   PRE-TX-O2   Device (Oxygen Therapy) nasal cannula   $ Is the patient on Low Flow Oxygen? Yes   Flow (L/min) 3   SpO2 96 %   Pulse Oximetry Type Intermittent   $ Pulse Oximetry - Multiple Charge Pulse Oximetry - Multiple   Pulse 91   Resp 20   Aerosol Therapy   $ Aerosol Therapy Charges Aerosol Treatment   Daily Review of Necessity (SVN) completed   Respiratory Treatment Status (SVN) given   Treatment Route (SVN) mouthpiece   Patient Position (SVN) semi-Dinh's   Post Treatment Assessment (SVN) breath sounds unchanged   Signs of Intolerance (SVN) none   Breath Sounds Post-Respiratory Treatment   Throughout All Fields Post-Treatment All Fields   Throughout All Fields Post-Treatment no change   Post-treatment Heart Rate (beats/min) 94   Post-treatment Resp Rate (breaths/min) 20   Education   $ Education Bronchodilator;15 min

## 2024-01-28 LAB
ANION GAP SERPL CALC-SCNC: 9 MMOL/L (ref 8–16)
BACTERIA BLD CULT: ABNORMAL
BACTERIA SPEC AEROBE CULT: NORMAL
BUN SERPL-MCNC: 11 MG/DL (ref 8–23)
CALCIUM SERPL-MCNC: 9.2 MG/DL (ref 8.7–10.5)
CHLORIDE SERPL-SCNC: 103 MMOL/L (ref 95–110)
CO2 SERPL-SCNC: 30 MMOL/L (ref 23–29)
CREAT SERPL-MCNC: 0.4 MG/DL (ref 0.5–1.4)
ERYTHROCYTE [DISTWIDTH] IN BLOOD BY AUTOMATED COUNT: 19.9 % (ref 11.5–14.5)
EST. GFR  (NO RACE VARIABLE): >60 ML/MIN/1.73 M^2
GLUCOSE SERPL-MCNC: 90 MG/DL (ref 70–110)
GRAM STN SPEC: NORMAL
HCT VFR BLD AUTO: 36 % (ref 37–48.5)
HGB BLD-MCNC: 10.3 G/DL (ref 12–16)
MCH RBC QN AUTO: 21.8 PG (ref 27–31)
MCHC RBC AUTO-ENTMCNC: 28.6 G/DL (ref 32–36)
MCV RBC AUTO: 76 FL (ref 82–98)
PLATELET # BLD AUTO: 252 K/UL (ref 150–450)
PMV BLD AUTO: 10.2 FL (ref 9.2–12.9)
POTASSIUM SERPL-SCNC: 3.6 MMOL/L (ref 3.5–5.1)
RBC # BLD AUTO: 4.72 M/UL (ref 4–5.4)
SODIUM SERPL-SCNC: 142 MMOL/L (ref 136–145)
TROPONIN I SERPL HS-MCNC: 38.8 PG/ML (ref 0–14.9)
WBC # BLD AUTO: 15.09 K/UL (ref 3.9–12.7)

## 2024-01-28 PROCEDURE — 84484 ASSAY OF TROPONIN QUANT: CPT | Performed by: INTERNAL MEDICINE

## 2024-01-28 PROCEDURE — 97161 PT EVAL LOW COMPLEX 20 MIN: CPT

## 2024-01-28 PROCEDURE — 99232 SBSQ HOSP IP/OBS MODERATE 35: CPT | Mod: ,,, | Performed by: INTERNAL MEDICINE

## 2024-01-28 PROCEDURE — 80048 BASIC METABOLIC PNL TOTAL CA: CPT | Performed by: INTERNAL MEDICINE

## 2024-01-28 PROCEDURE — 94640 AIRWAY INHALATION TREATMENT: CPT

## 2024-01-28 PROCEDURE — 25000003 PHARM REV CODE 250: Performed by: INTERNAL MEDICINE

## 2024-01-28 PROCEDURE — 36415 COLL VENOUS BLD VENIPUNCTURE: CPT | Performed by: INTERNAL MEDICINE

## 2024-01-28 PROCEDURE — 94761 N-INVAS EAR/PLS OXIMETRY MLT: CPT

## 2024-01-28 PROCEDURE — 63600175 PHARM REV CODE 636 W HCPCS: Performed by: STUDENT IN AN ORGANIZED HEALTH CARE EDUCATION/TRAINING PROGRAM

## 2024-01-28 PROCEDURE — 99900031 HC PATIENT EDUCATION (STAT)

## 2024-01-28 PROCEDURE — 63600175 PHARM REV CODE 636 W HCPCS: Performed by: INTERNAL MEDICINE

## 2024-01-28 PROCEDURE — 25000242 PHARM REV CODE 250 ALT 637 W/ HCPCS: Performed by: INTERNAL MEDICINE

## 2024-01-28 PROCEDURE — 21400001 HC TELEMETRY ROOM

## 2024-01-28 PROCEDURE — 85027 COMPLETE CBC AUTOMATED: CPT | Performed by: INTERNAL MEDICINE

## 2024-01-28 PROCEDURE — 99900035 HC TECH TIME PER 15 MIN (STAT)

## 2024-01-28 RX ORDER — GUAIFENESIN 600 MG/1
600 TABLET, EXTENDED RELEASE ORAL 2 TIMES DAILY
Status: DISCONTINUED | OUTPATIENT
Start: 2024-01-28 | End: 2024-01-29 | Stop reason: HOSPADM

## 2024-01-28 RX ORDER — HYDRALAZINE HYDROCHLORIDE 20 MG/ML
10 INJECTION INTRAMUSCULAR; INTRAVENOUS EVERY 4 HOURS PRN
Status: DISCONTINUED | OUTPATIENT
Start: 2024-01-28 | End: 2024-01-29 | Stop reason: HOSPADM

## 2024-01-28 RX ORDER — ONDANSETRON HYDROCHLORIDE 2 MG/ML
4 INJECTION, SOLUTION INTRAVENOUS EVERY 6 HOURS PRN
Status: DISCONTINUED | OUTPATIENT
Start: 2024-01-28 | End: 2024-01-29 | Stop reason: HOSPADM

## 2024-01-28 RX ORDER — DOXYCYCLINE 100 MG/1
100 CAPSULE ORAL EVERY 12 HOURS
Status: DISCONTINUED | OUTPATIENT
Start: 2024-01-28 | End: 2024-01-29 | Stop reason: HOSPADM

## 2024-01-28 RX ADMIN — IPRATROPIUM BROMIDE AND ALBUTEROL SULFATE 3 ML: 2.5; .5 SOLUTION RESPIRATORY (INHALATION) at 01:01

## 2024-01-28 RX ADMIN — SERTRALINE HYDROCHLORIDE 100 MG: 50 TABLET ORAL at 08:01

## 2024-01-28 RX ADMIN — ONDANSETRON 4 MG: 2 INJECTION INTRAMUSCULAR; INTRAVENOUS at 10:01

## 2024-01-28 RX ADMIN — CEFTRIAXONE SODIUM 1 G: 1 INJECTION, POWDER, FOR SOLUTION INTRAMUSCULAR; INTRAVENOUS at 05:01

## 2024-01-28 RX ADMIN — FLUTICASONE PROPIONATE 100 MCG: 50 SPRAY, METERED NASAL at 11:01

## 2024-01-28 RX ADMIN — DOXYCYCLINE 100 MG: 100 INJECTION, POWDER, LYOPHILIZED, FOR SOLUTION INTRAVENOUS at 06:01

## 2024-01-28 RX ADMIN — POTASSIUM BICARBONATE 50 MEQ: 977.5 TABLET, EFFERVESCENT ORAL at 08:01

## 2024-01-28 RX ADMIN — PANTOPRAZOLE SODIUM 20 MG: 20 TABLET, DELAYED RELEASE ORAL at 06:01

## 2024-01-28 RX ADMIN — HYDRALAZINE HYDROCHLORIDE 10 MG: 20 INJECTION INTRAMUSCULAR; INTRAVENOUS at 12:01

## 2024-01-28 RX ADMIN — FUROSEMIDE 20 MG: 10 INJECTION, SOLUTION INTRAMUSCULAR; INTRAVENOUS at 09:01

## 2024-01-28 RX ADMIN — DILTIAZEM HYDROCHLORIDE 120 MG: 120 CAPSULE, COATED, EXTENDED RELEASE ORAL at 08:01

## 2024-01-28 RX ADMIN — GABAPENTIN 300 MG: 300 CAPSULE ORAL at 08:01

## 2024-01-28 RX ADMIN — DOXYCYCLINE HYCLATE 100 MG: 100 CAPSULE ORAL at 08:01

## 2024-01-28 RX ADMIN — HYDROCODONE BITARTRATE AND ACETAMINOPHEN 1 TABLET: 7.5; 325 TABLET ORAL at 03:01

## 2024-01-28 RX ADMIN — GUAIFENESIN 600 MG: 600 TABLET, EXTENDED RELEASE ORAL at 11:01

## 2024-01-28 RX ADMIN — Medication 9 MG: at 08:01

## 2024-01-28 RX ADMIN — IPRATROPIUM BROMIDE AND ALBUTEROL SULFATE 3 ML: 2.5; .5 SOLUTION RESPIRATORY (INHALATION) at 08:01

## 2024-01-28 RX ADMIN — ATORVASTATIN CALCIUM 20 MG: 20 TABLET, FILM COATED ORAL at 08:01

## 2024-01-28 RX ADMIN — HYDROCODONE BITARTRATE AND ACETAMINOPHEN 1 TABLET: 7.5; 325 TABLET ORAL at 08:01

## 2024-01-28 RX ADMIN — HYDROCODONE BITARTRATE AND ACETAMINOPHEN 1 TABLET: 7.5; 325 TABLET ORAL at 11:01

## 2024-01-28 RX ADMIN — IPRATROPIUM BROMIDE AND ALBUTEROL SULFATE 3 ML: 2.5; .5 SOLUTION RESPIRATORY (INHALATION) at 07:01

## 2024-01-28 RX ADMIN — GUAIFENESIN 600 MG: 600 TABLET, EXTENDED RELEASE ORAL at 08:01

## 2024-01-28 NOTE — PROGRESS NOTES
Former[facility] Medicine  Progress Note    Patient name: Lety Herbert  MRN: [id number]  Admit Date: 1/25/2024   LOS: 3 days     SUBJECTIVE:     Principal problem: Shortness of breath    Interval History:  No further chest pain. Did not sleep well last night but no acute issues otherwise.  Feels like she has sputum to cough up but cannot get it up- mucinex added.  PT consult to mobilize and discussed getting out of bed and staying in chair today. I put her on room air and she dropped to 85%.  Improved to 89% when she was taking deep breaths.  I placed her back on 2L NC.     Hospital Course:    82 y/o F hx HTN, hx ILD, COPD, chronic hypoxemic respiratory failure on p.r.n. oxygen, bilateral mastectomy, AFib, RUL NSCLC, former smoker admitted with acute on chronic hypoxic respiratory failure. CTA chest negative for PE, does reveal signs of PHTN along with moderate right effusion (increased from 12/2023) and worsening RUL consolidation and bilateral increased interstitial opacities. Hepatic cirrhosis also noted.  She was noted to be hypoxic into the 60s when ambulating to the bathroom on room air.  She uses her supplemental oxygen on an as needed basis at home but has required it more so this past week due to symptoms. She has been started on IV abx in the event this is infection related. She has been started on IV steroids in the event this is ILD flare or bronchitis flare or radiation pneumonitis.  She has been started on IV lasix in the event this is volume mediated. Echo ordered with read pending. BNP is 805.  Troponin trend is static. Procal is normal. BCx growing strep. Covid and Flu screen is negative.  Pulmonary consulted and following.  Thoracentesis placed on hold to see if pleural effusion responds to diuretics.  Her xarelto has been held. Family kindly called her outpatient pharmacy and she takes Xarelto 20mg daily not TID as listed in chart. Episode of chest pain 1/27: EKG unchanged,  tropon checked and trended and roughly unchanged. Discussed overall case with Dr. Grajeda.  Will stop steroids 1/27 and continue IV diuretics as IVC is dilated with elevated venous pressures. CXR repeated with some improvement. Continuing IV abx.  Had a good deal of restless legs and I increased the dose of her evening Neurontin.     Scheduled Meds:   albuterol-ipratropium  3 mL Nebulization Q6H    atorvastatin  20 mg Oral QHS    cefTRIAXone (Rocephin) IV (PEDS and ADULTS)  1 g Intravenous Q24H    diltiaZEM  120 mg Oral Daily    doxycycline  100 mg Oral Q12H    fluticasone propionate  2 spray Each Nostril Daily    furosemide (LASIX) injection  20 mg Intravenous Daily    gabapentin  300 mg Oral QHS    guaiFENesin  600 mg Oral BID    melatonin  9 mg Oral Nightly    pantoprazole  20 mg Oral Daily    sertraline  100 mg Oral Daily     Continuous Infusions:  PRN Meds:benzonatate, dextrose 50%, dextrose 50%, glucagon (human recombinant), glucose, glucose, hydrALAZINE, HYDROcodone-acetaminophen, magnesium hydroxide 400 mg/5 ml, magnesium oxide, magnesium oxide, naloxone, potassium bicarbonate, potassium bicarbonate, potassium bicarbonate, potassium, sodium phosphates, potassium, sodium phosphates, potassium, sodium phosphates, sodium chloride 0.9%    Review of patient's allergies indicates:   Allergen Reactions    Meperidine     Promethazine     Bactrim [sulfamethoxazole-trimethoprim] Rash       Review of Systems: As per interval history    OBJECTIVE:     Vital Signs (Most Recent)  Temp: 97.9 °F (36.6 °C) (01/28/24 0714)  Pulse: 85 (01/28/24 0714)  Resp: 20 (01/28/24 0714)  BP: (!) 155/80 (01/28/24 0714)  SpO2: (!) 94 % (01/28/24 0714)    Vital Signs Range (Last 24H):  Temp:  [97.9 °F (36.6 °C)-98.4 °F (36.9 °C)]   Pulse:  []   Resp:  [16-20]   BP: (130-202)/()   SpO2:  [94 %-99 %]     I & O (Last 24H):  Intake/Output Summary (Last 24 hours) at 1/28/2024 0950  Last data filed at 1/28/2024 0736  Gross per 24  "hour   Intake 580 ml   Output 2350 ml   Net -1770 ml         Physical Exam:    Vitals and nursing note reviewed.     Constitutional:       General: Not in acute distress. Appears tired.     Appearance: Well-developed.   HENT:      Head: Normocephalic and atraumatic.   Eyes:      Pupils: Pupils are equal, round, and reactive to light.   Cardiovascular:      Rate and Rhythm: Regular rhythm.   Pulmonary:      Effort: Pulmonary effort is normal.      Breath sounds: Normal breath sounds. No wheezing.   O2 per NC  Abdominal:      General: There is no distension.      Palpations: Abdomen is soft.      Tenderness: There is no abdominal tenderness. There is no guarding or rebound.   Musculoskeletal:         General: Normal range of motion.      Cervical back: Normal range of motion.   Skin:     Findings: No rash.   Neurological:      Mental Status: Alert and oriented to person, place, and time.      Cranial Nerves: No cranial nerve deficit.      Sensory: No sensory deficit.     Laboratory:  I have reviewed all pertinent lab results within the past 24 hours.  CBC:   Recent Labs   Lab 01/28/24  0338   WBC 15.09*   RBC 4.72   HGB 10.3*   HCT 36.0*      MCV 76*   MCH 21.8*   MCHC 28.6*       CMP:   Recent Labs   Lab 01/25/24  1403 01/26/24  0508 01/28/24  0338      < > 90   CALCIUM 9.3   < > 9.2   ALBUMIN 4.3  --   --    PROT 7.3  --   --       < > 142   K 3.9   < > 3.6   CO2 32*   < > 30*      < > 103   BUN 13   < > 11   CREATININE 0.6   < > 0.4*   ALKPHOS 84  --   --    ALT 10  --   --    AST 24  --   --    BILITOT 1.0  --   --     < > = values in this interval not displayed.       Cardiac markers: No results for input(s): "CKMB", "CPKMB", "TROPONINT", "TROPONINI", "MYOGLOBIN" in the last 168 hours.  Microbiology Results (last 7 days)       Procedure Component Value Units Date/Time    Culture, Respiratory with Gram Stain [[id number]] Collected: 01/26/24 1520    Order Status: Completed Specimen: " Respiratory from Sputum Updated: 01/28/24 0924     Respiratory Culture Normal respiratory krista     Gram Stain (Respiratory) <10 epithelial cells per low power field.     Gram Stain (Respiratory) Rare WBC's     Gram Stain (Respiratory) Few Gram positive cocci     Gram Stain (Respiratory) Rare Gram negative rods    Blood culture x two cultures. Draw prior to antibiotics. [[id number]]  (Abnormal) Collected: 01/25/24 1427    Order Status: Completed Specimen: Blood Updated: 01/28/24 0759     Blood Culture, Routine Gram stain herrera bottle: Gram positive cocci      Results called to and read back by:MAGUE MARCOS.      01/26/2024  05:25 TGC      VIRIDANS STREPTOCOCCUS GROUP  Organism is a probable contaminant      Narrative:      Aerobic and anaerobic    Blood culture x two cultures. Draw prior to antibiotics. [[id number]] Collected: 01/25/24 1427    Order Status: Completed Specimen: Blood Updated: 01/27/24 1632     Blood Culture, Routine No Growth to date      No Growth to date      No Growth to date    Narrative:      Aerobic and anaerobic    MRSA Screen by PCR [[id number]] Collected: 01/26/24 1519    Order Status: Completed Specimen: Nasopharyngeal Swab from Nasal Updated: 01/26/24 1833     MRSA SCREEN BY PCR Negative    Rapid Organism ID by PCR (from Blood culture) [[id number]]  (Abnormal) Collected: 01/25/24 1415    Order Status: Completed Updated: 01/26/24 0552     Enterococcus faecalis Not Detected     Enterococcus faecium Not Detected     Listeria monocytogenes Not Detected     Staphylococcus spp. Not Detected     Staphylococcus aureus Not Detected     Staphylococcus epidermidis Not Detected     Staphylococcus lugdunensis Not Detected     Streptococcus species Detected     Comment: STREP SPP.  result(s) called and verbal readback obtained from MAGUE MILTON. by TC1 01/26/2024 05:52          Streptococcus agalactiae Not Detected     Streptococcus pneumoniae Not Detected     Streptococcus pyogenes  Not Detected     Acinetobacter calcoaceticus/baumannii complex Not Detected     Bacteroides fragilis Not Detected     Enterobacterales Not Detected     Enterobacter cloacae complex Not Detected     Escherichia coli Not Detected     Klebsiella aerogenes Not Detected     Klebsiella oxytoca Not Detected     Klebsiella pneumoniae group Not Detected     Proteus Not Detected     Salmonella sp Not Detected     Serratia marcescens Not Detected     Haemophilus influenzae Not Detected     Neisseria meningtidis Not Detected     Pseudomonas aeruginosa Not Detected     Stenotrophomonas maltophilia Not Detected     Candida albicans Not Detected     Candida auris Not Detected     Candida glabrata Not Detected     Candida krusei Not Detected     Candida parapsilosis Not Detected     Candida tropicalis Not Detected     Cryptococcus neoformans/gattii Not Detected     CTX-M (ESBL ) Test not applicable     IMP (Carbapenem resistant) Test not applicable     KPC resistance gene (Carbapenem resistant) Test not applicable     mcr-1  Test not applicable     mec A/C  Test not applicable     mec A/C and MREJ (MRSA) gene Test not applicable     NDM (Carbapenem resistant) Test not applicable     OXA-48-like (Carbapenem resistant) Test not applicable     van A/B (VRE gene) Test not applicable     VIM (Carbapenem resistant) Test not applicable    Narrative:      Aerobic and anaerobic  STREP SPP.  result(s) called and verbal readback obtained from ANASTASIYA QUILES RN WINGB. by TC1 01/26/2024 05:52    Respiratory Infection Panel (PCR), Nasopharyngeal [[id number]] Collected: 01/25/24 1709    Order Status: Completed Specimen: Nasopharyngeal Swab Updated: 01/25/24 1930     Respiratory Infection Panel Source NP swab     Adenovirus Not Detected     Coronavirus 229E, Common Cold Virus Not Detected     Coronavirus HKU1, Common Cold Virus Not Detected     Coronavirus NL63, Common Cold Virus Not Detected     Coronavirus OC43, Common Cold Virus Not  Detected     Comment: Coronavirus strains 229E, HKU1, NL63, and OC43 can cause the common   cold   and are not associated with the respiratory disease outbreak caused   by  the COVID-19 (SARS-CoV-2 novel Coronavirus) strain.           SARS-CoV2 (COVID-19) Qualitative PCR Not Detected     Human Metapneumovirus Not Detected     Human Rhinovirus/Enterovirus Not Detected     Influenza A (subtypes H1, H1-2009,H3) Not Detected     Influenza B Not Detected     Parainfluenza Virus 1 Not Detected     Parainfluenza Virus 2 Not Detected     Parainfluenza Virus 3 Not Detected     Parainfluenza Virus 4 Not Detected     Respiratory Syncytial Virus Not Detected     Bordetella Parapertussis (AN4841) Not Detected     Bordetella pertussis (ptxP) Not Detected     Chlamydia pneumoniae Not Detected     Mycoplasma pneumoniae Not Detected     Comment: Respiratory Infection Panel testing performed by Multiplex PCR.       Narrative:      Respiratory Infection Panel source->NP Swab    Gram stain [[id number]]     Order Status: No result Specimen: Body Fluid from Pleural Fluid     Fungus culture [[id number]]     Order Status: No result Specimen: Body Fluid from Pleural Fluid     AFB culture (includes stain) [[id number]]     Order Status: No result Specimen: Body Fluid from Pleural Fluid             Diagnostic Results:      ASSESSMENT/PLAN:         Active Hospital Problems    Diagnosis  POA    *Shortness of breath [R06.02]  Yes    S/P bilateral mastectomy [Z90.13]  Not Applicable    Mass of upper lobe of right lung [R91.8]  Yes    Bronchitis, chronic, mucopurulent [J41.1]  Yes      Resolved Hospital Problems   No resolved problems to display.         Plan:     -Mrs. Herbert has been admitted with acute on chronic hypoxic respiratory failure.  -Differential as outlined in HPI:  Pneumonia, ILD flare, radiation pneumonitis, hypervolemia, other  -IV abx. Bcx growing strep viridans and is a contaminant. Vanc stopped.    -IV steroids stopped 1/27 as  feel less likely bronchitis, etc.  Continuing nebs.  -IV lasix.  Echo ordered- normal EF, dilated IVC and elevated venous pressures. BNP elevated. Strict ins and outs. Continuing diuresis as this may be fluid related.   -Supplemental oxygen.  -Home xarelto held in the event she may need a thoracentesis for her pleural effusion.  Trial of diuresis first.  -chest pain work up as per HPI.  Repeating troponin to trend.   -Mobilizing. Up in chair.  PT consult.         VTE Risk Mitigation (From admission, onward)           Ordered     IP VTE HIGH RISK PATIENT  Once         01/25/24 1606     Place sequential compression device  Until discontinued         01/25/24 1606                      Department Hospital Medicine  [facility]  Cristina Ibrahim MD  Date of service: 01/28/2024

## 2024-01-28 NOTE — PROGRESS NOTES
Pulmonary/Critical Care  Progress Note      Patient name: Lety Herbert  MRN: [id number]  Date: 01/28/2024    Admit Date: 1/25/2024  Consult Requested By: Cristina Ibrahim MD    Reason for Consult: dyspnea, COPD, lung cancer    HPI:    1/26/2024- 82 yo ho ILD, COPD (last spirometry 2/2023 - NO obstruction, mild restriction), hypoxemia (on home O2), NSCLC (s/p XRT), h/o bilateral mastectomy, AF presented to ER with increased SOB and now admitted for further treatment.  ROS as below.  By report she was on a high dose of xarelto at home    1/27/2024 - Feels better this AM, did have episode of increased chest pain (see nursing note) which was addressed.  Has some sinus congestion and drainage.  BC + strept viridans - likely contaminant    1/28/2024 - Stable overnight, feels better and feels like she could go home.  She says that she has been taking the xarelto correctly at home and the reported dosing was in error.  Breathing is better.  BP is elevated.  Repeat CXR has been ordered    Review of Systems    Review of Systems   Constitutional:  Positive for malaise/fatigue. Negative for chills, diaphoresis, fever and weight loss.   HENT:  Negative for congestion.    Eyes:  Negative for pain.   Respiratory:  Positive for cough and shortness of breath. Negative for hemoptysis, sputum production, wheezing and stridor.         Some chest tightness   Cardiovascular:  Negative for chest pain, palpitations, orthopnea, claudication, leg swelling and PND.   Gastrointestinal:  Negative for abdominal pain, constipation, diarrhea, heartburn, nausea and vomiting.   Genitourinary:  Negative for dysuria, frequency and urgency.   Musculoskeletal:  Negative for falls and myalgias.   Neurological:  Positive for weakness. Negative for sensory change and focal weakness.   Psychiatric/Behavioral:  Negative for depression, substance abuse and suicidal ideas. The patient is not nervous/anxious.        Past Medical History    Past Medical  History:   Diagnosis Date    Abnormal chest CT 3/9/2023    Anemia     Basal cell carcinoma     nose     Cancer     breast    Depression     DVT (deep venous thrombosis)     Fall 3/20/2018    Former smoker 3/9/2023    Gastric ulceration     GERD (gastroesophageal reflux disease)     History of breast cancer 3/9/2023    History of frequent urinary tract infections     Hyperlipidemia     Hypertension     Malignant neoplasm of upper lobe of right lung (NSCLC favoring lung primary) 3/9/2023    Melanoma 2004?    left forearm    MRSA (methicillin resistant staph aureus) culture positive     Neuropathy     PE (pulmonary embolism)     Post-nasal drip 11/15/2018    Reflux     S/P bilateral mastectomy 3/9/2023       Past Surgical History    Past Surgical History:   Procedure Laterality Date    HYSTERECTOMY      MASTECTOMY, RADICAL Bilateral     TOTAL HIP ARTHROPLASTY Left 11/13/2017       Medications (scheduled):      albuterol-ipratropium  3 mL Nebulization Q6H    atorvastatin  20 mg Oral QHS    cefTRIAXone (Rocephin) IV (PEDS and ADULTS)  1 g Intravenous Q24H    diltiaZEM  120 mg Oral Daily    doxycycline  100 mg Oral Q12H    fluticasone propionate  2 spray Each Nostril Daily    furosemide (LASIX) injection  20 mg Intravenous Daily    gabapentin  300 mg Oral QHS    guaiFENesin  600 mg Oral BID    melatonin  9 mg Oral Nightly    pantoprazole  20 mg Oral Daily    sertraline  100 mg Oral Daily       Medications (infusions):         Medications (prn):     benzonatate, dextrose 50%, dextrose 50%, glucagon (human recombinant), glucose, glucose, hydrALAZINE, HYDROcodone-acetaminophen, magnesium hydroxide 400 mg/5 ml, magnesium oxide, magnesium oxide, naloxone, potassium bicarbonate, potassium bicarbonate, potassium bicarbonate, potassium, sodium phosphates, potassium, sodium phosphates, potassium, sodium phosphates, sodium chloride 0.9%    Family History:   Family History   Problem Relation Age of Onset    Cancer Mother     Cancer  "Father     Heart disease Father     Melanoma Neg Hx     Psoriasis Neg Hx     Lupus Neg Hx     Eczema Neg Hx        Social History: Tobacco:   Social History     Tobacco Use   Smoking Status Former    Passive exposure: Past   Smokeless Tobacco Never                                EtOH:   Social History     Substance and Sexual Activity   Alcohol Use No                                Drugs:   Social History     Substance and Sexual Activity   Drug Use No       Physical Exam    Vital signs:  Temp:  [97.9 °F (36.6 °C)-98.4 °F (36.9 °C)]   Pulse:  []   Resp:  [16-20]   BP: (130-202)/()   SpO2:  [94 %-99 %]     Intake/Output:   Intake/Output Summary (Last 24 hours) at 1/28/2024 1058  Last data filed at 1/28/2024 1002  Gross per 24 hour   Intake 940 ml   Output 2350 ml   Net -1410 ml          BMI: Estimated body mass index is 34.95 kg/m² as calculated from the following:    Height as of 1/16/24: 5' 5" (1.651 m).    Weight as of this encounter: 95.3 kg (210 lb).    Physical Exam  Vitals and nursing note reviewed.   Constitutional:       General: She is not in acute distress.     Appearance: Normal appearance. She is ill-appearing. She is not toxic-appearing or diaphoretic.   HENT:      Head: Normocephalic and atraumatic.      Right Ear: External ear normal.      Left Ear: External ear normal.      Nose: Nose normal. No congestion or rhinorrhea.      Mouth/Throat:      Mouth: Mucous membranes are moist.      Pharynx: Oropharynx is clear. No oropharyngeal exudate or posterior oropharyngeal erythema.   Eyes:      General: No scleral icterus.        Right eye: No discharge.         Left eye: No discharge.      Extraocular Movements: Extraocular movements intact.      Conjunctiva/sclera: Conjunctivae normal.      Pupils: Pupils are equal, round, and reactive to light.   Neck:      Vascular: No carotid bruit.   Cardiovascular:      Rate and Rhythm: Normal rate and regular rhythm.      Pulses: Normal pulses.      Heart " "sounds: No murmur heard.     No friction rub. No gallop.   Pulmonary:      Effort: Pulmonary effort is normal. No respiratory distress.      Breath sounds: No stridor. Rales present. No wheezing or rhonchi.      Comments: Decreased at bases  Chest:      Chest wall: No tenderness.   Abdominal:      General: Bowel sounds are normal. There is no distension.      Tenderness: There is no abdominal tenderness. There is no guarding.   Musculoskeletal:         General: No swelling. Normal range of motion.      Cervical back: Normal range of motion and neck supple. No rigidity or tenderness.      Right lower leg: No edema.      Left lower leg: No edema.   Lymphadenopathy:      Cervical: No cervical adenopathy.   Skin:     General: Skin is warm and dry.      Capillary Refill: Capillary refill takes less than 2 seconds.      Coloration: Skin is not jaundiced.      Findings: No bruising.   Neurological:      General: No focal deficit present.      Mental Status: She is alert and oriented to person, place, and time. Mental status is at baseline.      Cranial Nerves: No cranial nerve deficit.      Sensory: No sensory deficit.      Motor: No weakness.   Psychiatric:         Mood and Affect: Mood normal.         Behavior: Behavior normal.         Thought Content: Thought content normal.         Judgment: Judgment normal.         Laboratory    Recent Labs   Lab 01/28/24  0338   WBC 15.09*   RBC 4.72   HGB 10.3*   HCT 36.0*      MCV 76*   MCH 21.8*   MCHC 28.6*         Recent Labs   Lab 01/28/24  0338   CALCIUM 9.2      K 3.6   CO2 30*      BUN 11   CREATININE 0.4*         No results for input(s): "PT", "INR", "APTT" in the last 24 hours.      No results for input(s): "CPK", "CPKMB", "TROPONINI", "MB" in the last 24 hours.    Additional labs: reviewed    Microbiology:       Microbiology Results (last 7 days)       Procedure Component Value Units Date/Time    Culture, Respiratory with Gram Stain [[id number]] " Collected: 01/26/24 1520    Order Status: Completed Specimen: Respiratory from Sputum Updated: 01/28/24 0924     Respiratory Culture Normal respiratory krista     Gram Stain (Respiratory) <10 epithelial cells per low power field.     Gram Stain (Respiratory) Rare WBC's     Gram Stain (Respiratory) Few Gram positive cocci     Gram Stain (Respiratory) Rare Gram negative rods    Blood culture x two cultures. Draw prior to antibiotics. [[id number]]  (Abnormal) Collected: 01/25/24 1427    Order Status: Completed Specimen: Blood Updated: 01/28/24 0759     Blood Culture, Routine Gram stain herrera bottle: Gram positive cocci      Results called to and read back by:MAGUE MARCOS.      01/26/2024  05:25 [facility]      VIRIDANS STREPTOCOCCUS GROUP  Organism is a probable contaminant      Narrative:      Aerobic and anaerobic    Blood culture x two cultures. Draw prior to antibiotics. [[id number]] Collected: 01/25/24 1427    Order Status: Completed Specimen: Blood Updated: 01/27/24 1632     Blood Culture, Routine No Growth to date      No Growth to date      No Growth to date    Narrative:      Aerobic and anaerobic    MRSA Screen by PCR [[id number]] Collected: 01/26/24 1519    Order Status: Completed Specimen: Nasopharyngeal Swab from Nasal Updated: 01/26/24 1833     MRSA SCREEN BY PCR Negative    Rapid Organism ID by PCR (from Blood culture) [[id number]]  (Abnormal) Collected: 01/25/24 1415    Order Status: Completed Updated: 01/26/24 0552     Enterococcus faecalis Not Detected     Enterococcus faecium Not Detected     Listeria monocytogenes Not Detected     Staphylococcus spp. Not Detected     Staphylococcus aureus Not Detected     Staphylococcus epidermidis Not Detected     Staphylococcus lugdunensis Not Detected     Streptococcus species Detected     Comment: STREP SPP.  result(s) called and verbal readback obtained from MAGUE MILTON. by TC1 01/26/2024 05:52          Streptococcus agalactiae Not Detected      Streptococcus pneumoniae Not Detected     Streptococcus pyogenes Not Detected     Acinetobacter calcoaceticus/baumannii complex Not Detected     Bacteroides fragilis Not Detected     Enterobacterales Not Detected     Enterobacter cloacae complex Not Detected     Escherichia coli Not Detected     Klebsiella aerogenes Not Detected     Klebsiella oxytoca Not Detected     Klebsiella pneumoniae group Not Detected     Proteus Not Detected     Salmonella sp Not Detected     Serratia marcescens Not Detected     Haemophilus influenzae Not Detected     Neisseria meningtidis Not Detected     Pseudomonas aeruginosa Not Detected     Stenotrophomonas maltophilia Not Detected     Candida albicans Not Detected     Candida auris Not Detected     Candida glabrata Not Detected     Candida krusei Not Detected     Candida parapsilosis Not Detected     Candida tropicalis Not Detected     Cryptococcus neoformans/gattii Not Detected     CTX-M (ESBL ) Test not applicable     IMP (Carbapenem resistant) Test not applicable     KPC resistance gene (Carbapenem resistant) Test not applicable     mcr-1  Test not applicable     mec A/C  Test not applicable     mec A/C and MREJ (MRSA) gene Test not applicable     NDM (Carbapenem resistant) Test not applicable     OXA-48-like (Carbapenem resistant) Test not applicable     van A/B (VRE gene) Test not applicable     VIM (Carbapenem resistant) Test not applicable    Narrative:      Aerobic and anaerobic  STREP SPP.  result(s) called and verbal readback obtained from ANASTASIYA QUILES RN WINGB. by TC1 01/26/2024 05:52    Respiratory Infection Panel (PCR), Nasopharyngeal [[id number]] Collected: 01/25/24 1709    Order Status: Completed Specimen: Nasopharyngeal Swab Updated: 01/25/24 1930     Respiratory Infection Panel Source NP swab     Adenovirus Not Detected     Coronavirus 229E, Common Cold Virus Not Detected     Coronavirus HKU1, Common Cold Virus Not Detected     Coronavirus NL63, Common Cold  Virus Not Detected     Coronavirus OC43, Common Cold Virus Not Detected     Comment: Coronavirus strains 229E, HKU1, NL63, and OC43 can cause the common   cold   and are not associated with the respiratory disease outbreak caused   by  the COVID-19 (SARS-CoV-2 novel Coronavirus) strain.           SARS-CoV2 (COVID-19) Qualitative PCR Not Detected     Human Metapneumovirus Not Detected     Human Rhinovirus/Enterovirus Not Detected     Influenza A (subtypes H1, H1-2009,H3) Not Detected     Influenza B Not Detected     Parainfluenza Virus 1 Not Detected     Parainfluenza Virus 2 Not Detected     Parainfluenza Virus 3 Not Detected     Parainfluenza Virus 4 Not Detected     Respiratory Syncytial Virus Not Detected     Bordetella Parapertussis (XR8047) Not Detected     Bordetella pertussis (ptxP) Not Detected     Chlamydia pneumoniae Not Detected     Mycoplasma pneumoniae Not Detected     Comment: Respiratory Infection Panel testing performed by Multiplex PCR.       Narrative:      Respiratory Infection Panel source->NP Swab    Gram stain [[id number]]     Order Status: No result Specimen: Body Fluid from Pleural Fluid     Fungus culture [[id number]]     Order Status: No result Specimen: Body Fluid from Pleural Fluid     AFB culture (includes stain) [[id number]]     Order Status: No result Specimen: Body Fluid from Pleural Fluid             Radiology    X-Ray Chest AP Portable  CLINICAL HISTORY:  83 years (1940) Female hypoxia Shortness of Breath    TECHNIQUE:  XR CHEST 1 VIEW. 1 images obtained.    COMPARISON:  1/25/2024    FINDINGS:    The cardiomediastinal silhouette appears prominent in size the maintain cecal parameters upon comparison. Mild central vascular congestion changes are established with borderline overt pulmonary edema. Focal linear opacity in the right upper lobe on the basis of scarring or likely atelectasis with significant volume loss the right upper lobe shifting of the trachea towards the right.  "There is no evidence of pneumothorax. There is no evidence of mass lesion. Small calcific density projects over the left greater tubercle of the basis of calcific tendinitis.    IMPRESSION:  1.  Mild central vascular congestion changes.  2.  Linear opacity in the right upper lobe on the basis of scarring or atelectasis.  3.  No evidence of adverse change upon comparison.    Electronically signed by:  Moisés Martinez MD  01/27/2024 10:35 AM CST Workstation: [id number]        Additional Studies    reviewed    Ventilator Information                  No results for input(s): "PH", "PCO2", "PO2", "HCO3", "POCSATURATED", "BE" in the last 72 hours.      Impression    Active Hospital Problems    Diagnosis  POA    *Shortness of breath [R06.02]  Yes    S/P bilateral mastectomy [Z90.13]  Not Applicable    Mass of upper lobe of right lung [R91.8]  Yes    Bronchitis, chronic, mucopurulent [J41.1]  Yes      Resolved Hospital Problems   No resolved problems to display.       Plan    Continue antibiotics, respiratory treatments  Follow up on cultures  Continue diuresis  Hold xarelto  Follow effusion and may need to consider tap in a few days (could be done as oupt)  ? Reason for the higher xarelto dose at home  Follow H/H  Increase activity as able  Will follow up on CXR ordered for today  ? Home soon  With regards to her cancer she needs to get follow up PET scan as outpt    Thank you for this consult.  I will follow with you while the patient is hospitalized.        Buster Grajeda MD  [facility] Pulmonary/Critical Care  01/28/2024          "

## 2024-01-28 NOTE — PROGRESS NOTES
[facility] Medicine  Progress Note    Patient name: Lety Herbert  MRN: [id number]  Admit Date: 1/25/2024   LOS: 2 days     SUBJECTIVE:     Principal problem: Shortness of breath    Interval History:  Had episode of chest pain this AM.  EKG obtained and she is in afib (known history with rate control) with no changes compared to prior.  Pain improving at the time of my exam but she does report this pain seems different than the pain on presentation. She reports it was mid to left chest, sharp, does not think it radiated. Troponin checked and was 30 (24 on admit). Discussed overall case with Dr. Grajeda.  Will stop steroids and continue IV diuretics as IVC is dilated with elevated venous pressures. CXR repeated today with some improvement. Continuing IV abx for another day.  Had a good deal of restless legs this AM. I increased the dose of her evening Neurontin.     Hospital Course:    82 y/o F hx HTN, hx ILD, COPD, chronic hypoxemic respiratory failure on p.r.n. oxygen, bilateral mastectomy, AFib, RUL NSCLC, former smoker admitted with acute on chronic hypoxic respiratory failure. CTA chest negative for PE, does reveal signs of PHTN along with moderate right effusion (increased from 12/2023) and worsening RUL consolidation and bilateral increased interstitial opacities. Hepatic cirrhosis also noted.  She was noted to be hypoxic into the 60s when ambulating to the bathroom on room air.  She uses her supplemental oxygen on an as needed basis at home but has required it more so this past week due to symptoms. She has been started on IV abx in the event this is infection related. She has been started on IV steroids in the event this is ILD flare or bronchitis flare or radiation pneumonitis.  She has been started on IV lasix in the event this is volume mediated. Echo ordered with read pending. BNP is 805.  Troponin trend is static. Procal is normal. BCx growing strep. Covid and Flu screen is  negative.  Pulmonary consulted and following.  Thoracentesis placed on hold to see if pleural effusion responds to diuretics.  Her xarelto has been held. Family kindly called her outpatient pharmacy and she takes Xarelto 20mg daily not TID as listed in chart.     Scheduled Meds:   albuterol-ipratropium  3 mL Nebulization Q6H    atorvastatin  20 mg Oral QHS    cefTRIAXone (Rocephin) IV (PEDS and ADULTS)  1 g Intravenous Q24H    diltiaZEM  120 mg Oral Daily    doxycycline (VIBRAMYCIN) IVPB  100 mg Intravenous Q12H    fluticasone propionate  2 spray Each Nostril Daily    furosemide (LASIX) injection  20 mg Intravenous Daily    gabapentin  300 mg Oral QHS    melatonin  9 mg Oral Nightly    pantoprazole  20 mg Oral Daily    sertraline  100 mg Oral Daily     Continuous Infusions:  PRN Meds:dextrose 50%, dextrose 50%, glucagon (human recombinant), glucose, glucose, HYDROcodone-acetaminophen, magnesium hydroxide 400 mg/5 ml, magnesium oxide, magnesium oxide, naloxone, potassium bicarbonate, potassium bicarbonate, potassium bicarbonate, potassium, sodium phosphates, potassium, sodium phosphates, potassium, sodium phosphates, sodium chloride 0.9%    Review of patient's allergies indicates:   Allergen Reactions    Meperidine     Promethazine     Bactrim [sulfamethoxazole-trimethoprim] Rash       Review of Systems: As per interval history    OBJECTIVE:     Vital Signs (Most Recent)  Temp: 97.9 °F (36.6 °C) (01/27/24 1500)  Pulse: 99 (01/27/24 1500)  Resp: 16 (01/27/24 1500)  BP: (!) 169/70 (01/27/24 1500)  SpO2: 96 % (01/27/24 1500)    Vital Signs Range (Last 24H):  Temp:  [97.9 °F (36.6 °C)-98.9 °F (37.2 °C)]   Pulse:  []   Resp:  [14-20]   BP: (149-191)/()   SpO2:  [93 %-97 %]     I & O (Last 24H):  Intake/Output Summary (Last 24 hours) at 1/27/2024 1910  Last data filed at 1/27/2024 1439  Gross per 24 hour   Intake 1190 ml   Output 2250 ml   Net -1060 ml         Physical Exam:    Vitals and nursing note reviewed.  "    Constitutional:       General: Not in acute distress.     Appearance: Well-developed.   HENT:      Head: Normocephalic and atraumatic.   Eyes:      Pupils: Pupils are equal, round, and reactive to light.   Cardiovascular:      Rate and Rhythm: Regular rhythm.   Pulmonary:      Effort: Pulmonary effort is normal.      Breath sounds: Normal breath sounds. No wheezing.   O2 per NC  Abdominal:      General: There is no distension.      Palpations: Abdomen is soft.      Tenderness: There is no abdominal tenderness. There is no guarding or rebound.   Musculoskeletal:         General: Normal range of motion.      Cervical back: Normal range of motion.   Skin:     Findings: No rash.   Neurological:      Mental Status: Alert and oriented to person, place, and time.      Cranial Nerves: No cranial nerve deficit.      Sensory: No sensory deficit.     Laboratory:  I have reviewed all pertinent lab results within the past 24 hours.  CBC:   Recent Labs   Lab 01/27/24  0455   WBC 13.22*   RBC 4.82   HGB 10.4*   HCT 36.3*      MCV 75*   MCH 21.6*   MCHC 28.7*       CMP:   Recent Labs   Lab 01/25/24  1403 01/26/24  0508 01/27/24  0455      < > 149*   CALCIUM 9.3   < > 9.1   ALBUMIN 4.3  --   --    PROT 7.3  --   --       < > 137   K 3.9   < > 4.2   CO2 32*   < > 30*      < > 100   BUN 13   < > 14   CREATININE 0.6   < > 0.5   ALKPHOS 84  --   --    ALT 10  --   --    AST 24  --   --    BILITOT 1.0  --   --     < > = values in this interval not displayed.       Cardiac markers: No results for input(s): "CKMB", "CPKMB", "TROPONINT", "TROPONINI", "MYOGLOBIN" in the last 168 hours.  Microbiology Results (last 7 days)       Procedure Component Value Units Date/Time    Blood culture x two cultures. Draw prior to antibiotics. [[id number]] Collected: 01/25/24 1427    Order Status: Completed Specimen: Blood Updated: 01/27/24 1632     Blood Culture, Routine No Growth to date      No Growth to date      No Growth " to date    Narrative:      Aerobic and anaerobic    Blood culture x two cultures. Draw prior to antibiotics. [[id number]]  (Abnormal) Collected: 01/25/24 1427    Order Status: Completed Specimen: Blood Updated: 01/27/24 1111     Blood Culture, Routine Gram stain herrera bottle: Gram positive cocci      Results called to and read back by:MAGUE MARCOS.      01/26/2024  05:25 TGC      VIRIDANS STREPTOCOCCUS GROUP  Organism is a probable contaminant      Narrative:      Aerobic and anaerobic    Culture, Respiratory with Gram Stain [[id number]] Collected: 01/26/24 1520    Order Status: Completed Specimen: Respiratory from Sputum Updated: 01/27/24 1004     Respiratory Culture Normal respiratory krista     Gram Stain (Respiratory) <10 epithelial cells per low power field.     Gram Stain (Respiratory) Rare WBC's     Gram Stain (Respiratory) Few Gram positive cocci     Gram Stain (Respiratory) Rare Gram negative rods    MRSA Screen by PCR [[id number]] Collected: 01/26/24 1519    Order Status: Completed Specimen: Nasopharyngeal Swab from Nasal Updated: 01/26/24 1833     MRSA SCREEN BY PCR Negative    Rapid Organism ID by PCR (from Blood culture) [[id number]]  (Abnormal) Collected: 01/25/24 1415    Order Status: Completed Updated: 01/26/24 0552     Enterococcus faecalis Not Detected     Enterococcus faecium Not Detected     Listeria monocytogenes Not Detected     Staphylococcus spp. Not Detected     Staphylococcus aureus Not Detected     Staphylococcus epidermidis Not Detected     Staphylococcus lugdunensis Not Detected     Streptococcus species Detected     Comment: STREP SPP.  result(s) called and verbal readback obtained from MAGUE MILTON. by TC1 01/26/2024 05:52          Streptococcus agalactiae Not Detected     Streptococcus pneumoniae Not Detected     Streptococcus pyogenes Not Detected     Acinetobacter calcoaceticus/baumannii complex Not Detected     Bacteroides fragilis Not Detected      Enterobacterales Not Detected     Enterobacter cloacae complex Not Detected     Escherichia coli Not Detected     Klebsiella aerogenes Not Detected     Klebsiella oxytoca Not Detected     Klebsiella pneumoniae group Not Detected     Proteus Not Detected     Salmonella sp Not Detected     Serratia marcescens Not Detected     Haemophilus influenzae Not Detected     Neisseria meningtidis Not Detected     Pseudomonas aeruginosa Not Detected     Stenotrophomonas maltophilia Not Detected     Candida albicans Not Detected     Candida auris Not Detected     Candida glabrata Not Detected     Candida krusei Not Detected     Candida parapsilosis Not Detected     Candida tropicalis Not Detected     Cryptococcus neoformans/gattii Not Detected     CTX-M (ESBL ) Test not applicable     IMP (Carbapenem resistant) Test not applicable     KPC resistance gene (Carbapenem resistant) Test not applicable     mcr-1  Test not applicable     mec A/C  Test not applicable     mec A/C and MREJ (MRSA) gene Test not applicable     NDM (Carbapenem resistant) Test not applicable     OXA-48-like (Carbapenem resistant) Test not applicable     van A/B (VRE gene) Test not applicable     VIM (Carbapenem resistant) Test not applicable    Narrative:      Aerobic and anaerobic  STREP SPP.  result(s) called and verbal readback obtained from MAGUE MILTON. by TC1 01/26/2024 05:52    Respiratory Infection Panel (PCR), Nasopharyngeal [[id number]] Collected: 01/25/24 1709    Order Status: Completed Specimen: Nasopharyngeal Swab Updated: 01/25/24 1930     Respiratory Infection Panel Source NP swab     Adenovirus Not Detected     Coronavirus 229E, Common Cold Virus Not Detected     Coronavirus HKU1, Common Cold Virus Not Detected     Coronavirus NL63, Common Cold Virus Not Detected     Coronavirus OC43, Common Cold Virus Not Detected     Comment: Coronavirus strains 229E, HKU1, NL63, and OC43 can cause the common   cold   and are not  associated with the respiratory disease outbreak caused   by  the COVID-19 (SARS-CoV-2 novel Coronavirus) strain.           SARS-CoV2 (COVID-19) Qualitative PCR Not Detected     Human Metapneumovirus Not Detected     Human Rhinovirus/Enterovirus Not Detected     Influenza A (subtypes H1, H1-2009,H3) Not Detected     Influenza B Not Detected     Parainfluenza Virus 1 Not Detected     Parainfluenza Virus 2 Not Detected     Parainfluenza Virus 3 Not Detected     Parainfluenza Virus 4 Not Detected     Respiratory Syncytial Virus Not Detected     Bordetella Parapertussis (SU6813) Not Detected     Bordetella pertussis (ptxP) Not Detected     Chlamydia pneumoniae Not Detected     Mycoplasma pneumoniae Not Detected     Comment: Respiratory Infection Panel testing performed by Multiplex PCR.       Narrative:      Respiratory Infection Panel source->NP Swab    Gram stain [[id number]]     Order Status: No result Specimen: Body Fluid from Pleural Fluid     Fungus culture [[id number]]     Order Status: No result Specimen: Body Fluid from Pleural Fluid     AFB culture (includes stain) [[id number]]     Order Status: No result Specimen: Body Fluid from Pleural Fluid             Diagnostic Results:      ASSESSMENT/PLAN:         Active Hospital Problems    Diagnosis  POA    *Shortness of breath [R06.02]  Yes    S/P bilateral mastectomy [Z90.13]  Not Applicable    Mass of upper lobe of right lung [R91.8]  Yes    Bronchitis, chronic, mucopurulent [J41.1]  Yes      Resolved Hospital Problems   No resolved problems to display.         Plan:     -Mrs. Herbert has been admitted with acute on chronic hypoxic respiratory failure.  -Differential as outlined in HPI:  Pneumonia, ILD flare, radiation pneumonitis, hypervolemia, other  -IV abx. Bcx growing strep viridans and is a contaminant.   -IV steroids stopped 1/27 as feel less likely bronchitis, etc.  Continuing nebs.  -IV lasix.  Echo ordered- normal EF, dilated IVC and elevated venous  pressures. BNP elevated. Strict ins and outs. Continuing diuresis as this may be fluid related.   -Supplemental oxygen.  -Home xarelto held in the event she may need a thoracentesis for her pleural effusion.  Trial of diuresis first.  -chest pain work up as per HPI.  Repeating troponin to trend.         VTE Risk Mitigation (From admission, onward)           Ordered     IP VTE HIGH RISK PATIENT  Once         01/25/24 1606     Place sequential compression device  Until discontinued         01/25/24 1606                      Department Hospital Medicine  [facility]  Cristina Ibrahim MD  Date of service: 01/27/2024

## 2024-01-28 NOTE — RESPIRATORY THERAPY
01/27/24 2010   Patient Assessment/Suction   Level of Consciousness (AVPU) alert   Respiratory Effort Normal;Unlabored   Expansion/Accessory Muscles/Retractions no use of accessory muscles   All Lung Fields Breath Sounds Anterior:;diminished   Rhythm/Pattern, Respiratory unlabored;pattern regular;depth regular;no shortness of breath reported   Skin Integrity   $ Wound Care Tech Time 15 min   Area Observed Left;Right;Behind ear;Cheek;Nares   PRE-TX-O2   Device (Oxygen Therapy) nasal cannula with humidification   Flow (L/min) 3  (wears home o2)   SpO2 98 %   Pulse Oximetry Type Intermittent   $ Pulse Oximetry - Multiple Charge Pulse Oximetry - Multiple   Pulse 98   Resp 18   Aerosol Therapy   $ Aerosol Therapy Charges Aerosol Treatment   Daily Review of Necessity (SVN) completed   Respiratory Treatment Status (SVN) given   Treatment Route (SVN) mouthpiece;oxygen   Patient Position (SVN) HOB elevated   Post Treatment Assessment (SVN) increased aeration   Signs of Intolerance (SVN) none   Breath Sounds Post-Respiratory Treatment   Throughout All Fields Post-Treatment aeration increased   Post-treatment Heart Rate (beats/min) 98   Post-treatment Resp Rate (breaths/min) 18   Education   $ Education Bronchodilator;15 min;DME Nebulizer;DME Oxygen   Respiratory Evaluation   $ Care Plan Tech Time 15 min   $ Eval/Re-eval Charges Evaluation   Evaluation For New Orders   Home Oxygen   Has Home Oxygen? Yes   Liter Flow 3   Duration continuous   Route nasal cannula   Mode continuous

## 2024-01-28 NOTE — PLAN OF CARE
Problem: Physical Therapy  Goal: Physical Therapy Goal  Description: Goals to be met by: 24     Patient will increase functional independence with mobility by performin. Supine to sit with [place]  2. Sit to supine with [place]  3. Sit to stand transfer with Stand-by Assistance  4. Bed to chair transfer with Stand-by Assistance using Rolling Walker  5. Gait  x 150 feet with Stand-by Assistance using Rolling Walker.     Outcome: Ongoing, Progressing

## 2024-01-28 NOTE — PT/OT/SLP EVAL
Physical Therapy Evaluation    Patient Name:  Lety Herbert   MRN:  [id number]    Recommendations:     Discharge Recommendations: Low Intensity Therapy   Discharge Equipment Recommendations: none   Barriers to discharge: None    Assessment:     Lety Herbert is a 83 y.o. female admitted with a medical diagnosis of Shortness of breath.  She presents with the following impairments/functional limitations: weakness, impaired endurance, impaired functional mobility, gait instability, impaired balance, decreased lower extremity function, decreased ROM, impaired cardiopulmonary response to activity .  Patient agreeable to PT evaluation this afternoon.  Patient presented supine in bed and required min assist to transfer to sitting and then CGA to stand with a RW.  Patient then ambulated x 150 feet rW SBA and O2 at 2L.    Rehab Prognosis: Good; patient would benefit from acute skilled PT services to address these deficits and reach maximum level of function.    Recent Surgery: * No surgery found *      Plan:     During this hospitalization, patient to be seen 6 x/week to address the identified rehab impairments via gait training, therapeutic activities, therapeutic exercises and progress toward the following goals:    Plan of Care Expires:  02/26/24    Subjective     Chief Complaint: fatigue  Patient/Family Comments/goals: go home  Pain/Comfort:       Patients cultural, spiritual, Faith conflicts given the current situation:      Living Environment:  Currently lives with family in a  story home.  Prior to admission, patients level of function was modified independent.  Equipment used at home: cane, straight, wheelchair, bedside commode, rollator, oxygen.  DME owned (not currently used): none.  Upon discharge, patient will have assistance from family.    Objective:     Communicated with nurse prior to session.  Patient found supine with bed alarm, oxygen, PureWick, peripheral IV  upon PT entry to  room.    General Precautions: Standard, fall  Orthopedic Precautions:N/A   Braces:    Respiratory Status: Nasal cannula, flow 2 L/min    Exams:  RLE ROM: WFL  RLE Strength: WNL  LLE ROM: WFL  LLE Strength: WNL    Functional Mobility:  Bed Mobility:     Supine to Sit: minimum assistance  Sit to Supine: minimum assistance  Transfers:     Sit to Stand:  contact guard assistance with rolling walker  Gait: x 150 feet rW CGA and O2 at 2L      AM-PAC 6 CLICK MOBILITY  Total Score:        Treatment & Education:  None given    Patient left supine with call button in reach, bed alarm on, nurse notified, and family present.    GOALS:   Multidisciplinary Problems       Physical Therapy Goals          Problem: Physical Therapy    Goal Priority Disciplines Outcome Goal Variances Interventions   Physical Therapy Goal     PT, PT/OT Ongoing, Progressing     Description: Goals to be met by: 24     Patient will increase functional independence with mobility by performin. Supine to sit with [place]  2. Sit to supine with [place]  3. Sit to stand transfer with Stand-by Assistance  4. Bed to chair transfer with Stand-by Assistance using Rolling Walker  5. Gait  x 150 feet with Stand-by Assistance using Rolling Walker.                          History:     Past Medical History:   Diagnosis Date    Abnormal chest CT 3/9/2023    Anemia     Basal cell carcinoma     nose     Cancer     breast    Depression     DVT (deep venous thrombosis)     Fall 3/20/2018    Former smoker 3/9/2023    Gastric ulceration     GERD (gastroesophageal reflux disease)     History of breast cancer 3/9/2023    History of frequent urinary tract infections     Hyperlipidemia     Hypertension     Malignant neoplasm of upper lobe of right lung (NSCLC favoring lung primary) 3/9/2023    Melanoma 2004?    left forearm    MRSA (methicillin resistant staph aureus) culture positive     Neuropathy     PE (pulmonary embolism)     Post-nasal drip 11/15/2018     Reflux     S/P bilateral mastectomy 3/9/2023       Past Surgical History:   Procedure Laterality Date    HYSTERECTOMY      MASTECTOMY, RADICAL Bilateral     TOTAL HIP ARTHROPLASTY Left 11/13/2017       Time Tracking:     PT Received On: 01/28/24  PT Start Time: 1306     PT Stop Time: 1323  PT Total Time (min): 17 min     Billable Minutes: Evaluation 17      01/28/2024

## 2024-01-29 VITALS
TEMPERATURE: 99 F | WEIGHT: 210 LBS | HEART RATE: 83 BPM | RESPIRATION RATE: 20 BRPM | OXYGEN SATURATION: 96 % | SYSTOLIC BLOOD PRESSURE: 161 MMHG | BODY MASS INDEX: 34.95 KG/M2 | DIASTOLIC BLOOD PRESSURE: 78 MMHG

## 2024-01-29 LAB
ANION GAP SERPL CALC-SCNC: 6 MMOL/L (ref 8–16)
BUN SERPL-MCNC: 11 MG/DL (ref 8–23)
CALCIUM SERPL-MCNC: 8.9 MG/DL (ref 8.7–10.5)
CHLORIDE SERPL-SCNC: 101 MMOL/L (ref 95–110)
CO2 SERPL-SCNC: 35 MMOL/L (ref 23–29)
CREAT SERPL-MCNC: 0.5 MG/DL (ref 0.5–1.4)
ERYTHROCYTE [DISTWIDTH] IN BLOOD BY AUTOMATED COUNT: 19.9 % (ref 11.5–14.5)
EST. GFR  (NO RACE VARIABLE): >60 ML/MIN/1.73 M^2
GLUCOSE SERPL-MCNC: 81 MG/DL (ref 70–110)
HCT VFR BLD AUTO: 35.7 % (ref 37–48.5)
HGB BLD-MCNC: 10.2 G/DL (ref 12–16)
MCH RBC QN AUTO: 22.1 PG (ref 27–31)
MCHC RBC AUTO-ENTMCNC: 28.6 G/DL (ref 32–36)
MCV RBC AUTO: 77 FL (ref 82–98)
PLATELET # BLD AUTO: 207 K/UL (ref 150–450)
PMV BLD AUTO: 10.2 FL (ref 9.2–12.9)
POTASSIUM SERPL-SCNC: 3.5 MMOL/L (ref 3.5–5.1)
RBC # BLD AUTO: 4.62 M/UL (ref 4–5.4)
SODIUM SERPL-SCNC: 142 MMOL/L (ref 136–145)
WBC # BLD AUTO: 8.46 K/UL (ref 3.9–12.7)

## 2024-01-29 PROCEDURE — 94640 AIRWAY INHALATION TREATMENT: CPT

## 2024-01-29 PROCEDURE — 80048 BASIC METABOLIC PNL TOTAL CA: CPT | Performed by: INTERNAL MEDICINE

## 2024-01-29 PROCEDURE — 94761 N-INVAS EAR/PLS OXIMETRY MLT: CPT

## 2024-01-29 PROCEDURE — 63600175 PHARM REV CODE 636 W HCPCS: Performed by: INTERNAL MEDICINE

## 2024-01-29 PROCEDURE — 27000221 HC OXYGEN, UP TO 24 HOURS

## 2024-01-29 PROCEDURE — 99232 SBSQ HOSP IP/OBS MODERATE 35: CPT | Mod: ,,, | Performed by: INTERNAL MEDICINE

## 2024-01-29 PROCEDURE — 36415 COLL VENOUS BLD VENIPUNCTURE: CPT | Performed by: INTERNAL MEDICINE

## 2024-01-29 PROCEDURE — 25000003 PHARM REV CODE 250: Performed by: INTERNAL MEDICINE

## 2024-01-29 PROCEDURE — 85027 COMPLETE CBC AUTOMATED: CPT | Performed by: INTERNAL MEDICINE

## 2024-01-29 PROCEDURE — 25000242 PHARM REV CODE 250 ALT 637 W/ HCPCS: Performed by: INTERNAL MEDICINE

## 2024-01-29 RX ORDER — DOXYCYCLINE 100 MG/1
100 CAPSULE ORAL EVERY 12 HOURS
Qty: 8 CAPSULE | Refills: 0 | Status: SHIPPED | OUTPATIENT
Start: 2024-01-29 | End: 2024-02-02

## 2024-01-29 RX ORDER — FUROSEMIDE 20 MG/1
20 TABLET ORAL DAILY
Qty: 30 TABLET | Refills: 2 | Status: SHIPPED | OUTPATIENT
Start: 2024-01-29 | End: 2025-01-28

## 2024-01-29 RX ORDER — RIVAROXABAN 20 MG/1
20 TABLET, FILM COATED ORAL DAILY
Start: 2024-01-29

## 2024-01-29 RX ADMIN — FUROSEMIDE 20 MG: 10 INJECTION, SOLUTION INTRAMUSCULAR; INTRAVENOUS at 09:01

## 2024-01-29 RX ADMIN — FLUTICASONE PROPIONATE 100 MCG: 50 SPRAY, METERED NASAL at 08:01

## 2024-01-29 RX ADMIN — GUAIFENESIN 600 MG: 600 TABLET, EXTENDED RELEASE ORAL at 08:01

## 2024-01-29 RX ADMIN — DILTIAZEM HYDROCHLORIDE 120 MG: 120 CAPSULE, COATED, EXTENDED RELEASE ORAL at 08:01

## 2024-01-29 RX ADMIN — DOXYCYCLINE HYCLATE 100 MG: 100 CAPSULE ORAL at 08:01

## 2024-01-29 RX ADMIN — IPRATROPIUM BROMIDE AND ALBUTEROL SULFATE 3 ML: 2.5; .5 SOLUTION RESPIRATORY (INHALATION) at 01:01

## 2024-01-29 RX ADMIN — SERTRALINE HYDROCHLORIDE 100 MG: 50 TABLET ORAL at 08:01

## 2024-01-29 RX ADMIN — PANTOPRAZOLE SODIUM 20 MG: 20 TABLET, DELAYED RELEASE ORAL at 06:01

## 2024-01-29 RX ADMIN — IPRATROPIUM BROMIDE AND ALBUTEROL SULFATE 3 ML: 2.5; .5 SOLUTION RESPIRATORY (INHALATION) at 07:01

## 2024-01-29 NOTE — PROGRESS NOTES
Pulmonary/Critical Care  Progress Note      Patient name: Lety Herbert  MRN: [id number]  Date: 01/29/2024    Admit Date: 1/25/2024  Consult Requested By: Cristina Ibrahim MD    Reason for Consult: dyspnea, COPD, lung cancer    HPI:    1/26/2024- 82 yo ho ILD, COPD (last spirometry 2/2023 - NO obstruction, mild restriction), hypoxemia (on home O2), NSCLC (s/p XRT), h/o bilateral mastectomy, AF presented to ER with increased SOB and now admitted for further treatment.  ROS as below.  By report she was on a high dose of xarelto at home    1/27/2024 - Feels better this AM, did have episode of increased chest pain (see nursing note) which was addressed.  Has some sinus congestion and drainage.  BC + strept viridans - likely contaminant    1/28/2024 - Stable overnight, feels better and feels like she could go home.  She says that she has been taking the xarelto correctly at home and the reported dosing was in error.  Breathing is better.  BP is elevated.  Repeat CXR has been ordered    1/29/2024 - Was a little concerned yesterday afternoon that she was not ready for DC so stayed overnight and feels better this AM.  No new complaints.  She has plenty of help at home.  Sinus congestion is much better.  CXR reviewed she has chronic changes, scarring in RUL and no significant effusion    Review of Systems    Review of Systems   Constitutional:  Positive for malaise/fatigue. Negative for chills, diaphoresis, fever and weight loss.   HENT:  Negative for congestion.    Eyes:  Negative for pain.   Respiratory:  Positive for cough and shortness of breath. Negative for hemoptysis, sputum production, wheezing and stridor.         Some chest tightness   Cardiovascular:  Negative for chest pain, palpitations, orthopnea, claudication, leg swelling and PND.   Gastrointestinal:  Negative for abdominal pain, constipation, diarrhea, heartburn, nausea and vomiting.   Genitourinary:  Negative for dysuria, frequency and urgency.    Musculoskeletal:  Negative for falls and myalgias.   Neurological:  Positive for weakness. Negative for sensory change and focal weakness.   Psychiatric/Behavioral:  Negative for depression, substance abuse and suicidal ideas. The patient is not nervous/anxious.        Past Medical History    Past Medical History:   Diagnosis Date    Abnormal chest CT 3/9/2023    Anemia     Basal cell carcinoma     nose     Cancer     breast    Depression     DVT (deep venous thrombosis)     Fall 3/20/2018    Former smoker 3/9/2023    Gastric ulceration     GERD (gastroesophageal reflux disease)     History of breast cancer 3/9/2023    History of frequent urinary tract infections     Hyperlipidemia     Hypertension     Malignant neoplasm of upper lobe of right lung (NSCLC favoring lung primary) 3/9/2023    Melanoma 2004?    left forearm    MRSA (methicillin resistant staph aureus) culture positive     Neuropathy     PE (pulmonary embolism)     Post-nasal drip 11/15/2018    Reflux     S/P bilateral mastectomy 3/9/2023       Past Surgical History    Past Surgical History:   Procedure Laterality Date    HYSTERECTOMY      MASTECTOMY, RADICAL Bilateral     TOTAL HIP ARTHROPLASTY Left 11/13/2017       Medications (scheduled):      albuterol-ipratropium  3 mL Nebulization Q6H    atorvastatin  20 mg Oral QHS    cefTRIAXone (Rocephin) IV (PEDS and ADULTS)  1 g Intravenous Q24H    diltiaZEM  120 mg Oral Daily    doxycycline  100 mg Oral Q12H    fluticasone propionate  2 spray Each Nostril Daily    furosemide (LASIX) injection  20 mg Intravenous Daily    gabapentin  300 mg Oral QHS    guaiFENesin  600 mg Oral BID    melatonin  9 mg Oral Nightly    pantoprazole  20 mg Oral Daily    sertraline  100 mg Oral Daily       Medications (infusions):         Medications (prn):     benzonatate, dextrose 50%, dextrose 50%, glucagon (human recombinant), glucose, glucose, hydrALAZINE, HYDROcodone-acetaminophen, magnesium hydroxide 400 mg/5 ml, magnesium  "oxide, magnesium oxide, naloxone, ondansetron, potassium bicarbonate, potassium bicarbonate, potassium bicarbonate, potassium, sodium phosphates, potassium, sodium phosphates, potassium, sodium phosphates, sodium chloride 0.9%    Family History:   Family History   Problem Relation Age of Onset    Cancer Mother     Cancer Father     Heart disease Father     Melanoma Neg Hx     Psoriasis Neg Hx     Lupus Neg Hx     Eczema Neg Hx        Social History: Tobacco:   Social History     Tobacco Use   Smoking Status Former    Passive exposure: Past   Smokeless Tobacco Never                                EtOH:   Social History     Substance and Sexual Activity   Alcohol Use No                                Drugs:   Social History     Substance and Sexual Activity   Drug Use No       Physical Exam    Vital signs:  Temp:  [97.9 °F (36.6 °C)-98.7 °F (37.1 °C)]   Pulse:  [84-95]   Resp:  [17-20]   BP: (130-198)/(67-99)   SpO2:  [92 %-98 %]     Intake/Output:   Intake/Output Summary (Last 24 hours) at 1/29/2024 1026  Last data filed at 1/29/2024 0818  Gross per 24 hour   Intake 785 ml   Output 1400 ml   Net -615 ml          BMI: Estimated body mass index is 34.95 kg/m² as calculated from the following:    Height as of 1/16/24: 5' 5" (1.651 m).    Weight as of this encounter: 95.3 kg (210 lb).    Physical Exam  Vitals and nursing note reviewed.   Constitutional:       General: She is not in acute distress.     Appearance: Normal appearance. She is ill-appearing. She is not toxic-appearing or diaphoretic.   HENT:      Head: Normocephalic and atraumatic.      Right Ear: External ear normal.      Left Ear: External ear normal.      Nose: Nose normal. No congestion or rhinorrhea.      Mouth/Throat:      Mouth: Mucous membranes are moist.      Pharynx: Oropharynx is clear. No oropharyngeal exudate or posterior oropharyngeal erythema.   Eyes:      General: No scleral icterus.        Right eye: No discharge.         Left eye: No " discharge.      Extraocular Movements: Extraocular movements intact.      Conjunctiva/sclera: Conjunctivae normal.      Pupils: Pupils are equal, round, and reactive to light.   Neck:      Vascular: No carotid bruit.   Cardiovascular:      Rate and Rhythm: Normal rate and regular rhythm.      Pulses: Normal pulses.      Heart sounds: No murmur heard.     No friction rub. No gallop.   Pulmonary:      Effort: Pulmonary effort is normal. No respiratory distress.      Breath sounds: No stridor. Rales present. No wheezing or rhonchi.      Comments: Decreased at bases  Chest:      Chest wall: No tenderness.   Abdominal:      General: Bowel sounds are normal. There is no distension.      Tenderness: There is no abdominal tenderness. There is no guarding.   Musculoskeletal:         General: No swelling. Normal range of motion.      Cervical back: Normal range of motion and neck supple. No rigidity or tenderness.      Right lower leg: No edema.      Left lower leg: No edema.   Lymphadenopathy:      Cervical: No cervical adenopathy.   Skin:     General: Skin is warm and dry.      Capillary Refill: Capillary refill takes less than 2 seconds.      Coloration: Skin is not jaundiced.      Findings: No bruising.   Neurological:      General: No focal deficit present.      Mental Status: She is alert and oriented to person, place, and time. Mental status is at baseline.      Cranial Nerves: No cranial nerve deficit.      Sensory: No sensory deficit.      Motor: No weakness.   Psychiatric:         Mood and Affect: Mood normal.         Behavior: Behavior normal.         Thought Content: Thought content normal.         Judgment: Judgment normal.         Laboratory    Recent Labs   Lab 01/29/24  0520   WBC 8.46   RBC 4.62   HGB 10.2*   HCT 35.7*      MCV 77*   MCH 22.1*   MCHC 28.6*         Recent Labs   Lab 01/29/24  0520   CALCIUM 8.9      K 3.5   CO2 35*      BUN 11   CREATININE 0.5         No results for  "input(s): "PT", "INR", "APTT" in the last 24 hours.      No results for input(s): "CPK", "CPKMB", "TROPONINI", "MB" in the last 24 hours.    Additional labs: reviewed    Microbiology:       Microbiology Results (last 7 days)       Procedure Component Value Units Date/Time    Blood culture x two cultures. Draw prior to antibiotics. [[id number]] Collected: 01/25/24 1427    Order Status: Completed Specimen: Blood Updated: 01/28/24 1632     Blood Culture, Routine No Growth to date      No Growth to date      No Growth to date      No Growth to date    Narrative:      Aerobic and anaerobic    Culture, Respiratory with Gram Stain [[id number]] Collected: 01/26/24 1520    Order Status: Completed Specimen: Respiratory from Sputum Updated: 01/28/24 0924     Respiratory Culture Normal respiratory krista     Gram Stain (Respiratory) <10 epithelial cells per low power field.     Gram Stain (Respiratory) Rare WBC's     Gram Stain (Respiratory) Few Gram positive cocci     Gram Stain (Respiratory) Rare Gram negative rods    Blood culture x two cultures. Draw prior to antibiotics. [[id number]]  (Abnormal) Collected: 01/25/24 1427    Order Status: Completed Specimen: Blood Updated: 01/28/24 0759     Blood Culture, Routine Gram stain herrera bottle: Gram positive cocci      Results called to and read back by:MAGUE MARCOS.      01/26/2024  05:25 TGC      VIRIDANS STREPTOCOCCUS GROUP  Organism is a probable contaminant      Narrative:      Aerobic and anaerobic    MRSA Screen by PCR [[id number]] Collected: 01/26/24 1519    Order Status: Completed Specimen: Nasopharyngeal Swab from Nasal Updated: 01/26/24 1833     MRSA SCREEN BY PCR Negative    Rapid Organism ID by PCR (from Blood culture) [[id number]]  (Abnormal) Collected: 01/25/24 1415    Order Status: Completed Updated: 01/26/24 0552     Enterococcus faecalis Not Detected     Enterococcus faecium Not Detected     Listeria monocytogenes Not Detected     Staphylococcus spp. Not " Detected     Staphylococcus aureus Not Detected     Staphylococcus epidermidis Not Detected     Staphylococcus lugdunensis Not Detected     Streptococcus species Detected     Comment: STREP SPP.  result(s) called and verbal readback obtained from MAGUE MILTON. by [facility] 01/26/2024 05:52          Streptococcus agalactiae Not Detected     Streptococcus pneumoniae Not Detected     Streptococcus pyogenes Not Detected     Acinetobacter calcoaceticus/baumannii complex Not Detected     Bacteroides fragilis Not Detected     Enterobacterales Not Detected     Enterobacter cloacae complex Not Detected     Escherichia coli Not Detected     Klebsiella aerogenes Not Detected     Klebsiella oxytoca Not Detected     Klebsiella pneumoniae group Not Detected     Proteus Not Detected     Salmonella sp Not Detected     Serratia marcescens Not Detected     Haemophilus influenzae Not Detected     Neisseria meningtidis Not Detected     Pseudomonas aeruginosa Not Detected     Stenotrophomonas maltophilia Not Detected     Candida albicans Not Detected     Candida auris Not Detected     Candida glabrata Not Detected     Candida krusei Not Detected     Candida parapsilosis Not Detected     Candida tropicalis Not Detected     Cryptococcus neoformans/gattii Not Detected     CTX-M (ESBL ) Test not applicable     IMP (Carbapenem resistant) Test not applicable     KPC resistance gene (Carbapenem resistant) Test not applicable     mcr-1  Test not applicable     mec A/C  Test not applicable     mec A/C and MREJ (MRSA) gene Test not applicable     NDM (Carbapenem resistant) Test not applicable     OXA-48-like (Carbapenem resistant) Test not applicable     van A/B (VRE gene) Test not applicable     VIM (Carbapenem resistant) Test not applicable    Narrative:      Aerobic and anaerobic  STREP SPP.  result(s) called and verbal readback obtained from MAGUE MILTON by [facility] 01/26/2024 05:52    Respiratory Infection Panel (PCR),  Nasopharyngeal [[id number]] Collected: 01/25/24 1709    Order Status: Completed Specimen: Nasopharyngeal Swab Updated: 01/25/24 1930     Respiratory Infection Panel Source NP swab     Adenovirus Not Detected     Coronavirus 229E, Common Cold Virus Not Detected     Coronavirus HKU1, Common Cold Virus Not Detected     Coronavirus NL63, Common Cold Virus Not Detected     Coronavirus OC43, Common Cold Virus Not Detected     Comment: Coronavirus strains 229E, HKU1, NL63, and OC43 can cause the common   cold   and are not associated with the respiratory disease outbreak caused   by  the COVID-19 (SARS-CoV-2 novel Coronavirus) strain.           SARS-CoV2 (COVID-19) Qualitative PCR Not Detected     Human Metapneumovirus Not Detected     Human Rhinovirus/Enterovirus Not Detected     Influenza A (subtypes H1, H1-2009,H3) Not Detected     Influenza B Not Detected     Parainfluenza Virus 1 Not Detected     Parainfluenza Virus 2 Not Detected     Parainfluenza Virus 3 Not Detected     Parainfluenza Virus 4 Not Detected     Respiratory Syncytial Virus Not Detected     Bordetella Parapertussis (RR2702) Not Detected     Bordetella pertussis (ptxP) Not Detected     Chlamydia pneumoniae Not Detected     Mycoplasma pneumoniae Not Detected     Comment: Respiratory Infection Panel testing performed by Multiplex PCR.       Narrative:      Respiratory Infection Panel source->NP Swab    Gram stain [[id number]]     Order Status: No result Specimen: Body Fluid from Pleural Fluid     Fungus culture [[id number]]     Order Status: No result Specimen: Body Fluid from Pleural Fluid     AFB culture (includes stain) [[id number]]     Order Status: No result Specimen: Body Fluid from Pleural Fluid             Radiology    X-Ray Abdomen AP 1 View  CLINICAL HISTORY:  83 years (1940) Female nausea.emesis Nausea and emesis    TECHNIQUE:  XR ABDOMEN 1 VIEW (KUB).  1 image(s) obtained.    COMPARISON:  Most recent study from    FINDINGS:  The lung bases  "appear within normal limits. There are no abnormal gas filled loops of large or small bowel to suggest bowel obstruction. There is no pneumatosis or gross pneumoperitoneum. Osseous structures show dextroscoliosis at the thoracolumbar junction and a left total hip arthroplasty.    IMPRESSION:  Normal appearing radiograph of the abdomen with a nonobstructive bowel gas pattern.    .    Electronically signed by:  Moisés Gutierrez MD  01/29/2024 08:11 AM CST Workstation: [id number]        Additional Studies    reviewed    Ventilator Information                  No results for input(s): "PH", "PCO2", "PO2", "HCO3", "POCSATURATED", "BE" in the last 72 hours.      Impression    Active Hospital Problems    Diagnosis  POA    *Shortness of breath [R06.02]  Yes    S/P bilateral mastectomy [Z90.13]  Not Applicable    Mass of upper lobe of right lung [R91.8]  Yes    Bronchitis, chronic, mucopurulent [J41.1]  Yes      Resolved Hospital Problems   No resolved problems to display.       Plan    OK to DC from pulmonary standpoint on    Usual home meds, treatments  Her usual home O2  Po antibiotics to complete a 7 day course  She is requesting some home health if possible  She can follow up with me in 2-4 weeks  With regards to her cancer she needs to get her follow up PET scan as outpt  There is no significant pleural effusion on her CXR    Thank you for this consult.  I will follow with you while the patient is hospitalized.        Buster Grajeda MD  [facility] Pulmonary/Critical Care  01/29/2024          "

## 2024-01-29 NOTE — RESPIRATORY THERAPY
01/28/24 2041   Patient Assessment/Suction   Level of Consciousness (AVPU) alert   Respiratory Effort Normal;Unlabored   Expansion/Accessory Muscles/Retractions no use of accessory muscles   All Lung Fields Breath Sounds Anterior:;clear   Rhythm/Pattern, Respiratory unlabored;pattern regular;depth regular;no shortness of breath reported   PRE-TX-O2   Device (Oxygen Therapy) nasal cannula   Flow (L/min) 3   SpO2 98 %   Pulse Oximetry Type Intermittent   $ Pulse Oximetry - Multiple Charge Pulse Oximetry - Multiple   Pulse 93   Resp 17   Aerosol Therapy   $ Aerosol Therapy Charges Aerosol Treatment   Daily Review of Necessity (SVN) completed   Respiratory Treatment Status (SVN) given   Treatment Route (SVN) mouthpiece;oxygen   Patient Position (SVN) HOB elevated   Post Treatment Assessment (SVN) breath sounds unchanged   Signs of Intolerance (SVN) none   Breath Sounds Post-Respiratory Treatment   Throughout All Fields Post-Treatment no change   Post-treatment Heart Rate (beats/min) 93   Post-treatment Resp Rate (breaths/min) 17   Education   $ Education Bronchodilator;DME Nebulizer;DME Oxygen;15 min   Respiratory Evaluation   $ Care Plan Tech Time 15 min

## 2024-01-29 NOTE — PLAN OF CARE
Met with patient and family at bedside concerning discharge plan.  Levels of care discussed and education provided.  Patient verbalizes understanding and wishes to have home health at discharge with no preference in agency.  Referral to [facility]  placed in [facility].       01/29/24 7480   Post-Acute Status   Post-Acute Authorization Home Health   Home Health Status Referrals Sent   Discharge Plan   Discharge Plan A Home Health

## 2024-01-29 NOTE — PLAN OF CARE
Problem: Adult Inpatient Plan of Care  Goal: Plan of Care Review  Outcome: Met  Goal: Patient-Specific Goal (Individualized)  Outcome: Met  Goal: Absence of Hospital-Acquired Illness or Injury  Outcome: Met  Goal: Optimal Comfort and Wellbeing  Outcome: Met  Goal: Readiness for Transition of Care  Outcome: Met     Problem: Fall Injury Risk  Goal: Absence of Fall and Fall-Related Injury  Outcome: Met     Problem: Skin Injury Risk Increased  Goal: Skin Health and Integrity  Outcome: Met     Problem: Physical Therapy  Goal: Physical Therapy Goal  Description: Goals to be met by: 24     Patient will increase functional independence with mobility by performin. Supine to sit with [place]  2. Sit to supine with [place]  3. Sit to stand transfer with Stand-by Assistance  4. Bed to chair transfer with Stand-by Assistance using Rolling Walker  5. Gait  x 150 feet with Stand-by Assistance using Rolling Walker.     Outcome: Met

## 2024-01-29 NOTE — PLAN OF CARE
Patient accepted for home health services with [facility]  per [facility] response.  Start of care scheduled for 1/30/24, if patient discharged today.        01/29/24 3490   Post-Acute Status   Post-Acute Authorization Home Health   Home Health Status Pending medical clearance/testing

## 2024-01-29 NOTE — PT/OT/SLP PROGRESS
Physical Therapy      Patient Name:  Lety Herbert   MRN:  [id number]    Patient not seen today secondary to  (anticipating DC home today).

## 2024-01-29 NOTE — PLAN OF CARE
Home health arranged with [facility] , start of care tomorrow 1/30/24.  PCP appt scheduled and added to AVS, pt states she has an appt with her Pulmonologist tomorrow as well.     Discharge orders and chart reviewed with no further post-acute discharge needs identified at this time.  At this time, patient is cleared for discharge from Case Management standpoint.        01/29/24 1611   Final Note   Assessment Type Final Discharge Note   Anticipated Discharge Disposition Home-[facility]   Hospital Resources/Appts/Education Provided Appointments scheduled and added to AVS   Post-Acute Status   Post-Acute Authorization Home Health   Home Health Status Set-up Complete/Auth obtained   Patient choice form signed by patient/caregiver List with quality metrics by geographic area provided   Discharge Delays None known at this time

## 2024-01-29 NOTE — NURSING
Discharge instructions given to patient. Patient verbalized understanding of medications and follow up appointments. PIV removed from right forearm without difficulty, catheter tip intact. Tele removed and returned to monitor room. Patient to discharge home via private vehicle.

## 2024-01-29 NOTE — CARE UPDATE
Continue tx   01/29/24 0722   Patient Assessment/Suction   Level of Consciousness (AVPU) alert   Respiratory Effort Normal;Unlabored   Expansion/Accessory Muscles/Retractions no use of accessory muscles;expansion symmetric   All Lung Fields Breath Sounds clear;diminished;wheezes, expiratory   DAREK Breath Sounds wheezes, expiratory   LLL Breath Sounds diminished   RUL Breath Sounds clear   RML Breath Sounds clear   RLL Breath Sounds diminished   Rhythm/Pattern, Respiratory unlabored;depth regular;no shortness of breath reported   Cough Frequency infrequent   Cough Type good;nonproductive   PRE-TX-O2   Device (Oxygen Therapy) nasal cannula   $ Is the patient on Low Flow Oxygen? Yes   SpO2 98 %   Pulse Oximetry Type Intermittent   $ Pulse Oximetry - Multiple Charge Pulse Oximetry - Multiple   Pulse 94   Resp 20   Aerosol Therapy   $ Aerosol Therapy Charges Aerosol Treatment   Daily Review of Necessity (SVN) completed   Respiratory Treatment Status (SVN) given   Treatment Route (SVN) mouthpiece;oxygen   Patient Position (SVN) HOB elevated   Post Treatment Assessment (SVN) increased aeration   Signs of Intolerance (SVN) none   Breath Sounds Post-Respiratory Treatment   Throughout All Fields Post-Treatment All Fields   Throughout All Fields Post-Treatment aeration increased   Post-treatment Heart Rate (beats/min) 92   Post-treatment Resp Rate (breaths/min) 18

## 2024-01-30 ENCOUNTER — OFFICE VISIT (OUTPATIENT)
Dept: PULMONOLOGY | Facility: CLINIC | Age: 84
End: 2024-01-30
Payer: MEDICARE

## 2024-01-30 VITALS
WEIGHT: 210.13 LBS | OXYGEN SATURATION: 90 % | DIASTOLIC BLOOD PRESSURE: 113 MMHG | HEART RATE: 96 BPM | BODY MASS INDEX: 35.01 KG/M2 | SYSTOLIC BLOOD PRESSURE: 189 MMHG | HEIGHT: 65 IN

## 2024-01-30 DIAGNOSIS — I27.20 PULMONARY HYPERTENSION: ICD-10-CM

## 2024-01-30 DIAGNOSIS — R63.0 ANOREXIA: ICD-10-CM

## 2024-01-30 DIAGNOSIS — J90 PLEURAL EFFUSION, RIGHT: ICD-10-CM

## 2024-01-30 DIAGNOSIS — J44.1 COPD WITH RESPIRATORY FAILURE, ACUTE: ICD-10-CM

## 2024-01-30 DIAGNOSIS — J96.00 COPD WITH RESPIRATORY FAILURE, ACUTE: ICD-10-CM

## 2024-01-30 DIAGNOSIS — J96.11 CHRONIC HYPOXEMIC RESPIRATORY FAILURE: ICD-10-CM

## 2024-01-30 DIAGNOSIS — C34.11 PRIMARY CANCER OF RIGHT UPPER LOBE OF LUNG: ICD-10-CM

## 2024-01-30 DIAGNOSIS — J70.1 RADIATION FIBROSIS OF LUNG: ICD-10-CM

## 2024-01-30 DIAGNOSIS — R94.2 ABNORMAL PET SCAN OF LUNG: Primary | ICD-10-CM

## 2024-01-30 DIAGNOSIS — J47.1 BRONCHIECTASIS WITH (ACUTE) EXACERBATION: ICD-10-CM

## 2024-01-30 LAB — BACTERIA BLD CULT: NORMAL

## 2024-01-30 PROCEDURE — 1159F MED LIST DOCD IN RCRD: CPT | Mod: CPTII,S$GLB,, | Performed by: INTERNAL MEDICINE

## 2024-01-30 PROCEDURE — 1157F ADVNC CARE PLAN IN RCRD: CPT | Mod: CPTII,S$GLB,, | Performed by: INTERNAL MEDICINE

## 2024-01-30 PROCEDURE — 1101F PT FALLS ASSESS-DOCD LE1/YR: CPT | Mod: CPTII,S$GLB,, | Performed by: INTERNAL MEDICINE

## 2024-01-30 PROCEDURE — 1111F DSCHRG MED/CURRENT MED MERGE: CPT | Mod: CPTII,S$GLB,, | Performed by: INTERNAL MEDICINE

## 2024-01-30 PROCEDURE — 3080F DIAST BP >= 90 MM HG: CPT | Mod: CPTII,S$GLB,, | Performed by: INTERNAL MEDICINE

## 2024-01-30 PROCEDURE — 3288F FALL RISK ASSESSMENT DOCD: CPT | Mod: CPTII,S$GLB,, | Performed by: INTERNAL MEDICINE

## 2024-01-30 PROCEDURE — 99999 PR PBB SHADOW E&M-EST. PATIENT-LVL IV: CPT | Mod: PBBFAC,,, | Performed by: INTERNAL MEDICINE

## 2024-01-30 PROCEDURE — 99215 OFFICE O/P EST HI 40 MIN: CPT | Mod: S$GLB,,, | Performed by: INTERNAL MEDICINE

## 2024-01-30 PROCEDURE — 3077F SYST BP >= 140 MM HG: CPT | Mod: CPTII,S$GLB,, | Performed by: INTERNAL MEDICINE

## 2024-01-30 RX ORDER — ARFORMOTEROL TARTRATE 15 UG/2ML
15 SOLUTION RESPIRATORY (INHALATION) 2 TIMES DAILY
Qty: 60 EACH | Refills: 11 | Status: SHIPPED | OUTPATIENT
Start: 2024-01-30 | End: 2025-01-29

## 2024-01-30 RX ORDER — BUDESONIDE 0.5 MG/2ML
0.5 INHALANT ORAL 2 TIMES DAILY
Qty: 120 ML | Refills: 11 | Status: SHIPPED | OUTPATIENT
Start: 2024-01-30

## 2024-01-30 RX ORDER — MEGESTROL ACETATE 40 MG/ML
200 SUSPENSION ORAL DAILY
Qty: 150 ML | Refills: 11 | Status: SHIPPED | OUTPATIENT
Start: 2024-01-30 | End: 2025-01-29

## 2024-01-30 RX ORDER — LEVOFLOXACIN 500 MG/1
500 TABLET, FILM COATED ORAL DAILY
Qty: 10 TABLET | Refills: 1 | Status: SHIPPED | OUTPATIENT
Start: 2024-01-30

## 2024-01-30 RX ORDER — PREDNISONE 20 MG/1
TABLET ORAL
Qty: 36 TABLET | Refills: 0 | Status: SHIPPED | OUTPATIENT
Start: 2024-01-30

## 2024-01-30 NOTE — PROGRESS NOTES
1/30/2024    Lety Herbert  Follow up    Chief Complaint   Patient presents with    Follow-up     Per Dr. Macias's request       HPI:       1/30/2024....pt presented to [facility] 1/25 for 4 days with  fluid and pneumonia- but record vague and procal  very loww at 0.05 ...... Pt was on ox 3 lpm prior to hosp    Pt lost appetite pta, no fever, bnp was 805 admit, echo good with Mr/ ef 60%, rv enlarged, and pap 40.       Pt had had failure to thrive last months.      Ct reviewed from last yr-- ct rul nodule 2/2023 with decrease in size and xrt fibrosis changes seen 6/2023 and 9/2023.  Pet scan 12/2023 with no cancer activity but progressive scarring of rul, and progressively increased right pleural effusion til 12/2023.       Pt presented to [facility] with sob and low sat -- pt has poor recall of exact presentation.   Record revieweed but not clear picture.......      Pt will have some bilat lower ext pain -- knees.      2/7/2023--  feels better, no cough nor mucous,  no night sweats, appetite ok but not strong.   Uses oxygen occasionally.   Patient Instructions   Use levaquin prompt for infection.    May use prednisone if cough or breathing worsens    Will send in norco 90/month for 3 months.    Renewed xanax for as needed.    Will order needle bx.  Pet scan  and cxr viewed.  Will have cxr after bx.      Check six min walk and breathing test -- suspect surgery may not be offered as oxygen level had been low.   If cancer may refer to oncology doc.  May refer to Maryse Lange MD in [place]    Will call and discuss bx results over phone.    1/23/2023-- no fever, coughing min mucous yellow.  Ribs ok with no sign pain.  Sob still and still night sweats needing to change night gown.  Poor appetite.  Pt relates felt like she was dying last visit-- doing better now.  Occ blood streak hemoptysis.    Pt has home ox at 3 lpm --- uses intermittently with sat 90 rest and 80 or so with activity.  Pt vague on If breathing worsening  last yr.  Six min walk 2020 with sat low resting.  Patient Instructions   May need to repeat prednisone if cough returns.    Need to do biopsy right upper density.    You still have night sweats-- apparent pneumonia cleared from last visit.      Oxygen  needs seem very high with activity?      Use oxygen more --ok to use 1-2 lpm for restt and 3 lpm activity.  Lung tissue disease looks somewhat stable since 2019.  Oxygen needs are high but able to get by at rest with room air but marginally    Repeat prednisone now.     Will need to do needle biopsy.    Will direct over phone biopsy results       1/18/2023 having cough and fatigue -- coughs all day with yellow mucous with streaks blood.  Poor appetite, having back and rib pain -- lower ribs attributed to violent coughing.   Used trelegy with min help.  Walks about house with no falls-- no weak.  No percieved wt loss.  Used norco for pains -- helped min.   Has albuterol in past but not using.  Used prednisone in past-- some benefit appreciated.  Declines hosp.   Pt had pet scan viwed today-- new rll 13 mm density with min pet activity --new from 12/15/2022.  Pt now with night sweats-new last wk or so- sputum culture pending afb and nl krista 12/29.  May have fever nighttime  Patient Instructions   Re check for regular germs  Dose levaquin  Would check temp at night  Big large pet active Spot in lung could be infection-- infection is apparent with mucous but culture had been negative-- afb can take 2 months.  New spot in right lower lung should be infection-- having yellow mucous and blood and sweats at night  Prednisone 20 mg may help cough-- dose for 5 days --- may repeat as needed for cough.  Use norco as needed to suppress pain and cough.  Cancer typically slow-- infection quick.  Infection active.   Biopsy would be recommended (unless afb + -- regular germ not expected to cause spot in upper lung.   Would check cxr now to assure no rapid  pneumonia as right lower   lung spot new.     I would anticipate may be able to do biopsy-- if mycobacterial culture positive (typical germs that cause this problem are slow growers and take a month at least-- early February??) might hold off but would like to be sure about cancer ??  Qtc was 432 November 2022-- need to check ekg on 23rd.  F/u 23 office  Cx stable 1/18/2023 , wbc 21k, no impressive infiltrates.  K 3.2 -- discussion with pt -- sent in potassium 10 meq bid, will discuss bx at f/u on 23rd if pt doing well.     12/28/2022   Pt developed left chest pain acute onset-- lasted few days, pt has had lingering back pain.  Coughs a lot with yellow mucous-- tablespoon daily.  Inhalers no help in past  Had right shoulder area achey pain about 7/10 pain-- comes and goes.  chr hypoxic resp failure post pe and pulm htn assoc cor dz and osas.    Ct this month with 23 mm rul lesion cw ca. The lesion is new from 2019. Ild dz with bronchiectasis assoc mucous plugging seen.   Upper lung honeycombing suggested ct 12/15/2022 -- viewed directly.   No asbestos exposure but father worked [employer].  Pt was smoker  Pthad double mastectomy 15 yrs ago-- no oncology follow up  Patient Instructions   Bronchiectasis with yellow mucous-- cultures needed  Trial trelegy in place of current inhaler. Use aerobika for 10 breaths after inhaler.    Lung tissue with progression fibrosis-- slow process.  Consider treatment later.  Lung lesion right upper lung worrisome for cancer-- new over last 4 yrs-- enlarging lymph node in middle chest is still small.  Check pet scan.   May need biopsy -  will need to skip xarelto prior biopsy- needle of lung but may need ebus.    Back pain noted on right -- will send in [employer] Alerts for as needed use.  Pet scan may help determine if cancer related.   Will direct care with result of pet scan.  Could do phone follow up to discuss in detail if complex.   Below from Dr Villalpando  12/28/2021- breathing stable, able to walk around store w/ oxygen.  Tries to walk 20 min per day. Denies chest congestion/cough. Sometimes nose congested, uses astelin. She takes singulair nightly.  Patient Instructions   Oxygen - continue with activity and any time sats <92%  Clubbing of fingernails is not harmful- due to chronic hypoxia  06/30/2021-   Continue oxygen  Follow up with cardiologist  Stop claritin and try singulair one pill every night for sinus drip  continue flonase  while seated feels fine but gets breathless w/ minimal activity. Daughter reports dyspnea is getting worse over time. Reports cardiologist wants to place pacemaker for her but insurance denied it. Does not feel well and sits in recliner all the time. Reports constant sinus drip and cough w/ green/yellow mucous.    12/30/2020-   Call medical supply for the oxygen and ask about thinner or more comfortable nasal cannulas  Start claritin daily  Start flonase 2 sprays in each nostril daily  feels better w/ O2, wears 3L continuously except when sitting still at home. Trouble breathing w/ fatigue is episodic. Gets headaches- tension from wearing O2, glasses and mask behind ear. Has constant sinus drip. Other night R ear blocked up while wearing CPAP.  Tries mucinex DM but doesn't last long. She is on xarelto for atrial fib now.     6/30/20-   Oxygen for home use ordered- would wear continuously  Use CPAP machine- can try nasal pillows. Would use oxygen with your CPAP too.  Get lung function test and 6 minute walk to check to see how much oxygen you need.  Pt is an 80 yo female with DVT/PE, GERD, breast ca s/p double mastectomy- no chemo or XRT, melanoma of arm, AUSTIN, obesity presenting for initial evaluation. Pt reports oxygen desaturation for over a year but no one has ordered her any oxygen for home. First noted low O2 sat when she had sinus infection. O2 sats will drop when exerting herself or resting. Feels short of breath worsening over time. She coughs w/ phlegm from sinuses. Sees ENT who cauterized nose  "for bleeding. Daughter is here as well and assists w/ history.  Reports not using CPAP because she had tip of her nose removed for cancer in past and she is concerned mask will cause more cancer to come back. She wakes up a lot at night and feels drowsy during the day.  Denies any hx of lung problems. She is a past smoker- quit 40 yrs ago 1-2 PPD x 25 yrs. Used to have recurrent bronchitis and cough which stopped after she quit smoking.    Outside records from pt's cardiologist Jeremy Sargent in [facility] were faxed to us and have been reviewed- with dx including heart block, atrial fib & flutter, CAD, diastolic dysfunction, HTN, HLD, and obesity. Per that note ASAP consult was placed to pulmonary for "shortness of breath and O2 sat of 70s-80s."    The chief compliant  problem is varies w/ instability at times    PFSH:  Past Medical History:   Diagnosis Date    Abnormal chest CT 3/9/2023    Anemia     Basal cell carcinoma     nose     Cancer     breast    Depression     DVT (deep venous thrombosis)     Fall 3/20/2018    Former smoker 3/9/2023    Gastric ulceration     GERD (gastroesophageal reflux disease)     History of breast cancer 3/9/2023    History of frequent urinary tract infections     Hyperlipidemia     Hypertension     Malignant neoplasm of upper lobe of right lung (NSCLC favoring lung primary) 3/9/2023    Melanoma 2004?    left forearm    MRSA (methicillin resistant staph aureus) culture positive     Neuropathy     PE (pulmonary embolism)     Post-nasal drip 11/15/2018    Reflux     S/P bilateral mastectomy 3/9/2023         Past Surgical History:   Procedure Laterality Date    HYSTERECTOMY      MASTECTOMY, RADICAL Bilateral     TOTAL HIP ARTHROPLASTY Left 11/13/2017     Social History     Tobacco Use    Smoking status: Former     Passive exposure: Past    Smokeless tobacco: Never   Substance Use Topics    Alcohol use: No    Drug use: No     Family History   Problem Relation Age of Onset    Cancer Mother  " "   Cancer Father     Heart disease Father     Melanoma Neg Hx     Psoriasis Neg Hx     Lupus Neg Hx     Eczema Neg Hx      Review of patient's allergies indicates:   Allergen Reactions    Meperidine     Promethazine     Bactrim [sulfamethoxazole-trimethoprim] Rash       Performance Status:The patient's activity level is functions out of house.  Feels SOB any exertion out of house.     Review of Systems:  a review of eleven systems covering constitutional, Eye, HEENT, Psych, Respiratory, Cardiac, GI, , Musculoskeletal, Endocrine, Dermatologic was negative except for pertinent findings as listed ABOVE and below:  All negative with pertinent positives as above       Exam:Comprehensive exam done. BP (!) 189/113 (BP Location: Left arm, Patient Position: Sitting, BP Method: Small (Automatic))   Pulse 96   Ht 5' 5" (1.651 m)   Wt 95.3 kg (210 lb 1.6 oz)   SpO2 (!) 90% Comment: on room air at rest  BMI 34.96 kg/m²   Exam included Vitals as listed, and patient's appearance and affect and alertness and mood, oral exam for yeast and hygiene and pharynx lesions and Mallapatti (M) score, neck with inspection for jvd and masses and thyroid abnormalities and lymph nodes (supraclavicular and infraclavicular nodes and axillary also examined and noted if abn), chest exam included symmetry and effort and fremitus and percussion and auscultation, cardiac exam included rhythm and gallops and murmur and rubs and jvd and edema, abdominal exam for mass and hepatosplenomegaly and tenderness and hernias and bowel sounds, Musculoskeletal exam with muscle tone and posture and mobility/gait and  strength, and skin for rashes and cyanosis and pallor and turgor, extremity for clubbing.  Findings were normal except for pertinent findings listed below:  M1, oropharynx clear  HR irregularly irreg  Lungs rales bilaterally  No edema  Varicose veins of legs  Clubbing, no neck nodes      Radiographs (ct chest and cxr) reviewed: view by direct " vision   Cxr 1/18/2023 patchy pneumonia  Ct chest 12/15/2022 c/w 2019 -- progressive ild, bronchiectasis with mucous plugging, new lung lesion rul c/w ca.  Viewed.      CXR 6/8/20- interstitial markings prominent  VQ scan 6/8/20- IMPRESSION:  Normal VQ scan perfusion imaging  TTE 6/8/20-   Concentric left ventricular hypertrophy.  Normal left ventricular systolic function. The estimated ejection fraction is 65%.  Normal LV diastolic function.  Normal right ventricular systolic function.  Mild left atrial enlargement.  Mild aortic regurgitation.  Mild mitral regurgitation.  Mild tricuspid regurgitation.  Mild pulmonic regurgitation.  Normal central venous pressure (3 mmHg).  The estimated PA systolic pressure is 36 mmHg.    Labs reviewed    Results for RONN SOLORZANO (MRN [id number]) as of 6/30/2020 14:08   Ref. Range 3/11/2020 07:45   CO2 Latest Ref Range: 23 - 29 mmol/L 32 (H)        PFT 7/14/20 reviewed- mild restriction, reduced DLCO      6MWT 7/14/20- 85m, desat to 83% at rest, req 3L NC    Plan:  Clinical impression is resonably certain and repeated evaluation prn +/- follow up will be needed as below. Chronic hypoxemic resp failure due to cardiac comorbidities. PH likely groups 2/3 due to AUSTIN and HFpEF.    Lety was seen today for follow-up.    Diagnoses and all orders for this visit:    Abnormal PET scan of lung    Bronchiectasis with (acute) exacerbation  -     C-REACTIVE PROTEIN; Future  -     Culture, Respiratory with Gram Stain; Future    Radiation fibrosis of lung    Chronic hypoxemic respiratory failure    Pulmonary hypertension  -     B-TYPE NATRIURETIC PEPTIDE; Future    Pleural effusion, right    Anorexia  -     megestroL (MEGACE) 400 mg/10 mL (40 mg/mL) Susp; Take 5 mLs (200 mg total) by mouth once daily.    Primary cancer of right upper lobe of lung    COPD with respiratory failure, acute  -     budesonide (PULMICORT) 0.5 mg/2 mL nebulizer solution; Take 2 mLs (0.5 mg total) by nebulization 2  (two) times a day. Controller  -     arformoteroL (BROVANA) 15 mcg/2 mL Nebu; Take 2 mLs (15 mcg total) by nebulization 2 (two) times a day. Controller    Other orders  -     levoFLOXacin (LEVAQUIN) 500 MG tablet; Take 1 tablet (500 mg total) by mouth once daily.  -     predniSONE (DELTASONE) 20 MG tablet; 3 for 3 days then 2 for 3 days then one for 3 days and repeat for breathing problems                  Follow up in about 4 weeks (around 2/27/2024), or if symptoms worsen or fail to improve, for Virtual Visit.    Discussed with patient above for education the following:      Patient Instructions   Will order nebulizer budesonide and brovana breathing treatments for copd.    Lung tissue disease is seen on ct chest that on prior ct chest and stable-- major process in lungs was radiation damage.      Ct chest viewed and pet viewed from 1/22022 to 1/2024....      Ct chest and echo show evidence for pulmonary hypertension.  Heart valve leaks and copd is severe (with lung tissue).    Steroids were not effective recently and sputum did not grow germs.     Would dose megace for appetite -- 400 mg daily.          Need to do therapy as best able.... you have gotten very weak...  \    Would do follow up to assure no excess fluid in lungs (pet scan will give good picture of fluid.)-- may do out pt drainage.....      If having mild worsening -- may do levaquin antibiotic and take prednisone...       could do virtual follow up...    You direct no life support.   Darrell done today                 Evaluation took 53 minutes

## 2024-01-30 NOTE — HOSPITAL COURSE
84 y/o F hx HTN, hx ILD, COPD, chronic hypoxemic respiratory failure on p.r.n. oxygen, bilateral mastectomy, AFib, RUL NSCLC, former smoker admitted with acute on chronic hypoxic respiratory failure. CTA chest negative for PE, does reveal signs of PHTN along with moderate right effusion (increased from 12/2023) and worsening RUL consolidation and bilateral increased interstitial opacities. Hepatic cirrhosis also noted.  She was noted to be hypoxic into the 60s when ambulating to the bathroom on room air.  She uses her supplemental oxygen on an as needed basis at home but has required it more so this past week due to symptoms. She has been started on IV abx in the event this is infection related. She has been started on IV steroids in the event this is ILD flare or bronchitis flare or radiation pneumonitis.  She has been started on IV lasix in the event this is volume mediated. Echo ordered with read pending. BNP is 805.  Troponin trend is static. Procal is normal. BCx growing strep. Covid and Flu screen is negative.  Pulmonary consulted and following.  Thoracentesis placed on hold to see if pleural effusion responds to diuretics.  Her xarelto has been held. Family kindly called her outpatient pharmacy and she takes Xarelto 20mg daily not TID as listed in chart. Episode of chest pain 1/27: EKG unchanged, tropon checked and trended and roughly unchanged. Discussed overall case with Dr. Grajeda.  Will stop steroids 1/27 and continue IV diuretics as IVC is dilated with elevated venous pressures. CXR repeated with some improvement. Continuing IV abx.  Ultimately improved though will be discharged to wear her supplemental oxygen continuously rather than prn. Will be discharged with Rx to complete course of Doxy as well as Rx for lasix for suspected volume overload and possible HF. Will follow up with Dr. Henley and her Cardiologist, Dr. Lepe. Home health arranged. Examined on day of discharge and alert, NAD,  comfortable respirations on NC.  Return precautions given.

## 2024-01-30 NOTE — DISCHARGE SUMMARY
Formerly [facility], later [facility]  Discharge Summary      Patient Name: Lety Herbert  MRN: [id number]  GIOVANI: [id number]  Patient Class: IP- Inpatient  Admission Date: 1/25/2024  Hospital Length of Stay: 4 days  Discharge Date and Time: 1/29/2024  5:38 PM  Attending Physician: No att. providers found   Discharging Provider: Cristina Ibrahim MD  Primary Care Provider: Shilo Perez MD    Primary Care Team: Networked reference to record PCT     HPI:   No notes on file    * No surgery found *      Hospital Course:   84 y/o F hx HTN, hx ILD, COPD, chronic hypoxemic respiratory failure on p.r.n. oxygen, bilateral mastectomy, AFib, RUL NSCLC, former smoker admitted with acute on chronic hypoxic respiratory failure. CTA chest negative for PE, does reveal signs of PHTN along with moderate right effusion (increased from 12/2023) and worsening RUL consolidation and bilateral increased interstitial opacities. Hepatic cirrhosis also noted.  She was noted to be hypoxic into the 60s when ambulating to the bathroom on room air.  She uses her supplemental oxygen on an as needed basis at home but has required it more so this past week due to symptoms. She has been started on IV abx in the event this is infection related. She has been started on IV steroids in the event this is ILD flare or bronchitis flare or radiation pneumonitis.  She has been started on IV lasix in the event this is volume mediated. Echo ordered with read pending. BNP is 805.  Troponin trend is static. Procal is normal. BCx growing strep. Covid and Flu screen is negative.  Pulmonary consulted and following.  Thoracentesis placed on hold to see if pleural effusion responds to diuretics.  Her xarelto has been held. Family kindly called her outpatient pharmacy and she takes Xarelto 20mg daily not TID as listed in chart. Episode of chest pain 1/27: EKG unchanged, tropon checked and trended and roughly unchanged. Discussed overall case with Dr. Grajeda.   Will stop steroids 1/27 and continue IV diuretics as IVC is dilated with elevated venous pressures. CXR repeated with some improvement. Continuing IV abx.  Ultimately improved though will be discharged to wear her supplemental oxygen continuously rather than prn. Will be discharged with Rx to complete course of Doxy as well as Rx for lasix for suspected volume overload and possible HF. Will follow up with Dr. Henley and her Cardiologist, Dr. Lepe. Home health arranged. Examined on day of discharge and alert, NAD, comfortable respirations on NC.  Return precautions given.     Goals of Care Treatment Preferences:  Code Status: Full Code      Consults:   Consults (From admission, onward)          Status Ordering Provider     Inpatient consult to Pulmonology  Once        Provider:  Buster Grajeda MD    Completed [facility]     Inpatient consult to Pulmonology  Once        Provider:  Shelbie Villalpando MD    Completed [facility]            No new Assessment & Plan notes have been filed under this hospital service since the last note was generated.  Service: Hospital Medicine    Final Active Diagnoses:    Diagnosis Date Noted POA    PRINCIPAL PROBLEM:  Shortness of breath [R06.02] 03/15/2019 Yes    S/P bilateral mastectomy [Z90.13] 03/09/2023 Not Applicable    Mass of upper lobe of right lung [R91.8] 02/07/2023 Yes    Bronchitis, chronic, mucopurulent [J41.1] 01/18/2023 Yes      Problems Resolved During this Admission:       Discharged Condition: good    [place] [facility]    Follow Up:   Follow-up Information       Ashish Henley MD. Schedule an appointment as soon as possible for a visit in 2 week(s).    Specialty: Pulmonary Disease  Why: within two weeks for post hospital discharge follow up  Contact information:  [address]  [address]  [place] [address]  [phone]               Bello Lepe MD. Schedule an appointment as soon as possible for a visit.     Specialties: Cardiology, Interventional Cardiology  Why: evaluation for heart failure  Contact information:  [address]  [place] [address]  [phone]               Shilo Perez MD. Schedule an appointment as soon as possible for a visit in 2 week(s).    Specialty: Internal Medicine  Why: post hospital discharge follow up  Contact information:  [address]  [address]  [place] [address]  [phone]                           Patient Instructions:      Ambulatory referral/consult to Home Health   Standing Status: Future   Referral Priority: Routine Referral Type: Home Health   Referral Reason: Specialty Services Required   Requested Specialty: Home Health Services   Number of Visits Requested: 1     Diet Cardiac     Notify your health care provider if you experience any of the following:  difficulty breathing or increased cough     Notify your health care provider if you experience any of the following:  increased confusion or weakness     Activity as tolerated       Significant Diagnostic Studies: Labs: CMP   Recent Labs   Lab 01/28/24  0338 01/29/24  0520    142   K 3.6 3.5    101   CO2 30* 35*   GLU 90 81   BUN 11 11   CREATININE 0.4* 0.5   CALCIUM 9.2 8.9   ANIONGAP 9 6*    and CBC   Recent Labs   Lab 01/28/24  0338 01/29/24  0520   WBC 15.09* 8.46   HGB 10.3* 10.2*   HCT 36.0* 35.7*    207       Pending Diagnostic Studies:       None           Medications:  Reconciled Home Medications:      Medication List        START taking these medications      doxycycline 100 MG Cap  Commonly known as: VIBRAMYCIN  Take 1 capsule (100 mg total) by mouth every 12 (twelve) hours. for 4 days     furosemide 20 MG tablet  Commonly known as: LASIX  Take 1 tablet (20 mg total) by mouth once daily.            CHANGE how you take these medications      ALPRAZolam 0.25 MG tablet  Commonly known as: XANAX  Take 1 tablet (0.25 mg total) by mouth nightly as needed for Anxiety.  What changed: when  to take this     XARELTO 20 mg Tab  Generic drug: rivaroxaban  Take 1 tablet (20 mg total) by mouth once daily.  What changed: when to take this            CONTINUE taking these medications      atorvastatin 20 MG tablet  Commonly known as: LIPITOR  Take 1 tablet (20 mg total) by mouth once daily.     azelastine 137 mcg (0.1 %) nasal spray  Commonly known as: ASTELIN  1 spray by Nasal route daily as needed for Rhinitis.     diltiaZEM 120 MG Cp24  Commonly known as: CARDIZEM CD  TAKE ONE CAPSULE BY MOUTH EVERY DAY     gabapentin 100 MG capsule  Commonly known as: NEURONTIN  Take 200 mg by mouth every evening.     HYDROcodone-acetaminophen 7.5-325 mg per tablet  Commonly known as: NORCO  Take 1 tablet by mouth every 12 (twelve) hours as needed for Pain.     pantoprazole 20 MG tablet  Commonly known as: PROTONIX  TAKE ONE TABLET BY MOUTH EVERY DAY     sertraline 100 MG tablet  Commonly known as: ZOLOFT  TAKE 1 TABLET (100 MG TOTAL) BY MOUTH ONCE DAILY.     valsartan-hydrochlorothiazide 320-12.5 mg per tablet  Commonly known as: DIOVAN-HCT  Take 1 tablet by mouth every morning.              Indwelling Lines/Drains at time of discharge:   Lines/Drains/Airways       None                   Time spent on the discharge of patient: 34 minutes         Cristina Ibrahim MD  Department of Hospital Medicine  Formerly [facility]

## 2024-02-02 ENCOUNTER — TELEPHONE (OUTPATIENT)
Dept: FAMILY MEDICINE | Facility: CLINIC | Age: 84
End: 2024-02-02
Payer: MEDICARE

## 2024-02-02 DIAGNOSIS — J18.9 PNEUMONIA OF RIGHT LOWER LOBE DUE TO INFECTIOUS ORGANISM: ICD-10-CM

## 2024-02-02 DIAGNOSIS — R05.3 CHRONIC COUGH: ICD-10-CM

## 2024-02-02 DIAGNOSIS — K59.01 CONSTIPATION BY DELAYED COLONIC TRANSIT: Primary | ICD-10-CM

## 2024-02-02 RX ORDER — LACTULOSE 10 G/15ML
20 SOLUTION ORAL 2 TIMES DAILY
Qty: 900 ML | Refills: 1 | Status: SHIPPED | OUTPATIENT
Start: 2024-02-02 | End: 2024-03-03

## 2024-02-02 RX ORDER — CODEINE PHOSPHATE AND GUAIFENESIN 10; 100 MG/5ML; MG/5ML
5 SOLUTION ORAL 3 TIMES DAILY PRN
Qty: 120 ML | Refills: 0 | Status: SHIPPED | OUTPATIENT
Start: 2024-02-02 | End: 2024-02-12

## 2024-02-02 NOTE — LETTER
February 2, 2024        [facility]  [address]  [address]  [place] [address]             [facility] [address]  [address] [address]  Phone: [phone]  Fax: [phone]   Patient: Lety Herbert   MR Number: [id number]   YOB: 1940   Date of Visit: 2/2/2024            I was left a message by [facility] by Mr. Sudhir Doshi, RN with [facility] the patient has not had a bowel movement for several days and has been discharged from the hospital with diagnosis of shortness a breath, respiratory failure, pneumonia.  She is also having intractable cough.    Constipation by delayed colonic transit  -     lactulose (CHRONULAC) 20 gram/30 mL Soln; Take 30 mLs (20 g total) by mouth 2 (two) times daily. This prescription is for constipation.  Please continue the prescription till constipation resolved  Dispense: 900 mL; Refill: 1    Pneumonia of right lower lobe due to infectious organism  -     guaiFENesin-codeine 100-10 mg/5 ml (TUSSI-ORGANIDIN NR)  mg/5 mL syrup; Take 5 mLs by mouth 3 (three) times daily as needed for Cough. Please do not take this medication if already on hydrocodone pain medication.  Dispense: 120 mL; Refill: 0    Chronic cough  -     guaiFENesin-codeine 100-10 mg/5 ml (TUSSI-ORGANIDIN NR)  mg/5 mL syrup; Take 5 mLs by mouth 3 (three) times daily as needed for Cough. Please do not take this medication if already on hydrocodone pain medication.  Dispense: 120 mL; Refill: 0         Patient's overall prognosis continues to be guarded to poor given overwhelming medical conditions, advancing age and multiple comorbidities.    DIMA Perez MD

## 2024-02-02 NOTE — TELEPHONE ENCOUNTER
I was left a message by [facility] by Mr. Sudhir Doshi RN with home health the patient has not had a bowel movement for several days and has been discharged from the hospital with diagnosis of shortness a breath, respiratory failure, pneumonia.  She is also having intractable cough.    Constipation by delayed colonic transit  -     lactulose (CHRONULAC) 20 gram/30 mL Soln; Take 30 mLs (20 g total) by mouth 2 (two) times daily. This prescription is for constipation.  Please continue the prescription till constipation resolved  Dispense: 900 mL; Refill: 1    Pneumonia of right lower lobe due to infectious organism  -     guaiFENesin-codeine 100-10 mg/5 ml (TUSSI-ORGANIDIN NR)  mg/5 mL syrup; Take 5 mLs by mouth 3 (three) times daily as needed for Cough. Please do not take this medication if already on hydrocodone pain medication.  Dispense: 120 mL; Refill: 0    Chronic cough  -     guaiFENesin-codeine 100-10 mg/5 ml (TUSSI-ORGANIDIN NR)  mg/5 mL syrup; Take 5 mLs by mouth 3 (three) times daily as needed for Cough. Please do not take this medication if already on hydrocodone pain medication.  Dispense: 120 mL; Refill: 0         Patient's overall prognosis continues to be guarded to poor given overwhelming medical conditions, advancing age and multiple comorbidities.

## 2024-02-05 ENCOUNTER — PATIENT MESSAGE (OUTPATIENT)
Dept: FAMILY MEDICINE | Facility: CLINIC | Age: 84
End: 2024-02-05
Payer: MEDICARE

## 2024-02-05 ENCOUNTER — LAB VISIT (OUTPATIENT)
Dept: LAB | Facility: HOSPITAL | Age: 84
End: 2024-02-05
Attending: INTERNAL MEDICINE
Payer: MEDICARE

## 2024-02-05 DIAGNOSIS — E87.6 LOW BLOOD POTASSIUM: Primary | ICD-10-CM

## 2024-02-05 DIAGNOSIS — I50.9 HEART FAILURE, UNSPECIFIED: Primary | ICD-10-CM

## 2024-02-05 DIAGNOSIS — Z79.899 LONG TERM CURRENT USE OF DIURETIC: ICD-10-CM

## 2024-02-05 LAB
ANION GAP SERPL CALC-SCNC: 12 MMOL/L (ref 8–16)
BUN SERPL-MCNC: 39 MG/DL (ref 8–23)
CALCIUM SERPL-MCNC: 9.7 MG/DL (ref 8.7–10.5)
CHLORIDE SERPL-SCNC: 98 MMOL/L (ref 95–110)
CO2 SERPL-SCNC: 29 MMOL/L (ref 23–29)
CREAT SERPL-MCNC: 1.2 MG/DL (ref 0.5–1.4)
EST. GFR  (NO RACE VARIABLE): 44 ML/MIN/1.73 M^2
GLUCOSE SERPL-MCNC: 85 MG/DL (ref 70–110)
POTASSIUM SERPL-SCNC: 3 MMOL/L (ref 3.5–5.1)
SODIUM SERPL-SCNC: 139 MMOL/L (ref 136–145)

## 2024-02-05 PROCEDURE — 36415 COLL VENOUS BLD VENIPUNCTURE: CPT | Performed by: INTERNAL MEDICINE

## 2024-02-05 PROCEDURE — 80048 BASIC METABOLIC PNL TOTAL CA: CPT | Performed by: INTERNAL MEDICINE

## 2024-02-05 RX ORDER — POTASSIUM CHLORIDE 750 MG/1
10 TABLET, EXTENDED RELEASE ORAL DAILY
Qty: 90 TABLET | Refills: 3 | Status: SHIPPED | OUTPATIENT
Start: 2024-02-05 | End: 2025-02-04

## 2024-02-05 NOTE — TELEPHONE ENCOUNTER
Low blood potassium  -     potassium chloride SA (KLOR-CON M10) 10 MEQ tablet; Take 1 tablet (10 mEq total) by mouth once daily.  Dispense: 90 tablet; Refill: 3    Long term current use of diuretic  -     potassium chloride SA (KLOR-CON M10) 10 MEQ tablet; Take 1 tablet (10 mEq total) by mouth once daily.  Dispense: 90 tablet; Refill: 3

## 2024-02-06 ENCOUNTER — TELEPHONE (OUTPATIENT)
Dept: FAMILY MEDICINE | Facility: CLINIC | Age: 84
End: 2024-02-06
Payer: MEDICARE

## 2024-02-12 ENCOUNTER — TELEPHONE (OUTPATIENT)
Dept: PULMONOLOGY | Facility: CLINIC | Age: 84
End: 2024-02-12
Payer: MEDICARE

## 2024-02-12 NOTE — TELEPHONE ENCOUNTER
----- Message from Blossom Junior sent at 2/12/2024 12:16 PM CST -----  Type: Needs Medical Advice  Who Called:  pt daughter     Best Call Back Number: [phone] jose   Additional Information: pt says she recently has been in the hospital and went up on her oxygen from a 3 to 5 and says the portable is not working please advise

## 2024-02-12 NOTE — TELEPHONE ENCOUNTER
Placed a call to the pt in regards to needing a portable that can handle 5 liters. Routed to Dr Henley for advisement.

## 2024-02-14 ENCOUNTER — TELEPHONE (OUTPATIENT)
Dept: PULMONOLOGY | Facility: CLINIC | Age: 84
End: 2024-02-14
Payer: MEDICARE

## 2024-02-14 NOTE — TELEPHONE ENCOUNTER
----- Message from Ashish Henley MD sent at 2/12/2024  7:07 PM CST -----  She may need to use tanks.  Oxymizer may help.   Could do virtual to discuss -- I have not seen many poc at 5 ..... Insurance and equipment issue.  ----- Message -----  From: Khadijah Canas LPN  Sent: 2/12/2024   2:46 PM CST  To: Ashish Henley MD    Good Afternoon,   Pt was recently in the hospital and had her oxygen increased to 5 liters. The pt is looking for a portable that can handle 5 liters.  ----- Message -----  From: Blossom Junior  Sent: 2/12/2024  12:18 PM CST  To: Kale PUTNAM Staff    Type: Needs Medical Advice  Who Called:  pt daughter     Best Call Back Number: [phone] jose   Additional Information: pt says she recently has been in the hospital and went up on her oxygen from a 3 to 5 and says the portable is not working please advise

## 2024-02-15 ENCOUNTER — TELEPHONE (OUTPATIENT)
Dept: PULMONOLOGY | Facility: CLINIC | Age: 84
End: 2024-02-15
Payer: MEDICARE

## 2024-02-15 ENCOUNTER — HOSPITAL ENCOUNTER (OUTPATIENT)
Dept: RADIOLOGY | Facility: HOSPITAL | Age: 84
Discharge: HOME | End: 2024-02-15
Attending: INTERNAL MEDICINE
Payer: MEDICARE

## 2024-02-15 DIAGNOSIS — C34.11 MALIGNANT NEOPLASM OF UPPER LOBE OF RIGHT LUNG: ICD-10-CM

## 2024-02-15 DIAGNOSIS — R91.8 MASS OF UPPER LOBE OF RIGHT LUNG: ICD-10-CM

## 2024-02-15 NOTE — TELEPHONE ENCOUNTER
Placed a call to the pt in regards to the POC that can handle 5 lilters. Pt has a virtual visit on 2/27/24 with Dr. Henley to discuss. Pt verbalized understanding.

## 2024-02-15 NOTE — TELEPHONE ENCOUNTER
----- Message from Ashish Henley MD sent at 2/12/2024  7:07 PM CST -----  She may need to use tanks.  Oxymizer may help.   Could do virtual to discuss -- I have not seen many poc at 5 ..... Insurance and equipment issue.  ----- Message -----  From: Khadijah Canas LPN  Sent: 2/12/2024   2:46 PM CST  To: Ashish Henley MD    Good Afternoon,   Pt was recently in the hospital and had her oxygen increased to 5 liters. The pt is looking for a portable that can handle 5 liters.  ----- Message -----  From: Blossom Junior  Sent: 2/12/2024  12:18 PM CST  To: Kale PUTNAM Staff    Type: Needs Medical Advice  Who Called:  pt daughter     Best Call Back Number: [phone] jose   Additional Information: pt says she recently has been in the hospital and went up on her oxygen from a 3 to 5 and says the portable is not working please advise

## 2024-02-27 ENCOUNTER — OFFICE VISIT (OUTPATIENT)
Dept: PULMONOLOGY | Facility: CLINIC | Age: 84
End: 2024-02-27
Payer: MEDICARE

## 2024-02-27 VITALS — WEIGHT: 210.13 LBS | BODY MASS INDEX: 35.01 KG/M2 | HEIGHT: 65 IN

## 2024-02-27 DIAGNOSIS — J44.9 CHRONIC OBSTRUCTIVE PULMONARY DISEASE, UNSPECIFIED COPD TYPE: ICD-10-CM

## 2024-02-27 DIAGNOSIS — D84.9 IMMUNODEFICIENCY, UNSPECIFIED: ICD-10-CM

## 2024-02-27 DIAGNOSIS — J96.11 CHRONIC HYPOXEMIC RESPIRATORY FAILURE: Primary | ICD-10-CM

## 2024-02-27 PROCEDURE — 99213 OFFICE O/P EST LOW 20 MIN: CPT | Mod: 95,,, | Performed by: INTERNAL MEDICINE

## 2024-02-27 PROCEDURE — 1101F PT FALLS ASSESS-DOCD LE1/YR: CPT | Mod: CPTII,95,, | Performed by: INTERNAL MEDICINE

## 2024-02-27 PROCEDURE — 1111F DSCHRG MED/CURRENT MED MERGE: CPT | Mod: CPTII,95,, | Performed by: INTERNAL MEDICINE

## 2024-02-27 PROCEDURE — 1157F ADVNC CARE PLAN IN RCRD: CPT | Mod: CPTII,95,, | Performed by: INTERNAL MEDICINE

## 2024-02-27 PROCEDURE — 3288F FALL RISK ASSESSMENT DOCD: CPT | Mod: CPTII,95,, | Performed by: INTERNAL MEDICINE

## 2024-02-27 PROCEDURE — 1159F MED LIST DOCD IN RCRD: CPT | Mod: CPTII,95,, | Performed by: INTERNAL MEDICINE

## 2024-02-27 NOTE — PROGRESS NOTES
The patient location is: home  The chief complaint leading to consultation is: 26 min    Visit type: audiovisual    Face to Face time with patient: 26  26 min minutes of total time spent on the encounter, which includes face to face time and non-face to face time preparing to see the patient (eg, review of tests), Obtaining and/or reviewing separately obtained history, Documenting clinical information in the electronic or other health record, Independently interpreting results (not separately reported) and communicating results to the patient/family/caregiver, or Care coordination (not separately reported).         Each patient to whom he or she provides medical services by telemedicine is:  (1) informed of the relationship between the physician and patient and the respective role of any other health care provider with respect to management of the patient; and (2) notified that he or she may decline to receive medical services by telemedicine and may withdraw from such care at any time.    Notes:    2/27/2024    Lety Herbert  Follow up    Chief Complaint   Patient presents with    Follow-up       HPI:   2/27/2024 pt needs 5 lpm to mobilize -- has poc with pulse system.    Losing wt -- ambulates about house on ox.  Megace helped.    No pain nor anxiety.  Min cough from sinus    No recent prednisone      1/30/2024....pt presented to [facility] 1/25 for 4 days with  fluid and pneumonia- but record vague and procal  very loww at 0.05 ...... Pt was on ox 3 lpm prior to hosp    Pt lost appetite pta, no fever, bnp was 805 admit, echo good with Mr/ ef 60%, rv enlarged, and pap 40.       Pt had had failure to thrive last months.    d  Ct reviewed from last yr-- ct rul nodule 2/2023 with decrease in size and xrt fibrosis changes seen 6/2023 and 9/2023.  Pet scan 12/2023 with no cancer activity but progressive scarring of rul, and progressively increased right pleural effusion til 12/2023.       Pt presented to [facility]  with sob and low sat -- pt has poor recall of exact presentation.   Record revieweed but not clear picture.......      Pt will have some bilat lower ext pain -- knees.    Patient Instructions   Will order nebulizer budesonide and brovana breathing treatments for copd.    Lung tissue disease is seen on ct chest that on prior ct chest and stable-- major process in lungs was radiation damage.      Ct chest viewed and pet viewed from 1/22022 to 1/2024....      Ct chest and echo show evidence for pulmonary hypertension.  Heart valve leaks and copd is severe (with lung tissue).    Steroids were not effective recently and sputum did not grow germs.     Would dose megace for appetite -- 400 mg daily.          Need to do therapy as best able.... you have gotten very weak...  \    Would do follow up to assure no excess fluid in lungs (pet scan will give good picture of fluid.)-- may do out pt drainage.....      If having mild worsening -- may do levaquin antibiotic and take prednisone...       could do virtual follow up...    You direct no life support.   Lapost done today  2/7/2023--  feels better, no cough nor mucous,  no night sweats, appetite ok but not strong.   Uses oxygen occasionally.   Patient Instructions   Use levaquin prompt for infection.    May use prednisone if cough or breathing worsens    Will send in norco 90/month for 3 months.    Renewed xanax for as needed.    Will order needle bx.  Pet scan  and cxr viewed.  Will have cxr after bx.      Check six min walk and breathing test -- suspect surgery may not be offered as oxygen level had been low.   If cancer may refer to oncology doc.  May refer to Maryse Lange MD in [place]    Will call and discuss bx results over phone.    1/23/2023-- no fever, coughing min mucous yellow.  Ribs ok with no sign pain.  Sob still and still night sweats needing to change night gown.  Poor appetite.  Pt relates felt like she was dying last visit-- doing better now.  Occ blood  streak hemoptysis.    Pt has home ox at 3 lpm --- uses intermittently with sat 90 rest and 80 or so with activity.  Pt vague on If breathing worsening last yr.  Six min walk 2020 with sat low resting.  Patient Instructions   May need to repeat prednisone if cough returns.    Need to do biopsy right upper density.    You still have night sweats-- apparent pneumonia cleared from last visit.      Oxygen  needs seem very high with activity?      Use oxygen more --ok to use 1-2 lpm for restt and 3 lpm activity.  Lung tissue disease looks somewhat stable since 2019.  Oxygen needs are high but able to get by at rest with room air but marginally    Repeat prednisone now.     Will need to do needle biopsy.    Will direct over phone biopsy results       1/18/2023 having cough and fatigue -- coughs all day with yellow mucous with streaks blood.  Poor appetite, having back and rib pain -- lower ribs attributed to violent coughing.   Used trelegy with min help.  Walks about house with no falls-- no weak.  No percieved wt loss.  Used norco for pains -- helped min.   Has albuterol in past but not using.  Used prednisone in past-- some benefit appreciated.  Declines hosp.   Pt had pet scan viwed today-- new rll 13 mm density with min pet activity --new from 12/15/2022.  Pt now with night sweats-new last wk or so- sputum culture pending afb and nl krista 12/29.  May have fever nighttime  Patient Instructions   Re check for regular germs  Dose levaquin  Would check temp at night  Big large pet active Spot in lung could be infection-- infection is apparent with mucous but culture had been negative-- afb can take 2 months.  New spot in right lower lung should be infection-- having yellow mucous and blood and sweats at night  Prednisone 20 mg may help cough-- dose for 5 days --- may repeat as needed for cough.  Use norco as needed to suppress pain and cough.  Cancer typically slow-- infection quick.  Infection active.   Biopsy would be  recommended (unless afb + -- regular germ not expected to cause spot in upper lung.   Would check cxr now to assure no rapid  pneumonia as right lower  lung spot new.     I would anticipate may be able to do biopsy-- if mycobacterial culture positive (typical germs that cause this problem are slow growers and take a month at least-- early February??) might hold off but would like to be sure about cancer ??  Qtc was 432 November 2022-- need to check ekg on 23rd.  F/u 23 office  Cx stable 1/18/2023 , wbc 21k, no impressive infiltrates.  K 3.2 -- discussion with pt -- sent in potassium 10 meq bid, will discuss bx at f/u on 23rd if pt doing well.     12/28/2022   Pt developed left chest pain acute onset-- lasted few days, pt has had lingering back pain.  Coughs a lot with yellow mucous-- tablespoon daily.  Inhalers no help in past  Had right shoulder area achey pain about 7/10 pain-- comes and goes.  chr hypoxic resp failure post pe and pulm htn assoc cor dz and osas.    Ct this month with 23 mm rul lesion cw ca. The lesion is new from 2019. Ild dz with bronchiectasis assoc mucous plugging seen.   Upper lung honeycombing suggested ct 12/15/2022 -- viewed directly.   No asbestos exposure but father worked [employer].  Pt was smoker  Pthad double mastectomy 15 yrs ago-- no oncology follow up  Patient Instructions   Bronchiectasis with yellow mucous-- cultures needed  Trial trelegy in place of current inhaler. Use aerobika for 10 breaths after inhaler.    Lung tissue with progression fibrosis-- slow process.  Consider treatment later.  Lung lesion right upper lung worrisome for cancer-- new over last 4 yrs-- enlarging lymph node in middle chest is still small.  Check pet scan.   May need biopsy -  will need to skip xarelto prior biopsy- needle of lung but may need ebus.    Back pain noted on right -- will send in [employer] for as needed use.  Pet scan may help determine if cancer related.   Will direct care with result of pet  scan.  Could do phone follow up to discuss in detail if complex.   Below from Dr Villalpando  12/28/2021- breathing stable, able to walk around store w/ oxygen. Tries to walk 20 min per day. Denies chest congestion/cough. Sometimes nose congested, uses astelin. She takes singulair nightly.  Patient Instructions   Oxygen - continue with activity and any time sats <92%  Clubbing of fingernails is not harmful- due to chronic hypoxia  06/30/2021-   Continue oxygen  Follow up with cardiologist  Stop claritin and try singulair one pill every night for sinus drip  continue flonase  while seated feels fine but gets breathless w/ minimal activity. Daughter reports dyspnea is getting worse over time. Reports cardiologist wants to place pacemaker for her but insurance denied it. Does not feel well and sits in recliner all the time. Reports constant sinus drip and cough w/ green/yellow mucous.    12/30/2020-   Call medical supply for the oxygen and ask about thinner or more comfortable nasal cannulas  Start claritin daily  Start flonase 2 sprays in each nostril daily  feels better w/ O2, wears 3L continuously except when sitting still at home. Trouble breathing w/ fatigue is episodic. Gets headaches- tension from wearing O2, glasses and mask behind ear. Has constant sinus drip. Other night R ear blocked up while wearing CPAP.  Tries mucinex DM but doesn't last long. She is on xarelto for atrial fib now.     6/30/20-   Oxygen for home use ordered- would wear continuously  Use CPAP machine- can try nasal pillows. Would use oxygen with your CPAP too.  Get lung function test and 6 minute walk to check to see how much oxygen you need.  Pt is an 78 yo female with DVT/PE, GERD, breast ca s/p double mastectomy- no chemo or XRT, melanoma of arm, AUSTIN, obesity presenting for initial evaluation. Pt reports oxygen desaturation for over a year but no one has ordered her any oxygen for home. First noted low O2 sat when she had sinus infection. O2  "sats will drop when exerting herself or resting. Feels short of breath worsening over time. She coughs w/ phlegm from sinuses. Sees ENT who cauterized nose for bleeding. Daughter is here as well and assists w/ history.  Reports not using CPAP because she had tip of her nose removed for cancer in past and she is concerned mask will cause more cancer to come back. She wakes up a lot at night and feels drowsy during the day.  Denies any hx of lung problems. She is a past smoker- quit 40 yrs ago 1-2 PPD x 25 yrs. Used to have recurrent bronchitis and cough which stopped after she quit smoking.    Outside records from pt's cardiologist Jeremy Sargent in [facility] were faxed to us and have been reviewed- with dx including heart block, atrial fib & flutter, CAD, diastolic dysfunction, HTN, HLD, and obesity. Per that note ASAP consult was placed to pulmonary for "shortness of breath and O2 sat of 70s-80s."    The chief compliant  problem is varies w/ instability at times    PFSH:  Past Medical History:   Diagnosis Date    Abnormal chest CT 3/9/2023    Anemia     Basal cell carcinoma     nose     Cancer     breast    Depression     DVT (deep venous thrombosis)     Fall 3/20/2018    Former smoker 3/9/2023    Gastric ulceration     GERD (gastroesophageal reflux disease)     History of breast cancer 3/9/2023    History of frequent urinary tract infections     Hyperlipidemia     Hypertension     Malignant neoplasm of upper lobe of right lung (NSCLC favoring lung primary) 3/9/2023    Melanoma 2004?    left forearm    MRSA (methicillin resistant staph aureus) culture positive     Neuropathy     PE (pulmonary embolism)     Post-nasal drip 11/15/2018    Reflux     S/P bilateral mastectomy 3/9/2023         Past Surgical History:   Procedure Laterality Date    HYSTERECTOMY      MASTECTOMY, RADICAL Bilateral     TOTAL HIP ARTHROPLASTY Left 11/13/2017     Social History     Tobacco Use    Smoking status: Former     Passive exposure: " "Past    Smokeless tobacco: Never   Substance Use Topics    Alcohol use: No    Drug use: No     Family History   Problem Relation Age of Onset    Cancer Mother     Cancer Father     Heart disease Father     Melanoma Neg Hx     Psoriasis Neg Hx     Lupus Neg Hx     Eczema Neg Hx      Review of patient's allergies indicates:   Allergen Reactions    Meperidine     Promethazine     Bactrim [sulfamethoxazole-trimethoprim] Rash       Performance Status:The patient's activity level is functions out of house.  Feels SOB any exertion out of house.     Review of Systems:  a review of eleven systems covering constitutional, Eye, HEENT, Psych, Respiratory, Cardiac, GI, , Musculoskeletal, Endocrine, Dermatologic was negative except for pertinent findings as listed ABOVE and below:  All negative with pertinent positives as above       Exam:Comprehensive exam done. Ht 5' 5" (1.651 m)   Wt 95.3 kg (210 lb 1.6 oz)   BMI 34.96 kg/m²   Exam included Vitals as listed, and patient's appearance and affect and alertness and mood, oral exam for yeast and hygiene and pharynx lesions and Mallapatti (M) score, neck with inspection for jvd and masses and thyroid abnormalities and lymph nodes (supraclavicular and infraclavicular nodes and axillary also examined and noted if abn), chest exam included symmetry and effort and fremitus and percussion and auscultation, cardiac exam included rhythm and gallops and murmur and rubs and jvd and edema, abdominal exam for mass and hepatosplenomegaly and tenderness and hernias and bowel sounds, Musculoskeletal exam with muscle tone and posture and mobility/gait and  strength, and skin for rashes and cyanosis and pallor and turgor, extremity for clubbing.  Findings were normal except for pertinent findings listed below:  M1, oropharynx clear  HR irregularly irreg  Lungs rales bilaterally  No edema  Varicose veins of legs  Clubbing, no neck nodes      Radiographs (ct chest and cxr) reviewed: view by " direct vision   Cxr 1/18/2023 patchy pneumonia  Ct chest 12/15/2022 c/w 2019 -- progressive ild, bronchiectasis with mucous plugging, new lung lesion rul c/w ca.  Viewed.      CXR 6/8/20- interstitial markings prominent  VQ scan 6/8/20- IMPRESSION:  Normal VQ scan perfusion imaging  TTE 6/8/20-   Concentric left ventricular hypertrophy.  Normal left ventricular systolic function. The estimated ejection fraction is 65%.  Normal LV diastolic function.  Normal right ventricular systolic function.  Mild left atrial enlargement.  Mild aortic regurgitation.  Mild mitral regurgitation.  Mild tricuspid regurgitation.  Mild pulmonic regurgitation.  Normal central venous pressure (3 mmHg).  The estimated PA systolic pressure is 36 mmHg.    Labs reviewed    Results for RONN SOLORZANO (MRN [id number]) as of 6/30/2020 14:08   Ref. Range 3/11/2020 07:45   CO2 Latest Ref Range: 23 - 29 mmol/L 32 (H)        PFT 7/14/20 reviewed- mild restriction, reduced DLCO      6MWT 7/14/20- 85m, desat to 83% at rest, req 3L NC    Plan:  Clinical impression is resonably certain and repeated evaluation prn +/- follow up will be needed as below. Chronic hypoxemic resp failure due to cardiac comorbidities. PH likely groups 2/3 due to AUSTIN and HFpEF.    Lety was seen today for follow-up.    Diagnoses and all orders for this visit:    Chronic hypoxemic respiratory failure  -     OXYGEN FOR HOME USE    Chronic obstructive pulmonary disease, unspecified COPD type  -     OXYGEN FOR HOME USE    Immunodeficiency, unspecified                    Follow up in about 4 weeks (around 3/26/2024), or if symptoms worsen or fail to improve, for Virtual Visit.    Discussed with patient above for education the following:      Patient Instructions   Ct from admit viewed  Would retry prednisone 20 mg daily for 2 weeks-- stop if no benefit.     Will try to arrange oxymizer and non rebreather mask     Exact diagnosis not clear -- still needs high oxygen.    Would use  wheelchair to get out of house      Use oxymizer with flow as low as able to keep sat >90--use when out of house..      Immune deficiency may have played a role, monitor                     Evaluation took 53 minutes

## 2024-02-27 NOTE — PATIENT INSTRUCTIONS
Ct from admit viewed  Would retry prednisone 20 mg daily for 2 weeks-- stop if no benefit.     Will try to arrange oxymizer and non rebreather mask     Exact diagnosis not clear -- still needs high oxygen.    Would use wheelchair to get out of house      Use oxymizer with flow as low as able to keep sat >90--use when out of house..      Immune deficiency may have played a role, monitor

## 2024-03-04 ENCOUNTER — EXTERNAL HOME HEALTH (OUTPATIENT)
Dept: HOME HEALTH SERVICES | Facility: HOSPITAL | Age: 84
End: 2024-03-04
Payer: MEDICARE

## 2024-03-05 DIAGNOSIS — E78.2 MULTIPLE-TYPE HYPERLIPIDEMIA: ICD-10-CM

## 2024-03-05 RX ORDER — ATORVASTATIN CALCIUM 20 MG/1
20 TABLET, FILM COATED ORAL
Qty: 90 TABLET | Refills: 1 | Status: SHIPPED | OUTPATIENT
Start: 2024-03-05

## 2024-03-06 ENCOUNTER — HOSPITAL ENCOUNTER (OUTPATIENT)
Dept: RADIOLOGY | Facility: HOSPITAL | Age: 84
Discharge: HOME | End: 2024-03-06
Attending: INTERNAL MEDICINE
Payer: MEDICARE

## 2024-03-06 VITALS — WEIGHT: 210 LBS | BODY MASS INDEX: 33.75 KG/M2 | HEIGHT: 66 IN

## 2024-03-06 LAB — GLUCOSE SERPL-MCNC: 76 MG/DL (ref 70–110)

## 2024-03-06 PROCEDURE — A9552 F18 FDG: HCPCS | Mod: PO | Performed by: INTERNAL MEDICINE

## 2024-03-06 PROCEDURE — 82962 GLUCOSE BLOOD TEST: CPT | Mod: PO

## 2024-03-06 PROCEDURE — 78815 PET IMAGE W/CT SKULL-THIGH: CPT | Mod: TC,PO

## 2024-03-06 RX ORDER — FLUDEOXYGLUCOSE F18 500 MCI/ML
12.1 INJECTION INTRAVENOUS
Status: COMPLETED | OUTPATIENT
Start: 2024-03-06 | End: 2024-03-06

## 2024-03-06 RX ADMIN — FLUDEOXYGLUCOSE F-18 12.1 MILLICURIE: 500 INJECTION INTRAVENOUS at 02:03

## 2024-03-06 NOTE — PROGRESS NOTES
Notify PET scan ordered by Dr. Montoya see looked relatively good.  Radiation changes were suspected.  No change in plan.

## 2024-03-11 ENCOUNTER — TELEPHONE (OUTPATIENT)
Dept: PULMONOLOGY | Facility: CLINIC | Age: 84
End: 2024-03-11
Payer: MEDICARE

## 2024-03-11 NOTE — TELEPHONE ENCOUNTER
Placed a call to the pt in regards to the order for the portable tanks. Placed a call to [employer]ie and faxed the order to [phone]

## 2024-03-11 NOTE — TELEPHONE ENCOUNTER
----- Message from Yissel Denise sent at 3/11/2024 10:34 AM CDT -----  Contact: self  Type:  Needs Medical Advice    Who Called: Pt Lilliana Baer  Symptoms (please be specific): Orders for upgraded oxygen equipment  Best Call Back Number: pt Lilliana Baer, [phone]  Please call to advise... Thank you...

## 2024-03-11 NOTE — TELEPHONE ENCOUNTER
Placed a call to the pt in regards to pt going from 3 liters to5 liters on the oxygen machine. Routed message for the provider to order the upgraded oxygen equipment.

## 2024-03-11 NOTE — TELEPHONE ENCOUNTER
----- Message from Ashish Henley MD sent at 3/11/2024 12:19 PM CDT -----  Contact: self  There is an order placed  as below-- what is needed??      OXYGEN FOR HOME USE [] (Order [id number])  General Supply  Date and Time: 2/27/2024  4:54 PM Department: [facility] Pulmonary Rel By/Authorizing: Ashish Henley MD      ----- Message -----  From: Khadijah Canas LPN  Sent: 3/11/2024  11:13 AM CDT  To: Ashish Henley MD    Good Morning,  Pt needs orders for the upgraded oxygen equipment  Thank you  ----- Message -----  From: Yissel Denise  Sent: 3/11/2024  10:37 AM CDT  To: Kale PUTNAM Staff    Type:  Needs Medical Advice    Who Called: Pt Lilliana Baer  Symptoms (please be specific): Orders for upgraded oxygen equipment  Best Call Back Number: pt Lilliana Baer, [phone]  Please call to advise... Thank you...

## 2024-03-26 ENCOUNTER — TELEPHONE (OUTPATIENT)
Dept: HEMATOLOGY/ONCOLOGY | Facility: CLINIC | Age: 84
End: 2024-03-26

## 2024-03-26 NOTE — TELEPHONE ENCOUNTER
I spoke with pt and reminded her to have labs done @ [facility] prior to appt on 4/1/24 she verbalized understanding.

## 2024-03-28 NOTE — PROGRESS NOTES
[facility] Hematology/Oncology  PROGRESS NOTE -   Follow-up Visit      Subjective:       Patient ID:   NAME: Lety Herbert : 1940     83 y.o. female    Referring Doc: Shilo Perez MD  Other Physicians: Ashish Henley; Maryse Beckwith           Chief Complaint: RUL mass/lung ca f/u       History of Present Illness:     Patient returns today for a regularly scheduled follow-up visit.  The patient is here today to go over the results of the recently ordered labs, tests and studies. She is here with her grandson today.     She seems to be doing about the same. Breathing is stable on O2 per NC. She has residual chronic back pain issues, aches and pains which are stable. Chronic stable fatigue    She had bout of pneumonia and was hospitalized in 2024    She saw Dr Henley with pulm in 2024 and Dr Grajeda in 2024    She last saw Dr Ralph with rad/onc in 2024     No CP, HA's or N/V.    She had recent PEt scan on 3/6/2024         Dicussed covid precautions - she has been vaccinated    ROS:   GEN: normal without any fever, night sweats or weight loss; fatigue; chronic back pains and leg pains at night  HEENT: normal with no HA's, sore throat, stiff neck, changes in vision  CV: normal with no CP, SOB, PND, GAVIRIA or orthopnea  PULM: chronic SOB/GAVIRIA; O2 dependent , hemoptysis, sputum or pleuritic pain  GI: normal with no abdominal pain, nausea, vomiting, constipation, diarrhea, melanotic stools, BRBPR, or hematemesis  : normal with no hematuria, dysuria  BREAST: normal with no mass, discharge, pain  SKIN: normal with no rash, erythema, bruising, or swelling    Pain Scale:  6-7     Allergies:  Review of patient's allergies indicates:   Allergen Reactions    Meperidine     Promethazine     Bactrim [sulfamethoxazole-trimethoprim] Rash       Medications:    Current Outpatient Medications:     ALPRAZolam (XANAX) 0.25 MG tablet, Take 1 tablet (0.25 mg total) by mouth nightly as needed for Anxiety. (Patient  taking differently: Take 0.25 mg by mouth daily as needed for Anxiety.), Disp: 60 tablet, Rfl: 1    arformoteroL (BROVANA) 15 mcg/2 mL Nebu, Take 2 mLs (15 mcg total) by nebulization 2 (two) times a day. Controller, Disp: 60 each, Rfl: 11    atorvastatin (LIPITOR) 20 MG tablet, TAKE one TABLET BY MOUTH ONCE DAILY, Disp: 90 tablet, Rfl: 1    azelastine (ASTELIN) 137 mcg (0.1 %) nasal spray, 1 spray by Nasal route daily as needed for Rhinitis., Disp: , Rfl:     budesonide (PULMICORT) 0.5 mg/2 mL nebulizer solution, Take 2 mLs (0.5 mg total) by nebulization 2 (two) times a day. Controller, Disp: 120 mL, Rfl: 11    diltiaZEM (CARDIZEM CD) 120 MG Cp24, TAKE ONE CAPSULE BY MOUTH EVERY DAY (Patient taking differently: Take 120 mg by mouth once daily.), Disp: 90 capsule, Rfl: 1    furosemide (LASIX) 20 MG tablet, Take 1 tablet (20 mg total) by mouth once daily., Disp: 30 tablet, Rfl: 2    gabapentin (NEURONTIN) 100 MG capsule, Take 200 mg by mouth every evening., Disp: , Rfl:     HYDROcodone-acetaminophen (NORCO) 7.5-325 mg per tablet, Take 1 tablet by mouth every 12 (twelve) hours as needed for Pain., Disp: 60 tablet, Rfl: 0    levoFLOXacin (LEVAQUIN) 500 MG tablet, Take 1 tablet (500 mg total) by mouth once daily., Disp: 10 tablet, Rfl: 1    megestroL (MEGACE) 400 mg/10 mL (40 mg/mL) Susp, Take 5 mLs (200 mg total) by mouth once daily., Disp: 150 mL, Rfl: 11    pantoprazole (PROTONIX) 20 MG tablet, TAKE ONE TABLET BY MOUTH EVERY DAY (Patient taking differently: Take 20 mg by mouth once daily.), Disp: 90 tablet, Rfl: 1    potassium chloride SA (KLOR-CON M10) 10 MEQ tablet, Take 1 tablet (10 mEq total) by mouth once daily., Disp: 90 tablet, Rfl: 3    predniSONE (DELTASONE) 20 MG tablet, 3 for 3 days then 2 for 3 days then one for 3 days and repeat for breathing problems, Disp: 36 tablet, Rfl: 0    sertraline (ZOLOFT) 100 MG tablet, TAKE 1 TABLET (100 MG TOTAL) BY MOUTH ONCE DAILY., Disp: 90 tablet, Rfl: 3     "valsartan-hydrochlorothiazide (DIOVAN-HCT) 320-12.5 mg per tablet, Take 1 tablet by mouth every morning., Disp: 90 tablet, Rfl: 4    XARELTO 20 mg Tab, Take 1 tablet (20 mg total) by mouth once daily., Disp: , Rfl:     PMHx/PSHx Updates:  See patient's last visit with me on 1/8/2024.  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 Patient's remote   clinical history of breast carcinoma is noticed. Based on the   immunohistochemical features, the current tumor is favored to be of lung   primary.            Objective:     Vitals:  Blood pressure (!) 151/87, pulse 80, temperature 97.9 °F (36.6 °C), resp. rate 16, height 5' 6" (1.676 m).    Physical Examination:   GEN: no apparent distress, comfortable; AAOx3; overweight; elderly  HEAD: atraumatic and normocephalic  EYES: no pallor, no icterus, PERRLA  ENT: OMM, no pharyngeal erythema, external ears WNL; no nasal discharge; no thrush  NECK: no masses, thyroid normal, trachea midline, no LAD/LN's, supple  CV: RRR with no murmur; normal pulse; normal S1 and S2; no pedal edema  CHEST: Normal respiratory effort; CTAB; normal breath sounds; no wheeze or crackles; O2 per NC portable  ABDOM: nontender and nondistended; soft; normal bowel sounds; no rebound/guarding  MUSC/Skeletal: ROM normal; no crepitus; joints normal; no deformities; +arthropathy of hip/knees, hands; wheelchair  EXTREM: no clubbing, cyanosis, inflammation or swelling; varicosities in bilateral lower extremities  SKIN: no rashes, " lesions, ulcers, petechiae or subcutaneous nodules; chronic age related skin changes  : no carrillo  NEURO: grossly intact; motor/sensory WNL; AAOx3; no tremors  PSYCH: normal mood, affect and behavior  LYMPH: normal cervical, supraclavicular, axillary and groin LN's          Labs:     Lab Results   Component Value Date    WBC 7.80 03/29/2024    HGB 11.6 (L) 03/29/2024    HCT 37.7 03/29/2024    MCV 82 03/29/2024     03/29/2024       CMP  Sodium   Date Value Ref Range Status   03/29/2024 138 136 - 145 mmol/L Final   03/04/2019 142 134 - 144 mmol/L      Potassium   Date Value Ref Range Status   03/29/2024 3.8 3.5 - 5.1 mmol/L Final     Chloride   Date Value Ref Range Status   03/29/2024 103 95 - 110 mmol/L Final   03/04/2019 104 98 - 110 mmol/L      CO2   Date Value Ref Range Status   03/29/2024 29 23 - 29 mmol/L Final     Glucose   Date Value Ref Range Status   03/29/2024 85 70 - 110 mg/dL Final   03/04/2019 102 (H) 70 - 99 mg/dL      BUN   Date Value Ref Range Status   03/29/2024 13 8 - 23 mg/dL Final     Creatinine   Date Value Ref Range Status   03/29/2024 0.6 0.5 - 1.4 mg/dL Final   03/04/2019 0.48 (L) 0.60 - 1.40 mg/dL      Calcium   Date Value Ref Range Status   03/29/2024 9.1 8.7 - 10.5 mg/dL Final     Total Protein   Date Value Ref Range Status   03/29/2024 6.7 6.0 - 8.4 g/dL Final     Albumin   Date Value Ref Range Status   03/29/2024 4.0 3.5 - 5.2 g/dL Final   03/04/2019 4.0 3.1 - 4.7 g/dL      Total Bilirubin   Date Value Ref Range Status   03/29/2024 0.8 0.1 - 1.0 mg/dL Final     Comment:     For infants and newborns, interpretation of results should be based  on gestational age, weight and in agreement with clinical  observations.    Premature Infant recommended reference ranges:  Up to 24 hours.............<8.0 mg/dL  Up to 48 hours............<12.0 mg/dL  3-5 days..................<15.0 mg/dL  6-29 days.................<15.0 mg/dL       Alkaline Phosphatase   Date Value Ref Range Status    03/29/2024 58 55 - 135 U/L Final     AST   Date Value Ref Range Status   03/29/2024 18 10 - 40 U/L Final     ALT   Date Value Ref Range Status   03/29/2024 9 (L) 10 - 44 U/L Final     Anion Gap   Date Value Ref Range Status   03/29/2024 6 (L) 8 - 16 mmol/L Final     eGFR   Date Value Ref Range Status   03/29/2024 >60.0 >60 mL/min/1.73 m^2 Final     Lab Results   Component Value Date    CEA 1.4 01/08/2024           Radiology/Diagnostic Studies:    PET 3/6/2024:    IMPRESSION:  1. Discoid airspace opacity in the paramediastinal right upper lobe favored to represent post radiation/post treatment change, obscuring the previously described pulmonary nodule in the anterior right upper lobe with no convincing focal increased FDG activity from background.     2. Bilateral mastectomy with reconstruction.     3. Asymmetric increased FDG activity in the right pectoralis minor muscle favored to represent radiation change/inflammation.     4. Indeterminate oval area of markedly increased FDG activity adjacent to the right external iliac as above, slightly increased in conspicuity/size from the previous exam. No convincing CT anatomic correlate is present although this is adjacent to the terminal ileum and misregistration from cecal uptake is a consideration. Consider correlation with a history of colonoscopy in this age group.     5. Mild nonspecific asymmetric increased FDG activity in the inferior left adrenal body, newly appreciated from the previous exam, and only marginally increased from background.         Chest CT 12/19/2023:    IMPRESSION:  1.  Reference right upper lobe spiculated opacity is obscured by a broad area of linear density/consolidative change that abuts the anterior pleural surface. The degree of opacification has mildly increased upon comparison.  2.  Subpleural linear opacities in the right lower lobe superior segment.  3.  Small right pleural effusion with subjacent atelectasis.  4.  No evidence of  pathologic lymphadenopathy in the right sury hepatis.  5.  Small sliding type hiatus hernia.  6.  Coronary artery calcification.  7.  L2 compression fracture that has undergone previous kyphoplasty.    PET 9/19/2023:  IMPRESSION:  Decreased size and FDG activity of the spiculated nodule in the anterior right upper lobe     Increased groundglass and interstitial opacities within the right upper lobe and superior segment right lower lobe compatible with post radiation changes     Bilateral mastectomies with reconstruction with interval increased FDG activity in the right pectoralis minor muscle compatible with postradiation changes     Stable FDG activity in the region of the right external iliac vein without adenopathy.     Degenerative changes of the spine  Cardiomegaly with coronary artery calcification         CT chest 6/16/2023:    IMPRESSION:  Decrease in size of the mass within the anterior right upper lobe     Development of peripheral groundglass opacities in the right upper lobe and adjacent to the mass compatible with post radiation changes     No evidence of metastatic disease     Cardiomegaly        PET 1/11/2023:     IMPRESSION: Hypermetabolic 2.3 cm mass within the anterior right upper lobe suspicious for primary lung malignancy     Faint groundglass opacities throughout the lungs with prominence of the pulmonary vasculature and cardiomegaly which may be represent pulmonary edema.  Development of a 13 mm subpleural nodule in the right lower lobe. Since this is new since the most recent CT findings most likely are secondary to infectious or inflammatory process however metastatic lesion cannot be excluded     Increased FDG activity in the region of the right external iliac vein without corresponding adenopathy. This may be physiologic there is physiologic activity within a superficial vein in the upper right thigh and findings may be secondary to thrombophlebitis.. Follow-up CT abdomen and pelvis with  contrast is recommended     Degenerative changes of the spine           CT Chest 12/15/2022:     IMPRESSION:  1. A 23 mm right upper lobe noncalcified pulmonary nodule is highly suspicious for primary pulmonary neoplasm. Tissue diagnosis is recommended.  2. No findings of regional metastatic disease in the chest.  3. Enlarged pulmonary arteries, suggesting pulmonary arterial hypertension.  4. Cardiomegaly, with aortic and coronary arterial calcifications.     CTA 11/19/2022:     IMPRESSION:  1.  No pulmonary embolus detected.  2.  Interstitial abnormality, suspect mild edema superimposed upon chronic changes.  3.  Right upper lobe 2.1 cm nodule. Consider a non-contrast Chest CT at 3 months, a PET/CT, or tissue sampling. These guidelines do not apply to immunocompromised patients and patients with cancer   ADDENDUM #1         The presence of right upper lobe lung nodule needing further evaluation or follow-up has been discussed with Dr. Rick Salgado 11/19/2022 12:50 AM CST.     All lab results and imaging results have been reviewed and     I have reviewed all available lab results and radiology reports.    Assessment/Plan:   (1) 83 y.o. female with diagnosis of RUL lung mass who has been referred by Shilo Perez MD for evaluation by medical hematology/oncology. She has been followed by Dr Ashish Henley with pulmonary for her COPD and chronic hypoxemia/bronchitis, who was a former smoker.      - She had a CTA in Nov 2022 which was negative for a PE but showed a RUL lung mass, which was followed by a CT Chest on 12/15/2022 which confirmed a 2.3cm RUL mass.   - She had a PET scan on 1/11/2023 which showed a 2.3 cm hypermetabolic mass in the anterior right upper lobe abutting the superior vena cava along with development of a 13 mm subpleural nodule within the right lower lobe.         - She had CT guided biopsy of the RUL lung mass on 2/15/2023 with pathology coming back NSCLC favoring a lung primary.     - She is  supposed to be on oxygen at home. She has portable tank and has a lot of GAVIRIA. She has chronic SOB.   - She is former smoker and quit when she was 43yrs old.      - discussed the pathology and reviewed and discussed the latest NCCN guidelines (vs. 2-2023)  - unlikely candidate for any surgical intervention  - will refer to Rad/onc to see if there are any radiation options either monotherapy or in combination with low dose weekly chemotherapy  - CARIS on the pathology  - check CEA and up to date labs    3/27/2023:  - She saw Dr Ralph with rad/onc on 3/20/2023 and she is starting XRT monotherapy today with day#1    4/25/2023:  - she completed XRT and has some fatigue  - she will need post-XRT evaluation in about 4 weeks with rad/onc and expect her to need repeat scans in 6-8 weeks    5/25/2023:  - she saw Dr Ralph on 5/2 and he has CT Chest ordered post-XRT  - some residual fatigue and back pain  - due to see Dr Henley  - labs adequate    8/21/2023:  - she has been having some more back pain  - due for repeat CT with Dr Ralph in Sept/oct 2023, will go ahead and order PET now  - she needs to f/u with Dr Henley with pulmonary as well  - await PEt results, consider other scans if necessary  - CEA at 2.0      10/24/2023:  - she had PET scan in Sept 2023 with overall improved report  - she sees rad/onc again on 11/6  - rad/onc has already ordered the next Chest CT  - due for up to date labs incl. CEA    1/8/2024:  - She has residual chronic back pain issues, aches and pains in legs especially at night; doesn't sleep well at night  - She saw Dr Perez in Nov 2023  - She last saw Dr Ralph with rad/onc on 5/2/2023 and previously completed XRT; she was supposed to see him again on 11/6/2023 but did not  - she had Chest CT on 12/19/2023  - She is on portable O2; breathing up and down. No CP, HA's or N/V.  - She has not seen Dr Henley with pulmonary in some time    4/1/2024:  - She had bout of pneumonia and was hospitalized in  Jan 2024  - She saw Dr Henley with pulm in Feb 2024 and Dr Grajeda in Jan 2024  - She had recent PEt scan on 3/6/2024 relatively stable  - she saw Dr Ralph with rad/onc in Jan 2024     (2) Hx/of PE and DVT     (3) HTN and hypercholesterolemia     (4) COPD/chronic hypoxemia; bronchiectasis; chronic bronchitis - followed by Dr Ashish Henley/Shelbie Villalpando     (5) CHF and dysrhythmia     (6) GERD; gastric ulcer     (7) Hx/of breast cancer s/p bilateral mastectomies- followed by Dr Maryse Bekcwith  - She has history of breast cancer in past around 2005  for which she has had bilateral mastectomies and is followed by Dr Beckwith. She did not require chemotherapy or radiation. She had reconstruction surgery with Dr Grimaldo. She saw Dr Beckwith couple of weeks ago.        (8) OA (Knees); chronic back pain; s/p Left total hip after fracture     (9) Urinary urgency/incontinence     (10) Migraine HA's     (11) Anxiety and Depression     (12) Hx/of melanoma left forearm, BCC     (13) Former smoker           VISIT DIAGNOSES:      Mass of upper lobe of right lung    Radiation fibrosis of lung    Abnormal PET scan of lung    Primary cancer of right upper lobe of lung    S/P bilateral mastectomy    History of breast cancer    Abnormal chest CT    Former smoker          PLAN:    f/u with Dr Henley with pulmonary as directed;  repeat scan in 4 months of sooner if pulmonary directs  She previously had completed monotherapy XRT; f/u with rad/onc as directed    Check up to date labs incl. CEA level every 3 months   Encouarged f/u with Dr Henley  F/u with PCP, Pulm, etc     RTC in  12-16 weeks with myself  Fax note to Shilo Perez MD; Amena Mejia Mannina, [place]       Discussion:     COVID-19 Discussion:     I had long discussion with patient and any applicable family about the COVID-19 coronavirus epidemic and the recommended precautions with regard to cancer and/or hematology patients. I have re-iterated the CDC  "recommendations for adequate hand washing, use of hand -like products, and coughing into elbow, etc. In addition, especially for our patients who are on chemotherapy and/or our otherwise immunocompromised patients, I have recommended avoidance of crowds, including movie theaters, restaurants, churches, etc. I have recommended avoidance of any sick or symptomatic family members and/or friends. I have also recommended avoidance of any raw and unwashed food products, and general avoidance of food items that have not been prepared by themselves. The patient has been asked to call us immediately with any symptom developments, issues, questions or other general concerns.         Pathology Discussion:     I reviewed and discussed the pathology report(s) and radiograph reports (if available) in as simple to understand and/or laymen's terms to the best of my ability. I had an indepth conversation with the patient and went over the patient's individual diagnosis based on the information that was currently available. I discussed the TNM staging process with regard to the patient's particular cancer type, and the calculated stage based on the currently available TNM data and literature. I discussed the available prognostic data with regard to the current staging information and how it relates to the prognosis of their particular neoplastic process.          NCCN Guidelines:     I discussed the available treatment option(s) in accordance with the latest literature from the NCCN Clinical Practice Guidelines for the patient's particular type of cancer disorder. The NCCN Guidelines provide a "document evidence-based (and) consensus-driven management" of the care of oncology patients. The treatment recommendations were made not only in accordance to the NCCN guidelines, but also factored in to account the patient's overall age, condition, performance status and their medical co-morbidities. I went over the risks and " benefits of the the treatment options (if any could be made) with regard to their particular cancer type, their cancer stage, their age, and their co-morbidities.         I have explained and the patient understands all of  the current recommendation(s). I have answered all of their questions to the best of my ability and to their complete satisfaction.      I spent over 25 mins of time with the patient. Reviewed results of the recently ordered labs, tests and studies; made directives with regards to the results. Over half of this time was spent couseling and coordinating care.    I have explained all of the above in detail and the patient understands all of the current recommendation(s). I have answered all of their questions to the best of my ability and to their complete satisfaction.   The patient is to continue with the current management plan.            Electronically signed by Isacc Vogt MD

## 2024-03-29 ENCOUNTER — LAB VISIT (OUTPATIENT)
Dept: LAB | Facility: HOSPITAL | Age: 84
End: 2024-03-29
Attending: INTERNAL MEDICINE
Payer: MEDICARE

## 2024-03-29 DIAGNOSIS — C34.11 MALIGNANT NEOPLASM OF UPPER LOBE OF RIGHT LUNG: ICD-10-CM

## 2024-03-29 LAB
ALBUMIN SERPL BCP-MCNC: 4 G/DL (ref 3.5–5.2)
ALP SERPL-CCNC: 58 U/L (ref 55–135)
ALT SERPL W/O P-5'-P-CCNC: 9 U/L (ref 10–44)
ANION GAP SERPL CALC-SCNC: 6 MMOL/L (ref 8–16)
AST SERPL-CCNC: 18 U/L (ref 10–40)
BASOPHILS # BLD AUTO: 0.08 K/UL (ref 0–0.2)
BASOPHILS NFR BLD: 1 % (ref 0–1.9)
BILIRUB SERPL-MCNC: 0.8 MG/DL (ref 0.1–1)
BUN SERPL-MCNC: 13 MG/DL (ref 8–23)
CALCIUM SERPL-MCNC: 9.1 MG/DL (ref 8.7–10.5)
CHLORIDE SERPL-SCNC: 103 MMOL/L (ref 95–110)
CO2 SERPL-SCNC: 29 MMOL/L (ref 23–29)
CREAT SERPL-MCNC: 0.6 MG/DL (ref 0.5–1.4)
DIFFERENTIAL METHOD BLD: ABNORMAL
EOSINOPHIL # BLD AUTO: 0.1 K/UL (ref 0–0.5)
EOSINOPHIL NFR BLD: 1.3 % (ref 0–8)
ERYTHROCYTE [DISTWIDTH] IN BLOOD BY AUTOMATED COUNT: ABNORMAL % (ref 11.5–14.5)
EST. GFR  (NO RACE VARIABLE): >60 ML/MIN/1.73 M^2
GLUCOSE SERPL-MCNC: 85 MG/DL (ref 70–110)
HCT VFR BLD AUTO: 37.7 % (ref 37–48.5)
HGB BLD-MCNC: 11.6 G/DL (ref 12–16)
IMM GRANULOCYTES # BLD AUTO: 0.03 K/UL (ref 0–0.04)
IMM GRANULOCYTES NFR BLD AUTO: 0.4 % (ref 0–0.5)
LYMPHOCYTES # BLD AUTO: 1.8 K/UL (ref 1–4.8)
LYMPHOCYTES NFR BLD: 22.9 % (ref 18–48)
MCH RBC QN AUTO: 25.3 PG (ref 27–31)
MCHC RBC AUTO-ENTMCNC: 30.8 G/DL (ref 32–36)
MCV RBC AUTO: 82 FL (ref 82–98)
MONOCYTES # BLD AUTO: 0.6 K/UL (ref 0.3–1)
MONOCYTES NFR BLD: 7.4 % (ref 4–15)
NEUTROPHILS # BLD AUTO: 5.2 K/UL (ref 1.8–7.7)
NEUTROPHILS NFR BLD: 67 % (ref 38–73)
NRBC BLD-RTO: 0 /100 WBC
PLATELET # BLD AUTO: 291 K/UL (ref 150–450)
PLATELET BLD QL SMEAR: ABNORMAL
PMV BLD AUTO: 9.8 FL (ref 9.2–12.9)
POTASSIUM SERPL-SCNC: 3.8 MMOL/L (ref 3.5–5.1)
PROT SERPL-MCNC: 6.7 G/DL (ref 6–8.4)
RBC # BLD AUTO: 4.59 M/UL (ref 4–5.4)
SODIUM SERPL-SCNC: 138 MMOL/L (ref 136–145)
WBC # BLD AUTO: 7.8 K/UL (ref 3.9–12.7)

## 2024-03-29 PROCEDURE — 85025 COMPLETE CBC W/AUTO DIFF WBC: CPT | Performed by: INTERNAL MEDICINE

## 2024-03-29 PROCEDURE — 80053 COMPREHEN METABOLIC PANEL: CPT | Performed by: INTERNAL MEDICINE

## 2024-03-29 PROCEDURE — 36415 COLL VENOUS BLD VENIPUNCTURE: CPT | Performed by: INTERNAL MEDICINE

## 2024-04-01 ENCOUNTER — OFFICE VISIT (OUTPATIENT)
Dept: HEMATOLOGY/ONCOLOGY | Facility: CLINIC | Age: 84
End: 2024-04-01
Payer: MEDICARE

## 2024-04-01 VITALS
SYSTOLIC BLOOD PRESSURE: 151 MMHG | DIASTOLIC BLOOD PRESSURE: 87 MMHG | TEMPERATURE: 98 F | HEIGHT: 66 IN | RESPIRATION RATE: 16 BRPM | BODY MASS INDEX: 33.89 KG/M2 | HEART RATE: 80 BPM

## 2024-04-01 DIAGNOSIS — Z87.891 FORMER SMOKER: ICD-10-CM

## 2024-04-01 DIAGNOSIS — J70.1 RADIATION FIBROSIS OF LUNG: ICD-10-CM

## 2024-04-01 DIAGNOSIS — R94.2 ABNORMAL PET SCAN OF LUNG: ICD-10-CM

## 2024-04-01 DIAGNOSIS — Z85.3 HISTORY OF BREAST CANCER: ICD-10-CM

## 2024-04-01 DIAGNOSIS — Z90.13 S/P BILATERAL MASTECTOMY: ICD-10-CM

## 2024-04-01 DIAGNOSIS — R93.89 ABNORMAL CHEST CT: ICD-10-CM

## 2024-04-01 DIAGNOSIS — R91.8 MASS OF UPPER LOBE OF RIGHT LUNG: Primary | ICD-10-CM

## 2024-04-01 DIAGNOSIS — C34.11 PRIMARY CANCER OF RIGHT UPPER LOBE OF LUNG: ICD-10-CM

## 2024-04-01 PROCEDURE — 3079F DIAST BP 80-89 MM HG: CPT | Mod: CPTII,S$GLB,, | Performed by: INTERNAL MEDICINE

## 2024-04-01 PROCEDURE — 99214 OFFICE O/P EST MOD 30 MIN: CPT | Mod: S$GLB,,, | Performed by: INTERNAL MEDICINE

## 2024-04-01 PROCEDURE — 1157F ADVNC CARE PLAN IN RCRD: CPT | Mod: CPTII,S$GLB,, | Performed by: INTERNAL MEDICINE

## 2024-04-01 PROCEDURE — 1125F AMNT PAIN NOTED PAIN PRSNT: CPT | Mod: CPTII,S$GLB,, | Performed by: INTERNAL MEDICINE

## 2024-04-01 PROCEDURE — 3077F SYST BP >= 140 MM HG: CPT | Mod: CPTII,S$GLB,, | Performed by: INTERNAL MEDICINE

## 2024-04-01 PROCEDURE — 1159F MED LIST DOCD IN RCRD: CPT | Mod: CPTII,S$GLB,, | Performed by: INTERNAL MEDICINE

## 2024-04-01 PROCEDURE — 1160F RVW MEDS BY RX/DR IN RCRD: CPT | Mod: CPTII,S$GLB,, | Performed by: INTERNAL MEDICINE

## 2024-04-01 PROCEDURE — 1101F PT FALLS ASSESS-DOCD LE1/YR: CPT | Mod: CPTII,S$GLB,, | Performed by: INTERNAL MEDICINE

## 2024-04-01 PROCEDURE — 3288F FALL RISK ASSESSMENT DOCD: CPT | Mod: CPTII,S$GLB,, | Performed by: INTERNAL MEDICINE

## 2024-04-22 ENCOUNTER — TELEPHONE (OUTPATIENT)
Dept: FAMILY MEDICINE | Facility: CLINIC | Age: 84
End: 2024-04-22
Payer: MEDICARE

## 2024-04-22 DIAGNOSIS — M79.604 PAIN IN BOTH LOWER EXTREMITIES: Primary | ICD-10-CM

## 2024-04-22 DIAGNOSIS — M79.605 PAIN IN BOTH LOWER EXTREMITIES: Primary | ICD-10-CM

## 2024-04-22 DIAGNOSIS — I48.0 PAROXYSMAL ATRIAL FIBRILLATION: Chronic | ICD-10-CM

## 2024-04-22 RX ORDER — DILTIAZEM HYDROCHLORIDE 120 MG/1
120 CAPSULE, COATED, EXTENDED RELEASE ORAL
Qty: 90 CAPSULE | Refills: 1 | Status: SHIPPED | OUTPATIENT
Start: 2024-04-22

## 2024-04-22 RX ORDER — GABAPENTIN 300 MG/1
300 CAPSULE ORAL NIGHTLY
Qty: 30 CAPSULE | Refills: 5 | Status: SHIPPED | OUTPATIENT
Start: 2024-04-22

## 2024-04-24 ENCOUNTER — EXTERNAL HOME HEALTH (OUTPATIENT)
Dept: HOME HEALTH SERVICES | Facility: HOSPITAL | Age: 84
End: 2024-04-24
Payer: MEDICARE

## 2024-05-06 PROBLEM — J96.00 COPD WITH RESPIRATORY FAILURE, ACUTE: Status: RESOLVED | Noted: 2024-01-30 | Resolved: 2024-05-06

## 2024-05-06 PROBLEM — J44.1 COPD WITH RESPIRATORY FAILURE, ACUTE: Status: RESOLVED | Noted: 2024-01-30 | Resolved: 2024-05-06

## 2024-05-06 PROBLEM — J96.11 CHRONIC HYPOXEMIC RESPIRATORY FAILURE: Status: RESOLVED | Noted: 2024-01-30 | Resolved: 2024-05-06

## 2024-05-06 RX ORDER — VALSARTAN AND HYDROCHLOROTHIAZIDE 320; 12.5 MG/1; MG/1
1 TABLET, FILM COATED ORAL EVERY MORNING
Qty: 90 TABLET | Refills: 4 | Status: SHIPPED | OUTPATIENT
Start: 2024-05-06

## 2024-05-08 ENCOUNTER — TELEPHONE (OUTPATIENT)
Dept: PULMONOLOGY | Facility: CLINIC | Age: 84
End: 2024-05-08
Payer: MEDICARE

## 2024-05-08 NOTE — TELEPHONE ENCOUNTER
----- Message from Blossom Junior sent at 5/7/2024  3:56 PM CDT -----  Type: Needs Medical Advice  Who Called:  stephen Rosenberg Call Back Number: [phone] (home)     Additional Information: pt requesting call back please advise

## 2024-05-08 NOTE — TELEPHONE ENCOUNTER
----- Message from Blossom Junior sent at 5/7/2024  3:56 PM CDT -----  Type: Needs Medical Advice  Who Called:  stephen Rosenberg Call Back Number: [phone] (home)     Additional Information: pt requesting call back please advise  
Spoke with [facility].  Informed them of oxygen setting is 5 liter.  They will send out tanks as soon as patient calls for refill.  Patient was informed and she verbalized understanding  
detailed exam
detailed exam

## 2024-05-16 ENCOUNTER — OFFICE VISIT (OUTPATIENT)
Dept: FAMILY MEDICINE | Facility: CLINIC | Age: 84
End: 2024-05-16
Payer: MEDICARE

## 2024-05-16 VITALS
BODY MASS INDEX: 30.78 KG/M2 | HEIGHT: 66 IN | WEIGHT: 191.5 LBS | OXYGEN SATURATION: 95 % | SYSTOLIC BLOOD PRESSURE: 140 MMHG | DIASTOLIC BLOOD PRESSURE: 80 MMHG | HEART RATE: 61 BPM | RESPIRATION RATE: 20 BRPM

## 2024-05-16 DIAGNOSIS — I50.32 CHRONIC HEART FAILURE WITH PRESERVED EJECTION FRACTION: Primary | ICD-10-CM

## 2024-05-16 DIAGNOSIS — J96.11 CHRONIC HYPOXEMIC RESPIRATORY FAILURE: ICD-10-CM

## 2024-05-16 DIAGNOSIS — Z85.3 HISTORY OF BREAST CANCER: ICD-10-CM

## 2024-05-16 DIAGNOSIS — B35.1 ONYCHOMYCOSIS: ICD-10-CM

## 2024-05-16 DIAGNOSIS — M79.604 PAIN IN BOTH LOWER EXTREMITIES: ICD-10-CM

## 2024-05-16 DIAGNOSIS — Z79.01 CHRONIC ANTICOAGULATION: ICD-10-CM

## 2024-05-16 DIAGNOSIS — C34.11 PRIMARY CANCER OF RIGHT UPPER LOBE OF LUNG: ICD-10-CM

## 2024-05-16 DIAGNOSIS — M79.605 PAIN IN BOTH LOWER EXTREMITIES: ICD-10-CM

## 2024-05-16 DIAGNOSIS — Z99.89 WALKER AS AMBULATION AID: ICD-10-CM

## 2024-05-16 DIAGNOSIS — I48.0 PAROXYSMAL ATRIAL FIBRILLATION: ICD-10-CM

## 2024-05-16 DIAGNOSIS — F33.1 MODERATE EPISODE OF RECURRENT MAJOR DEPRESSIVE DISORDER: ICD-10-CM

## 2024-05-16 DIAGNOSIS — R41.3 MILD MEMORY DISTURBANCE: ICD-10-CM

## 2024-05-16 DIAGNOSIS — Z74.8 DEPENDENT FOR TRANSPORTATION: ICD-10-CM

## 2024-05-16 DIAGNOSIS — I10 BENIGN ESSENTIAL HYPERTENSION: ICD-10-CM

## 2024-05-16 PROCEDURE — 1160F RVW MEDS BY RX/DR IN RCRD: CPT | Mod: CPTII,S$GLB,, | Performed by: INTERNAL MEDICINE

## 2024-05-16 PROCEDURE — 1157F ADVNC CARE PLAN IN RCRD: CPT | Mod: CPTII,S$GLB,, | Performed by: INTERNAL MEDICINE

## 2024-05-16 PROCEDURE — 1159F MED LIST DOCD IN RCRD: CPT | Mod: CPTII,S$GLB,, | Performed by: INTERNAL MEDICINE

## 2024-05-16 PROCEDURE — 1101F PT FALLS ASSESS-DOCD LE1/YR: CPT | Mod: CPTII,S$GLB,, | Performed by: INTERNAL MEDICINE

## 2024-05-16 PROCEDURE — 1125F AMNT PAIN NOTED PAIN PRSNT: CPT | Mod: CPTII,S$GLB,, | Performed by: INTERNAL MEDICINE

## 2024-05-16 PROCEDURE — 3288F FALL RISK ASSESSMENT DOCD: CPT | Mod: CPTII,S$GLB,, | Performed by: INTERNAL MEDICINE

## 2024-05-16 PROCEDURE — 99214 OFFICE O/P EST MOD 30 MIN: CPT | Mod: S$GLB,,, | Performed by: INTERNAL MEDICINE

## 2024-05-16 PROCEDURE — 3079F DIAST BP 80-89 MM HG: CPT | Mod: CPTII,S$GLB,, | Performed by: INTERNAL MEDICINE

## 2024-05-16 PROCEDURE — 3077F SYST BP >= 140 MM HG: CPT | Mod: CPTII,S$GLB,, | Performed by: INTERNAL MEDICINE

## 2024-05-16 PROCEDURE — 99999 PR PBB SHADOW E&M-EST. PATIENT-LVL V: CPT | Mod: PBBFAC,,, | Performed by: INTERNAL MEDICINE

## 2024-05-16 RX ORDER — CICLOPIROX 80 MG/ML
SOLUTION TOPICAL NIGHTLY
Qty: 6.6 ML | Refills: 10 | Status: SHIPPED | OUTPATIENT
Start: 2024-05-16

## 2024-05-16 NOTE — PROGRESS NOTES
Subjective:       Patient ID: Lety Herbert is a 83 y.o. female.    Chief Complaint: Congestive Heart Failure, Hypertension, and Hyperlipidemia    Miss Davidson is a 83 comes for 4-6 months follow-up.  She has been chronically and steadily declining over months in years with her overall health, endurance and general sense of well-being.    Underlying medical issues are as below:-      1.         Chronic hypoxemic respiratory failure   2.         Chronic heart failure with preserved ejection fraction   3.         Multiple-type hyperlipidemia   4.         Benign essential hypertension   5.         Paroxysmal atrial fibrillation -seems to be stable and she has seen Dr. Lepe with no major changes  6.         Normocytic anemia   7.         Mixed stress and urge urinary incontinence   8.         Gastroesophageal reflux disease without esophagitis   9.         Chronic tension-type headache, not intractable -the headaches seem to be getting better and not as bad as before.  10.       Compression fracture of L1 vertebra, sequela     Previous visit we had reviewed her excruciating pain 10/10 in the lower extremities.  Imaging studies were negative for blood clot.  Vascular referral was made to see if could be caused by peripheral vascular disease.  X-rays did not show any fracture.    I had continue prescription for hydrocodone for interim.  And now with interim request from family members to refill her medications.    Oncology diagnosis    Mass of upper lobe of right lung     Radiation fibrosis of lung     Abnormal PET scan of lung     Primary cancer of right upper lobe of lung     S/P bilateral mastectomy     History of breast cancer     Abnormal chest CT     Former smoker            Congestive Heart Failure  Presents for follow-up visit. Associated symptoms include fatigue, shortness of breath and unexpected weight change (GAINED 6 LB OF WEIGHT..). Pertinent negatives include no abdominal pain, chest pain or  palpitations. Compliance with total regimen is 26-50%. Compliance with diet is 26-50%. Compliance with exercise is 0-25%. Compliance with medications is 51-75%.   Hypertension  This is a chronic problem. The current episode started more than 1 year ago. The problem is uncontrolled. Associated symptoms include headaches, malaise/fatigue, peripheral edema and shortness of breath. Pertinent negatives include no chest pain or palpitations. Risk factors for coronary artery disease include sedentary lifestyle, obesity, dyslipidemia and post-menopausal state. Past treatments include beta blockers, angiotensin blockers and diuretics. The current treatment provides moderate improvement. Compliance problems include psychosocial issues.  There is no history of coarctation of the aorta, hyperaldosteronism, pheochromocytoma or renovascular disease.   Hyperlipidemia  This is a chronic problem. The current episode started more than 1 year ago. The problem is controlled. Associated symptoms include myalgias and shortness of breath. Pertinent negatives include no chest pain. Current antihyperlipidemic treatment includes statins. The current treatment provides moderate improvement of lipids. Compliance problems include psychosocial issues.  Risk factors for coronary artery disease include a sedentary lifestyle, hypertension, post-menopausal, obesity and dyslipidemia.   Atrial Fibrillation  Presents for follow-up visit. Symptoms include shortness of breath. Symptoms are negative for bradycardia, chest pain, dizziness and palpitations. The symptoms have been stable. Past medical history includes atrial fibrillation, CHF and hyperlipidemia.   DepressionPatient presents with the following symptoms: shortness of breath.  Patient is not experiencing: choking sensation, confusion, nervousness/anxiety, palpitations and suicidal ideas.    Patient has a history of: CHF    Headache   This is a chronic problem. The current episode started more  than 1 year ago. The problem occurs constantly. The problem has been gradually worsening. The pain is located in the Occipital region. The pain radiates to the left neck and right neck. The pain quality is not similar to prior headaches. The quality of the pain is described as aching. The pain is moderate. Associated symptoms include back pain. Pertinent negatives include no abdominal pain, dizziness, eye pain, numbness or sore throat. Nothing aggravates the symptoms. She has tried acetaminophen for the symptoms.   Anemia  Symptoms include malaise/fatigue and pallor. There has been no abdominal pain, bruising/bleeding easily, confusion, light-headedness or palpitations. Signs of blood loss that are not present include vaginal bleeding.   Chronic Pain  Past Medical History Includes::  Cancer  Chronicity:  Chronic  Onset:  More than 1 year ago  Frequency:  Constantly  Progression since onset:  Waxing and waning  Pain location:  Generalized (Recently chest wall)  Pain - numeric:  5/10  Treatments tried: Hydrocodone.  Previous Imaging:  CT Scan  Current treatment:  Hydrocodone/acetaminophen (Hycet, LorcetLortab, Norco, Vicodin)  Improvement on Current Treatment:  Mild  Goals of therapy:  Improve ADL's  Associated symptoms: constipation, dyspnea and headaches    Associated symptoms: no abdominal pain, no chest pain, no dizziness and no numbness    Drug Screen: No    Pain Contract: No    Prescription Monitoring Program  reviewed: Yes        Past Medical History:   Diagnosis Date    Abnormal chest CT 3/9/2023    Anemia     Basal cell carcinoma     nose     Cancer     breast    Depression     DVT (deep venous thrombosis)     Fall 3/20/2018    Former smoker 3/9/2023    Gastric ulceration     GERD (gastroesophageal reflux disease)     History of breast cancer 3/9/2023    History of frequent urinary tract infections     Hyperlipidemia     Hypertension     Malignant neoplasm of upper lobe of right lung (NSCLC favoring lung  primary) 3/9/2023    Melanoma 2004?    left forearm    MRSA (methicillin resistant staph aureus) culture positive     Neuropathy     PE (pulmonary embolism)     Post-nasal drip 11/15/2018    Reflux     S/P bilateral mastectomy 3/9/2023     Social History     Socioeconomic History    Marital status:      Spouse name: Jean    Number of children: 3   Tobacco Use    Smoking status: Former     Passive exposure: Past    Smokeless tobacco: Never   Substance and Sexual Activity    Alcohol use: No    Drug use: No    Sexual activity: Not Currently     Partners: Male   Social History Narrative    3 Children- 9 GC, 7 GGrands     Social Determinants of Health     Financial Resource Strain: Medium Risk (11/10/2021)    Overall Financial Resource Strain (CARDIA)     Difficulty of Paying Living Expenses: Somewhat hard   Food Insecurity: No Food Insecurity (11/10/2021)    Hunger Vital Sign     Worried About Running Out of Food in the Last Year: Never true     Ran Out of Food in the Last Year: Never true   Transportation Needs: No Transportation Needs (11/10/2021)    PRAPARE - Transportation     Lack of Transportation (Medical): No     Lack of Transportation (Non-Medical): No   Physical Activity: Inactive (11/10/2021)    Exercise Vital Sign     Days of Exercise per Week: 0 days     Minutes of Exercise per Session: 0 min   Stress: Stress Concern Present (11/10/2021)    [employer] [place] [employer] - Occupational Stress Questionnaire     Feeling of Stress : To some extent   Housing Stability: Low Risk  (11/10/2021)    Housing Stability Vital Sign     Unable to Pay for Housing in the Last Year: No     Number of Places Lived in the Last Year: 1     Unstable Housing in the Last Year: No     Past Surgical History:   Procedure Laterality Date    HYSTERECTOMY      MASTECTOMY, RADICAL Bilateral     TOTAL HIP ARTHROPLASTY Left 11/13/2017     Family History   Problem Relation Name Age of Onset    Cancer Mother      Cancer  Father      Heart disease Father      Melanoma Neg Hx      Psoriasis Neg Hx      Lupus Neg Hx      Eczema Neg Hx         Review of Systems   Constitutional:  Positive for activity change (reducing activities), fatigue, malaise/fatigue and unexpected weight change (GAINED 6 LB OF WEIGHT..). Negative for appetite change and chills.        She has lost 20 lb of weight.  Patient is overall not feeling well.  This should not be the case.  Generally when somebody loses weight by watching diet they really feel well.  Patient on the contrary feels more sick and tired and headaches.   HENT:  Positive for congestion. Negative for nosebleeds and sore throat.    Eyes:  Negative for pain, discharge and visual disturbance.   Respiratory:  Positive for shortness of breath. Negative for choking and chest tightness.         Recent diagnosis of lung cancer non-small cell.  Has undergone chemotherapy.   Cardiovascular:  Positive for leg swelling. Negative for chest pain and palpitations.        HTN   Gastrointestinal:  Positive for constipation. Negative for abdominal pain and blood in stool.        Constipation seems to be stable with Amitiza 24 mcg capsule.   Endocrine: Positive for polyuria. Negative for cold intolerance and polydipsia.   Genitourinary:  Positive for enuresis. Negative for vaginal bleeding.        Incontinence symptoms   Musculoskeletal:  Positive for arthralgias, back pain and myalgias. Negative for joint swelling.        Left hip fracture S/P Arthroplasty NoV 2017  Fall in October/November 2019.  L1 compression fracture status post vertebroplasty.  LOWER EXTREMITY PAIN AND DURATION UNKNOWN.  The pain in the lower extremities has got worse.   Skin:  Positive for pallor. Negative for color change, rash and wound.        Complains of somewhat brittle nails.   Allergic/Immunologic: Negative for environmental allergies, food allergies and immunocompromised state.   Neurological:  Positive for headaches. Negative for  "dizziness, light-headedness and numbness.   Hematological:  Does not bruise/bleed easily.   Psychiatric/Behavioral:  Positive for depression. Negative for confusion, self-injury and suicidal ideas. The patient is not nervous/anxious and is not hyperactive.         Patient has a history of depression. Stable on sertraline. She however denies that she is depressed.         Objective:      Blood pressure (!) 140/80, pulse 61, resp. rate 20, height 5' 6" (1.676 m), weight 86.9 kg (191 lb 8 oz), SpO2 95%. Body mass index is 30.91 kg/m².  Physical Exam  Constitutional:       Appearance: She is obese. She is ill-appearing.      Comments: BMI 30.91   Eyes:      General: No scleral icterus.  Cardiovascular:      Rate and Rhythm: Normal rate and regular rhythm.      Heart sounds: Normal heart sounds.   Pulmonary:      Effort: Pulmonary effort is normal.      Breath sounds: Normal breath sounds.      Comments: Occasional harsh sound  Abdominal:      General: Bowel sounds are normal.   Musculoskeletal:      Cervical back: No rigidity.      Right hip: Decreased range of motion.      Left hip: Decreased range of motion.   Skin:     General: Skin is warm.      Coloration: Skin is pale.      Findings: No bruising or lesion.   Neurological:      Mental Status: She is alert. Mental status is at baseline.   Psychiatric:      Comments: Anhedonic dysthymic           Assessment:       Lab Visit on 03/29/2024   Component Date Value Ref Range Status    WBC 03/29/2024 7.80  3.90 - 12.70 K/uL Final    RBC 03/29/2024 4.59  4.00 - 5.40 M/uL Final    Hemoglobin 03/29/2024 11.6 (L)  12.0 - 16.0 g/dL Final    Hematocrit 03/29/2024 37.7  37.0 - 48.5 % Final    MCV 03/29/2024 82  82 - 98 fL Final    MCH 03/29/2024 25.3 (L)  27.0 - 31.0 pg Final    MCHC 03/29/2024 30.8 (L)  32.0 - 36.0 g/dL Final    RDW 03/29/2024 SEE COMMENT  11.5 - 14.5 % Final    Platelets 03/29/2024 291  150 - 450 K/uL Final    MPV 03/29/2024 9.8  9.2 - 12.9 fL Final    Immature " Granulocytes 03/29/2024 0.4  0.0 - 0.5 % Final    Gran # (ANC) 03/29/2024 5.2  1.8 - 7.7 K/uL Final    Immature Grans (Abs) 03/29/2024 0.03  0.00 - 0.04 K/uL Final    Lymph # 03/29/2024 1.8  1.0 - 4.8 K/uL Final    Mono # 03/29/2024 0.6  0.3 - 1.0 K/uL Final    Eos # 03/29/2024 0.1  0.0 - 0.5 K/uL Final    Baso # 03/29/2024 0.08  0.00 - 0.20 K/uL Final    nRBC 03/29/2024 0  0 /100 WBC Final    Gran % 03/29/2024 67.0  38.0 - 73.0 % Final    Lymph % 03/29/2024 22.9  18.0 - 48.0 % Final    Mono % 03/29/2024 7.4  4.0 - 15.0 % Final    Eosinophil % 03/29/2024 1.3  0.0 - 8.0 % Final    Basophil % 03/29/2024 1.0  0.0 - 1.9 % Final    Platelet Estimate 03/29/2024 Appears normal   Final    Differential Method 03/29/2024 Automated   Final    Sodium 03/29/2024 138  136 - 145 mmol/L Final    Potassium 03/29/2024 3.8  3.5 - 5.1 mmol/L Final    Chloride 03/29/2024 103  95 - 110 mmol/L Final    CO2 03/29/2024 29  23 - 29 mmol/L Final    Glucose 03/29/2024 85  70 - 110 mg/dL Final    BUN 03/29/2024 13  8 - 23 mg/dL Final    Creatinine 03/29/2024 0.6  0.5 - 1.4 mg/dL Final    Calcium 03/29/2024 9.1  8.7 - 10.5 mg/dL Final    Total Protein 03/29/2024 6.7  6.0 - 8.4 g/dL Final    Albumin 03/29/2024 4.0  3.5 - 5.2 g/dL Final    Total Bilirubin 03/29/2024 0.8  0.1 - 1.0 mg/dL Final    Alkaline Phosphatase 03/29/2024 58  55 - 135 U/L Final    AST 03/29/2024 18  10 - 40 U/L Final    ALT 03/29/2024 9 (L)  10 - 44 U/L Final    eGFR 03/29/2024 >60.0  >60 mL/min/1.73 m^2 Final    Anion Gap 03/29/2024 6 (L)  8 - 16 mmol/L Final   Hospital Outpatient Visit on 03/06/2024   Component Date Value Ref Range Status    POC Glucose 03/06/2024 76  70 - 110 Final       1. Chronic heart failure with preserved ejection fraction    2. Benign essential hypertension  Overview:  BP elevated      3. Chronic anticoagulation    4. Chronic hypoxemic respiratory failure  Comments:  Chronic oxygen.    5. Paroxysmal atrial fibrillation  Comments:  Patient does not  recall if she is taking Xarelto.    6. Moderate episode of recurrent major depressive disorder    7. Mild memory disturbance    8. Onychomycosis  -     ciclopirox (PENLAC) 8 % Soln; Apply topically nightly. Apply on the affected nails every night.  Wash the foot next day in the morning.  Clean the toes prior to application.  Dispense: 6.6 mL; Refill: 10    9. Walker as ambulation aid    10. Pain in both lower extremities    11. Dependent for transportation    12. Primary cancer of right upper lobe of lung    13. History of breast cancer             Plan:   Chronic heart failure with preserved ejection fraction    Benign essential hypertension    Chronic anticoagulation    Chronic hypoxemic respiratory failure  Comments:  Chronic oxygen.    Paroxysmal atrial fibrillation  Comments:  Patient does not recall if she is taking Xarelto.    Moderate episode of recurrent major depressive disorder    Mild memory disturbance    Onychomycosis  -     ciclopirox (PENLAC) 8 % Soln; Apply topically nightly. Apply on the affected nails every night.  Wash the foot next day in the morning.  Clean the toes prior to application.  Dispense: 6.6 mL; Refill: 10    Walker as ambulation aid    Pain in both lower extremities    Dependent for transportation    Primary cancer of right upper lobe of lung    History of breast cancer      Patient's chronic illnesses and slow and steady decline has been noted.    She was lost weight but not much improvement in her performance status.  She uses a quad walker for ambulation.  She has chronic oxygen need.      Her main worry is now her toenails which do not look good and probably dystrophic or onychomycotic.  I am not too keen on treating it but patient is worried about it.  Will try Penlac lacquer with no promises or guarantees.  May need biopsy or extrication for fungus but this maybe difficult to perform I have advised her the treatment may have to continue for 48 months it might be  disappointing.    She might be at more risk for oral antifungal medications.      She was not seen a cardiologist Dr. Sargent for years now.  I have advised her to call his office and set up an appointment.    Mild memory disturbances and cognitive changes which are consistent with the age and station of life.    Family members are there to help.  Follow up in about 4 months (around 9/16/2024), or if symptoms worsen or fail to improve, for Hypertension/lipids.  Spent sim 30 minutes with patient which involved review of pts medical conditions, labs, medications and with 50% of time face-to-face discussion about medical problems, management and any applicable changes.  Current Outpatient Medications:     ALPRAZolam (XANAX) 0.25 MG tablet, Take 1 tablet (0.25 mg total) by mouth nightly as needed for Anxiety. (Patient taking differently: Take 0.25 mg by mouth daily as needed for Anxiety.), Disp: 60 tablet, Rfl: 1    arformoteroL (BROVANA) 15 mcg/2 mL Nebu, Take 2 mLs (15 mcg total) by nebulization 2 (two) times a day. Controller, Disp: 60 each, Rfl: 11    atorvastatin (LIPITOR) 20 MG tablet, TAKE one TABLET BY MOUTH ONCE DAILY, Disp: 90 tablet, Rfl: 1    azelastine (ASTELIN) 137 mcg (0.1 %) nasal spray, 1 spray by Nasal route daily as needed for Rhinitis., Disp: , Rfl:     budesonide (PULMICORT) 0.5 mg/2 mL nebulizer solution, Take 2 mLs (0.5 mg total) by nebulization 2 (two) times a day. Controller, Disp: 120 mL, Rfl: 11    diltiaZEM (CARDIZEM CD) 120 MG Cp24, TAKE ONE CAPSULE BY MOUTH EVERY DAY, Disp: 90 capsule, Rfl: 1    furosemide (LASIX) 20 MG tablet, Take 1 tablet (20 mg total) by mouth once daily., Disp: 30 tablet, Rfl: 2    gabapentin (NEURONTIN) 300 MG capsule, Take 1 capsule (300 mg total) by mouth every evening., Disp: 30 capsule, Rfl: 5    HYDROcodone-acetaminophen (NORCO) 7.5-325 mg per tablet, Take 1 tablet by mouth every 12 (twelve) hours as needed for Pain., Disp: 60 tablet, Rfl: 0    levoFLOXacin  (LEVAQUIN) 500 MG tablet, Take 1 tablet (500 mg total) by mouth once daily., Disp: 10 tablet, Rfl: 1    megestroL (MEGACE) 400 mg/10 mL (40 mg/mL) Susp, Take 5 mLs (200 mg total) by mouth once daily., Disp: 150 mL, Rfl: 11    pantoprazole (PROTONIX) 20 MG tablet, TAKE ONE TABLET BY MOUTH EVERY DAY (Patient taking differently: Take 20 mg by mouth once daily.), Disp: 90 tablet, Rfl: 1    potassium chloride SA (KLOR-CON M10) 10 MEQ tablet, Take 1 tablet (10 mEq total) by mouth once daily., Disp: 90 tablet, Rfl: 3    predniSONE (DELTASONE) 20 MG tablet, 3 for 3 days then 2 for 3 days then one for 3 days and repeat for breathing problems, Disp: 36 tablet, Rfl: 0    sertraline (ZOLOFT) 100 MG tablet, TAKE 1 TABLET (100 MG TOTAL) BY MOUTH ONCE DAILY., Disp: 90 tablet, Rfl: 3    valsartan-hydrochlorothiazide (DIOVAN-HCT) 320-12.5 mg per tablet, TAKE ONE TABLET BY MOUTH EVERY MORNING, Disp: 90 tablet, Rfl: 4    XARELTO 20 mg Tab, Take 1 tablet (20 mg total) by mouth once daily., Disp: , Rfl:     ciclopirox (PENLAC) 8 % Soln, Apply topically nightly. Apply on the affected nails every night.  Wash the foot next day in the morning.  Clean the toes prior to application., Disp: 6.6 mL, Rfl: 10    Shilo Perez

## 2024-05-20 ENCOUNTER — OFFICE VISIT (OUTPATIENT)
Dept: PULMONOLOGY | Facility: CLINIC | Age: 84
End: 2024-05-20
Payer: MEDICARE

## 2024-05-20 VITALS
BODY MASS INDEX: 30.82 KG/M2 | HEART RATE: 73 BPM | DIASTOLIC BLOOD PRESSURE: 84 MMHG | WEIGHT: 191.81 LBS | HEIGHT: 66 IN | SYSTOLIC BLOOD PRESSURE: 145 MMHG | OXYGEN SATURATION: 80 %

## 2024-05-20 DIAGNOSIS — J96.11 CHRONIC HYPOXEMIC RESPIRATORY FAILURE: ICD-10-CM

## 2024-05-20 DIAGNOSIS — K74.60 CIRRHOSIS OF LIVER WITH ASCITES, UNSPECIFIED HEPATIC CIRRHOSIS TYPE: ICD-10-CM

## 2024-05-20 DIAGNOSIS — R41.3 MEMORY CHANGES: ICD-10-CM

## 2024-05-20 DIAGNOSIS — J44.9 CHRONIC OBSTRUCTIVE PULMONARY DISEASE, UNSPECIFIED COPD TYPE: Primary | ICD-10-CM

## 2024-05-20 DIAGNOSIS — J70.1 RADIATION FIBROSIS OF LUNG: ICD-10-CM

## 2024-05-20 DIAGNOSIS — R18.8 CIRRHOSIS OF LIVER WITH ASCITES, UNSPECIFIED HEPATIC CIRRHOSIS TYPE: ICD-10-CM

## 2024-05-20 PROCEDURE — 1157F ADVNC CARE PLAN IN RCRD: CPT | Mod: CPTII,S$GLB,, | Performed by: INTERNAL MEDICINE

## 2024-05-20 PROCEDURE — 99999 PR PBB SHADOW E&M-EST. PATIENT-LVL IV: CPT | Mod: PBBFAC,,, | Performed by: INTERNAL MEDICINE

## 2024-05-20 PROCEDURE — 3079F DIAST BP 80-89 MM HG: CPT | Mod: CPTII,S$GLB,, | Performed by: INTERNAL MEDICINE

## 2024-05-20 PROCEDURE — 3288F FALL RISK ASSESSMENT DOCD: CPT | Mod: CPTII,S$GLB,, | Performed by: INTERNAL MEDICINE

## 2024-05-20 PROCEDURE — 3077F SYST BP >= 140 MM HG: CPT | Mod: CPTII,S$GLB,, | Performed by: INTERNAL MEDICINE

## 2024-05-20 PROCEDURE — 1101F PT FALLS ASSESS-DOCD LE1/YR: CPT | Mod: CPTII,S$GLB,, | Performed by: INTERNAL MEDICINE

## 2024-05-20 PROCEDURE — 1159F MED LIST DOCD IN RCRD: CPT | Mod: CPTII,S$GLB,, | Performed by: INTERNAL MEDICINE

## 2024-05-20 PROCEDURE — 99213 OFFICE O/P EST LOW 20 MIN: CPT | Mod: S$GLB,,, | Performed by: INTERNAL MEDICINE

## 2024-05-20 RX ORDER — LACTULOSE 10 G/15ML
20 SOLUTION ORAL 2 TIMES DAILY PRN
Qty: 1800 ML | Refills: 0 | Status: SHIPPED | OUTPATIENT
Start: 2024-05-20

## 2024-05-20 RX ORDER — ALBUTEROL SULFATE 90 UG/1
AEROSOL, METERED RESPIRATORY (INHALATION)
Qty: 18 G | Refills: 11 | Status: SHIPPED | OUTPATIENT
Start: 2024-05-20

## 2024-05-20 NOTE — PROGRESS NOTES
5/20/2024    Lety Herbert  Follow up    Chief Complaint   Patient presents with    Follow-up    COPD       HPI:       5/20/2024 pt now down to 4lpm on tank in office , wears 5lpm at home., desat walking .  Mucous yellow -- not clear if lungs or chest with no recent abx...  having copious mucous.    Uses megace prn, weight stable..    No prednisone now..    Pet scan from 3/2024 looks less impressive with right upper lung radiation fibrosis, fluid around lung is minima.    2/27/2024 pt needs 5 lpm to mobilize -- has poc with pulse system.    Losing wt -- ambulates about house on ox.  Megace helped.    No pain nor anxiety.  Min cough from sinus    No recent prednisone  Patient Instructions   Ct from admit viewed  Would retry prednisone 20 mg daily for 2 weeks-- stop if no benefit.     Will try to arrange oxymizer and non rebreather mask     Exact diagnosis not clear -- still needs high oxygen.    Would use wheelchair to get out of house      Use oxymizer with flow as low as able to keep sat >90--use when out of house..      Immune deficiency may have played a role, monitor    1/30/2024....pt presented to [facility] 1/25 for 4 days with  fluid and pneumonia- but record vague and procal  very loww at 0.05 ...... Pt was on ox 3 lpm prior to hosp    Pt lost appetite pta, no fever, bnp was 805 admit, echo good with Mr/ ef 60%, rv enlarged, and pap 40.       Pt had had failure to thrive last months.    d  Ct reviewed from last yr-- ct rul nodule 2/2023 with decrease in size and xrt fibrosis changes seen 6/2023 and 9/2023.  Pet scan 12/2023 with no cancer activity but progressive scarring of rul, and progressively increased right pleural effusion til 12/2023.       Pt presented to [facility] with sob and low sat -- pt has poor recall of exact presentation.   Record revieweed but not clear picture.......      Pt will have some bilat lower ext pain -- knees.    Patient Instructions   Will order nebulizer budesonide and brovana  breathing treatments for copd.    Lung tissue disease is seen on ct chest that on prior ct chest and stable-- major process in lungs was radiation damage.      Ct chest viewed and pet viewed from 1/22022 to 1/2024....      Ct chest and echo show evidence for pulmonary hypertension.  Heart valve leaks and copd is severe (with lung tissue).    Steroids were not effective recently and sputum did not grow germs.     Would dose megace for appetite -- 400 mg daily.          Need to do therapy as best able.... you have gotten very weak...  \    Would do follow up to assure no excess fluid in lungs (pet scan will give good picture of fluid.)-- may do out pt drainage.....      If having mild worsening -- may do levaquin antibiotic and take prednisone...       could do virtual follow up...    You direct no life support.   Lapost done today  2/7/2023--  feels better, no cough nor mucous,  no night sweats, appetite ok but not strong.   Uses oxygen occasionally.   Patient Instructions   Use levaquin prompt for infection.    May use prednisone if cough or breathing worsens    Will send in norco 90/month for 3 months.    Renewed xanax for as needed.    Will order needle bx.  Pet scan  and cxr viewed.  Will have cxr after bx.      Check six min walk and breathing test -- suspect surgery may not be offered as oxygen level had been low.   If cancer may refer to oncology doc.  May refer to Maryse Lange MD in [facility]    Will call and discuss bx results over phone.    1/23/2023-- no fever, coughing min mucous yellow.  Ribs ok with no sign pain.  Sob still and still night sweats needing to change night gown.  Poor appetite.  Pt relates felt like she was dying last visit-- doing better now.  Occ blood streak hemoptysis.    Pt has home ox at 3 lpm --- uses intermittently with sat 90 rest and 80 or so with activity.  Pt vague on If breathing worsening last yr.  Six min walk 2020 with sat low resting.  Patient Instructions   May need to  repeat prednisone if cough returns.    Need to do biopsy right upper density.    You still have night sweats-- apparent pneumonia cleared from last visit.      Oxygen  needs seem very high with activity?      Use oxygen more --ok to use 1-2 lpm for restt and 3 lpm activity.  Lung tissue disease looks somewhat stable since 2019.  Oxygen needs are high but able to get by at rest with room air but marginally    Repeat prednisone now.     Will need to do needle biopsy.    Will direct over phone biopsy results       1/18/2023 having cough and fatigue -- coughs all day with yellow mucous with streaks blood.  Poor appetite, having back and rib pain -- lower ribs attributed to violent coughing.   Used trelegy with min help.  Walks about house with no falls-- no weak.  No percieved wt loss.  Used norco for pains -- helped min.   Has albuterol in past but not using.  Used prednisone in past-- some benefit appreciated.  Declines hosp.   Pt had pet scan viwed today-- new rll 13 mm density with min pet activity --new from 12/15/2022.  Pt now with night sweats-new last wk or so- sputum culture pending afb and nl krista 12/29.  May have fever nighttime  Patient Instructions   Re check for regular germs  Dose levaquin  Would check temp at night  Big large pet active Spot in lung could be infection-- infection is apparent with mucous but culture had been negative-- afb can take 2 months.  New spot in right lower lung should be infection-- having yellow mucous and blood and sweats at night  Prednisone 20 mg may help cough-- dose for 5 days --- may repeat as needed for cough.  Use norco as needed to suppress pain and cough.  Cancer typically slow-- infection quick.  Infection active.   Biopsy would be recommended (unless afb + -- regular germ not expected to cause spot in upper lung.   Would check cxr now to assure no rapid  pneumonia as right lower  lung spot new.     I would anticipate may be able to do biopsy-- if mycobacterial  culture positive (typical germs that cause this problem are slow growers and take a month at least-- early February??) might hold off but would like to be sure about cancer ??  Qtc was 432 November 2022-- need to check ekg on 23rd.  F/u 23 office  Cx stable 1/18/2023 , wbc 21k, no impressive infiltrates.  K 3.2 -- discussion with pt -- sent in potassium 10 meq bid, will discuss bx at f/u on 23rd if pt doing well.     12/28/2022   Pt developed left chest pain acute onset-- lasted few days, pt has had lingering back pain.  Coughs a lot with yellow mucous-- tablespoon daily.  Inhalers no help in past  Had right shoulder area achey pain about 7/10 pain-- comes and goes.  chr hypoxic resp failure post pe and pulm htn assoc cor dz and osas.    Ct this month with 23 mm rul lesion cw ca. The lesion is new from 2019. Ild dz with bronchiectasis assoc mucous plugging seen.   Upper lung honeycombing suggested ct 12/15/2022 -- viewed directly.   No asbestos exposure but father worked [employer].  Pt was smoker  Pthad double mastectomy 15 yrs ago-- no oncology follow up  Patient Instructions   Bronchiectasis with yellow mucous-- cultures needed  Trial trelegy in place of current inhaler. Use aerobika for 10 breaths after inhaler.    Lung tissue with progression fibrosis-- slow process.  Consider treatment later.  Lung lesion right upper lung worrisome for cancer-- new over last 4 yrs-- enlarging lymph node in middle chest is still small.  Check pet scan.   May need biopsy -  will need to skip xarelto prior biopsy- needle of lung but may need ebus.    Back pain noted on right -- will send in Metastorm for as needed use.  Pet scan may help determine if cancer related.   Will direct care with result of pet scan.  Could do phone follow up to discuss in detail if complex.   Below from Dr Villalpando  12/28/2021- breathing stable, able to walk around store w/ oxygen. Tries to walk 20 min per day. Denies chest congestion/cough. Sometimes nose  congested, uses astelin. She takes singulair nightly.  Patient Instructions   Oxygen - continue with activity and any time sats <92%  Clubbing of fingernails is not harmful- due to chronic hypoxia  06/30/2021-   Continue oxygen  Follow up with cardiologist  Stop claritin and try singulair one pill every night for sinus drip  continue flonase  while seated feels fine but gets breathless w/ minimal activity. Daughter reports dyspnea is getting worse over time. Reports cardiologist wants to place pacemaker for her but insurance denied it. Does not feel well and sits in recliner all the time. Reports constant sinus drip and cough w/ green/yellow mucous.    12/30/2020-   Call medical supply for the oxygen and ask about thinner or more comfortable nasal cannulas  Start claritin daily  Start flonase 2 sprays in each nostril daily  feels better w/ O2, wears 3L continuously except when sitting still at home. Trouble breathing w/ fatigue is episodic. Gets headaches- tension from wearing O2, glasses and mask behind ear. Has constant sinus drip. Other night R ear blocked up while wearing CPAP.  Tries mucinex DM but doesn't last long. She is on xarelto for atrial fib now.     6/30/20-   Oxygen for home use ordered- would wear continuously  Use CPAP machine- can try nasal pillows. Would use oxygen with your CPAP too.  Get lung function test and 6 minute walk to check to see how much oxygen you need.  Pt is an 80 yo female with DVT/PE, GERD, breast ca s/p double mastectomy- no chemo or XRT, melanoma of arm, A[place], obesity presenting for initial evaluation. Pt reports oxygen desaturation for over a year but no one has ordered her any oxygen for home. First noted low O2 sat when she had sinus infection. O2 sats will drop when exerting herself or resting. Feels short of breath worsening over time. She coughs w/ phlegm from sinuses. Sees ENT who cauterized nose for bleeding. Daughter is here as well and assists w/ history.  Reports not  "using CPAP because she had tip of her nose removed for cancer in past and she is concerned mask will cause more cancer to come back. She wakes up a lot at night and feels drowsy during the day.  Denies any hx of lung problems. She is a past smoker- quit 40 yrs ago 1-2 PPD x 25 yrs. Used to have recurrent bronchitis and cough which stopped after she quit smoking.    Outside records from pt's cardiologist Jeremy Sargent in [facility] were faxed to us and have been reviewed- with dx including heart block, atrial fib & flutter, CAD, diastolic dysfunction, HTN, HLD, and obesity. Per that note ASAP consult was placed to pulmonary for "shortness of breath and O2 sat of 70s-80s."    The chief compliant  problem is varies w/ instability at times    PFSH:  Past Medical History:   Diagnosis Date    Abnormal chest CT 3/9/2023    Anemia     Basal cell carcinoma     nose     Cancer     breast    Depression     DVT (deep venous thrombosis)     Fall 3/20/2018    Former smoker 3/9/2023    Gastric ulceration     GERD (gastroesophageal reflux disease)     History of breast cancer 3/9/2023    History of frequent urinary tract infections     Hyperlipidemia     Hypertension     Malignant neoplasm of upper lobe of right lung (NSCLC favoring lung primary) 3/9/2023    Melanoma 2004?    left forearm    MRSA (methicillin resistant staph aureus) culture positive     Neuropathy     PE (pulmonary embolism)     Post-nasal drip 11/15/2018    Reflux     S/P bilateral mastectomy 3/9/2023         Past Surgical History:   Procedure Laterality Date    HYSTERECTOMY      MASTECTOMY, RADICAL Bilateral     TOTAL HIP ARTHROPLASTY Left 11/13/2017     Social History     Tobacco Use    Smoking status: Former     Passive exposure: Past    Smokeless tobacco: Never   Substance Use Topics    Alcohol use: No    Drug use: No     Family History   Problem Relation Name Age of Onset    Cancer Mother      Cancer Father      Heart disease Father      Melanoma Neg Hx      " "Psoriasis Neg Hx      Lupus Neg Hx      Eczema Neg Hx       Review of patient's allergies indicates:   Allergen Reactions    Meperidine     Promethazine     Bactrim [sulfamethoxazole-trimethoprim] Rash       Performance Status:The patient's activity level is functions out of house.  Feels SOB any exertion out of house.     Review of Systems:  a review of eleven systems covering constitutional, Eye, HEENT, Psych, Respiratory, Cardiac, GI, , Musculoskeletal, Endocrine, Dermatologic was negative except for pertinent findings as listed ABOVE and below:  All negative with pertinent positives as above       Exam:Comprehensive exam done. BP (!) 145/84 (BP Location: Left arm, Patient Position: Sitting, BP Method: Small (Automatic))   Pulse 73   Ht 5' 6" (1.676 m)   Wt 87 kg (191 lb 12.8 oz)   SpO2 (!) 80% Comment: on room air at rest  BMI 30.96 kg/m²   Exam included Vitals as listed, and patient's appearance and affect and alertness and mood, oral exam for yeast and hygiene and pharynx lesions and Mallapatti (M) score, neck with inspection for jvd and masses and thyroid abnormalities and lymph nodes (supraclavicular and infraclavicular nodes and axillary also examined and noted if abn), chest exam included symmetry and effort and fremitus and percussion and auscultation, cardiac exam included rhythm and gallops and murmur and rubs and jvd and edema, abdominal exam for mass and hepatosplenomegaly and tenderness and hernias and bowel sounds, Musculoskeletal exam with muscle tone and posture and mobility/gait and  strength, and skin for rashes and cyanosis and pallor and turgor, extremity for clubbing.  Findings were normal except for pertinent findings listed below:  M1, oropharynx clear  HR irregularly irreg  Lungs rales bilaterally  No edema  Varicose veins of legs  Clubbing, no neck nodes      Radiographs (ct chest and cxr) reviewed: view by direct vision   Cxr 1/18/2023 patchy pneumonia  Ct chest 12/15/2022 c/w " 2019 -- progressive ild, bronchiectasis with mucous plugging, new lung lesion rul c/w ca.  Viewed.      CXR 6/8/20- interstitial markings prominent  VQ scan 6/8/20- IMPRESSION:  Normal VQ scan perfusion imaging  TTE 6/8/20-   Concentric left ventricular hypertrophy.  Normal left ventricular systolic function. The estimated ejection fraction is 65%.  Normal LV diastolic function.  Normal right ventricular systolic function.  Mild left atrial enlargement.  Mild aortic regurgitation.  Mild mitral regurgitation.  Mild tricuspid regurgitation.  Mild pulmonic regurgitation.  Normal central venous pressure (3 mmHg).  The estimated PA systolic pressure is 36 mmHg.    Labs reviewed    Results for RONN SOLORZANO (MRN [id number]) as of 6/30/2020 14:08   Ref. Range 3/11/2020 07:45   CO2 Latest Ref Range: 23 - 29 mmol/L 32 (H)        PFT 7/14/20 reviewed- mild restriction, reduced DLCO      6MWT 7/14/20- 85m, desat to 83% at rest, req 3L NC    Plan:  Clinical impression is resonably certain and repeated evaluation prn +/- follow up will be needed as below. Chronic hypoxemic resp failure due to cardiac comorbidities. PH likely groups 2/3 due to AUST[place] and HFpEF.    Lety was seen today for follow-up and copd.    Diagnoses and all orders for this visit:    Chronic obstructive pulmonary disease, unspecified COPD type  -     albuterol (PROVENTIL/VENTOLIN HFA) 90 mcg/actuation inhaler; 2 puffs every 4 hours as needed for cough, wheeze, or shortness of breath    Radiation fibrosis of lung    Chronic hypoxemic respiratory failure    Cirrhosis of liver with ascites, unspecified hepatic cirrhosis type  -     lactulose (CHRONULAC) 20 gram/30 mL Soln; Take 30 mLs (20 g total) by mouth 2 (two) times daily as needed.  -     HEPATITIS C ANTIBODY; Future    Memory changes  -     lactulose (CHRONULAC) 20 gram/30 mL Soln; Take 30 mLs (20 g total) by mouth 2 (two) times daily as needed.                      Follow up in about 6 months (around  11/20/2024), or if symptoms worsen or fail to improve.    Discussed with patient above for education the following:      Patient Instructions   With memory not good and history of cirrhosis and constipation-- dose lactulose once or twice daily -- note if brain clearer..        You have had some ascites and fluid around lungs with cirrhosis of liver seen.      With xarelto use may be prudent to be have upper scope to assure no varicies...      Check hepatitis c test    Will send Dr Perez a note -- may need further evaluation        Ct chest and pet scan 3/2024 viewed.  Looks good-- would do routine follow up on lung cancer.     Continue oxygen.    Use brovana and budesonide regular and albuterol as needed..

## 2024-05-20 NOTE — PATIENT INSTRUCTIONS
With memory not good and history of cirrhosis and constipation-- dose lactulose once or twice daily -- note if brain clearer..        You have had some ascites and fluid around lungs with cirrhosis of liver seen.      With xarelto use may be prudent to be have upper scope to assure no varicies...      Check hepatitis c test    Will send Dr Perez a note -- may need further evaluation        Ct chest and pet scan 3/2024 viewed.  Looks good-- would do routine follow up on lung cancer.     Continue oxygen.    Use brovana and budesonide regular and albuterol as needed..

## 2024-05-21 DIAGNOSIS — K21.9 GASTROESOPHAGEAL REFLUX DISEASE WITHOUT ESOPHAGITIS: ICD-10-CM

## 2024-05-21 RX ORDER — PANTOPRAZOLE SODIUM 20 MG/1
20 TABLET, DELAYED RELEASE ORAL
Qty: 90 TABLET | Refills: 1 | Status: SHIPPED | OUTPATIENT
Start: 2024-05-21

## 2024-05-22 ENCOUNTER — PATIENT MESSAGE (OUTPATIENT)
Dept: FAMILY MEDICINE | Facility: CLINIC | Age: 84
End: 2024-05-22
Payer: MEDICARE

## 2024-05-29 PROCEDURE — G0179 MD RECERTIFICATION HHA PT: HCPCS | Mod: ,,, | Performed by: INTERNAL MEDICINE

## 2024-06-11 DIAGNOSIS — M54.6 PAIN IN THORACIC SPINE: Primary | ICD-10-CM

## 2024-06-21 ENCOUNTER — EXTERNAL HOME HEALTH (OUTPATIENT)
Dept: HOME HEALTH SERVICES | Facility: HOSPITAL | Age: 84
End: 2024-06-21
Payer: MEDICARE

## 2024-07-01 ENCOUNTER — HOSPITAL ENCOUNTER (OUTPATIENT)
Dept: RADIOLOGY | Facility: HOSPITAL | Age: 84
Discharge: HOME | End: 2024-07-01
Attending: INTERNAL MEDICINE
Payer: MEDICARE

## 2024-07-01 ENCOUNTER — HOSPITAL ENCOUNTER (OUTPATIENT)
Dept: RADIOLOGY | Facility: HOSPITAL | Age: 84
Discharge: HOME | End: 2024-07-01
Attending: ORTHOPAEDIC SURGERY
Payer: MEDICARE

## 2024-07-01 DIAGNOSIS — C34.11 PRIMARY CANCER OF RIGHT UPPER LOBE OF LUNG: ICD-10-CM

## 2024-07-01 DIAGNOSIS — M54.6 PAIN IN THORACIC SPINE: ICD-10-CM

## 2024-07-01 DIAGNOSIS — R94.2 ABNORMAL PET SCAN OF LUNG: ICD-10-CM

## 2024-07-01 DIAGNOSIS — R91.8 MASS OF UPPER LOBE OF RIGHT LUNG: ICD-10-CM

## 2024-07-01 LAB
CREAT SERPL-MCNC: 0.7 MG/DL (ref 0.5–1.4)
SAMPLE: NORMAL

## 2024-07-01 PROCEDURE — 25500020 PHARM REV CODE 255: Mod: PO | Performed by: INTERNAL MEDICINE

## 2024-07-01 PROCEDURE — 71260 CT THORAX DX C+: CPT | Mod: 26,,, | Performed by: RADIOLOGY

## 2024-07-01 PROCEDURE — 71260 CT THORAX DX C+: CPT | Mod: TC,PO

## 2024-07-01 RX ADMIN — IOHEXOL 100 ML: 350 INJECTION, SOLUTION INTRAVENOUS at 10:07

## 2024-07-06 DIAGNOSIS — F41.9 ANXIETY: ICD-10-CM

## 2024-07-06 DIAGNOSIS — G47.09 OTHER INSOMNIA: ICD-10-CM

## 2024-07-08 RX ORDER — ALPRAZOLAM 0.25 MG/1
0.25 TABLET ORAL NIGHTLY
Qty: 60 TABLET | Refills: 1 | Status: SHIPPED | OUTPATIENT
Start: 2024-07-08

## 2024-07-15 ENCOUNTER — HOSPITAL ENCOUNTER (EMERGENCY)
Facility: HOSPITAL | Age: 84
Discharge: HOME | End: 2024-07-15
Attending: EMERGENCY MEDICINE
Payer: MEDICARE

## 2024-07-15 VITALS
SYSTOLIC BLOOD PRESSURE: 179 MMHG | OXYGEN SATURATION: 99 % | HEART RATE: 79 BPM | BODY MASS INDEX: 29.73 KG/M2 | TEMPERATURE: 100 F | DIASTOLIC BLOOD PRESSURE: 74 MMHG | HEIGHT: 66 IN | WEIGHT: 185 LBS | RESPIRATION RATE: 18 BRPM

## 2024-07-15 DIAGNOSIS — N39.0 URINARY TRACT INFECTION WITHOUT HEMATURIA, SITE UNSPECIFIED: ICD-10-CM

## 2024-07-15 DIAGNOSIS — K64.9 HEMORRHOIDS, UNSPECIFIED HEMORRHOID TYPE: ICD-10-CM

## 2024-07-15 DIAGNOSIS — K59.00 CONSTIPATION: ICD-10-CM

## 2024-07-15 DIAGNOSIS — K59.00 CONSTIPATION, UNSPECIFIED CONSTIPATION TYPE: Primary | ICD-10-CM

## 2024-07-15 LAB
ALBUMIN SERPL BCP-MCNC: 4.4 G/DL (ref 3.5–5.2)
ALP SERPL-CCNC: 69 U/L (ref 55–135)
ALT SERPL W/O P-5'-P-CCNC: 8 U/L (ref 10–44)
ANION GAP SERPL CALC-SCNC: 5 MMOL/L (ref 8–16)
AST SERPL-CCNC: 16 U/L (ref 10–40)
BACTERIA #/AREA URNS HPF: ABNORMAL /HPF
BASOPHILS # BLD AUTO: 0.05 K/UL (ref 0–0.2)
BASOPHILS NFR BLD: 0.4 % (ref 0–1.9)
BILIRUB SERPL-MCNC: 0.8 MG/DL (ref 0.1–1)
BILIRUB UR QL STRIP: NEGATIVE
BUN SERPL-MCNC: 13 MG/DL (ref 8–23)
CALCIUM SERPL-MCNC: 9.6 MG/DL (ref 8.7–10.5)
CHLORIDE SERPL-SCNC: 105 MMOL/L (ref 95–110)
CLARITY UR: ABNORMAL
CO2 SERPL-SCNC: 28 MMOL/L (ref 23–29)
COLOR UR: YELLOW
CREAT SERPL-MCNC: 0.6 MG/DL (ref 0.5–1.4)
DIFFERENTIAL METHOD BLD: ABNORMAL
EOSINOPHIL # BLD AUTO: 0.1 K/UL (ref 0–0.5)
EOSINOPHIL NFR BLD: 0.6 % (ref 0–8)
ERYTHROCYTE [DISTWIDTH] IN BLOOD BY AUTOMATED COUNT: 13.4 % (ref 11.5–14.5)
EST. GFR  (NO RACE VARIABLE): >60 ML/MIN/1.73 M^2
GLUCOSE SERPL-MCNC: 100 MG/DL (ref 70–110)
GLUCOSE UR QL STRIP: NEGATIVE
HCT VFR BLD AUTO: 43.3 % (ref 37–48.5)
HGB BLD-MCNC: 13.9 G/DL (ref 12–16)
HGB UR QL STRIP: NEGATIVE
HYALINE CASTS #/AREA URNS LPF: 70 /LPF
IMM GRANULOCYTES # BLD AUTO: 0.03 K/UL (ref 0–0.04)
IMM GRANULOCYTES NFR BLD AUTO: 0.2 % (ref 0–0.5)
KETONES UR QL STRIP: NEGATIVE
LEUKOCYTE ESTERASE UR QL STRIP: ABNORMAL
LYMPHOCYTES # BLD AUTO: 2.1 K/UL (ref 1–4.8)
LYMPHOCYTES NFR BLD: 16.5 % (ref 18–48)
MCH RBC QN AUTO: 29.5 PG (ref 27–31)
MCHC RBC AUTO-ENTMCNC: 32.1 G/DL (ref 32–36)
MCV RBC AUTO: 92 FL (ref 82–98)
MICROSCOPIC COMMENT: ABNORMAL
MONOCYTES # BLD AUTO: 0.7 K/UL (ref 0.3–1)
MONOCYTES NFR BLD: 5.7 % (ref 4–15)
NEUTROPHILS # BLD AUTO: 9.9 K/UL (ref 1.8–7.7)
NEUTROPHILS NFR BLD: 76.6 % (ref 38–73)
NITRITE UR QL STRIP: NEGATIVE
NRBC BLD-RTO: 0 /100 WBC
PH UR STRIP: 6 [PH] (ref 5–8)
PLATELET # BLD AUTO: 285 K/UL (ref 150–450)
PMV BLD AUTO: 10.6 FL (ref 9.2–12.9)
POTASSIUM SERPL-SCNC: 3.8 MMOL/L (ref 3.5–5.1)
PROT SERPL-MCNC: 7.6 G/DL (ref 6–8.4)
PROT UR QL STRIP: ABNORMAL
RBC # BLD AUTO: 4.71 M/UL (ref 4–5.4)
RBC #/AREA URNS HPF: 1 /HPF (ref 0–4)
SODIUM SERPL-SCNC: 138 MMOL/L (ref 136–145)
SP GR UR STRIP: 1.01 (ref 1–1.03)
URN SPEC COLLECT METH UR: ABNORMAL
UROBILINOGEN UR STRIP-ACNC: NEGATIVE EU/DL
WBC # BLD AUTO: 12.88 K/UL (ref 3.9–12.7)
WBC #/AREA URNS HPF: 23 /HPF (ref 0–5)

## 2024-07-15 PROCEDURE — 87086 URINE CULTURE/COLONY COUNT: CPT | Performed by: NURSE PRACTITIONER

## 2024-07-15 PROCEDURE — 25000003 PHARM REV CODE 250: Performed by: EMERGENCY MEDICINE

## 2024-07-15 PROCEDURE — 85025 COMPLETE CBC W/AUTO DIFF WBC: CPT | Performed by: NURSE PRACTITIONER

## 2024-07-15 PROCEDURE — 99285 EMERGENCY DEPT VISIT HI MDM: CPT | Mod: 25

## 2024-07-15 PROCEDURE — 80053 COMPREHEN METABOLIC PANEL: CPT | Performed by: NURSE PRACTITIONER

## 2024-07-15 PROCEDURE — 87186 SC STD MICRODIL/AGAR DIL: CPT | Performed by: NURSE PRACTITIONER

## 2024-07-15 PROCEDURE — 81001 URINALYSIS AUTO W/SCOPE: CPT | Performed by: NURSE PRACTITIONER

## 2024-07-15 RX ORDER — VALSARTAN 80 MG/1
320 TABLET ORAL
Status: COMPLETED | OUTPATIENT
Start: 2024-07-15 | End: 2024-07-15

## 2024-07-15 RX ORDER — CIPROFLOXACIN 500 MG/1
500 TABLET ORAL 2 TIMES DAILY
Qty: 20 TABLET | Refills: 0 | Status: SHIPPED | OUTPATIENT
Start: 2024-07-15 | End: 2024-07-25

## 2024-07-15 RX ORDER — HYDROCORTISONE 25 MG/G
CREAM TOPICAL 2 TIMES DAILY
Qty: 28 G | Refills: 0 | Status: SHIPPED | OUTPATIENT
Start: 2024-07-15

## 2024-07-15 RX ORDER — DILTIAZEM HYDROCHLORIDE 120 MG/1
120 CAPSULE, COATED, EXTENDED RELEASE ORAL
Status: COMPLETED | OUTPATIENT
Start: 2024-07-15 | End: 2024-07-15

## 2024-07-15 RX ORDER — SYRING-NEEDL,DISP,INSUL,0.3 ML 29 G X1/2"
296 SYRINGE, EMPTY DISPOSABLE MISCELLANEOUS
Status: COMPLETED | OUTPATIENT
Start: 2024-07-15 | End: 2024-07-15

## 2024-07-15 RX ORDER — HYDROCHLOROTHIAZIDE 12.5 MG/1
12.5 TABLET ORAL DAILY
Status: DISCONTINUED | OUTPATIENT
Start: 2024-07-16 | End: 2024-07-15

## 2024-07-15 RX ORDER — HYDROCHLOROTHIAZIDE 12.5 MG/1
12.5 TABLET ORAL
Status: COMPLETED | OUTPATIENT
Start: 2024-07-15 | End: 2024-07-15

## 2024-07-15 RX ORDER — PSEUDOEPHEDRINE/ACETAMINOPHEN 30MG-500MG
100 TABLET ORAL
Status: COMPLETED | OUTPATIENT
Start: 2024-07-15 | End: 2024-07-15

## 2024-07-15 RX ADMIN — MAGESIUM CITRATE 296 ML: 1.75 LIQUID ORAL at 05:07

## 2024-07-15 RX ADMIN — HYDROCHLOROTHIAZIDE 12.5 MG: 12.5 TABLET ORAL at 08:07

## 2024-07-15 RX ADMIN — DILTIAZEM HYDROCHLORIDE 120 MG: 120 CAPSULE, COATED, EXTENDED RELEASE ORAL at 08:07

## 2024-07-15 RX ADMIN — SODIUM CHLORIDE 500 ML: 9 INJECTION, SOLUTION INTRAVENOUS at 06:07

## 2024-07-15 RX ADMIN — Medication 100 ML: at 07:07

## 2024-07-15 RX ADMIN — VALSARTAN 320 MG: 80 TABLET, FILM COATED ORAL at 08:07

## 2024-07-15 NOTE — ED PROVIDER NOTES
Encounter Date: 7/15/2024       History     Chief Complaint   Patient presents with    Hemorrhoids    Constipation     LAST BM X 1 WEEK     HPI patient is a 83-year-old woman who presents emergency department complaining of constipation and hemorrhoids flaring up.  She states she has not had a bowel movement 4 days.  She states she has tried all the over-the-counter medications including fleets enemas and they are not helping.  She denies any abdominal pain, nausea, fever.  States she has rectal pain in her hemorrhoids are really bothering her.  Review of patient's allergies indicates:   Allergen Reactions    Meperidine     Promethazine     Bactrim [sulfamethoxazole-trimethoprim] Rash     Past Medical History:   Diagnosis Date    Abnormal chest CT 3/9/2023    Anemia     Basal cell carcinoma     nose     Cancer     breast    Depression     DVT (deep venous thrombosis)     Fall 3/20/2018    Former smoker 3/9/2023    Gastric ulceration     GERD (gastroesophageal reflux disease)     History of breast cancer 3/9/2023    History of frequent urinary tract infections     Hyperlipidemia     Hypertension     Malignant neoplasm of upper lobe of right lung (NSCLC favoring lung primary) 3/9/2023    Melanoma 2004?    left forearm    MRSA (methicillin resistant staph aureus) culture positive     Neuropathy     PE (pulmonary embolism)     Post-nasal drip 11/15/2018    Reflux     S/P bilateral mastectomy 3/9/2023     Past Surgical History:   Procedure Laterality Date    HYSTERECTOMY      MASTECTOMY, RADICAL Bilateral     TOTAL HIP ARTHROPLASTY Left 11/13/2017     Family History   Problem Relation Name Age of Onset    Cancer Mother      Cancer Father      Heart disease Father      Melanoma Neg Hx      Psoriasis Neg Hx      Lupus Neg Hx      Eczema Neg Hx       Social History     Tobacco Use    Smoking status: Former     Passive exposure: Past    Smokeless tobacco: Never   Substance Use Topics    Alcohol use: No    Drug use: No      Review of Systems   Constitutional:  Negative for fever.   HENT:  Negative for sore throat.    Respiratory:  Positive for shortness of breath (chronic).    Cardiovascular:  Negative for chest pain.   Gastrointestinal:  Positive for anal bleeding (from hemorrhoids occasionally), constipation and rectal pain. Negative for nausea.   Genitourinary:  Negative for dysuria.   Musculoskeletal:  Negative for back pain.   Skin:  Negative for rash.   Neurological:  Negative for weakness.   Hematological:  Does not bruise/bleed easily.       Physical Exam     Initial Vitals [07/15/24 1450]   BP Pulse Resp Temp SpO2   (!) 142/99 99 18 100.2 °F (37.9 °C) 96 %      MAP       --         Physical Exam    Constitutional: Vital signs are normal. She appears well-developed and well-nourished.  Non-toxic appearance. No distress.   HENT:   Head: Normocephalic and atraumatic.   Eyes: EOM are normal. Pupils are equal, round, and reactive to light.   Neck: Neck supple. No JVD present.   Normal range of motion.  Cardiovascular:  Normal rate and regular rhythm.           Pulmonary/Chest: No respiratory distress.   Abdominal: Abdomen is soft. Bowel sounds are normal. There is no abdominal tenderness. There is no rebound and no guarding.   Genitourinary:    Genitourinary Comments: Fecal impaction (disimpacted at bedside and given enema) external hemorrhoids, not actively bleeding.     Musculoskeletal:         General: Normal range of motion.      Cervical back: Normal range of motion and neck supple. No rigidity.     Neurological: She is alert and oriented to person, place, and time. She has normal strength and normal reflexes. No cranial nerve deficit or sensory deficit. She exhibits normal muscle tone. Coordination normal. GCS eye subscore is 4. GCS verbal subscore is 5. GCS motor subscore is 6.   Skin: Skin is warm and dry.   Psychiatric: She has a normal mood and affect. Her speech is normal and behavior is normal. She is not actively  hallucinating.         ED Course   Procedures  Labs Reviewed   CULTURE, URINE - Abnormal; Notable for the following components:       Result Value    Urine Culture, Routine   (*)     Value: GRAM NEGATIVE ALBINA, LACTOSE   >100,000 cfu/ml  Identification and susceptibility pending      All other components within normal limits    Narrative:     Specimen Source->Urine   CBC W/ AUTO DIFFERENTIAL - Abnormal; Notable for the following components:    WBC 12.88 (*)     Gran # (ANC) 9.9 (*)     Gran % 76.6 (*)     Lymph % 16.5 (*)     All other components within normal limits   COMPREHENSIVE METABOLIC PANEL - Abnormal; Notable for the following components:    ALT 8 (*)     Anion Gap 5 (*)     All other components within normal limits   URINALYSIS, REFLEX TO URINE CULTURE - Abnormal; Notable for the following components:    Appearance, UA Hazy (*)     Protein, UA Trace (*)     Leukocytes, UA 2+ (*)     All other components within normal limits    Narrative:     Specimen Source->Urine   URINALYSIS MICROSCOPIC - Abnormal; Notable for the following components:    WBC, UA 23 (*)     Hyaline Casts, UA 70 (*)     All other components within normal limits    Narrative:     Specimen Source->Urine          Imaging Results              X-Ray Abdomen AP 1 View (KUB) (Final result)  Result time 07/15/24 16:26:56      Final result by Sofía Bryan MD (07/15/24 16:26:56)                   Impression:      Unremarkable bowel gas pattern      Electronically signed by: Sofía Bryan  Date:    07/15/2024  Time:    16:26               Narrative:    EXAMINATION:  XR ABDOMEN AP 1 VIEW    CLINICAL HISTORY:  .  Constipation, unspecified    TECHNIQUE:  KUB    COMPARISON:  01/28/2024    FINDINGS:  There is a normal bowel gas pattern.  No organomegaly or abnormal soft tissue mass.  There are surgical clips in the pelvis.    There is been prior vertebroplasty at L1.  There is a total left hip arthroplasty.                                        Medications   glycerin 99.5% topical solution 100 mL (100 mLs Rectal Given by Other 7/15/24 191)     And   magnesium citrate solution 296 mL (296 mLs Rectal Given by Other 7/15/24 173)     And   sodium chloride 0.9% bolus 500 mL 500 mL (0 mLs Rectal Stopped 7/15/24 1923)   valsartan tablet 320 mg (320 mg Oral Given 7/15/24 2012)   diltiaZEM 24 hr capsule 120 mg (120 mg Oral Given 7/15/24 2013)   hydroCHLOROthiazide tablet 12.5 mg (12.5 mg Oral Given 7/15/24 2013)     Medical Decision Making  84 yo woman c/o constipation and rectal pain. No abdominal tenderness. No fever. Nontoxic or ill appearing. Distal constipation on xray interpreted by myself. Fecal disimpaction and enema given in ED with production of BM. Labs reveal 23 wbc on ua, nitrite negative. Will treat with cipro for uti and cover for possible sterocolitis. Awaiting urine culture sensitivites. Pt wishes to go home. Will prescribe golytely ad anusol as well. Return precautions discussed. Pt to fu with pcp.    Amount and/or Complexity of Data Reviewed  Radiology: ordered.    Risk  OTC drugs.  Prescription drug management.                                      Clinical Impression:  Final diagnoses:  [K59.00] Constipation  [K59.00] Constipation, unspecified constipation type (Primary)  [K64.9] Hemorrhoids, unspecified hemorrhoid type  [N39.0] Urinary tract infection without hematuria, site unspecified          ED Disposition Condition    Discharge Stable          ED Prescriptions       Medication Sig Dispense Start Date End Date Auth. Provider    ciprofloxacin HCl (CIPRO) 500 MG tablet Take 1 tablet (500 mg total) by mouth 2 (two) times daily. for 10 days 20 tablet 7/15/2024 2024 Phillip Cota MD    polyethylene glycol (COLYTE) 240-22.72-6.72 -5.84 gram SolR () Take 4,000 mLs by mouth once. for 1 dose 4,000 mL 7/15/2024 7/15/2024 Phillip Cota MD    hydrocortisone 2.5 % cream Apply topically 2 (two) times daily. To hemorrhoids  28 g 7/15/2024 -- Phillip Ctoa MD          Follow-up Information       Follow up With Specialties Details Why Contact Info Additional Information    Shilo Perez MD Internal Medicine Schedule an appointment as soon as possible for a visit   [address]  [address]  [place] [address]  [phone]       [facility] - Emergency Dept Emergency Medicine  As needed, If symptoms worsen [address] [address]  [phone] 1st floor             Phillip Cota MD  07/16/24 1133

## 2024-07-15 NOTE — FIRST PROVIDER EVALUATION
Emergency Department TeleTriage Encounter Note      CHIEF COMPLAINT    Chief Complaint   Patient presents with    Hemorrhoids    Constipation     LAST BM X 1 WEEK       VITAL SIGNS   Initial Vitals [07/15/24 1450]   BP Pulse Resp Temp SpO2   (!) 142/99 99 18 100.2 °F (37.9 °C) 96 %      MAP       --            ALLERGIES    Review of patient's allergies indicates:   Allergen Reactions    Meperidine     Promethazine     Bactrim [sulfamethoxazole-trimethoprim] Rash       PROVIDER TRIAGE NOTE  Verbal consent for the teletriage evaluation was given by the patient at the start of the evaluation.  All efforts will be made to maintain patient's privacy during the evaluation.      This is a teletriage evaluation of a 83 y.o. female presenting to the ED with c/o constipation for 1 week.  Taking OTC meds with no improvement.  Also reports hemorrhoids with rectal pain. Limited physical exam via telehealth: The patient is awake, alert, answering questions appropriately and is not in respiratory distress.  As the Teletriage provider, I performed an initial assessment and ordered appropriate labs and imaging studies, if any, to facilitate the patient's care once placed in the ED. Once a room is available, care and a full evaluation will be completed by an alternate ED provider.  Any additional orders and the final disposition will be determined by that provider.  All imaging and labs will not be followed-up by the Teletriage Team, including myself.          ORDERS  Labs Reviewed   CBC W/ AUTO DIFFERENTIAL   COMPREHENSIVE METABOLIC PANEL   URINALYSIS, REFLEX TO URINE CULTURE       ED Orders (720h ago, onward)      Start Ordered     Status Ordering Provider    07/15/24 1456 07/15/24 1455  Vital signs  Every 2 hours         Ordered ANIYAH MONK    07/15/24 1456 07/15/24 1455  Diet NPO  Diet effective now         Ordered ANIYAH MONK    07/15/24 1456 07/15/24 1455  Insert peripheral IV  Once         Ordered ANIYAH MONK     07/15/24 1456 07/15/24 1455  CBC W/ AUTO DIFFERENTIAL  STAT         Ordered ANIYAH MONK    07/15/24 1456 07/15/24 1455  Comp. Metabolic Panel  STAT         Ordered ANIYAH MONK    07/15/24 1456 07/15/24 1455  Urinalysis, Reflex to Urine Culture Urine, Clean Catch  STAT         Ordered ANIYAH MONK CASSANDRA              Virtual Visit Note: The provider triage portion of this emergency department evaluation and documentation was performed via [employer], a HIPAA-compliant telemedicine application, in concert with a tele-presenter in the room. A face to face patient evaluation with one of my colleagues will occur once the patient is placed in an emergency department room.      DISCLAIMER: This note was prepared with [employer] voice recognition transcription software. Garbled syntax, mangled pronouns, and other bizarre constructions may be attributed to that software system.

## 2024-07-16 LAB
OHS QRS DURATION: 72 MS
OHS QTC CALCULATION: 451 MS

## 2024-07-17 LAB — BACTERIA UR CULT: ABNORMAL

## 2024-07-22 ENCOUNTER — HOSPITAL ENCOUNTER (OUTPATIENT)
Dept: RADIOLOGY | Facility: HOSPITAL | Age: 84
Discharge: HOME | End: 2024-07-22
Attending: ORTHOPAEDIC SURGERY
Payer: MEDICARE

## 2024-07-22 PROCEDURE — 72157 MRI CHEST SPINE W/O & W/DYE: CPT | Mod: TC,PO

## 2024-07-22 PROCEDURE — 25500020 PHARM REV CODE 255: Mod: PO | Performed by: ORTHOPAEDIC SURGERY

## 2024-07-22 PROCEDURE — A9585 GADOBUTROL INJECTION: HCPCS | Mod: PO | Performed by: ORTHOPAEDIC SURGERY

## 2024-07-22 PROCEDURE — 72157 MRI CHEST SPINE W/O & W/DYE: CPT | Mod: 26,,, | Performed by: RADIOLOGY

## 2024-07-22 RX ORDER — GADOBUTROL 604.72 MG/ML
8.5 INJECTION INTRAVENOUS
Status: COMPLETED | OUTPATIENT
Start: 2024-07-22 | End: 2024-07-22

## 2024-07-22 RX ADMIN — GADOBUTROL 8.5 ML: 604.72 INJECTION INTRAVENOUS at 01:07

## 2024-07-25 DIAGNOSIS — F34.1 DYSTHYMIA: ICD-10-CM

## 2024-07-25 RX ORDER — SERTRALINE HYDROCHLORIDE 100 MG/1
100 TABLET, FILM COATED ORAL DAILY
Qty: 90 TABLET | Refills: 3 | Status: SHIPPED | OUTPATIENT
Start: 2024-07-25

## 2024-07-29 ENCOUNTER — TELEPHONE (OUTPATIENT)
Facility: CLINIC | Age: 84
End: 2024-07-29
Payer: MEDICARE

## 2024-07-29 NOTE — TELEPHONE ENCOUNTER
I spoke with pt and reminded her to have labs done prior to appt on 8/5/24 pt verbalized understanding.

## 2024-08-02 ENCOUNTER — LAB VISIT (OUTPATIENT)
Dept: LAB | Facility: HOSPITAL | Age: 84
End: 2024-08-02
Attending: INTERNAL MEDICINE
Payer: MEDICARE

## 2024-08-02 ENCOUNTER — TELEPHONE (OUTPATIENT)
Facility: CLINIC | Age: 84
End: 2024-08-02
Payer: MEDICARE

## 2024-08-02 DIAGNOSIS — C34.11 MALIGNANT NEOPLASM OF UPPER LOBE OF RIGHT LUNG: ICD-10-CM

## 2024-08-02 DIAGNOSIS — R91.8 MASS OF UPPER LOBE OF RIGHT LUNG: ICD-10-CM

## 2024-08-02 LAB
ALBUMIN SERPL BCP-MCNC: 4 G/DL (ref 3.5–5.2)
ALP SERPL-CCNC: 62 U/L (ref 55–135)
ALT SERPL W/O P-5'-P-CCNC: 14 U/L (ref 10–44)
ANION GAP SERPL CALC-SCNC: 6 MMOL/L (ref 8–16)
AST SERPL-CCNC: 20 U/L (ref 10–40)
BASOPHILS # BLD AUTO: 0.06 K/UL (ref 0–0.2)
BASOPHILS NFR BLD: 0.6 % (ref 0–1.9)
BILIRUB SERPL-MCNC: 0.7 MG/DL (ref 0.1–1)
BUN SERPL-MCNC: 14 MG/DL (ref 8–23)
CALCIUM SERPL-MCNC: 9.1 MG/DL (ref 8.7–10.5)
CEA SERPL-MCNC: 1.7 NG/ML (ref 0–5)
CHLORIDE SERPL-SCNC: 104 MMOL/L (ref 95–110)
CO2 SERPL-SCNC: 30 MMOL/L (ref 23–29)
CREAT SERPL-MCNC: 0.6 MG/DL (ref 0.5–1.4)
DIFFERENTIAL METHOD BLD: NORMAL
EOSINOPHIL # BLD AUTO: 0.2 K/UL (ref 0–0.5)
EOSINOPHIL NFR BLD: 1.9 % (ref 0–8)
ERYTHROCYTE [DISTWIDTH] IN BLOOD BY AUTOMATED COUNT: 13.3 % (ref 11.5–14.5)
EST. GFR  (NO RACE VARIABLE): >60 ML/MIN/1.73 M^2
GLUCOSE SERPL-MCNC: 135 MG/DL (ref 70–110)
HCT VFR BLD AUTO: 40.7 % (ref 37–48.5)
HGB BLD-MCNC: 13.2 G/DL (ref 12–16)
IMM GRANULOCYTES # BLD AUTO: 0.03 K/UL (ref 0–0.04)
IMM GRANULOCYTES NFR BLD AUTO: 0.3 % (ref 0–0.5)
LYMPHOCYTES # BLD AUTO: 3 K/UL (ref 1–4.8)
LYMPHOCYTES NFR BLD: 29.7 % (ref 18–48)
MCH RBC QN AUTO: 29.5 PG (ref 27–31)
MCHC RBC AUTO-ENTMCNC: 32.4 G/DL (ref 32–36)
MCV RBC AUTO: 91 FL (ref 82–98)
MONOCYTES # BLD AUTO: 0.5 K/UL (ref 0.3–1)
MONOCYTES NFR BLD: 5 % (ref 4–15)
NEUTROPHILS # BLD AUTO: 6.3 K/UL (ref 1.8–7.7)
NEUTROPHILS NFR BLD: 62.5 % (ref 38–73)
NRBC BLD-RTO: 0 /100 WBC
PLATELET # BLD AUTO: 247 K/UL (ref 150–450)
PMV BLD AUTO: 11.1 FL (ref 9.2–12.9)
POTASSIUM SERPL-SCNC: 3.6 MMOL/L (ref 3.5–5.1)
PROT SERPL-MCNC: 6.9 G/DL (ref 6–8.4)
RBC # BLD AUTO: 4.48 M/UL (ref 4–5.4)
SODIUM SERPL-SCNC: 140 MMOL/L (ref 136–145)
WBC # BLD AUTO: 10.07 K/UL (ref 3.9–12.7)

## 2024-08-02 PROCEDURE — 36415 COLL VENOUS BLD VENIPUNCTURE: CPT | Performed by: INTERNAL MEDICINE

## 2024-08-02 PROCEDURE — 80053 COMPREHEN METABOLIC PANEL: CPT | Performed by: INTERNAL MEDICINE

## 2024-08-02 PROCEDURE — 82378 CARCINOEMBRYONIC ANTIGEN: CPT | Performed by: INTERNAL MEDICINE

## 2024-08-02 PROCEDURE — 85025 COMPLETE CBC W/AUTO DIFF WBC: CPT | Performed by: INTERNAL MEDICINE

## 2024-08-05 ENCOUNTER — OFFICE VISIT (OUTPATIENT)
Facility: CLINIC | Age: 84
End: 2024-08-05
Payer: MEDICARE

## 2024-08-05 VITALS
TEMPERATURE: 98 F | WEIGHT: 184.69 LBS | HEART RATE: 75 BPM | BODY MASS INDEX: 29.68 KG/M2 | RESPIRATION RATE: 18 BRPM | SYSTOLIC BLOOD PRESSURE: 147 MMHG | HEIGHT: 66 IN | DIASTOLIC BLOOD PRESSURE: 98 MMHG

## 2024-08-05 DIAGNOSIS — M79.662 PAIN AND SWELLING OF LEFT LOWER LEG: ICD-10-CM

## 2024-08-05 DIAGNOSIS — R91.8 MASS OF UPPER LOBE OF RIGHT LUNG: ICD-10-CM

## 2024-08-05 DIAGNOSIS — C34.11 PRIMARY CANCER OF RIGHT UPPER LOBE OF LUNG: ICD-10-CM

## 2024-08-05 DIAGNOSIS — R94.2 ABNORMAL PET SCAN OF LUNG: Primary | ICD-10-CM

## 2024-08-05 DIAGNOSIS — R93.89 ABNORMAL CHEST CT: ICD-10-CM

## 2024-08-05 DIAGNOSIS — M79.605 PAIN IN BOTH LOWER EXTREMITIES: ICD-10-CM

## 2024-08-05 DIAGNOSIS — J70.1 RADIATION FIBROSIS OF LUNG: ICD-10-CM

## 2024-08-05 DIAGNOSIS — Z85.3 HISTORY OF BREAST CANCER: ICD-10-CM

## 2024-08-05 DIAGNOSIS — M79.89 PAIN AND SWELLING OF LEFT LOWER LEG: ICD-10-CM

## 2024-08-05 DIAGNOSIS — M79.604 PAIN IN BOTH LOWER EXTREMITIES: ICD-10-CM

## 2024-08-05 DIAGNOSIS — Z90.13 S/P BILATERAL MASTECTOMY: ICD-10-CM

## 2024-08-05 DIAGNOSIS — Z87.891 FORMER SMOKER: ICD-10-CM

## 2024-08-05 PROCEDURE — 3288F FALL RISK ASSESSMENT DOCD: CPT | Mod: CPTII,S$GLB,, | Performed by: INTERNAL MEDICINE

## 2024-08-05 PROCEDURE — 99215 OFFICE O/P EST HI 40 MIN: CPT | Mod: S$GLB,,, | Performed by: INTERNAL MEDICINE

## 2024-08-05 PROCEDURE — 1160F RVW MEDS BY RX/DR IN RCRD: CPT | Mod: CPTII,S$GLB,, | Performed by: INTERNAL MEDICINE

## 2024-08-05 PROCEDURE — 1125F AMNT PAIN NOTED PAIN PRSNT: CPT | Mod: CPTII,S$GLB,, | Performed by: INTERNAL MEDICINE

## 2024-08-05 PROCEDURE — 3080F DIAST BP >= 90 MM HG: CPT | Mod: CPTII,S$GLB,, | Performed by: INTERNAL MEDICINE

## 2024-08-05 PROCEDURE — 99999 PR PBB SHADOW E&M-EST. PATIENT-LVL IV: CPT | Mod: PBBFAC,,, | Performed by: INTERNAL MEDICINE

## 2024-08-05 PROCEDURE — 3077F SYST BP >= 140 MM HG: CPT | Mod: CPTII,S$GLB,, | Performed by: INTERNAL MEDICINE

## 2024-08-05 PROCEDURE — 1101F PT FALLS ASSESS-DOCD LE1/YR: CPT | Mod: CPTII,S$GLB,, | Performed by: INTERNAL MEDICINE

## 2024-08-05 PROCEDURE — G2211 COMPLEX E/M VISIT ADD ON: HCPCS | Mod: S$GLB,,, | Performed by: INTERNAL MEDICINE

## 2024-08-05 PROCEDURE — 1159F MED LIST DOCD IN RCRD: CPT | Mod: CPTII,S$GLB,, | Performed by: INTERNAL MEDICINE

## 2024-08-05 PROCEDURE — 1157F ADVNC CARE PLAN IN RCRD: CPT | Mod: CPTII,S$GLB,, | Performed by: INTERNAL MEDICINE

## 2024-08-05 RX ORDER — HYDROCODONE BITARTRATE AND ACETAMINOPHEN 7.5; 325 MG/1; MG/1
1 TABLET ORAL EVERY 12 HOURS PRN
Qty: 60 TABLET | Refills: 0 | Status: SHIPPED | OUTPATIENT
Start: 2024-08-05

## 2024-08-19 PROBLEM — J96.11 CHRONIC HYPOXEMIC RESPIRATORY FAILURE: Status: RESOLVED | Noted: 2024-05-20 | Resolved: 2024-08-19

## 2024-09-15 NOTE — PROGRESS NOTES
Subjective:       Patient ID: Lety Herbert is a 83 y.o. female.    Chief Complaint: Hypertension, Congestive Heart Failure, Hyperlipidemia, Atrial Fibrillation, Gastroesophageal Reflux, Headache, Anemia, and Incontinence    Miss Davidson is a 83 year old  female comes for 4 months follow-up.      She has been chronically and steadily declining over months in years with her overall health, endurance and general sense of well-being.    Underlying medical issues are as below:-      1.         Chronic hypoxemic respiratory failure   2.         Chronic heart failure with preserved ejection fraction   3.         Multiple-type hyperlipidemia   4.         Benign essential hypertension   5.         Paroxysmal atrial fibrillation -seems to be stable and she has seen Dr. Lepe with no major changes  6.         Normocytic anemia   7.         Mixed stress and urge urinary incontinence   8.         Gastroesophageal reflux disease without esophagitis   9.         Chronic tension-type headache, not intractable -the headaches seem to be getting better and not as bad as before.  10.       Compression fracture of L1 vertebra, sequela       Previous visit on top of underlying chronic debilitating medical issues, her focus were mostly on not so pretty looking nails in the feet.  I had advised the patient that she has so many other medical issue that treating the somewhat dystrophic nails should not be on the top of the priority list.  Whether it is dystrophy or onychomycosis was not clear and subject to toenail biopsy or scraping.  At any rate I had given her Penlac lacquer and with the suggestion that the treatment might not be successful.  Treating her with oral antifungal medications like Lamisil (terbinafine) would increase the risk of adverse effects especially hepatotoxicity. She may have tried the laid lacquer for a few weeks and ultimately give it up.    Today she did state that besides the toenail she has other  medical issues and other problems to worry about.     In past we had reviewed her excruciating pain 10/10 in the lower extremities.  Imaging studies were negative for blood clot.  Vascular referral was made to see if could be caused by peripheral vascular disease.  X-rays did not show any fracture.      Chronic pain seems to be another major issue at this point and I did review the [facility] database the current prescription for  opiate / benzodiazepines from Hematology and Oncology.    I have advised the family members  to keep an eye for potential adverse effects including cognitive, balanced and fall risks, constipation, mood changes with above.      Oncology diagnosis:-  Mass of upper lobe of right lung   Radiation fibrosis of lung  Abnormal PET scan of lung   Primary cancer of right upper lobe of lung   S/P bilateral mastectomy   History of breast cancer   Abnormal chest CT   Former smoker      Oncology evaluation by Dr. Vogt recently:-    (1) 83 y.o. female with diagnosis of RUL lung mass who has been referred by Shilo Perez MD for evaluation by medical hematology/oncology. She has been followed by Dr Ashish Henley with pulmonary for her COPD and chronic hypoxemia/bronchitis, who was a former smoker.      - She had a CTA in Nov 2022 which was negative for a PE but showed a RUL lung mass, which was followed by a CT Chest on 12/15/2022 which confirmed a 2.3cm RUL mass.   - She had a PET scan on 1/11/2023 which showed a 2.3 cm hypermetabolic mass in the anterior right upper lobe abutting the superior vena cava along with development of a 13 mm subpleural nodule within the right lower lobe.         - She had CT guided biopsy of the RUL lung mass on 2/15/2023 with pathology coming back NSCLC favoring a lung primary.     - She is supposed to be on oxygen at home. She has portable tank and has a lot of GAVIRIA. She has chronic SOB.   - She is former smoker and quit when she was 43yrs old.      - discussed the  pathology and reviewed and discussed the latest NCCN guidelines (vs. 2-2023)  - unlikely candidate for any surgical intervention  - will refer to Rad/onc to see if there are any radiation options either monotherapy or in combination with low dose weekly chemotherapy  - ROCHELLE on the pathology  - check CEA and up to date labs      Pulmonary evaluation by Dr. Ashish Henley:-    Plan:  Clinical impression is resonably certain and repeated evaluation prn +/- follow up will be needed as below. Chronic hypoxemic resp failure due to cardiac comorbidities. PH likely groups 2/3 due to AUSTIN and HFpEF.   Lety was seen today for follow-up and copd.   Diagnoses and all orders for this visit:   Chronic obstructive pulmonary disease, unspecified COPD type  -     albuterol (PROVENTIL/VENTOLIN HFA) 90 mcg/actuation inhaler; 2 puffs every 4 hours as needed for cough, wheeze, or shortness of breath   Radiation fibrosis of lung   Chronic hypoxemic respiratory failure   Cirrhosis of liver with ascites, unspecified hepatic cirrhosis type  -     lactulose (CHRONULAC) 20 gram/30 mL Soln; Take 30 mLs (20 g total) by mouth 2 (two) times daily as needed.  -     HEPATITIS C ANTIBODY; Future   Memory changes  -     lactulose (CHRONULAC) 20 gram/30 mL Soln; Take 30 mLs (20 g total) by mouth 2 (two) times daily as needed.              Congestive Heart Failure  Presents for follow-up visit. Associated symptoms include fatigue and shortness of breath (Stable). Pertinent negatives include no abdominal pain, chest pain, palpitations or unexpected weight change. Compliance with total regimen is 26-50%. Compliance with diet is 26-50%. Compliance with exercise is 0-25%. Compliance with medications is 51-75%.   Hypertension  This is a chronic problem. The current episode started more than 1 year ago. The problem is uncontrolled. Associated symptoms include malaise/fatigue, peripheral edema and shortness of breath (Stable). Pertinent negatives include no  chest pain or palpitations. Risk factors for coronary artery disease include sedentary lifestyle, obesity, dyslipidemia and post-menopausal state. Past treatments include beta blockers, angiotensin blockers and diuretics. The current treatment provides moderate improvement. Compliance problems include psychosocial issues.  There is no history of coarctation of the aorta, hyperaldosteronism, pheochromocytoma or renovascular disease.   Hyperlipidemia  This is a chronic problem. The current episode started more than 1 year ago. The problem is controlled. Associated symptoms include myalgias and shortness of breath (Stable). Pertinent negatives include no chest pain. Current antihyperlipidemic treatment includes statins. The current treatment provides moderate improvement of lipids. Compliance problems include psychosocial issues.  Risk factors for coronary artery disease include a sedentary lifestyle, hypertension, post-menopausal, obesity and dyslipidemia.   Atrial Fibrillation  Presents for follow-up visit. Symptoms include shortness of breath (Stable). Symptoms are negative for bradycardia, chest pain, dizziness and palpitations. The symptoms have been stable. Past medical history includes atrial fibrillation, CHF and hyperlipidemia.   DepressionPatient presents with the following symptoms: shortness of breath (Stable).  Patient is not experiencing: choking sensation, confusion, nervousness/anxiety, palpitations and suicidal ideas.    Patient has a history of: CHF    Headache   This is a chronic problem. The current episode started more than 1 year ago. The problem occurs constantly. The problem has been gradually worsening. The pain is located in the Occipital region. The pain radiates to the left neck and right neck. The pain quality is not similar to prior headaches. The quality of the pain is described as aching. The pain is moderate. Associated symptoms include back pain. Pertinent negatives include no  abdominal pain, dizziness, eye pain, numbness or sore throat. Nothing aggravates the symptoms. She has tried acetaminophen for the symptoms.   Anemia  Symptoms include malaise/fatigue and pallor. There has been no abdominal pain, bruising/bleeding easily, confusion, light-headedness or palpitations. Signs of blood loss that are not present include vaginal bleeding.   Chronic Pain  Past Medical History Includes::  Cancer  Chronicity:  Chronic  Onset:  More than 1 year ago  Frequency:  Constantly  Progression since onset:  Waxing and waning  Pain location:  Generalized (Recently chest wall)  Pain - numeric:  5/10  Treatments tried: Hydrocodone.  Previous Imaging:  CT Scan  Current treatment:  Hydrocodone/acetaminophen (Hycet, LorcetLortab, Norco, Vicodin)  Improvement on Current Treatment:  Mild  Goals of therapy:  Improve ADL's  Associated symptoms: dyspnea (Stable)    Associated symptoms: no abdominal pain, no chest pain, no constipation, no dizziness and no numbness    Drug Screen: No    Pain Contract: No    Prescription Monitoring Program  reviewed: Yes        Past Medical History:   Diagnosis Date    Abnormal chest CT 3/9/2023    Anemia     Basal cell carcinoma     nose     Cancer     breast    Depression     DVT (deep venous thrombosis)     Fall 3/20/2018    Former smoker 3/9/2023    Gastric ulceration     GERD (gastroesophageal reflux disease)     History of breast cancer 3/9/2023    History of frequent urinary tract infections     Hyperlipidemia     Hypertension     Malignant neoplasm of upper lobe of right lung (NSCLC favoring lung primary) 3/9/2023    Melanoma 2004?    left forearm    MRSA (methicillin resistant staph aureus) culture positive     Neuropathy     PE (pulmonary embolism)     Post-nasal drip 11/15/2018    Reflux     S/P bilateral mastectomy 3/9/2023     Social History     Socioeconomic History    Marital status:      Spouse name: Jean    Number of children: 3   Tobacco Use    Smoking  status: Former     Passive exposure: Past    Smokeless tobacco: Never   Substance and Sexual Activity    Alcohol use: No    Drug use: No    Sexual activity: Not Currently     Partners: Male   Social History Narrative    3 Children- 9 GC, 7 GGrands     Social Determinants of Health     Financial Resource Strain: Medium Risk (11/10/2021)    Overall Financial Resource Strain (CARDIA)     Difficulty of Paying Living Expenses: Somewhat hard   Food Insecurity: No Food Insecurity (11/10/2021)    Hunger Vital Sign     Worried About Running Out of Food in the Last Year: Never true     Ran Out of Food in the Last Year: Never true   Transportation Needs: No Transportation Needs (11/10/2021)    PRAPARE - Transportation     Lack of Transportation (Medical): No     Lack of Transportation (Non-Medical): No   Physical Activity: Inactive (11/10/2021)    Exercise Vital Sign     Days of Exercise per Week: 0 days     Minutes of Exercise per Session: 0 min   Stress: Stress Concern Present (11/10/2021)    [employer] - Occupational Stress Questionnaire     Feeling of Stress : To some extent   Housing Stability: Low Risk  (11/10/2021)    Housing Stability Vital Sign     Unable to Pay for Housing in the Last Year: No     Number of Places Lived in the Last Year: 1     Unstable Housing in the Last Year: No     Past Surgical History:   Procedure Laterality Date    HYSTERECTOMY      MASTECTOMY, RADICAL Bilateral     TOTAL HIP ARTHROPLASTY Left 11/13/2017     Family History   Problem Relation Name Age of Onset    Cancer Mother      Cancer Father      Heart disease Father      Melanoma Neg Hx      Psoriasis Neg Hx      Lupus Neg Hx      Eczema Neg Hx         Review of Systems   Constitutional:  Positive for activity change (reducing activities), fatigue and malaise/fatigue. Negative for appetite change, chills and unexpected weight change.        She has lost 20 lb of weight.  Patient is overall not feeling well.   This should not be the case.  Generally when somebody loses weight by watching diet they really feel well.  Patient on the contrary feels more sick and tired and headaches.   HENT:  Negative for congestion, nosebleeds and sore throat.    Eyes:  Negative for pain, discharge and visual disturbance.   Respiratory:  Positive for shortness of breath (Stable). Negative for choking and chest tightness.         Recent diagnosis of lung cancer non-small cell.  Has undergone chemotherapy.   Cardiovascular:  Positive for leg swelling. Negative for chest pain and palpitations.        HTN   Gastrointestinal:  Negative for abdominal pain, blood in stool and constipation.         Previously she was noted to be on Amitiza 24 mcg capsule. Currently this medication is not on her active list and regardless constipation seems to be okay.  Currently known to be on Chronulac.  Probably Amitiza was too expensive.   Endocrine: Positive for polyuria. Negative for cold intolerance and polydipsia.   Genitourinary:  Positive for enuresis. Negative for difficulty urinating, hematuria and vaginal bleeding.        Incontinence symptoms   Musculoskeletal:  Positive for arthralgias, back pain and myalgias. Negative for joint swelling.        Left hip fracture S/P Arthroplasty NoV 2017  Fall in October/November 2019.  L1 compression fracture status post vertebroplasty.  LOWER EXTREMITY PAIN AND DURATION UNKNOWN.  The pain in the lower extremities has got worse.   Skin:  Positive for pallor. Negative for color change, rash and wound.         Previously given a trial of Penlac lacquer for dystrophic /onychomycotic nails.  Not using it anymore.   Allergic/Immunologic: Negative for environmental allergies, food allergies and immunocompromised state.   Neurological:  Negative for dizziness, light-headedness and numbness.   Hematological:  Does not bruise/bleed easily.   Psychiatric/Behavioral:  Positive for depression. Negative for confusion, self-injury  "and suicidal ideas. The patient is not nervous/anxious and is not hyperactive.         Patient has a history of depression. Stable on sertraline. She however denies that she is depressed.         Objective:      Blood pressure 129/85, pulse 79, height 5' 6" (1.676 m), weight 84.8 kg (187 lb). Body mass index is 30.18 kg/m².  Physical Exam  Constitutional:       General: She is not in acute distress.     Appearance: She is obese. She is ill-appearing. She is not toxic-appearing or diaphoretic.      Comments: BMI 30.18   Eyes:      General: No scleral icterus.  Cardiovascular:      Rate and Rhythm: Normal rate and regular rhythm.      Heart sounds: Normal heart sounds.   Pulmonary:      Effort: Pulmonary effort is normal.      Breath sounds: Decreased air movement present. Examination of the right-lower field reveals decreased breath sounds. Examination of the left-lower field reveals decreased breath sounds. Decreased breath sounds present.      Comments: Occasional harsh sound  Abdominal:      General: There is no distension.      Tenderness: There is no abdominal tenderness.   Musculoskeletal:      Cervical back: No rigidity.      Right hip: Decreased range of motion.      Left hip: Decreased range of motion.      Right lower leg: Edema (Mild) present.      Left lower leg: Edema (mild) present.   Skin:     General: Skin is warm.      Coloration: Skin is pale.      Findings: No bruising or lesion.   Neurological:      Mental Status: She is alert. Mental status is at baseline.   Psychiatric:         Behavior: Behavior normal.      Comments: Anhedonic dysthymic           Assessment:       Lab Visit on 08/02/2024   Component Date Value Ref Range Status    WBC 08/02/2024 10.07  3.90 - 12.70 K/uL Final    RBC 08/02/2024 4.48  4.00 - 5.40 M/uL Final    Hemoglobin 08/02/2024 13.2  12.0 - 16.0 g/dL Final    Hematocrit 08/02/2024 40.7  37.0 - 48.5 % Final    MCV 08/02/2024 91  82 - 98 fL Final    MCH 08/02/2024 29.5  27.0 - " 31.0 pg Final    MCHC 08/02/2024 32.4  32.0 - 36.0 g/dL Final    RDW 08/02/2024 13.3  11.5 - 14.5 % Final    Platelets 08/02/2024 247  150 - 450 K/uL Final    MPV 08/02/2024 11.1  9.2 - 12.9 fL Final    Immature Granulocytes 08/02/2024 0.3  0.0 - 0.5 % Final    Gran # (ANC) 08/02/2024 6.3  1.8 - 7.7 K/uL Final    Immature Grans (Abs) 08/02/2024 0.03  0.00 - 0.04 K/uL Final    Lymph # 08/02/2024 3.0  1.0 - 4.8 K/uL Final    Mono # 08/02/2024 0.5  0.3 - 1.0 K/uL Final    Eos # 08/02/2024 0.2  0.0 - 0.5 K/uL Final    Baso # 08/02/2024 0.06  0.00 - 0.20 K/uL Final    nRBC 08/02/2024 0  0 /100 WBC Final    Gran % 08/02/2024 62.5  38.0 - 73.0 % Final    Lymph % 08/02/2024 29.7  18.0 - 48.0 % Final    Mono % 08/02/2024 5.0  4.0 - 15.0 % Final    Eosinophil % 08/02/2024 1.9  0.0 - 8.0 % Final    Basophil % 08/02/2024 0.6  0.0 - 1.9 % Final    Differential Method 08/02/2024 Automated   Final    Sodium 08/02/2024 140  136 - 145 mmol/L Final    Potassium 08/02/2024 3.6  3.5 - 5.1 mmol/L Final    Chloride 08/02/2024 104  95 - 110 mmol/L Final    CO2 08/02/2024 30 (H)  23 - 29 mmol/L Final    Glucose 08/02/2024 135 (H)  70 - 110 mg/dL Final    BUN 08/02/2024 14  8 - 23 mg/dL Final    Creatinine 08/02/2024 0.6  0.5 - 1.4 mg/dL Final    Calcium 08/02/2024 9.1  8.7 - 10.5 mg/dL Final    Total Protein 08/02/2024 6.9  6.0 - 8.4 g/dL Final    Albumin 08/02/2024 4.0  3.5 - 5.2 g/dL Final    Total Bilirubin 08/02/2024 0.7  0.1 - 1.0 mg/dL Final    Alkaline Phosphatase 08/02/2024 62  55 - 135 U/L Final    AST 08/02/2024 20  10 - 40 U/L Final    ALT 08/02/2024 14  10 - 44 U/L Final    eGFR 08/02/2024 >60.0  >60 mL/min/1.73 m^2 Final    Anion Gap 08/02/2024 6 (L)  8 - 16 mmol/L Final    CEA 08/02/2024 1.7  0.0 - 5.0 ng/mL Final   Orders Only on 07/15/2024   Component Date Value Ref Range Status    QRS Duration 07/15/2024 72  ms Final    [facility] QTC Calculation 07/15/2024 451  ms Final   Admission on 07/15/2024, Discharged on 07/15/2024    Component Date Value Ref Range Status    WBC 07/15/2024 12.88 (H)  3.90 - 12.70 K/uL Final    RBC 07/15/2024 4.71  4.00 - 5.40 M/uL Final    Hemoglobin 07/15/2024 13.9  12.0 - 16.0 g/dL Final    Hematocrit 07/15/2024 43.3  37.0 - 48.5 % Final    MCV 07/15/2024 92  82 - 98 fL Final    MCH 07/15/2024 29.5  27.0 - 31.0 pg Final    MCHC 07/15/2024 32.1  32.0 - 36.0 g/dL Final    RDW 07/15/2024 13.4  11.5 - 14.5 % Final    Platelets 07/15/2024 285  150 - 450 K/uL Final    MPV 07/15/2024 10.6  9.2 - 12.9 fL Final    Immature Granulocytes 07/15/2024 0.2  0.0 - 0.5 % Final    Gran # (ANC) 07/15/2024 9.9 (H)  1.8 - 7.7 K/uL Final    Immature Grans (Abs) 07/15/2024 0.03  0.00 - 0.04 K/uL Final    Lymph # 07/15/2024 2.1  1.0 - 4.8 K/uL Final    Mono # 07/15/2024 0.7  0.3 - 1.0 K/uL Final    Eos # 07/15/2024 0.1  0.0 - 0.5 K/uL Final    Baso # 07/15/2024 0.05  0.00 - 0.20 K/uL Final    nRBC 07/15/2024 0  0 /100 WBC Final    Gran % 07/15/2024 76.6 (H)  38.0 - 73.0 % Final    Lymph % 07/15/2024 16.5 (L)  18.0 - 48.0 % Final    Mono % 07/15/2024 5.7  4.0 - 15.0 % Final    Eosinophil % 07/15/2024 0.6  0.0 - 8.0 % Final    Basophil % 07/15/2024 0.4  0.0 - 1.9 % Final    Differential Method 07/15/2024 Automated   Final    Sodium 07/15/2024 138  136 - 145 mmol/L Final    Potassium 07/15/2024 3.8  3.5 - 5.1 mmol/L Final    Chloride 07/15/2024 105  95 - 110 mmol/L Final    CO2 07/15/2024 28  23 - 29 mmol/L Final    Glucose 07/15/2024 100  70 - 110 mg/dL Final    BUN 07/15/2024 13  8 - 23 mg/dL Final    Creatinine 07/15/2024 0.6  0.5 - 1.4 mg/dL Final    Calcium 07/15/2024 9.6  8.7 - 10.5 mg/dL Final    Total Protein 07/15/2024 7.6  6.0 - 8.4 g/dL Final    Albumin 07/15/2024 4.4  3.5 - 5.2 g/dL Final    Total Bilirubin 07/15/2024 0.8  0.1 - 1.0 mg/dL Final    Alkaline Phosphatase 07/15/2024 69  55 - 135 U/L Final    AST 07/15/2024 16  10 - 40 U/L Final    ALT 07/15/2024 8 (L)  10 - 44 U/L Final    eGFR 07/15/2024 >60.0  >60 mL/min/1.73  m^2 Final    Anion Gap 07/15/2024 5 (L)  8 - 16 mmol/L Final    Specimen UA 07/15/2024 Urine, Clean Catch   Final    Color, UA 07/15/2024 Yellow  Yellow, Straw, Alisha Final    Appearance, UA 07/15/2024 Hazy (A)  Clear Final    pH, UA 07/15/2024 6.0  5.0 - 8.0 Final    Specific Gravity, UA 07/15/2024 1.015  1.005 - 1.030 Final    Protein, UA 07/15/2024 Trace (A)  Negative Final    Glucose, UA 07/15/2024 Negative  Negative Final    Ketones, UA 07/15/2024 Negative  Negative Final    Bilirubin (UA) 07/15/2024 Negative  Negative Final    Occult Blood UA 07/15/2024 Negative  Negative Final    Nitrite, UA 07/15/2024 Negative  Negative Final    Urobilinogen, UA 07/15/2024 Negative  Negative EU/dL Final    Leukocytes, UA 07/15/2024 2+ (A)  Negative Final    RBC, UA 07/15/2024 1  0 - 4 /hpf Final    WBC, UA 07/15/2024 23 (H)  0 - 5 /hpf Final    Bacteria 07/15/2024 Rare  None-Occ /hpf Final    Hyaline Casts, UA 07/15/2024 70 (A)  0-1/lpf /lpf Final    Microscopic Comment 07/15/2024 SEE COMMENT   Final    Urine Culture, Routine 07/15/2024  (A)   Final                    Value:KLEBSIELLA PNEUMONIAE  >100,000 cfu/ml     Hospital Outpatient Visit on 07/01/2024   Component Date Value Ref Range Status    POC Creatinine 07/01/2024 0.7  0.5 - 1.4 mg/dL Final    Sample 07/01/2024 VENOUS   Final       1. Chronic heart failure with preserved ejection fraction    2. Benign essential hypertension  Overview:  BP elevated      3. Chronic anticoagulation    4. Chronic hypoxemic respiratory failure    5. Moderate episode of recurrent major depressive disorder    6. Mild memory disturbance    7. Dysthymia    8. Long term (current) use of opiate analgesic    9. Immunization counseling    10. Need for influenza vaccination  -     influenza (adjuvanted) (Fluad) 45 mcg/0.5 mL IM vaccine (> or = 64 yo) 0.5 mL    11. Paroxysmal atrial fibrillation  Comments:  Currently on anticoagulation.  Orders:  -     diltiaZEM (CARDIZEM CD) 120 MG Cp24; Take 1  capsule (120 mg total) by mouth once daily.  Dispense: 90 capsule; Refill: 3             Plan:   Chronic heart failure with preserved ejection fraction    Benign essential hypertension    Chronic anticoagulation    Chronic hypoxemic respiratory failure    Moderate episode of recurrent major depressive disorder    Mild memory disturbance    Dysthymia    Long term (current) use of opiate analgesic    Immunization counseling    Need for influenza vaccination  -     influenza (adjuvanted) (Fluad) 45 mcg/0.5 mL IM vaccine (> or = 64 yo) 0.5 mL    Paroxysmal atrial fibrillation  Comments:  Currently on anticoagulation.  Orders:  -     diltiaZEM (CARDIZEM CD) 120 MG Cp24; Take 1 capsule (120 mg total) by mouth once daily.  Dispense: 90 capsule; Refill: 3    Today's visit involve review of her recent medical, oncological pulmonary updates including chronically declining health.       We also reviewed her chronic medications and there seems to be confusion about certain medications including furosemide.    Family support structure also has been reviewed.      As her health declines and focused shifts more on palliative measures by pain, anxiety, the role of tighter control of lipids, blood pressures may take a less aggressive stance.  Not sure if statins may cause have some muscle aches and joint pains and degree of aggressiveness of care was discussed with the family members.      Overall prognosis borderline and guarded.    Follow up in about 4 months (around 1/17/2025), or if symptoms worsen or fail to improve, for Hypertension/lipids.      Current Outpatient Medications:     albuterol (PROVENTIL/VENTOLIN HFA) 90 mcg/actuation inhaler, 2 puffs every 4 hours as needed for cough, wheeze, or shortness of breath, Disp: 18 g, Rfl: 11    ALPRAZolam (XANAX) 0.25 MG tablet, TAKE ONE TABLET BY MOUTH NIGHTLY AS NEEDED FOR ANXIETY, Disp: 60 tablet, Rfl: 1    arformoteroL (BROVANA) 15 mcg/2 mL Nebu, Take 2 mLs (15 mcg total) by  nebulization 2 (two) times a day. Controller, Disp: 60 each, Rfl: 11    atorvastatin (LIPITOR) 20 MG tablet, TAKE one TABLET BY MOUTH ONCE DAILY, Disp: 90 tablet, Rfl: 1    azelastine (ASTELIN) 137 mcg (0.1 %) nasal spray, 1 spray by Nasal route daily as needed for Rhinitis., Disp: , Rfl:     budesonide (PULMICORT) 0.5 mg/2 mL nebulizer solution, Take 2 mLs (0.5 mg total) by nebulization 2 (two) times a day. Controller, Disp: 120 mL, Rfl: 11    furosemide (LASIX) 20 MG tablet, Take 1 tablet (20 mg total) by mouth once daily., Disp: 30 tablet, Rfl: 2    gabapentin (NEURONTIN) 300 MG capsule, Take 1 capsule (300 mg total) by mouth every evening., Disp: 30 capsule, Rfl: 5    HYDROcodone-acetaminophen (NORCO) 7.5-325 mg per tablet, Take 1 tablet by mouth every 12 (twelve) hours as needed for Pain., Disp: 60 tablet, Rfl: 0    hydrocortisone 2.5 % cream, Apply topically 2 (two) times daily. To hemorrhoids, Disp: 28 g, Rfl: 0    lactulose (CHRONULAC) 20 gram/30 mL Soln, Take 30 mLs (20 g total) by mouth 2 (two) times daily as needed., Disp: 1800 mL, Rfl: 0    pantoprazole (PROTONIX) 20 MG tablet, TAKE ONE TABLET BY MOUTH EVERY DAY, Disp: 90 tablet, Rfl: 1    potassium chloride SA (KLOR-CON M10) 10 MEQ tablet, Take 1 tablet (10 mEq total) by mouth once daily., Disp: 90 tablet, Rfl: 3    sertraline (ZOLOFT) 100 MG tablet, TAKE 1 TABLET (100 MG TOTAL) BY MOUTH ONCE DAILY., Disp: 90 tablet, Rfl: 3    valsartan-hydrochlorothiazide (DIOVAN-HCT) 320-12.5 mg per tablet, TAKE ONE TABLET BY MOUTH EVERY MORNING, Disp: 90 tablet, Rfl: 4    XARELTO 20 mg Tab, Take 1 tablet (20 mg total) by mouth once daily., Disp: , Rfl:     diltiaZEM (CARDIZEM CD) 120 MG Cp24, Take 1 capsule (120 mg total) by mouth once daily., Disp: 90 capsule, Rfl: 3  No current facility-administered medications for this visit.    Shilo Perez    Dear Patient,  Here are some important vaccines you should consider:-  1. **Flu Vaccine**:-    - **Why**: Protects you  from the flu, which can be very serious.    - **When**: Every year, before the flu season starts.    - **Where**: Available in our office.  2. **Pneumonia Vaccine**:-   -      -**Why**: Protects you from pneumonia, which can cause severe lung infections.    - **When**: Usually given once, but sometimes a booster is needed.    - **Where**: Available in our office.  3. **Covid Vaccine Update**:    - **Why**: Keeps you protected against the latest Covid-19 variants.    - **When**: Follow the current guidelines for updates and boosters.    - **Where**: Available at your pharmacy under Medicare Part D or Advantage Plan. Check for any co-payments.  4. **RSV Vaccine**:-    - **Why**: Protects against respiratory syncytial virus, which can cause serious lung infections.    - **When**: Recommended for older adults.    - **Where**: Available at your pharmacy under Medicare Part D or Advantage Plan. Check for any co-payments.  5. **Shingles Vaccine**:    - **Why**: Prevents shingles, a painful skin rash.    - **When**: Usually given in two doses.    - **Where**: Available at your pharmacy under Medicare Part D or Advantage Plan. Check for any co-payments.  6. **TdAp Vaccine**:-    - **Why**: Protects against tetanus, diphtheria, and whooping cough.    - **When**: Every 10 years.    - **Where**: Available at your pharmacy under Medicare Part D or Advantage Plan. Check for any co-payments.

## 2024-09-17 ENCOUNTER — OFFICE VISIT (OUTPATIENT)
Dept: FAMILY MEDICINE | Facility: CLINIC | Age: 84
End: 2024-09-17
Payer: MEDICARE

## 2024-09-17 VITALS
SYSTOLIC BLOOD PRESSURE: 129 MMHG | DIASTOLIC BLOOD PRESSURE: 85 MMHG | WEIGHT: 187 LBS | BODY MASS INDEX: 30.05 KG/M2 | HEART RATE: 79 BPM | HEIGHT: 66 IN

## 2024-09-17 DIAGNOSIS — Z23 NEED FOR INFLUENZA VACCINATION: ICD-10-CM

## 2024-09-17 DIAGNOSIS — I10 BENIGN ESSENTIAL HYPERTENSION: ICD-10-CM

## 2024-09-17 DIAGNOSIS — I48.0 PAROXYSMAL ATRIAL FIBRILLATION: Chronic | ICD-10-CM

## 2024-09-17 DIAGNOSIS — F34.1 DYSTHYMIA: ICD-10-CM

## 2024-09-17 DIAGNOSIS — R41.3 MILD MEMORY DISTURBANCE: ICD-10-CM

## 2024-09-17 DIAGNOSIS — F33.1 MODERATE EPISODE OF RECURRENT MAJOR DEPRESSIVE DISORDER: ICD-10-CM

## 2024-09-17 DIAGNOSIS — Z79.01 CHRONIC ANTICOAGULATION: ICD-10-CM

## 2024-09-17 DIAGNOSIS — Z71.85 IMMUNIZATION COUNSELING: ICD-10-CM

## 2024-09-17 DIAGNOSIS — Z79.891 LONG TERM (CURRENT) USE OF OPIATE ANALGESIC: ICD-10-CM

## 2024-09-17 DIAGNOSIS — J96.11 CHRONIC HYPOXEMIC RESPIRATORY FAILURE: ICD-10-CM

## 2024-09-17 DIAGNOSIS — I50.32 CHRONIC HEART FAILURE WITH PRESERVED EJECTION FRACTION: Primary | ICD-10-CM

## 2024-09-17 PROCEDURE — 1160F RVW MEDS BY RX/DR IN RCRD: CPT | Mod: CPTII,S$GLB,, | Performed by: INTERNAL MEDICINE

## 2024-09-17 PROCEDURE — 3079F DIAST BP 80-89 MM HG: CPT | Mod: CPTII,S$GLB,, | Performed by: INTERNAL MEDICINE

## 2024-09-17 PROCEDURE — G0008 ADMIN INFLUENZA VIRUS VAC: HCPCS | Mod: S$GLB,,, | Performed by: INTERNAL MEDICINE

## 2024-09-17 PROCEDURE — 90653 IIV ADJUVANT VACCINE IM: CPT | Mod: S$GLB,,, | Performed by: INTERNAL MEDICINE

## 2024-09-17 PROCEDURE — 99214 OFFICE O/P EST MOD 30 MIN: CPT | Mod: S$GLB,,, | Performed by: INTERNAL MEDICINE

## 2024-09-17 PROCEDURE — 3074F SYST BP LT 130 MM HG: CPT | Mod: CPTII,S$GLB,, | Performed by: INTERNAL MEDICINE

## 2024-09-17 PROCEDURE — 1157F ADVNC CARE PLAN IN RCRD: CPT | Mod: CPTII,S$GLB,, | Performed by: INTERNAL MEDICINE

## 2024-09-17 PROCEDURE — 99999 PR PBB SHADOW E&M-EST. PATIENT-LVL III: CPT | Mod: PBBFAC,,, | Performed by: INTERNAL MEDICINE

## 2024-09-17 PROCEDURE — 1159F MED LIST DOCD IN RCRD: CPT | Mod: CPTII,S$GLB,, | Performed by: INTERNAL MEDICINE

## 2024-09-17 PROCEDURE — 1101F PT FALLS ASSESS-DOCD LE1/YR: CPT | Mod: CPTII,S$GLB,, | Performed by: INTERNAL MEDICINE

## 2024-09-17 PROCEDURE — 1125F AMNT PAIN NOTED PAIN PRSNT: CPT | Mod: CPTII,S$GLB,, | Performed by: INTERNAL MEDICINE

## 2024-09-17 PROCEDURE — 3288F FALL RISK ASSESSMENT DOCD: CPT | Mod: CPTII,S$GLB,, | Performed by: INTERNAL MEDICINE

## 2024-09-17 RX ORDER — DILTIAZEM HYDROCHLORIDE 120 MG/1
120 CAPSULE, COATED, EXTENDED RELEASE ORAL DAILY
Qty: 90 CAPSULE | Refills: 3 | Status: SHIPPED | OUTPATIENT
Start: 2024-09-17

## 2024-10-03 DIAGNOSIS — E78.2 MULTIPLE-TYPE HYPERLIPIDEMIA: ICD-10-CM

## 2024-10-09 RX ORDER — ATORVASTATIN CALCIUM 20 MG/1
20 TABLET, FILM COATED ORAL
Qty: 90 TABLET | Refills: 1 | Status: SHIPPED | OUTPATIENT
Start: 2024-10-09

## 2024-10-17 DIAGNOSIS — Z78.0 MENOPAUSE: ICD-10-CM

## 2024-11-06 ENCOUNTER — HOSPITAL ENCOUNTER (OUTPATIENT)
Dept: RADIOLOGY | Facility: HOSPITAL | Age: 84
Discharge: HOME | End: 2024-11-06
Attending: INTERNAL MEDICINE
Payer: MEDICARE

## 2024-11-06 VITALS — WEIGHT: 185 LBS | BODY MASS INDEX: 29.73 KG/M2 | HEIGHT: 66 IN

## 2024-11-06 DIAGNOSIS — R94.2 ABNORMAL PET SCAN OF LUNG: ICD-10-CM

## 2024-11-06 DIAGNOSIS — R91.8 MASS OF UPPER LOBE OF RIGHT LUNG: ICD-10-CM

## 2024-11-06 DIAGNOSIS — Z78.0 MENOPAUSE: ICD-10-CM

## 2024-11-06 DIAGNOSIS — C34.11 PRIMARY CANCER OF RIGHT UPPER LOBE OF LUNG: ICD-10-CM

## 2024-11-06 DIAGNOSIS — R93.89 ABNORMAL CHEST CT: ICD-10-CM

## 2024-11-06 LAB — POCT GLUCOSE: 119 MG/DL (ref 70–110)

## 2024-11-06 PROCEDURE — 78815 PET IMAGE W/CT SKULL-THIGH: CPT | Mod: TC,PO

## 2024-11-06 PROCEDURE — 78815 PET IMAGE W/CT SKULL-THIGH: CPT | Mod: 26,PI,, | Performed by: RADIOLOGY

## 2024-11-06 PROCEDURE — 77080 DXA BONE DENSITY AXIAL: CPT | Mod: TC,PO

## 2024-11-06 PROCEDURE — 77080 DXA BONE DENSITY AXIAL: CPT | Mod: 26,,, | Performed by: RADIOLOGY

## 2024-11-06 PROCEDURE — A9552 F18 FDG: HCPCS | Mod: PO | Performed by: INTERNAL MEDICINE

## 2024-11-06 RX ORDER — FLUDEOXYGLUCOSE F18 500 MCI/ML
12.3 INJECTION INTRAVENOUS
Status: COMPLETED | OUTPATIENT
Start: 2024-11-06 | End: 2024-11-06

## 2024-11-06 RX ADMIN — FLUDEOXYGLUCOSE F-18 12.3 MILLICURIE: 500 INJECTION INTRAVENOUS at 12:11

## 2024-11-08 ENCOUNTER — TELEPHONE (OUTPATIENT)
Facility: CLINIC | Age: 84
End: 2024-11-08
Payer: MEDICARE

## 2024-11-08 DIAGNOSIS — M89.9 BONE LESION: Primary | ICD-10-CM

## 2024-11-08 NOTE — TELEPHONE ENCOUNTER
----- Message from Pedrito Ralph MD sent at 11/6/2024  4:45 PM CST -----  Appears to be M1 now. I recall she was pretty debilitated.   If she wants to do anything about this, likely need bx. Maybe CT guided at T12 as best bet.  We could treat T12 if symptomatic. Looks like her MRI T was suspicious for mets months ago.  ----- Message -----  From: Domonique Santos [place]  Sent: 11/6/2024   4:05 PM CST  To: MD JOHNIE Guajardo Jr.. Not sure what to tell her.

## 2024-11-14 ENCOUNTER — TELEPHONE (OUTPATIENT)
Dept: RADIOLOGY | Facility: HOSPITAL | Age: 84
End: 2024-11-14

## 2024-11-14 NOTE — H&P (VIEW-ONLY)
[facility] Hematology/Oncology  PROGRESS NOTE -   Follow-up Visit    No change       Subjective:       Patient ID:   NAME: Lety Herbert : 1940     84 y.o. female    Referring Doc: Shilo Lincoln MD  Other Physicians: Ashish Henley; Maryse Beckwith           Chief Complaint: RUL mass/lung ca f/u       History of Present Illness:     Patient returns today for a regularly scheduled follow-up visit.  The patient is here today to go over the results of the recently ordered labs, tests and studies. She is here with her grandson today.     She seems to be doing about the same. Breathing is stable on O2 per NC. She has residual chronic back pain issues, aches and pains which are stable. Chronic stable fatigue    She saw Dr Henley with pulm in May 2024; she saw Dr lincoln in may 2024    She last saw Dr Ralph with rad/onc in 2024     No CP, HA's or N/V.    She had PEt scan on 3/6/2024; Ct chest on 2024         Dicussed covid precautions - she has been vaccinated    ROS:   GEN: normal without any fever, night sweats or weight loss; fatigue; chronic back pains and leg pains at night  HEENT: normal with no HA's, sore throat, stiff neck, changes in vision  CV: normal with no CP, SOB, PND, GAVIRIA or orthopnea  PULM: chronic SOB/GAVIRIA; O2 dependent , hemoptysis, sputum or pleuritic pain  GI: normal with no abdominal pain, nausea, vomiting, constipation, diarrhea, melanotic stools, BRBPR, or hematemesis  : normal with no hematuria, dysuria  BREAST: normal with no mass, discharge, pain  SKIN: normal with no rash, erythema, bruising, or swelling    Pain Scale:  6-7     Allergies:  Review of patient's allergies indicates:   Allergen Reactions    Meperidine     Promethazine     Bactrim [sulfamethoxazole-trimethoprim] Rash       Medications:    Current Outpatient Medications:     albuterol (PROVENTIL/VENTOLIN HFA) 90 mcg/actuation inhaler, 2 puffs every 4 hours as needed for cough, wheeze, or shortness of breath, Disp: 18 g, Rfl:  11    ALPRAZolam (XANAX) 0.25 MG tablet, TAKE ONE TABLET BY MOUTH NIGHTLY AS NEEDED FOR ANXIETY, Disp: 60 tablet, Rfl: 1    arformoteroL (BROVANA) 15 mcg/2 mL Nebu, Take 2 mLs (15 mcg total) by nebulization 2 (two) times a day. Controller, Disp: 60 each, Rfl: 11    atorvastatin (LIPITOR) 20 MG tablet, TAKE one TABLET BY MOUTH ONCE DAILY, Disp: 90 tablet, Rfl: 1    azelastine (ASTELIN) 137 mcg (0.1 %) nasal spray, 1 spray by Nasal route daily as needed for Rhinitis., Disp: , Rfl:     budesonide (PULMICORT) 0.5 mg/2 mL nebulizer solution, Take 2 mLs (0.5 mg total) by nebulization 2 (two) times a day. Controller, Disp: 120 mL, Rfl: 11    diltiaZEM (CARDIZEM CD) 120 MG Cp24, Take 1 capsule (120 mg total) by mouth once daily., Disp: 90 capsule, Rfl: 3    gabapentin (NEURONTIN) 300 MG capsule, Take 1 capsule (300 mg total) by mouth every evening., Disp: 30 capsule, Rfl: 5    HYDROcodone-acetaminophen (NORCO) 7.5-325 mg per tablet, Take 1 tablet by mouth every 12 (twelve) hours as needed for Pain., Disp: 60 tablet, Rfl: 0    hydrocortisone 2.5 % cream, Apply topically 2 (two) times daily. To hemorrhoids, Disp: 28 g, Rfl: 0    lactulose (CHRONULAC) 20 gram/30 mL Soln, Take 30 mLs (20 g total) by mouth 2 (two) times daily as needed., Disp: 1800 mL, Rfl: 0    pantoprazole (PROTONIX) 20 MG tablet, TAKE ONE TABLET BY MOUTH EVERY DAY, Disp: 90 tablet, Rfl: 1    potassium chloride SA (KLOR-CON M10) 10 MEQ tablet, Take 1 tablet (10 mEq total) by mouth once daily., Disp: 90 tablet, Rfl: 3    sertraline (ZOLOFT) 100 MG tablet, TAKE 1 TABLET (100 MG TOTAL) BY MOUTH ONCE DAILY., Disp: 90 tablet, Rfl: 3    valsartan-hydrochlorothiazide (DIOVAN-HCT) 320-12.5 mg per tablet, TAKE ONE TABLET BY MOUTH EVERY MORNING, Disp: 90 tablet, Rfl: 4    XARELTO 20 mg Tab, Take 1 tablet (20 mg total) by mouth once daily., Disp: , Rfl:     PMHx/PSHx Updates:  See patient's last visit with me on 4/1/2024.  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 Patient's remote   clinical history of breast carcinoma is noticed. Based on the   immunohistochemical features, the current tumor is favored to be of lung   primary.            Objective:         Labs:     Lab Results   Component Value Date    WBC 10.07 08/02/2024    HGB 13.2 08/02/2024    HCT 40.7 08/02/2024    MCV 91 08/02/2024     08/02/2024       CMP  Sodium   Date Value Ref Range Status   08/02/2024 140 136 - 145 mmol/L Final   03/04/2019 142 134 - 144 mmol/L      Potassium   Date Value Ref Range Status   08/02/2024 3.6 3.5 - 5.1 mmol/L Final     Chloride   Date Value Ref Range Status   08/02/2024 104 95 - 110 mmol/L Final   03/04/2019 104 98 - 110 mmol/L      CO2   Date Value Ref Range Status   08/02/2024 30 (H) 23 - 29 mmol/L Final     Glucose   Date Value Ref Range Status   08/02/2024 135 (H) 70 - 110 mg/dL Final   03/04/2019 102 (H) 70 - 99 mg/dL      BUN   Date Value Ref Range Status   08/02/2024 14 8 - 23 mg/dL Final     Creatinine   Date Value Ref Range Status   08/02/2024 0.6 0.5 - 1.4 mg/dL Final   03/04/2019 0.48 (L) 0.60 - 1.40 mg/dL      Calcium   Date Value Ref Range Status   08/02/2024 9.1 8.7 - 10.5 mg/dL Final     Total Protein   Date Value Ref Range Status   08/02/2024 6.9 6.0 - 8.4 g/dL Final     Albumin   Date Value Ref Range Status   08/02/2024 4.0 3.5 - 5.2 g/dL Final   03/04/2019 4.0 3.1 - 4.7 g/dL      Total Bilirubin   Date Value Ref Range Status   08/02/2024 0.7 0.1 - 1.0 mg/dL Final     Comment:     For  infants and newborns, interpretation of results should be based  on gestational age, weight and in agreement with clinical  observations.    Premature Infant recommended reference ranges:  Up to 24 hours.............<8.0 mg/dL  Up to 48 hours............<12.0 mg/dL  3-5 days..................<15.0 mg/dL  6-29 days.................<15.0 mg/dL       Alkaline Phosphatase   Date Value Ref Range Status   08/02/2024 62 55 - 135 U/L Final     AST   Date Value Ref Range Status   08/02/2024 20 10 - 40 U/L Final     ALT   Date Value Ref Range Status   08/02/2024 14 10 - 44 U/L Final     Anion Gap   Date Value Ref Range Status   08/02/2024 6 (L) 8 - 16 mmol/L Final     eGFR   Date Value Ref Range Status   08/02/2024 >60.0 >60 mL/min/1.73 m^2 Final     Lab Results   Component Value Date    CEA 1.7 08/02/2024           Radiology/Diagnostic Studies:    MRI T-spine 7/22/2024:    Impression:     Degradation by motion.     Abnormal marrow signal within the posterior elements of T12 to the right of midline with additional small foci of abnormal marrow signal within the rightward aspect of the T4 and T5 vertebral bodies.  In light of the history of primary neoplasm, the findings are of concern for osseous metastases.     Remote compression deformity involving L1 with changes of previous vertebroplasty.  There is mild retropulsion of the posterior vertebral margin.     Degenerative disc/facet changes.      CT Chest  7/1/2024:  Impression:     1. Chronic consolidation of the right upper lobe with air bronchograms and mild bronchiectasis shows no detrimental change from previous exam.  Scattered subpleural interstitial thickening in the bilateral lungs is unchanged.  2. Juxtapleural nodular like density in the posterior right lower lobe measures 9 mm, could reflect a nodule or atelectasis.  Follow-up CT chest in 3 months recommended.  3. 2 mm subpleural nodular density in the anterior right middle lobe is unchanged.  4. Enlarged  mediastinal and right hilar lymph nodes noted with no detrimental change.       PET 3/6/2024:    IMPRESSION:  1. Discoid airspace opacity in the paramediastinal right upper lobe favored to represent post radiation/post treatment change, obscuring the previously described pulmonary nodule in the anterior right upper lobe with no convincing focal increased FDG activity from background.     2. Bilateral mastectomy with reconstruction.     3. Asymmetric increased FDG activity in the right pectoralis minor muscle favored to represent radiation change/inflammation.     4. Indeterminate oval area of markedly increased FDG activity adjacent to the right external iliac as above, slightly increased in conspicuity/size from the previous exam. No convincing CT anatomic correlate is present although this is adjacent to the terminal ileum and misregistration from cecal uptake is a consideration. Consider correlation with a history of colonoscopy in this age group.     5. Mild nonspecific asymmetric increased FDG activity in the inferior left adrenal body, newly appreciated from the previous exam, and only marginally increased from background.         Chest CT 12/19/2023:    IMPRESSION:  1.  Reference right upper lobe spiculated opacity is obscured by a broad area of linear density/consolidative change that abuts the anterior pleural surface. The degree of opacification has mildly increased upon comparison.  2.  Subpleural linear opacities in the right lower lobe superior segment.  3.  Small right pleural effusion with subjacent atelectasis.  4.  No evidence of pathologic lymphadenopathy in the right sury hepatis.  5.  Small sliding type hiatus hernia.  6.  Coronary artery calcification.  7.  L2 compression fracture that has undergone previous kyphoplasty.    PET 9/19/2023:  IMPRESSION:  Decreased size and FDG activity of the spiculated nodule in the anterior right upper lobe     Increased groundglass and interstitial opacities  within the right upper lobe and superior segment right lower lobe compatible with post radiation changes     Bilateral mastectomies with reconstruction with interval increased FDG activity in the right pectoralis minor muscle compatible with postradiation changes     Stable FDG activity in the region of the right external iliac vein without adenopathy.     Degenerative changes of the spine  Cardiomegaly with coronary artery calcification         CT chest 6/16/2023:    IMPRESSION:  Decrease in size of the mass within the anterior right upper lobe     Development of peripheral groundglass opacities in the right upper lobe and adjacent to the mass compatible with post radiation changes     No evidence of metastatic disease     Cardiomegaly        PET 1/11/2023:     IMPRESSION: Hypermetabolic 2.3 cm mass within the anterior right upper lobe suspicious for primary lung malignancy     Faint groundglass opacities throughout the lungs with prominence of the pulmonary vasculature and cardiomegaly which may be represent pulmonary edema.  Development of a 13 mm subpleural nodule in the right lower lobe. Since this is new since the most recent CT findings most likely are secondary to infectious or inflammatory process however metastatic lesion cannot be excluded     Increased FDG activity in the region of the right external iliac vein without corresponding adenopathy. This may be physiologic there is physiologic activity within a superficial vein in the upper right thigh and findings may be secondary to thrombophlebitis.. Follow-up CT abdomen and pelvis with contrast is recommended     Degenerative changes of the spine           CT Chest 12/15/2022:     IMPRESSION:  1. A 23 mm right upper lobe noncalcified pulmonary nodule is highly suspicious for primary pulmonary neoplasm. Tissue diagnosis is recommended.  2. No findings of regional metastatic disease in the chest.  3. Enlarged pulmonary arteries, suggesting pulmonary  arterial hypertension.  4. Cardiomegaly, with aortic and coronary arterial calcifications.     CTA 11/19/2022:     IMPRESSION:  1.  No pulmonary embolus detected.  2.  Interstitial abnormality, suspect mild edema superimposed upon chronic changes.  3.  Right upper lobe 2.1 cm nodule. Consider a non-contrast Chest CT at 3 months, a PET/CT, or tissue sampling. These guidelines do not apply to immunocompromised patients and patients with cancer   ADDENDUM #1         The presence of right upper lobe lung nodule needing further evaluation or follow-up has been discussed with Dr. Rick Salgado 11/19/2022 12:50 AM CST.     All lab results and imaging results have been reviewed and     I have reviewed all available lab results and radiology reports.    Assessment/Plan:   (1) 84 y.o. female with diagnosis of RUL lung mass who has been referred by Shilo Perez MD for evaluation by medical hematology/oncology. She has been followed by Dr Ashish Henley with pulmonary for her COPD and chronic hypoxemia/bronchitis, who was a former smoker.      - She had a CTA in Nov 2022 which was negative for a PE but showed a RUL lung mass, which was followed by a CT Chest on 12/15/2022 which confirmed a 2.3cm RUL mass.   - She had a PET scan on 1/11/2023 which showed a 2.3 cm hypermetabolic mass in the anterior right upper lobe abutting the superior vena cava along with development of a 13 mm subpleural nodule within the right lower lobe.         - She had CT guided biopsy of the RUL lung mass on 2/15/2023 with pathology coming back NSCLC favoring a lung primary.     - She is supposed to be on oxygen at home. She has portable tank and has a lot of GAVIRIA. She has chronic SOB.   - She is former smoker and quit when she was 43yrs old.      - discussed the pathology and reviewed and discussed the latest NCCN guidelines (vs. 2-2023)  - unlikely candidate for any surgical intervention  - will refer to Rad/onc to see if there are any radiation  options either monotherapy or in combination with low dose weekly chemotherapy  - CARIS on the pathology  - check CEA and up to date labs    3/27/2023:  - She saw Dr Ralph with rad/onc on 3/20/2023 and she is starting XRT monotherapy today with day#1    4/25/2023:  - she completed XRT and has some fatigue  - she will need post-XRT evaluation in about 4 weeks with rad/onc and expect her to need repeat scans in 6-8 weeks    5/25/2023:  - she saw Dr Ralph on 5/2 and he has CT Chest ordered post-XRT  - some residual fatigue and back pain  - due to see Dr Henley  - labs adequate    8/21/2023:  - she has been having some more back pain  - due for repeat CT with Dr Ralph in Sept/oct 2023, will go ahead and order PET now  - she needs to f/u with Dr Henley with pulmonary as well  - await PEt results, consider other scans if necessary  - CEA at 2.0      10/24/2023:  - she had PET scan in Sept 2023 with overall improved report  - she sees rad/onc again on 11/6  - rad/onc has already ordered the next Chest CT  - due for up to date labs incl. CEA    1/8/2024:  - She has residual chronic back pain issues, aches and pains in legs especially at night; doesn't sleep well at night  - She saw Dr Perez in Nov 2023  - She last saw Dr Ralph with rad/onc on 5/2/2023 and previously completed XRT; she was supposed to see him again on 11/6/2023 but did not  - she had Chest CT on 12/19/2023  - She is on portable O2; breathing up and down. No CP, HA's or N/V.  - She has not seen Dr Henley with pulmonary in some time    4/1/2024:  - She had bout of pneumonia and was hospitalized in Jan 2024  - She saw Dr Henley with pulm in Feb 2024 and Dr Grajeda in Jan 2024  - She had recent PEt scan on 3/6/2024 relatively stable  - she saw Dr Ralph with rad/onc in Jan 2024 8/5/2024:  - She saw Dr Henley with pulm in May 2024; she saw Dr Perez in may 2024  - She last saw Dr Ralph with rad/onc in Jan 2024  - She had PEt scan on 3/6/2024; Ct chest on  7/1/2024  - Dr Dodge ordered a MRI of her T spine on 7/22 and radiologist mentioned in their report that could not exclude presence of cancer in her spine - discussed this with patient and she does not want to get any bone scan or studies to further evaluate  - CEA 1.7     (2) Hx/of PE and DVT     (3) HTN and hypercholesterolemia     (4) COPD/chronic hypoxemia; bronchiectasis; chronic bronchitis - followed by Dr Ashish Henley/Shelbie Villalpando     (5) CHF and dysrhythmia     (6) GERD; gastric ulcer     (7) Hx/of breast cancer s/p bilateral mastectomies- followed by Dr Maryse Beckwith  - She has history of breast cancer in past around 2005  for which she has had bilateral mastectomies and is followed by Dr Beckwith. She did not require chemotherapy or radiation. She had reconstruction surgery with Dr Grimaldo. She saw Dr Beckwith couple of weeks ago.        (8) OA (Knees); chronic back pain; s/p Left total hip after fracture     (9) Urinary urgency/incontinence     (10) Migraine HA's     (11) Anxiety and Depression     (12) Hx/of melanoma left forearm, BCC     (13) Former smoker           VISIT DIAGNOSES:      Lung Cancer   Solid bone lesion    PLAN:  f/u with Dr Henley with pulmonary as directed;  repeat scan in 4 months of sooner if pulmonary directs (PET this time)  She previously had completed monotherapy XRT; f/u with rad/onc as directed    Check up to date labs incl. CEA level every 3 months   Encouarged f/u with Dr Henley  F/u with PCP, Pulm, etc     RTC in   16 weeks with myself  Fax note to Shilo Perez MD; Amena Mejia Mannina, [place]       Discussion:     COVID-19 Discussion:     I had long discussion with patient and any applicable family about the COVID-19 coronavirus epidemic and the recommended precautions with regard to cancer and/or hematology patients. I have re-iterated the CDC recommendations for adequate hand washing, use of hand -like products, and coughing into elbow, etc. In  "addition, especially for our patients who are on chemotherapy and/or our otherwise immunocompromised patients, I have recommended avoidance of crowds, including movie theaters, restaurants, churches, etc. I have recommended avoidance of any sick or symptomatic family members and/or friends. I have also recommended avoidance of any raw and unwashed food products, and general avoidance of food items that have not been prepared by themselves. The patient has been asked to call us immediately with any symptom developments, issues, questions or other general concerns.         Pathology Discussion:     I reviewed and discussed the pathology report(s) and radiograph reports (if available) in as simple to understand and/or laymen's terms to the best of my ability. I had an indepth conversation with the patient and went over the patient's individual diagnosis based on the information that was currently available. I discussed the TNM staging process with regard to the patient's particular cancer type, and the calculated stage based on the currently available TNM data and literature. I discussed the available prognostic data with regard to the current staging information and how it relates to the prognosis of their particular neoplastic process.          NCCN Guidelines:     I discussed the available treatment option(s) in accordance with the latest literature from the NCCN Clinical Practice Guidelines for the patient's particular type of cancer disorder. The NCCN Guidelines provide a "document evidence-based (and) consensus-driven management" of the care of oncology patients. The treatment recommendations were made not only in accordance to the NCCN guidelines, but also factored in to account the patient's overall age, condition, performance status and their medical co-morbidities. I went over the risks and benefits of the the treatment options (if any could be made) with regard to their particular cancer type, their cancer " stage, their age, and their co-morbidities.         I have explained and the patient understands all of  the current recommendation(s). I have answered all of their questions to the best of my ability and to their complete satisfaction.        I have explained all of the above in detail and the patient understands all of the current recommendation(s). I have answered all of their questions to the best of my ability and to their complete satisfaction.   The patient is to continue with the current management plan.            Electronically signed by Domonique Valdes NP

## 2024-11-14 NOTE — NURSING
"Pre procedure instructions given to pt and daughter "jose", for bone biopsy verbalized understanding to arrive at 7 am on 11/29/24, npo after mn will stop her xarelto 24 hrs prior to her procedure, will have her daughter to drive her home.  "

## 2024-11-14 NOTE — PROGRESS NOTES
[facility] Hematology/Oncology  PROGRESS NOTE -   Follow-up Visit    No change       Subjective:       Patient ID:   NAME: Lety Herbert : 1940     84 y.o. female    Referring Doc: Shilo Lincoln MD  Other Physicians: Ashish Henley; Maryse Beckwith           Chief Complaint: RUL mass/lung ca f/u       History of Present Illness:     Patient returns today for a regularly scheduled follow-up visit.  The patient is here today to go over the results of the recently ordered labs, tests and studies. She is here with her grandson today.     She seems to be doing about the same. Breathing is stable on O2 per NC. She has residual chronic back pain issues, aches and pains which are stable. Chronic stable fatigue    She saw Dr Henley with pulm in May 2024; she saw Dr lincoln in may 2024    She last saw Dr Ralph with rad/onc in 2024     No CP, HA's or N/V.    She had PEt scan on 3/6/2024; Ct chest on 2024         Dicussed covid precautions - she has been vaccinated    ROS:   GEN: normal without any fever, night sweats or weight loss; fatigue; chronic back pains and leg pains at night  HEENT: normal with no HA's, sore throat, stiff neck, changes in vision  CV: normal with no CP, SOB, PND, GAVIRIA or orthopnea  PULM: chronic SOB/GAVIRIA; O2 dependent , hemoptysis, sputum or pleuritic pain  GI: normal with no abdominal pain, nausea, vomiting, constipation, diarrhea, melanotic stools, BRBPR, or hematemesis  : normal with no hematuria, dysuria  BREAST: normal with no mass, discharge, pain  SKIN: normal with no rash, erythema, bruising, or swelling    Pain Scale:  6-7     Allergies:  Review of patient's allergies indicates:   Allergen Reactions    Meperidine     Promethazine     Bactrim [sulfamethoxazole-trimethoprim] Rash       Medications:    Current Outpatient Medications:     albuterol (PROVENTIL/VENTOLIN HFA) 90 mcg/actuation inhaler, 2 puffs every 4 hours as needed for cough, wheeze, or shortness of breath, Disp: 18 g, Rfl:  11    ALPRAZolam (XANAX) 0.25 MG tablet, TAKE ONE TABLET BY MOUTH NIGHTLY AS NEEDED FOR ANXIETY, Disp: 60 tablet, Rfl: 1    arformoteroL (BROVANA) 15 mcg/2 mL Nebu, Take 2 mLs (15 mcg total) by nebulization 2 (two) times a day. Controller, Disp: 60 each, Rfl: 11    atorvastatin (LIPITOR) 20 MG tablet, TAKE one TABLET BY MOUTH ONCE DAILY, Disp: 90 tablet, Rfl: 1    azelastine (ASTELIN) 137 mcg (0.1 %) nasal spray, 1 spray by Nasal route daily as needed for Rhinitis., Disp: , Rfl:     budesonide (PULMICORT) 0.5 mg/2 mL nebulizer solution, Take 2 mLs (0.5 mg total) by nebulization 2 (two) times a day. Controller, Disp: 120 mL, Rfl: 11    diltiaZEM (CARDIZEM CD) 120 MG Cp24, Take 1 capsule (120 mg total) by mouth once daily., Disp: 90 capsule, Rfl: 3    gabapentin (NEURONTIN) 300 MG capsule, Take 1 capsule (300 mg total) by mouth every evening., Disp: 30 capsule, Rfl: 5    HYDROcodone-acetaminophen (NORCO) 7.5-325 mg per tablet, Take 1 tablet by mouth every 12 (twelve) hours as needed for Pain., Disp: 60 tablet, Rfl: 0    hydrocortisone 2.5 % cream, Apply topically 2 (two) times daily. To hemorrhoids, Disp: 28 g, Rfl: 0    lactulose (CHRONULAC) 20 gram/30 mL Soln, Take 30 mLs (20 g total) by mouth 2 (two) times daily as needed., Disp: 1800 mL, Rfl: 0    pantoprazole (PROTONIX) 20 MG tablet, TAKE ONE TABLET BY MOUTH EVERY DAY, Disp: 90 tablet, Rfl: 1    potassium chloride SA (KLOR-CON M10) 10 MEQ tablet, Take 1 tablet (10 mEq total) by mouth once daily., Disp: 90 tablet, Rfl: 3    sertraline (ZOLOFT) 100 MG tablet, TAKE 1 TABLET (100 MG TOTAL) BY MOUTH ONCE DAILY., Disp: 90 tablet, Rfl: 3    valsartan-hydrochlorothiazide (DIOVAN-HCT) 320-12.5 mg per tablet, TAKE ONE TABLET BY MOUTH EVERY MORNING, Disp: 90 tablet, Rfl: 4    XARELTO 20 mg Tab, Take 1 tablet (20 mg total) by mouth once daily., Disp: , Rfl:     PMHx/PSHx Updates:  See patient's last visit with me on 4/1/2024.  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 Patient's remote   clinical history of breast carcinoma is noticed. Based on the   immunohistochemical features, the current tumor is favored to be of lung   primary.            Objective:         Labs:     Lab Results   Component Value Date    WBC 10.07 08/02/2024    HGB 13.2 08/02/2024    HCT 40.7 08/02/2024    MCV 91 08/02/2024     08/02/2024       CMP  Sodium   Date Value Ref Range Status   08/02/2024 140 136 - 145 mmol/L Final   03/04/2019 142 134 - 144 mmol/L      Potassium   Date Value Ref Range Status   08/02/2024 3.6 3.5 - 5.1 mmol/L Final     Chloride   Date Value Ref Range Status   08/02/2024 104 95 - 110 mmol/L Final   03/04/2019 104 98 - 110 mmol/L      CO2   Date Value Ref Range Status   08/02/2024 30 (H) 23 - 29 mmol/L Final     Glucose   Date Value Ref Range Status   08/02/2024 135 (H) 70 - 110 mg/dL Final   03/04/2019 102 (H) 70 - 99 mg/dL      BUN   Date Value Ref Range Status   08/02/2024 14 8 - 23 mg/dL Final     Creatinine   Date Value Ref Range Status   08/02/2024 0.6 0.5 - 1.4 mg/dL Final   03/04/2019 0.48 (L) 0.60 - 1.40 mg/dL      Calcium   Date Value Ref Range Status   08/02/2024 9.1 8.7 - 10.5 mg/dL Final     Total Protein   Date Value Ref Range Status   08/02/2024 6.9 6.0 - 8.4 g/dL Final     Albumin   Date Value Ref Range Status   08/02/2024 4.0 3.5 - 5.2 g/dL Final   03/04/2019 4.0 3.1 - 4.7 g/dL      Total Bilirubin   Date Value Ref Range Status   08/02/2024 0.7 0.1 - 1.0 mg/dL Final     Comment:     For  infants and newborns, interpretation of results should be based  on gestational age, weight and in agreement with clinical  observations.    Premature Infant recommended reference ranges:  Up to 24 hours.............<8.0 mg/dL  Up to 48 hours............<12.0 mg/dL  3-5 days..................<15.0 mg/dL  6-29 days.................<15.0 mg/dL       Alkaline Phosphatase   Date Value Ref Range Status   08/02/2024 62 55 - 135 U/L Final     AST   Date Value Ref Range Status   08/02/2024 20 10 - 40 U/L Final     ALT   Date Value Ref Range Status   08/02/2024 14 10 - 44 U/L Final     Anion Gap   Date Value Ref Range Status   08/02/2024 6 (L) 8 - 16 mmol/L Final     eGFR   Date Value Ref Range Status   08/02/2024 >60.0 >60 mL/min/1.73 m^2 Final     Lab Results   Component Value Date    CEA 1.7 08/02/2024           Radiology/Diagnostic Studies:    MRI T-spine 7/22/2024:    Impression:     Degradation by motion.     Abnormal marrow signal within the posterior elements of T12 to the right of midline with additional small foci of abnormal marrow signal within the rightward aspect of the T4 and T5 vertebral bodies.  In light of the history of primary neoplasm, the findings are of concern for osseous metastases.     Remote compression deformity involving L1 with changes of previous vertebroplasty.  There is mild retropulsion of the posterior vertebral margin.     Degenerative disc/facet changes.      CT Chest  7/1/2024:  Impression:     1. Chronic consolidation of the right upper lobe with air bronchograms and mild bronchiectasis shows no detrimental change from previous exam.  Scattered subpleural interstitial thickening in the bilateral lungs is unchanged.  2. Juxtapleural nodular like density in the posterior right lower lobe measures 9 mm, could reflect a nodule or atelectasis.  Follow-up CT chest in 3 months recommended.  3. 2 mm subpleural nodular density in the anterior right middle lobe is unchanged.  4. Enlarged  mediastinal and right hilar lymph nodes noted with no detrimental change.       PET 3/6/2024:    IMPRESSION:  1. Discoid airspace opacity in the paramediastinal right upper lobe favored to represent post radiation/post treatment change, obscuring the previously described pulmonary nodule in the anterior right upper lobe with no convincing focal increased FDG activity from background.     2. Bilateral mastectomy with reconstruction.     3. Asymmetric increased FDG activity in the right pectoralis minor muscle favored to represent radiation change/inflammation.     4. Indeterminate oval area of markedly increased FDG activity adjacent to the right external iliac as above, slightly increased in conspicuity/size from the previous exam. No convincing CT anatomic correlate is present although this is adjacent to the terminal ileum and misregistration from cecal uptake is a consideration. Consider correlation with a history of colonoscopy in this age group.     5. Mild nonspecific asymmetric increased FDG activity in the inferior left adrenal body, newly appreciated from the previous exam, and only marginally increased from background.         Chest CT 12/19/2023:    IMPRESSION:  1.  Reference right upper lobe spiculated opacity is obscured by a broad area of linear density/consolidative change that abuts the anterior pleural surface. The degree of opacification has mildly increased upon comparison.  2.  Subpleural linear opacities in the right lower lobe superior segment.  3.  Small right pleural effusion with subjacent atelectasis.  4.  No evidence of pathologic lymphadenopathy in the right sury hepatis.  5.  Small sliding type hiatus hernia.  6.  Coronary artery calcification.  7.  L2 compression fracture that has undergone previous kyphoplasty.    PET 9/19/2023:  IMPRESSION:  Decreased size and FDG activity of the spiculated nodule in the anterior right upper lobe     Increased groundglass and interstitial opacities  within the right upper lobe and superior segment right lower lobe compatible with post radiation changes     Bilateral mastectomies with reconstruction with interval increased FDG activity in the right pectoralis minor muscle compatible with postradiation changes     Stable FDG activity in the region of the right external iliac vein without adenopathy.     Degenerative changes of the spine  Cardiomegaly with coronary artery calcification         CT chest 6/16/2023:    IMPRESSION:  Decrease in size of the mass within the anterior right upper lobe     Development of peripheral groundglass opacities in the right upper lobe and adjacent to the mass compatible with post radiation changes     No evidence of metastatic disease     Cardiomegaly        PET 1/11/2023:     IMPRESSION: Hypermetabolic 2.3 cm mass within the anterior right upper lobe suspicious for primary lung malignancy     Faint groundglass opacities throughout the lungs with prominence of the pulmonary vasculature and cardiomegaly which may be represent pulmonary edema.  Development of a 13 mm subpleural nodule in the right lower lobe. Since this is new since the most recent CT findings most likely are secondary to infectious or inflammatory process however metastatic lesion cannot be excluded     Increased FDG activity in the region of the right external iliac vein without corresponding adenopathy. This may be physiologic there is physiologic activity within a superficial vein in the upper right thigh and findings may be secondary to thrombophlebitis.. Follow-up CT abdomen and pelvis with contrast is recommended     Degenerative changes of the spine           CT Chest 12/15/2022:     IMPRESSION:  1. A 23 mm right upper lobe noncalcified pulmonary nodule is highly suspicious for primary pulmonary neoplasm. Tissue diagnosis is recommended.  2. No findings of regional metastatic disease in the chest.  3. Enlarged pulmonary arteries, suggesting pulmonary  arterial hypertension.  4. Cardiomegaly, with aortic and coronary arterial calcifications.     CTA 11/19/2022:     IMPRESSION:  1.  No pulmonary embolus detected.  2.  Interstitial abnormality, suspect mild edema superimposed upon chronic changes.  3.  Right upper lobe 2.1 cm nodule. Consider a non-contrast Chest CT at 3 months, a PET/CT, or tissue sampling. These guidelines do not apply to immunocompromised patients and patients with cancer   ADDENDUM #1         The presence of right upper lobe lung nodule needing further evaluation or follow-up has been discussed with Dr. Rick Salgado 11/19/2022 12:50 AM CST.     All lab results and imaging results have been reviewed and     I have reviewed all available lab results and radiology reports.    Assessment/Plan:   (1) 84 y.o. female with diagnosis of RUL lung mass who has been referred by Shilo Perez MD for evaluation by medical hematology/oncology. She has been followed by Dr Ashish Henley with pulmonary for her COPD and chronic hypoxemia/bronchitis, who was a former smoker.      - She had a CTA in Nov 2022 which was negative for a PE but showed a RUL lung mass, which was followed by a CT Chest on 12/15/2022 which confirmed a 2.3cm RUL mass.   - She had a PET scan on 1/11/2023 which showed a 2.3 cm hypermetabolic mass in the anterior right upper lobe abutting the superior vena cava along with development of a 13 mm subpleural nodule within the right lower lobe.         - She had CT guided biopsy of the RUL lung mass on 2/15/2023 with pathology coming back NSCLC favoring a lung primary.     - She is supposed to be on oxygen at home. She has portable tank and has a lot of GAVIRIA. She has chronic SOB.   - She is former smoker and quit when she was 43yrs old.      - discussed the pathology and reviewed and discussed the latest NCCN guidelines (vs. 2-2023)  - unlikely candidate for any surgical intervention  - will refer to Rad/onc to see if there are any radiation  options either monotherapy or in combination with low dose weekly chemotherapy  - CARIS on the pathology  - check CEA and up to date labs    3/27/2023:  - She saw Dr Ralph with rad/onc on 3/20/2023 and she is starting XRT monotherapy today with day#1    4/25/2023:  - she completed XRT and has some fatigue  - she will need post-XRT evaluation in about 4 weeks with rad/onc and expect her to need repeat scans in 6-8 weeks    5/25/2023:  - she saw Dr Ralph on 5/2 and he has CT Chest ordered post-XRT  - some residual fatigue and back pain  - due to see Dr Henley  - labs adequate    8/21/2023:  - she has been having some more back pain  - due for repeat CT with Dr Ralph in Sept/oct 2023, will go ahead and order PET now  - she needs to f/u with Dr Henley with pulmonary as well  - await PEt results, consider other scans if necessary  - CEA at 2.0      10/24/2023:  - she had PET scan in Sept 2023 with overall improved report  - she sees rad/onc again on 11/6  - rad/onc has already ordered the next Chest CT  - due for up to date labs incl. CEA    1/8/2024:  - She has residual chronic back pain issues, aches and pains in legs especially at night; doesn't sleep well at night  - She saw Dr Perez in Nov 2023  - She last saw Dr Ralph with rad/onc on 5/2/2023 and previously completed XRT; she was supposed to see him again on 11/6/2023 but did not  - she had Chest CT on 12/19/2023  - She is on portable O2; breathing up and down. No CP, HA's or N/V.  - She has not seen Dr Henley with pulmonary in some time    4/1/2024:  - She had bout of pneumonia and was hospitalized in Jan 2024  - She saw Dr Henley with pulm in Feb 2024 and Dr Grajeda in Jan 2024  - She had recent PEt scan on 3/6/2024 relatively stable  - she saw Dr Ralph with rad/onc in Jan 2024 8/5/2024:  - She saw Dr Henley with pulm in May 2024; she saw Dr Perez in may 2024  - She last saw Dr Ralph with rad/onc in Jan 2024  - She had PEt scan on 3/6/2024; Ct chest on  7/1/2024  - Dr Dodge ordered a MRI of her T spine on 7/22 and radiologist mentioned in their report that could not exclude presence of cancer in her spine - discussed this with patient and she does not want to get any bone scan or studies to further evaluate  - CEA 1.7     (2) Hx/of PE and DVT     (3) HTN and hypercholesterolemia     (4) COPD/chronic hypoxemia; bronchiectasis; chronic bronchitis - followed by Dr Ashish Henley/Shelbie Villalpando     (5) CHF and dysrhythmia     (6) GERD; gastric ulcer     (7) Hx/of breast cancer s/p bilateral mastectomies- followed by Dr Maryse Beckwith  - She has history of breast cancer in past around 2005  for which she has had bilateral mastectomies and is followed by Dr Beckwith. She did not require chemotherapy or radiation. She had reconstruction surgery with Dr Grimaldo. She saw Dr Beckwith couple of weeks ago.        (8) OA (Knees); chronic back pain; s/p Left total hip after fracture     (9) Urinary urgency/incontinence     (10) Migraine HA's     (11) Anxiety and Depression     (12) Hx/of melanoma left forearm, BCC     (13) Former smoker           VISIT DIAGNOSES:      Lung Cancer   Solid bone lesion    PLAN:  f/u with Dr Henley with pulmonary as directed;  repeat scan in 4 months of sooner if pulmonary directs (PET this time)  She previously had completed monotherapy XRT; f/u with rad/onc as directed    Check up to date labs incl. CEA level every 3 months   Encouarged f/u with Dr Henley  F/u with PCP, Pulm, etc     RTC in   16 weeks with myself  Fax note to Shilo Perez MD; Amena Mejia Mannina, [place]       Discussion:     COVID-19 Discussion:     I had long discussion with patient and any applicable family about the COVID-19 coronavirus epidemic and the recommended precautions with regard to cancer and/or hematology patients. I have re-iterated the CDC recommendations for adequate hand washing, use of hand -like products, and coughing into elbow, etc. In  "addition, especially for our patients who are on chemotherapy and/or our otherwise immunocompromised patients, I have recommended avoidance of crowds, including movie theaters, restaurants, churches, etc. I have recommended avoidance of any sick or symptomatic family members and/or friends. I have also recommended avoidance of any raw and unwashed food products, and general avoidance of food items that have not been prepared by themselves. The patient has been asked to call us immediately with any symptom developments, issues, questions or other general concerns.         Pathology Discussion:     I reviewed and discussed the pathology report(s) and radiograph reports (if available) in as simple to understand and/or laymen's terms to the best of my ability. I had an indepth conversation with the patient and went over the patient's individual diagnosis based on the information that was currently available. I discussed the TNM staging process with regard to the patient's particular cancer type, and the calculated stage based on the currently available TNM data and literature. I discussed the available prognostic data with regard to the current staging information and how it relates to the prognosis of their particular neoplastic process.          NCCN Guidelines:     I discussed the available treatment option(s) in accordance with the latest literature from the NCCN Clinical Practice Guidelines for the patient's particular type of cancer disorder. The NCCN Guidelines provide a "document evidence-based (and) consensus-driven management" of the care of oncology patients. The treatment recommendations were made not only in accordance to the NCCN guidelines, but also factored in to account the patient's overall age, condition, performance status and their medical co-morbidities. I went over the risks and benefits of the the treatment options (if any could be made) with regard to their particular cancer type, their cancer " stage, their age, and their co-morbidities.         I have explained and the patient understands all of  the current recommendation(s). I have answered all of their questions to the best of my ability and to their complete satisfaction.        I have explained all of the above in detail and the patient understands all of the current recommendation(s). I have answered all of their questions to the best of my ability and to their complete satisfaction.   The patient is to continue with the current management plan.            Electronically signed by Domonique Valdes NP

## 2024-11-20 ENCOUNTER — OFFICE VISIT (OUTPATIENT)
Dept: PULMONOLOGY | Facility: CLINIC | Age: 84
End: 2024-11-20
Payer: MEDICARE

## 2024-11-20 VITALS
WEIGHT: 191.5 LBS | HEIGHT: 66 IN | SYSTOLIC BLOOD PRESSURE: 169 MMHG | HEART RATE: 63 BPM | DIASTOLIC BLOOD PRESSURE: 82 MMHG | BODY MASS INDEX: 30.78 KG/M2 | OXYGEN SATURATION: 93 %

## 2024-11-20 DIAGNOSIS — M79.662 PAIN AND SWELLING OF LEFT LOWER LEG: ICD-10-CM

## 2024-11-20 DIAGNOSIS — M79.89 PAIN AND SWELLING OF LEFT LOWER LEG: ICD-10-CM

## 2024-11-20 DIAGNOSIS — M79.604 PAIN IN BOTH LOWER EXTREMITIES: ICD-10-CM

## 2024-11-20 DIAGNOSIS — M79.605 PAIN IN BOTH LOWER EXTREMITIES: ICD-10-CM

## 2024-11-20 PROCEDURE — 1159F MED LIST DOCD IN RCRD: CPT | Mod: CPTII,S$GLB,, | Performed by: INTERNAL MEDICINE

## 2024-11-20 PROCEDURE — 1157F ADVNC CARE PLAN IN RCRD: CPT | Mod: CPTII,S$GLB,, | Performed by: INTERNAL MEDICINE

## 2024-11-20 PROCEDURE — 3288F FALL RISK ASSESSMENT DOCD: CPT | Mod: CPTII,S$GLB,, | Performed by: INTERNAL MEDICINE

## 2024-11-20 PROCEDURE — 1101F PT FALLS ASSESS-DOCD LE1/YR: CPT | Mod: CPTII,S$GLB,, | Performed by: INTERNAL MEDICINE

## 2024-11-20 PROCEDURE — 99213 OFFICE O/P EST LOW 20 MIN: CPT | Mod: S$GLB,,, | Performed by: INTERNAL MEDICINE

## 2024-11-20 PROCEDURE — 99999 PR PBB SHADOW E&M-EST. PATIENT-LVL III: CPT | Mod: PBBFAC,,, | Performed by: INTERNAL MEDICINE

## 2024-11-20 RX ORDER — HYDROCODONE BITARTRATE AND ACETAMINOPHEN 7.5; 325 MG/1; MG/1
1 TABLET ORAL EVERY 6 HOURS PRN
Qty: 28 TABLET | Refills: 0 | Status: SHIPPED | OUTPATIENT
Start: 2024-11-20

## 2024-11-20 NOTE — PROGRESS NOTES
11/20/2024    Lety Herbert  Follow up    Chief Complaint   Patient presents with    Follow-up    Shortness of Breath       HPI:   November 20, 2024-the patient had abnormal MRI of the spine in July with T12 and T4 and T5 being suspicious for metastatic disease.  PET scan was done November the 6 with concerns for metastatic disease and T12 and also a nodule anterior to the inferior vena cava.  The patient was seen in follow-up by Oncology November 14th-no orders reviewed.  Not clear management options.  Uses ox 3-4 lpm 24/7.    Appetite ok now and no stimulant needed  Pt feels like crap.  Having 7/10 pain last couple months.  . Uses ox 24/7 with stable ivan...  no falls and uses walker.  For spine bx next wk  Bowels good last month with poor memory.  No liver eval-- had benefit from lactulose    Uses neb rx prn only .     Pt frustrated as cannot do as she wishes.. uses xanax once daily        5/20/2024 pt now down to 4lpm on tank in office , wears 5lpm at home., desat walking .  Mucous yellow -- not clear if lungs or chest with no recent abx...  having copious mucous.  Uses megace prn, weight stable..  No prednisone now..  Pet scan from 3/2024 looks less impressive with right upper lung radiation fibrosis, fluid around lung is minima.  Patient Instructions   With memory not good and history of cirrhosis and constipation-- dose lactulose once or twice daily -- note if brain clearer..      You have had some ascites and fluid around lungs with cirrhosis of liver seen.    With xarelto use may be prudent to be have upper scope to assure no varicies...    Check hepatitis c test  Will send Dr Perez a note -- may need further evaluation  Ct chest and pet scan 3/2024 viewed.  Looks good-- would do routine follow up on lung cancer.   Continue oxygen.    Use brovana and budesonide regular and albuterol as needed..  2/27/2024 pt needs 5 lpm to mobilize -- has poc with pulse system.    Losing wt -- ambulates about  house on ox.  Megace helped.    No pain nor anxiety.  Min cough from sinus    No recent prednisone  Patient Instructions   Ct from admit viewed  Would retry prednisone 20 mg daily for 2 weeks-- stop if no benefit.     Will try to arrange oxymizer and non rebreather mask     Exact diagnosis not clear -- still needs high oxygen.    Would use wheelchair to get out of house      Use oxymizer with flow as low as able to keep sat >90--use when out of house..      Immune deficiency may have played a role, monitor    1/30/2024....pt presented to [facility] 1/25 for 4 days with  fluid and pneumonia- but record vague and procal  very loww at 0.05 ...... Pt was on ox 3 lpm prior to hosp    Pt lost appetite pta, no fever, bnp was 805 admit, echo good with Mr/ ef 60%, rv enlarged, and pap 40.       Pt had had failure to thrive last months.    d  Ct reviewed from last yr-- ct rul nodule 2/2023 with decrease in size and xrt fibrosis changes seen 6/2023 and 9/2023.  Pet scan 12/2023 with no cancer activity but progressive scarring of rul, and progressively increased right pleural effusion til 12/2023.       Pt presented to [facility] with sob and low sat -- pt has poor recall of exact presentation.   Record revieweed but not clear picture.......      Pt will have some bilat lower ext pain -- knees.    Patient Instructions   Will order nebulizer budesonide and brovana breathing treatments for copd.    Lung tissue disease is seen on ct chest that on prior ct chest and stable-- major process in lungs was radiation damage.      Ct chest viewed and pet viewed from 1/22022 to 1/2024....      Ct chest and echo show evidence for pulmonary hypertension.  Heart valve leaks and copd is severe (with lung tissue).    Steroids were not effective recently and sputum did not grow germs.     Would dose megace for appetite -- 400 mg daily.          Need to do therapy as best able.... you have gotten very weak...  \    Would do follow up to assure no excess fluid  in lungs (pet scan will give good picture of fluid.)-- may do out pt drainage.....      If having mild worsening -- may do levaquin antibiotic and take prednisone...       could do virtual follow up...    You direct no life support.   Lapost done today  2/7/2023--  feels better, no cough nor mucous,  no night sweats, appetite ok but not strong.   Uses oxygen occasionally.   Patient Instructions   Use levaquin prompt for infection.    May use prednisone if cough or breathing worsens    Will send in norco 90/month for 3 months.    Renewed xanax for as needed.    Will order needle bx.  Pet scan  and cxr viewed.  Will have cxr after bx.      Check six min walk and breathing test -- suspect surgery may not be offered as oxygen level had been low.   If cancer may refer to oncology doc.  May refer to Maryse Lange MD in [place]    Will call and discuss bx results over phone.    1/23/2023-- no fever, coughing min mucous yellow.  Ribs ok with no sign pain.  Sob still and still night sweats needing to change night gown.  Poor appetite.  Pt relates felt like she was dying last visit-- doing better now.  Occ blood streak hemoptysis.    Pt has home ox at 3 lpm --- uses intermittently with sat 90 rest and 80 or so with activity.  Pt vague on If breathing worsening last yr.  Six min walk 2020 with sat low resting.  Patient Instructions   May need to repeat prednisone if cough returns.    Need to do biopsy right upper density.    You still have night sweats-- apparent pneumonia cleared from last visit.      Oxygen  needs seem very high with activity?      Use oxygen more --ok to use 1-2 lpm for restt and 3 lpm activity.  Lung tissue disease looks somewhat stable since 2019.  Oxygen needs are high but able to get by at rest with room air but marginally    Repeat prednisone now.     Will need to do needle biopsy.    Will direct over phone biopsy results       1/18/2023 having cough and fatigue -- coughs all day with yellow mucous  with streaks blood.  Poor appetite, having back and rib pain -- lower ribs attributed to violent coughing.   Used trelegy with min help.  Walks about house with no falls-- no weak.  No percieved wt loss.  Used norco for pains -- helped min.   Has albuterol in past but not using.  Used prednisone in past-- some benefit appreciated.  Declines hosp.   Pt had pet scan viwed today-- new rll 13 mm density with min pet activity --new from 12/15/2022.  Pt now with night sweats-new last wk or so- sputum culture pending afb and nl krista 12/29.  May have fever nighttime  Patient Instructions   Re check for regular germs  Dose levaquin  Would check temp at night  Big large pet active Spot in lung could be infection-- infection is apparent with mucous but culture had been negative-- afb can take 2 months.  New spot in right lower lung should be infection-- having yellow mucous and blood and sweats at night  Prednisone 20 mg may help cough-- dose for 5 days --- may repeat as needed for cough.  Use norco as needed to suppress pain and cough.  Cancer typically slow-- infection quick.  Infection active.   Biopsy would be recommended (unless afb + -- regular germ not expected to cause spot in upper lung.   Would check cxr now to assure no rapid  pneumonia as right lower  lung spot new.     I would anticipate may be able to do biopsy-- if mycobacterial culture positive (typical germs that cause this problem are slow growers and take a month at least-- early February??) might hold off but would like to be sure about cancer ??  Qtc was 432 November 2022-- need to check ekg on 23rd.  F/u 23 office  Cx stable 1/18/2023 , wbc 21k, no impressive infiltrates.  K 3.2 -- discussion with pt -- sent in potassium 10 meq bid, will discuss bx at f/u on 23rd if pt doing well.     12/28/2022   Pt developed left chest pain acute onset-- lasted few days, pt has had lingering back pain.  Coughs a lot with yellow mucous-- tablespoon daily.  Inhalers no  help in past  Had right shoulder area achey pain about 7/10 pain-- comes and goes.  chr hypoxic resp failure post pe and pulm htn assoc cor dz and osas.    Ct this month with 23 mm rul lesion cw ca. The lesion is new from 2019. Ild dz with bronchiectasis assoc mucous plugging seen.   Upper lung honeycombing suggested ct 12/15/2022 -- viewed directly.   No asbestos exposure but father worked [employer].  Pt was smoker  Pthad double mastectomy 15 yrs ago-- no oncology follow up  Patient Instructions   Bronchiectasis with yellow mucous-- cultures needed  Trial trelegy in place of current inhaler. Use aerobika for 10 breaths after inhaler.    Lung tissue with progression fibrosis-- slow process.  Consider treatment later.  Lung lesion right upper lung worrisome for cancer-- new over last 4 yrs-- enlarging lymph node in middle chest is still small.  Check pet scan.   May need biopsy -  will need to skip xarelto prior biopsy- needle of lung but may need ebus.    Back pain noted on right -- will send in norco for as needed use.  Pet scan may help determine if cancer related.   Will direct care with result of pet scan.  Could do phone follow up to discuss in detail if complex.   Below from Dr Villalpando  12/28/2021- breathing stable, able to walk around store w/ oxygen. Tries to walk 20 min per day. Denies chest congestion/cough. Sometimes nose congested, uses astelin. She takes singulair nightly.  Patient Instructions   Oxygen - continue with activity and any time sats <92%  Clubbing of fingernails is not harmful- due to chronic hypoxia  06/30/2021-   Continue oxygen  Follow up with cardiologist  Stop claritin and try singulair one pill every night for sinus drip  continue flonase  while seated feels fine but gets breathless w/ minimal activity. Daughter reports dyspnea is getting worse over time. Reports cardiologist wants to place pacemaker for her but insurance denied it. Does not feel well and sits in recliner all the time.  Reports constant sinus drip and cough w/ green/yellow mucous.    12/30/2020-   Call medical supply for the oxygen and ask about thinner or more comfortable nasal cannulas  Start claritin daily  Start flonase 2 sprays in each nostril daily  feels better w/ O2, wears 3L continuously except when sitting still at home. Trouble breathing w/ fatigue is episodic. Gets headaches- tension from wearing O2, glasses and mask behind ear. Has constant sinus drip. Other night R ear blocked up while wearing CPAP.  Tries mucinex DM but doesn't last long. She is on xarelto for atrial fib now.     6/30/20-   Oxygen for home use ordered- would wear continuously  Use CPAP machine- can try nasal pillows. Would use oxygen with your CPAP too.  Get lung function test and 6 minute walk to check to see how much oxygen you need.  Pt is an 80 yo female with DVT/PE, GERD, breast ca s/p double mastectomy- no chemo or XRT, melanoma of arm, AUSTIN, obesity presenting for initial evaluation. Pt reports oxygen desaturation for over a year but no one has ordered her any oxygen for home. First noted low O2 sat when she had sinus infection. O2 sats will drop when exerting herself or resting. Feels short of breath worsening over time. She coughs w/ phlegm from sinuses. Sees ENT who cauterized nose for bleeding. Daughter is here as well and assists w/ history.  Reports not using CPAP because she had tip of her nose removed for cancer in past and she is concerned mask will cause more cancer to come back. She wakes up a lot at night and feels drowsy during the day.  Denies any hx of lung problems. She is a past smoker- quit 40 yrs ago 1-2 PPD x 25 yrs. Used to have recurrent bronchitis and cough which stopped after she quit smoking.    Outside records from pt's cardiologist Jeremy Sargent in [facility] were faxed to us and have been reviewed- with dx including heart block, atrial fib & flutter, CAD, diastolic dysfunction, HTN, HLD, and obesity. Per that note  "ASAP consult was placed to pulmonary for "shortness of breath and O2 sat of 70s-80s."    The chief compliant  problem is varies w/ instability at times    PFSH:  Past Medical History:   Diagnosis Date    Abnormal chest CT 3/9/2023    Anemia     Basal cell carcinoma     nose     Cancer     breast    Depression     DVT (deep venous thrombosis)     Fall 3/20/2018    Former smoker 3/9/2023    Gastric ulceration     GERD (gastroesophageal reflux disease)     History of breast cancer 3/9/2023    History of frequent urinary tract infections     Hyperlipidemia     Hypertension     Malignant neoplasm of upper lobe of right lung (NSCLC favoring lung primary) 3/9/2023    Melanoma 2004?    left forearm    MRSA (methicillin resistant staph aureus) culture positive     Neuropathy     PE (pulmonary embolism)     Post-nasal drip 11/15/2018    Reflux     S/P bilateral mastectomy 3/9/2023         Past Surgical History:   Procedure Laterality Date    HYSTERECTOMY      MASTECTOMY, RADICAL Bilateral     TOTAL HIP ARTHROPLASTY Left 11/13/2017     Social History     Tobacco Use    Smoking status: Former     Passive exposure: Past    Smokeless tobacco: Never   Substance Use Topics    Alcohol use: No    Drug use: No     Family History   Problem Relation Name Age of Onset    Cancer Mother      Cancer Father      Heart disease Father      Melanoma Neg Hx      Psoriasis Neg Hx      Lupus Neg Hx      Eczema Neg Hx       Review of patient's allergies indicates:   Allergen Reactions    Meperidine     Promethazine     Bactrim [sulfamethoxazole-trimethoprim] Rash       Performance Status:The patient's activity level is functions out of house.  Feels SOB any exertion out of house.     Review of Systems:  a review of eleven systems covering constitutional, Eye, HEENT, Psych, Respiratory, Cardiac, GI, , Musculoskeletal, Endocrine, Dermatologic was negative except for pertinent findings as listed ABOVE and below:  All negative with pertinent " "positives as above       Exam:Comprehensive exam done. BP (!) 169/82 (BP Location: Right forearm, Patient Position: Sitting)   Pulse 63   Ht 5' 6" (1.676 m)   Wt 86.8 kg (191 lb 7.5 oz)   SpO2 (!) 93% Comment: on room air at rest  BMI 30.90 kg/m²   Exam included Vitals as listed, and patient's appearance and affect and alertness and mood, oral exam for yeast and hygiene and pharynx lesions and Mallapatti (M) score, neck with inspection for jvd and masses and thyroid abnormalities and lymph nodes (supraclavicular and infraclavicular nodes and axillary also examined and noted if abn), chest exam included symmetry and effort and fremitus and percussion and auscultation, cardiac exam included rhythm and gallops and murmur and rubs and jvd and edema, abdominal exam for mass and hepatosplenomegaly and tenderness and hernias and bowel sounds, Musculoskeletal exam with muscle tone and posture and mobility/gait and  strength, and skin for rashes and cyanosis and pallor and turgor, extremity for clubbing.  Findings were normal except for pertinent findings listed below:  M1, oropharynx clear  HR irregularly irreg  Lungs rales bilaterally  No edema  Varicose veins of legs  Clubbing, no neck nodes      Radiographs (ct chest and cxr) reviewed: view by direct vision   Cxr 1/18/2023 patchy pneumonia  Ct chest 12/15/2022 c/w 2019 -- progressive ild, bronchiectasis with mucous plugging, new lung lesion rul c/w ca.  Viewed.      CXR 6/8/20- interstitial markings prominent  VQ scan 6/8/20- IMPRESSION:  Normal VQ scan perfusion imaging  TTE 6/8/20-   Concentric left ventricular hypertrophy.  Normal left ventricular systolic function. The estimated ejection fraction is 65%.  Normal LV diastolic function.  Normal right ventricular systolic function.  Mild left atrial enlargement.  Mild aortic regurgitation.  Mild mitral regurgitation.  Mild tricuspid regurgitation.  Mild pulmonic regurgitation.  Normal central venous pressure (3 " mmHg).  The estimated PA systolic pressure is 36 mmHg.    Labs reviewed    Results for RONN SOLORZANO (MRN [id number]) as of 6/30/2020 14:08   Ref. Range 3/11/2020 07:45   CO2 Latest Ref Range: 23 - 29 mmol/L 32 (H)        PFT 7/14/20 reviewed- mild restriction, reduced DLCO      6MWT 7/14/20- 85m, desat to 83% at rest, req 3L NC    Plan:  Clinical impression is resonably certain and repeated evaluation prn +/- follow up will be needed as below. Chronic hypoxemic resp failure due to cardiac comorbidities. PH likely groups 2/3 due to AUSTIN and HFpEF.    Lety was seen today for follow-up and shortness of breath.    Diagnoses and all orders for this visit:    Pain in both lower extremities  -     HYDROcodone-acetaminophen (NORCO) 7.5-325 mg per tablet; Take 1 tablet by mouth every 6 (six) hours as needed for Pain.    Pain and swelling of left lower leg  -     HYDROcodone-acetaminophen (NORCO) 7.5-325 mg per tablet; Take 1 tablet by mouth every 6 (six) hours as needed for Pain.                        Follow up in about 6 months (around 5/20/2025), or if symptoms worsen or fail to improve.    Discussed with patient above for education the following:      Patient Instructions   Use norco  as needed for back pain-- will send in 28 pills, you may need more.  You may request Dr Vogt/Loree or notify me.  Should get pain medications one doctor.    Lungs seem stable -- ct chest July compared to pet done 11/2024 -- lung tissue sickly and radiation damage, high blood pressure in lungs suggested.    Lungs look stable on ct -- breathing is stable.    Re check 6 months

## 2024-11-20 NOTE — PATIENT INSTRUCTIONS
Use norco  as needed for back pain-- will send in 28 pills, you may need more.  You may request Dr Vogt/Loree or notify me.  Should get pain medications one doctor.    Lungs seem stable -- ct chest July compared to pet done 11/2024 -- lung tissue sickly and radiation damage, high blood pressure in lungs suggested.    Lungs look stable on ct -- breathing is stable.    Re check 6 months

## 2024-11-29 ENCOUNTER — HOSPITAL ENCOUNTER (OUTPATIENT)
Dept: RADIOLOGY | Facility: HOSPITAL | Age: 84
Discharge: HOME | End: 2024-11-29
Attending: NURSE PRACTITIONER
Payer: MEDICARE

## 2024-11-29 VITALS
SYSTOLIC BLOOD PRESSURE: 160 MMHG | OXYGEN SATURATION: 98 % | DIASTOLIC BLOOD PRESSURE: 79 MMHG | HEIGHT: 66 IN | TEMPERATURE: 98 F | RESPIRATION RATE: 16 BRPM | WEIGHT: 189 LBS | HEART RATE: 77 BPM | BODY MASS INDEX: 30.37 KG/M2

## 2024-11-29 DIAGNOSIS — M89.9 BONE LESION: ICD-10-CM

## 2024-11-29 LAB
APTT PPP: 29.3 SEC (ref 21–32)
BASOPHILS # BLD AUTO: 0.06 K/UL (ref 0–0.2)
BASOPHILS NFR BLD: 0.8 % (ref 0–1.9)
DIFFERENTIAL METHOD BLD: ABNORMAL
EOSINOPHIL # BLD AUTO: 0.2 K/UL (ref 0–0.5)
EOSINOPHIL NFR BLD: 2 % (ref 0–8)
ERYTHROCYTE [DISTWIDTH] IN BLOOD BY AUTOMATED COUNT: 12.6 % (ref 11.5–14.5)
HCT VFR BLD AUTO: 38.3 % (ref 37–48.5)
HGB BLD-MCNC: 12 G/DL (ref 12–16)
IMM GRANULOCYTES # BLD AUTO: 0.01 K/UL (ref 0–0.04)
IMM GRANULOCYTES NFR BLD AUTO: 0.1 % (ref 0–0.5)
INR PPP: 1 (ref 0.8–1.2)
LYMPHOCYTES # BLD AUTO: 2.5 K/UL (ref 1–4.8)
LYMPHOCYTES NFR BLD: 33.6 % (ref 18–48)
MCH RBC QN AUTO: 30.2 PG (ref 27–31)
MCHC RBC AUTO-ENTMCNC: 31.3 G/DL (ref 32–36)
MCV RBC AUTO: 96 FL (ref 82–98)
MONOCYTES # BLD AUTO: 0.6 K/UL (ref 0.3–1)
MONOCYTES NFR BLD: 8.5 % (ref 4–15)
NEUTROPHILS # BLD AUTO: 4.1 K/UL (ref 1.8–7.7)
NEUTROPHILS NFR BLD: 55 % (ref 38–73)
NRBC BLD-RTO: 0 /100 WBC
PLATELET # BLD AUTO: 212 K/UL (ref 150–450)
PMV BLD AUTO: 10.8 FL (ref 9.2–12.9)
PROTHROMBIN TIME: 11.1 SEC (ref 9–12.5)
RBC # BLD AUTO: 3.98 M/UL (ref 4–5.4)
WBC # BLD AUTO: 7.45 K/UL (ref 3.9–12.7)

## 2024-11-29 PROCEDURE — 88341 IMHCHEM/IMCYTCHM EA ADD ANTB: CPT | Mod: TC,59 | Performed by: PATHOLOGY

## 2024-11-29 PROCEDURE — 85025 COMPLETE CBC W/AUTO DIFF WBC: CPT | Performed by: RADIOLOGY

## 2024-11-29 PROCEDURE — 20225 BONE BIOPSY TROCAR/NDL DEEP: CPT | Mod: RT,,, | Performed by: RADIOLOGY

## 2024-11-29 PROCEDURE — 99152 MOD SED SAME PHYS/QHP 5/>YRS: CPT

## 2024-11-29 PROCEDURE — 99153 MOD SED SAME PHYS/QHP EA: CPT

## 2024-11-29 PROCEDURE — 77012 CT SCAN FOR NEEDLE BIOPSY: CPT | Mod: TC | Performed by: RADIOLOGY

## 2024-11-29 PROCEDURE — 63600175 PHARM REV CODE 636 W HCPCS: Performed by: RADIOLOGY

## 2024-11-29 PROCEDURE — 77012 CT SCAN FOR NEEDLE BIOPSY: CPT | Mod: 26,,, | Performed by: RADIOLOGY

## 2024-11-29 PROCEDURE — 85730 THROMBOPLASTIN TIME PARTIAL: CPT | Performed by: RADIOLOGY

## 2024-11-29 PROCEDURE — 25000003 PHARM REV CODE 250: Performed by: RADIOLOGY

## 2024-11-29 PROCEDURE — 27200940 CT BIOPSY BONE DEEP (XPD)

## 2024-11-29 PROCEDURE — 85610 PROTHROMBIN TIME: CPT | Performed by: RADIOLOGY

## 2024-11-29 RX ORDER — FENTANYL CITRATE 50 UG/ML
INJECTION, SOLUTION INTRAMUSCULAR; INTRAVENOUS
Status: COMPLETED | OUTPATIENT
Start: 2024-11-29 | End: 2024-11-29

## 2024-11-29 RX ORDER — SODIUM CHLORIDE 9 MG/ML
INJECTION, SOLUTION INTRAVENOUS
Status: COMPLETED | OUTPATIENT
Start: 2024-11-29 | End: 2024-11-29

## 2024-11-29 RX ORDER — LIDOCAINE HYDROCHLORIDE 10 MG/ML
INJECTION, SOLUTION EPIDURAL; INFILTRATION; INTRACAUDAL; PERINEURAL
Status: COMPLETED | OUTPATIENT
Start: 2024-11-29 | End: 2024-11-29

## 2024-11-29 RX ORDER — MIDAZOLAM HYDROCHLORIDE 1 MG/ML
INJECTION, SOLUTION INTRAMUSCULAR; INTRAVENOUS
Status: COMPLETED | OUTPATIENT
Start: 2024-11-29 | End: 2024-11-29

## 2024-11-29 RX ADMIN — SODIUM CHLORIDE 250 ML/HR: 0.9 INJECTION, SOLUTION INTRAVENOUS at 09:11

## 2024-11-29 RX ADMIN — FENTANYL CITRATE 50 MCG: 50 INJECTION INTRAMUSCULAR; INTRAVENOUS at 09:11

## 2024-11-29 RX ADMIN — FENTANYL CITRATE 25 MCG: 50 INJECTION INTRAMUSCULAR; INTRAVENOUS at 09:11

## 2024-11-29 RX ADMIN — LIDOCAINE HYDROCHLORIDE 5 ML: 10 INJECTION, SOLUTION EPIDURAL; INFILTRATION; INTRACAUDAL at 09:11

## 2024-11-29 RX ADMIN — MIDAZOLAM 2 MG: 1 INJECTION INTRAMUSCULAR; INTRAVENOUS at 09:11

## 2024-11-29 NOTE — DISCHARGE INSTRUCTIONS
Medical Imaging Discharge Instructions    Discharge Instructions After:  Bone Biopsy    Diet:  Resume diet as prescribed by your physician    Medications:  Resume medications as prescribed by your physician and Avoid the use of muscle relaxants, sedatives, hypnotics or mood altering medications for 24 hours unless approved by your physician.    Activity:  Rest today, Avoid strenuous activity for 3 days, Do not drive an automobile or operate hazardous machinery for 24 hours, Make no major decisions or sign any legal documents for 24 hours, and Limited activities : 72 hrs    Care of Dressing and Incision:  Do not remove dressing until tomorrow, keep covered for 24 hrs and May change dressing or bandage as needed    Report to M.D. any of the Following:  Any severe pain, new onset pain or worsening symptoms, Excessive bleeding, bruising or swelling, Temperature of 101 degrees or above, and IV site may become tender. If so, place a warm, wet compress on IV site four times a day. This should relieve symptoms in a few days.    Follow up with your physician regarding the results of this exam. Please feel free to call the radiology department at [phone] for any problems or questions. Ask to speak with the radiology nurse.  Do not drive, sign legal documents or shower for the next 24 hours.  Burke Goa RN  Date of Service: 11/29/2024  10:25 AM

## 2024-11-29 NOTE — PLAN OF CARE
Pt tolerated thoracic biopsy well performed by dr ramos, n acute distress noted vs stable back to room in asu via stretcher with rn

## 2024-12-05 ENCOUNTER — TELEPHONE (OUTPATIENT)
Facility: CLINIC | Age: 84
End: 2024-12-05
Payer: MEDICARE

## 2024-12-05 NOTE — TELEPHONE ENCOUNTER
LVM to confirm appointment scheduled for 12/12 at 2:15 with Dr. Vogt and remind pt to have lab work done prior to visit.

## 2024-12-06 ENCOUNTER — LAB VISIT (OUTPATIENT)
Dept: LAB | Facility: HOSPITAL | Age: 84
End: 2024-12-06
Attending: INTERNAL MEDICINE
Payer: MEDICARE

## 2024-12-06 DIAGNOSIS — R91.8 MASS OF UPPER LOBE OF RIGHT LUNG: ICD-10-CM

## 2024-12-06 DIAGNOSIS — C34.11 MALIGNANT NEOPLASM OF UPPER LOBE OF RIGHT LUNG: ICD-10-CM

## 2024-12-06 LAB
ALBUMIN SERPL BCP-MCNC: 3.9 G/DL (ref 3.5–5.2)
ALP SERPL-CCNC: 66 U/L (ref 55–135)
ALT SERPL W/O P-5'-P-CCNC: 10 U/L (ref 10–44)
ANION GAP SERPL CALC-SCNC: 5 MMOL/L (ref 8–16)
AST SERPL-CCNC: 19 U/L (ref 10–40)
BASOPHILS # BLD AUTO: 0.03 K/UL (ref 0–0.2)
BASOPHILS NFR BLD: 0.5 % (ref 0–1.9)
BILIRUB SERPL-MCNC: 0.5 MG/DL (ref 0.1–1)
BUN SERPL-MCNC: 14 MG/DL (ref 8–23)
CALCIUM SERPL-MCNC: 9.2 MG/DL (ref 8.7–10.5)
CEA SERPL-MCNC: 1.2 NG/ML (ref 0–5)
CHLORIDE SERPL-SCNC: 102 MMOL/L (ref 95–110)
CO2 SERPL-SCNC: 34 MMOL/L (ref 23–29)
CREAT SERPL-MCNC: 0.6 MG/DL (ref 0.5–1.4)
DIFFERENTIAL METHOD BLD: ABNORMAL
EOSINOPHIL # BLD AUTO: 0.1 K/UL (ref 0–0.5)
EOSINOPHIL NFR BLD: 2.2 % (ref 0–8)
ERYTHROCYTE [DISTWIDTH] IN BLOOD BY AUTOMATED COUNT: 12.6 % (ref 11.5–14.5)
EST. GFR  (NO RACE VARIABLE): >60 ML/MIN/1.73 M^2
GLUCOSE SERPL-MCNC: 88 MG/DL (ref 70–110)
HCT VFR BLD AUTO: 36.7 % (ref 37–48.5)
HGB BLD-MCNC: 11.4 G/DL (ref 12–16)
IMM GRANULOCYTES # BLD AUTO: 0.01 K/UL (ref 0–0.04)
IMM GRANULOCYTES NFR BLD AUTO: 0.2 % (ref 0–0.5)
LYMPHOCYTES # BLD AUTO: 1.8 K/UL (ref 1–4.8)
LYMPHOCYTES NFR BLD: 30.5 % (ref 18–48)
MCH RBC QN AUTO: 29.4 PG (ref 27–31)
MCHC RBC AUTO-ENTMCNC: 31.1 G/DL (ref 32–36)
MCV RBC AUTO: 95 FL (ref 82–98)
MONOCYTES # BLD AUTO: 0.6 K/UL (ref 0.3–1)
MONOCYTES NFR BLD: 9.2 % (ref 4–15)
NEUTROPHILS # BLD AUTO: 3.5 K/UL (ref 1.8–7.7)
NEUTROPHILS NFR BLD: 57.4 % (ref 38–73)
NRBC BLD-RTO: 0 /100 WBC
PLATELET # BLD AUTO: 202 K/UL (ref 150–450)
PMV BLD AUTO: 9.7 FL (ref 9.2–12.9)
POTASSIUM SERPL-SCNC: 4 MMOL/L (ref 3.5–5.1)
PROT SERPL-MCNC: 6.8 G/DL (ref 6–8.4)
RBC # BLD AUTO: 3.88 M/UL (ref 4–5.4)
SODIUM SERPL-SCNC: 141 MMOL/L (ref 136–145)
WBC # BLD AUTO: 6 K/UL (ref 3.9–12.7)

## 2024-12-06 PROCEDURE — 80053 COMPREHEN METABOLIC PANEL: CPT | Performed by: INTERNAL MEDICINE

## 2024-12-06 PROCEDURE — 82378 CARCINOEMBRYONIC ANTIGEN: CPT | Performed by: INTERNAL MEDICINE

## 2024-12-06 PROCEDURE — 36415 COLL VENOUS BLD VENIPUNCTURE: CPT | Performed by: INTERNAL MEDICINE

## 2024-12-06 PROCEDURE — 85025 COMPLETE CBC W/AUTO DIFF WBC: CPT | Performed by: INTERNAL MEDICINE

## 2024-12-10 DIAGNOSIS — K21.9 GASTROESOPHAGEAL REFLUX DISEASE WITHOUT ESOPHAGITIS: ICD-10-CM

## 2024-12-12 ENCOUNTER — OFFICE VISIT (OUTPATIENT)
Facility: CLINIC | Age: 84
End: 2024-12-12
Payer: MEDICARE

## 2024-12-12 VITALS
TEMPERATURE: 98 F | DIASTOLIC BLOOD PRESSURE: 90 MMHG | RESPIRATION RATE: 16 BRPM | BODY MASS INDEX: 30.53 KG/M2 | SYSTOLIC BLOOD PRESSURE: 175 MMHG | WEIGHT: 190 LBS | HEART RATE: 71 BPM | HEIGHT: 66 IN

## 2024-12-12 DIAGNOSIS — R93.89 ABNORMAL CHEST CT: ICD-10-CM

## 2024-12-12 DIAGNOSIS — Z85.3 HISTORY OF BREAST CANCER: ICD-10-CM

## 2024-12-12 DIAGNOSIS — Z87.891 FORMER SMOKER: ICD-10-CM

## 2024-12-12 DIAGNOSIS — C34.11 PRIMARY CANCER OF RIGHT UPPER LOBE OF LUNG: ICD-10-CM

## 2024-12-12 DIAGNOSIS — Z90.13 S/P BILATERAL MASTECTOMY: ICD-10-CM

## 2024-12-12 DIAGNOSIS — R18.8 CIRRHOSIS OF LIVER WITH ASCITES, UNSPECIFIED HEPATIC CIRRHOSIS TYPE: ICD-10-CM

## 2024-12-12 DIAGNOSIS — K74.60 CIRRHOSIS OF LIVER WITH ASCITES, UNSPECIFIED HEPATIC CIRRHOSIS TYPE: ICD-10-CM

## 2024-12-12 DIAGNOSIS — R94.2 ABNORMAL PET SCAN OF LUNG: Primary | ICD-10-CM

## 2024-12-12 PROCEDURE — 1160F RVW MEDS BY RX/DR IN RCRD: CPT | Mod: CPTII,S$GLB,, | Performed by: INTERNAL MEDICINE

## 2024-12-12 PROCEDURE — 1159F MED LIST DOCD IN RCRD: CPT | Mod: CPTII,S$GLB,, | Performed by: INTERNAL MEDICINE

## 2024-12-12 PROCEDURE — 3288F FALL RISK ASSESSMENT DOCD: CPT | Mod: CPTII,S$GLB,, | Performed by: INTERNAL MEDICINE

## 2024-12-12 PROCEDURE — 99999 PR PBB SHADOW E&M-EST. PATIENT-LVL IV: CPT | Mod: PBBFAC,,, | Performed by: INTERNAL MEDICINE

## 2024-12-12 PROCEDURE — 99215 OFFICE O/P EST HI 40 MIN: CPT | Mod: S$GLB,,, | Performed by: INTERNAL MEDICINE

## 2024-12-12 PROCEDURE — 1157F ADVNC CARE PLAN IN RCRD: CPT | Mod: CPTII,S$GLB,, | Performed by: INTERNAL MEDICINE

## 2024-12-12 PROCEDURE — 1125F AMNT PAIN NOTED PAIN PRSNT: CPT | Mod: CPTII,S$GLB,, | Performed by: INTERNAL MEDICINE

## 2024-12-12 PROCEDURE — 1101F PT FALLS ASSESS-DOCD LE1/YR: CPT | Mod: CPTII,S$GLB,, | Performed by: INTERNAL MEDICINE

## 2024-12-12 PROCEDURE — G2211 COMPLEX E/M VISIT ADD ON: HCPCS | Mod: S$GLB,,, | Performed by: INTERNAL MEDICINE

## 2024-12-26 RX ORDER — PANTOPRAZOLE SODIUM 20 MG/1
20 TABLET, DELAYED RELEASE ORAL
Qty: 90 TABLET | Refills: 1 | Status: SHIPPED | OUTPATIENT
Start: 2024-12-26

## 2025-01-18 DIAGNOSIS — G47.09 OTHER INSOMNIA: ICD-10-CM

## 2025-01-18 DIAGNOSIS — F41.9 ANXIETY: ICD-10-CM

## 2025-01-21 ENCOUNTER — CLINICAL SUPPORT (OUTPATIENT)
Dept: RADIATION ONCOLOGY | Facility: CLINIC | Age: 85
End: 2025-01-21
Payer: MEDICARE

## 2025-01-21 DIAGNOSIS — C34.11 PRIMARY CANCER OF RIGHT UPPER LOBE OF LUNG: Primary | ICD-10-CM

## 2025-01-21 DIAGNOSIS — Z85.3 HISTORY OF BREAST CANCER: ICD-10-CM

## 2025-01-21 NOTE — Clinical Note
AC-- I agree continue imaging. Pt denies appreciable decline though imaging worrisome for recurrence of lung vs BCa. Her KPS would be a challenge with any therapy. Pls forward next PET and I'll be happy to weigh in.

## 2025-01-21 NOTE — PROGRESS NOTES
Lety Herbert  [id number]  1940 1/21/2025  No referring provider defined for this encounter.    DIAGNOSIS:  Cancer Staging   Primary cancer of right upper lobe of lung  Staging form: Lung, AJCC 8th Edition  - Clinical: Stage IA3 (cT1c, cN0, cM0) - Signed by Pedrito Ralph Jr., MD on 3/20/2023    REASON FOR VISIT: Routine scheduled follow-up.    The patient location is:  home  Visit type: Virtual visit with audio ONLY  The reason for the audio only service rather than synchronous audio and video virtual visit was related to technical difficulties or patient preference/necessity.    HISTORY OF PRESENT ILLNESS:   Lety Herbert is a 84 y.o. female former smoker (quit >40yrs ago) following Dr. Henley for diagnosis of COPD and ILD on supplemental home O2 who presented with chest pain with CTA chest noting 2.1 cm right upper lobe nodule demonstrating enlargement on short interval CT of the chest to 2.3 cm.  PET-CT confirmed SUV of 16.4 with development of a 2nd 1.3 mm subpleural right lower lobe nodule, SUV 2.3 thought infectious/inflammatory etiology.  No FDG uptake in the mediastinum or distant.  CT-guided biopsy returned non-small cell carcinoma, poorly differentiated with squamous features, lung primary favored.    Patient was seen by Dr. Henley who advised she is not operative candidate.  She was also seen by Dr. Vogt and is referred to discuss definitive radiotherapy options.    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  Treatment was well tolerated with mild fatigue.    INTERVAL HISTORY:   Patient continues largely unchanged and denies any appreciable decline in her QOL. She has several chronic conditions including CHF, COPD and severe PAD--deemed inoperable per family.  She endorses chronic  shortness of breath requiring supplemental oxygen.  She denies fever or chills. Occasional pruritus of skin. Has chronic aches and pains but reports back pain only with prolonged standing. No paresthesias, bowel or bladder issues.    Review of systems otherwise negative unless indicated in HPI/interval history.    Past Medical History:   Diagnosis Date    Abnormal chest CT 03/09/2023    Anemia     Basal cell carcinoma     nose     Cancer     breast    COPD (chronic obstructive pulmonary disease)     Depression     DVT (deep venous thrombosis)     Fall 03/20/2018    Former smoker 03/09/2023    Gastric ulceration     GERD (gastroesophageal reflux disease)     History of breast cancer 03/09/2023    History of frequent urinary tract infections     Hyperlipidemia     Hypertension     Malignant neoplasm of upper lobe of right lung (NSCLC favoring lung primary) 03/09/2023    Melanoma 2004?    left forearm    MRSA (methicillin resistant staph aureus) culture positive     Neuropathy     PE (pulmonary embolism)     Post-nasal drip 11/15/2018    Reflux     S/P bilateral mastectomy 03/09/2023     Past Surgical History:   Procedure Laterality Date    HYSTERECTOMY      MASTECTOMY, RADICAL Bilateral     TOTAL HIP ARTHROPLASTY Left 11/13/2017     Social History     Socioeconomic History    Marital status:      Spouse name: Jean    Number of children: 3   Tobacco Use    Smoking status: Former     Passive exposure: Past    Smokeless tobacco: Never   Substance and Sexual Activity    Alcohol use: No    Drug use: No    Sexual activity: Not Currently     Partners: Male   Social History Narrative    3 Children- 9 GC, 7 GGrands     Social Drivers of Health     Financial Resource Strain: Medium Risk (11/10/2021)    Overall Financial Resource Strain (CARDIA)     Difficulty of Paying Living Expenses: Somewhat hard   Food Insecurity: No Food Insecurity (11/10/2021)    Hunger Vital Sign     Worried About Running Out of Food in the  Last Year: Never true     Ran Out of Food in the Last Year: Never true   Transportation Needs: No Transportation Needs (11/10/2021)    PRAPARE - Transportation     Lack of Transportation (Medical): No     Lack of Transportation (Non-Medical): No   Physical Activity: Inactive (11/10/2021)    Exercise Vital Sign     Days of Exercise per Week: 0 days     Minutes of Exercise per Session: 0 min   Stress: Stress Concern Present (11/10/2021)    [employer] - Occupational Stress Questionnaire     Feeling of Stress : To some extent   Housing Stability: Low Risk  (11/10/2021)    Housing Stability Vital Sign     Unable to Pay for Housing in the Last Year: No     Number of Places Lived in the Last Year: 1     Unstable Housing in the Last Year: No     Family History   Problem Relation Name Age of Onset    Cancer Mother      Cancer Father      Heart disease Father      Melanoma Neg Hx      Psoriasis Neg Hx      Lupus Neg Hx      Eczema Neg Hx       Medication List with Changes/Refills   Current Medications    ALBUTEROL (PROVENTIL/VENTOLIN HFA) 90 MCG/ACTUATION INHALER    2 puffs every 4 hours as needed for cough, wheeze, or shortness of breath    ALPRAZOLAM (XANAX) 0.25 MG TABLET    TAKE ONE TABLET BY MOUTH NIGHTLY AS NEEDED FOR ANXIETY    ARFORMOTEROL (BROVANA) 15 MCG/2 ML NEBU    Take 2 mLs (15 mcg total) by nebulization 2 (two) times a day. Controller    ATORVASTATIN (LIPITOR) 20 MG TABLET    TAKE one TABLET BY MOUTH ONCE DAILY    AZELASTINE (ASTELIN) 137 MCG (0.1 %) NASAL SPRAY    1 spray by Nasal route daily as needed for Rhinitis.    BUDESONIDE (PULMICORT) 0.5 MG/2 ML NEBULIZER SOLUTION    Take 2 mLs (0.5 mg total) by nebulization 2 (two) times a day. Controller    DILTIAZEM (CARDIZEM CD) 120 MG CP24    Take 1 capsule (120 mg total) by mouth once daily.    GABAPENTIN (NEURONTIN) 300 MG CAPSULE    Take 1 capsule (300 mg total) by mouth every evening.    HYDROCODONE-ACETAMINOPHEN (NORCO) 7.5-325  MG PER TABLET    Take 1 tablet by mouth every 6 (six) hours as needed for Pain.    HYDROCORTISONE 2.5 % CREAM    Apply topically 2 (two) times daily. To hemorrhoids    LACTULOSE (CHRONULAC) 20 GRAM/30 ML SOLN    Take 30 mLs (20 g total) by mouth 2 (two) times daily as needed.    PANTOPRAZOLE (PROTONIX) 20 MG TABLET    TAKE ONE TABLET BY MOUTH EVERY DAY    POTASSIUM CHLORIDE SA (KLOR-CON M10) 10 MEQ TABLET    Take 1 tablet (10 mEq total) by mouth once daily.    SERTRALINE (ZOLOFT) 100 MG TABLET    TAKE 1 TABLET (100 MG TOTAL) BY MOUTH ONCE DAILY.    VALSARTAN-HYDROCHLOROTHIAZIDE (DIOVAN-HCT) 320-12.5 MG PER TABLET    TAKE ONE TABLET BY MOUTH EVERY MORNING    XARELTO 20 MG TAB    Take 1 tablet (20 mg total) by mouth once daily.     Review of patient's allergies indicates:   Allergen Reactions    Meperidine     Promethazine     Bactrim [sulfamethoxazole-trimethoprim] Rash       PHYSICAL EXAM:   (telemed visit)    ANCILLARY DATA:   11/29/24 T12 bone biopsy  BONE, T12 VERTEBRAE LESION, CORE BIOPSY:  --NEGATIVE FOR NEOPLASM  --FRAGMENTS OF BONE AND MARROW ELEMENTS     11/6/24 PET  Impression:  1.  Prior bilateral mastectomy and reconstruction, with improving, likely post radiation change in the pectoralis minor and right upper lobe.  2.  New FDG avid soft tissue nodule/node anterior to the IVC suspicious for malignancy/metastasis.  3.  New FDG avid focus in the right T12 pedicle suspicious for malignancy/metastatic disease.  4.  Unchanged nonspecific fusiform FDG activity along the right external iliac adjacent to the cecum, possibly from misregistration as previously described, and FDG activity in the terminal ileum.  5.  Minimal unchanged FDG activity in the left adrenal, similar to the previous exam.    7/22/24 MRI T spine  Impression:  Degradation by motion.  Abnormal marrow signal within the posterior elements of T12 to the right of midline with additional small foci of abnormal marrow signal within the rightward  aspect of the T4 and T5 vertebral bodies.  In light of the history of primary neoplasm, the findings are of concern for osseous metastases.  Remote compression deformity involving L1 with changes of previous vertebroplasty.  There is mild retropulsion of the posterior vertebral margin.  Degenerative disc/facet changes.      7/1/24 CT C  Impression:  1. Chronic consolidation of the right upper lobe with air bronchograms and mild bronchiectasis shows no detrimental change from previous exam.  Scattered subpleural interstitial thickening in the bilateral lungs is unchanged.  2. Juxtapleural nodular like density in the posterior right lower lobe measures 9 mm, could reflect a nodule or atelectasis.  Follow-up CT chest in 3 months recommended.  3. 2 mm subpleural nodular density in the anterior right middle lobe is unchanged.  4. Enlarged mediastinal and right hilar lymph nodes noted with no detrimental change.    12/19/23 CT C  IMPRESSION:  1.  Reference right upper lobe spiculated opacity is obscured by a broad area of linear density/consolidative change that abuts the anterior pleural surface. The degree of opacification has mildly increased upon comparison.  2.  Subpleural linear opacities in the right lower lobe superior segment.  3.  Small right pleural effusion with subjacent atelectasis.  4.  No evidence of pathologic lymphadenopathy in the right sury hepatis.  5.  Small sliding type hiatus hernia.  6.  Coronary artery calcification.  7.  L2 compression fracture that has undergone previous kyphoplasty.    9/19/23 PET  IMPRESSION:  Decreased size and FDG activity of the spiculated nodule in the anterior right upper lobe  Increased groundglass and interstitial opacities within the right upper lobe and superior segment right lower lobe compatible with post radiation changes  Bilateral mastectomies with reconstruction with interval increased FDG activity in the right pectoralis minor muscle compatible with  postradiation changes  Stable FDG activity in the region of the right external iliac vein without adenopathy.  Degenerative changes of the spine  Cardiomegaly with coronary artery calcification    ASSESSMENT: 84 y.o. female with stage IA3, [id number] poor diff SCCa RUL  PLAN:     - RT well tolerated.  Patient continues to suffer with several chronic conditions. No appreciable decline reported.  - MRI, PET concerning for recurrence at T spine and RP LN. Bx of T spine (-); repeat imaging planned per Dr. Vogt. DDx: lung ca vs remote Bca recurrence. KPS would certainly limit therapeutic options. Pt denies significant pain at back on today's visit--no RT planned.  - follow up with Dr. Vogt; PET planned.  - Return to clinic prn; pt requesting reduce burden of appts.    All questions answered and contact information provided. Patient understands free to call us anytime with any questions or concerns regarding radiation therapy.    I have personally evaluated this patient with a moderate to high complexity diagnosis.     Length of phone call: 10 minutes  Total time: 30 minutes dedicated to reviewing/interpreting pertinent laboratory/imaging/pathology as well as follow-up with concurrent consultants; reviewing and performing history; counseling patient on continuing oncologic recommendations; documentation in the electronic medical record including ordering of additional tests and/or radiation treatment protocol; and coordination of care with physicians with referrals placed as appropriate.    Each patient to whom he or she provides medical services by telemedicine is:  (1) informed of the relationship between the physician and patient and the respective role of any other health care provider with respect to management of the patient; and (2) notified that he or she may decline to receive medical services by telemedicine and may withdraw from such care at any time. Patient verbally consented to receive this service  via voice-only telephone call.    This service was not originating from a related E/M service provided within the previous 7 days nor will  to an E/M service or procedure within the next 24 hours or my soonest available appointment.  Prevailing standard of care was able to be met in this audio-only visit.      PHYSICIAN: Pedrito Ralph Jr, MD

## 2025-01-23 ENCOUNTER — OFFICE VISIT (OUTPATIENT)
Facility: CLINIC | Age: 85
End: 2025-01-23
Payer: MEDICARE

## 2025-01-23 VITALS
DIASTOLIC BLOOD PRESSURE: 96 MMHG | BODY MASS INDEX: 30.37 KG/M2 | HEIGHT: 66 IN | SYSTOLIC BLOOD PRESSURE: 161 MMHG | HEART RATE: 75 BPM | WEIGHT: 189 LBS | TEMPERATURE: 97 F | RESPIRATION RATE: 15 BRPM

## 2025-01-23 DIAGNOSIS — J44.9 CHRONIC OBSTRUCTIVE PULMONARY DISEASE, UNSPECIFIED COPD TYPE: Primary | ICD-10-CM

## 2025-01-23 DIAGNOSIS — R94.2 ABNORMAL PET SCAN OF LUNG: ICD-10-CM

## 2025-01-23 DIAGNOSIS — C34.11 PRIMARY CANCER OF RIGHT UPPER LOBE OF LUNG: ICD-10-CM

## 2025-01-23 PROCEDURE — 1126F AMNT PAIN NOTED NONE PRSNT: CPT | Mod: CPTII,S$GLB,, | Performed by: NURSE PRACTITIONER

## 2025-01-23 PROCEDURE — 99999 PR PBB SHADOW E&M-EST. PATIENT-LVL IV: CPT | Mod: PBBFAC,,, | Performed by: NURSE PRACTITIONER

## 2025-01-23 PROCEDURE — 1101F PT FALLS ASSESS-DOCD LE1/YR: CPT | Mod: CPTII,S$GLB,, | Performed by: NURSE PRACTITIONER

## 2025-01-23 PROCEDURE — G2211 COMPLEX E/M VISIT ADD ON: HCPCS | Mod: S$GLB,,, | Performed by: NURSE PRACTITIONER

## 2025-01-23 PROCEDURE — 1157F ADVNC CARE PLAN IN RCRD: CPT | Mod: CPTII,S$GLB,, | Performed by: NURSE PRACTITIONER

## 2025-01-23 PROCEDURE — 99215 OFFICE O/P EST HI 40 MIN: CPT | Mod: S$GLB,,, | Performed by: NURSE PRACTITIONER

## 2025-01-23 PROCEDURE — 3288F FALL RISK ASSESSMENT DOCD: CPT | Mod: CPTII,S$GLB,, | Performed by: NURSE PRACTITIONER

## 2025-01-23 PROCEDURE — 1159F MED LIST DOCD IN RCRD: CPT | Mod: CPTII,S$GLB,, | Performed by: NURSE PRACTITIONER

## 2025-01-23 RX ORDER — ALPRAZOLAM 0.25 MG/1
0.25 TABLET ORAL NIGHTLY
Qty: 60 TABLET | Refills: 1 | Status: SHIPPED | OUTPATIENT
Start: 2025-01-23

## 2025-01-23 NOTE — PROGRESS NOTES
[facility] Hematology/Oncology  PROGRESS NOTE -   Follow-up Visit      Subjective:       Patient ID:   NAME: Lety Herbert : 1940     84 y.o. female    Referring Doc: Shilo Perez MD  Other Physicians: Ashish Henley; Maryse Beckwith           Chief Complaint: RUL mass/lung ca f/u       History of Present Illness:     Patient returns today for a regularly scheduled follow-up visit.  The patient is here today to go over the results of the recently ordered labs, tests and studies. She is here with her daughter today.     She reports general lack of energy and fatigue. Breathing is stable on O2 3 liters per NC. She has residual chronic back pain issues, aches and pains which are stable.      She had PET scan on 2024 and she does have a repeat PET ordered but not scheduled.     She saw Dr Henley with pulm on 2024    She had a bone biopsy on  which has come back negative for cancer    She last saw Dr Ralph with rad/onc in 2025 for a tele visit.      No CP, HA's or N/V.    Dicussed covid precautions - she has been vaccinated    ROS:   GEN: normal without any fever, night sweats or weight loss; fatigue; chronic back pains and leg pains at night  HEENT: normal with no HA's, sore throat, stiff neck, changes in vision  CV: normal with no CP, SOB, PND, GAVIRIA or orthopnea  PULM: chronic SOB/GAVIRIA; O2 dependent , hemoptysis, sputum or pleuritic pain  GI: normal with no abdominal pain, nausea, vomiting, constipation, diarrhea, melanotic stools, BRBPR, or hematemesis  : normal with no hematuria, dysuria  BREAST: normal with no mass, discharge, pain  SKIN: normal with no rash, erythema, bruising, or swelling    Pain Scale:  6-7     Allergies:  Review of patient's allergies indicates:   Allergen Reactions    Meperidine     Promethazine     Bactrim [sulfamethoxazole-trimethoprim] Rash       Medications:    Current Outpatient Medications:     albuterol (PROVENTIL/VENTOLIN HFA) 90 mcg/actuation inhaler, 2 puffs  every 4 hours as needed for cough, wheeze, or shortness of breath, Disp: 18 g, Rfl: 11    ALPRAZolam (XANAX) 0.25 MG tablet, TAKE ONE TABLET BY MOUTH NIGHTLY AS NEEDED FOR ANXIETY, Disp: 60 tablet, Rfl: 1    arformoteroL (BROVANA) 15 mcg/2 mL Nebu, Take 2 mLs (15 mcg total) by nebulization 2 (two) times a day. Controller, Disp: 60 each, Rfl: 11    atorvastatin (LIPITOR) 20 MG tablet, TAKE one TABLET BY MOUTH ONCE DAILY, Disp: 90 tablet, Rfl: 1    azelastine (ASTELIN) 137 mcg (0.1 %) nasal spray, 1 spray by Nasal route daily as needed for Rhinitis., Disp: , Rfl:     budesonide (PULMICORT) 0.5 mg/2 mL nebulizer solution, Take 2 mLs (0.5 mg total) by nebulization 2 (two) times a day. Controller, Disp: 120 mL, Rfl: 11    diltiaZEM (CARDIZEM CD) 120 MG Cp24, Take 1 capsule (120 mg total) by mouth once daily., Disp: 90 capsule, Rfl: 3    gabapentin (NEURONTIN) 300 MG capsule, Take 1 capsule (300 mg total) by mouth every evening., Disp: 30 capsule, Rfl: 5    HYDROcodone-acetaminophen (NORCO) 7.5-325 mg per tablet, Take 1 tablet by mouth every 6 (six) hours as needed for Pain., Disp: 28 tablet, Rfl: 0    hydrocortisone 2.5 % cream, Apply topically 2 (two) times daily. To hemorrhoids, Disp: 28 g, Rfl: 0    lactulose (CHRONULAC) 20 gram/30 mL Soln, Take 30 mLs (20 g total) by mouth 2 (two) times daily as needed., Disp: 1800 mL, Rfl: 0    pantoprazole (PROTONIX) 20 MG tablet, TAKE ONE TABLET BY MOUTH EVERY DAY, Disp: 90 tablet, Rfl: 1    potassium chloride SA (KLOR-CON M10) 10 MEQ tablet, Take 1 tablet (10 mEq total) by mouth once daily., Disp: 90 tablet, Rfl: 3    sertraline (ZOLOFT) 100 MG tablet, TAKE 1 TABLET (100 MG TOTAL) BY MOUTH ONCE DAILY., Disp: 90 tablet, Rfl: 3    valsartan-hydrochlorothiazide (DIOVAN-HCT) 320-12.5 mg per tablet, TAKE ONE TABLET BY MOUTH EVERY MORNING, Disp: 90 tablet, Rfl: 4    XARELTO 20 mg Tab, Take 1 tablet (20 mg total) by mouth once daily., Disp: , Rfl:     PMHx/PSHx Updates:  See patient's  "last visit with Dr. 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 Patient's remote   clinical history of breast carcinoma is noticed. Based on the   immunohistochemical features, the current tumor is favored to be of lung   primary.            Objective:     Vitals:  Blood pressure (!) 161/96, pulse 75, temperature 97.2 °F (36.2 °C), temperature source Temporal, resp. rate 15, height 5' 6" (1.676 m), weight 85.7 kg (189 lb).    Physical Examination:   GEN: no apparent distress, comfortable; AAOx3; overweight; elderly  HEAD: atraumatic and normocephalic  EYES: no pallor, no icterus, PERRLA  ENT: OMM, no pharyngeal erythema, external ears WNL; no nasal discharge; no thrush  NECK: no masses, thyroid normal, trachea midline, no LAD/LN's, supple  CV: RRR with no murmur; normal pulse; normal S1 and S2; no pedal edema  CHEST: Normal respiratory effort; CTAB; normal breath sounds; no wheeze or crackles; O2 3 LIters per NC portable  ABDOM: nontender and nondistended; soft; normal bowel sounds; no rebound/guarding  MUSC/Skeletal: ROM normal; no crepitus; joints normal; no deformities; +arthropathy of hip/knees, hands; wheelchair  EXTREM: no clubbing, cyanosis, inflammation or swelling; varicosities in bilateral lower extremities  SKIN: no rashes, lesions, ulcers, petechiae or " subcutaneous nodules; chronic age related skin changes  : no carrillo  NEURO: grossly intact; motor/sensory WNL; AAOx3; no tremors  PSYCH: normal mood, affect and behavior  LYMPH: normal cervical, supraclavicular, axillary and groin LN's          Labs:     Lab Results   Component Value Date    WBC 6.00 12/06/2024    HGB 11.4 (L) 12/06/2024    HCT 36.7 (L) 12/06/2024    MCV 95 12/06/2024     12/06/2024       CMP  Sodium   Date Value Ref Range Status   12/06/2024 141 136 - 145 mmol/L Final   03/04/2019 142 134 - 144 mmol/L      Potassium   Date Value Ref Range Status   12/06/2024 4.0 3.5 - 5.1 mmol/L Final     Chloride   Date Value Ref Range Status   12/06/2024 102 95 - 110 mmol/L Final   03/04/2019 104 98 - 110 mmol/L      CO2   Date Value Ref Range Status   12/06/2024 34 (H) 23 - 29 mmol/L Final     Glucose   Date Value Ref Range Status   12/06/2024 88 70 - 110 mg/dL Final   03/04/2019 102 (H) 70 - 99 mg/dL      BUN   Date Value Ref Range Status   12/06/2024 14 8 - 23 mg/dL Final     Creatinine   Date Value Ref Range Status   12/06/2024 0.6 0.5 - 1.4 mg/dL Final   03/04/2019 0.48 (L) 0.60 - 1.40 mg/dL      Calcium   Date Value Ref Range Status   12/06/2024 9.2 8.7 - 10.5 mg/dL Final     Total Protein   Date Value Ref Range Status   12/06/2024 6.8 6.0 - 8.4 g/dL Final     Albumin   Date Value Ref Range Status   12/06/2024 3.9 3.5 - 5.2 g/dL Final   03/04/2019 4.0 3.1 - 4.7 g/dL      Total Bilirubin   Date Value Ref Range Status   12/06/2024 0.5 0.1 - 1.0 mg/dL Final     Comment:     For infants and newborns, interpretation of results should be based  on gestational age, weight and in agreement with clinical  observations.    Premature Infant recommended reference ranges:  Up to 24 hours.............<8.0 mg/dL  Up to 48 hours............<12.0 mg/dL  3-5 days..................<15.0 mg/dL  6-29 days.................<15.0 mg/dL       Alkaline Phosphatase   Date Value Ref Range Status   12/06/2024 66 55 - 135 U/L  Final     AST   Date Value Ref Range Status   12/06/2024 19 10 - 40 U/L Final     ALT   Date Value Ref Range Status   12/06/2024 10 10 - 44 U/L Final     Anion Gap   Date Value Ref Range Status   12/06/2024 5 (L) 8 - 16 mmol/L Final     eGFR   Date Value Ref Range Status   12/06/2024 >60.0 >60 mL/min/1.73 m^2 Final     Lab Results   Component Value Date    CEA 1.2 12/06/2024           Radiology/Diagnostic Studies:    PET 11/6/2024:    Impression:     1.  Prior bilateral mastectomy and reconstruction, with improving, likely post radiation change in the pectoralis minor and right upper lobe.     2.  New FDG avid soft tissue nodule/node anterior to the IVC suspicious for malignancy/metastasis.     3.  New FDG avid focus in the right T12 pedicle suspicious for malignancy/metastatic disease.     4.  Unchanged nonspecific fusiform FDG activity along the right external iliac adjacent to the cecum, possibly from misregistration as previously described, and FDG activity in the terminal ileum.     5.  Minimal unchanged FDG activity in the left adrenal, similar to the previous exam.               MRI T-spine 7/22/2024:    Impression:     Degradation by motion.     Abnormal marrow signal within the posterior elements of T12 to the right of midline with additional small foci of abnormal marrow signal within the rightward aspect of the T4 and T5 vertebral bodies.  In light of the history of primary neoplasm, the findings are of concern for osseous metastases.     Remote compression deformity involving L1 with changes of previous vertebroplasty.  There is mild retropulsion of the posterior vertebral margin.     Degenerative disc/facet changes.      CT Chest  7/1/2024:  Impression:     1. Chronic consolidation of the right upper lobe with air bronchograms and mild bronchiectasis shows no detrimental change from previous exam.  Scattered subpleural interstitial thickening in the bilateral lungs is unchanged.  2. Juxtapleural nodular  like density in the posterior right lower lobe measures 9 mm, could reflect a nodule or atelectasis.  Follow-up CT chest in 3 months recommended.  3. 2 mm subpleural nodular density in the anterior right middle lobe is unchanged.  4. Enlarged mediastinal and right hilar lymph nodes noted with no detrimental change.       PET 3/6/2024:    IMPRESSION:  1. Discoid airspace opacity in the paramediastinal right upper lobe favored to represent post radiation/post treatment change, obscuring the previously described pulmonary nodule in the anterior right upper lobe with no convincing focal increased FDG activity from background.     2. Bilateral mastectomy with reconstruction.     3. Asymmetric increased FDG activity in the right pectoralis minor muscle favored to represent radiation change/inflammation.     4. Indeterminate oval area of markedly increased FDG activity adjacent to the right external iliac as above, slightly increased in conspicuity/size from the previous exam. No convincing CT anatomic correlate is present although this is adjacent to the terminal ileum and misregistration from cecal uptake is a consideration. Consider correlation with a history of colonoscopy in this age group.     5. Mild nonspecific asymmetric increased FDG activity in the inferior left adrenal body, newly appreciated from the previous exam, and only marginally increased from background.         Chest CT 12/19/2023:    IMPRESSION:  1.  Reference right upper lobe spiculated opacity is obscured by a broad area of linear density/consolidative change that abuts the anterior pleural surface. The degree of opacification has mildly increased upon comparison.  2.  Subpleural linear opacities in the right lower lobe superior segment.  3.  Small right pleural effusion with subjacent atelectasis.  4.  No evidence of pathologic lymphadenopathy in the right sury hepatis.  5.  Small sliding type hiatus hernia.  6.  Coronary artery calcification.  7.   L2 compression fracture that has undergone previous kyphoplasty.    PET 9/19/2023:  IMPRESSION:  Decreased size and FDG activity of the spiculated nodule in the anterior right upper lobe     Increased groundglass and interstitial opacities within the right upper lobe and superior segment right lower lobe compatible with post radiation changes     Bilateral mastectomies with reconstruction with interval increased FDG activity in the right pectoralis minor muscle compatible with postradiation changes     Stable FDG activity in the region of the right external iliac vein without adenopathy.     Degenerative changes of the spine  Cardiomegaly with coronary artery calcification         CT chest 6/16/2023:    IMPRESSION:  Decrease in size of the mass within the anterior right upper lobe     Development of peripheral groundglass opacities in the right upper lobe and adjacent to the mass compatible with post radiation changes     No evidence of metastatic disease     Cardiomegaly        PET 1/11/2023:     IMPRESSION: Hypermetabolic 2.3 cm mass within the anterior right upper lobe suspicious for primary lung malignancy     Faint groundglass opacities throughout the lungs with prominence of the pulmonary vasculature and cardiomegaly which may be represent pulmonary edema.  Development of a 13 mm subpleural nodule in the right lower lobe. Since this is new since the most recent CT findings most likely are secondary to infectious or inflammatory process however metastatic lesion cannot be excluded     Increased FDG activity in the region of the right external iliac vein without corresponding adenopathy. This may be physiologic there is physiologic activity within a superficial vein in the upper right thigh and findings may be secondary to thrombophlebitis.. Follow-up CT abdomen and pelvis with contrast is recommended     Degenerative changes of the spine           CT Chest 12/15/2022:     IMPRESSION:  1. A 23 mm right upper  lobe noncalcified pulmonary nodule is highly suspicious for primary pulmonary neoplasm. Tissue diagnosis is recommended.  2. No findings of regional metastatic disease in the chest.  3. Enlarged pulmonary arteries, suggesting pulmonary arterial hypertension.  4. Cardiomegaly, with aortic and coronary arterial calcifications.     CTA 11/19/2022:     IMPRESSION:  1.  No pulmonary embolus detected.  2.  Interstitial abnormality, suspect mild edema superimposed upon chronic changes.  3.  Right upper lobe 2.1 cm nodule. Consider a non-contrast Chest CT at 3 months, a PET/CT, or tissue sampling. These guidelines do not apply to immunocompromised patients and patients with cancer   ADDENDUM #1         The presence of right upper lobe lung nodule needing further evaluation or follow-up has been discussed with Dr. Rick Salgado 11/19/2022 12:50 AM CST.     All lab results and imaging results have been reviewed and     I have reviewed all available lab results and radiology reports.    Assessment/Plan:   (1) 84 y.o. female with diagnosis of RUL lung mass who has been referred by Shilo Perez MD for evaluation by medical hematology/oncology. She has been followed by Dr Ashish Henley with pulmonary for her COPD and chronic hypoxemia/bronchitis, who was a former smoker.      - She had a CTA in Nov 2022 which was negative for a PE but showed a RUL lung mass, which was followed by a CT Chest on 12/15/2022 which confirmed a 2.3cm RUL mass.   - She had a PET scan on 1/11/2023 which showed a 2.3 cm hypermetabolic mass in the anterior right upper lobe abutting the superior vena cava along with development of a 13 mm subpleural nodule within the right lower lobe.         - She had CT guided biopsy of the RUL lung mass on 2/15/2023 with pathology coming back NSCLC favoring a lung primary.     - She is supposed to be on oxygen at home. She has portable tank and has a lot of GAVIRIA. She has chronic SOB.   - She is former smoker and quit  when she was 43yrs old.      - discussed the pathology and reviewed and discussed the latest NCCN guidelines (vs. 2-2023)  - unlikely candidate for any surgical intervention  - will refer to Rad/onc to see if there are any radiation options either monotherapy or in combination with low dose weekly chemotherapy  - CARIS on the pathology  - check CEA and up to date labs    3/27/2023:  - She saw Dr Ralph with rad/onc on 3/20/2023 and she is starting XRT monotherapy today with day#1    4/25/2023:  - she completed XRT and has some fatigue  - she will need post-XRT evaluation in about 4 weeks with rad/onc and expect her to need repeat scans in 6-8 weeks    5/25/2023:  - she saw Dr Ralph on 5/2 and he has CT Chest ordered post-XRT  - some residual fatigue and back pain  - due to see Dr Henley  - labs adequate    8/21/2023:  - she has been having some more back pain  - due for repeat CT with Dr Ralph in Sept/oct 2023, will go ahead and order PET now  - she needs to f/u with Dr Henley with pulmonary as well  - await PEt results, consider other scans if necessary  - CEA at 2.0      10/24/2023:  - she had PET scan in Sept 2023 with overall improved report  - she sees rad/onc again on 11/6  - rad/onc has already ordered the next Chest CT  - due for up to date labs incl. CEA    1/8/2024:  - She has residual chronic back pain issues, aches and pains in legs especially at night; doesn't sleep well at night  - She saw Dr Perez in Nov 2023  - She last saw Dr Ralph with rad/onc on 5/2/2023 and previously completed XRT; she was supposed to see him again on 11/6/2023 but did not  - she had Chest CT on 12/19/2023  - She is on portable O2; breathing up and down. No CP, HA's or N/V.  - She has not seen Dr Henley with pulmonary in some time    4/1/2024:  - She had bout of pneumonia and was hospitalized in Jan 2024  - She saw Dr Henley with pulm in Feb 2024 and Dr Grajeda in Jan 2024  - She had recent PEt scan on 3/6/2024 relatively  stable  - she saw Dr Ralph with rad/onc in Jan 2024 8/5/2024:  - She saw Dr Henley with pulm in May 2024; she saw Dr Perez in may 2024  - She last saw Dr Ralph with rad/onc in Jan 2024  - She had PEt scan on 3/6/2024; Ct chest on 7/1/2024  - Dr Dodge ordered a MRI of her T spine on 7/22 and radiologist mentioned in their report that could not exclude presence of cancer in her spine - discussed this with patient and she does not want to get any bone scan or studies to further evaluate  - CEA 1.7    12/12/2024:  -  She had PET scan on 11/6/2024  - She saw Dr Henley with pulm on 11/20/2024  - She saw Domonique Navas NP on 11/14/2024  - She had a bone biopsy on 11/29 which has come back negative for cancer  - CEA at 1.2    1/23/2025:   - repeat PET in February, need to get scheduled   - sees Dr. Henley in May   - saw Rad/onc last week and agreed to repeat PET       (2) Hx/of PE and DVT     (3) HTN and hypercholesterolemia     (4) COPD/chronic hypoxemia; bronchiectasis; chronic bronchitis - followed by Dr Ashish Henley/Shelbie Villalpando     (5) CHF and dysrhythmia     (6) GERD; gastric ulcer     (7) Hx/of breast cancer s/p bilateral mastectomies- followed by Dr Maryse Beckwith  - She has history of breast cancer in past around 2005  for which she has had bilateral mastectomies and is followed by Dr Beckwith. She did not require chemotherapy or radiation. She had reconstruction surgery with Dr Grimaldo. She saw Dr Beckwith couple of weeks ago.        (8) OA (Knees); chronic back pain; s/p Left total hip after fracture     (9) Urinary urgency/incontinence     (10) Migraine HA's     (11) Anxiety and Depression     (12) Hx/of melanoma left forearm, BCC     (13) Former smoker           VISIT DIAGNOSES:      Chronic obstructive pulmonary disease, unspecified COPD type    Primary cancer of right upper lobe of lung    Abnormal PET scan of lung      PLAN:  Repeat PET in FEb, need to schedule ; will continue with conservative approach;  ask Dr Ralph to review pet after completed.    f/u with Dr Henley with pulmonary in may 2025  She previously had completed monotherapy XRT; f/u with rad/onc as directed    Check up to date labs incl. CEA level every 3 months   F/u with PCP, Pulm, etc     RTC in  6 weeks with Dr. Vogt     Discussion:     COVID-19 Discussion:     I had long discussion with patient and any applicable family about the COVID-19 coronavirus epidemic and the recommended precautions with regard to cancer and/or hematology patients. I have re-iterated the CDC recommendations for adequate hand washing, use of hand -like products, and coughing into elbow, etc. In addition, especially for our patients who are on chemotherapy and/or our otherwise immunocompromised patients, I have recommended avoidance of crowds, including movie theaters, restaurants, churches, etc. I have recommended avoidance of any sick or symptomatic family members and/or friends. I have also recommended avoidance of any raw and unwashed food products, and general avoidance of food items that have not been prepared by themselves. The patient has been asked to call us immediately with any symptom developments, issues, questions or other general concerns.         Pathology Discussion:     I reviewed and discussed the pathology report(s) and radiograph reports (if available) in as simple to understand and/or laymen's terms to the best of my ability. I had an indepth conversation with the patient and went over the patient's individual diagnosis based on the information that was currently available. I discussed the TNM staging process with regard to the patient's particular cancer type, and the calculated stage based on the currently available TNM data and literature. I discussed the available prognostic data with regard to the current staging information and how it relates to the prognosis of their particular neoplastic process.          NCCN Guidelines:     I  "discussed the available treatment option(s) in accordance with the latest literature from the NCCN Clinical Practice Guidelines for the patient's particular type of cancer disorder. The NCCN Guidelines provide a "document evidence-based (and) consensus-driven management" of the care of oncology patients. The treatment recommendations were made not only in accordance to the NCCN guidelines, but also factored in to account the patient's overall age, condition, performance status and their medical co-morbidities. I went over the risks and benefits of the the treatment options (if any could be made) with regard to their particular cancer type, their cancer stage, their age, and their co-morbidities.         I have explained and the patient understands all of  the current recommendation(s). I have answered all of their questions to the best of my ability and to their complete satisfaction.        I have explained all of the above in detail and the patient understands all of the current recommendation(s). I have answered all of their questions to the best of my ability and to their complete satisfaction.   The patient is to continue with the current management plan.            Electronically signed by Domonique Valdes NP                  "

## 2025-01-23 NOTE — ADDENDUM NOTE
Addended by: KAYLEE DURAN [facility] on: 1/23/2025 02:13 PM     Modules accepted: Level of Service

## 2025-01-28 NOTE — TELEPHONE ENCOUNTER
----- Message from Shilo Perez MD sent at 2/5/2024  5:20 PM CST -----  By patient that her potassium level is low.  She has taking a water pill and that can lower the potassium and low potassium can cause charley horses and cramps.  I am sending a potassium supplement 1 pill a day as long as she takes the water pill.  She can pick it up from the pharmacy.   OB: Dr Deondre Quan MD    Hospital: [facility]      Onset:  Ongoing x last week    Location/Description: 33w4d pregnancy, calling w/ c/o BP = 129/82, seeing stars in periphery    Pt states BP normally runs 100s/70s. Has been running 130s/80s since last OB check on 25. Pt does endorse mild constant headache to crown, posterior head.     Pt does endorse mild cough last couple days, no other sick sx. Pt does endorse sick household w/ other children.     Pt call to triage yesterday w/ recc for immediate eval; pt declines triage reccs.     At time of call, pt denies chest pain when not coughing. Denies worsened difficulty breathing, SOB w/ pregnancy. + mild nagging HA to crown, posterior head. Pt does endorse intermitt seeing stars in periphery when concentrating or laying down that has happened a couple times over last two days. Denies blurred/double vision. Denies new numbness/tingling, pt has baseline varicose veins, ongoing intermitt numbness to B/L feet. Denies dizziness, weakness. Denies fevers, vomiting, diarrhea. Pt does endorse hx of eczema, ongoing itching but nothing new or worsened. Pt denies leg, hand, face swelling    Cramping/Contractions:  Pt does endorse ongoing BH contractions; nothing timeable or increasing in frequency, duration, intensity.     Vaginal Drainage/Bleeding:  none    Fetus Active?  Normal activity. Pt does not monitor kick counts.     Symptom :  Not applicable - see protocol section    Precipitating Factors:  Pt w/ mild cough, no other sick sx    Pain Scale (1-10), 10 highest: 2/10, mild nagging constant HA to crown, posterior head    LMP: Patient's last menstrual period was 2024 (approximate).    EDC:  3/14/2025    Gestational Age:  33w4d    Blood Type: A Rh Negative    OB History:    OB History    Para Term  AB Living   4 3 3 0 0 3   SAB IAB Ectopic Molar Multiple Live Births   0 0 0 0 0 3        Vaginal/C Section:  vaginal delivery x 3    Group B  Strep (pos or neg):  not yet completed    History of previous Labor & Delivery:         Recent visits (last 3-4 weeks) for same reason or recent surgery:  Last OB visit 25    PLAN:  Provider's office  See Provider within 24 hour    Patient/Caller agrees to follow recommendations. and Patient/Caller encouraged to call back if any questions or concerns.     Triage call to OB office, spoke to JIMI Littlejohn. Pt warm tx to OB office for further reccs, follow up.     Priority message to OB office for follow up.     Reason for Disposition   Other significant medical symptom is present, see that guideline (e.g., abdominal pain, chest pain, headache, leg swelling, signs of labor, vision changes)   [1] MODERATE headache (e.g., interferes with normal activities) AND [2] present > 24 hours AND [3] unexplained  (Exceptions: Has not tried pain medicines, typical migraine, or headache part of viral illness.)    Protocols used: Pregnancy - High Blood Pressure-A-AH, Pregnancy - Headache-A-AH

## 2025-02-08 DIAGNOSIS — E87.6 LOW BLOOD POTASSIUM: ICD-10-CM

## 2025-02-08 DIAGNOSIS — Z79.899 LONG TERM CURRENT USE OF DIURETIC: ICD-10-CM

## 2025-02-10 RX ORDER — POTASSIUM CHLORIDE 750 MG/1
10 TABLET, EXTENDED RELEASE ORAL
Qty: 90 TABLET | Refills: 3 | Status: SHIPPED | OUTPATIENT
Start: 2025-02-10

## 2025-02-13 ENCOUNTER — HOSPITAL ENCOUNTER (OUTPATIENT)
Dept: RADIOLOGY | Facility: HOSPITAL | Age: 85
Discharge: HOME | End: 2025-02-13
Attending: INTERNAL MEDICINE
Payer: MEDICARE

## 2025-02-13 VITALS — HEIGHT: 66 IN | BODY MASS INDEX: 29.73 KG/M2 | WEIGHT: 185 LBS

## 2025-02-13 DIAGNOSIS — Z85.3 HISTORY OF BREAST CANCER: ICD-10-CM

## 2025-02-13 DIAGNOSIS — C34.11 PRIMARY CANCER OF RIGHT UPPER LOBE OF LUNG: ICD-10-CM

## 2025-02-13 DIAGNOSIS — R94.2 ABNORMAL PET SCAN OF LUNG: ICD-10-CM

## 2025-02-13 PROCEDURE — 78815 PET IMAGE W/CT SKULL-THIGH: CPT | Mod: 26,PS,, | Performed by: RADIOLOGY

## 2025-02-13 PROCEDURE — A9552 F18 FDG: HCPCS | Mod: PO | Performed by: INTERNAL MEDICINE

## 2025-02-13 PROCEDURE — 78815 PET IMAGE W/CT SKULL-THIGH: CPT | Mod: TC,PO

## 2025-02-13 RX ORDER — FLUDEOXYGLUCOSE F18 500 MCI/ML
12 INJECTION INTRAVENOUS
Status: COMPLETED | OUTPATIENT
Start: 2025-02-13 | End: 2025-02-13

## 2025-02-13 RX ADMIN — FLUDEOXYGLUCOSE F-18 12 MILLICURIE: 500 INJECTION INTRAVENOUS at 03:02

## 2025-02-14 LAB — POCT GLUCOSE: 103 MG/DL (ref 70–110)

## 2025-02-27 ENCOUNTER — TELEPHONE (OUTPATIENT)
Facility: CLINIC | Age: 85
End: 2025-02-27
Payer: MEDICARE

## 2025-02-27 NOTE — TELEPHONE ENCOUNTER
LVM to confirm appointment scheduled for 3/6 at 10:30 with Dr. Vogt and remind pt to complete labs prior to visit.

## 2025-02-28 ENCOUNTER — LAB VISIT (OUTPATIENT)
Dept: LAB | Facility: HOSPITAL | Age: 85
End: 2025-02-28
Attending: INTERNAL MEDICINE
Payer: MEDICARE

## 2025-02-28 DIAGNOSIS — C34.11 MALIGNANT NEOPLASM OF UPPER LOBE OF RIGHT LUNG: ICD-10-CM

## 2025-02-28 DIAGNOSIS — R91.8 MASS OF UPPER LOBE OF RIGHT LUNG: ICD-10-CM

## 2025-02-28 LAB
ALBUMIN SERPL BCP-MCNC: 4.4 G/DL (ref 3.5–5.2)
ALP SERPL-CCNC: 83 U/L (ref 55–135)
ALT SERPL W/O P-5'-P-CCNC: 8 U/L (ref 10–44)
ANION GAP SERPL CALC-SCNC: 7 MMOL/L (ref 8–16)
AST SERPL-CCNC: 20 U/L (ref 10–40)
BASOPHILS # BLD AUTO: 0.03 K/UL (ref 0–0.2)
BASOPHILS NFR BLD: 0.4 % (ref 0–1.9)
BILIRUB SERPL-MCNC: 0.6 MG/DL (ref 0.1–1)
BUN SERPL-MCNC: 10 MG/DL (ref 8–23)
CALCIUM SERPL-MCNC: 9.6 MG/DL (ref 8.7–10.5)
CEA SERPL-MCNC: 1.2 NG/ML (ref 0–5)
CHLORIDE SERPL-SCNC: 101 MMOL/L (ref 95–110)
CO2 SERPL-SCNC: 31 MMOL/L (ref 23–29)
CREAT SERPL-MCNC: 0.6 MG/DL (ref 0.5–1.4)
DIFFERENTIAL METHOD BLD: ABNORMAL
EOSINOPHIL # BLD AUTO: 0.1 K/UL (ref 0–0.5)
EOSINOPHIL NFR BLD: 1.9 % (ref 0–8)
ERYTHROCYTE [DISTWIDTH] IN BLOOD BY AUTOMATED COUNT: 13.1 % (ref 11.5–14.5)
EST. GFR  (NO RACE VARIABLE): >60 ML/MIN/1.73 M^2
GLUCOSE SERPL-MCNC: 102 MG/DL (ref 70–110)
HCT VFR BLD AUTO: 40.6 % (ref 37–48.5)
HGB BLD-MCNC: 12.6 G/DL (ref 12–16)
IMM GRANULOCYTES # BLD AUTO: 0.02 K/UL (ref 0–0.04)
IMM GRANULOCYTES NFR BLD AUTO: 0.3 % (ref 0–0.5)
LYMPHOCYTES # BLD AUTO: 1.5 K/UL (ref 1–4.8)
LYMPHOCYTES NFR BLD: 21.4 % (ref 18–48)
MCH RBC QN AUTO: 28.7 PG (ref 27–31)
MCHC RBC AUTO-ENTMCNC: 31 G/DL (ref 32–36)
MCV RBC AUTO: 93 FL (ref 82–98)
MONOCYTES # BLD AUTO: 0.6 K/UL (ref 0.3–1)
MONOCYTES NFR BLD: 8.2 % (ref 4–15)
NEUTROPHILS # BLD AUTO: 4.6 K/UL (ref 1.8–7.7)
NEUTROPHILS NFR BLD: 67.8 % (ref 38–73)
NRBC BLD-RTO: 0 /100 WBC
PLATELET # BLD AUTO: 251 K/UL (ref 150–450)
PMV BLD AUTO: 10.8 FL (ref 9.2–12.9)
POTASSIUM SERPL-SCNC: 3.9 MMOL/L (ref 3.5–5.1)
PROT SERPL-MCNC: 7.7 G/DL (ref 6–8.4)
RBC # BLD AUTO: 4.39 M/UL (ref 4–5.4)
SODIUM SERPL-SCNC: 139 MMOL/L (ref 136–145)
WBC # BLD AUTO: 6.79 K/UL (ref 3.9–12.7)

## 2025-02-28 PROCEDURE — 80053 COMPREHEN METABOLIC PANEL: CPT | Performed by: INTERNAL MEDICINE

## 2025-02-28 PROCEDURE — 82378 CARCINOEMBRYONIC ANTIGEN: CPT | Performed by: INTERNAL MEDICINE

## 2025-02-28 PROCEDURE — 85025 COMPLETE CBC W/AUTO DIFF WBC: CPT | Performed by: INTERNAL MEDICINE

## 2025-02-28 PROCEDURE — 36415 COLL VENOUS BLD VENIPUNCTURE: CPT | Performed by: INTERNAL MEDICINE

## 2025-03-05 NOTE — PROGRESS NOTES
[facility] Hematology/Oncology  PROGRESS NOTE -   Follow-up Visit      Subjective:       Patient ID:   NAME: Lety Herbert : 1940     84 y.o. female    Referring Doc: Shilo Perez MD  Other Physicians: Ashish Henley; Maryse Beckwith           Chief Complaint: RUL mass/lung ca f/u       History of Present Illness:     Patient returns today for a regularly scheduled follow-up visit.  The patient is here today to go over the results of the recently ordered labs, tests and studies. She is here with her grandson today.     She reports general lack of energy and fatigue; very low energy. Breathing is stable on O2 per NC. She has residual chronic back pain issues, aches and pains which are stable.      She had recent PET scan on  2025 which unfortunately showing what looks like progressive cancer in LN's.     She last saw Dr Henley with pulm on 2024    She saw Domonique Navas NP on 2025; Dr aRlph with rad/onc on 2025    She previously had had a bone biopsy on  which has come back negative for cancer    She last saw Dr Perez in 2024                       ROS:   GEN: normal without any fever, night sweats or weight loss; fatigue; chronic back pains and leg pains at night  HEENT: normal with no HA's, sore throat, stiff neck, changes in vision  CV: normal with no CP, SOB, PND, GAVIRIA or orthopnea  PULM: chronic SOB/GAVIRIA; O2 dependent , hemoptysis, sputum or pleuritic pain  GI: normal with no abdominal pain, nausea, vomiting, constipation, diarrhea, melanotic stools, BRBPR, or hematemesis  : normal with no hematuria, dysuria  BREAST: normal with no mass, discharge, pain  SKIN: normal with no rash, erythema, bruising, or swelling    Pain Scale:  6-7     Allergies:  Review of patient's allergies indicates:   Allergen Reactions    Meperidine     Promethazine     Bactrim [sulfamethoxazole-trimethoprim] Rash       Medications:    Current Outpatient Medications:     albuterol (PROVENTIL/VENTOLIN HFA)  90 mcg/actuation inhaler, 2 puffs every 4 hours as needed for cough, wheeze, or shortness of breath, Disp: 18 g, Rfl: 11    ALPRAZolam (XANAX) 0.25 MG tablet, TAKE ONE TABLET BY MOUTH NIGHTLY AS NEEDED FOR ANXIETY, Disp: 60 tablet, Rfl: 1    arformoteroL (BROVANA) 15 mcg/2 mL Nebu, Take 2 mLs (15 mcg total) by nebulization 2 (two) times a day. Controller, Disp: 60 each, Rfl: 11    atorvastatin (LIPITOR) 20 MG tablet, TAKE one TABLET BY MOUTH ONCE DAILY, Disp: 90 tablet, Rfl: 1    azelastine (ASTELIN) 137 mcg (0.1 %) nasal spray, 1 spray by Nasal route daily as needed for Rhinitis., Disp: , Rfl:     budesonide (PULMICORT) 0.5 mg/2 mL nebulizer solution, Take 2 mLs (0.5 mg total) by nebulization 2 (two) times a day. Controller, Disp: 120 mL, Rfl: 11    diltiaZEM (CARDIZEM CD) 120 MG Cp24, Take 1 capsule (120 mg total) by mouth once daily., Disp: 90 capsule, Rfl: 3    gabapentin (NEURONTIN) 300 MG capsule, Take 1 capsule (300 mg total) by mouth every evening., Disp: 30 capsule, Rfl: 5    HYDROcodone-acetaminophen (NORCO) 7.5-325 mg per tablet, Take 1 tablet by mouth every 6 (six) hours as needed for Pain., Disp: 28 tablet, Rfl: 0    hydrocortisone 2.5 % cream, Apply topically 2 (two) times daily. To hemorrhoids, Disp: 28 g, Rfl: 0    lactulose (CHRONULAC) 20 gram/30 mL Soln, Take 30 mLs (20 g total) by mouth 2 (two) times daily as needed., Disp: 1800 mL, Rfl: 0    pantoprazole (PROTONIX) 20 MG tablet, TAKE ONE TABLET BY MOUTH EVERY DAY, Disp: 90 tablet, Rfl: 1    potassium chloride (KLOR-CON) 10 MEQ TbSR, TAKE 1 TABLET BY MOUTH ONCE DAILY, Disp: 90 tablet, Rfl: 3    sertraline (ZOLOFT) 100 MG tablet, TAKE 1 TABLET (100 MG TOTAL) BY MOUTH ONCE DAILY., Disp: 90 tablet, Rfl: 3    valsartan-hydrochlorothiazide (DIOVAN-HCT) 320-12.5 mg per tablet, TAKE ONE TABLET BY MOUTH EVERY MORNING, Disp: 90 tablet, Rfl: 4    XARELTO 20 mg Tab, Take 1 tablet (20 mg total) by mouth once daily., Disp: , Rfl:     PMHx/PSHx Updates:  See  "patient's last visit with me on 12/12/2024.  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 Patient's remote   clinical history of breast carcinoma is noticed. Based on the   immunohistochemical features, the current tumor is favored to be of lung   primary.            Objective:     Vitals:  Blood pressure (!) 144/79, pulse 80, temperature 97.7 °F (36.5 °C), temperature source Temporal, resp. rate 16, height 5' 6" (1.676 m), weight 84.2 kg (185 lb 9.6 oz), SpO2 97%.    Physical Examination:   GEN: no apparent distress, comfortable; AAOx3; overweight; elderly  HEAD: atraumatic and normocephalic  EYES: no pallor, no icterus, PERRLA  ENT: OMM, no pharyngeal erythema, external ears WNL; no nasal discharge; no thrush  NECK: no masses, thyroid normal, trachea midline, no LAD/LN's, supple  CV: RRR with no murmur; normal pulse; normal S1 and S2; no pedal edema  CHEST: Normal respiratory effort; CTAB; normal breath sounds; no wheeze or crackles; O2 per NC portable  ABDOM: nontender and nondistended; soft; normal bowel sounds; no rebound/guarding  MUSC/Skeletal: ROM normal; no crepitus; joints normal; no deformities; +arthropathy of hip/knees, hands; wheelchair  EXTREM: no clubbing, cyanosis, inflammation or swelling; varicosities in bilateral lower extremities  SKIN: no rashes, lesions, ulcers, petechiae " or subcutaneous nodules; chronic age related skin changes  : no carrillo  NEURO: grossly intact; motor/sensory WNL; AAOx3; no tremors  PSYCH: normal mood, affect and behavior  LYMPH: normal cervical, supraclavicular, axillary and groin LN's          Labs:     Lab Results   Component Value Date    WBC 6.79 02/28/2025    HGB 12.6 02/28/2025    HCT 40.6 02/28/2025    MCV 93 02/28/2025     02/28/2025       CMP  Sodium   Date Value Ref Range Status   02/28/2025 139 136 - 145 mmol/L Final   03/04/2019 142 134 - 144 mmol/L      Potassium   Date Value Ref Range Status   02/28/2025 3.9 3.5 - 5.1 mmol/L Final     Chloride   Date Value Ref Range Status   02/28/2025 101 95 - 110 mmol/L Final   03/04/2019 104 98 - 110 mmol/L      CO2   Date Value Ref Range Status   02/28/2025 31 (H) 23 - 29 mmol/L Final     Glucose   Date Value Ref Range Status   02/28/2025 102 70 - 110 mg/dL Final   03/04/2019 102 (H) 70 - 99 mg/dL      BUN   Date Value Ref Range Status   02/28/2025 10 8 - 23 mg/dL Final     Creatinine   Date Value Ref Range Status   02/28/2025 0.6 0.5 - 1.4 mg/dL Final   03/04/2019 0.48 (L) 0.60 - 1.40 mg/dL      Calcium   Date Value Ref Range Status   02/28/2025 9.6 8.7 - 10.5 mg/dL Final     Total Protein   Date Value Ref Range Status   02/28/2025 7.7 6.0 - 8.4 g/dL Final     Albumin   Date Value Ref Range Status   02/28/2025 4.4 3.5 - 5.2 g/dL Final   03/04/2019 4.0 3.1 - 4.7 g/dL      Total Bilirubin   Date Value Ref Range Status   02/28/2025 0.6 0.1 - 1.0 mg/dL Final     Comment:     For infants and newborns, interpretation of results should be based  on gestational age, weight and in agreement with clinical  observations.    Premature Infant recommended reference ranges:  Up to 24 hours.............<8.0 mg/dL  Up to 48 hours............<12.0 mg/dL  3-5 days..................<15.0 mg/dL  6-29 days.................<15.0 mg/dL       Alkaline Phosphatase   Date Value Ref Range Status   02/28/2025 83 55 - 135 U/L Final      AST   Date Value Ref Range Status   02/28/2025 20 10 - 40 U/L Final     ALT   Date Value Ref Range Status   02/28/2025 8 (L) 10 - 44 U/L Final     Anion Gap   Date Value Ref Range Status   02/28/2025 7 (L) 8 - 16 mmol/L Final     eGFR   Date Value Ref Range Status   02/28/2025 >60.0 >60 mL/min/1.73 m^2 Final     Lab Results   Component Value Date    CEA 1.2 02/28/2025           Radiology/Diagnostic Studies:    PET 2/13/2025:  Impression:     1. Interval development of numerous enlarged, hypermetabolic retrocrural and abdominal retroperitoneal lymph nodes, as well as left supraclavicular lymph nodes, compatible with metastatic disease.  2. Nonspecific hypermetabolic foci in the left neck and in the right hilar region, without CT correlate.  3. Interval increased size and FDG uptake of hypermetabolic osseous metastasis in the T12 vertebral pedicle.  4. Additional observations as described.      PET 11/6/2024:    Impression:     1.  Prior bilateral mastectomy and reconstruction, with improving, likely post radiation change in the pectoralis minor and right upper lobe.     2.  New FDG avid soft tissue nodule/node anterior to the IVC suspicious for malignancy/metastasis.     3.  New FDG avid focus in the right T12 pedicle suspicious for malignancy/metastatic disease.     4.  Unchanged nonspecific fusiform FDG activity along the right external iliac adjacent to the cecum, possibly from misregistration as previously described, and FDG activity in the terminal ileum.     5.  Minimal unchanged FDG activity in the left adrenal, similar to the previous exam.               MRI T-spine 7/22/2024:    Impression:     Degradation by motion.     Abnormal marrow signal within the posterior elements of T12 to the right of midline with additional small foci of abnormal marrow signal within the rightward aspect of the T4 and T5 vertebral bodies.  In light of the history of primary neoplasm, the findings are of concern for osseous  metastases.     Remote compression deformity involving L1 with changes of previous vertebroplasty.  There is mild retropulsion of the posterior vertebral margin.     Degenerative disc/facet changes.      CT Chest  7/1/2024:  Impression:     1. Chronic consolidation of the right upper lobe with air bronchograms and mild bronchiectasis shows no detrimental change from previous exam.  Scattered subpleural interstitial thickening in the bilateral lungs is unchanged.  2. Juxtapleural nodular like density in the posterior right lower lobe measures 9 mm, could reflect a nodule or atelectasis.  Follow-up CT chest in 3 months recommended.  3. 2 mm subpleural nodular density in the anterior right middle lobe is unchanged.  4. Enlarged mediastinal and right hilar lymph nodes noted with no detrimental change.       PET 3/6/2024:    IMPRESSION:  1. Discoid airspace opacity in the paramediastinal right upper lobe favored to represent post radiation/post treatment change, obscuring the previously described pulmonary nodule in the anterior right upper lobe with no convincing focal increased FDG activity from background.     2. Bilateral mastectomy with reconstruction.     3. Asymmetric increased FDG activity in the right pectoralis minor muscle favored to represent radiation change/inflammation.     4. Indeterminate oval area of markedly increased FDG activity adjacent to the right external iliac as above, slightly increased in conspicuity/size from the previous exam. No convincing CT anatomic correlate is present although this is adjacent to the terminal ileum and misregistration from cecal uptake is a consideration. Consider correlation with a history of colonoscopy in this age group.     5. Mild nonspecific asymmetric increased FDG activity in the inferior left adrenal body, newly appreciated from the previous exam, and only marginally increased from background.         Chest CT 12/19/2023:    IMPRESSION:  1.  Reference right  upper lobe spiculated opacity is obscured by a broad area of linear density/consolidative change that abuts the anterior pleural surface. The degree of opacification has mildly increased upon comparison.  2.  Subpleural linear opacities in the right lower lobe superior segment.  3.  Small right pleural effusion with subjacent atelectasis.  4.  No evidence of pathologic lymphadenopathy in the right sury hepatis.  5.  Small sliding type hiatus hernia.  6.  Coronary artery calcification.  7.  L2 compression fracture that has undergone previous kyphoplasty.    PET 9/19/2023:  IMPRESSION:  Decreased size and FDG activity of the spiculated nodule in the anterior right upper lobe     Increased groundglass and interstitial opacities within the right upper lobe and superior segment right lower lobe compatible with post radiation changes     Bilateral mastectomies with reconstruction with interval increased FDG activity in the right pectoralis minor muscle compatible with postradiation changes     Stable FDG activity in the region of the right external iliac vein without adenopathy.     Degenerative changes of the spine  Cardiomegaly with coronary artery calcification         CT chest 6/16/2023:    IMPRESSION:  Decrease in size of the mass within the anterior right upper lobe     Development of peripheral groundglass opacities in the right upper lobe and adjacent to the mass compatible with post radiation changes     No evidence of metastatic disease     Cardiomegaly        PET 1/11/2023:     IMPRESSION: Hypermetabolic 2.3 cm mass within the anterior right upper lobe suspicious for primary lung malignancy     Faint groundglass opacities throughout the lungs with prominence of the pulmonary vasculature and cardiomegaly which may be represent pulmonary edema.  Development of a 13 mm subpleural nodule in the right lower lobe. Since this is new since the most recent CT findings most likely are secondary to infectious or  inflammatory process however metastatic lesion cannot be excluded     Increased FDG activity in the region of the right external iliac vein without corresponding adenopathy. This may be physiologic there is physiologic activity within a superficial vein in the upper right thigh and findings may be secondary to thrombophlebitis.. Follow-up CT abdomen and pelvis with contrast is recommended     Degenerative changes of the spine           CT Chest 12/15/2022:     IMPRESSION:  1. A 23 mm right upper lobe noncalcified pulmonary nodule is highly suspicious for primary pulmonary neoplasm. Tissue diagnosis is recommended.  2. No findings of regional metastatic disease in the chest.  3. Enlarged pulmonary arteries, suggesting pulmonary arterial hypertension.  4. Cardiomegaly, with aortic and coronary arterial calcifications.     CTA 11/19/2022:     IMPRESSION:  1.  No pulmonary embolus detected.  2.  Interstitial abnormality, suspect mild edema superimposed upon chronic changes.  3.  Right upper lobe 2.1 cm nodule. Consider a non-contrast Chest CT at 3 months, a PET/CT, or tissue sampling. These guidelines do not apply to immunocompromised patients and patients with cancer   ADDENDUM #1         The presence of right upper lobe lung nodule needing further evaluation or follow-up has been discussed with Dr. Rick Salgado 11/19/2022 12:50 AM CST.     All lab results and imaging results have been reviewed and     I have reviewed all available lab results and radiology reports.    Assessment/Plan:   (1) 84 y.o. female with diagnosis of RUL lung mass who has been referred by Shilo Perez MD for evaluation by medical hematology/oncology. She has been followed by Dr Ashish Henley with pulmonary for her COPD and chronic hypoxemia/bronchitis, who was a former smoker.      - She had a CTA in Nov 2022 which was negative for a PE but showed a RUL lung mass, which was followed by a CT Chest on 12/15/2022 which confirmed a 2.3cm RUL mass.    - She had a PET scan on 1/11/2023 which showed a 2.3 cm hypermetabolic mass in the anterior right upper lobe abutting the superior vena cava along with development of a 13 mm subpleural nodule within the right lower lobe.         - She had CT guided biopsy of the RUL lung mass on 2/15/2023 with pathology coming back NSCLC favoring a lung primary.     - She is supposed to be on oxygen at home. She has portable tank and has a lot of GAVIRIA. She has chronic SOB.   - She is former smoker and quit when she was 43yrs old.      - discussed the pathology and reviewed and discussed the latest NCCN guidelines (vs. 2-2023)  - unlikely candidate for any surgical intervention  - will refer to Rad/onc to see if there are any radiation options either monotherapy or in combination with low dose weekly chemotherapy  - CARIS on the pathology  - check CEA and up to date labs    3/27/2023:  - She saw Dr Ralph with rad/onc on 3/20/2023 and she is starting XRT monotherapy today with day#1    4/25/2023:  - she completed XRT and has some fatigue  - she will need post-XRT evaluation in about 4 weeks with rad/onc and expect her to need repeat scans in 6-8 weeks    5/25/2023:  - she saw Dr Ralph on 5/2 and he has CT Chest ordered post-XRT  - some residual fatigue and back pain  - due to see Dr Henley  - labs adequate    8/21/2023:  - she has been having some more back pain  - due for repeat CT with Dr Ralph in Sept/oct 2023, will go ahead and order PET now  - she needs to f/u with Dr Henley with pulmonary as well  - await PEt results, consider other scans if necessary  - CEA at 2.0      10/24/2023:  - she had PET scan in Sept 2023 with overall improved report  - she sees rad/onc again on 11/6  - rad/onc has already ordered the next Chest CT  - due for up to date labs incl. CEA    1/8/2024:  - She has residual chronic back pain issues, aches and pains in legs especially at night; doesn't sleep well at night  - She saw Dr Perez in Nov 2023  -  She last saw Dr Ralph with rad/onc on 5/2/2023 and previously completed XRT; she was supposed to see him again on 11/6/2023 but did not  - she had Chest CT on 12/19/2023  - She is on portable O2; breathing up and down. No CP, HA's or N/V.  - She has not seen Dr Henley with pulmonary in some time    4/1/2024:  - She had bout of pneumonia and was hospitalized in Jan 2024  - She saw Dr Henley with pulm in Feb 2024 and Dr Grajeda in Jan 2024  - She had recent PEt scan on 3/6/2024 relatively stable  - she saw Dr Ralph with rad/onc in Jan 2024 8/5/2024:  - She saw Dr Henley with pulm in May 2024; she saw Dr Perez in may 2024  - She last saw Dr Ralph with rad/onc in Jan 2024  - She had PEt scan on 3/6/2024; Ct chest on 7/1/2024  - Dr Dodge ordered a MRI of her T spine on 7/22 and radiologist mentioned in their report that could not exclude presence of cancer in her spine - discussed this with patient and she does not want to get any bone scan or studies to further evaluate  - CEA 1.7    12/12/2024:  -  She had PET scan on 11/6/2024  - She saw Dr Henley with pulm on 11/20/2024  - She saw Domonique Navas NP on 11/14/2024  - She had a bone biopsy on 11/29 which has come back negative for cancer  - CEA at 1.2    3/6/2025:  - She had recent PET scan on  2/13/2025 which unfortunately showing what looks like progressive cancer in LN's.   - She last saw Dr Henley with pulm on 11/20/2024  - She saw Domonique Navas NP on 1/23/2025; Dr Ralph with rad/onc on 2/11/2025  - I am not sure she could safely undergo any meaningful regimen of chemotherapy given her advanced age, lung function and poor condition  - see is we can get Dr henley to see her again and ask IR if there are any LN's that can be safely biopsied  - CEA 1.2  - no anemic      (2) Hx/of PE and DVT     (3) HTN and hypercholesterolemia     (4) COPD/chronic hypoxemia; bronchiectasis; chronic bronchitis - followed by Dr Ashish Henley/Shelbie Villalpando     (5) CHF and dysrhythmia      (6) GERD; gastric ulcer     (7) Hx/of breast cancer s/p bilateral mastectomies- followed by Dr Maryse Beckwith  - She has history of breast cancer in past around 2005  for which she has had bilateral mastectomies and is followed by Dr Beckwith. She did not require chemotherapy or radiation. She had reconstruction surgery with Dr Grimaldo. She saw Dr Beckwith couple of weeks ago.        (8) OA (Knees); chronic back pain; s/p Left total hip after fracture     (9) Urinary urgency/incontinence     (10) Migraine HA's     (11) Anxiety and Depression     (12) Hx/of melanoma left forearm, BCC     (13) Former smoker           VISIT DIAGNOSES:      Abnormal PET scan of lung    Primary cancer of right upper lobe of lung    S/P bilateral mastectomy    History of breast cancer    Cirrhosis of liver with ascites, unspecified hepatic cirrhosis type    Abnormal chest CT    Former smoker          PLAN:  Ask Dr gasca to see patient again for re-evaluation, ? Repeat bronch  Ask IR to evaluate for potential biopsy of one of the new LN's  She previously had completed monotherapy XRT; f/u with rad/onc as directed    I am not sure she could safely undergo any meaningful regimen of chemotherapy given her advanced age, lung function and poor condition  Check up to date labs incl. CEA level every 3 months   F/u with PCP, Pulm, etc     RTC in  4 weeks  Fax note to Shilo Perez MD; Amena Mejia, Domonique Ralph       Discussion:     COVID-19 Discussion:     I had long discussion with patient and any applicable family about the COVID-19 coronavirus epidemic and the recommended precautions with regard to cancer and/or hematology patients. I have re-iterated the CDC recommendations for adequate hand washing, use of hand -like products, and coughing into elbow, etc. In addition, especially for our patients who are on chemotherapy and/or our otherwise immunocompromised patients, I have recommended avoidance of crowds, including  "movie theaters, restaurants, churches, etc. I have recommended avoidance of any sick or symptomatic family members and/or friends. I have also recommended avoidance of any raw and unwashed food products, and general avoidance of food items that have not been prepared by themselves. The patient has been asked to call us immediately with any symptom developments, issues, questions or other general concerns.         Pathology Discussion:     I reviewed and discussed the pathology report(s) and radiograph reports (if available) in as simple to understand and/or laymen's terms to the best of my ability. I had an indepth conversation with the patient and went over the patient's individual diagnosis based on the information that was currently available. I discussed the TNM staging process with regard to the patient's particular cancer type, and the calculated stage based on the currently available TNM data and literature. I discussed the available prognostic data with regard to the current staging information and how it relates to the prognosis of their particular neoplastic process.          NCCN Guidelines:     I discussed the available treatment option(s) in accordance with the latest literature from the NCCN Clinical Practice Guidelines for the patient's particular type of cancer disorder. The NCCN Guidelines provide a "document evidence-based (and) consensus-driven management" of the care of oncology patients. The treatment recommendations were made not only in accordance to the NCCN guidelines, but also factored in to account the patient's overall age, condition, performance status and their medical co-morbidities. I went over the risks and benefits of the the treatment options (if any could be made) with regard to their particular cancer type, their cancer stage, their age, and their co-morbidities.         I have explained and the patient understands all of  the current recommendation(s). I have answered all of " their questions to the best of my ability and to their complete satisfaction.      I spent over 25 mins of time with the patient. Reviewed results of the recently ordered labs, tests and studies; made directives with regards to the results. Over half of this time was spent couseling and coordinating care.    I have explained all of the above in detail and the patient understands all of the current recommendation(s). I have answered all of their questions to the best of my ability and to their complete satisfaction.   The patient is to continue with the current management plan.            Electronically signed by Isacc Vogt MD

## 2025-03-06 ENCOUNTER — OFFICE VISIT (OUTPATIENT)
Facility: CLINIC | Age: 85
End: 2025-03-06
Payer: MEDICARE

## 2025-03-06 VITALS
HEIGHT: 66 IN | TEMPERATURE: 98 F | SYSTOLIC BLOOD PRESSURE: 144 MMHG | OXYGEN SATURATION: 97 % | RESPIRATION RATE: 16 BRPM | WEIGHT: 185.63 LBS | DIASTOLIC BLOOD PRESSURE: 79 MMHG | BODY MASS INDEX: 29.83 KG/M2 | HEART RATE: 80 BPM

## 2025-03-06 DIAGNOSIS — C34.11 PRIMARY CANCER OF RIGHT UPPER LOBE OF LUNG: ICD-10-CM

## 2025-03-06 DIAGNOSIS — K74.60 CIRRHOSIS OF LIVER WITH ASCITES, UNSPECIFIED HEPATIC CIRRHOSIS TYPE: ICD-10-CM

## 2025-03-06 DIAGNOSIS — Z85.3 HISTORY OF BREAST CANCER: ICD-10-CM

## 2025-03-06 DIAGNOSIS — R18.8 CIRRHOSIS OF LIVER WITH ASCITES, UNSPECIFIED HEPATIC CIRRHOSIS TYPE: ICD-10-CM

## 2025-03-06 DIAGNOSIS — R93.89 ABNORMAL CHEST CT: ICD-10-CM

## 2025-03-06 DIAGNOSIS — Z87.891 FORMER SMOKER: ICD-10-CM

## 2025-03-06 DIAGNOSIS — R94.2 ABNORMAL PET SCAN OF LUNG: Primary | ICD-10-CM

## 2025-03-06 DIAGNOSIS — Z90.13 S/P BILATERAL MASTECTOMY: ICD-10-CM

## 2025-03-06 PROCEDURE — 99999 PR PBB SHADOW E&M-EST. PATIENT-LVL IV: CPT | Mod: PBBFAC,,, | Performed by: INTERNAL MEDICINE

## 2025-03-06 PROCEDURE — 99215 OFFICE O/P EST HI 40 MIN: CPT | Mod: S$GLB,,, | Performed by: INTERNAL MEDICINE

## 2025-03-06 PROCEDURE — 1125F AMNT PAIN NOTED PAIN PRSNT: CPT | Mod: CPTII,S$GLB,, | Performed by: INTERNAL MEDICINE

## 2025-03-06 PROCEDURE — 1159F MED LIST DOCD IN RCRD: CPT | Mod: CPTII,S$GLB,, | Performed by: INTERNAL MEDICINE

## 2025-03-06 PROCEDURE — G2211 COMPLEX E/M VISIT ADD ON: HCPCS | Mod: S$GLB,,, | Performed by: INTERNAL MEDICINE

## 2025-03-06 PROCEDURE — 1160F RVW MEDS BY RX/DR IN RCRD: CPT | Mod: CPTII,S$GLB,, | Performed by: INTERNAL MEDICINE

## 2025-03-06 PROCEDURE — 3288F FALL RISK ASSESSMENT DOCD: CPT | Mod: CPTII,S$GLB,, | Performed by: INTERNAL MEDICINE

## 2025-03-06 PROCEDURE — 1101F PT FALLS ASSESS-DOCD LE1/YR: CPT | Mod: CPTII,S$GLB,, | Performed by: INTERNAL MEDICINE

## 2025-03-06 PROCEDURE — 1157F ADVNC CARE PLAN IN RCRD: CPT | Mod: CPTII,S$GLB,, | Performed by: INTERNAL MEDICINE

## 2025-03-19 ENCOUNTER — OFFICE VISIT (OUTPATIENT)
Dept: PULMONOLOGY | Facility: CLINIC | Age: 85
End: 2025-03-19
Payer: MEDICARE

## 2025-03-19 VITALS
BODY MASS INDEX: 30.02 KG/M2 | SYSTOLIC BLOOD PRESSURE: 121 MMHG | OXYGEN SATURATION: 97 % | HEART RATE: 67 BPM | HEIGHT: 66 IN | WEIGHT: 186.81 LBS | DIASTOLIC BLOOD PRESSURE: 75 MMHG

## 2025-03-19 DIAGNOSIS — M79.89 PAIN AND SWELLING OF LEFT LOWER LEG: ICD-10-CM

## 2025-03-19 DIAGNOSIS — G89.3 CANCER RELATED PAIN: ICD-10-CM

## 2025-03-19 DIAGNOSIS — M79.662 PAIN AND SWELLING OF LEFT LOWER LEG: ICD-10-CM

## 2025-03-19 DIAGNOSIS — C79.51 METASTASIS TO BONE: Primary | ICD-10-CM

## 2025-03-19 DIAGNOSIS — M79.604 PAIN IN BOTH LOWER EXTREMITIES: ICD-10-CM

## 2025-03-19 DIAGNOSIS — M54.50 LUMBAR BACK PAIN: ICD-10-CM

## 2025-03-19 DIAGNOSIS — J70.1 RADIATION FIBROSIS OF LUNG: ICD-10-CM

## 2025-03-19 DIAGNOSIS — K59.09 CHRONIC CONSTIPATION: ICD-10-CM

## 2025-03-19 DIAGNOSIS — J96.11 CHRONIC HYPOXEMIC RESPIRATORY FAILURE: ICD-10-CM

## 2025-03-19 DIAGNOSIS — J44.9 CHRONIC OBSTRUCTIVE PULMONARY DISEASE, UNSPECIFIED COPD TYPE: ICD-10-CM

## 2025-03-19 DIAGNOSIS — M79.605 PAIN IN BOTH LOWER EXTREMITIES: ICD-10-CM

## 2025-03-19 PROCEDURE — 1101F PT FALLS ASSESS-DOCD LE1/YR: CPT | Mod: CPTII,S$GLB,, | Performed by: INTERNAL MEDICINE

## 2025-03-19 PROCEDURE — 3288F FALL RISK ASSESSMENT DOCD: CPT | Mod: CPTII,S$GLB,, | Performed by: INTERNAL MEDICINE

## 2025-03-19 PROCEDURE — 1159F MED LIST DOCD IN RCRD: CPT | Mod: CPTII,S$GLB,, | Performed by: INTERNAL MEDICINE

## 2025-03-19 PROCEDURE — 99213 OFFICE O/P EST LOW 20 MIN: CPT | Mod: S$GLB,,, | Performed by: INTERNAL MEDICINE

## 2025-03-19 PROCEDURE — 99999 PR PBB SHADOW E&M-EST. PATIENT-LVL IV: CPT | Mod: PBBFAC,,, | Performed by: INTERNAL MEDICINE

## 2025-03-19 PROCEDURE — 1157F ADVNC CARE PLAN IN RCRD: CPT | Mod: CPTII,S$GLB,, | Performed by: INTERNAL MEDICINE

## 2025-03-19 RX ORDER — HYDROCODONE BITARTRATE AND ACETAMINOPHEN 7.5; 325 MG/1; MG/1
1 TABLET ORAL EVERY 6 HOURS PRN
Qty: 28 TABLET | Refills: 0 | Status: SHIPPED | OUTPATIENT
Start: 2025-03-19

## 2025-03-19 NOTE — PATIENT INSTRUCTIONS
Pet scan viewed -- area of abnormality that showed on pet scan last yr and was biopsied-- has worsened along with adenopathy with pet activity.    You need to use pain pills-- norco 7.5 sent in and need to be renewed next week.    Will ask radiation oncology to see- radiation should help    Follow up with Dr Vogt      Use miralax -- add linzess one daily to help bowels..    Lungs and breathing stable-- pain will affect breathing    Will need to get pain meds --   may need to see in 3 months to renew-- may be best to get from radiation doc or Dr Vogt-- I will plan to send in norco next 3 months

## 2025-03-19 NOTE — PROGRESS NOTES
3/19/2025    Lety Herbert  Follow up    No chief complaint on file.      HPI:     3/19/2025 pt given norco after November visit -- had spine bx which was non diagnositic. Pain progressed. F/u pet done with worsening dx lumbar spine and abd lymph nodes postiive.  Pt to see med onc.  No recent radiation onc .     Some chr constipation      November 20, 2024-the patient had abnormal MRI of the spine in July with T12 and T4 and T5 being suspicious for metastatic disease.  PET scan was done November the 6 with concerns for metastatic disease and T12 and also a nodule anterior to the inferior vena cava.  The patient was seen in follow-up by Oncology November 14th-no orders reviewed.  Not clear management options.  Uses ox 3-4 lpm 24/7.    Appetite ok now and no stimulant needed  Pt feels like crap.  Having 7/10 pain last couple months.  . Uses ox 24/7 with stable ivan...  no falls and uses walker.  For spine bx next wk  Bowels good last month with poor memory.  No liver eval-- had benefit from lactulose    Uses neb rx prn only .     Pt frustrated as cannot do as she wishes.. uses xanax once daily    Patient Instructions   Use norco  as needed for back pain-- will send in 28 pills, you may need more.  You may request Dr Vogt/Loree or notify me.  Should get pain medications one doctor.    Lungs seem stable -- ct chest July compared to pet done 11/2024 -- lung tissue sickly and radiation damage, high blood pressure in lungs suggested.    Lungs look stable on ct -- breathing is stable.    Re check 6 months       5/20/2024 pt now down to 4lpm on tank in office , wears 5lpm at home., desat walking .  Mucous yellow -- not clear if lungs or chest with no recent abx...  having copious mucous.  Uses megace prn, weight stable..  No prednisone now..  Pet scan from 3/2024 looks less impressive with right upper lung radiation fibrosis, fluid around lung is minima.  Patient Instructions   With memory not good and  history of cirrhosis and constipation-- dose lactulose once or twice daily -- note if brain clearer..      You have had some ascites and fluid around lungs with cirrhosis of liver seen.    With xarelto use may be prudent to be have upper scope to assure no varicies...    Check hepatitis c test  Will send Dr Perez a note -- may need further evaluation  Ct chest and pet scan 3/2024 viewed.  Looks good-- would do routine follow up on lung cancer.   Continue oxygen.    Use brovana and budesonide regular and albuterol as needed..  2/27/2024 pt needs 5 lpm to mobilize -- has poc with pulse system.    Losing wt -- ambulates about house on ox.  Megace helped.    No pain nor anxiety.  Min cough from sinus    No recent prednisone  Patient Instructions   Ct from admit viewed  Would retry prednisone 20 mg daily for 2 weeks-- stop if no benefit.     Will try to arrange oxymizer and non rebreather mask     Exact diagnosis not clear -- still needs high oxygen.    Would use wheelchair to get out of house      Use oxymizer with flow as low as able to keep sat >90--use when out of house..      Immune deficiency may have played a role, monitor    1/30/2024....pt presented to [facility] 1/25 for 4 days with  fluid and pneumonia- but record vague and procal  very loww at 0.05 ...... Pt was on ox 3 lpm prior to hosp    Pt lost appetite pta, no fever, bnp was 805 admit, echo good with Mr/ ef 60%, rv enlarged, and pap 40.       Pt had had failure to thrive last months.    d  Ct reviewed from last yr-- ct rul nodule 2/2023 with decrease in size and xrt fibrosis changes seen 6/2023 and 9/2023.  Pet scan 12/2023 with no cancer activity but progressive scarring of rul, and progressively increased right pleural effusion til 12/2023.       Pt presented to [facility] with sob and low sat -- pt has poor recall of exact presentation.   Record revieweed but not clear picture.......      Pt will have some bilat lower ext pain -- knees.    Patient Instructions    Will order nebulizer budesonide and brovana breathing treatments for copd.    Lung tissue disease is seen on ct chest that on prior ct chest and stable-- major process in lungs was radiation damage.      Ct chest viewed and pet viewed from 1/22022 to 1/2024....      Ct chest and echo show evidence for pulmonary hypertension.  Heart valve leaks and copd is severe (with lung tissue).    Steroids were not effective recently and sputum did not grow germs.     Would dose megace for appetite -- 400 mg daily.          Need to do therapy as best able.... you have gotten very weak...  \    Would do follow up to assure no excess fluid in lungs (pet scan will give good picture of fluid.)-- may do out pt drainage.....      If having mild worsening -- may do levaquin antibiotic and take prednisone...       could do virtual follow up...    You direct no life support.   Lapost done today  2/7/2023--  feels better, no cough nor mucous,  no night sweats, appetite ok but not strong.   Uses oxygen occasionally.   Patient Instructions   Use levaquin prompt for infection.    May use prednisone if cough or breathing worsens    Will send in norco 90/month for 3 months.    Renewed xanax for as needed.    Will order needle bx.  Pet scan  and cxr viewed.  Will have cxr after bx.      Check six min walk and breathing test -- suspect surgery may not be offered as oxygen level had been low.   If cancer may refer to oncology doc.  May refer to Maryse Lange MD in [place]    Will call and discuss bx results over phone.    1/23/2023-- no fever, coughing min mucous yellow.  Ribs ok with no sign pain.  Sob still and still night sweats needing to change night gown.  Poor appetite.  Pt relates felt like she was dying last visit-- doing better now.  Occ blood streak hemoptysis.    Pt has home ox at 3 lpm --- uses intermittently with sat 90 rest and 80 or so with activity.  Pt vague on If breathing worsening last yr.  Six min walk 2020 with sat low  resting.  Patient Instructions   May need to repeat prednisone if cough returns.    Need to do biopsy right upper density.    You still have night sweats-- apparent pneumonia cleared from last visit.      Oxygen  needs seem very high with activity?      Use oxygen more --ok to use 1-2 lpm for restt and 3 lpm activity.  Lung tissue disease looks somewhat stable since 2019.  Oxygen needs are high but able to get by at rest with room air but marginally    Repeat prednisone now.     Will need to do needle biopsy.    Will direct over phone biopsy results       1/18/2023 having cough and fatigue -- coughs all day with yellow mucous with streaks blood.  Poor appetite, having back and rib pain -- lower ribs attributed to violent coughing.   Used trelegy with min help.  Walks about house with no falls-- no weak.  No percieved wt loss.  Used norco for pains -- helped min.   Has albuterol in past but not using.  Used prednisone in past-- some benefit appreciated.  Declines hosp.   Pt had pet scan viwed today-- new rll 13 mm density with min pet activity --new from 12/15/2022.  Pt now with night sweats-new last wk or so- sputum culture pending afb and nl krista 12/29.  May have fever nighttime  Patient Instructions   Re check for regular germs  Dose levaquin  Would check temp at night  Big large pet active Spot in lung could be infection-- infection is apparent with mucous but culture had been negative-- afb can take 2 months.  New spot in right lower lung should be infection-- having yellow mucous and blood and sweats at night  Prednisone 20 mg may help cough-- dose for 5 days --- may repeat as needed for cough.  Use norco as needed to suppress pain and cough.  Cancer typically slow-- infection quick.  Infection active.   Biopsy would be recommended (unless afb + -- regular germ not expected to cause spot in upper lung.   Would check cxr now to assure no rapid  pneumonia as right lower  lung spot new.     I would anticipate may  be able to do biopsy-- if mycobacterial culture positive (typical germs that cause this problem are slow growers and take a month at least-- early February??) might hold off but would like to be sure about cancer ??  Qtc was 432 November 2022-- need to check ekg on 23rd.  F/u 23 office  Cx stable 1/18/2023 , wbc 21k, no impressive infiltrates.  K 3.2 -- discussion with pt -- sent in potassium 10 meq bid, will discuss bx at f/u on 23rd if pt doing well.     12/28/2022   Pt developed left chest pain acute onset-- lasted few days, pt has had lingering back pain.  Coughs a lot with yellow mucous-- tablespoon daily.  Inhalers no help in past  Had right shoulder area achey pain about 7/10 pain-- comes and goes.  chr hypoxic resp failure post pe and pulm htn assoc cor dz and osas.    Ct this month with 23 mm rul lesion cw ca. The lesion is new from 2019. Ild dz with bronchiectasis assoc mucous plugging seen.   Upper lung honeycombing suggested ct 12/15/2022 -- viewed directly.   No asbestos exposure but father worked [employer].  Pt was smoker  Pthad double mastectomy 15 yrs ago-- no oncology follow up  Patient Instructions   Bronchiectasis with yellow mucous-- cultures needed  Trial trelegy in place of current inhaler. Use aerobika for 10 breaths after inhaler.    Lung tissue with progression fibrosis-- slow process.  Consider treatment later.  Lung lesion right upper lung worrisome for cancer-- new over last 4 yrs-- enlarging lymph node in middle chest is still small.  Check pet scan.   May need biopsy -  will need to skip xarelto prior biopsy- needle of lung but may need ebus.    Back pain noted on right -- will send in N4MD for as needed use.  Pet scan may help determine if cancer related.   Will direct care with result of pet scan.  Could do phone follow up to discuss in detail if complex.   Below from Dr Villalpando  12/28/2021- breathing stable, able to walk around store w/ oxygen. Tries to walk 20 min per day. Denies chest  congestion/cough. Sometimes nose congested, uses astelin. She takes singulair nightly.  Patient Instructions   Oxygen - continue with activity and any time sats <92%  Clubbing of fingernails is not harmful- due to chronic hypoxia  06/30/2021-   Continue oxygen  Follow up with cardiologist  Stop claritin and try singulair one pill every night for sinus drip  continue flonase  while seated feels fine but gets breathless w/ minimal activity. Daughter reports dyspnea is getting worse over time. Reports cardiologist wants to place pacemaker for her but insurance denied it. Does not feel well and sits in recliner all the time. Reports constant sinus drip and cough w/ green/yellow mucous.    12/30/2020-   Call medical supply for the oxygen and ask about thinner or more comfortable nasal cannulas  Start claritin daily  Start flonase 2 sprays in each nostril daily  feels better w/ O2, wears 3L continuously except when sitting still at home. Trouble breathing w/ fatigue is episodic. Gets headaches- tension from wearing O2, glasses and mask behind ear. Has constant sinus drip. Other night R ear blocked up while wearing CPAP.  Tries mucinex DM but doesn't last long. She is on xarelto for atrial fib now.     6/30/20-   Oxygen for home use ordered- would wear continuously  Use CPAP machine- can try nasal pillows. Would use oxygen with your CPAP too.  Get lung function test and 6 minute walk to check to see how much oxygen you need.  Pt is an 80 yo female with DVT/PE, GERD, breast ca s/p double mastectomy- no chemo or XRT, melanoma of arm, AUSTIN, obesity presenting for initial evaluation. Pt reports oxygen desaturation for over a year but no one has ordered her any oxygen for home. First noted low O2 sat when she had sinus infection. O2 sats will drop when exerting herself or resting. Feels short of breath worsening over time. She coughs w/ phlegm from sinuses. Sees ENT who cauterized nose for bleeding. Daughter is here as well and  "assists w/ history.  Reports not using CPAP because she had tip of her nose removed for cancer in past and she is concerned mask will cause more cancer to come back. She wakes up a lot at night and feels drowsy during the day.  Denies any hx of lung problems. She is a past smoker- quit 40 yrs ago 1-2 PPD x 25 yrs. Used to have recurrent bronchitis and cough which stopped after she quit smoking.    Outside records from pt's cardiologist Jeremy Sargent in [facility] were faxed to us and have been reviewed- with dx including heart block, atrial fib & flutter, CAD, diastolic dysfunction, HTN, HLD, and obesity. Per that note ASAP consult was placed to pulmonary for "shortness of breath and O2 sat of 70s-80s."    The chief compliant  problem is varies w/ instability at times    PFSH:  Past Medical History:   Diagnosis Date    Abnormal chest CT 03/09/2023    Anemia     Basal cell carcinoma     nose     Cancer     breast    COPD (chronic obstructive pulmonary disease)     Depression     DVT (deep venous thrombosis)     Fall 03/20/2018    Former smoker 03/09/2023    Gastric ulceration     GERD (gastroesophageal reflux disease)     History of breast cancer 03/09/2023    History of frequent urinary tract infections     Hyperlipidemia     Hypertension     Malignant neoplasm of upper lobe of right lung (NSCLC favoring lung primary) 03/09/2023    Melanoma 2004?    left forearm    MRSA (methicillin resistant staph aureus) culture positive     Neuropathy     PE (pulmonary embolism)     Post-nasal drip 11/15/2018    Reflux     S/P bilateral mastectomy 03/09/2023         Past Surgical History:   Procedure Laterality Date    HYSTERECTOMY      MASTECTOMY, RADICAL Bilateral     TOTAL HIP ARTHROPLASTY Left 11/13/2017     Social History     Tobacco Use    Smoking status: Former     Passive exposure: Past    Smokeless tobacco: Never   Substance Use Topics    Alcohol use: No    Drug use: No     Family History   Problem Relation Name Age of " Onset    Cancer Mother      Cancer Father      Heart disease Father      Melanoma Neg Hx      Psoriasis Neg Hx      Lupus Neg Hx      Eczema Neg Hx       Review of patient's allergies indicates:   Allergen Reactions    Meperidine     Promethazine     Bactrim [sulfamethoxazole-trimethoprim] Rash       Performance Status:The patient's activity level is functions out of house.  Feels SOB any exertion out of house.     Review of Systems:  a review of eleven systems covering constitutional, Eye, HEENT, Psych, Respiratory, Cardiac, GI, , Musculoskeletal, Endocrine, Dermatologic was negative except for pertinent findings as listed ABOVE and below:  All negative with pertinent positives as above       Exam:Comprehensive exam done. There were no vitals taken for this visit.  Exam included Vitals as listed, and patient's appearance and affect and alertness and mood, oral exam for yeast and hygiene and pharynx lesions and Mallapatti (M) score, neck with inspection for jvd and masses and thyroid abnormalities and lymph nodes (supraclavicular and infraclavicular nodes and axillary also examined and noted if abn), chest exam included symmetry and effort and fremitus and percussion and auscultation, cardiac exam included rhythm and gallops and murmur and rubs and jvd and edema, abdominal exam for mass and hepatosplenomegaly and tenderness and hernias and bowel sounds, Musculoskeletal exam with muscle tone and posture and mobility/gait and  strength, and skin for rashes and cyanosis and pallor and turgor, extremity for clubbing.  Findings were normal except for pertinent findings listed below:  M1, oropharynx clear  HR irregularly irreg  Lungs rales bilaterally  No edema  Varicose veins of legs  Clubbing, no neck nodes      Radiographs (ct chest and cxr) reviewed: view by direct vision   Cxr 1/18/2023 patchy pneumonia  Ct chest 12/15/2022 c/w 2019 -- progressive ild, bronchiectasis with mucous plugging, new lung lesion rul  c/w ca.  Viewed.      CXR 6/8/20- interstitial markings prominent  VQ scan 6/8/20- IMPRESSION:  Normal VQ scan perfusion imaging  TTE 6/8/20-   Concentric left ventricular hypertrophy.  Normal left ventricular systolic function. The estimated ejection fraction is 65%.  Normal LV diastolic function.  Normal right ventricular systolic function.  Mild left atrial enlargement.  Mild aortic regurgitation.  Mild mitral regurgitation.  Mild tricuspid regurgitation.  Mild pulmonic regurgitation.  Normal central venous pressure (3 mmHg).  The estimated PA systolic pressure is 36 mmHg.    Labs reviewed    Results for RONN SOLORZANO (MRN [id number]) as of 6/30/2020 14:08   Ref. Range 3/11/2020 07:45   CO2 Latest Ref Range: 23 - 29 mmol/L 32 (H)        PFT 7/14/20 reviewed- mild restriction, reduced DLCO      6MWT 7/14/20- 85m, desat to 83% at rest, req 3L NC    Plan:  Clinical impression is resonably certain and repeated evaluation prn +/- follow up will be needed as below. Chronic hypoxemic resp failure due to cardiac comorbidities. PH likely groups 2/3 due to AUSTIN and HFpEF.    Diagnoses and all orders for this visit:    Metastasis to bone  -     Ambulatory referral/consult to Radiation Oncology; Future    Pain in both lower extremities  -     HYDROcodone-acetaminophen (NORCO) 7.5-325 mg per tablet; Take 1 tablet by mouth every 6 (six) hours as needed for Pain.    Pain and swelling of left lower leg  -     HYDROcodone-acetaminophen (NORCO) 7.5-325 mg per tablet; Take 1 tablet by mouth every 6 (six) hours as needed for Pain.    Lumbar back pain  -     Ambulatory referral/consult to Radiation Oncology; Future    Cancer related pain  -     HYDROcodone-acetaminophen (NORCO) 7.5-325 mg per tablet; Take 1 tablet by mouth every 6 (six) hours as needed for Pain.    Chronic constipation  -     linaCLOtide (LINZESS) 72 mcg Cap capsule; Take 1 capsule (72 mcg total) by mouth before breakfast.    Chronic hypoxemic respiratory  failure    Chronic obstructive pulmonary disease, unspecified COPD type    Radiation fibrosis of lung                          No follow-ups on file.    Discussed with patient above for education the following:      Patient Instructions   Pet scan viewed -- area of abnormality that showed on pet scan last yr and was biopsied-- has worsened along with adenopathy with pet activity.    You need to use pain pills-- norco 7.5 sent in and need to be renewed next week.    Will ask radiation oncology to see- radiation should help    Follow up with Dr Vogt      Use miralax -- add linzess one daily to help bowels..    Lungs and breathing stable-- pain will affect breathing    Will need to get pain meds --   may need to see in 3 months to renew-- may be best to get from radiation doc or Dr Vogt-- I will plan to send in norco next 3 months            Trupti took 23 min

## 2025-03-30 ENCOUNTER — HOSPITAL ENCOUNTER (EMERGENCY)
Facility: HOSPITAL | Age: 85
Discharge: HOME | End: 2025-03-30
Attending: STUDENT IN AN ORGANIZED HEALTH CARE EDUCATION/TRAINING PROGRAM
Payer: MEDICARE

## 2025-03-30 VITALS
BODY MASS INDEX: 29.73 KG/M2 | HEIGHT: 66 IN | OXYGEN SATURATION: 96 % | DIASTOLIC BLOOD PRESSURE: 110 MMHG | RESPIRATION RATE: 14 BRPM | WEIGHT: 185 LBS | TEMPERATURE: 98 F | HEART RATE: 70 BPM | SYSTOLIC BLOOD PRESSURE: 160 MMHG

## 2025-03-30 DIAGNOSIS — M79.662 PAIN AND SWELLING OF LEFT LOWER LEG: ICD-10-CM

## 2025-03-30 DIAGNOSIS — R10.9 FLANK PAIN: Primary | ICD-10-CM

## 2025-03-30 DIAGNOSIS — M54.9 BACK PAIN, UNSPECIFIED BACK LOCATION, UNSPECIFIED BACK PAIN LATERALITY, UNSPECIFIED CHRONICITY: ICD-10-CM

## 2025-03-30 DIAGNOSIS — G89.3 CANCER RELATED PAIN: ICD-10-CM

## 2025-03-30 DIAGNOSIS — M79.89 PAIN AND SWELLING OF LEFT LOWER LEG: ICD-10-CM

## 2025-03-30 DIAGNOSIS — M79.605 PAIN IN BOTH LOWER EXTREMITIES: ICD-10-CM

## 2025-03-30 DIAGNOSIS — M79.604 PAIN IN BOTH LOWER EXTREMITIES: ICD-10-CM

## 2025-03-30 LAB
ABSOLUTE EOSINOPHIL (SMH): 0.12 K/UL
ABSOLUTE MONOCYTE (SMH): 0.49 K/UL (ref 0.3–1)
ABSOLUTE NEUTROPHIL COUNT (SMH): 4.9 K/UL (ref 1.8–7.7)
ALBUMIN SERPL-MCNC: 3.9 G/DL (ref 3.5–5.2)
ALP SERPL-CCNC: 83 UNIT/L (ref 55–135)
ALT SERPL-CCNC: 8 UNIT/L (ref 10–44)
ANION GAP (SMH): 6 MMOL/L (ref 8–16)
AST SERPL-CCNC: 17 UNIT/L (ref 10–40)
BASOPHILS # BLD AUTO: 0.06 K/UL
BASOPHILS NFR BLD AUTO: 0.9 %
BILIRUB SERPL-MCNC: 0.8 MG/DL (ref 0.1–1)
BILIRUB UR QL STRIP.AUTO: NEGATIVE
BUN SERPL-MCNC: 10 MG/DL (ref 8–23)
CALCIUM SERPL-MCNC: 9.4 MG/DL (ref 8.7–10.5)
CHLORIDE SERPL-SCNC: 100 MMOL/L (ref 95–110)
CLARITY UR: CLEAR
CO2 SERPL-SCNC: 33 MMOL/L (ref 23–29)
COLOR UR AUTO: YELLOW
CREAT SERPL-MCNC: 0.5 MG/DL (ref 0.5–1.4)
CREAT SERPL-MCNC: 0.6 MG/DL (ref 0.5–1.4)
ERYTHROCYTE [DISTWIDTH] IN BLOOD BY AUTOMATED COUNT: 13.2 % (ref 11.5–14.5)
GFR SERPLBLD CREATININE-BSD FMLA CKD-EPI: >60 ML/MIN/1.73/M2
GLUCOSE SERPL-MCNC: 102 MG/DL (ref 70–110)
GLUCOSE UR QL STRIP: NEGATIVE
HCT VFR BLD AUTO: 37.7 % (ref 37–48.5)
HGB BLD-MCNC: 12 GM/DL (ref 12–16)
HGB UR QL STRIP: NEGATIVE
IMM GRANULOCYTES # BLD AUTO: 0.03 K/UL (ref 0–0.04)
IMM GRANULOCYTES NFR BLD AUTO: 0.4 % (ref 0–0.5)
KETONES UR QL STRIP: NEGATIVE
LDH SERPL L TO P-CCNC: 0.61 MMOL/L (ref 0.5–2.2)
LEUKOCYTE ESTERASE UR QL STRIP: NEGATIVE
LIPASE SERPL-CCNC: 16 U/L (ref 4–60)
LYMPHOCYTES # BLD AUTO: 1.14 K/UL (ref 1–4.8)
MCH RBC QN AUTO: 29 PG (ref 27–31)
MCHC RBC AUTO-ENTMCNC: 31.8 G/DL (ref 32–36)
MCV RBC AUTO: 91 FL (ref 82–98)
NITRITE UR QL STRIP: NEGATIVE
NUCLEATED RBC (/100WBC) (SMH): 0 /100 WBC
PH UR STRIP: 8 [PH]
PLATELET # BLD AUTO: 220 K/UL (ref 150–450)
PMV BLD AUTO: 10.5 FL (ref 9.2–12.9)
POTASSIUM SERPL-SCNC: 4.1 MMOL/L (ref 3.5–5.1)
PROT SERPL-MCNC: 7.1 GM/DL (ref 6–8.4)
PROT UR QL STRIP: NEGATIVE
RBC # BLD AUTO: 4.14 M/UL (ref 4–5.4)
RELATIVE EOSINOPHIL (SMH): 1.8 % (ref 0–8)
RELATIVE LYMPHOCYTE (SMH): 17 % (ref 18–48)
RELATIVE MONOCYTE (SMH): 7.3 % (ref 4–15)
RELATIVE NEUTROPHIL (SMH): 72.6 % (ref 38–73)
SAMPLE: NORMAL
SAMPLE: NORMAL
SODIUM SERPL-SCNC: 139 MMOL/L (ref 136–145)
SP GR UR STRIP: 1
UROBILINOGEN UR STRIP-ACNC: NEGATIVE EU/DL
WBC # BLD AUTO: 6.69 K/UL (ref 3.9–12.7)

## 2025-03-30 PROCEDURE — 85025 COMPLETE CBC W/AUTO DIFF WBC: CPT | Performed by: STUDENT IN AN ORGANIZED HEALTH CARE EDUCATION/TRAINING PROGRAM

## 2025-03-30 PROCEDURE — 80053 COMPREHEN METABOLIC PANEL: CPT | Performed by: STUDENT IN AN ORGANIZED HEALTH CARE EDUCATION/TRAINING PROGRAM

## 2025-03-30 PROCEDURE — 81003 URINALYSIS AUTO W/O SCOPE: CPT | Performed by: STUDENT IN AN ORGANIZED HEALTH CARE EDUCATION/TRAINING PROGRAM

## 2025-03-30 PROCEDURE — 83690 ASSAY OF LIPASE: CPT | Performed by: STUDENT IN AN ORGANIZED HEALTH CARE EDUCATION/TRAINING PROGRAM

## 2025-03-30 PROCEDURE — 25000003 PHARM REV CODE 250: Performed by: STUDENT IN AN ORGANIZED HEALTH CARE EDUCATION/TRAINING PROGRAM

## 2025-03-30 PROCEDURE — 25500020 PHARM REV CODE 255: Performed by: STUDENT IN AN ORGANIZED HEALTH CARE EDUCATION/TRAINING PROGRAM

## 2025-03-30 PROCEDURE — 99285 EMERGENCY DEPT VISIT HI MDM: CPT | Mod: 25

## 2025-03-30 PROCEDURE — 82565 ASSAY OF CREATININE: CPT

## 2025-03-30 RX ORDER — CETIRIZINE HYDROCHLORIDE 10 MG/1
10 TABLET ORAL DAILY
Status: DISCONTINUED | OUTPATIENT
Start: 2025-03-31 | End: 2025-03-30

## 2025-03-30 RX ORDER — NITROFURANTOIN 25; 75 MG/1; MG/1
100 CAPSULE ORAL EVERY 12 HOURS
COMMUNITY
Start: 2025-03-26

## 2025-03-30 RX ORDER — OXYCODONE HYDROCHLORIDE 10 MG/1
10 TABLET ORAL
Refills: 0 | Status: COMPLETED | OUTPATIENT
Start: 2025-03-30 | End: 2025-03-30

## 2025-03-30 RX ORDER — LOSARTAN POTASSIUM 25 MG/1
25 TABLET ORAL DAILY
Status: DISCONTINUED | OUTPATIENT
Start: 2025-03-30 | End: 2025-03-30 | Stop reason: HOSPADM

## 2025-03-30 RX ORDER — CETIRIZINE HYDROCHLORIDE 10 MG/1
10 TABLET ORAL DAILY
Status: DISCONTINUED | OUTPATIENT
Start: 2025-03-30 | End: 2025-03-30 | Stop reason: HOSPADM

## 2025-03-30 RX ORDER — HYDROCHLOROTHIAZIDE 25 MG/1
25 TABLET ORAL
Status: COMPLETED | OUTPATIENT
Start: 2025-03-30 | End: 2025-03-30

## 2025-03-30 RX ORDER — HYDROCODONE BITARTRATE AND ACETAMINOPHEN 7.5; 325 MG/1; MG/1
1 TABLET ORAL EVERY 6 HOURS PRN
Qty: 14 TABLET | Refills: 0 | Status: SHIPPED | OUTPATIENT
Start: 2025-03-30

## 2025-03-30 RX ORDER — LOSARTAN POTASSIUM 25 MG/1
25 TABLET ORAL DAILY
Status: DISCONTINUED | OUTPATIENT
Start: 2025-03-31 | End: 2025-03-30

## 2025-03-30 RX ADMIN — IOHEXOL 100 ML: 350 INJECTION, SOLUTION INTRAVENOUS at 04:03

## 2025-03-30 RX ADMIN — HYDROCHLOROTHIAZIDE 25 MG: 25 TABLET ORAL at 04:03

## 2025-03-30 RX ADMIN — CETIRIZINE HYDROCHLORIDE 10 MG: 10 TABLET, FILM COATED ORAL at 04:03

## 2025-03-30 RX ADMIN — LOSARTAN POTASSIUM 25 MG: 25 TABLET, FILM COATED ORAL at 04:03

## 2025-03-30 RX ADMIN — OXYCODONE HYDROCHLORIDE 10 MG: 10 TABLET ORAL at 03:03

## 2025-03-30 NOTE — DISCHARGE INSTRUCTIONS
Below with the findings noted on CT scan which are similar to recent PET scan.  Recommend follow up with your oncologist and radiation oncologist this week for ongoing discussion on management options.  Take multimodal pain control including Tylenol, ibuprofen, and Norco as needed.     CT A/P w/ contrast 3/30/2025  Impression:     Multiple enlarged retroperitoneal nodes concerning for neoplastic process.     Small lytic lesion in the right T12 pedicle, concerning for metastatic disease     Mild diffuse circumferential bladder wall thickening suggestive of cystitis.  Mild bilateral hydroureteronephrosis suggestive of infection or recently passed stone.

## 2025-03-30 NOTE — ED PROVIDER NOTES
Encounter Date: 3/30/2025       History     Chief Complaint   Patient presents with    Flank Pain     Bilateral flank pain; has been going on since last week.  Getting worse.  Denies any urinary symptoms.       HPI    Patient is an 84-year-old female with history of right upper lobe lung cancer with recent PET scan showing concern for metastatic disease presenting with worsening bilateral lower flank pain.  States it has been constant and worse with movement.  Denies any associated urinary symptoms, fever, chills, abdominal pain, nausea, vomiting, decreased appetite.  Patient denies any chest pain.  Patient has had chronic intermittent incontinence but denies any new symptoms over the last several weeks following the onset of pain.    Review of patient's allergies indicates:   Allergen Reactions    Meperidine     Promethazine     Bactrim [sulfamethoxazole-trimethoprim] Rash     Past Medical History:   Diagnosis Date    Abnormal chest CT 03/09/2023    Anemia     Basal cell carcinoma     nose     Cancer     breast    COPD (chronic obstructive pulmonary disease)     Depression     DVT (deep venous thrombosis)     Fall 03/20/2018    Former smoker 03/09/2023    Gastric ulceration     GERD (gastroesophageal reflux disease)     History of breast cancer 03/09/2023    History of frequent urinary tract infections     Hyperlipidemia     Hypertension     Malignant neoplasm of upper lobe of right lung (NSCLC favoring lung primary) 03/09/2023    Melanoma 2004?    left forearm    MRSA (methicillin resistant staph aureus) culture positive     Neuropathy     PE (pulmonary embolism)     Post-nasal drip 11/15/2018    Reflux     S/P bilateral mastectomy 03/09/2023     Past Surgical History:   Procedure Laterality Date    HYSTERECTOMY      MASTECTOMY, RADICAL Bilateral     TOTAL HIP ARTHROPLASTY Left 11/13/2017     Family History   Problem Relation Name Age of Onset    Cancer Mother      Cancer Father      Heart disease Father       Melanoma Neg Hx      Psoriasis Neg Hx      Lupus Neg Hx      Eczema Neg Hx       Social History[1]  Review of Systems  As noted above  Physical Exam     Initial Vitals [03/30/25 1441]   BP Pulse Resp Temp SpO2   (!) 169/94 67 18 98.1 °F (36.7 °C) 97 %      MAP       --         Physical Exam    Constitutional: She appears well-developed and well-nourished. She is not diaphoretic.   HENT:   Head: Normocephalic.   Eyes: Conjunctivae and EOM are normal. Pupils are equal, round, and reactive to light.   Cardiovascular:  Normal rate.           Pulmonary/Chest: Breath sounds normal. No respiratory distress. She has no wheezes. She has no rales.   Abdominal: Abdomen is soft. She exhibits no distension. There is no abdominal tenderness. There is no rebound and no guarding.   Musculoskeletal:         General: No tenderness or edema. Normal range of motion.      Comments: Patient with bilateral paraspinal tenderness around the T12 region.  No rash or obvious deformity.  No midline spinal tenderness per patient.  No suprapubic tenderness     Neurological: She is alert and oriented to person, place, and time.   Skin: Skin is warm.         ED Course   Procedures  Labs Reviewed   COMPREHENSIVE METABOLIC PANEL - Abnormal       Result Value    Sodium 139      Potassium 4.1      Chloride 100      CO2 33 (*)     Glucose 102      BUN 10      Creatinine 0.5      Calcium 9.4      Protein Total 7.1      Albumin 3.9      Bilirubin Total 0.8      ALP 83      AST 17      ALT 8 (*)     Anion Gap 6 (*)     eGFR >60     CBC WITH DIFFERENTIAL - Abnormal    WBC 6.69      RBC 4.14      Hgb 12.0      Hct 37.7      MCV 91      MCH 29.0      MCHC 31.8 (*)     RDW 13.2      Platelet Count 220      MPV 10.5      Nucleated RBC 0      Neut % 72.6      Lymph % 17.0 (*)     Mono % 7.3      Eos % 1.8      Basophil % 0.9      Imm Grans % 0.4      Neut # 4.9      Lymph # 1.14      Mono # 0.49      Eos # 0.12      Baso # 0.06      Imm Grans # 0.03     LIPASE -  Normal    Lipase Level 16     URINALYSIS, REFLEX TO URINE CULTURE - Normal    Color, UA Yellow      Appearance, UA Clear      Spec Grav UA 1.005      pH, UA 8.0      Protein, UA Negative      Glucose, UA Negative      Ketones, UA Negative      Blood, UA Negative      Bilirubin, UA Negative      Urobilinogen, UA Negative      Nitrites, UA Negative      Leukocyte Esterase, UA Negative     CBC W/ AUTO DIFFERENTIAL    Narrative:     The following orders were created for panel order CBC W/ AUTO DIFFERENTIAL.  Procedure                               Abnormality         Status                     ---------                               -----------         ------                     CBC with Differential[[id number]]       Abnormal            Final result                 Please view results for these tests on the individual orders.   ISTAT LACTATE    POC Lactate 0.61      Sample VENOUS     ISTAT CREATININE    POC Creatinine 0.6      Sample VENOUS     POCT CREATININE   POCT LACTATE          Imaging Results              CT Abdomen Pelvis With IV Contrast NO Oral Contrast (Final result)  Result time 03/30/25 18:24:39      Final result by Edel Allen MD (03/30/25 18:24:39)                   Impression:      Multiple enlarged retroperitoneal nodes concerning for neoplastic process.    Small lytic lesion in the right T12 pedicle, concerning for metastatic disease    Mild diffuse circumferential bladder wall thickening suggestive of cystitis.  Mild bilateral hydroureteronephrosis suggestive of infection or recently passed stone.      Electronically signed by: Edel Allen  Date:    03/30/2025  Time:    18:24               Narrative:    EXAMINATION:  CT ABDOMEN PELVIS WITH IV CONTRAST    CLINICAL HISTORY:  Abdominal pain, acute, nonlocalized;    TECHNIQUE:  Low dose axial images, sagittal and coronal reformations were obtained from the lung bases to the pubic symphysis following the IV administration of 100 mL of  Omnipaque 350 .  Oral contrast was not given.    COMPARISON:  None.    FINDINGS:  Soft tissues: Unremarkable.    Bones: Chronic L1 compression fracture status post vertebral augmentation.  Small lytic lesion in the right T12 pedicle.    Lower chest: Normal heart size. No pericardial effusion.    Lung Bases: Well aerated, without consolidation or pleural fluid.    Liver: Fatty liver.    Gallbladder: No calcified gallstones.    Bile Ducts: No evidence of dilated ducts.    Pancreas: No mass or peripancreatic fat stranding.    Spleen: Unremarkable.    Adrenals: Unremarkable.    Kidneys, ureters, bladder: Mild diffuse circumferential bladder wall thickening.  Mild bilateral hydroureteronephrosis suggestive of infection or recently passed stone.Bilateral renal cysts and additional subcentimeter hypodense lesions too small to definitely characterize.    Reproductive organs: Unremarkable.    GI Tract/Mesentery: No evidence of bowel obstruction or inflammation.    Peritoneal Space: No ascites. No free air.    Lymphadenopathy: Multiple enlarged retroperitoneal nodes.    Vasculature: Multifocal atherosclerosis of the abdominal aorta and its branches.                                       Medications   losartan tablet 25 mg (25 mg Oral Given 3/30/25 1659)   cetirizine tablet 10 mg (10 mg Oral Given 3/30/25 1659)   oxyCODONE immediate release tablet Tab 10 mg (10 mg Oral Given 3/30/25 1526)   iohexoL (OMNIPAQUE 350) injection 100 mL (100 mLs Intravenous Given 3/30/25 1601)   hydroCHLOROthiazide tablet 25 mg (25 mg Oral Given 3/30/25 1659)     Medical Decision Making  84-year-old female with history of lung cancer with recent PET scan showing concern for metastatic disease in the retroperitoneal lymph nodes presenting with worsening back pain for the past several weeks.  No associated fever, new focal neuro deficits, or dysuria.  Vital signs notable for high blood pressure but otherwise stable.  Lab work reviewed without evidence  of UTI or significant abnormality.  Imaging similar to previous PET scan with concern for metastatic disease near the site where patient expresses pain.  Low suspicion for infectious etiology or any evolving or new spinal injury.  Patient has close follow up with radiation oncology and her oncologist this week.  We will provide short course of pain medication for multimodal pain control and strict return precautions.    Kwesi Goodson MD  Emergency Medicine      Amount and/or Complexity of Data Reviewed  Labs: ordered.  Radiology: ordered.    Risk  OTC drugs.  Prescription drug management.                                      Clinical Impression:  Final diagnoses:  [R10.9] Flank pain (Primary)  [M54.9] Back pain, unspecified back location, unspecified back pain laterality, unspecified chronicity          ED Disposition Condition    Discharge Stable          ED Prescriptions       Medication Sig Dispense Start Date End Date Auth. Provider    HYDROcodone-acetaminophen (NORCO) 7.5-325 mg per tablet Take 1 tablet by mouth every 6 (six) hours as needed for Pain. 14 tablet 3/30/2025 -- Kwesi Goodson MD          Follow-up Information       Follow up With Specialties Details Why Contact Info Additional Information    [facility] - Emergency Dept Emergency Medicine  As needed, If symptoms worsen including uncontrolled pain, new onset weakness or any other concerns [address] [id number] [phone] 1st floor               [1]   Social History  Tobacco Use    Smoking status: Former     Passive exposure: Past    Smokeless tobacco: Never   Substance Use Topics    Alcohol use: No    Drug use: No        Kwesi Goodson MD  03/30/25 1175

## 2025-03-31 NOTE — ED NOTES
Spoke with Dr. Goodson regarding patients BP reading 160/110. He stated it was okay to d/c with BP

## 2025-04-02 ENCOUNTER — OFFICE VISIT (OUTPATIENT)
Dept: RADIATION ONCOLOGY | Facility: CLINIC | Age: 85
End: 2025-04-02
Payer: MEDICARE

## 2025-04-02 VITALS
BODY MASS INDEX: 30.23 KG/M2 | TEMPERATURE: 98 F | DIASTOLIC BLOOD PRESSURE: 91 MMHG | SYSTOLIC BLOOD PRESSURE: 158 MMHG | HEART RATE: 84 BPM | OXYGEN SATURATION: 94 % | WEIGHT: 187.31 LBS

## 2025-04-02 DIAGNOSIS — C34.11 PRIMARY CANCER OF RIGHT UPPER LOBE OF LUNG: Primary | ICD-10-CM

## 2025-04-02 DIAGNOSIS — C79.51 METASTASIS TO BONE: ICD-10-CM

## 2025-04-02 DIAGNOSIS — M54.50 LUMBAR BACK PAIN: ICD-10-CM

## 2025-04-02 PROCEDURE — 99215 OFFICE O/P EST HI 40 MIN: CPT | Mod: S$GLB,,, | Performed by: RADIOLOGY

## 2025-04-02 PROCEDURE — 1157F ADVNC CARE PLAN IN RCRD: CPT | Mod: CPTII,S$GLB,, | Performed by: RADIOLOGY

## 2025-04-02 PROCEDURE — G2211 COMPLEX E/M VISIT ADD ON: HCPCS | Mod: S$GLB,,, | Performed by: RADIOLOGY

## 2025-04-02 PROCEDURE — 1101F PT FALLS ASSESS-DOCD LE1/YR: CPT | Mod: CPTII,S$GLB,, | Performed by: RADIOLOGY

## 2025-04-02 PROCEDURE — 1160F RVW MEDS BY RX/DR IN RCRD: CPT | Mod: CPTII,S$GLB,, | Performed by: RADIOLOGY

## 2025-04-02 PROCEDURE — 1125F AMNT PAIN NOTED PAIN PRSNT: CPT | Mod: CPTII,S$GLB,, | Performed by: RADIOLOGY

## 2025-04-02 PROCEDURE — 3288F FALL RISK ASSESSMENT DOCD: CPT | Mod: CPTII,S$GLB,, | Performed by: RADIOLOGY

## 2025-04-02 PROCEDURE — 1159F MED LIST DOCD IN RCRD: CPT | Mod: CPTII,S$GLB,, | Performed by: RADIOLOGY

## 2025-04-02 NOTE — PROGRESS NOTES
Lety Herbert  [id number]  1940 4/2/2025  Ashish Henley Md  [address]  [address]  [place] [address]    REASON FOR CONSULTATION: Metastatic lung cancer versus breast cancer    TREATMENT GOAL: palliative    HISTORY OF PRESENT ILLNESS:   Lety Herbert is a 84 y.o. female  former smoker (quit >40yrs ago) following Dr. Henley for diagnosis of COPD and ILD on supplemental home O2 who presented with chest pain with CTA chest noting 2.1 cm right upper lobe nodule demonstrating enlargement on short interval CT of the chest to 2.3 cm.  PET-CT confirmed SUV of 16.4 with development of a 2nd 1.3 mm subpleural right lower lobe nodule, SUV 2.3 thought infectious/inflammatory etiology.  No FDG uptake in the mediastinum or distant.  CT-guided biopsy returned non-small cell carcinoma, poorly differentiated with squamous features, lung primary favored.     Patient was seen by Dr. Henley who advised she is not operative candidate.  She was also seen by Dr. Vogt and is referred to discuss definitive radiotherapy options.     Prior oncologic history of BCa treated with bilateral mastectomy + reconstruction in 2005 without adjuvant therapy     2/23 PFTs              FEV1   2.03L  7/20 PFTs  DLCO54.3%     Completed SBRT, 50 Gy in 5 fractions to the right upper lobe ending April 5, 2023.  Treatment was well tolerated with mild fatigue.    7/24 CT C stable.  She was evaluated for back pain in July 2024 with MRI T-spine suggesting abnormal marrow signal in T12, T4 and T5 with remote compression deformity at L1 and evidence of prior vertebroplasty.  PET confirmed FDG avid nodule anterior to the IVC and FDG uptake at T12 suggestive of recurrent disease, differential diagnosis: breast vs lung ca.  CT guided biopsy of T12 was nondiagnostic.  I last met with the patient January 21, 2025 at which time she was denying pain and was planned for repeat PET which demonstrated interval development of numerous enlarged FDG avid  retrocrural, abdominal retroperitoneal and left supraclavicular lymph nodes.  T12 lesion increased in size now with SUV 21.6.  There were nonspecific sites of uptake in the left neck and right hilum.    She last followed up with Dr. Vogt 03/05/2025 again noting poor candidacy for systemic treatment.  She is referred to discuss palliative radiotherapy to T12 which is now painful.  No tissue confirmation.    She returns on supplemental O2.  Denies fever, chills, chest pain or cough.  She reports significant back pain requiring hydrocodone 7.5/325 q.6 hours.  Denies lower extremity weakness, saddle anesthesia, bowel or bladder incontinence/retention.  Denies headache or seizure activity.    Review of systems otherwise negative unless indicated in HPI.    Past Medical History:   Diagnosis Date    Abnormal chest CT 03/09/2023    Anemia     Basal cell carcinoma     nose     Cancer     breast    COPD (chronic obstructive pulmonary disease)     Depression     DVT (deep venous thrombosis)     Fall 03/20/2018    Former smoker 03/09/2023    Gastric ulceration     GERD (gastroesophageal reflux disease)     History of breast cancer 03/09/2023    History of frequent urinary tract infections     Hyperlipidemia     Hypertension     Malignant neoplasm of upper lobe of right lung (NSCLC favoring lung primary) 03/09/2023    Melanoma 2004?    left forearm    MRSA (methicillin resistant staph aureus) culture positive     Neuropathy     PE (pulmonary embolism)     Post-nasal drip 11/15/2018    Reflux     S/P bilateral mastectomy 03/09/2023     Past Surgical History:   Procedure Laterality Date    HYSTERECTOMY      MASTECTOMY, RADICAL Bilateral     TOTAL HIP ARTHROPLASTY Left 11/13/2017     Social History     Socioeconomic History    Marital status:      Spouse name: Jean    Number of children: 3   Tobacco Use    Smoking status: Former     Passive exposure: Past    Smokeless tobacco: Never   Substance and Sexual Activity     Alcohol use: No    Drug use: No    Sexual activity: Not Currently     Partners: Male   Social History Narrative    3 Children- 9 GC, 7 GGrands     Social Drivers of Health     Financial Resource Strain: Medium Risk (11/10/2021)    Overall Financial Resource Strain (CARDIA)     Difficulty of Paying Living Expenses: Somewhat hard   Food Insecurity: No Food Insecurity (11/10/2021)    Hunger Vital Sign     Worried About Running Out of Food in the Last Year: Never true     Ran Out of Food in the Last Year: Never true   Transportation Needs: No Transportation Needs (11/10/2021)    PRAPARE - Transportation     Lack of Transportation (Medical): No     Lack of Transportation (Non-Medical): No   Physical Activity: Inactive (11/10/2021)    Exercise Vital Sign     Days of Exercise per Week: 0 days     Minutes of Exercise per Session: 0 min   Stress: Stress Concern Present (11/10/2021)    [employer] - Occupational Stress Questionnaire     Feeling of Stress : To some extent   Housing Stability: Low Risk  (11/10/2021)    Housing Stability Vital Sign     Unable to Pay for Housing in the Last Year: No     Number of Places Lived in the Last Year: 1     Unstable Housing in the Last Year: No     Family History   Problem Relation Name Age of Onset    Cancer Mother      Cancer Father      Heart disease Father      Melanoma Neg Hx      Psoriasis Neg Hx      Lupus Neg Hx      Eczema Neg Hx         PRIOR HISTORY OF CHEMOTHERAPY OR RADIOTHERAPY: Please see HPI for patients prior oncologic history.    Medication List with Changes/Refills   Current Medications    ALBUTEROL (PROVENTIL/VENTOLIN HFA) 90 MCG/ACTUATION INHALER    2 puffs every 4 hours as needed for cough, wheeze, or shortness of breath    ALPRAZOLAM (XANAX) 0.25 MG TABLET    TAKE ONE TABLET BY MOUTH NIGHTLY AS NEEDED FOR ANXIETY    ARFORMOTEROL (BROVANA) 15 MCG/2 ML NEBU    Take 2 mLs (15 mcg total) by nebulization 2 (two) times a day. Controller     ATORVASTATIN (LIPITOR) 20 MG TABLET    TAKE one TABLET BY MOUTH ONCE DAILY    BUDESONIDE (PULMICORT) 0.5 MG/2 ML NEBULIZER SOLUTION    Take 2 mLs (0.5 mg total) by nebulization 2 (two) times a day. Controller    DILTIAZEM (CARDIZEM CD) 120 MG CP24    Take 1 capsule (120 mg total) by mouth once daily.    GABAPENTIN (NEURONTIN) 300 MG CAPSULE    Take 1 capsule (300 mg total) by mouth every evening.    HYDROCODONE-ACETAMINOPHEN (NORCO) 7.5-325 MG PER TABLET    Take 1 tablet by mouth every 6 (six) hours as needed for Pain.    HYDROCORTISONE 2.5 % CREAM    Apply topically 2 (two) times daily. To hemorrhoids    LACTULOSE (CHRONULAC) 20 GRAM/30 ML SOLN    Take 30 mLs (20 g total) by mouth 2 (two) times daily as needed.    LINACLOTIDE (LINZESS) 72 MCG CAP CAPSULE    Take 1 capsule (72 mcg total) by mouth before breakfast.    NITROFURANTOIN, MACROCRYSTAL-MONOHYDRATE, (MACROBID) 100 MG CAPSULE    Take 100 mg by mouth every 12 (twelve) hours.    PANTOPRAZOLE (PROTONIX) 20 MG TABLET    TAKE ONE TABLET BY MOUTH EVERY DAY    POTASSIUM CHLORIDE (KLOR-CON) 10 MEQ TBSR    TAKE 1 TABLET BY MOUTH ONCE DAILY    SERTRALINE (ZOLOFT) 100 MG TABLET    TAKE 1 TABLET (100 MG TOTAL) BY MOUTH ONCE DAILY.    VALSARTAN-HYDROCHLOROTHIAZIDE (DIOVAN-HCT) 320-12.5 MG PER TABLET    TAKE ONE TABLET BY MOUTH EVERY MORNING    XARELTO 20 MG TAB    Take 1 tablet (20 mg total) by mouth once daily.     Review of patient's allergies indicates:   Allergen Reactions    Meperidine     Promethazine     Bactrim [sulfamethoxazole-trimethoprim] Rash       QUALITY OF LIFE: 70%- Cares for Self: Unable to Carry on Normal Activity or Active Work    Vitals:    04/02/25 0851   BP: (!) 158/91   Pulse: 84   Temp: 97.9 °F (36.6 °C)   SpO2: (!) 94%   Weight: 85 kg (187 lb 4.8 oz)   PainSc:   4   PainLoc: Back     Body mass index is 30.23 kg/m².    PHYSICAL EXAM:   GENERAL: alert; in no apparent distress. In pain  HEAD: normocephalic, atraumatic.  EYES: pupils are equal,  round, reactive to light and accommodation. Sclera anicteric. Conjunctiva not injected.   NOSE/THROAT: no nasal erythema or rhinorrhea. Oropharynx pink, without erythema, ulcerations or thrush.   NECK: no cervical motion rigidity; supple with no masses.    CHEST: Patient is speaking comfortably on O2 with normal work of breathing without using accessory muscles of respiration.  CARDIOVASCULAR: regular rate and rhythm  ABDOMEN: soft, nontender, nondistended.   MUSCULOSKELETAL: no tenderness to palpation along the spine or scapulae. Normal range of motion.  NEUROLOGIC: cranial nerves II-XII intact bilaterally. Strength 5/5 in bilateral upper and lower extremities. No sensory deficits appreciated. Has walker  EXTREMITIES: + clubbing; no cyanosis, edema.  SKIN: no erythema, rashes, ulcerations noted.     REVIEW OF IMAGING/PATHOLOGY/LABS: Please see HPI. All images reviewed personally by dictating physician.     ASSESSMENT: Lety Herbert is a 84 y.o. female with Metastatic lung cancer versus breast cancer and painful T12 metastasis.  PLAN:  Lety Herbert has a remote smoking history and prior oncologic history of breast cancer treated with bilateral mastectomy and reconstruction in 2005 without adjuvant therapy and subsequent development of a right upper lobe nodule, biopsy-proven poorly differentiated non-small-cell carcinoma with squamous features favoring a lung primary that was treated with SBRT, 50 Gy in 5 fractions in April 2023.  She has subsequently recurred with MRI suggestive of multiple T-spine osseous lesions, nondiagnostic biopsy from T12 now with serial PET demonstrating progression in multiple lymph node stations above and below the diaphragm and at T12 resulting in worsening pain.  We again discussed palliative radiotherapy using SBRT regimen, 25-30 Gy in 5 fractions to T12 to assist with local control and provide pain relief.  This will require custom immobilization, respiratory compression to  deliver every other day high dose per fraction treatments similar to her prior SBRT course.  Advised patient remain on hydrocodone during treatment as it will take 1-2 weeks following therapy for relief to begin.    We will discuss options for systemic therapy with Dr. Vogt such as candidacy, testing of lung specimen for PDL1, etc...    I carefully explained the process of simulation and treatment delivery with weekly physician visits. Patient wishes to proceed.     We discussed the risks and benefits of the above treatment and have gone over in detail the acute and late toxicities of radiation therapy to the T12. The patient expressed  understanding and has signed a consent form which is included in the patients chart.      The patient has our contact information and understands that they are free to contact us at any time with questions or concerns regarding radiation therapy.     DISPOSITION: RTC FOR CT SIM     I have personally seen and evaluated this patient with a high complexity diagnosis.      60 minutes were dedicated to reviewing/interpreting pertinent laboratory/imaging/pathology as well as prior consultations; reviewing and performing history and physical; counseling patient on oncologic recommendations; documentation in the electronic medical record including ordering of additional tests and/or radiation treatment protocol; and coordination of care with physicians with referrals placed as appropriate.    PHYSICIAN: Pedrito Ralph Jr, MD    Thank you for the opportunity to meet and consult with Lety Herbert.   Please feel free to contact me to discuss the above recommendation further.

## 2025-04-02 NOTE — Clinical Note
AC-- DDx remains breast (20yrs ago) vs lung (poor diff SCCa from 2023); favor the latter with this pattern of spread. Thoughts on repeat bx (of LNs?) vs just calling it lung and running PDL1 on her prior specimen? Doubtful she could do more than IO (if even) or AI (if prove ER+ BCa). Disease is running. Going to SBRT T12 in meantime, arjun

## 2025-04-04 ENCOUNTER — TELEPHONE (OUTPATIENT)
Facility: CLINIC | Age: 85
End: 2025-04-04
Payer: MEDICARE

## 2025-04-04 DIAGNOSIS — M79.604 PAIN IN BOTH LOWER EXTREMITIES: ICD-10-CM

## 2025-04-04 DIAGNOSIS — M79.605 PAIN IN BOTH LOWER EXTREMITIES: ICD-10-CM

## 2025-04-04 DIAGNOSIS — G89.3 CANCER ASSOCIATED PAIN: ICD-10-CM

## 2025-04-04 DIAGNOSIS — C34.11 PRIMARY CANCER OF RIGHT UPPER LOBE OF LUNG: Primary | ICD-10-CM

## 2025-04-04 DIAGNOSIS — R32 URINARY INCONTINENCE, UNSPECIFIED TYPE: ICD-10-CM

## 2025-04-04 NOTE — TELEPHONE ENCOUNTER
Spoke to Lucretia in IR to see if they are able to get bx of one of the neck nodes that are hot. Lucretia requested I send message to her concerning this as they only have one radiologist and they are currently in training. Message sent.

## 2025-04-04 NOTE — TELEPHONE ENCOUNTER
----- Message from Ivana sent at 4/4/2025  1:05 PM CDT -----  Buffy pts dtr is asking if we could order something like a pure wick because she is having trouble with urine going down her legs when she gets up to go potty. [phone]

## 2025-04-04 NOTE — TELEPHONE ENCOUNTER
Care Due:                  Date            Visit Type   Department     Provider  --------------------------------------------------------------------------------                                             [facility]   [address]  Last Visit: 09-      PATIENT      FAMILY Armida Perez  Next Visit: None Scheduled  None         None Found                                                            Last  Test          Frequency    Reason                     Performed    Due Date  --------------------------------------------------------------------------------    Lipid Panel.  12 months..  atorvastatin.............  09- 09-    [facility] Embedded Care Due Messages. Reference number: [id number].   4/04/2025 8:00:37 AM CDT

## 2025-04-04 NOTE — TELEPHONE ENCOUNTER
Spoke to daughter made aware that I dont believe purwick can be used in home setting as it needs to be hooked up to suction and I dont think this would help the issue of her having urine leakage when she stands. I did instruct that they could try adult diapers she states they are using those but the patient urinates through them. Discussed with Domonique and per her orders I placed orders for homehealth as they may be able to help this and provide family with home options concerning incontinence maintenance. Daughter made aware of order for HH and that they would reach out to them to schedule appt to discuss what they can do for patient. Daughter was very appreciative and verbalized understanding.

## 2025-04-05 RX ORDER — GABAPENTIN 300 MG/1
300 CAPSULE ORAL NIGHTLY
Qty: 30 CAPSULE | Refills: 5 | Status: SHIPPED | OUTPATIENT
Start: 2025-04-05

## 2025-04-07 ENCOUNTER — TELEPHONE (OUTPATIENT)
Facility: CLINIC | Age: 85
End: 2025-04-07
Payer: MEDICARE

## 2025-04-07 ENCOUNTER — TELEPHONE (OUTPATIENT)
Dept: RADIOLOGY | Facility: HOSPITAL | Age: 85
End: 2025-04-07

## 2025-04-07 DIAGNOSIS — M79.662 PAIN AND SWELLING OF LEFT LOWER LEG: ICD-10-CM

## 2025-04-07 DIAGNOSIS — M79.89 PAIN AND SWELLING OF LEFT LOWER LEG: ICD-10-CM

## 2025-04-07 DIAGNOSIS — M79.605 PAIN IN BOTH LOWER EXTREMITIES: ICD-10-CM

## 2025-04-07 DIAGNOSIS — M79.604 PAIN IN BOTH LOWER EXTREMITIES: ICD-10-CM

## 2025-04-07 DIAGNOSIS — C34.11 PRIMARY CANCER OF RIGHT UPPER LOBE OF LUNG: Primary | ICD-10-CM

## 2025-04-07 DIAGNOSIS — G89.3 CANCER RELATED PAIN: ICD-10-CM

## 2025-04-07 RX ORDER — HYDROCODONE BITARTRATE AND ACETAMINOPHEN 7.5; 325 MG/1; MG/1
1 TABLET ORAL EVERY 6 HOURS PRN
Qty: 90 TABLET | Refills: 0 | Status: SHIPPED | OUTPATIENT
Start: 2025-04-07

## 2025-04-07 NOTE — NURSING
Pt scheduled for retroperitoneal lymph node biopsy Wednesday 4/9/25 at 0900 with an arrival time of 0700 via [facility] Rojas, she is to remain NPO after midnight and she will have a , medications reviewed and xarelto to be held after 4/7/25 am dose, understanding verbalized of all.

## 2025-04-07 NOTE — TELEPHONE ENCOUNTER
Attempted to call patient to make aware that pain med script sent to pharmacy, patient not able to come to phone at this time, vale Rojas made aware, verbalized understanding.

## 2025-04-07 NOTE — TELEPHONE ENCOUNTER
----- Message from RN Franny sent at 4/7/2025  9:21 AM CDT -----  Looks like she is scheduled to see you tomorrow. Looks like ER doc last order this for her  ----- Message -----  From: Emma Pereyra  Sent: 4/7/2025   9:08 AM CDT  To: Torie Dexter Staff    Pt's grandson Bob,  calling requesting a prescription of HYDROcodone-acetaminophen (NORCO) 7.5-325 mg per tablet, for the above patient. I don't see a prescription for this medication prescribed by Dr. Vogt. # [phone]

## 2025-04-07 NOTE — TELEPHONE ENCOUNTER
Per Lucretia in IR and Dr Jean Polk, neck node to risky to get due to location, but he can get retroperitoneal node.  Dr Vogt made aware and in agreement with this. Bx orders placed per Dr Vogt's orders to do so and per Lucretia's direction on what order code needs to be used.

## 2025-04-07 NOTE — TELEPHONE ENCOUNTER
----- Message from Ivana sent at 4/7/2025  1:45 PM CDT -----  Yesy with [facility]  calling for pt, she was in the ED last wk and has run out of pain medication Norco 7.5 mg. Her pain level is 5 out of 10 with absolutely no movement. Please send to [facility].  [phone] Bob collazo  ----- Message -----  From: Ivana Terrazas  Sent: 4/7/2025   1:49 PM CDT  To: Torie Dexter Staff    Yesy with [facility] calling for pt, she was in the ED last wk and has run out of pain medication Norco 7.5 mg. Her pain level is 5 out of 10 with absolutely no movement. Please send to [facility].  [phone] Bob collazo

## 2025-04-08 ENCOUNTER — OFFICE VISIT (OUTPATIENT)
Facility: CLINIC | Age: 85
End: 2025-04-08
Payer: MEDICARE

## 2025-04-08 VITALS
DIASTOLIC BLOOD PRESSURE: 86 MMHG | RESPIRATION RATE: 16 BRPM | HEART RATE: 62 BPM | TEMPERATURE: 97 F | HEIGHT: 66 IN | SYSTOLIC BLOOD PRESSURE: 122 MMHG | OXYGEN SATURATION: 100 % | BODY MASS INDEX: 30.23 KG/M2

## 2025-04-08 DIAGNOSIS — K74.60 CIRRHOSIS OF LIVER WITH ASCITES, UNSPECIFIED HEPATIC CIRRHOSIS TYPE: ICD-10-CM

## 2025-04-08 DIAGNOSIS — Z87.891 FORMER SMOKER: ICD-10-CM

## 2025-04-08 DIAGNOSIS — Z85.3 HISTORY OF BREAST CANCER: ICD-10-CM

## 2025-04-08 DIAGNOSIS — R91.8 MASS OF UPPER LOBE OF RIGHT LUNG: ICD-10-CM

## 2025-04-08 DIAGNOSIS — R94.2 ABNORMAL PET SCAN OF LUNG: Primary | ICD-10-CM

## 2025-04-08 DIAGNOSIS — R18.8 CIRRHOSIS OF LIVER WITH ASCITES, UNSPECIFIED HEPATIC CIRRHOSIS TYPE: ICD-10-CM

## 2025-04-08 DIAGNOSIS — J70.1 RADIATION FIBROSIS OF LUNG: ICD-10-CM

## 2025-04-08 DIAGNOSIS — C79.51 METASTASIS TO BONE: ICD-10-CM

## 2025-04-08 DIAGNOSIS — Z90.13 S/P BILATERAL MASTECTOMY: ICD-10-CM

## 2025-04-08 DIAGNOSIS — R93.89 ABNORMAL CHEST CT: ICD-10-CM

## 2025-04-08 DIAGNOSIS — C34.11 PRIMARY CANCER OF RIGHT UPPER LOBE OF LUNG: ICD-10-CM

## 2025-04-08 PROCEDURE — 1160F RVW MEDS BY RX/DR IN RCRD: CPT | Mod: CPTII,S$GLB,, | Performed by: INTERNAL MEDICINE

## 2025-04-08 PROCEDURE — 3288F FALL RISK ASSESSMENT DOCD: CPT | Mod: CPTII,S$GLB,, | Performed by: INTERNAL MEDICINE

## 2025-04-08 PROCEDURE — 1159F MED LIST DOCD IN RCRD: CPT | Mod: CPTII,S$GLB,, | Performed by: INTERNAL MEDICINE

## 2025-04-08 PROCEDURE — 1125F AMNT PAIN NOTED PAIN PRSNT: CPT | Mod: CPTII,S$GLB,, | Performed by: INTERNAL MEDICINE

## 2025-04-08 PROCEDURE — 1157F ADVNC CARE PLAN IN RCRD: CPT | Mod: CPTII,S$GLB,, | Performed by: INTERNAL MEDICINE

## 2025-04-08 PROCEDURE — 99215 OFFICE O/P EST HI 40 MIN: CPT | Mod: S$GLB,,, | Performed by: INTERNAL MEDICINE

## 2025-04-08 PROCEDURE — 99999 PR PBB SHADOW E&M-EST. PATIENT-LVL III: CPT | Mod: PBBFAC,,, | Performed by: INTERNAL MEDICINE

## 2025-04-08 PROCEDURE — G2211 COMPLEX E/M VISIT ADD ON: HCPCS | Mod: S$GLB,,, | Performed by: INTERNAL MEDICINE

## 2025-04-08 PROCEDURE — 1101F PT FALLS ASSESS-DOCD LE1/YR: CPT | Mod: CPTII,S$GLB,, | Performed by: INTERNAL MEDICINE

## 2025-04-08 NOTE — H&P (VIEW-ONLY)
[facility] Hematology/Oncology  PROGRESS NOTE -   Follow-up Visit      Subjective:       Patient ID:   NAME: Lety Hebrert : 1940     84 y.o. female    Referring Doc: Shilo Perez MD  Other Physicians: Ashish Henley; Maryse Beckwith           Chief Complaint: RUL mass/lung ca f/u       History of Present Illness:     Patient returns today for a regularly scheduled follow-up visit.  The patient is here today to go over the results of the recently ordered labs, tests and studies. She is here with her son and  today.     She reports general lack of energy and fatigue;  Breathing is stable on O2 per NC. She has residual chronic back pain issues, aches and pains which are stable.      She had recent PET scan on  2025 which unfortunately showing what looks like progressive cancer in LN's.     She last saw Dr Henley with pulm on 3/19/2025    She saw Domonique Navas NP on 2025;     Dr Ralph with rad/onc on 2025; she sees Dr Ralph again later today     She last saw Dr Perez in 2024                       ROS:   GEN: normal without any fever, night sweats or weight loss; fatigue; chronic back pains and leg pains at night  HEENT: normal with no HA's, sore throat, stiff neck, changes in vision  CV: normal with no CP, SOB, PND, GAVIRIA or orthopnea  PULM: chronic SOB/GAVIRIA; O2 dependent , hemoptysis, sputum or pleuritic pain  GI: normal with no abdominal pain, nausea, vomiting, constipation, diarrhea, melanotic stools, BRBPR, or hematemesis  : normal with no hematuria, dysuria  BREAST: normal with no mass, discharge, pain  SKIN: normal with no rash, erythema, bruising, or swelling    Pain Scale:  9    Allergies:  Review of patient's allergies indicates:   Allergen Reactions    Meperidine     Promethazine     Bactrim [sulfamethoxazole-trimethoprim] Rash       Medications:    Current Outpatient Medications:     albuterol (PROVENTIL/VENTOLIN HFA) 90 mcg/actuation inhaler, 2 puffs every 4 hours as needed  for cough, wheeze, or shortness of breath, Disp: 18 g, Rfl: 11    ALPRAZolam (XANAX) 0.25 MG tablet, TAKE ONE TABLET BY MOUTH NIGHTLY AS NEEDED FOR ANXIETY, Disp: 60 tablet, Rfl: 1    atorvastatin (LIPITOR) 20 MG tablet, TAKE one TABLET BY MOUTH ONCE DAILY (Patient taking differently: Take 20 mg by mouth once daily.), Disp: 90 tablet, Rfl: 1    budesonide (PULMICORT) 0.5 mg/2 mL nebulizer solution, Take 2 mLs (0.5 mg total) by nebulization 2 (two) times a day. Controller, Disp: 120 mL, Rfl: 11    diltiaZEM (CARDIZEM CD) 120 MG Cp24, Take 1 capsule (120 mg total) by mouth once daily., Disp: 90 capsule, Rfl: 3    gabapentin (NEURONTIN) 300 MG capsule, TAKE one CAPSULE BY MOUTH EVERY EVENING, Disp: 30 capsule, Rfl: 5    HYDROcodone-acetaminophen (NORCO) 7.5-325 mg per tablet, Take 1 tablet by mouth every 6 (six) hours as needed for Pain., Disp: 90 tablet, Rfl: 0    hydrocortisone 2.5 % cream, Apply topically 2 (two) times daily. To hemorrhoids (Patient taking differently: Apply 1 application  topically 2 (two) times daily. To hemorrhoids), Disp: 28 g, Rfl: 0    lactulose (CHRONULAC) 20 gram/30 mL Soln, Take 30 mLs (20 g total) by mouth 2 (two) times daily as needed., Disp: 1800 mL, Rfl: 0    linaCLOtide (LINZESS) 72 mcg Cap capsule, Take 1 capsule (72 mcg total) by mouth before breakfast., Disp: 30 capsule, Rfl: 5    nitrofurantoin, macrocrystal-monohydrate, (MACROBID) 100 MG capsule, Take 100 mg by mouth every 12 (twelve) hours., Disp: , Rfl:     pantoprazole (PROTONIX) 20 MG tablet, TAKE ONE TABLET BY MOUTH EVERY DAY (Patient taking differently: Take 20 mg by mouth once daily.), Disp: 90 tablet, Rfl: 1    potassium chloride (KLOR-CON) 10 MEQ TbSR, TAKE 1 TABLET BY MOUTH ONCE DAILY (Patient taking differently: Take 10 mEq by mouth once daily.), Disp: 90 tablet, Rfl: 3    sertraline (ZOLOFT) 100 MG tablet, TAKE 1 TABLET (100 MG TOTAL) BY MOUTH ONCE DAILY., Disp: 90 tablet, Rfl: 3    valsartan-hydrochlorothiazide  "(DIOVAN-HCT) 320-12.5 mg per tablet, TAKE ONE TABLET BY MOUTH EVERY MORNING, Disp: 90 tablet, Rfl: 4    XARELTO 20 mg Tab, Take 1 tablet (20 mg total) by mouth once daily., Disp: , Rfl:     arformoteroL (BROVANA) 15 mcg/2 mL Nebu, Take 2 mLs (15 mcg total) by nebulization 2 (two) times a day. 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 Patient's remote   clinical history of breast carcinoma is noticed. Based on the   immunohistochemical features, the current tumor is favored to be of lung   primary.            Objective:     Vitals:  Blood pressure 122/86, pulse 62, temperature 97.2 °F (36.2 °C), temperature source Temporal, resp. rate 16, height 5' 6" (1.676 m), SpO2 100%.    Physical Examination:   GEN: no apparent distress, comfortable; AAOx3; overweight; elderly  HEAD: atraumatic and normocephalic  EYES: no pallor, no icterus, PERRLA  ENT: OMM, no pharyngeal erythema, external ears WNL; no nasal discharge; no thrush  NECK: no masses, thyroid normal, trachea midline, no LAD/LN's, supple  CV: RRR with no murmur; normal pulse; normal S1 and S2; no pedal edema  CHEST: Normal respiratory effort; CTAB; normal breath sounds; no wheeze or crackles; O2 per NC portable  ABDOM: nontender and " nondistended; soft; normal bowel sounds; no rebound/guarding  MUSC/Skeletal: ROM normal; no crepitus; joints normal; no deformities; +arthropathy of hip/knees, hands; wheelchair  EXTREM: no clubbing, cyanosis, inflammation or swelling; varicosities in bilateral lower extremities  SKIN: no rashes, lesions, ulcers, petechiae or subcutaneous nodules; chronic age related skin changes  : no carrillo  NEURO: grossly intact; motor/sensory WNL; AAOx3; no tremors  PSYCH: normal mood, affect and behavior  LYMPH: normal cervical, supraclavicular, axillary and groin LN's          Labs:     Lab Results   Component Value Date    WBC 6.69 03/30/2025    HGB 12.0 03/30/2025    HCT 37.7 03/30/2025    MCV 91 03/30/2025     03/30/2025       CMP  Sodium   Date Value Ref Range Status   03/30/2025 139 136 - 145 mmol/L Final   03/04/2019 142 134 - 144 mmol/L      Potassium   Date Value Ref Range Status   03/30/2025 4.1 3.5 - 5.1 mmol/L Final     Chloride   Date Value Ref Range Status   02/28/2025 101 95 - 110 mmol/L Final   03/04/2019 104 98 - 110 mmol/L      CO2   Date Value Ref Range Status   03/30/2025 33 (H) 23 - 29 mmol/L Final     Glucose   Date Value Ref Range Status   02/28/2025 102 70 - 110 mg/dL Final   03/04/2019 102 (H) 70 - 99 mg/dL      BUN   Date Value Ref Range Status   03/30/2025 10 8 - 23 mg/dL Final     Creatinine   Date Value Ref Range Status   03/30/2025 0.5 0.5 - 1.4 mg/dL Final   03/04/2019 0.48 (L) 0.60 - 1.40 mg/dL      Calcium   Date Value Ref Range Status   03/30/2025 9.4 8.7 - 10.5 mg/dL Final     Total Protein   Date Value Ref Range Status   02/28/2025 7.7 6.0 - 8.4 g/dL Final     Albumin   Date Value Ref Range Status   03/30/2025 3.9 3.5 - 5.2 g/dL Final   03/04/2019 4.0 3.1 - 4.7 g/dL      Bilirubin Total   Date Value Ref Range Status   03/30/2025 0.8 0.1 - 1.0 mg/dL Final     Comment:     For infants and newborns, interpretation of results should be based   on gestational age, weight and in agreement  with clinical   observations.    Premature Infant recommended reference ranges:   0-24 hours:  <8.0 mg/dL   24-48 hours: <12.0 mg/dL   3-5 days:    <15.0 mg/dL   6-29 days:   <15.0 mg/dL     ALP   Date Value Ref Range Status   03/30/2025 83 55 - 135 unit/L Final     AST   Date Value Ref Range Status   03/30/2025 17 10 - 40 unit/L Final     ALT   Date Value Ref Range Status   03/30/2025 8 (L) 10 - 44 unit/L Final     Anion Gap   Date Value Ref Range Status   02/28/2025 7 (L) 8 - 16 mmol/L Final     eGFR   Date Value Ref Range Status   03/30/2025 >60 >60 mL/min/1.73/m2 Final   02/28/2025 >60.0 >60 mL/min/1.73 m^2 Final     Lab Results   Component Value Date    CEA 1.2 02/28/2025           Radiology/Diagnostic Studies:    CT Abdom/pelvis  3/30/2025:  Impression:     Multiple enlarged retroperitoneal nodes concerning for neoplastic process.     Small lytic lesion in the right T12 pedicle, concerning for metastatic disease     Mild diffuse circumferential bladder wall thickening suggestive of cystitis.  Mild bilateral hydroureteronephrosis suggestive of infection or recently passed stone.         PET 2/13/2025:  Impression:     1. Interval development of numerous enlarged, hypermetabolic retrocrural and abdominal retroperitoneal lymph nodes, as well as left supraclavicular lymph nodes, compatible with metastatic disease.  2. Nonspecific hypermetabolic foci in the left neck and in the right hilar region, without CT correlate.  3. Interval increased size and FDG uptake of hypermetabolic osseous metastasis in the T12 vertebral pedicle.  4. Additional observations as described.      PET 11/6/2024:    Impression:     1.  Prior bilateral mastectomy and reconstruction, with improving, likely post radiation change in the pectoralis minor and right upper lobe.     2.  New FDG avid soft tissue nodule/node anterior to the IVC suspicious for malignancy/metastasis.     3.  New FDG avid focus in the right T12 pedicle suspicious for  malignancy/metastatic disease.     4.  Unchanged nonspecific fusiform FDG activity along the right external iliac adjacent to the cecum, possibly from misregistration as previously described, and FDG activity in the terminal ileum.     5.  Minimal unchanged FDG activity in the left adrenal, similar to the previous exam.               MRI T-spine 7/22/2024:    Impression:     Degradation by motion.     Abnormal marrow signal within the posterior elements of T12 to the right of midline with additional small foci of abnormal marrow signal within the rightward aspect of the T4 and T5 vertebral bodies.  In light of the history of primary neoplasm, the findings are of concern for osseous metastases.     Remote compression deformity involving L1 with changes of previous vertebroplasty.  There is mild retropulsion of the posterior vertebral margin.     Degenerative disc/facet changes.      CT Chest  7/1/2024:  Impression:     1. Chronic consolidation of the right upper lobe with air bronchograms and mild bronchiectasis shows no detrimental change from previous exam.  Scattered subpleural interstitial thickening in the bilateral lungs is unchanged.  2. Juxtapleural nodular like density in the posterior right lower lobe measures 9 mm, could reflect a nodule or atelectasis.  Follow-up CT chest in 3 months recommended.  3. 2 mm subpleural nodular density in the anterior right middle lobe is unchanged.  4. Enlarged mediastinal and right hilar lymph nodes noted with no detrimental change.       PET 3/6/2024:    IMPRESSION:  1. Discoid airspace opacity in the paramediastinal right upper lobe favored to represent post radiation/post treatment change, obscuring the previously described pulmonary nodule in the anterior right upper lobe with no convincing focal increased FDG activity from background.     2. Bilateral mastectomy with reconstruction.     3. Asymmetric increased FDG activity in the right pectoralis minor muscle favored  to represent radiation change/inflammation.     4. Indeterminate oval area of markedly increased FDG activity adjacent to the right external iliac as above, slightly increased in conspicuity/size from the previous exam. No convincing CT anatomic correlate is present although this is adjacent to the terminal ileum and misregistration from cecal uptake is a consideration. Consider correlation with a history of colonoscopy in this age group.     5. Mild nonspecific asymmetric increased FDG activity in the inferior left adrenal body, newly appreciated from the previous exam, and only marginally increased from background.         Chest CT 12/19/2023:    IMPRESSION:  1.  Reference right upper lobe spiculated opacity is obscured by a broad area of linear density/consolidative change that abuts the anterior pleural surface. The degree of opacification has mildly increased upon comparison.  2.  Subpleural linear opacities in the right lower lobe superior segment.  3.  Small right pleural effusion with subjacent atelectasis.  4.  No evidence of pathologic lymphadenopathy in the right sury hepatis.  5.  Small sliding type hiatus hernia.  6.  Coronary artery calcification.  7.  L2 compression fracture that has undergone previous kyphoplasty.    PET 9/19/2023:  IMPRESSION:  Decreased size and FDG activity of the spiculated nodule in the anterior right upper lobe     Increased groundglass and interstitial opacities within the right upper lobe and superior segment right lower lobe compatible with post radiation changes     Bilateral mastectomies with reconstruction with interval increased FDG activity in the right pectoralis minor muscle compatible with postradiation changes     Stable FDG activity in the region of the right external iliac vein without adenopathy.     Degenerative changes of the spine  Cardiomegaly with coronary artery calcification         CT chest 6/16/2023:    IMPRESSION:  Decrease in size of the mass within  the anterior right upper lobe     Development of peripheral groundglass opacities in the right upper lobe and adjacent to the mass compatible with post radiation changes     No evidence of metastatic disease     Cardiomegaly        PET 1/11/2023:     IMPRESSION: Hypermetabolic 2.3 cm mass within the anterior right upper lobe suspicious for primary lung malignancy     Faint groundglass opacities throughout the lungs with prominence of the pulmonary vasculature and cardiomegaly which may be represent pulmonary edema.  Development of a 13 mm subpleural nodule in the right lower lobe. Since this is new since the most recent CT findings most likely are secondary to infectious or inflammatory process however metastatic lesion cannot be excluded     Increased FDG activity in the region of the right external iliac vein without corresponding adenopathy. This may be physiologic there is physiologic activity within a superficial vein in the upper right thigh and findings may be secondary to thrombophlebitis.. Follow-up CT abdomen and pelvis with contrast is recommended     Degenerative changes of the spine           CT Chest 12/15/2022:     IMPRESSION:  1. A 23 mm right upper lobe noncalcified pulmonary nodule is highly suspicious for primary pulmonary neoplasm. Tissue diagnosis is recommended.  2. No findings of regional metastatic disease in the chest.  3. Enlarged pulmonary arteries, suggesting pulmonary arterial hypertension.  4. Cardiomegaly, with aortic and coronary arterial calcifications.     CTA 11/19/2022:     IMPRESSION:  1.  No pulmonary embolus detected.  2.  Interstitial abnormality, suspect mild edema superimposed upon chronic changes.  3.  Right upper lobe 2.1 cm nodule. Consider a non-contrast Chest CT at 3 months, a PET/CT, or tissue sampling. These guidelines do not apply to immunocompromised patients and patients with cancer   ADDENDUM #1         The presence of right upper lobe lung nodule needing further  evaluation or follow-up has been discussed with Dr. Rick Salgado 11/19/2022 12:50 AM CST.     All lab results and imaging results have been reviewed and     I have reviewed all available lab results and radiology reports.    Assessment/Plan:   (1) 84 y.o. female with diagnosis of RUL lung mass who has been referred by Shilo Perez MD for evaluation by medical hematology/oncology. She has been followed by Dr Ashish Henley with pulmonary for her COPD and chronic hypoxemia/bronchitis, who was a former smoker.      - She had a CTA in Nov 2022 which was negative for a PE but showed a RUL lung mass, which was followed by a CT Chest on 12/15/2022 which confirmed a 2.3cm RUL mass.   - She had a PET scan on 1/11/2023 which showed a 2.3 cm hypermetabolic mass in the anterior right upper lobe abutting the superior vena cava along with development of a 13 mm subpleural nodule within the right lower lobe.         - She had CT guided biopsy of the RUL lung mass on 2/15/2023 with pathology coming back NSCLC favoring a lung primary.     - She is supposed to be on oxygen at home. She has portable tank and has a lot of GAVIRIA. She has chronic SOB.   - She is former smoker and quit when she was 43yrs old.      - discussed the pathology and reviewed and discussed the latest NCCN guidelines (vs. 2-2023)  - unlikely candidate for any surgical intervention  - will refer to Rad/onc to see if there are any radiation options either monotherapy or in combination with low dose weekly chemotherapy  - ROCHELLE on the pathology  - check CEA and up to date labs    3/27/2023:  - She saw Dr Ralph with rad/onc on 3/20/2023 and she is starting XRT monotherapy today with day#1    4/25/2023:  - she completed XRT and has some fatigue  - she will need post-XRT evaluation in about 4 weeks with rad/onc and expect her to need repeat scans in 6-8 weeks    5/25/2023:  - she saw Dr Ralph on 5/2 and he has CT Chest ordered post-XRT  - some residual fatigue and  back pain  - due to see Dr Henley  - labs adequate    8/21/2023:  - she has been having some more back pain  - due for repeat CT with Dr Ralph in Sept/oct 2023, will go ahead and order PET now  - she needs to f/u with Dr Henley with pulmonary as well  - await PEt results, consider other scans if necessary  - CEA at 2.0      10/24/2023:  - she had PET scan in Sept 2023 with overall improved report  - she sees rad/onc again on 11/6  - rad/onc has already ordered the next Chest CT  - due for up to date labs incl. CEA    1/8/2024:  - She has residual chronic back pain issues, aches and pains in legs especially at night; doesn't sleep well at night  - She saw Dr Perez in Nov 2023  - She last saw Dr Ralph with rad/onc on 5/2/2023 and previously completed XRT; she was supposed to see him again on 11/6/2023 but did not  - she had Chest CT on 12/19/2023  - She is on portable O2; breathing up and down. No CP, HA's or N/V.  - She has not seen Dr Henley with pulmonary in some time    4/1/2024:  - She had bout of pneumonia and was hospitalized in Jan 2024  - She saw Dr Henley with pulm in Feb 2024 and Dr Grajeda in Jan 2024  - She had recent PEt scan on 3/6/2024 relatively stable  - she saw Dr Ralph with rad/onc in Jan 2024 8/5/2024:  - She saw Dr Henley with pulm in May 2024; she saw Dr Perez in may 2024  - She last saw Dr Ralph with rad/onc in Jan 2024  - She had PEt scan on 3/6/2024; Ct chest on 7/1/2024  - Dr Dodge ordered a MRI of her T spine on 7/22 and radiologist mentioned in their report that could not exclude presence of cancer in her spine - discussed this with patient and she does not want to get any bone scan or studies to further evaluate  - CEA 1.7    12/12/2024:  -  She had PET scan on 11/6/2024  - She saw Dr Henley with pulm on 11/20/2024  - She saw Domonique Navas NP on 11/14/2024  - She had a bone biopsy on 11/29 which has come back negative for cancer  - CEA at 1.2    3/6/2025:  - She had recent PET scan on   2/13/2025 which unfortunately showing what looks like progressive cancer in LN's.   - She last saw Dr Henley with pulm on 11/20/2024  - She saw Domonique Navas NP on 1/23/2025; Dr Ralph with rad/onc on 2/11/2025  - I am not sure she could safely undergo any meaningful regimen of chemotherapy given her advanced age, lung function and poor condition  - see is we can get Dr henley to see her again and ask IR if there are any LN's that can be safely biopsied  - CEA 1.2  - no anemic    4/8/2025:  - Dr Ralph with rad/onc on 4/2/2025; she sees Dr Ralph again later today   - she is scheduled for repeat biopsy with IR tomorrow  - will await path report     (2) Hx/of PE and DVT     (3) HTN and hypercholesterolemia     (4) COPD/chronic hypoxemia; bronchiectasis; chronic bronchitis - followed by Dr Ashish Henley/Shelbie Villalpando     (5) CHF and dysrhythmia     (6) GERD; gastric ulcer     (7) Hx/of breast cancer s/p bilateral mastectomies- followed by Dr Maryse Beckwith  - She has history of breast cancer in past around 2005  for which she has had bilateral mastectomies and is followed by Dr Beckwith. She did not require chemotherapy or radiation. She had reconstruction surgery with Dr Grimaldo. She saw Dr Beckwith couple of weeks ago.        (8) OA (Knees); chronic back pain; s/p Left total hip after fracture     (9) Urinary urgency/incontinence     (10) Migraine HA's     (11) Anxiety and Depression     (12) Hx/of melanoma left forearm, BCC     (13) Former smoker           VISIT DIAGNOSES:      Abnormal PET scan of lung    Radiation fibrosis of lung    Mass of upper lobe of right lung    Primary cancer of right upper lobe of lung    History of breast cancer    S/P bilateral mastectomy    Metastasis to bone    Cirrhosis of liver with ascites, unspecified hepatic cirrhosis type    Abnormal chest CT    Former smoker          PLAN:  Proceed with rad/onc f/u later today  Proceed with biopsy with IR  She previously had completed  monotherapy XRT; f/u with rad/onc as directed    I am not sure she could safely undergo any meaningful regimen of chemotherapy given her advanced age, lung function and poor condition  Check up to date labs incl. CEA level every 3 months   F/u with PCP, Pulm, etc     RTC in  1-2 weeks with Whit; 4 weeks with myself   Fax note to Shilo Perez MD; Amena Mejia Mannina, [place]       Discussion:     COVID-19 Discussion:     I had long discussion with patient and any applicable family about the COVID-19 coronavirus epidemic and the recommended precautions with regard to cancer and/or hematology patients. I have re-iterated the CDC recommendations for adequate hand washing, use of hand -like products, and coughing into elbow, etc. In addition, especially for our patients who are on chemotherapy and/or our otherwise immunocompromised patients, I have recommended avoidance of crowds, including movie theaters, restaurants, churches, etc. I have recommended avoidance of any sick or symptomatic family members and/or friends. I have also recommended avoidance of any raw and unwashed food products, and general avoidance of food items that have not been prepared by themselves. The patient has been asked to call us immediately with any symptom developments, issues, questions or other general concerns.         Pathology Discussion:     I reviewed and discussed the pathology report(s) and radiograph reports (if available) in as simple to understand and/or laymen's terms to the best of my ability. I had an indepth conversation with the patient and went over the patient's individual diagnosis based on the information that was currently available. I discussed the TNM staging process with regard to the patient's particular cancer type, and the calculated stage based on the currently available TNM data and literature. I discussed the available prognostic data with regard to the current staging information and how it  "relates to the prognosis of their particular neoplastic process.          NCCN Guidelines:     I discussed the available treatment option(s) in accordance with the latest literature from the NCCN Clinical Practice Guidelines for the patient's particular type of cancer disorder. The NCCN Guidelines provide a "document evidence-based (and) consensus-driven management" of the care of oncology patients. The treatment recommendations were made not only in accordance to the NCCN guidelines, but also factored in to account the patient's overall age, condition, performance status and their medical co-morbidities. I went over the risks and benefits of the the treatment options (if any could be made) with regard to their particular cancer type, their cancer stage, their age, and their co-morbidities.         I have explained and the patient understands all of  the current recommendation(s). I have answered all of their questions to the best of my ability and to their complete satisfaction.      I spent over 25 mins of time with the patient. Reviewed results of the recently ordered labs, tests and studies; made directives with regards to the results. Over half of this time was spent couseling and coordinating care.    I have explained all of the above in detail and the patient understands all of the current recommendation(s). I have answered all of their questions to the best of my ability and to their complete satisfaction.   The patient is to continue with the current management plan.            Electronically signed by Isacc Vogt MD                      "

## 2025-04-08 NOTE — PROGRESS NOTES
[facility] Hematology/Oncology  PROGRESS NOTE -   Follow-up Visit      Subjective:       Patient ID:   NAME: Lety Herbert : 1940     84 y.o. female    Referring Doc: Shilo Perez MD  Other Physicians: Ashish Henley; Maryse Beckwith           Chief Complaint: RUL mass/lung ca f/u       History of Present Illness:     Patient returns today for a regularly scheduled follow-up visit.  The patient is here today to go over the results of the recently ordered labs, tests and studies. She is here with her son and  today.     She reports general lack of energy and fatigue;  Breathing is stable on O2 per NC. She has residual chronic back pain issues, aches and pains which are stable.      She had recent PET scan on  2025 which unfortunately showing what looks like progressive cancer in LN's.     She last saw Dr Henley with pulm on 3/19/2025    She saw Domonique Navas NP on 2025;     Dr Ralph with rad/onc on 2025; she sees Dr Ralph again later today     She last saw Dr Perez in 2024                       ROS:   GEN: normal without any fever, night sweats or weight loss; fatigue; chronic back pains and leg pains at night  HEENT: normal with no HA's, sore throat, stiff neck, changes in vision  CV: normal with no CP, SOB, PND, GAVIRIA or orthopnea  PULM: chronic SOB/GAVIRIA; O2 dependent , hemoptysis, sputum or pleuritic pain  GI: normal with no abdominal pain, nausea, vomiting, constipation, diarrhea, melanotic stools, BRBPR, or hematemesis  : normal with no hematuria, dysuria  BREAST: normal with no mass, discharge, pain  SKIN: normal with no rash, erythema, bruising, or swelling    Pain Scale:  9    Allergies:  Review of patient's allergies indicates:   Allergen Reactions    Meperidine     Promethazine     Bactrim [sulfamethoxazole-trimethoprim] Rash       Medications:    Current Outpatient Medications:     albuterol (PROVENTIL/VENTOLIN HFA) 90 mcg/actuation inhaler, 2 puffs every 4 hours as needed  for cough, wheeze, or shortness of breath, Disp: 18 g, Rfl: 11    ALPRAZolam (XANAX) 0.25 MG tablet, TAKE ONE TABLET BY MOUTH NIGHTLY AS NEEDED FOR ANXIETY, Disp: 60 tablet, Rfl: 1    atorvastatin (LIPITOR) 20 MG tablet, TAKE one TABLET BY MOUTH ONCE DAILY (Patient taking differently: Take 20 mg by mouth once daily.), Disp: 90 tablet, Rfl: 1    budesonide (PULMICORT) 0.5 mg/2 mL nebulizer solution, Take 2 mLs (0.5 mg total) by nebulization 2 (two) times a day. Controller, Disp: 120 mL, Rfl: 11    diltiaZEM (CARDIZEM CD) 120 MG Cp24, Take 1 capsule (120 mg total) by mouth once daily., Disp: 90 capsule, Rfl: 3    gabapentin (NEURONTIN) 300 MG capsule, TAKE one CAPSULE BY MOUTH EVERY EVENING, Disp: 30 capsule, Rfl: 5    HYDROcodone-acetaminophen (NORCO) 7.5-325 mg per tablet, Take 1 tablet by mouth every 6 (six) hours as needed for Pain., Disp: 90 tablet, Rfl: 0    hydrocortisone 2.5 % cream, Apply topically 2 (two) times daily. To hemorrhoids (Patient taking differently: Apply 1 application  topically 2 (two) times daily. To hemorrhoids), Disp: 28 g, Rfl: 0    lactulose (CHRONULAC) 20 gram/30 mL Soln, Take 30 mLs (20 g total) by mouth 2 (two) times daily as needed., Disp: 1800 mL, Rfl: 0    linaCLOtide (LINZESS) 72 mcg Cap capsule, Take 1 capsule (72 mcg total) by mouth before breakfast., Disp: 30 capsule, Rfl: 5    nitrofurantoin, macrocrystal-monohydrate, (MACROBID) 100 MG capsule, Take 100 mg by mouth every 12 (twelve) hours., Disp: , Rfl:     pantoprazole (PROTONIX) 20 MG tablet, TAKE ONE TABLET BY MOUTH EVERY DAY (Patient taking differently: Take 20 mg by mouth once daily.), Disp: 90 tablet, Rfl: 1    potassium chloride (KLOR-CON) 10 MEQ TbSR, TAKE 1 TABLET BY MOUTH ONCE DAILY (Patient taking differently: Take 10 mEq by mouth once daily.), Disp: 90 tablet, Rfl: 3    sertraline (ZOLOFT) 100 MG tablet, TAKE 1 TABLET (100 MG TOTAL) BY MOUTH ONCE DAILY., Disp: 90 tablet, Rfl: 3    valsartan-hydrochlorothiazide  "(DIOVAN-HCT) 320-12.5 mg per tablet, TAKE ONE TABLET BY MOUTH EVERY MORNING, Disp: 90 tablet, Rfl: 4    XARELTO 20 mg Tab, Take 1 tablet (20 mg total) by mouth once daily., Disp: , Rfl:     arformoteroL (BROVANA) 15 mcg/2 mL Nebu, Take 2 mLs (15 mcg total) by nebulization 2 (two) times a day. 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 Patient's remote   clinical history of breast carcinoma is noticed. Based on the   immunohistochemical features, the current tumor is favored to be of lung   primary.            Objective:     Vitals:  Blood pressure 122/86, pulse 62, temperature 97.2 °F (36.2 °C), temperature source Temporal, resp. rate 16, height 5' 6" (1.676 m), SpO2 100%.    Physical Examination:   GEN: no apparent distress, comfortable; AAOx3; overweight; elderly  HEAD: atraumatic and normocephalic  EYES: no pallor, no icterus, PERRLA  ENT: OMM, no pharyngeal erythema, external ears WNL; no nasal discharge; no thrush  NECK: no masses, thyroid normal, trachea midline, no LAD/LN's, supple  CV: RRR with no murmur; normal pulse; normal S1 and S2; no pedal edema  CHEST: Normal respiratory effort; CTAB; normal breath sounds; no wheeze or crackles; O2 per NC portable  ABDOM: nontender and " nondistended; soft; normal bowel sounds; no rebound/guarding  MUSC/Skeletal: ROM normal; no crepitus; joints normal; no deformities; +arthropathy of hip/knees, hands; wheelchair  EXTREM: no clubbing, cyanosis, inflammation or swelling; varicosities in bilateral lower extremities  SKIN: no rashes, lesions, ulcers, petechiae or subcutaneous nodules; chronic age related skin changes  : no carrillo  NEURO: grossly intact; motor/sensory WNL; AAOx3; no tremors  PSYCH: normal mood, affect and behavior  LYMPH: normal cervical, supraclavicular, axillary and groin LN's          Labs:     Lab Results   Component Value Date    WBC 6.69 03/30/2025    HGB 12.0 03/30/2025    HCT 37.7 03/30/2025    MCV 91 03/30/2025     03/30/2025       CMP  Sodium   Date Value Ref Range Status   03/30/2025 139 136 - 145 mmol/L Final   03/04/2019 142 134 - 144 mmol/L      Potassium   Date Value Ref Range Status   03/30/2025 4.1 3.5 - 5.1 mmol/L Final     Chloride   Date Value Ref Range Status   02/28/2025 101 95 - 110 mmol/L Final   03/04/2019 104 98 - 110 mmol/L      CO2   Date Value Ref Range Status   03/30/2025 33 (H) 23 - 29 mmol/L Final     Glucose   Date Value Ref Range Status   02/28/2025 102 70 - 110 mg/dL Final   03/04/2019 102 (H) 70 - 99 mg/dL      BUN   Date Value Ref Range Status   03/30/2025 10 8 - 23 mg/dL Final     Creatinine   Date Value Ref Range Status   03/30/2025 0.5 0.5 - 1.4 mg/dL Final   03/04/2019 0.48 (L) 0.60 - 1.40 mg/dL      Calcium   Date Value Ref Range Status   03/30/2025 9.4 8.7 - 10.5 mg/dL Final     Total Protein   Date Value Ref Range Status   02/28/2025 7.7 6.0 - 8.4 g/dL Final     Albumin   Date Value Ref Range Status   03/30/2025 3.9 3.5 - 5.2 g/dL Final   03/04/2019 4.0 3.1 - 4.7 g/dL      Bilirubin Total   Date Value Ref Range Status   03/30/2025 0.8 0.1 - 1.0 mg/dL Final     Comment:     For infants and newborns, interpretation of results should be based   on gestational age, weight and in agreement  with clinical   observations.    Premature Infant recommended reference ranges:   0-24 hours:  <8.0 mg/dL   24-48 hours: <12.0 mg/dL   3-5 days:    <15.0 mg/dL   6-29 days:   <15.0 mg/dL     ALP   Date Value Ref Range Status   03/30/2025 83 55 - 135 unit/L Final     AST   Date Value Ref Range Status   03/30/2025 17 10 - 40 unit/L Final     ALT   Date Value Ref Range Status   03/30/2025 8 (L) 10 - 44 unit/L Final     Anion Gap   Date Value Ref Range Status   02/28/2025 7 (L) 8 - 16 mmol/L Final     eGFR   Date Value Ref Range Status   03/30/2025 >60 >60 mL/min/1.73/m2 Final   02/28/2025 >60.0 >60 mL/min/1.73 m^2 Final     Lab Results   Component Value Date    CEA 1.2 02/28/2025           Radiology/Diagnostic Studies:    CT Abdom/pelvis  3/30/2025:  Impression:     Multiple enlarged retroperitoneal nodes concerning for neoplastic process.     Small lytic lesion in the right T12 pedicle, concerning for metastatic disease     Mild diffuse circumferential bladder wall thickening suggestive of cystitis.  Mild bilateral hydroureteronephrosis suggestive of infection or recently passed stone.         PET 2/13/2025:  Impression:     1. Interval development of numerous enlarged, hypermetabolic retrocrural and abdominal retroperitoneal lymph nodes, as well as left supraclavicular lymph nodes, compatible with metastatic disease.  2. Nonspecific hypermetabolic foci in the left neck and in the right hilar region, without CT correlate.  3. Interval increased size and FDG uptake of hypermetabolic osseous metastasis in the T12 vertebral pedicle.  4. Additional observations as described.      PET 11/6/2024:    Impression:     1.  Prior bilateral mastectomy and reconstruction, with improving, likely post radiation change in the pectoralis minor and right upper lobe.     2.  New FDG avid soft tissue nodule/node anterior to the IVC suspicious for malignancy/metastasis.     3.  New FDG avid focus in the right T12 pedicle suspicious for  malignancy/metastatic disease.     4.  Unchanged nonspecific fusiform FDG activity along the right external iliac adjacent to the cecum, possibly from misregistration as previously described, and FDG activity in the terminal ileum.     5.  Minimal unchanged FDG activity in the left adrenal, similar to the previous exam.               MRI T-spine 7/22/2024:    Impression:     Degradation by motion.     Abnormal marrow signal within the posterior elements of T12 to the right of midline with additional small foci of abnormal marrow signal within the rightward aspect of the T4 and T5 vertebral bodies.  In light of the history of primary neoplasm, the findings are of concern for osseous metastases.     Remote compression deformity involving L1 with changes of previous vertebroplasty.  There is mild retropulsion of the posterior vertebral margin.     Degenerative disc/facet changes.      CT Chest  7/1/2024:  Impression:     1. Chronic consolidation of the right upper lobe with air bronchograms and mild bronchiectasis shows no detrimental change from previous exam.  Scattered subpleural interstitial thickening in the bilateral lungs is unchanged.  2. Juxtapleural nodular like density in the posterior right lower lobe measures 9 mm, could reflect a nodule or atelectasis.  Follow-up CT chest in 3 months recommended.  3. 2 mm subpleural nodular density in the anterior right middle lobe is unchanged.  4. Enlarged mediastinal and right hilar lymph nodes noted with no detrimental change.       PET 3/6/2024:    IMPRESSION:  1. Discoid airspace opacity in the paramediastinal right upper lobe favored to represent post radiation/post treatment change, obscuring the previously described pulmonary nodule in the anterior right upper lobe with no convincing focal increased FDG activity from background.     2. Bilateral mastectomy with reconstruction.     3. Asymmetric increased FDG activity in the right pectoralis minor muscle favored  to represent radiation change/inflammation.     4. Indeterminate oval area of markedly increased FDG activity adjacent to the right external iliac as above, slightly increased in conspicuity/size from the previous exam. No convincing CT anatomic correlate is present although this is adjacent to the terminal ileum and misregistration from cecal uptake is a consideration. Consider correlation with a history of colonoscopy in this age group.     5. Mild nonspecific asymmetric increased FDG activity in the inferior left adrenal body, newly appreciated from the previous exam, and only marginally increased from background.         Chest CT 12/19/2023:    IMPRESSION:  1.  Reference right upper lobe spiculated opacity is obscured by a broad area of linear density/consolidative change that abuts the anterior pleural surface. The degree of opacification has mildly increased upon comparison.  2.  Subpleural linear opacities in the right lower lobe superior segment.  3.  Small right pleural effusion with subjacent atelectasis.  4.  No evidence of pathologic lymphadenopathy in the right sury hepatis.  5.  Small sliding type hiatus hernia.  6.  Coronary artery calcification.  7.  L2 compression fracture that has undergone previous kyphoplasty.    PET 9/19/2023:  IMPRESSION:  Decreased size and FDG activity of the spiculated nodule in the anterior right upper lobe     Increased groundglass and interstitial opacities within the right upper lobe and superior segment right lower lobe compatible with post radiation changes     Bilateral mastectomies with reconstruction with interval increased FDG activity in the right pectoralis minor muscle compatible with postradiation changes     Stable FDG activity in the region of the right external iliac vein without adenopathy.     Degenerative changes of the spine  Cardiomegaly with coronary artery calcification         CT chest 6/16/2023:    IMPRESSION:  Decrease in size of the mass within  the anterior right upper lobe     Development of peripheral groundglass opacities in the right upper lobe and adjacent to the mass compatible with post radiation changes     No evidence of metastatic disease     Cardiomegaly        PET 1/11/2023:     IMPRESSION: Hypermetabolic 2.3 cm mass within the anterior right upper lobe suspicious for primary lung malignancy     Faint groundglass opacities throughout the lungs with prominence of the pulmonary vasculature and cardiomegaly which may be represent pulmonary edema.  Development of a 13 mm subpleural nodule in the right lower lobe. Since this is new since the most recent CT findings most likely are secondary to infectious or inflammatory process however metastatic lesion cannot be excluded     Increased FDG activity in the region of the right external iliac vein without corresponding adenopathy. This may be physiologic there is physiologic activity within a superficial vein in the upper right thigh and findings may be secondary to thrombophlebitis.. Follow-up CT abdomen and pelvis with contrast is recommended     Degenerative changes of the spine           CT Chest 12/15/2022:     IMPRESSION:  1. A 23 mm right upper lobe noncalcified pulmonary nodule is highly suspicious for primary pulmonary neoplasm. Tissue diagnosis is recommended.  2. No findings of regional metastatic disease in the chest.  3. Enlarged pulmonary arteries, suggesting pulmonary arterial hypertension.  4. Cardiomegaly, with aortic and coronary arterial calcifications.     CTA 11/19/2022:     IMPRESSION:  1.  No pulmonary embolus detected.  2.  Interstitial abnormality, suspect mild edema superimposed upon chronic changes.  3.  Right upper lobe 2.1 cm nodule. Consider a non-contrast Chest CT at 3 months, a PET/CT, or tissue sampling. These guidelines do not apply to immunocompromised patients and patients with cancer   ADDENDUM #1         The presence of right upper lobe lung nodule needing further  evaluation or follow-up has been discussed with Dr. iRck Salgado 11/19/2022 12:50 AM CST.     All lab results and imaging results have been reviewed and     I have reviewed all available lab results and radiology reports.    Assessment/Plan:   (1) 84 y.o. female with diagnosis of RUL lung mass who has been referred by Shilo Perez MD for evaluation by medical hematology/oncology. She has been followed by Dr Ashish Henley with pulmonary for her COPD and chronic hypoxemia/bronchitis, who was a former smoker.      - She had a CTA in Nov 2022 which was negative for a PE but showed a RUL lung mass, which was followed by a CT Chest on 12/15/2022 which confirmed a 2.3cm RUL mass.   - She had a PET scan on 1/11/2023 which showed a 2.3 cm hypermetabolic mass in the anterior right upper lobe abutting the superior vena cava along with development of a 13 mm subpleural nodule within the right lower lobe.         - She had CT guided biopsy of the RUL lung mass on 2/15/2023 with pathology coming back NSCLC favoring a lung primary.     - She is supposed to be on oxygen at home. She has portable tank and has a lot of GAVIRIA. She has chronic SOB.   - She is former smoker and quit when she was 43yrs old.      - discussed the pathology and reviewed and discussed the latest NCCN guidelines (vs. 2-2023)  - unlikely candidate for any surgical intervention  - will refer to Rad/onc to see if there are any radiation options either monotherapy or in combination with low dose weekly chemotherapy  - ROCHELLE on the pathology  - check CEA and up to date labs    3/27/2023:  - She saw Dr Ralph with rad/onc on 3/20/2023 and she is starting XRT monotherapy today with day#1    4/25/2023:  - she completed XRT and has some fatigue  - she will need post-XRT evaluation in about 4 weeks with rad/onc and expect her to need repeat scans in 6-8 weeks    5/25/2023:  - she saw Dr Ralph on 5/2 and he has CT Chest ordered post-XRT  - some residual fatigue and  back pain  - due to see Dr Henley  - labs adequate    8/21/2023:  - she has been having some more back pain  - due for repeat CT with Dr Ralph in Sept/oct 2023, will go ahead and order PET now  - she needs to f/u with Dr Henley with pulmonary as well  - await PEt results, consider other scans if necessary  - CEA at 2.0      10/24/2023:  - she had PET scan in Sept 2023 with overall improved report  - she sees rad/onc again on 11/6  - rad/onc has already ordered the next Chest CT  - due for up to date labs incl. CEA    1/8/2024:  - She has residual chronic back pain issues, aches and pains in legs especially at night; doesn't sleep well at night  - She saw Dr Perez in Nov 2023  - She last saw Dr Ralph with rad/onc on 5/2/2023 and previously completed XRT; she was supposed to see him again on 11/6/2023 but did not  - she had Chest CT on 12/19/2023  - She is on portable O2; breathing up and down. No CP, HA's or N/V.  - She has not seen Dr Henley with pulmonary in some time    4/1/2024:  - She had bout of pneumonia and was hospitalized in Jan 2024  - She saw Dr Henley with pulm in Feb 2024 and Dr Grajeda in Jan 2024  - She had recent PEt scan on 3/6/2024 relatively stable  - she saw Dr Ralph with rad/onc in Jan 2024 8/5/2024:  - She saw Dr Henley with pulm in May 2024; she saw Dr Perez in may 2024  - She last saw Dr Ralph with rad/onc in Jan 2024  - She had PEt scan on 3/6/2024; Ct chest on 7/1/2024  - Dr Dodge ordered a MRI of her T spine on 7/22 and radiologist mentioned in their report that could not exclude presence of cancer in her spine - discussed this with patient and she does not want to get any bone scan or studies to further evaluate  - CEA 1.7    12/12/2024:  -  She had PET scan on 11/6/2024  - She saw Dr Henley with pulm on 11/20/2024  - She saw Domonique Navas NP on 11/14/2024  - She had a bone biopsy on 11/29 which has come back negative for cancer  - CEA at 1.2    3/6/2025:  - She had recent PET scan on   2/13/2025 which unfortunately showing what looks like progressive cancer in LN's.   - She last saw Dr Henley with pulm on 11/20/2024  - She saw Domonique Navas NP on 1/23/2025; Dr Ralph with rad/onc on 2/11/2025  - I am not sure she could safely undergo any meaningful regimen of chemotherapy given her advanced age, lung function and poor condition  - see is we can get Dr henley to see her again and ask IR if there are any LN's that can be safely biopsied  - CEA 1.2  - no anemic    4/8/2025:  - Dr Ralph with rad/onc on 4/2/2025; she sees Dr Ralph again later today   - she is scheduled for repeat biopsy with IR tomorrow  - will await path report     (2) Hx/of PE and DVT     (3) HTN and hypercholesterolemia     (4) COPD/chronic hypoxemia; bronchiectasis; chronic bronchitis - followed by Dr Ashish Henley/Shelbie Villalpando     (5) CHF and dysrhythmia     (6) GERD; gastric ulcer     (7) Hx/of breast cancer s/p bilateral mastectomies- followed by Dr Maryse Beckwith  - She has history of breast cancer in past around 2005  for which she has had bilateral mastectomies and is followed by Dr Beckwith. She did not require chemotherapy or radiation. She had reconstruction surgery with Dr Grimaldo. She saw Dr Beckwith couple of weeks ago.        (8) OA (Knees); chronic back pain; s/p Left total hip after fracture     (9) Urinary urgency/incontinence     (10) Migraine HA's     (11) Anxiety and Depression     (12) Hx/of melanoma left forearm, BCC     (13) Former smoker           VISIT DIAGNOSES:      Abnormal PET scan of lung    Radiation fibrosis of lung    Mass of upper lobe of right lung    Primary cancer of right upper lobe of lung    History of breast cancer    S/P bilateral mastectomy    Metastasis to bone    Cirrhosis of liver with ascites, unspecified hepatic cirrhosis type    Abnormal chest CT    Former smoker          PLAN:  Proceed with rad/onc f/u later today  Proceed with biopsy with IR  She previously had completed  monotherapy XRT; f/u with rad/onc as directed    I am not sure she could safely undergo any meaningful regimen of chemotherapy given her advanced age, lung function and poor condition  Check up to date labs incl. CEA level every 3 months   F/u with PCP, Pulm, etc     RTC in  1-2 weeks with Whit; 4 weeks with myself   Fax note to Shilo Perez MD; Amena Mejia Mannina, [place]       Discussion:     COVID-19 Discussion:     I had long discussion with patient and any applicable family about the COVID-19 coronavirus epidemic and the recommended precautions with regard to cancer and/or hematology patients. I have re-iterated the CDC recommendations for adequate hand washing, use of hand -like products, and coughing into elbow, etc. In addition, especially for our patients who are on chemotherapy and/or our otherwise immunocompromised patients, I have recommended avoidance of crowds, including movie theaters, restaurants, churches, etc. I have recommended avoidance of any sick or symptomatic family members and/or friends. I have also recommended avoidance of any raw and unwashed food products, and general avoidance of food items that have not been prepared by themselves. The patient has been asked to call us immediately with any symptom developments, issues, questions or other general concerns.         Pathology Discussion:     I reviewed and discussed the pathology report(s) and radiograph reports (if available) in as simple to understand and/or laymen's terms to the best of my ability. I had an indepth conversation with the patient and went over the patient's individual diagnosis based on the information that was currently available. I discussed the TNM staging process with regard to the patient's particular cancer type, and the calculated stage based on the currently available TNM data and literature. I discussed the available prognostic data with regard to the current staging information and how it  "relates to the prognosis of their particular neoplastic process.          NCCN Guidelines:     I discussed the available treatment option(s) in accordance with the latest literature from the NCCN Clinical Practice Guidelines for the patient's particular type of cancer disorder. The NCCN Guidelines provide a "document evidence-based (and) consensus-driven management" of the care of oncology patients. The treatment recommendations were made not only in accordance to the NCCN guidelines, but also factored in to account the patient's overall age, condition, performance status and their medical co-morbidities. I went over the risks and benefits of the the treatment options (if any could be made) with regard to their particular cancer type, their cancer stage, their age, and their co-morbidities.         I have explained and the patient understands all of  the current recommendation(s). I have answered all of their questions to the best of my ability and to their complete satisfaction.      I spent over 25 mins of time with the patient. Reviewed results of the recently ordered labs, tests and studies; made directives with regards to the results. Over half of this time was spent couseling and coordinating care.    I have explained all of the above in detail and the patient understands all of the current recommendation(s). I have answered all of their questions to the best of my ability and to their complete satisfaction.   The patient is to continue with the current management plan.            Electronically signed by Isacc Vogt MD                      "

## 2025-04-09 ENCOUNTER — HOSPITAL ENCOUNTER (OUTPATIENT)
Dept: RADIOLOGY | Facility: HOSPITAL | Age: 85
Discharge: HOME | End: 2025-04-09
Attending: INTERNAL MEDICINE
Payer: MEDICARE

## 2025-04-09 VITALS
SYSTOLIC BLOOD PRESSURE: 124 MMHG | HEART RATE: 81 BPM | DIASTOLIC BLOOD PRESSURE: 69 MMHG | RESPIRATION RATE: 16 BRPM | OXYGEN SATURATION: 99 % | TEMPERATURE: 97 F | HEIGHT: 66 IN | WEIGHT: 180 LBS | BODY MASS INDEX: 28.93 KG/M2

## 2025-04-09 DIAGNOSIS — C34.11 PRIMARY CANCER OF RIGHT UPPER LOBE OF LUNG: ICD-10-CM

## 2025-04-09 LAB
ABSOLUTE EOSINOPHIL (SMH): 0.16 K/UL
ABSOLUTE MONOCYTE (SMH): 0.68 K/UL (ref 0.3–1)
ABSOLUTE NEUTROPHIL COUNT (SMH): 6 K/UL (ref 1.8–7.7)
APTT PPP: 29.1 SECONDS (ref 21–32)
BASOPHILS # BLD AUTO: 0.08 K/UL
BASOPHILS NFR BLD AUTO: 0.8 %
ERYTHROCYTE [DISTWIDTH] IN BLOOD BY AUTOMATED COUNT: 13.8 % (ref 11.5–14.5)
HCT VFR BLD AUTO: 42.6 % (ref 37–48.5)
HGB BLD-MCNC: 13.6 GM/DL (ref 12–16)
IMM GRANULOCYTES # BLD AUTO: 0.06 K/UL (ref 0–0.04)
IMM GRANULOCYTES NFR BLD AUTO: 0.6 % (ref 0–0.5)
INR PPP: 1.1 (ref 0.8–1.2)
LYMPHOCYTES # BLD AUTO: 3.17 K/UL (ref 1–4.8)
MCH RBC QN AUTO: 29.2 PG (ref 27–31)
MCHC RBC AUTO-ENTMCNC: 31.9 G/DL (ref 32–36)
MCV RBC AUTO: 92 FL (ref 82–98)
NUCLEATED RBC (/100WBC) (SMH): 0 /100 WBC
PLATELET # BLD AUTO: 304 K/UL (ref 150–450)
PMV BLD AUTO: 10.1 FL (ref 9.2–12.9)
PROTHROMBIN TIME: 11.7 SECONDS (ref 9–12.5)
RBC # BLD AUTO: 4.65 M/UL (ref 4–5.4)
RELATIVE EOSINOPHIL (SMH): 1.6 % (ref 0–8)
RELATIVE LYMPHOCYTE (SMH): 31.3 % (ref 18–48)
RELATIVE MONOCYTE (SMH): 6.7 % (ref 4–15)
RELATIVE NEUTROPHIL (SMH): 59 % (ref 38–73)
WBC # BLD AUTO: 10.13 K/UL (ref 3.9–12.7)

## 2025-04-09 PROCEDURE — A4215 STERILE NEEDLE: HCPCS

## 2025-04-09 PROCEDURE — 99152 MOD SED SAME PHYS/QHP 5/>YRS: CPT

## 2025-04-09 PROCEDURE — 85730 THROMBOPLASTIN TIME PARTIAL: CPT | Performed by: INTERNAL MEDICINE

## 2025-04-09 PROCEDURE — 85610 PROTHROMBIN TIME: CPT | Performed by: INTERNAL MEDICINE

## 2025-04-09 PROCEDURE — 63600175 PHARM REV CODE 636 W HCPCS: Performed by: RADIOLOGY

## 2025-04-09 PROCEDURE — 25000003 PHARM REV CODE 250: Performed by: RADIOLOGY

## 2025-04-09 PROCEDURE — 85025 COMPLETE CBC W/AUTO DIFF WBC: CPT | Performed by: INTERNAL MEDICINE

## 2025-04-09 RX ORDER — SODIUM CHLORIDE 9 MG/ML
INJECTION, SOLUTION INTRAVENOUS
Status: COMPLETED | OUTPATIENT
Start: 2025-04-09 | End: 2025-04-09

## 2025-04-09 RX ORDER — FENTANYL CITRATE 50 UG/ML
INJECTION, SOLUTION INTRAMUSCULAR; INTRAVENOUS
Status: COMPLETED | OUTPATIENT
Start: 2025-04-09 | End: 2025-04-09

## 2025-04-09 RX ORDER — LIDOCAINE HYDROCHLORIDE 10 MG/ML
INJECTION, SOLUTION EPIDURAL; INFILTRATION; INTRACAUDAL; PERINEURAL
Status: COMPLETED | OUTPATIENT
Start: 2025-04-09 | End: 2025-04-09

## 2025-04-09 RX ORDER — MIDAZOLAM HYDROCHLORIDE 1 MG/ML
INJECTION, SOLUTION INTRAMUSCULAR; INTRAVENOUS
Status: COMPLETED | OUTPATIENT
Start: 2025-04-09 | End: 2025-04-09

## 2025-04-09 RX ADMIN — MIDAZOLAM 1 MG: 1 INJECTION INTRAMUSCULAR; INTRAVENOUS at 09:04

## 2025-04-09 RX ADMIN — SODIUM CHLORIDE 250 ML/HR: 9 INJECTION, SOLUTION INTRAVENOUS at 09:04

## 2025-04-09 RX ADMIN — FENTANYL CITRATE 25 MCG: 50 INJECTION INTRAMUSCULAR; INTRAVENOUS at 09:04

## 2025-04-09 RX ADMIN — LIDOCAINE HYDROCHLORIDE 8 ML: 10 INJECTION, SOLUTION EPIDURAL; INFILTRATION; INTRACAUDAL; PERINEURAL at 09:04

## 2025-04-09 NOTE — PLAN OF CARE
Pt AAOX3, Vital signs stable, right lower back bandaid c,d,I. Pt verbalizes understanding of discharge instructions

## 2025-04-09 NOTE — PLAN OF CARE
Patient tolerated procedure well. Dsg to right back of carlos and bandaid CDI. AAOx3.  Patient reports only pain to back which was present prior to procedure.  MAEx4 with generalized weakness.  Patient returned to room via stretcher per RN, report given to MAGUE Mendez.  BERNIE

## 2025-04-09 NOTE — PLAN OF CARE
Patient arrived to CT via stretcher per RN. Ambulated to bathroom with standby assist x1.  Pt reports pain in back only with movement.  AAOx3.     anxiety producing

## 2025-04-09 NOTE — INTERVAL H&P NOTE
The patient has been examined and the H&P has been reviewed prior to ct guided lymph node biopsy        There are no hospital problems to display for this patient.

## 2025-04-09 NOTE — DISCHARGE INSTRUCTIONS
Medical Imaging Discharge Instructions    Discharge Instructions After:  Lymph Node Biopsy    Diet:  Resume diet as prescribed by your physician and Avoid the use of muscle relaxants, sedative, hypnotics or mood altering medications for 24 hours unless approved by your physician    Medications:  Resume medications as prescribed by your physician and Avoid the use of muscle relaxants, sedatives, hypnotics or mood altering medications for 24 hours unless approved by your physician.    Activity:  Rest today, Avoid strenuous activity for 1 days, Do not drive an automobile or operate hazardous machinery for 24 hours, and Make no major decisions or sign any legal documents for 24 hours    Care of Dressing and Incision:  Do not remove dressing until 24 hours and May change dressing or bandage as needed    Report to M.D. any of the Following:  Any severe pain, new onset pain or worsening symptoms, Excessive bleeding, bruising or swelling, Temperature of 101 degrees or above, Severe headache associated with nausea and vomiting, and IV site may become tender. If so, place a warm, wet compress on IV site four times a day. This should relieve symptoms in a few days.    Follow up with your physician regarding the results of this exam. Please feel free to call the radiology department at [phone] for any problems or questions. Ask to speak with the radiology nurse.    Ashley Stein RN  Date of Service: 04/09/2025  9:33 AM

## 2025-04-14 ENCOUNTER — RESULTS FOLLOW-UP (OUTPATIENT)
Facility: CLINIC | Age: 85
End: 2025-04-14

## 2025-04-14 ENCOUNTER — TELEPHONE (OUTPATIENT)
Facility: CLINIC | Age: 85
End: 2025-04-14
Payer: MEDICARE

## 2025-04-14 DIAGNOSIS — R59.9 ADENOPATHY: Primary | ICD-10-CM

## 2025-04-14 NOTE — TELEPHONE ENCOUNTER
----- Message from Isacc Vogt MD sent at 4/14/2025 10:27 AM CDT -----  May be some type of lymphoma - we need to get a whole LN excised for pathologic evaluation   ----- Message -----  From: Lab, Background User  Sent: 4/9/2025   7:37 AM CDT  To: Isacc Vogt MD

## 2025-04-15 ENCOUNTER — OFFICE VISIT (OUTPATIENT)
Facility: CLINIC | Age: 85
End: 2025-04-15
Payer: MEDICARE

## 2025-04-15 ENCOUNTER — EXTERNAL HOME HEALTH (OUTPATIENT)
Dept: HOME HEALTH SERVICES | Facility: HOSPITAL | Age: 85
End: 2025-04-15
Payer: MEDICARE

## 2025-04-15 VITALS
TEMPERATURE: 97 F | BODY MASS INDEX: 28.8 KG/M2 | RESPIRATION RATE: 16 BRPM | WEIGHT: 179.19 LBS | SYSTOLIC BLOOD PRESSURE: 130 MMHG | DIASTOLIC BLOOD PRESSURE: 89 MMHG | OXYGEN SATURATION: 99 % | HEIGHT: 66 IN | HEART RATE: 79 BPM

## 2025-04-15 DIAGNOSIS — R53.81 PHYSICAL DECONDITIONING: ICD-10-CM

## 2025-04-15 DIAGNOSIS — C34.11 PRIMARY CANCER OF RIGHT UPPER LOBE OF LUNG: Primary | ICD-10-CM

## 2025-04-15 DIAGNOSIS — C79.51 METASTASIS TO BONE: ICD-10-CM

## 2025-04-15 PROCEDURE — 1125F AMNT PAIN NOTED PAIN PRSNT: CPT | Mod: CPTII,S$GLB,, | Performed by: NURSE PRACTITIONER

## 2025-04-15 PROCEDURE — 1101F PT FALLS ASSESS-DOCD LE1/YR: CPT | Mod: CPTII,S$GLB,, | Performed by: NURSE PRACTITIONER

## 2025-04-15 PROCEDURE — 3288F FALL RISK ASSESSMENT DOCD: CPT | Mod: CPTII,S$GLB,, | Performed by: NURSE PRACTITIONER

## 2025-04-15 PROCEDURE — G2211 COMPLEX E/M VISIT ADD ON: HCPCS | Mod: S$GLB,,, | Performed by: NURSE PRACTITIONER

## 2025-04-15 PROCEDURE — 99215 OFFICE O/P EST HI 40 MIN: CPT | Mod: S$GLB,,, | Performed by: NURSE PRACTITIONER

## 2025-04-15 PROCEDURE — 1157F ADVNC CARE PLAN IN RCRD: CPT | Mod: CPTII,S$GLB,, | Performed by: NURSE PRACTITIONER

## 2025-04-15 PROCEDURE — 99999 PR PBB SHADOW E&M-EST. PATIENT-LVL IV: CPT | Mod: PBBFAC,,, | Performed by: NURSE PRACTITIONER

## 2025-04-15 PROCEDURE — 1159F MED LIST DOCD IN RCRD: CPT | Mod: CPTII,S$GLB,, | Performed by: NURSE PRACTITIONER

## 2025-04-15 NOTE — PROGRESS NOTES
[facility] Hematology/Oncology  PROGRESS NOTE -   Follow-up Visit      Subjective:       Patient ID:   NAME: Lety Herbert : 1940     84 y.o. female    Referring Doc: Shilo Perez MD  Other Physicians: Ashish Henley; Maryse Beckwith           Chief Complaint: RUL mass/lung ca f/u       History of Present Illness:     Patient returns today for a regularly scheduled follow-up visit.  The patient is here today to go over the results of the recently ordered labs, tests and studies. She is here with her son and  today.     She reports general lack of energy and fatigue;  Breathing is stable on O2 per NC. She has residual chronic back pain issues, aches and pains which are stable.      She had recent PET scan on  2025 which unfortunately showing what looks like progressive cancer in LN's, which demonstrated interval development of numerous enlarged FDG avid retrocrural, abdominal retroperitoneal and left supraclavicular lymph nodes.  T12 lesion increased in size now with SUV 21.6.  There were nonspecific sites of uptake in the left neck and right hilum.     She last followed up with Dr. Vogt 2025 again noting poor candidacy for systemic treatment.     She last saw Dr Henley with pulm on 3/19/2025    She has subsequently recurred with MRI suggestive of multiple T-spine osseous lesions, nondiagnostic biopsy from T12 now with serial PET demonstrating progression in multiple lymph node stations above and below the diaphragm and at T12 resulting in worsening pain.     She did discuss starting XRT with Dr. Ralph, but has not yet started.     He did discuss palliative radiotherapy using SBRT regimen, 25-30 Gy in 5 fractions to T12 to assist with local control and provide pain relief. This will require custom immobilization, respiratory compression to deliver every other day high dose per fraction treatments similar to her prior SBRT course. The patient is taking norco every 6 hours prn.     She did  have an IR biopsy of a retroperitoneal LN that did show a B cell process, she was referred to surgery for an excisional biopsy    Dr Ralph with rad/onc on 4/2/2025; she sees Dr Ralph again later today     She last saw Dr Perez in Sept 2024              ROS:   GEN: normal without any fever, night sweats or weight loss; fatigue; chronic back pains and leg pains at night  HEENT: normal with no HA's, sore throat, stiff neck, changes in vision  CV: normal with no CP, SOB, PND, GAVIRIA or orthopnea  PULM: chronic SOB/GAVIRIA; O2 dependent , hemoptysis, sputum or pleuritic pain  GI: normal with no abdominal pain, nausea, vomiting, constipation, diarrhea, melanotic stools, BRBPR, or hematemesis  : normal with no hematuria, dysuria  BREAST: normal with no mass, discharge, pain  SKIN: normal with no rash, erythema, bruising, or swelling    Pain Scale:  9    Allergies:  Review of patient's allergies indicates:   Allergen Reactions    Meperidine     Promethazine     Bactrim [sulfamethoxazole-trimethoprim] Rash       Medications:    Current Outpatient Medications:     albuterol (PROVENTIL/VENTOLIN HFA) 90 mcg/actuation inhaler, 2 puffs every 4 hours as needed for cough, wheeze, or shortness of breath, Disp: 18 g, Rfl: 11    ALPRAZolam (XANAX) 0.25 MG tablet, TAKE ONE TABLET BY MOUTH NIGHTLY AS NEEDED FOR ANXIETY, Disp: 60 tablet, Rfl: 1    atorvastatin (LIPITOR) 20 MG tablet, TAKE one TABLET BY MOUTH ONCE DAILY (Patient taking differently: Take 20 mg by mouth once daily.), Disp: 90 tablet, Rfl: 1    budesonide (PULMICORT) 0.5 mg/2 mL nebulizer solution, Take 2 mLs (0.5 mg total) by nebulization 2 (two) times a day. Controller, Disp: 120 mL, Rfl: 11    diltiaZEM (CARDIZEM CD) 120 MG Cp24, Take 1 capsule (120 mg total) by mouth once daily., Disp: 90 capsule, Rfl: 3    gabapentin (NEURONTIN) 300 MG capsule, TAKE one CAPSULE BY MOUTH EVERY EVENING, Disp: 30 capsule, Rfl: 5    HYDROcodone-acetaminophen (NORCO) 7.5-325 mg per tablet,  Take 1 tablet by mouth every 6 (six) hours as needed for Pain., Disp: 90 tablet, Rfl: 0    hydrocortisone 2.5 % cream, Apply topically 2 (two) times daily. To hemorrhoids (Patient taking differently: Apply 1 application  topically 2 (two) times daily. To hemorrhoids), Disp: 28 g, Rfl: 0    lactulose (CHRONULAC) 20 gram/30 mL Soln, Take 30 mLs (20 g total) by mouth 2 (two) times daily as needed., Disp: 1800 mL, Rfl: 0    linaCLOtide (LINZESS) 72 mcg Cap capsule, Take 1 capsule (72 mcg total) by mouth before breakfast., Disp: 30 capsule, Rfl: 5    nitrofurantoin, macrocrystal-monohydrate, (MACROBID) 100 MG capsule, Take 100 mg by mouth every 12 (twelve) hours., Disp: , Rfl:     pantoprazole (PROTONIX) 20 MG tablet, TAKE ONE TABLET BY MOUTH EVERY DAY (Patient taking differently: Take 20 mg by mouth once daily.), Disp: 90 tablet, Rfl: 1    potassium chloride (KLOR-CON) 10 MEQ TbSR, TAKE 1 TABLET BY MOUTH ONCE DAILY (Patient taking differently: Take 10 mEq by mouth once daily.), Disp: 90 tablet, Rfl: 3    sertraline (ZOLOFT) 100 MG tablet, TAKE 1 TABLET (100 MG TOTAL) BY MOUTH ONCE DAILY., Disp: 90 tablet, Rfl: 3    valsartan-hydrochlorothiazide (DIOVAN-HCT) 320-12.5 mg per tablet, TAKE ONE TABLET BY MOUTH EVERY MORNING, Disp: 90 tablet, Rfl: 4    XARELTO 20 mg Tab, Take 1 tablet (20 mg total) by mouth once daily., Disp: , Rfl:     arformoteroL (BROVANA) 15 mcg/2 mL Nebu, Take 2 mLs (15 mcg total) by nebulization 2 (two) times a day. Controller, Disp: 60 each, Rfl: 11    PMHx/PSHx Updates:  See patient's last visit with me on 3/6/2025  See H&P on 3/10/2023        Pathology:   Cancer Staging   Primary cancer of right upper lobe of lung  Staging form: Lung, AJCC 8th Edition  - Clinical: Stage IA3 (cT1c, cN0, cM0) - Signed by Pedrito Ralph Jr., MD on 3/20/2023      Bone biopsy  11/29/2024:      BONE, T12 VERTEBRAE LESION, CORE BIOPSY:     --NEGATIVE FOR NEOPLASM     --FRAGMENTS OF BONE AND MARROW ELEMENTS         CT  "guided lung biopsy  2/15/2023:     Final Pathologic Diagnosis LUNG, RIGHT, BIOPSY:   Non-small cell carcinoma consistent with squamous features, see comment   The biopsy show a poorly differentiated non-small cell carcinoma with   immunohistochemical features supporting the above diagnosis. Patient's remote   clinical history of breast carcinoma is noticed. Based on the   immunohistochemical features, the current tumor is favored to be of lung   primary.            Objective:     Vitals:  Blood pressure 130/89, pulse 79, temperature 97.2 °F (36.2 °C), temperature source Temporal, resp. rate 16, height 5' 6" (1.676 m), weight 81.3 kg (179 lb 3.2 oz), SpO2 99%.    Physical Examination:   GEN: no apparent distress, comfortable; AAOx3; overweight; elderly  HEAD: atraumatic and normocephalic  EYES: no pallor, no icterus, PERRLA  ENT: OMM, no pharyngeal erythema, external ears WNL; no nasal discharge; no thrush  NECK: no masses, thyroid normal, trachea midline, no LAD/LN's, supple  CV: RRR with no murmur; normal pulse; normal S1 and S2; no pedal edema  CHEST: Normal respiratory effort; CTAB; normal breath sounds; no wheeze or crackles; O2 per NC portable  ABDOM: nontender and nondistended; soft; normal bowel sounds; no rebound/guarding  MUSC/Skeletal: ROM normal; no crepitus; joints normal; no deformities; +arthropathy of hip/knees, hands; wheelchair  EXTREM: no clubbing, cyanosis, inflammation or swelling; varicosities in bilateral lower extremities  SKIN: no rashes, lesions, ulcers, petechiae or subcutaneous nodules; chronic age related skin changes  : no carrillo  NEURO: grossly intact; motor/sensory WNL; AAOx3; no tremors  PSYCH: normal mood, affect and behavior  LYMPH: normal cervical, supraclavicular, axillary and groin LN's          Labs:     Lab Results   Component Value Date    WBC 10.13 04/09/2025    HGB 13.6 04/09/2025    HCT 42.6 04/09/2025    MCV 92 04/09/2025     04/09/2025       CMP  Sodium   Date " Value Ref Range Status   03/30/2025 139 136 - 145 mmol/L Final   03/04/2019 142 134 - 144 mmol/L      Potassium   Date Value Ref Range Status   03/30/2025 4.1 3.5 - 5.1 mmol/L Final     Chloride   Date Value Ref Range Status   02/28/2025 101 95 - 110 mmol/L Final   03/04/2019 104 98 - 110 mmol/L      CO2   Date Value Ref Range Status   03/30/2025 33 (H) 23 - 29 mmol/L Final     Glucose   Date Value Ref Range Status   02/28/2025 102 70 - 110 mg/dL Final   03/04/2019 102 (H) 70 - 99 mg/dL      BUN   Date Value Ref Range Status   03/30/2025 10 8 - 23 mg/dL Final     Creatinine   Date Value Ref Range Status   03/30/2025 0.5 0.5 - 1.4 mg/dL Final   03/04/2019 0.48 (L) 0.60 - 1.40 mg/dL      Calcium   Date Value Ref Range Status   03/30/2025 9.4 8.7 - 10.5 mg/dL Final     Total Protein   Date Value Ref Range Status   02/28/2025 7.7 6.0 - 8.4 g/dL Final     Albumin   Date Value Ref Range Status   03/30/2025 3.9 3.5 - 5.2 g/dL Final   03/04/2019 4.0 3.1 - 4.7 g/dL      Bilirubin Total   Date Value Ref Range Status   03/30/2025 0.8 0.1 - 1.0 mg/dL Final     Comment:     For infants and newborns, interpretation of results should be based   on gestational age, weight and in agreement with clinical   observations.    Premature Infant recommended reference ranges:   0-24 hours:  <8.0 mg/dL   24-48 hours: <12.0 mg/dL   3-5 days:    <15.0 mg/dL   6-29 days:   <15.0 mg/dL     ALP   Date Value Ref Range Status   03/30/2025 83 55 - 135 unit/L Final     AST   Date Value Ref Range Status   03/30/2025 17 10 - 40 unit/L Final     ALT   Date Value Ref Range Status   03/30/2025 8 (L) 10 - 44 unit/L Final     Anion Gap   Date Value Ref Range Status   02/28/2025 7 (L) 8 - 16 mmol/L Final     eGFR   Date Value Ref Range Status   03/30/2025 >60 >60 mL/min/1.73/m2 Final   02/28/2025 >60.0 >60 mL/min/1.73 m^2 Final     Lab Results   Component Value Date    CEA 1.2 02/28/2025           Radiology/Diagnostic Studies:    CT Abdom/pelvis   3/30/2025:  Impression:     Multiple enlarged retroperitoneal nodes concerning for neoplastic process.     Small lytic lesion in the right T12 pedicle, concerning for metastatic disease     Mild diffuse circumferential bladder wall thickening suggestive of cystitis.  Mild bilateral hydroureteronephrosis suggestive of infection or recently passed stone.         PET 2/13/2025:  Impression:     1. Interval development of numerous enlarged, hypermetabolic retrocrural and abdominal retroperitoneal lymph nodes, as well as left supraclavicular lymph nodes, compatible with metastatic disease.  2. Nonspecific hypermetabolic foci in the left neck and in the right hilar region, without CT correlate.  3. Interval increased size and FDG uptake of hypermetabolic osseous metastasis in the T12 vertebral pedicle.  4. Additional observations as described.      PET 11/6/2024:    Impression:     1.  Prior bilateral mastectomy and reconstruction, with improving, likely post radiation change in the pectoralis minor and right upper lobe.     2.  New FDG avid soft tissue nodule/node anterior to the IVC suspicious for malignancy/metastasis.     3.  New FDG avid focus in the right T12 pedicle suspicious for malignancy/metastatic disease.     4.  Unchanged nonspecific fusiform FDG activity along the right external iliac adjacent to the cecum, possibly from misregistration as previously described, and FDG activity in the terminal ileum.     5.  Minimal unchanged FDG activity in the left adrenal, similar to the previous exam.               MRI T-spine 7/22/2024:    Impression:     Degradation by motion.     Abnormal marrow signal within the posterior elements of T12 to the right of midline with additional small foci of abnormal marrow signal within the rightward aspect of the T4 and T5 vertebral bodies.  In light of the history of primary neoplasm, the findings are of concern for osseous metastases.     Remote compression deformity involving L1  with changes of previous vertebroplasty.  There is mild retropulsion of the posterior vertebral margin.     Degenerative disc/facet changes.      CT Chest  7/1/2024:  Impression:     1. Chronic consolidation of the right upper lobe with air bronchograms and mild bronchiectasis shows no detrimental change from previous exam.  Scattered subpleural interstitial thickening in the bilateral lungs is unchanged.  2. Juxtapleural nodular like density in the posterior right lower lobe measures 9 mm, could reflect a nodule or atelectasis.  Follow-up CT chest in 3 months recommended.  3. 2 mm subpleural nodular density in the anterior right middle lobe is unchanged.  4. Enlarged mediastinal and right hilar lymph nodes noted with no detrimental change.       PET 3/6/2024:    IMPRESSION:  1. Discoid airspace opacity in the paramediastinal right upper lobe favored to represent post radiation/post treatment change, obscuring the previously described pulmonary nodule in the anterior right upper lobe with no convincing focal increased FDG activity from background.     2. Bilateral mastectomy with reconstruction.     3. Asymmetric increased FDG activity in the right pectoralis minor muscle favored to represent radiation change/inflammation.     4. Indeterminate oval area of markedly increased FDG activity adjacent to the right external iliac as above, slightly increased in conspicuity/size from the previous exam. No convincing CT anatomic correlate is present although this is adjacent to the terminal ileum and misregistration from cecal uptake is a consideration. Consider correlation with a history of colonoscopy in this age group.     5. Mild nonspecific asymmetric increased FDG activity in the inferior left adrenal body, newly appreciated from the previous exam, and only marginally increased from background.         Chest CT 12/19/2023:    IMPRESSION:  1.  Reference right upper lobe spiculated opacity is obscured by a broad area of  linear density/consolidative change that abuts the anterior pleural surface. The degree of opacification has mildly increased upon comparison.  2.  Subpleural linear opacities in the right lower lobe superior segment.  3.  Small right pleural effusion with subjacent atelectasis.  4.  No evidence of pathologic lymphadenopathy in the right sury hepatis.  5.  Small sliding type hiatus hernia.  6.  Coronary artery calcification.  7.  L2 compression fracture that has undergone previous kyphoplasty.    PET 9/19/2023:  IMPRESSION:  Decreased size and FDG activity of the spiculated nodule in the anterior right upper lobe     Increased groundglass and interstitial opacities within the right upper lobe and superior segment right lower lobe compatible with post radiation changes     Bilateral mastectomies with reconstruction with interval increased FDG activity in the right pectoralis minor muscle compatible with postradiation changes     Stable FDG activity in the region of the right external iliac vein without adenopathy.     Degenerative changes of the spine  Cardiomegaly with coronary artery calcification         CT chest 6/16/2023:    IMPRESSION:  Decrease in size of the mass within the anterior right upper lobe     Development of peripheral groundglass opacities in the right upper lobe and adjacent to the mass compatible with post radiation changes     No evidence of metastatic disease     Cardiomegaly        PET 1/11/2023:     IMPRESSION: Hypermetabolic 2.3 cm mass within the anterior right upper lobe suspicious for primary lung malignancy     Faint groundglass opacities throughout the lungs with prominence of the pulmonary vasculature and cardiomegaly which may be represent pulmonary edema.  Development of a 13 mm subpleural nodule in the right lower lobe. Since this is new since the most recent CT findings most likely are secondary to infectious or inflammatory process however metastatic lesion cannot be excluded      Increased FDG activity in the region of the right external iliac vein without corresponding adenopathy. This may be physiologic there is physiologic activity within a superficial vein in the upper right thigh and findings may be secondary to thrombophlebitis.. Follow-up CT abdomen and pelvis with contrast is recommended     Degenerative changes of the spine           CT Chest 12/15/2022:     IMPRESSION:  1. A 23 mm right upper lobe noncalcified pulmonary nodule is highly suspicious for primary pulmonary neoplasm. Tissue diagnosis is recommended.  2. No findings of regional metastatic disease in the chest.  3. Enlarged pulmonary arteries, suggesting pulmonary arterial hypertension.  4. Cardiomegaly, with aortic and coronary arterial calcifications.     CTA 11/19/2022:     IMPRESSION:  1.  No pulmonary embolus detected.  2.  Interstitial abnormality, suspect mild edema superimposed upon chronic changes.  3.  Right upper lobe 2.1 cm nodule. Consider a non-contrast Chest CT at 3 months, a PET/CT, or tissue sampling. These guidelines do not apply to immunocompromised patients and patients with cancer   ADDENDUM #1         The presence of right upper lobe lung nodule needing further evaluation or follow-up has been discussed with Dr. Rick Salgado 11/19/2022 12:50 AM CST.     All lab results and imaging results have been reviewed and     I have reviewed all available lab results and radiology reports.    Assessment/Plan:   (1) 84 y.o. female with diagnosis of RUL lung mass who has been referred by Shilo Perez MD for evaluation by medical hematology/oncology. She has been followed by Dr Ashish Henley with pulmonary for her COPD and chronic hypoxemia/bronchitis, who was a former smoker.      - She had a CTA in Nov 2022 which was negative for a PE but showed a RUL lung mass, which was followed by a CT Chest on 12/15/2022 which confirmed a 2.3cm RUL mass.   - She had a PET scan on 1/11/2023 which showed a 2.3 cm  hypermetabolic mass in the anterior right upper lobe abutting the superior vena cava along with development of a 13 mm subpleural nodule within the right lower lobe.         - She had CT guided biopsy of the RUL lung mass on 2/15/2023 with pathology coming back NSCLC favoring a lung primary.     - She is supposed to be on oxygen at home. She has portable tank and has a lot of GAVIRIA. She has chronic SOB.   - She is former smoker and quit when she was 43yrs old.      - discussed the pathology and reviewed and discussed the latest NCCN guidelines (vs. 2-2023)  - unlikely candidate for any surgical intervention  - will refer to Rad/onc to see if there are any radiation options either monotherapy or in combination with low dose weekly chemotherapy  - CARIS on the pathology  - check CEA and up to date labs    3/27/2023:  - She saw Dr Ralph with rad/onc on 3/20/2023 and she is starting XRT monotherapy today with day#1    4/25/2023:  - she completed XRT and has some fatigue  - she will need post-XRT evaluation in about 4 weeks with rad/onc and expect her to need repeat scans in 6-8 weeks    5/25/2023:  - she saw Dr Ralph on 5/2 and he has CT Chest ordered post-XRT  - some residual fatigue and back pain  - due to see Dr Henley  - labs adequate    8/21/2023:  - she has been having some more back pain  - due for repeat CT with Dr Ralph in Sept/oct 2023, will go ahead and order PET now  - she needs to f/u with Dr Henley with pulmonary as well  - await PEt results, consider other scans if necessary  - CEA at 2.0      10/24/2023:  - she had PET scan in Sept 2023 with overall improved report  - she sees rad/onc again on 11/6  - rad/onc has already ordered the next Chest CT  - due for up to date labs incl. CEA    1/8/2024:  - She has residual chronic back pain issues, aches and pains in legs especially at night; doesn't sleep well at night  - She saw Dr Perez in Nov 2023  - She last saw Dr Ralph with rad/onc on 5/2/2023 and  previously completed XRT; she was supposed to see him again on 11/6/2023 but did not  - she had Chest CT on 12/19/2023  - She is on portable O2; breathing up and down. No CP, HA's or N/V.  - She has not seen Dr Henley with pulmonary in some time    4/1/2024:  - She had bout of pneumonia and was hospitalized in Jan 2024  - She saw Dr Henley with pulm in Feb 2024 and Dr Grajeda in Jan 2024  - She had recent PEt scan on 3/6/2024 relatively stable  - she saw Dr Ralph with rad/onc in Jan 2024 8/5/2024:  - She saw Dr Henley with pulm in May 2024; she saw Dr Perez in may 2024  - She last saw Dr Ralph with rad/onc in Jan 2024  - She had PEt scan on 3/6/2024; Ct chest on 7/1/2024  - Dr Dodge ordered a MRI of her T spine on 7/22 and radiologist mentioned in their report that could not exclude presence of cancer in her spine - discussed this with patient and she does not want to get any bone scan or studies to further evaluate  - CEA 1.7    12/12/2024:  -  She had PET scan on 11/6/2024  - She saw Dr Henley with pulm on 11/20/2024  - She saw Domonique Navas NP on 11/14/2024  - She had a bone biopsy on 11/29 which has come back negative for cancer  - CEA at 1.2    3/6/2025:  - She had recent PET scan on  2/13/2025 which unfortunately showing what looks like progressive cancer in LN's.   - She last saw Dr Henley with pulm on 11/20/2024  - She saw Domonique Navas NP on 1/23/2025; Dr Ralph with rad/onc on 2/11/2025  - I am not sure she could safely undergo any meaningful regimen of chemotherapy given her advanced age, lung function and poor condition  - see is we can get Dr henley to see her again and ask IR if there are any LN's that can be safely biopsied  - CEA 1.2  - no anemic    4/8/2025:  - Dr Ralph with rad/onc on 4/2/2025; she sees Dr Ralph again later today   - she is scheduled for repeat biopsy with IR tomorrow  - will await path report     4/15/2025:   - starting XRT to the back with Dr. Ralph   -referral to surgery  for excisional biopsy due to potential B cell process found on LN   - patient needs a wheelchair due to severe deconditioning due to bone metastasis     (2) Hx/of PE and DVT     (3) HTN and hypercholesterolemia     (4) COPD/chronic hypoxemia; bronchiectasis; chronic bronchitis - followed by Dr Ashish Henley/Shelbie Villalpando     (5) CHF and dysrhythmia     (6) GERD; gastric ulcer     (7) Hx/of breast cancer s/p bilateral mastectomies- followed by Dr Maryse Beckwith  - She has history of breast cancer in past around 2005  for which she has had bilateral mastectomies and is followed by Dr Beckwith. She did not require chemotherapy or radiation. She had reconstruction surgery with Dr Grimaldo. She saw Dr Beckwith couple of weeks ago.        (8) OA (Knees); chronic back pain; s/p Left total hip after fracture     (9) Urinary urgency/incontinence     (10) Migraine HA's     (11) Anxiety and Depression     (12) Hx/of melanoma left forearm, BCC     (13) Former smoker           VISIT DIAGNOSES:      Primary cancer of right upper lobe of lung  -     WHEELCHAIR FOR HOME USE    Metastasis to bone  -     WHEELCHAIR FOR HOME USE    Physical deconditioning  -     WHEELCHAIR FOR HOME USE            PLAN:  Proceed with rad/onc, patient will start XRT to the back soon   Proceed with biopsy by excision with gen surgery due to abnormal IR biopsy   She previously had completed monotherapy XRT to the lung ; f/u with rad/onc as directed    I am not sure she could safely undergo any meaningful regimen of chemotherapy given her advanced age, lung function and poor condition  Check up to date labs incl. CEA level every 3 months        RTC in  3 weeks with Dr. Vogt after excisional biopsy     Discussion:     COVID-19 Discussion:     I had long discussion with patient and any applicable family about the COVID-19 coronavirus epidemic and the recommended precautions with regard to cancer and/or hematology patients. I have re-iterated the CDC  "recommendations for adequate hand washing, use of hand -like products, and coughing into elbow, etc. In addition, especially for our patients who are on chemotherapy and/or our otherwise immunocompromised patients, I have recommended avoidance of crowds, including movie theaters, restaurants, churches, etc. I have recommended avoidance of any sick or symptomatic family members and/or friends. I have also recommended avoidance of any raw and unwashed food products, and general avoidance of food items that have not been prepared by themselves. The patient has been asked to call us immediately with any symptom developments, issues, questions or other general concerns.         Pathology Discussion:     I reviewed and discussed the pathology report(s) and radiograph reports (if available) in as simple to understand and/or laymen's terms to the best of my ability. I had an indepth conversation with the patient and went over the patient's individual diagnosis based on the information that was currently available. I discussed the TNM staging process with regard to the patient's particular cancer type, and the calculated stage based on the currently available TNM data and literature. I discussed the available prognostic data with regard to the current staging information and how it relates to the prognosis of their particular neoplastic process.          NCCN Guidelines:     I discussed the available treatment option(s) in accordance with the latest literature from the NCCN Clinical Practice Guidelines for the patient's particular type of cancer disorder. The NCCN Guidelines provide a "document evidence-based (and) consensus-driven management" of the care of oncology patients. The treatment recommendations were made not only in accordance to the NCCN guidelines, but also factored in to account the patient's overall age, condition, performance status and their medical co-morbidities. I went over the risks and " benefits of the the treatment options (if any could be made) with regard to their particular cancer type, their cancer stage, their age, and their co-morbidities.         I have explained and the patient understands all of  the current recommendation(s). I have answered all of their questions to the best of my ability and to their complete satisfaction.          I have explained all of the above in detail and the patient understands all of the current recommendation(s). I have answered all of their questions to the best of my ability and to their complete satisfaction.   The patient is to continue with the current management plan.            Electronically signed by Domonique Valdes NP

## 2025-04-30 ENCOUNTER — TREATMENT (OUTPATIENT)
Dept: RADIATION ONCOLOGY | Facility: CLINIC | Age: 85
End: 2025-04-30
Payer: MEDICARE

## 2025-04-30 PROCEDURE — 77373 STRTCTC BDY RAD THER TX DLVR: CPT | Mod: S$GLB,,, | Performed by: RADIOLOGY

## 2025-05-02 ENCOUNTER — TREATMENT (OUTPATIENT)
Dept: RADIATION ONCOLOGY | Facility: CLINIC | Age: 85
End: 2025-05-02
Payer: MEDICARE

## 2025-05-02 DIAGNOSIS — E78.2 MULTIPLE-TYPE HYPERLIPIDEMIA: ICD-10-CM

## 2025-05-02 PROCEDURE — 77373 STRTCTC BDY RAD THER TX DLVR: CPT | Mod: S$GLB,,, | Performed by: RADIOLOGY

## 2025-05-02 PROCEDURE — 77336 RADIATION PHYSICS CONSULT: CPT | Mod: S$GLB,,, | Performed by: RADIOLOGY

## 2025-05-02 RX ORDER — ATORVASTATIN CALCIUM 20 MG/1
20 TABLET, FILM COATED ORAL
Qty: 90 TABLET | Refills: 1 | Status: SHIPPED | OUTPATIENT
Start: 2025-05-02

## 2025-05-05 ENCOUNTER — TELEPHONE (OUTPATIENT)
Dept: SURGERY | Facility: CLINIC | Age: 85
End: 2025-05-05
Payer: MEDICARE

## 2025-05-05 NOTE — TELEPHONE ENCOUNTER
----- Message from Fatoumata sent at 5/5/2025  3:01 PM CDT -----  Type:  Appointment RequestCaller is requesting a appointment. Name of Caller:pt Vale When is the first available appointment?not seen Symptoms:Lung biopsy needs to be redone and pt was referred to Dr Villalpando Would the patient rather a call back or a response via [facility]? Please call Best Call Back Number:[phone] or vale [phone] Information:      Please call back to advise. Thanks!

## 2025-05-07 ENCOUNTER — TELEPHONE (OUTPATIENT)
Facility: CLINIC | Age: 85
End: 2025-05-07
Payer: MEDICARE

## 2025-05-07 ENCOUNTER — OFFICE VISIT (OUTPATIENT)
Facility: CLINIC | Age: 85
End: 2025-05-07
Payer: MEDICARE

## 2025-05-07 VITALS
HEART RATE: 87 BPM | OXYGEN SATURATION: 98 % | SYSTOLIC BLOOD PRESSURE: 101 MMHG | RESPIRATION RATE: 16 BRPM | DIASTOLIC BLOOD PRESSURE: 69 MMHG | HEIGHT: 66 IN | BODY MASS INDEX: 28.28 KG/M2 | TEMPERATURE: 98 F | WEIGHT: 176 LBS

## 2025-05-07 DIAGNOSIS — C79.51 METASTASIS TO BONE: ICD-10-CM

## 2025-05-07 DIAGNOSIS — M79.89 PAIN AND SWELLING OF LEFT LOWER LEG: ICD-10-CM

## 2025-05-07 DIAGNOSIS — C34.11 PRIMARY CANCER OF RIGHT UPPER LOBE OF LUNG: Primary | ICD-10-CM

## 2025-05-07 DIAGNOSIS — M79.605 PAIN IN BOTH LOWER EXTREMITIES: ICD-10-CM

## 2025-05-07 DIAGNOSIS — G89.3 CANCER RELATED PAIN: ICD-10-CM

## 2025-05-07 DIAGNOSIS — M79.604 PAIN IN BOTH LOWER EXTREMITIES: ICD-10-CM

## 2025-05-07 DIAGNOSIS — M79.662 PAIN AND SWELLING OF LEFT LOWER LEG: ICD-10-CM

## 2025-05-07 PROCEDURE — 1157F ADVNC CARE PLAN IN RCRD: CPT | Mod: CPTII,S$GLB,, | Performed by: NURSE PRACTITIONER

## 2025-05-07 PROCEDURE — G2211 COMPLEX E/M VISIT ADD ON: HCPCS | Mod: S$GLB,,, | Performed by: NURSE PRACTITIONER

## 2025-05-07 PROCEDURE — 3288F FALL RISK ASSESSMENT DOCD: CPT | Mod: CPTII,S$GLB,, | Performed by: NURSE PRACTITIONER

## 2025-05-07 PROCEDURE — 1101F PT FALLS ASSESS-DOCD LE1/YR: CPT | Mod: CPTII,S$GLB,, | Performed by: NURSE PRACTITIONER

## 2025-05-07 PROCEDURE — 99999 PR PBB SHADOW E&M-EST. PATIENT-LVL V: CPT | Mod: PBBFAC,,, | Performed by: NURSE PRACTITIONER

## 2025-05-07 PROCEDURE — 99215 OFFICE O/P EST HI 40 MIN: CPT | Mod: S$GLB,,, | Performed by: NURSE PRACTITIONER

## 2025-05-07 PROCEDURE — 1125F AMNT PAIN NOTED PAIN PRSNT: CPT | Mod: CPTII,S$GLB,, | Performed by: NURSE PRACTITIONER

## 2025-05-07 PROCEDURE — 1159F MED LIST DOCD IN RCRD: CPT | Mod: CPTII,S$GLB,, | Performed by: NURSE PRACTITIONER

## 2025-05-07 RX ORDER — HYDROCODONE BITARTRATE AND ACETAMINOPHEN 10; 325 MG/1; MG/1
1 TABLET ORAL EVERY 6 HOURS PRN
Qty: 120 TABLET | Refills: 0 | Status: SHIPPED | OUTPATIENT
Start: 2025-05-07

## 2025-05-07 RX ORDER — ONDANSETRON HYDROCHLORIDE 8 MG/1
8 TABLET, FILM COATED ORAL EVERY 8 HOURS PRN
Qty: 30 TABLET | Refills: 2 | Status: SHIPPED | OUTPATIENT
Start: 2025-05-07 | End: 2026-05-07

## 2025-05-07 NOTE — PROGRESS NOTES
[facility] Hematology/Oncology  PROGRESS NOTE -   Follow-up Visit      Subjective:       Patient ID:   NAME: Lety Herbert : 1940     84 y.o. female    Referring Doc: Shilo Perez MD  Other Physicians: Ashish Henley; Maryse Beckwith           Chief Complaint: RUL mass/lung ca f/u       History of Present Illness:     Patient returns today for a regularly scheduled follow-up visit.  The patient is here today to go over the results of the recently ordered labs, tests and studies. She is here with her son and  today.     She reports general lack of energy and fatigue;  Breathing is stable on O2 per NC. She is having some increased lower back pain, she states this has not improved since XRT.     She had recent PET scan on  2025 which unfortunately showing what looks like progressive cancer in LN's, which demonstrated interval development of numerous enlarged FDG avid retrocrural, abdominal retroperitoneal and left supraclavicular lymph nodes.  T12 lesion increased in size now with SUV 21.6.  There were nonspecific sites of uptake in the left neck and right hilum.     She last followed up with Dr. Vogt 2025 again noting poor candidacy for systemic treatment.     She last saw Dr Henley with pulm on 3/19/2025 and sees him again in .     She has subsequently recurred with MRI suggestive of multiple T-spine osseous lesions, nondiagnostic biopsy from T12 now with serial PET demonstrating progression in multiple lymph node stations above and below the diaphragm and at T12 resulting in worsening pain.     She did completed XRT with Dr. Ralph. On 2025 to the T spine.     She is taking norco 7.5 every 6 hrs but she states this is not working well.     She did have an IR biopsy of a retroperitoneal LN that did show a B cell process, she was referred to surgery for an excisional biopsy, this appt has not been made yet.     Dr Ralph with rad/onc on 2025; she has a phone visit with   Val in two weeks.       ROS:   GEN: normal without any fever, night sweats or weight loss; fatigue; chronic back pains and leg pains at night  HEENT: normal with no HA's, sore throat, stiff neck, changes in vision  CV: normal with no CP, SOB, PND, GAVIRIA or orthopnea  PULM: chronic SOB/GAVIRIA; O2 dependent , hemoptysis, sputum or pleuritic pain  GI: normal with no abdominal pain, nausea, vomiting, constipation, diarrhea, melanotic stools, BRBPR, or hematemesis  : normal with no hematuria, dysuria  BREAST: normal with no mass, discharge, pain  SKIN: normal with no rash, erythema, bruising, or swelling    Pain Scale:  9    Allergies:  Review of patient's allergies indicates:   Allergen Reactions    Meperidine     Promethazine     Bactrim [sulfamethoxazole-trimethoprim] Rash       Medications:    Current Outpatient Medications:     albuterol (PROVENTIL/VENTOLIN HFA) 90 mcg/actuation inhaler, 2 puffs every 4 hours as needed for cough, wheeze, or shortness of breath, Disp: 18 g, Rfl: 11    ALPRAZolam (XANAX) 0.25 MG tablet, TAKE ONE TABLET BY MOUTH NIGHTLY AS NEEDED FOR ANXIETY, Disp: 60 tablet, Rfl: 1    atorvastatin (LIPITOR) 20 MG tablet, TAKE one TABLET BY MOUTH ONCE DAILY, Disp: 90 tablet, Rfl: 1    budesonide (PULMICORT) 0.5 mg/2 mL nebulizer solution, Take 2 mLs (0.5 mg total) by nebulization 2 (two) times a day. Controller, Disp: 120 mL, Rfl: 11    diltiaZEM (CARDIZEM CD) 120 MG Cp24, Take 1 capsule (120 mg total) by mouth once daily., Disp: 90 capsule, Rfl: 3    gabapentin (NEURONTIN) 300 MG capsule, TAKE one CAPSULE BY MOUTH EVERY EVENING, Disp: 30 capsule, Rfl: 5    hydrocortisone 2.5 % cream, Apply topically 2 (two) times daily. To hemorrhoids (Patient taking differently: Apply 1 application  topically 2 (two) times daily. To hemorrhoids), Disp: 28 g, Rfl: 0    lactulose (CHRONULAC) 20 gram/30 mL Soln, Take 30 mLs (20 g total) by mouth 2 (two) times daily as needed., Disp: 1800 mL, Rfl: 0    linaCLOtide  (LINZESS) 72 mcg Cap capsule, Take 1 capsule (72 mcg total) by mouth before breakfast., Disp: 30 capsule, Rfl: 5    nitrofurantoin, macrocrystal-monohydrate, (MACROBID) 100 MG capsule, Take 100 mg by mouth every 12 (twelve) hours., Disp: , Rfl:     pantoprazole (PROTONIX) 20 MG tablet, TAKE ONE TABLET BY MOUTH EVERY DAY (Patient taking differently: Take 20 mg by mouth once daily.), Disp: 90 tablet, Rfl: 1    potassium chloride (KLOR-CON) 10 MEQ TbSR, TAKE 1 TABLET BY MOUTH ONCE DAILY (Patient taking differently: Take 10 mEq by mouth once daily.), Disp: 90 tablet, Rfl: 3    sertraline (ZOLOFT) 100 MG tablet, TAKE 1 TABLET (100 MG TOTAL) BY MOUTH ONCE DAILY., Disp: 90 tablet, Rfl: 3    valsartan-hydrochlorothiazide (DIOVAN-HCT) 320-12.5 mg per tablet, TAKE ONE TABLET BY MOUTH EVERY MORNING, Disp: 90 tablet, Rfl: 4    XARELTO 20 mg Tab, Take 1 tablet (20 mg total) by mouth once daily., Disp: , Rfl:     arformoteroL (BROVANA) 15 mcg/2 mL Nebu, Take 2 mLs (15 mcg total) by nebulization 2 (two) times a day. Controller, Disp: 60 each, Rfl: 11    HYDROcodone-acetaminophen (NORCO)  mg per tablet, Take 1 tablet by mouth every 6 (six) hours as needed for Pain., Disp: 120 tablet, Rfl: 0    ondansetron (ZOFRAN) 8 MG tablet, Take 1 tablet (8 mg total) by mouth every 8 (eight) hours as needed for Nausea., Disp: 30 tablet, Rfl: 2    PMHx/PSHx Updates:  See patient's last visit with me on 4/15/2025  See H&P on 3/10/2023        Pathology:   Cancer Staging   Primary cancer of right upper lobe of lung  Staging form: Lung, AJCC 8th Edition  - Clinical: Stage IA3 (cT1c, cN0, cM0) - Signed by Pedrito Ralph Jr., MD on 3/20/2023      Bone biopsy  11/29/2024:      BONE, T12 VERTEBRAE LESION, CORE BIOPSY:     --NEGATIVE FOR NEOPLASM     --FRAGMENTS OF BONE AND MARROW ELEMENTS         CT guided lung biopsy  2/15/2023:     Final Pathologic Diagnosis LUNG, RIGHT, BIOPSY:   Non-small cell carcinoma consistent with squamous features, see  "comment   The biopsy show a poorly differentiated non-small cell carcinoma with   immunohistochemical features supporting the above diagnosis. Patient's remote   clinical history of breast carcinoma is noticed. Based on the   immunohistochemical features, the current tumor is favored to be of lung   primary.            Objective:     Vitals:  Blood pressure 101/69, pulse 87, temperature 97.9 °F (36.6 °C), temperature source Temporal, resp. rate 16, height 5' 6" (1.676 m), weight 79.8 kg (176 lb), SpO2 98%.    Physical Examination:   GEN: no apparent distress, comfortable; AAOx3; elderly  HEAD: atraumatic and normocephalic  EYES: no pallor, no icterus, PERRLA  ENT: OMM, no pharyngeal erythema, external ears WNL; no nasal discharge; no thrush  NECK: no masses, thyroid normal, trachea midline, no LAD/LN's, supple  CV: RRR with no murmur; normal pulse; normal S1 and S2; no pedal edema  CHEST: Normal respiratory effort; CTAB; normal breath sounds; no wheeze or crackles; O2 per NC portable  ABDOM: nontender and nondistended; soft; normal bowel sounds; no rebound/guarding  MUSC/Skeletal: ROM normal; no crepitus; joints normal; no deformities; +arthropathy of hip/knees, hands; wheelchair  EXTREM: no clubbing, cyanosis, inflammation or swelling; varicosities in bilateral lower extremities  SKIN: no rashes, lesions, ulcers, petechiae or subcutaneous nodules; chronic age related skin changes  : no carrillo  NEURO: grossly intact; motor/sensory WNL; AAOx3; no tremors  PSYCH: normal mood, affect and behavior  LYMPH: normal cervical, supraclavicular, axillary and groin LN's          Labs:     Lab Results   Component Value Date    WBC 10.13 04/09/2025    HGB 13.6 04/09/2025    HCT 42.6 04/09/2025    MCV 92 04/09/2025     04/09/2025       CMP  Sodium   Date Value Ref Range Status   03/30/2025 139 136 - 145 mmol/L Final   03/04/2019 142 134 - 144 mmol/L      Potassium   Date Value Ref Range Status   03/30/2025 4.1 3.5 - 5.1 " mmol/L Final     Chloride   Date Value Ref Range Status   03/30/2025 100 95 - 110 mmol/L Final   03/04/2019 104 98 - 110 mmol/L      CO2   Date Value Ref Range Status   03/30/2025 33 (H) 23 - 29 mmol/L Final     Glucose   Date Value Ref Range Status   03/30/2025 102 70 - 110 mg/dL Final   03/04/2019 102 (H) 70 - 99 mg/dL      BUN   Date Value Ref Range Status   03/30/2025 10 8 - 23 mg/dL Final     Creatinine   Date Value Ref Range Status   03/30/2025 0.5 0.5 - 1.4 mg/dL Final   03/04/2019 0.48 (L) 0.60 - 1.40 mg/dL      Calcium   Date Value Ref Range Status   03/30/2025 9.4 8.7 - 10.5 mg/dL Final     Protein Total   Date Value Ref Range Status   03/30/2025 7.1 6.0 - 8.4 gm/dL Final     Albumin   Date Value Ref Range Status   03/30/2025 3.9 3.5 - 5.2 g/dL Final   03/04/2019 4.0 3.1 - 4.7 g/dL      Bilirubin Total   Date Value Ref Range Status   03/30/2025 0.8 0.1 - 1.0 mg/dL Final     Comment:     For infants and newborns, interpretation of results should be based   on gestational age, weight and in agreement with clinical   observations.    Premature Infant recommended reference ranges:   0-24 hours:  <8.0 mg/dL   24-48 hours: <12.0 mg/dL   3-5 days:    <15.0 mg/dL   6-29 days:   <15.0 mg/dL     ALP   Date Value Ref Range Status   03/30/2025 83 55 - 135 unit/L Final     AST   Date Value Ref Range Status   03/30/2025 17 10 - 40 unit/L Final     ALT   Date Value Ref Range Status   03/30/2025 8 (L) 10 - 44 unit/L Final     Anion Gap   Date Value Ref Range Status   03/30/2025 6 (L) 8 - 16 mmol/L Final     eGFR   Date Value Ref Range Status   03/30/2025 >60 >60 mL/min/1.73/m2 Final   02/28/2025 >60.0 >60 mL/min/1.73 m^2 Final     Lab Results   Component Value Date    CEA 1.2 02/28/2025           Radiology/Diagnostic Studies:    CT Abdom/pelvis  3/30/2025:  Impression:     Multiple enlarged retroperitoneal nodes concerning for neoplastic process.     Small lytic lesion in the right T12 pedicle, concerning for metastatic  disease     Mild diffuse circumferential bladder wall thickening suggestive of cystitis.  Mild bilateral hydroureteronephrosis suggestive of infection or recently passed stone.         PET 2/13/2025:  Impression:     1. Interval development of numerous enlarged, hypermetabolic retrocrural and abdominal retroperitoneal lymph nodes, as well as left supraclavicular lymph nodes, compatible with metastatic disease.  2. Nonspecific hypermetabolic foci in the left neck and in the right hilar region, without CT correlate.  3. Interval increased size and FDG uptake of hypermetabolic osseous metastasis in the T12 vertebral pedicle.  4. Additional observations as described.      PET 11/6/2024:    Impression:     1.  Prior bilateral mastectomy and reconstruction, with improving, likely post radiation change in the pectoralis minor and right upper lobe.     2.  New FDG avid soft tissue nodule/node anterior to the IVC suspicious for malignancy/metastasis.     3.  New FDG avid focus in the right T12 pedicle suspicious for malignancy/metastatic disease.     4.  Unchanged nonspecific fusiform FDG activity along the right external iliac adjacent to the cecum, possibly from misregistration as previously described, and FDG activity in the terminal ileum.     5.  Minimal unchanged FDG activity in the left adrenal, similar to the previous exam.               MRI T-spine 7/22/2024:    Impression:     Degradation by motion.     Abnormal marrow signal within the posterior elements of T12 to the right of midline with additional small foci of abnormal marrow signal within the rightward aspect of the T4 and T5 vertebral bodies.  In light of the history of primary neoplasm, the findings are of concern for osseous metastases.     Remote compression deformity involving L1 with changes of previous vertebroplasty.  There is mild retropulsion of the posterior vertebral margin.     Degenerative disc/facet changes.      CT Chest   7/1/2024:  Impression:     1. Chronic consolidation of the right upper lobe with air bronchograms and mild bronchiectasis shows no detrimental change from previous exam.  Scattered subpleural interstitial thickening in the bilateral lungs is unchanged.  2. Juxtapleural nodular like density in the posterior right lower lobe measures 9 mm, could reflect a nodule or atelectasis.  Follow-up CT chest in 3 months recommended.  3. 2 mm subpleural nodular density in the anterior right middle lobe is unchanged.  4. Enlarged mediastinal and right hilar lymph nodes noted with no detrimental change.       PET 3/6/2024:    IMPRESSION:  1. Discoid airspace opacity in the paramediastinal right upper lobe favored to represent post radiation/post treatment change, obscuring the previously described pulmonary nodule in the anterior right upper lobe with no convincing focal increased FDG activity from background.     2. Bilateral mastectomy with reconstruction.     3. Asymmetric increased FDG activity in the right pectoralis minor muscle favored to represent radiation change/inflammation.     4. Indeterminate oval area of markedly increased FDG activity adjacent to the right external iliac as above, slightly increased in conspicuity/size from the previous exam. No convincing CT anatomic correlate is present although this is adjacent to the terminal ileum and misregistration from cecal uptake is a consideration. Consider correlation with a history of colonoscopy in this age group.     5. Mild nonspecific asymmetric increased FDG activity in the inferior left adrenal body, newly appreciated from the previous exam, and only marginally increased from background.         Chest CT 12/19/2023:    IMPRESSION:  1.  Reference right upper lobe spiculated opacity is obscured by a broad area of linear density/consolidative change that abuts the anterior pleural surface. The degree of opacification has mildly increased upon comparison.  2.   Subpleural linear opacities in the right lower lobe superior segment.  3.  Small right pleural effusion with subjacent atelectasis.  4.  No evidence of pathologic lymphadenopathy in the right sury hepatis.  5.  Small sliding type hiatus hernia.  6.  Coronary artery calcification.  7.  L2 compression fracture that has undergone previous kyphoplasty.    PET 9/19/2023:  IMPRESSION:  Decreased size and FDG activity of the spiculated nodule in the anterior right upper lobe     Increased groundglass and interstitial opacities within the right upper lobe and superior segment right lower lobe compatible with post radiation changes     Bilateral mastectomies with reconstruction with interval increased FDG activity in the right pectoralis minor muscle compatible with postradiation changes     Stable FDG activity in the region of the right external iliac vein without adenopathy.     Degenerative changes of the spine  Cardiomegaly with coronary artery calcification         CT chest 6/16/2023:    IMPRESSION:  Decrease in size of the mass within the anterior right upper lobe     Development of peripheral groundglass opacities in the right upper lobe and adjacent to the mass compatible with post radiation changes     No evidence of metastatic disease     Cardiomegaly        PET 1/11/2023:     IMPRESSION: Hypermetabolic 2.3 cm mass within the anterior right upper lobe suspicious for primary lung malignancy     Faint groundglass opacities throughout the lungs with prominence of the pulmonary vasculature and cardiomegaly which may be represent pulmonary edema.  Development of a 13 mm subpleural nodule in the right lower lobe. Since this is new since the most recent CT findings most likely are secondary to infectious or inflammatory process however metastatic lesion cannot be excluded     Increased FDG activity in the region of the right external iliac vein without corresponding adenopathy. This may be physiologic there is  physiologic activity within a superficial vein in the upper right thigh and findings may be secondary to thrombophlebitis.. Follow-up CT abdomen and pelvis with contrast is recommended     Degenerative changes of the spine           CT Chest 12/15/2022:     IMPRESSION:  1. A 23 mm right upper lobe noncalcified pulmonary nodule is highly suspicious for primary pulmonary neoplasm. Tissue diagnosis is recommended.  2. No findings of regional metastatic disease in the chest.  3. Enlarged pulmonary arteries, suggesting pulmonary arterial hypertension.  4. Cardiomegaly, with aortic and coronary arterial calcifications.     CTA 11/19/2022:     IMPRESSION:  1.  No pulmonary embolus detected.  2.  Interstitial abnormality, suspect mild edema superimposed upon chronic changes.  3.  Right upper lobe 2.1 cm nodule. Consider a non-contrast Chest CT at 3 months, a PET/CT, or tissue sampling. These guidelines do not apply to immunocompromised patients and patients with cancer   ADDENDUM #1         The presence of right upper lobe lung nodule needing further evaluation or follow-up has been discussed with Dr. Rick Salgado 11/19/2022 12:50 AM CST.     All lab results and imaging results have been reviewed and     I have reviewed all available lab results and radiology reports.    Assessment/Plan:   (1) 84 y.o. female with diagnosis of RUL lung mass who has been referred by Shilo Perez MD for evaluation by medical hematology/oncology. She has been followed by Dr Ashish Henley with pulmonary for her COPD and chronic hypoxemia/bronchitis, who was a former smoker.      - She had a CTA in Nov 2022 which was negative for a PE but showed a RUL lung mass, which was followed by a CT Chest on 12/15/2022 which confirmed a 2.3cm RUL mass.   - She had a PET scan on 1/11/2023 which showed a 2.3 cm hypermetabolic mass in the anterior right upper lobe abutting the superior vena cava along with development of a 13 mm subpleural nodule within the  right lower lobe.         - She had CT guided biopsy of the RUL lung mass on 2/15/2023 with pathology coming back NSCLC favoring a lung primary.     - She is supposed to be on oxygen at home. She has portable tank and has a lot of GAVIRIA. She has chronic SOB.   - She is former smoker and quit when she was 43yrs old.      - discussed the pathology and reviewed and discussed the latest NCCN guidelines (vs. 2-2023)  - unlikely candidate for any surgical intervention  - will refer to Rad/onc to see if there are any radiation options either monotherapy or in combination with low dose weekly chemotherapy  - CARIS on the pathology  - check CEA and up to date labs    3/27/2023:  - She saw Dr Ralph with rad/onc on 3/20/2023 and she is starting XRT monotherapy today with day#1    4/25/2023:  - she completed XRT and has some fatigue  - she will need post-XRT evaluation in about 4 weeks with rad/onc and expect her to need repeat scans in 6-8 weeks    5/25/2023:  - she saw Dr Ralph on 5/2 and he has CT Chest ordered post-XRT  - some residual fatigue and back pain  - due to see Dr Henley  - labs adequate    8/21/2023:  - she has been having some more back pain  - due for repeat CT with Dr Ralph in Sept/oct 2023, will go ahead and order PET now  - she needs to f/u with Dr Henley with pulmonary as well  - await PEt results, consider other scans if necessary  - CEA at 2.0      10/24/2023:  - she had PET scan in Sept 2023 with overall improved report  - she sees rad/onc again on 11/6  - rad/onc has already ordered the next Chest CT  - due for up to date labs incl. CEA    1/8/2024:  - She has residual chronic back pain issues, aches and pains in legs especially at night; doesn't sleep well at night  - She saw Dr Perez in Nov 2023  - She last saw Dr Ralph with rad/onc on 5/2/2023 and previously completed XRT; she was supposed to see him again on 11/6/2023 but did not  - she had Chest CT on 12/19/2023  - She is on portable O2;  breathing up and down. No CP, HA's or N/V.  - She has not seen Dr Henley with pulmonary in some time    4/1/2024:  - She had bout of pneumonia and was hospitalized in Jan 2024  - She saw Dr Henley with pulm in Feb 2024 and Dr Grajeda in Jan 2024  - She had recent PEt scan on 3/6/2024 relatively stable  - she saw Dr Ralph with rad/onc in Jan 2024 8/5/2024:  - She saw Dr Henley with pultigist in May 2024; she saw Dr Perez in may 2024  - She last saw Dr Ralph with rad/onc in Jan 2024  - She had PEt scan on 3/6/2024; Ct chest on 7/1/2024  - Dr Dodge ordered a MRI of her T spine on 7/22 and radiologist mentioned in their report that could not exclude presence of cancer in her spine - discussed this with patient and she does not want to get any bone scan or studies to further evaluate  - CEA 1.7    12/12/2024:  -  She had PET scan on 11/6/2024  - She saw Dr Henley with pulm on 11/20/2024  - She saw Domonique Navas NP on 11/14/2024  - She had a bone biopsy on 11/29 which has come back negative for cancer  - CEA at 1.2    3/6/2025:  - She had recent PET scan on  2/13/2025 which unfortunately showing what looks like progressive cancer in LN's.   - She last saw Dr Henley with pulm on 11/20/2024  - She saw Domonique Navas NP on 1/23/2025; Dr Ralph with rad/onc on 2/11/2025  - I am not sure she could safely undergo any meaningful regimen of chemotherapy given her advanced age, lung function and poor condition  - see is we can get Dr henley to see her again and ask IR if there are any LN's that can be safely biopsied  - CEA 1.2  - no anemic    4/8/2025:  - Dr Ralph with rad/onc on 4/2/2025; she sees Dr Ralph again later today   - she is scheduled for repeat biopsy with IR tomorrow  - will await path report     4/15/2025:   - starting XRT to the back with Dr. Ralph   -referral to surgery for excisional biopsy due to potential B cell process found on LN   - patient needs a wheelchair due to severe deconditioning due to bone  metastasis     5/7/2025:   - due for excisional LN biopsy to determine what type of B cell lymphoma   - increase pain medication to 10 mg dosing of norco   -   (2) Hx/of PE and DVT     (3) HTN and hypercholesterolemia     (4) COPD/chronic hypoxemia; bronchiectasis; chronic bronchitis - followed by Dr Ashish Henley/Shelbie Villalpando     (5) CHF and dysrhythmia     (6) GERD; gastric ulcer     (7) Hx/of breast cancer s/p bilateral mastectomies- followed by Dr Maryse Beckwith  - She has history of breast cancer in past around 2005  for which she has had bilateral mastectomies and is followed by Dr Beckwith. She did not require chemotherapy or radiation. She had reconstruction surgery with Dr Grimaldo. She saw Dr Beckwith couple of weeks ago.        (8) OA (Knees); chronic back pain; s/p Left total hip after fracture     (9) Urinary urgency/incontinence     (10) Migraine HA's     (11) Anxiety and Depression     (12) Hx/of melanoma left forearm, BCC     (13) Former smoker           VISIT DIAGNOSES:      Primary cancer of right upper lobe of lung  -     HYDROcodone-acetaminophen (NORCO)  mg per tablet; Take 1 tablet by mouth every 6 (six) hours as needed for Pain.  Dispense: 120 tablet; Refill: 0  -     ondansetron (ZOFRAN) 8 MG tablet; Take 1 tablet (8 mg total) by mouth every 8 (eight) hours as needed for Nausea.  Dispense: 30 tablet; Refill: 2    Pain in both lower extremities    Pain and swelling of left lower leg    Cancer related pain  -     HYDROcodone-acetaminophen (NORCO)  mg per tablet; Take 1 tablet by mouth every 6 (six) hours as needed for Pain.  Dispense: 120 tablet; Refill: 0    Metastasis to bone  -     HYDROcodone-acetaminophen (NORCO)  mg per tablet; Take 1 tablet by mouth every 6 (six) hours as needed for Pain.  Dispense: 120 tablet; Refill: 0  -     ondansetron (ZOFRAN) 8 MG tablet; Take 1 tablet (8 mg total) by mouth every 8 (eight) hours as needed for Nausea.  Dispense: 30 tablet; Refill:  2              PLAN:  Patient completed XRT to the spinal lesion on 5/2/2025, still having pain, increased pain meds to norco 10s.   Proceed with biopsy by excision with gen surgery due to abnormal IR biopsy - has yet to make this appt   She previously had completed monotherapy XRT to the lung ; f/u with rad/onc as directed    I am not sure she could safely undergo any meaningful regimen of chemotherapy given her advanced age, lung function and poor condition  Check up to date labs incl. CEA level every 3 months   Due to patient cancer pain and poor condition, palliative care consult placed.          RTC in  3 weeks with Dr. Vogt after excisional biopsy     Discussion:     COVID-19 Discussion:     I had long discussion with patient and any applicable family about the COVID-19 coronavirus epidemic and the recommended precautions with regard to cancer and/or hematology patients. I have re-iterated the CDC recommendations for adequate hand washing, use of hand -like products, and coughing into elbow, etc. In addition, especially for our patients who are on chemotherapy and/or our otherwise immunocompromised patients, I have recommended avoidance of crowds, including movie theaters, restaurants, churches, etc. I have recommended avoidance of any sick or symptomatic family members and/or friends. I have also recommended avoidance of any raw and unwashed food products, and general avoidance of food items that have not been prepared by themselves. The patient has been asked to call us immediately with any symptom developments, issues, questions or other general concerns.         Pathology Discussion:     I reviewed and discussed the pathology report(s) and radiograph reports (if available) in as simple to understand and/or laymen's terms to the best of my ability. I had an indepth conversation with the patient and went over the patient's individual diagnosis based on the information that was currently  "available. I discussed the TNM staging process with regard to the patient's particular cancer type, and the calculated stage based on the currently available TNM data and literature. I discussed the available prognostic data with regard to the current staging information and how it relates to the prognosis of their particular neoplastic process.          NCCN Guidelines:     I discussed the available treatment option(s) in accordance with the latest literature from the NCCN Clinical Practice Guidelines for the patient's particular type of cancer disorder. The NCCN Guidelines provide a "document evidence-based (and) consensus-driven management" of the care of oncology patients. The treatment recommendations were made not only in accordance to the NCCN guidelines, but also factored in to account the patient's overall age, condition, performance status and their medical co-morbidities. I went over the risks and benefits of the the treatment options (if any could be made) with regard to their particular cancer type, their cancer stage, their age, and their co-morbidities.         I have explained and the patient understands all of  the current recommendation(s). I have answered all of their questions to the best of my ability and to their complete satisfaction.          I have explained all of the above in detail and the patient understands all of the current recommendation(s). I have answered all of their questions to the best of my ability and to their complete satisfaction.   The patient is to continue with the current management plan.            Electronically signed by Domonique Valdes NP                      "

## 2025-05-07 NOTE — TELEPHONE ENCOUNTER
----- Message from Ivana sent at 5/5/2025 10:28 AM CDT -----  Shaista with [facility] asking for orders for a wheelchair, pt gets winded going to the restroom and other chores [phone]

## 2025-05-07 NOTE — TELEPHONE ENCOUNTER
Spoke to Shaista and made aware that I see where orders were placed for WC in April but do not see any particular DME company that it has been sent to and inquired as to if there is a particular DME company that they recommend we send orders to and she states they do not. I informed that I will fax orders to [facility] today. Verbalized understanding.

## 2025-05-12 ENCOUNTER — TELEPHONE (OUTPATIENT)
Facility: CLINIC | Age: 85
End: 2025-05-12
Payer: MEDICARE

## 2025-05-12 NOTE — TELEPHONE ENCOUNTER
"----- Message from Domonique Santos NP sent at 5/12/2025  2:48 PM CDT -----  That is fine  ----- Message -----  From: Franny Carver RN  Sent: 5/12/2025  12:10 PM CDT  To: Domonique Valdes NP    You ok with standard wheelchair instead? I spoke to [facility] they said they have lightweight but it is $300  ----- Message -----  From: Ivana Terrazas  Sent: 5/9/2025  11:27 AM CDT  To: Torie Dexter Staff    Sobeida with [employer] calling to let us know that they do not have lightweight wheelchairs, only standard 18". [phone]  "

## 2025-05-12 NOTE — TELEPHONE ENCOUNTER
Sobeida made aware that standard 18 inch WC will be fine. Verbalized understanding, states should be able to get to patient by tomorrow and that she does not need anything further from this office at this time.

## 2025-05-20 ENCOUNTER — CLINICAL SUPPORT (OUTPATIENT)
Dept: RADIATION ONCOLOGY | Facility: CLINIC | Age: 85
End: 2025-05-20
Payer: MEDICARE

## 2025-05-20 DIAGNOSIS — C79.51 METASTASIS TO BONE: Primary | ICD-10-CM

## 2025-05-20 PROCEDURE — 99499 UNLISTED E&M SERVICE: CPT | Mod: 93,,, | Performed by: RADIOLOGY

## 2025-05-20 NOTE — PROGRESS NOTES
DIAGNOSIS: Metastatic lung cancer versus breast cancer    TREATMENT  Completed SBRT, 25 Gy in 5 fractions to T12 ending May 2, 2025.  Treatment well tolerated.    Contacted patient today for 3 week checkup.  Patient reports minimal pain relief.  Her oxycodone has been increased to 10 mg.  She denies focal deficits.    A/P  1.  Minimal appreciable pain relief.  Offered referral to pain medicine versus Neurosurgery, she deferred at this time.  2.  Follow-up with Dr. Vogt  3.  Return to clinic prn.

## 2025-05-22 ENCOUNTER — TELEPHONE (OUTPATIENT)
Facility: CLINIC | Age: 85
End: 2025-05-22
Payer: MEDICARE

## 2025-05-22 ENCOUNTER — TELEPHONE (OUTPATIENT)
Dept: SURGERY | Facility: CLINIC | Age: 85
End: 2025-05-22
Payer: MEDICARE

## 2025-05-22 NOTE — TELEPHONE ENCOUNTER
----- Message from MAGUE Berger sent at 5/22/2025  4:15 PM CDT -----  Good Afternoon - This patient was scheduled on May 13th to see Dr Villalpando regarding a biopsy but missed the appointment because her daughter was in the hospital. Can you reach out to the daughter to reschedule her please?

## 2025-05-22 NOTE — TELEPHONE ENCOUNTER
Notified by JONNY Brock that patient missed their May 13th appointment with Dr Villalpando. Called and spoke with patient's daughter, Buffy, who reports that she was admitted at the time of the appointment and was unable to take her mother to see Dr Villalpando. Message sent to Dr Villalpando's team to reschedule the patient. Daughter verbalized understanding of above.

## 2025-05-26 ENCOUNTER — TELEPHONE (OUTPATIENT)
Facility: CLINIC | Age: 85
End: 2025-05-26
Payer: MEDICARE

## 2025-05-26 DIAGNOSIS — C79.51 METASTASIS TO BONE: ICD-10-CM

## 2025-05-26 DIAGNOSIS — C34.11 PRIMARY CANCER OF RIGHT UPPER LOBE OF LUNG: Primary | ICD-10-CM

## 2025-05-26 NOTE — TELEPHONE ENCOUNTER
Per last visit, 5/7/25, check up to date labs inclu CEA level every 3 month. New orders placed pending approval.

## 2025-06-04 ENCOUNTER — LAB VISIT (OUTPATIENT)
Dept: LAB | Facility: HOSPITAL | Age: 85
End: 2025-06-04
Attending: INTERNAL MEDICINE
Payer: MEDICARE

## 2025-06-04 DIAGNOSIS — C34.11 PRIMARY CANCER OF RIGHT UPPER LOBE OF LUNG: ICD-10-CM

## 2025-06-04 DIAGNOSIS — C79.51 METASTASIS TO BONE: ICD-10-CM

## 2025-06-04 LAB
ABSOLUTE EOSINOPHIL (SMH): 0.23 K/UL
ABSOLUTE MONOCYTE (SMH): 0.41 K/UL (ref 0.3–1)
ABSOLUTE NEUTROPHIL COUNT (SMH): 4.4 K/UL (ref 1.8–7.7)
ALBUMIN SERPL-MCNC: 4 G/DL (ref 3.5–5.2)
ALP SERPL-CCNC: 72 UNIT/L (ref 55–135)
ALT SERPL-CCNC: 9 UNIT/L (ref 10–44)
ANION GAP (SMH): 4 MMOL/L (ref 8–16)
AST SERPL-CCNC: 18 UNIT/L (ref 10–40)
BASOPHILS # BLD AUTO: 0.07 K/UL
BASOPHILS NFR BLD AUTO: 1.1 %
BILIRUB SERPL-MCNC: 0.6 MG/DL (ref 0.1–1)
BUN SERPL-MCNC: 16 MG/DL (ref 8–23)
CALCIUM SERPL-MCNC: 9.2 MG/DL (ref 8.7–10.5)
CARCINOEMBRYONIC ANTIGEN (SMH): 1.2 NG/ML
CHLORIDE SERPL-SCNC: 103 MMOL/L (ref 95–110)
CO2 SERPL-SCNC: 31 MMOL/L (ref 23–29)
CREAT SERPL-MCNC: 0.7 MG/DL (ref 0.5–1.4)
ERYTHROCYTE [DISTWIDTH] IN BLOOD BY AUTOMATED COUNT: 14.6 % (ref 11.5–14.5)
GFR SERPLBLD CREATININE-BSD FMLA CKD-EPI: >60 ML/MIN/1.73/M2
GLUCOSE SERPL-MCNC: 102 MG/DL (ref 70–110)
HCT VFR BLD AUTO: 35.4 % (ref 37–48.5)
HGB BLD-MCNC: 11.3 GM/DL (ref 12–16)
IMM GRANULOCYTES # BLD AUTO: 0.02 K/UL (ref 0–0.04)
IMM GRANULOCYTES NFR BLD AUTO: 0.3 % (ref 0–0.5)
LYMPHOCYTES # BLD AUTO: 1.23 K/UL (ref 1–4.8)
MCH RBC QN AUTO: 30.8 PG (ref 27–31)
MCHC RBC AUTO-ENTMCNC: 31.9 G/DL (ref 32–36)
MCV RBC AUTO: 97 FL (ref 82–98)
NUCLEATED RBC (/100WBC) (SMH): 0 /100 WBC
PLATELET # BLD AUTO: 220 K/UL (ref 150–450)
PMV BLD AUTO: 9.6 FL (ref 9.2–12.9)
POTASSIUM SERPL-SCNC: 4.3 MMOL/L (ref 3.5–5.1)
PROT SERPL-MCNC: 7.1 GM/DL (ref 6–8.4)
RBC # BLD AUTO: 3.67 M/UL (ref 4–5.4)
RELATIVE EOSINOPHIL (SMH): 3.6 % (ref 0–8)
RELATIVE LYMPHOCYTE (SMH): 19.3 % (ref 18–48)
RELATIVE MONOCYTE (SMH): 6.4 % (ref 4–15)
RELATIVE NEUTROPHIL (SMH): 69.3 % (ref 38–73)
SODIUM SERPL-SCNC: 138 MMOL/L (ref 136–145)
WBC # BLD AUTO: 6.38 K/UL (ref 3.9–12.7)

## 2025-06-04 PROCEDURE — 80053 COMPREHEN METABOLIC PANEL: CPT

## 2025-06-04 PROCEDURE — 85025 COMPLETE CBC W/AUTO DIFF WBC: CPT

## 2025-06-04 PROCEDURE — 36415 COLL VENOUS BLD VENIPUNCTURE: CPT

## 2025-06-04 PROCEDURE — 82378 CARCINOEMBRYONIC ANTIGEN: CPT

## 2025-06-06 PROCEDURE — G0179 MD RECERTIFICATION HHA PT: HCPCS | Mod: ,,, | Performed by: NURSE PRACTITIONER

## 2025-06-11 ENCOUNTER — TELEPHONE (OUTPATIENT)
Facility: CLINIC | Age: 85
End: 2025-06-11
Payer: MEDICARE

## 2025-06-11 NOTE — TELEPHONE ENCOUNTER
I left voice message for pt regarding confirming appt w/ Dr. Vogt, on 6/18/2025. Left number for pt to contact the office, if they have any questions, would like to confirm or would like to reschedule appt

## 2025-06-12 ENCOUNTER — OFFICE VISIT (OUTPATIENT)
Dept: SURGERY | Facility: CLINIC | Age: 85
End: 2025-06-12
Payer: MEDICARE

## 2025-06-12 VITALS
TEMPERATURE: 98 F | HEIGHT: 66 IN | SYSTOLIC BLOOD PRESSURE: 131 MMHG | DIASTOLIC BLOOD PRESSURE: 79 MMHG | WEIGHT: 175.94 LBS | BODY MASS INDEX: 28.27 KG/M2 | HEART RATE: 77 BPM

## 2025-06-12 DIAGNOSIS — R59.9 ADENOPATHY: Primary | ICD-10-CM

## 2025-06-12 DIAGNOSIS — C79.51 METASTASIS TO BONE: ICD-10-CM

## 2025-06-12 DIAGNOSIS — Z85.3 HISTORY OF BREAST CANCER: ICD-10-CM

## 2025-06-12 DIAGNOSIS — C34.11 PRIMARY CANCER OF RIGHT UPPER LOBE OF LUNG: ICD-10-CM

## 2025-06-12 PROCEDURE — 1157F ADVNC CARE PLAN IN RCRD: CPT | Mod: CPTII,S$GLB,, | Performed by: SURGERY

## 2025-06-12 PROCEDURE — 99999 PR PBB SHADOW E&M-EST. PATIENT-LVL IV: CPT | Mod: PBBFAC,,, | Performed by: SURGERY

## 2025-06-12 PROCEDURE — 1159F MED LIST DOCD IN RCRD: CPT | Mod: CPTII,S$GLB,, | Performed by: SURGERY

## 2025-06-12 PROCEDURE — 1125F AMNT PAIN NOTED PAIN PRSNT: CPT | Mod: CPTII,S$GLB,, | Performed by: SURGERY

## 2025-06-12 PROCEDURE — 1160F RVW MEDS BY RX/DR IN RCRD: CPT | Mod: CPTII,S$GLB,, | Performed by: SURGERY

## 2025-06-12 PROCEDURE — 99204 OFFICE O/P NEW MOD 45 MIN: CPT | Mod: S$GLB,,, | Performed by: SURGERY

## 2025-06-16 RX ORDER — VALSARTAN AND HYDROCHLOROTHIAZIDE 320; 12.5 MG/1; MG/1
1 TABLET, FILM COATED ORAL EVERY MORNING
Qty: 90 TABLET | Refills: 0 | Status: SHIPPED | OUTPATIENT
Start: 2025-06-16

## 2025-06-17 ENCOUNTER — EXTERNAL HOME HEALTH (OUTPATIENT)
Dept: HOME HEALTH SERVICES | Facility: HOSPITAL | Age: 85
End: 2025-06-17
Payer: MEDICARE

## 2025-06-17 NOTE — PROGRESS NOTES
Subjective:       Patient ID: Lety Herbert is a 84 y.o. female.    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  Most recent PET showing PET positive retroperitoneal and supraclavicular lymphadenopathy.  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  Referred to the office for possibility of excisional biopsy for more tissue of the retroperitoneal process or left supraclavicular process.  Patient is recently status post radiation treatment to the spine metastases.  She has significant COPD on home oxygen.    Past Medical History:   Diagnosis Date    Abnormal chest CT 03/09/2023    Anemia     Basal cell carcinoma     nose     Cancer     breast    COPD (chronic obstructive pulmonary disease)     Depression     DVT (deep venous thrombosis)     Fall 03/20/2018    Former smoker 03/09/2023    Gastric ulceration     GERD (gastroesophageal reflux disease)     History of breast cancer 03/09/2023    History of frequent urinary tract infections     Hyperlipidemia     Hypertension     Malignant neoplasm of upper lobe of right lung (NSCLC favoring lung primary) 03/09/2023    Melanoma 2004?    left forearm    MRSA (methicillin resistant staph aureus) culture positive     Neuropathy     PE (pulmonary embolism)     Post-nasal drip 11/15/2018    Reflux     S/P bilateral mastectomy 03/09/2023     Past Surgical History:   Procedure Laterality Date    HYSTERECTOMY      MASTECTOMY, RADICAL Bilateral     TOTAL HIP ARTHROPLASTY Left 11/13/2017     Review of patient's allergies indicates:   Allergen Reactions    Meperidine     Promethazine     Bactrim [sulfamethoxazole-trimethoprim] Rash     Medication List with Changes/Refills   Current Medications    ALBUTEROL (PROVENTIL/VENTOLIN HFA) 90 MCG/ACTUATION INHALER    2  puffs every 4 hours as needed for cough, wheeze, or shortness of breath    ALPRAZOLAM (XANAX) 0.25 MG TABLET    TAKE ONE TABLET BY MOUTH NIGHTLY AS NEEDED FOR ANXIETY    ARFORMOTEROL (BROVANA) 15 MCG/2 ML NEBU    Take 2 mLs (15 mcg total) by nebulization 2 (two) times a day. Controller    ATORVASTATIN (LIPITOR) 20 MG TABLET    TAKE one TABLET BY MOUTH ONCE DAILY    BUDESONIDE (PULMICORT) 0.5 MG/2 ML NEBULIZER SOLUTION    Take 2 mLs (0.5 mg total) by nebulization 2 (two) times a day. Controller    DILTIAZEM (CARDIZEM CD) 120 MG CP24    Take 1 capsule (120 mg total) by mouth once daily.    GABAPENTIN (NEURONTIN) 300 MG CAPSULE    TAKE one CAPSULE BY MOUTH EVERY EVENING    HYDROCODONE-ACETAMINOPHEN (NORCO)  MG PER TABLET    Take 1 tablet by mouth every 6 (six) hours as needed for Pain.    HYDROCORTISONE 2.5 % CREAM    Apply topically 2 (two) times daily. To hemorrhoids    LACTULOSE (CHRONULAC) 20 GRAM/30 ML SOLN    Take 30 mLs (20 g total) by mouth 2 (two) times daily as needed.    LINACLOTIDE (LINZESS) 72 MCG CAP CAPSULE    Take 1 capsule (72 mcg total) by mouth before breakfast.    NITROFURANTOIN, MACROCRYSTAL-MONOHYDRATE, (MACROBID) 100 MG CAPSULE    Take 100 mg by mouth every 12 (twelve) hours.    ONDANSETRON (ZOFRAN) 8 MG TABLET    Take 1 tablet (8 mg total) by mouth every 8 (eight) hours as needed for Nausea.    PANTOPRAZOLE (PROTONIX) 20 MG TABLET    TAKE ONE TABLET BY MOUTH EVERY DAY    POTASSIUM CHLORIDE (KLOR-CON) 10 MEQ TBSR    TAKE 1 TABLET BY MOUTH ONCE DAILY    SERTRALINE (ZOLOFT) 100 MG TABLET    TAKE 1 TABLET (100 MG TOTAL) BY MOUTH ONCE DAILY.    XARELTO 20 MG TAB    Take 1 tablet (20 mg total) by mouth once daily.   Changed and/or Refilled Medications    Modified Medication Previous Medication    VALSARTAN-HYDROCHLOROTHIAZIDE (DIOVAN-HCT) 320-12.5 MG PER TABLET valsartan-hydrochlorothiazide (DIOVAN-HCT) 320-12.5 mg per tablet       TAKE ONE TABLET BY MOUTH EVERY MORNING    TAKE ONE TABLET BY  MOUTH EVERY MORNING     Family History   Problem Relation Name Age of Onset    Cancer Mother      Cancer Father      Heart disease Father      Melanoma Neg Hx      Psoriasis Neg Hx      Lupus Neg Hx      Eczema Neg Hx       Social History     Socioeconomic History    Marital status:      Spouse name: Jean    Number of children: 3   Tobacco Use    Smoking status: Former     Passive exposure: Past    Smokeless tobacco: Never   Substance and Sexual Activity    Alcohol use: No    Drug use: No    Sexual activity: Not Currently     Partners: Male   Social History Narrative    3 Children- 9 GC, 7 GGrands     Social Drivers of Health     Financial Resource Strain: Medium Risk (11/10/2021)    Overall Financial Resource Strain (CARDIA)     Difficulty of Paying Living Expenses: Somewhat hard   Food Insecurity: No Food Insecurity (11/10/2021)    Hunger Vital Sign     Worried About Running Out of Food in the Last Year: Never true     Ran Out of Food in the Last Year: Never true   Transportation Needs: No Transportation Needs (11/10/2021)    PRAPARE - Transportation     Lack of Transportation (Medical): No     Lack of Transportation (Non-Medical): No   Physical Activity: Inactive (11/10/2021)    Exercise Vital Sign     Days of Exercise per Week: 0 days     Minutes of Exercise per Session: 0 min   Stress: Stress Concern Present (11/10/2021)    [employer] [place] [employer] - Occupational Stress Questionnaire     Feeling of Stress : To some extent   Housing Stability: Low Risk  (11/10/2021)    Housing Stability Vital Sign     Unable to Pay for Housing in the Last Year: No     Number of Places Lived in the Last Year: 1     Unstable Housing in the Last Year: No         Review of Systems    Objective:      Physical Exam  Constitutional:       General: She is not in acute distress.     Appearance: She is ill-appearing. She is not toxic-appearing.      Comments: Elderly, frail female in no acute distress   Neck:       Comments: Did not definitively palpate the deep left supraclavicular lymphadenopathy  Pulmonary:      Effort: No tachypnea or bradypnea.      Comments: On oxygen in the office  Skin:     Coloration: Skin is not jaundiced.   Neurological:      Mental Status: She is alert and oriented to person, place, and time.   Psychiatric:         Behavior: Behavior is cooperative.         Assessment/Plan:   Lety was seen today for follow-up.    Diagnoses and all orders for this visit:    Adenopathy  -     Ambulatory referral/consult to General Surgery    Primary cancer of right upper lobe of lung    Metastasis to bone    History of breast cancer      Patient with history of multiple cancers -most recently stage IV non-small-cell lung cancer diagnosed a couple years ago with bone metastases.  New PET scan and biopsy now worrisome for B-cell lymphoproliferative process.  Referred to potentially obtain more tissue.  To obtain retroperitoneal tissue, would result in a fairly big operation for her.  Would will most likely need laparotomy either in combination with robotics or laparoscopy or full laparotomy to obtain the tissue.  Left supraclavicular may be easier on her but it is deep.  Abutting the superior ribcage.  Before subjecting her to a large operation with a lot of potential risks to her - would like to discuss with Oncology to see if any type of treatment would even be offered with a new diagnosis.  Will follow up with her in the near future to discuss best approach if benefit would outweigh the risks.

## 2025-06-18 ENCOUNTER — OFFICE VISIT (OUTPATIENT)
Facility: CLINIC | Age: 85
End: 2025-06-18
Payer: MEDICARE

## 2025-06-18 VITALS
DIASTOLIC BLOOD PRESSURE: 87 MMHG | OXYGEN SATURATION: 95 % | BODY MASS INDEX: 28.31 KG/M2 | RESPIRATION RATE: 16 BRPM | HEART RATE: 67 BPM | HEIGHT: 66 IN | SYSTOLIC BLOOD PRESSURE: 173 MMHG | WEIGHT: 176.13 LBS | TEMPERATURE: 98 F

## 2025-06-18 DIAGNOSIS — R91.8 MASS OF UPPER LOBE OF RIGHT LUNG: Primary | ICD-10-CM

## 2025-06-18 DIAGNOSIS — K74.60 CIRRHOSIS OF LIVER WITH ASCITES, UNSPECIFIED HEPATIC CIRRHOSIS TYPE: ICD-10-CM

## 2025-06-18 DIAGNOSIS — C79.51 METASTASIS TO BONE: ICD-10-CM

## 2025-06-18 DIAGNOSIS — Z85.3 HISTORY OF BREAST CANCER: ICD-10-CM

## 2025-06-18 DIAGNOSIS — R94.2 ABNORMAL PET SCAN OF LUNG: ICD-10-CM

## 2025-06-18 DIAGNOSIS — C34.11 PRIMARY CANCER OF RIGHT UPPER LOBE OF LUNG: ICD-10-CM

## 2025-06-18 DIAGNOSIS — R18.8 CIRRHOSIS OF LIVER WITH ASCITES, UNSPECIFIED HEPATIC CIRRHOSIS TYPE: ICD-10-CM

## 2025-06-18 DIAGNOSIS — Z87.891 FORMER SMOKER: ICD-10-CM

## 2025-06-18 DIAGNOSIS — Z90.13 S/P BILATERAL MASTECTOMY: ICD-10-CM

## 2025-06-18 DIAGNOSIS — R93.89 ABNORMAL CHEST CT: ICD-10-CM

## 2025-06-18 DIAGNOSIS — G89.3 CANCER RELATED PAIN: ICD-10-CM

## 2025-06-18 PROCEDURE — 99215 OFFICE O/P EST HI 40 MIN: CPT | Mod: S$GLB,,, | Performed by: INTERNAL MEDICINE

## 2025-06-18 PROCEDURE — 99999 PR PBB SHADOW E&M-EST. PATIENT-LVL IV: CPT | Mod: PBBFAC,,, | Performed by: INTERNAL MEDICINE

## 2025-06-18 PROCEDURE — 1159F MED LIST DOCD IN RCRD: CPT | Mod: CPTII,S$GLB,, | Performed by: INTERNAL MEDICINE

## 2025-06-18 PROCEDURE — 1157F ADVNC CARE PLAN IN RCRD: CPT | Mod: CPTII,S$GLB,, | Performed by: INTERNAL MEDICINE

## 2025-06-18 PROCEDURE — G2211 COMPLEX E/M VISIT ADD ON: HCPCS | Mod: S$GLB,,, | Performed by: INTERNAL MEDICINE

## 2025-06-18 PROCEDURE — 1101F PT FALLS ASSESS-DOCD LE1/YR: CPT | Mod: CPTII,S$GLB,, | Performed by: INTERNAL MEDICINE

## 2025-06-18 PROCEDURE — 1125F AMNT PAIN NOTED PAIN PRSNT: CPT | Mod: CPTII,S$GLB,, | Performed by: INTERNAL MEDICINE

## 2025-06-18 PROCEDURE — 3288F FALL RISK ASSESSMENT DOCD: CPT | Mod: CPTII,S$GLB,, | Performed by: INTERNAL MEDICINE

## 2025-06-18 RX ORDER — HYDROCODONE BITARTRATE AND ACETAMINOPHEN 10; 325 MG/1; MG/1
1 TABLET ORAL EVERY 6 HOURS PRN
Qty: 120 TABLET | Refills: 0 | Status: SHIPPED | OUTPATIENT
Start: 2025-06-18

## 2025-06-18 NOTE — PROGRESS NOTES
[facility] Hematology/Oncology  PROGRESS NOTE -   Follow-up Visit      Subjective:       Patient ID:   NAME: Ltey Herbert : 1940     84 y.o. female    Referring Doc: Shilo Perez MD  Other Physicians: Ashish Henley; Maryse Beckwith           Chief Complaint: RUL mass/lung ca f/u       History of Present Illness:     Patient returns today for a regularly scheduled follow-up visit.  The patient is here today to go over the results of the recently ordered labs, tests and studies. She is here with her son today.     She reports general lack of energy and fatigue;  Breathing is stable on O2 per NC. She has residual chronic back pain issues, aches and pains which are stable.        She previously had a  PET scan on  2025 which unfortunately showing what looks like progressive cancer in LN's. She had CT guided LN biopsy on 2025 which was highly suspicious for but non-diagnostic of some form of B-cell lymphoproliferative process    She saw Dr Ralph with rad/onc for follow-up on  after completing SBRT on 2025;     she subsequently saw Dr Villalpando with Gen-Surg on  for evaluation for excisional LN procedure but he is concerned about the location of the LN and her ability to tolerate any extensive or major surgery. Dr Villalpando plans to present her case at the next [facility] Tumor board    She last saw Dr Henley with pulm on 3/19/2025; She last saw Dr Perez in 2024                       ROS:   GEN: normal without any fever, night sweats or weight loss; fatigue; chronic back pains and leg pains at night  HEENT: normal with no HA's, sore throat, stiff neck, changes in vision  CV: normal with no CP, SOB, PND, GAVIRIA or orthopnea  PULM: chronic SOB/GAVIRIA; O2 dependent , hemoptysis, sputum or pleuritic pain  GI: normal with no abdominal pain, nausea, vomiting, constipation, diarrhea, melanotic stools, BRBPR, or hematemesis  : normal with no hematuria, dysuria  BREAST: normal with no mass, discharge, pain  SKIN:  normal with no rash, erythema, bruising, or swelling    Pain Scale:  9    Allergies:  Review of patient's allergies indicates:   Allergen Reactions    Meperidine     Promethazine     Bactrim [sulfamethoxazole-trimethoprim] Rash       Medications:    Current Outpatient Medications:     albuterol (PROVENTIL/VENTOLIN HFA) 90 mcg/actuation inhaler, 2 puffs every 4 hours as needed for cough, wheeze, or shortness of breath, Disp: 18 g, Rfl: 11    ALPRAZolam (XANAX) 0.25 MG tablet, TAKE ONE TABLET BY MOUTH NIGHTLY AS NEEDED FOR ANXIETY, Disp: 60 tablet, Rfl: 1    arformoteroL (BROVANA) 15 mcg/2 mL Nebu, Take 2 mLs (15 mcg total) by nebulization 2 (two) times a day. Controller, Disp: 60 each, Rfl: 11    atorvastatin (LIPITOR) 20 MG tablet, TAKE one TABLET BY MOUTH ONCE DAILY, Disp: 90 tablet, Rfl: 1    budesonide (PULMICORT) 0.5 mg/2 mL nebulizer solution, Take 2 mLs (0.5 mg total) by nebulization 2 (two) times a day. Controller, Disp: 120 mL, Rfl: 11    diltiaZEM (CARDIZEM CD) 120 MG Cp24, Take 1 capsule (120 mg total) by mouth once daily., Disp: 90 capsule, Rfl: 3    gabapentin (NEURONTIN) 300 MG capsule, TAKE one CAPSULE BY MOUTH EVERY EVENING, Disp: 30 capsule, Rfl: 5    HYDROcodone-acetaminophen (NORCO)  mg per tablet, Take 1 tablet by mouth every 6 (six) hours as needed for Pain., Disp: 120 tablet, Rfl: 0    hydrocortisone 2.5 % cream, Apply topically 2 (two) times daily. To hemorrhoids (Patient taking differently: Apply 1 application  topically 2 (two) times daily. To hemorrhoids), Disp: 28 g, Rfl: 0    lactulose (CHRONULAC) 20 gram/30 mL Soln, Take 30 mLs (20 g total) by mouth 2 (two) times daily as needed., Disp: 1800 mL, Rfl: 0    linaCLOtide (LINZESS) 72 mcg Cap capsule, Take 1 capsule (72 mcg total) by mouth before breakfast., Disp: 30 capsule, Rfl: 5    nitrofurantoin, macrocrystal-monohydrate, (MACROBID) 100 MG capsule, Take 100 mg by mouth every 12 (twelve) hours., Disp: , Rfl:     ondansetron (ZOFRAN)  8 MG tablet, Take 1 tablet (8 mg total) by mouth every 8 (eight) hours as needed for Nausea., Disp: 30 tablet, Rfl: 2    pantoprazole (PROTONIX) 20 MG tablet, TAKE ONE TABLET BY MOUTH EVERY DAY (Patient taking differently: Take 20 mg by mouth once daily.), Disp: 90 tablet, Rfl: 1    potassium chloride (KLOR-CON) 10 MEQ TbSR, TAKE 1 TABLET BY MOUTH ONCE DAILY (Patient taking differently: Take 10 mEq by mouth once daily.), Disp: 90 tablet, Rfl: 3    sertraline (ZOLOFT) 100 MG tablet, TAKE 1 TABLET (100 MG TOTAL) BY MOUTH ONCE DAILY., Disp: 90 tablet, Rfl: 3    valsartan-hydrochlorothiazide (DIOVAN-HCT) 320-12.5 mg per tablet, TAKE ONE TABLET BY MOUTH EVERY MORNING, Disp: 90 tablet, Rfl: 0    XARELTO 20 mg Tab, Take 1 tablet (20 mg total) by mouth once daily., Disp: , Rfl:     PMHx/PSHx Updates:  See patient's last visit with me on 5/9/2025  See H&P on 3/10/2023        Pathology:   Cancer Staging   Primary cancer of right upper lobe of lung  Staging form: Lung, AJCC 8th Edition  - Clinical: Stage IA3 (cT1c, cN0, cM0) - Signed by Pedrito Ralph Jr., MD on 3/20/2023      CT guided Biopsy 4/9/2025:      LYMPH NODE, RIGHT RETROPERITONEAL, NEEDLE CORE BIOPSY:     --EXTENSIVE NECROSIS, WITH POSITIVITY FOR CD20 AND CD79a BY IMMUNOHISTOCHEMISTRY; FINDINGS ARE HIGHLY SUSPICIOUS FOR, BUT NOT DEFINITIVELY DIAGNOSTIC OF, B-CELL   LYMPHOPROLIFERATIVE DISORDER (SEE COMMENT).     --EXCISIONAL BIOPSY IS RECOMMENDED, FOR FURTHER DEFINITIVE CHARACTERIZATION.     --NO EVIDENCE OF METASTATIC CARCINOMA.     The overall findings by histomorphology and immunohistochemistry raise a high degree of suspicion for, but are not definitively diagnostic of, a    B-cell lymphoproliferative disorder with necrosis       Bone biopsy  11/29/2024:      BONE, T12 VERTEBRAE LESION, CORE BIOPSY:     --NEGATIVE FOR NEOPLASM     --FRAGMENTS OF BONE AND MARROW ELEMENTS         CT guided lung biopsy  2/15/2023:     Final Pathologic Diagnosis LUNG, RIGHT,  "BIOPSY:   Non-small cell carcinoma consistent with squamous features, see comment   The biopsy show a poorly differentiated non-small cell carcinoma with   immunohistochemical features supporting the above diagnosis. Patient's remote   clinical history of breast carcinoma is noticed. Based on the   immunohistochemical features, the current tumor is favored to be of lung   primary.            Objective:     Vitals:  Blood pressure (!) 173/87, pulse 67, temperature 97.7 °F (36.5 °C), temperature source Temporal, resp. rate 16, height 5' 6" (1.676 m), weight 79.9 kg (176 lb 1.6 oz), SpO2 95%.    Physical Examination:   GEN: no apparent distress, comfortable; AAOx3; overweight; elderly  HEAD: atraumatic and normocephalic  EYES: no pallor, no icterus, PERRLA  ENT: OMM, no pharyngeal erythema, external ears WNL; no nasal discharge; no thrush  NECK: no masses, thyroid normal, trachea midline, no LAD/LN's, supple  CV: RRR with no murmur; normal pulse; normal S1 and S2; no pedal edema  CHEST: Normal respiratory effort; CTAB; normal breath sounds; no wheeze or crackles; O2 per NC portable  ABDOM: nontender and nondistended; soft; normal bowel sounds; no rebound/guarding  MUSC/Skeletal: ROM normal; no crepitus; joints normal; no deformities; +arthropathy of hip/knees, hands; wheelchair  EXTREM: no clubbing, cyanosis, inflammation or swelling; varicosities in bilateral lower extremities  SKIN: no rashes, lesions, ulcers, petechiae or subcutaneous nodules; chronic age related skin changes  : no carrillo  NEURO: grossly intact; motor/sensory WNL; AAOx3; no tremors  PSYCH: normal mood, affect and behavior  LYMPH: normal cervical, supraclavicular, axillary and groin LN's          Labs:     Lab Results   Component Value Date    WBC 6.38 06/04/2025    HGB 11.3 (L) 06/04/2025    HCT 35.4 (L) 06/04/2025    MCV 97 06/04/2025     06/04/2025       CMP  Sodium   Date Value Ref Range Status   06/04/2025 138 136 - 145 mmol/L Final "   03/04/2019 142 134 - 144 mmol/L      Potassium   Date Value Ref Range Status   06/04/2025 4.3 3.5 - 5.1 mmol/L Final     Chloride   Date Value Ref Range Status   06/04/2025 103 95 - 110 mmol/L Final   03/04/2019 104 98 - 110 mmol/L      CO2   Date Value Ref Range Status   06/04/2025 31 (H) 23 - 29 mmol/L Final     Glucose   Date Value Ref Range Status   06/04/2025 102 70 - 110 mg/dL Final   03/04/2019 102 (H) 70 - 99 mg/dL      BUN   Date Value Ref Range Status   06/04/2025 16 8 - 23 mg/dL Final     Creatinine   Date Value Ref Range Status   06/04/2025 0.7 0.5 - 1.4 mg/dL Final   03/04/2019 0.48 (L) 0.60 - 1.40 mg/dL      Calcium   Date Value Ref Range Status   06/04/2025 9.2 8.7 - 10.5 mg/dL Final     Protein Total   Date Value Ref Range Status   06/04/2025 7.1 6.0 - 8.4 gm/dL Final     Albumin   Date Value Ref Range Status   06/04/2025 4.0 3.5 - 5.2 g/dL Final   03/04/2019 4.0 3.1 - 4.7 g/dL      Bilirubin Total   Date Value Ref Range Status   06/04/2025 0.6 0.1 - 1.0 mg/dL Final     Comment:     For infants and newborns, interpretation of results should be based   on gestational age, weight and in agreement with clinical   observations.    Premature Infant recommended reference ranges:   0-24 hours:  <8.0 mg/dL   24-48 hours: <12.0 mg/dL   3-5 days:    <15.0 mg/dL   6-29 days:   <15.0 mg/dL     ALP   Date Value Ref Range Status   06/04/2025 72 55 - 135 unit/L Final     AST   Date Value Ref Range Status   06/04/2025 18 10 - 40 unit/L Final     ALT   Date Value Ref Range Status   06/04/2025 9 (L) 10 - 44 unit/L Final     Anion Gap   Date Value Ref Range Status   06/04/2025 4 (L) 8 - 16 mmol/L Final     eGFR   Date Value Ref Range Status   06/04/2025 >60 >60 mL/min/1.73/m2 Final   02/28/2025 >60.0 >60 mL/min/1.73 m^2 Final     Lab Results   Component Value Date    CEA 1.2 06/04/2025           Radiology/Diagnostic Studies:    CT Abdom/pelvis  3/30/2025:  Impression:     Multiple enlarged retroperitoneal nodes  concerning for neoplastic process.     Small lytic lesion in the right T12 pedicle, concerning for metastatic disease     Mild diffuse circumferential bladder wall thickening suggestive of cystitis.  Mild bilateral hydroureteronephrosis suggestive of infection or recently passed stone.         PET 2/13/2025:  Impression:     1. Interval development of numerous enlarged, hypermetabolic retrocrural and abdominal retroperitoneal lymph nodes, as well as left supraclavicular lymph nodes, compatible with metastatic disease.  2. Nonspecific hypermetabolic foci in the left neck and in the right hilar region, without CT correlate.  3. Interval increased size and FDG uptake of hypermetabolic osseous metastasis in the T12 vertebral pedicle.  4. Additional observations as described.      PET 11/6/2024:    Impression:     1.  Prior bilateral mastectomy and reconstruction, with improving, likely post radiation change in the pectoralis minor and right upper lobe.     2.  New FDG avid soft tissue nodule/node anterior to the IVC suspicious for malignancy/metastasis.     3.  New FDG avid focus in the right T12 pedicle suspicious for malignancy/metastatic disease.     4.  Unchanged nonspecific fusiform FDG activity along the right external iliac adjacent to the cecum, possibly from misregistration as previously described, and FDG activity in the terminal ileum.     5.  Minimal unchanged FDG activity in the left adrenal, similar to the previous exam.               MRI T-spine 7/22/2024:    Impression:     Degradation by motion.     Abnormal marrow signal within the posterior elements of T12 to the right of midline with additional small foci of abnormal marrow signal within the rightward aspect of the T4 and T5 vertebral bodies.  In light of the history of primary neoplasm, the findings are of concern for osseous metastases.     Remote compression deformity involving L1 with changes of previous vertebroplasty.  There is mild  retropulsion of the posterior vertebral margin.     Degenerative disc/facet changes.      CT Chest  7/1/2024:  Impression:     1. Chronic consolidation of the right upper lobe with air bronchograms and mild bronchiectasis shows no detrimental change from previous exam.  Scattered subpleural interstitial thickening in the bilateral lungs is unchanged.  2. Juxtapleural nodular like density in the posterior right lower lobe measures 9 mm, could reflect a nodule or atelectasis.  Follow-up CT chest in 3 months recommended.  3. 2 mm subpleural nodular density in the anterior right middle lobe is unchanged.  4. Enlarged mediastinal and right hilar lymph nodes noted with no detrimental change.       PET 3/6/2024:    IMPRESSION:  1. Discoid airspace opacity in the paramediastinal right upper lobe favored to represent post radiation/post treatment change, obscuring the previously described pulmonary nodule in the anterior right upper lobe with no convincing focal increased FDG activity from background.     2. Bilateral mastectomy with reconstruction.     3. Asymmetric increased FDG activity in the right pectoralis minor muscle favored to represent radiation change/inflammation.     4. Indeterminate oval area of markedly increased FDG activity adjacent to the right external iliac as above, slightly increased in conspicuity/size from the previous exam. No convincing CT anatomic correlate is present although this is adjacent to the terminal ileum and misregistration from cecal uptake is a consideration. Consider correlation with a history of colonoscopy in this age group.     5. Mild nonspecific asymmetric increased FDG activity in the inferior left adrenal body, newly appreciated from the previous exam, and only marginally increased from background.         Chest CT 12/19/2023:    IMPRESSION:  1.  Reference right upper lobe spiculated opacity is obscured by a broad area of linear density/consolidative change that abuts the  anterior pleural surface. The degree of opacification has mildly increased upon comparison.  2.  Subpleural linear opacities in the right lower lobe superior segment.  3.  Small right pleural effusion with subjacent atelectasis.  4.  No evidence of pathologic lymphadenopathy in the right sury hepatis.  5.  Small sliding type hiatus hernia.  6.  Coronary artery calcification.  7.  L2 compression fracture that has undergone previous kyphoplasty.    PET 9/19/2023:  IMPRESSION:  Decreased size and FDG activity of the spiculated nodule in the anterior right upper lobe     Increased groundglass and interstitial opacities within the right upper lobe and superior segment right lower lobe compatible with post radiation changes     Bilateral mastectomies with reconstruction with interval increased FDG activity in the right pectoralis minor muscle compatible with postradiation changes     Stable FDG activity in the region of the right external iliac vein without adenopathy.     Degenerative changes of the spine  Cardiomegaly with coronary artery calcification         CT chest 6/16/2023:    IMPRESSION:  Decrease in size of the mass within the anterior right upper lobe     Development of peripheral groundglass opacities in the right upper lobe and adjacent to the mass compatible with post radiation changes     No evidence of metastatic disease     Cardiomegaly        PET 1/11/2023:     IMPRESSION: Hypermetabolic 2.3 cm mass within the anterior right upper lobe suspicious for primary lung malignancy     Faint groundglass opacities throughout the lungs with prominence of the pulmonary vasculature and cardiomegaly which may be represent pulmonary edema.  Development of a 13 mm subpleural nodule in the right lower lobe. Since this is new since the most recent CT findings most likely are secondary to infectious or inflammatory process however metastatic lesion cannot be excluded     Increased FDG activity in the region of the right  external iliac vein without corresponding adenopathy. This may be physiologic there is physiologic activity within a superficial vein in the upper right thigh and findings may be secondary to thrombophlebitis.. Follow-up CT abdomen and pelvis with contrast is recommended     Degenerative changes of the spine           CT Chest 12/15/2022:     IMPRESSION:  1. A 23 mm right upper lobe noncalcified pulmonary nodule is highly suspicious for primary pulmonary neoplasm. Tissue diagnosis is recommended.  2. No findings of regional metastatic disease in the chest.  3. Enlarged pulmonary arteries, suggesting pulmonary arterial hypertension.  4. Cardiomegaly, with aortic and coronary arterial calcifications.     CTA 11/19/2022:     IMPRESSION:  1.  No pulmonary embolus detected.  2.  Interstitial abnormality, suspect mild edema superimposed upon chronic changes.  3.  Right upper lobe 2.1 cm nodule. Consider a non-contrast Chest CT at 3 months, a PET/CT, or tissue sampling. These guidelines do not apply to immunocompromised patients and patients with cancer   ADDENDUM #1         The presence of right upper lobe lung nodule needing further evaluation or follow-up has been discussed with Dr. Rick Salgado 11/19/2022 12:50 AM CST.     All lab results and imaging results have been reviewed and     I have reviewed all available lab results and radiology reports.    Assessment/Plan:   (1) 84 y.o. female with diagnosis of RUL lung mass who has been referred by Shilo Perez MD for evaluation by medical hematology/oncology. She has been followed by Dr Ashish Henley with pulmonary for her COPD and chronic hypoxemia/bronchitis, who was a former smoker.      - She had a CTA in Nov 2022 which was negative for a PE but showed a RUL lung mass, which was followed by a CT Chest on 12/15/2022 which confirmed a 2.3cm RUL mass.   - She had a PET scan on 1/11/2023 which showed a 2.3 cm hypermetabolic mass in the anterior right upper lobe abutting  the superior vena cava along with development of a 13 mm subpleural nodule within the right lower lobe.         - She had CT guided biopsy of the RUL lung mass on 2/15/2023 with pathology coming back NSCLC favoring a lung primary.     - She is supposed to be on oxygen at home. She has portable tank and has a lot of GAVIRIA. She has chronic SOB.   - She is former smoker and quit when she was 43yrs old.      - discussed the pathology and reviewed and discussed the latest NCCN guidelines (vs. 2-2023)  - unlikely candidate for any surgical intervention  - will refer to Rad/onc to see if there are any radiation options either monotherapy or in combination with low dose weekly chemotherapy  - CARIS on the pathology  - check CEA and up to date labs    3/27/2023:  - She saw Dr Ralph with rad/onc on 3/20/2023 and she is starting XRT monotherapy today with day#1    4/25/2023:  - she completed XRT and has some fatigue  - she will need post-XRT evaluation in about 4 weeks with rad/onc and expect her to need repeat scans in 6-8 weeks    5/25/2023:  - she saw Dr Ralph on 5/2 and he has CT Chest ordered post-XRT  - some residual fatigue and back pain  - due to see Dr Henley  - labs adequate    8/21/2023:  - she has been having some more back pain  - due for repeat CT with Dr Ralph in Sept/oct 2023, will go ahead and order PET now  - she needs to f/u with Dr Henley with pulmonary as well  - await PEt results, consider other scans if necessary  - CEA at 2.0      10/24/2023:  - she had PET scan in Sept 2023 with overall improved report  - she sees rad/onc again on 11/6  - rad/onc has already ordered the next Chest CT  - due for up to date labs incl. CEA    1/8/2024:  - She has residual chronic back pain issues, aches and pains in legs especially at night; doesn't sleep well at night  - She saw Dr Perez in Nov 2023  - She last saw Dr Ralph with rad/onc on 5/2/2023 and previously completed XRT; she was supposed to see him again on  11/6/2023 but did not  - she had Chest CT on 12/19/2023  - She is on portable O2; breathing up and down. No CP, HA's or N/V.  - She has not seen Dr Henley with pulmonary in some time    4/1/2024:  - She had bout of pneumonia and was hospitalized in Jan 2024  - She saw Dr Henley with pulm in Feb 2024 and Dr Grajeda in Jan 2024  - She had recent PEt scan on 3/6/2024 relatively stable  - she saw Dr Ralph with rad/onc in Jan 2024 8/5/2024:  - She saw Dr Henley with pulm in May 2024; she saw Dr Perez in may 2024  - She last saw Dr Ralph with rad/onc in Jan 2024  - She had PEt scan on 3/6/2024; Ct chest on 7/1/2024  - Dr Dodge ordered a MRI of her T spine on 7/22 and radiologist mentioned in their report that could not exclude presence of cancer in her spine - discussed this with patient and she does not want to get any bone scan or studies to further evaluate  - CEA 1.7    12/12/2024:  -  She had PET scan on 11/6/2024  - She saw Dr Henley with pulm on 11/20/2024  - She saw Domonique Navas NP on 11/14/2024  - She had a bone biopsy on 11/29 which has come back negative for cancer  - CEA at 1.2    3/6/2025:  - She had recent PET scan on  2/13/2025 which unfortunately showing what looks like progressive cancer in LN's.   - She last saw Dr Henley with pulm on 11/20/2024  - She saw Domonique Navas NP on 1/23/2025; Dr Ralph with rad/onc on 2/11/2025  - I am not sure she could safely undergo any meaningful regimen of chemotherapy given her advanced age, lung function and poor condition  - see is we can get Dr henley to see her again and ask IR if there are any LN's that can be safely biopsied  - CEA 1.2  - no anemic    4/8/2025:  - Dr Ralph with rad/onc on 4/2/2025; she sees Dr Ralph again later today   - she is scheduled for repeat biopsy with IR tomorrow  - will await path report       5/7/2025:   - due for excisional LN biopsy to determine what type of B cell lymphoma   - increase pain medication to 10 mg dosing of  norco    6/18/2025:  - She had CT guided LN biopsy on 4/9/2025 which was highly suspicious for but non-diagnostic of some form of B-cell lymphoproliferative process  - She saw Dr Ralph with rad/onc for follow-up on 5/20 after completing SBRT on 5/2/2025;   - she subsequently saw Dr Villalpando with Gen-Surg on 6/16 for evaluation for excisional LN procedure but he is concerned about the location of the LN and her ability to tolerate any extensive or major surgery. Dr Villalpando plans to present her case at the next [facility] Tumor board      (2) Hx/of PE and DVT     (3) HTN and hypercholesterolemia     (4) COPD/chronic hypoxemia; bronchiectasis; chronic bronchitis - followed by Dr Ashish Henley/Shelbie Villalpando     (5) CHF and dysrhythmia     (6) GERD; gastric ulcer     (7) Hx/of breast cancer s/p bilateral mastectomies- followed by Dr Maryse Beckwith  - She has history of breast cancer in past around 2005  for which she has had bilateral mastectomies and is followed by Dr Beckwith. She did not require chemotherapy or radiation. She had reconstruction surgery with Dr Grimaldo. She saw Dr Beckwith couple of weeks ago.        (8) OA (Knees); chronic back pain; s/p Left total hip after fracture     (9) Urinary urgency/incontinence     (10) Migraine HA's     (11) Anxiety and Depression     (12) Hx/of melanoma left forearm, BCC     (13) Former smoker           VISIT DIAGNOSES:      Mass of upper lobe of right lung    Abnormal PET scan of lung    Metastasis to bone    History of breast cancer    Primary cancer of right upper lobe of lung    S/P bilateral mastectomy    Cirrhosis of liver with ascites, unspecified hepatic cirrhosis type    Abnormal chest CT    Former smoker          PLAN:  Proceed with evaluation at next [facility] Tumor Board   F/u with Rad/onc and Dr Villalpando  She previously had completed monotherapy XRT; f/u with rad/onc as directed    I am not sure she could safely undergo any meaningful regimen of chemotherapy given her  advanced age, lung function and poor condition  Check up to date labs incl. CEA level every 3 months   F/u with PCP, Pulm, etc     RTC in  1st or second week of July 2025  Fax note to Shilo Perez MD; Amena Mejia Mannina, [place]; [place]       Discussion:     COVID-19 Discussion:     I had long discussion with patient and any applicable family about the COVID-19 coronavirus epidemic and the recommended precautions with regard to cancer and/or hematology patients. I have re-iterated the CDC recommendations for adequate hand washing, use of hand -like products, and coughing into elbow, etc. In addition, especially for our patients who are on chemotherapy and/or our otherwise immunocompromised patients, I have recommended avoidance of crowds, including movie theaters, restaurants, churches, etc. I have recommended avoidance of any sick or symptomatic family members and/or friends. I have also recommended avoidance of any raw and unwashed food products, and general avoidance of food items that have not been prepared by themselves. The patient has been asked to call us immediately with any symptom developments, issues, questions or other general concerns.         Pathology Discussion:     I reviewed and discussed the pathology report(s) and radiograph reports (if available) in as simple to understand and/or laymen's terms to the best of my ability. I had an indepth conversation with the patient and went over the patient's individual diagnosis based on the information that was currently available. I discussed the TNM staging process with regard to the patient's particular cancer type, and the calculated stage based on the currently available TNM data and literature. I discussed the available prognostic data with regard to the current staging information and how it relates to the prognosis of their particular neoplastic process.          NCCN Guidelines:     I discussed the available treatment  "option(s) in accordance with the latest literature from the NCCN Clinical Practice Guidelines for the patient's particular type of cancer disorder. The NCCN Guidelines provide a "document evidence-based (and) consensus-driven management" of the care of oncology patients. The treatment recommendations were made not only in accordance to the NCCN guidelines, but also factored in to account the patient's overall age, condition, performance status and their medical co-morbidities. I went over the risks and benefits of the the treatment options (if any could be made) with regard to their particular cancer type, their cancer stage, their age, and their co-morbidities.         I have explained and the patient understands all of  the current recommendation(s). I have answered all of their questions to the best of my ability and to their complete satisfaction.      I spent over 25 mins of time with the patient. Reviewed results of the recently ordered labs, tests and studies; made directives with regards to the results. Over half of this time was spent couseling and coordinating care.    I have explained all of the above in detail and the patient understands all of the current recommendation(s). I have answered all of their questions to the best of my ability and to their complete satisfaction.   The patient is to continue with the current management plan.            Electronically signed by Isacc Vogt MD                          "

## 2025-06-19 ENCOUNTER — OFFICE VISIT (OUTPATIENT)
Dept: PULMONOLOGY | Facility: CLINIC | Age: 85
End: 2025-06-19
Payer: MEDICARE

## 2025-06-19 VITALS
HEART RATE: 70 BPM | HEIGHT: 66 IN | OXYGEN SATURATION: 95 % | WEIGHT: 177.13 LBS | BODY MASS INDEX: 28.47 KG/M2 | SYSTOLIC BLOOD PRESSURE: 118 MMHG | DIASTOLIC BLOOD PRESSURE: 62 MMHG

## 2025-06-19 DIAGNOSIS — R93.89 ABNORMAL CHEST CT: ICD-10-CM

## 2025-06-19 DIAGNOSIS — J96.11 CHRONIC HYPOXEMIC RESPIRATORY FAILURE: ICD-10-CM

## 2025-06-19 DIAGNOSIS — J70.1 RADIATION FIBROSIS OF LUNG: Primary | ICD-10-CM

## 2025-06-19 DIAGNOSIS — R06.02 SHORTNESS OF BREATH: ICD-10-CM

## 2025-06-19 PROCEDURE — 99999 PR PBB SHADOW E&M-EST. PATIENT-LVL IV: CPT | Mod: PBBFAC,,, | Performed by: INTERNAL MEDICINE

## 2025-06-19 NOTE — PATIENT INSTRUCTIONS
Needle biopsy of abdominal lymph node had suspicious tissue.    Dr Solano felt surgery may involve open procedure and deferred.  He considered needle biopsy of neck node.    Ct/pet viewed from 2/2025     Lungs are crippled but stable.      Your case is to be presented next [facility] tumor board.....    You should do well with neck node biopsy if needed...

## 2025-06-19 NOTE — PROGRESS NOTES
6/19/2025    Lety Herbert  Follow up    Chief Complaint   Patient presents with    3m f/u       HPI:   6/19/2025 uses ox 3lpm ox recently...uses walker.  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    Breating getting by-- up  about house.    Constipation varies-- miralax.. .  Getting norco from oncology     Uses neb prn- -not recently...      3/19/2025 pt given norco after November visit -- had spine bx which was non diagnositic. Pain progressed. F/u pet done with worsening dx lumbar spine and abd lymph nodes postiive.  Pt to see med onc.  No recent radiation onc .     Some chr constipation      November 20, 2024-the patient had abnormal MRI of the spine in July with T12 and T4 and T5 being suspicious for metastatic disease.  PET scan was done November the 6 with concerns for metastatic disease and T12 and also a nodule anterior to the inferior vena cava.  The patient was seen in follow-up by Oncology November 14th-no orders reviewed.  Not clear management options.  Uses ox 3-4 lpm 24/7.    Appetite ok now and no stimulant needed  Pt feels like crap.  Having 7/10 pain last couple months.  . Uses ox 24/7 with stable ivan...  no falls and uses walker.  For spine bx next wk  Bowels good last month with poor memory.  No liver eval-- had benefit from lactulose    Uses neb rx prn only .     Pt frustrated as cannot do as she wishes.. uses xanax once daily    Patient Instructions   Use norco  as needed for back pain-- will send in 28 pills, you may need more.  You may request Dr Vogt/Loree or notify me.  Should get pain medications one doctor.    Lungs seem stable -- ct chest July compared to pet done 11/2024 -- lung tissue sickly and radiation damage,  high blood pressure in lungs suggested.    Lungs look stable on ct -- breathing is stable.    Re check 6 months       5/20/2024 pt now down to 4lpm on tank in office , wears 5lpm at home., desat walking .  Mucous yellow -- not clear if lungs or chest with no recent abx...  having copious mucous.  Uses megace prn, weight stable..  No prednisone now..  Pet scan from 3/2024 looks less impressive with right upper lung radiation fibrosis, fluid around lung is minima.  Patient Instructions   With memory not good and history of cirrhosis and constipation-- dose lactulose once or twice daily -- note if brain clearer..      You have had some ascites and fluid around lungs with cirrhosis of liver seen.    With xarelto use may be prudent to be have upper scope to assure no varicies...    Check hepatitis c test  Will send Dr Perez a note -- may need further evaluation  Ct chest and pet scan 3/2024 viewed.  Looks good-- would do routine follow up on lung cancer.   Continue oxygen.    Use brovana and budesonide regular and albuterol as needed..  2/27/2024 pt needs 5 lpm to mobilize -- has poc with pulse system.    Losing wt -- ambulates about house on ox.  Megace helped.    No pain nor anxiety.  Min cough from sinus    No recent prednisone  Patient Instructions   Ct from admit viewed  Would retry prednisone 20 mg daily for 2 weeks-- stop if no benefit.     Will try to arrange oxymizer and non rebreather mask     Exact diagnosis not clear -- still needs high oxygen.    Would use wheelchair to get out of house      Use oxymizer with flow as low as able to keep sat >90--use when out of house..      Immune deficiency may have played a role, monitor    1/30/2024....pt presented to [facility] 1/25 for 4 days with  fluid and pneumonia- but record vague and procal  very loww at 0.05 ...... Pt was on ox 3 lpm prior to hosp    Pt lost appetite pta, no fever, bnp was 805 admit, echo good with Mr/ ef 60%, rv enlarged, and pap 40.       Pt had had  failure to thrive last months.    d  Ct reviewed from last yr-- ct rul nodule 2/2023 with decrease in size and xrt fibrosis changes seen 6/2023 and 9/2023.  Pet scan 12/2023 with no cancer activity but progressive scarring of rul, and progressively increased right pleural effusion til 12/2023.       Pt presented to [facility] with sob and low sat -- pt has poor recall of exact presentation.   Record revieweed but not clear picture.......      Pt will have some bilat lower ext pain -- knees.    Patient Instructions   Will order nebulizer budesonide and brovana breathing treatments for copd.    Lung tissue disease is seen on ct chest that on prior ct chest and stable-- major process in lungs was radiation damage.      Ct chest viewed and pet viewed from 1/22022 to 1/2024....      Ct chest and echo show evidence for pulmonary hypertension.  Heart valve leaks and copd is severe (with lung tissue).    Steroids were not effective recently and sputum did not grow germs.     Would dose megace for appetite -- 400 mg daily.          Need to do therapy as best able.... you have gotten very weak...  \    Would do follow up to assure no excess fluid in lungs (pet scan will give good picture of fluid.)-- may do out pt drainage.....      If having mild worsening -- may do levaquin antibiotic and take prednisone...       could do virtual follow up...    You direct no life support.   Lapost done today  2/7/2023--  feels better, no cough nor mucous,  no night sweats, appetite ok but not strong.   Uses oxygen occasionally.   Patient Instructions   Use levaquin prompt for infection.    May use prednisone if cough or breathing worsens    Will send in norco 90/month for 3 months.    Renewed xanax for as needed.    Will order needle bx.  Pet scan  and cxr viewed.  Will have cxr after bx.      Check six min walk and breathing test -- suspect surgery may not be offered as oxygen level had been low.   If cancer may refer to oncology doc.  May refer  to Maryse Lange MD in [place]    Will call and discuss bx results over phone.    1/23/2023-- no fever, coughing min mucous yellow.  Ribs ok with no sign pain.  Sob still and still night sweats needing to change night gown.  Poor appetite.  Pt relates felt like she was dying last visit-- doing better now.  Occ blood streak hemoptysis.    Pt has home ox at 3 lpm --- uses intermittently with sat 90 rest and 80 or so with activity.  Pt vague on If breathing worsening last yr.  Six min walk 2020 with sat low resting.  Patient Instructions   May need to repeat prednisone if cough returns.    Need to do biopsy right upper density.    You still have night sweats-- apparent pneumonia cleared from last visit.      Oxygen  needs seem very high with activity?      Use oxygen more --ok to use 1-2 lpm for restt and 3 lpm activity.  Lung tissue disease looks somewhat stable since 2019.  Oxygen needs are high but able to get by at rest with room air but marginally    Repeat prednisone now.     Will need to do needle biopsy.    Will direct over phone biopsy results       1/18/2023 having cough and fatigue -- coughs all day with yellow mucous with streaks blood.  Poor appetite, having back and rib pain -- lower ribs attributed to violent coughing.   Used trelegy with min help.  Walks about house with no falls-- no weak.  No percieved wt loss.  Used norco for pains -- helped min.   Has albuterol in past but not using.  Used prednisone in past-- some benefit appreciated.  Declines hosp.   Pt had pet scan viwed today-- new rll 13 mm density with min pet activity --new from 12/15/2022.  Pt now with night sweats-new last wk or so- sputum culture pending afb and nl krista 12/29.  May have fever nighttime  Patient Instructions   Re check for regular germs  Dose levaquin  Would check temp at night  Big large pet active Spot in lung could be infection-- infection is apparent with mucous but culture had been negative-- afb can take 2  months.  New spot in right lower lung should be infection-- having yellow mucous and blood and sweats at night  Prednisone 20 mg may help cough-- dose for 5 days --- may repeat as needed for cough.  Use norco as needed to suppress pain and cough.  Cancer typically slow-- infection quick.  Infection active.   Biopsy would be recommended (unless afb + -- regular germ not expected to cause spot in upper lung.   Would check cxr now to assure no rapid  pneumonia as right lower  lung spot new.     I would anticipate may be able to do biopsy-- if mycobacterial culture positive (typical germs that cause this problem are slow growers and take a month at least-- early February??) might hold off but would like to be sure about cancer ??  Qtc was 432 November 2022-- need to check ekg on 23rd.  F/u 23 office  Cx stable 1/18/2023 , wbc 21k, no impressive infiltrates.  K 3.2 -- discussion with pt -- sent in potassium 10 meq bid, will discuss bx at f/u on 23rd if pt doing well.     12/28/2022   Pt developed left chest pain acute onset-- lasted few days, pt has had lingering back pain.  Coughs a lot with yellow mucous-- tablespoon daily.  Inhalers no help in past  Had right shoulder area achey pain about 7/10 pain-- comes and goes.  chr hypoxic resp failure post pe and pulm htn assoc cor dz and osas.    Ct this month with 23 mm rul lesion cw ca. The lesion is new from 2019. Ild dz with bronchiectasis assoc mucous plugging seen.   Upper lung honeycombing suggested ct 12/15/2022 -- viewed directly.   No asbestos exposure but father worked [employer].  Pt was smoker  Pthad double mastectomy 15 yrs ago-- no oncology follow up  Patient Instructions   Bronchiectasis with yellow mucous-- cultures needed  Trial trelegy in place of current inhaler. Use aerobika for 10 breaths after inhaler.    Lung tissue with progression fibrosis-- slow process.  Consider treatment later.  Lung lesion right upper lung worrisome for cancer-- new over last 4  yrs-- enlarging lymph node in middle chest is still small.  Check pet scan.   May need biopsy -  will need to skip xarelto prior biopsy- needle of lung but may need ebus.    Back pain noted on right -- will send in [place] for as needed use.  Pet scan may help determine if cancer related.   Will direct care with result of pet scan.  Could do phone follow up to discuss in detail if complex.   Below from Dr Villalpando  12/28/2021- breathing stable, able to walk around store w/ oxygen. Tries to walk 20 min per day. Denies chest congestion/cough. Sometimes nose congested, uses astelin. She takes singulair nightly.  Patient Instructions   Oxygen - continue with activity and any time sats <92%  Clubbing of fingernails is not harmful- due to chronic hypoxia  06/30/2021-   Continue oxygen  Follow up with cardiologist  Stop claritin and try singulair one pill every night for sinus drip  continue flonase  while seated feels fine but gets breathless w/ minimal activity. Daughter reports dyspnea is getting worse over time. Reports cardiologist wants to place pacemaker for her but insurance denied it. Does not feel well and sits in recliner all the time. Reports constant sinus drip and cough w/ green/yellow mucous.    12/30/2020-   Call medical supply for the oxygen and ask about thinner or more comfortable nasal cannulas  Start claritin daily  Start flonase 2 sprays in each nostril daily  feels better w/ O2, wears 3L continuously except when sitting still at home. Trouble breathing w/ fatigue is episodic. Gets headaches- tension from wearing O2, glasses and mask behind ear. Has constant sinus drip. Other night R ear blocked up while wearing CPAP.  Tries mucinex DM but doesn't last long. She is on xarelto for atrial fib now.     6/30/20-   Oxygen for home use ordered- would wear continuously  Use CPAP machine- can try nasal pillows. Would use oxygen with your CPAP too.  Get lung function test and 6 minute walk to check to see how much  "oxygen you need.  Pt is an 80 yo female with DVT/PE, GERD, breast ca s/p double mastectomy- no chemo or XRT, melanoma of arm, A[place], obesity presenting for initial evaluation. Pt reports oxygen desaturation for over a year but no one has ordered her any oxygen for home. First noted low O2 sat when she had sinus infection. O2 sats will drop when exerting herself or resting. Feels short of breath worsening over time. She coughs w/ phlegm from sinuses. Sees ENT who cauterized nose for bleeding. Daughter is here as well and assists w/ history.  Reports not using CPAP because she had tip of her nose removed for cancer in past and she is concerned mask will cause more cancer to come back. She wakes up a lot at night and feels drowsy during the day.  Denies any hx of lung problems. She is a past smoker- quit 40 yrs ago 1-2 PPD x 25 yrs. Used to have recurrent bronchitis and cough which stopped after she quit smoking.    Outside records from pt's cardiologist Jeremy Sargent in [facility] were faxed to us and have been reviewed- with dx including heart block, atrial fib & flutter, CAD, diastolic dysfunction, HTN, HLD, and obesity. Per that note ASAP consult was placed to pulmonary for "shortness of breath and O2 sat of 70s-80s."    The chief compliant  problem is varies w/ instability at times    PFSH:  Past Medical History:   Diagnosis Date    Abnormal chest CT 03/09/2023    Anemia     Basal cell carcinoma     nose     Cancer     breast    COPD (chronic obstructive pulmonary disease)     Depression     DVT (deep venous thrombosis)     Fall 03/20/2018    Former smoker 03/09/2023    Gastric ulceration     GERD (gastroesophageal reflux disease)     History of breast cancer 03/09/2023    History of frequent urinary tract infections     Hyperlipidemia     Hypertension     Malignant neoplasm of upper lobe of right lung (NSCLC favoring lung primary) 03/09/2023    Melanoma 2004?    left forearm    MRSA (methicillin resistant staph " "aureus) culture positive     Neuropathy     PE (pulmonary embolism)     Post-nasal drip 11/15/2018    Reflux     S/P bilateral mastectomy 03/09/2023         Past Surgical History:   Procedure Laterality Date    HYSTERECTOMY      MASTECTOMY, RADICAL Bilateral     TOTAL HIP ARTHROPLASTY Left 11/13/2017     Social History     Tobacco Use    Smoking status: Former     Passive exposure: Past    Smokeless tobacco: Never   Substance Use Topics    Alcohol use: No    Drug use: No     Family History   Problem Relation Name Age of Onset    Cancer Mother      Cancer Father      Heart disease Father      Melanoma Neg Hx      Psoriasis Neg Hx      Lupus Neg Hx      Eczema Neg Hx       Review of patient's allergies indicates:   Allergen Reactions    Meperidine     Promethazine     Bactrim [sulfamethoxazole-trimethoprim] Rash       Performance Status:The patient's activity level is functions out of house.  Feels SOB any exertion out of house.     Review of Systems:  a review of eleven systems covering constitutional, Eye, HEENT, Psych, Respiratory, Cardiac, GI, , Musculoskeletal, Endocrine, Dermatologic was negative except for pertinent findings as listed ABOVE and below:  All negative with pertinent positives as above       Exam:Comprehensive exam done. /62 (BP Location: Left forearm, Patient Position: Sitting)   Pulse 70   Ht 5' 6" (1.676 m)   Wt 80.3 kg (177 lb 2.2 oz)   SpO2 95% Comment: on room air at rest  BMI 28.59 kg/m²   Exam included Vitals as listed, and patient's appearance and affect and alertness and mood, oral exam for yeast and hygiene and pharynx lesions and Mallapatti (M) score, neck with inspection for jvd and masses and thyroid abnormalities and lymph nodes (supraclavicular and infraclavicular nodes and axillary also examined and noted if abn), chest exam included symmetry and effort and fremitus and percussion and auscultation, cardiac exam included rhythm and gallops and murmur and rubs and jvd " and edema, abdominal exam for mass and hepatosplenomegaly and tenderness and hernias and bowel sounds, Musculoskeletal exam with muscle tone and posture and mobility/gait and  strength, and skin for rashes and cyanosis and pallor and turgor, extremity for clubbing.  Findings were normal except for pertinent findings listed below:  M1, oropharynx clear  HR irregularly irreg  Lungs rales bilaterally  No edema  Varicose veins of legs  Clubbing, no neck nodes      Radiographs (ct chest and cxr) reviewed: view by direct vision   Cxr 1/18/2023 patchy pneumonia  Ct chest 12/15/2022 c/w 2019 -- progressive ild, bronchiectasis with mucous plugging, new lung lesion rul c/w ca.  Viewed.      CXR 6/8/20- interstitial markings prominent  VQ scan 6/8/20- IMPRESSION:  Normal VQ scan perfusion imaging  TTE 6/8/20-   Concentric left ventricular hypertrophy.  Normal left ventricular systolic function. The estimated ejection fraction is 65%.  Normal LV diastolic function.  Normal right ventricular systolic function.  Mild left atrial enlargement.  Mild aortic regurgitation.  Mild mitral regurgitation.  Mild tricuspid regurgitation.  Mild pulmonic regurgitation.  Normal central venous pressure (3 mmHg).  The estimated PA systolic pressure is 36 mmHg.    Labs reviewed    Results for RONN SOLORZANO (MRN [id number]) as of 6/30/2020 14:08   Ref. Range 3/11/2020 07:45   CO2 Latest Ref Range: 23 - 29 mmol/L 32 (H)        PFT 7/14/20 reviewed- mild restriction, reduced DLCO      6MWT 7/14/20- 85m, desat to 83% at rest, req 3L NC    Plan:  Clinical impression is resonably certain and repeated evaluation prn +/- follow up will be needed as below. Chronic hypoxemic resp failure due to cardiac comorbidities. PH likely groups 2/3 due to AUSTIN and HFpEF.    Lety was seen today for 3m f/u.    Diagnoses and all orders for this visit:    Radiation fibrosis of lung    Shortness of breath    Chronic hypoxemic respiratory failure    Abnormal chest  CT                            Follow up in about 6 months (around 12/19/2025).    Discussed with patient above for education the following:      Patient Instructions   Needle biopsy of abdominal lymph node had suspicious tissue.    Dr Solano felt surgery may involve open procedure and deferred.  He considered needle biopsy of neck node.    Ct/pet viewed from 2/2025     Lungs are crippled but stable.      Your case is to be presented next [facility] tumor board.....    You should do well with neck node biopsy if needed...            Jazmineal took 23 min

## 2025-06-24 ENCOUNTER — TUMOR BOARD CONFERENCE (OUTPATIENT)
Facility: CLINIC | Age: 85
End: 2025-06-24
Payer: MEDICARE

## 2025-06-24 NOTE — PROGRESS NOTES
06/24/25 1235   [facility] [place] [facility]   Presenting Hospital / Clinic [facility]ly [facility], [facility]   Virtual Tumor Board Conference In person   Presenter TIM Villalpando MD, CARLOS Ralph MD   Date Presented to Tumor Board 06/24/25   Specialties Present Medical Oncology;Hematology;Radiation Oncology;Surgical Oncology;Pathology;Navigation;Radiology;Nutrition   Cancer Type   Cancer Type Unknown primary   Recommended Plan (General)   Recommended Plan Note Tissue diagnosis needed.  Refer to ENT.

## 2025-06-26 ENCOUNTER — TELEPHONE (OUTPATIENT)
Facility: CLINIC | Age: 85
End: 2025-06-26
Payer: MEDICARE

## 2025-06-26 DIAGNOSIS — C79.51 METASTASIS TO BONE: ICD-10-CM

## 2025-06-26 DIAGNOSIS — C34.11 PRIMARY CANCER OF RIGHT UPPER LOBE OF LUNG: Primary | ICD-10-CM

## 2025-06-27 ENCOUNTER — TELEPHONE (OUTPATIENT)
Facility: CLINIC | Age: 85
End: 2025-06-27
Payer: MEDICARE

## 2025-06-27 DIAGNOSIS — R59.0 SUPRACLAVICULAR ADENOPATHY: Primary | ICD-10-CM

## 2025-06-27 DIAGNOSIS — K21.9 GASTROESOPHAGEAL REFLUX DISEASE WITHOUT ESOPHAGITIS: ICD-10-CM

## 2025-06-30 RX ORDER — PANTOPRAZOLE SODIUM 20 MG/1
20 TABLET, DELAYED RELEASE ORAL
Qty: 90 TABLET | Refills: 1 | Status: SHIPPED | OUTPATIENT
Start: 2025-06-30

## 2025-07-09 ENCOUNTER — OFFICE VISIT (OUTPATIENT)
Facility: CLINIC | Age: 85
End: 2025-07-09
Payer: MEDICARE

## 2025-07-09 VITALS
HEART RATE: 61 BPM | TEMPERATURE: 98 F | RESPIRATION RATE: 18 BRPM | OXYGEN SATURATION: 95 % | BODY MASS INDEX: 28.95 KG/M2 | HEIGHT: 66 IN | WEIGHT: 180.13 LBS

## 2025-07-09 DIAGNOSIS — R93.89 ABNORMAL CHEST CT: ICD-10-CM

## 2025-07-09 DIAGNOSIS — I48.91 ATRIAL FIBRILLATION, UNSPECIFIED TYPE: ICD-10-CM

## 2025-07-09 DIAGNOSIS — Z85.3 HISTORY OF BREAST CANCER: ICD-10-CM

## 2025-07-09 DIAGNOSIS — Z90.13 S/P BILATERAL MASTECTOMY: ICD-10-CM

## 2025-07-09 DIAGNOSIS — J70.1 RADIATION FIBROSIS OF LUNG: ICD-10-CM

## 2025-07-09 DIAGNOSIS — C34.11 PRIMARY CANCER OF RIGHT UPPER LOBE OF LUNG: ICD-10-CM

## 2025-07-09 DIAGNOSIS — R94.2 ABNORMAL PET SCAN OF LUNG: Primary | ICD-10-CM

## 2025-07-09 DIAGNOSIS — C79.51 METASTASIS TO BONE: ICD-10-CM

## 2025-07-09 DIAGNOSIS — R91.8 MASS OF UPPER LOBE OF RIGHT LUNG: ICD-10-CM

## 2025-07-09 PROCEDURE — 1125F AMNT PAIN NOTED PAIN PRSNT: CPT | Mod: CPTII,S$GLB,, | Performed by: INTERNAL MEDICINE

## 2025-07-09 PROCEDURE — 1101F PT FALLS ASSESS-DOCD LE1/YR: CPT | Mod: CPTII,S$GLB,, | Performed by: INTERNAL MEDICINE

## 2025-07-09 PROCEDURE — 99999 PR PBB SHADOW E&M-EST. PATIENT-LVL IV: CPT | Mod: PBBFAC,,, | Performed by: INTERNAL MEDICINE

## 2025-07-09 PROCEDURE — 1159F MED LIST DOCD IN RCRD: CPT | Mod: CPTII,S$GLB,, | Performed by: INTERNAL MEDICINE

## 2025-07-09 PROCEDURE — 1157F ADVNC CARE PLAN IN RCRD: CPT | Mod: CPTII,S$GLB,, | Performed by: INTERNAL MEDICINE

## 2025-07-09 PROCEDURE — 99214 OFFICE O/P EST MOD 30 MIN: CPT | Mod: S$GLB,,, | Performed by: INTERNAL MEDICINE

## 2025-07-09 PROCEDURE — 3288F FALL RISK ASSESSMENT DOCD: CPT | Mod: CPTII,S$GLB,, | Performed by: INTERNAL MEDICINE

## 2025-07-09 PROCEDURE — 1160F RVW MEDS BY RX/DR IN RCRD: CPT | Mod: CPTII,S$GLB,, | Performed by: INTERNAL MEDICINE

## 2025-07-09 PROCEDURE — G2211 COMPLEX E/M VISIT ADD ON: HCPCS | Mod: S$GLB,,, | Performed by: INTERNAL MEDICINE

## 2025-07-09 RX ORDER — RIVAROXABAN 20 MG/1
20 TABLET, FILM COATED ORAL DAILY
Qty: 30 TABLET | Refills: 3
Start: 2025-07-09

## 2025-07-09 NOTE — PROGRESS NOTES
[facility] Hematology/Oncology  PROGRESS NOTE -   Follow-up Visit      Subjective:       Patient ID:   NAME: Lety Herbert : 1940     84 y.o. female    Referring Doc: Shilo Perez MD  Other Physicians: Ashish Henley; Maryse Beckwith           Chief Complaint: RUL mass/lung ca f/u       History of Present Illness:     Patient returns today for a regularly scheduled follow-up visit.  The patient is here today to go over the results of the recently ordered labs, tests and studies. She is here with her son today.     She reports general lack of energy and fatigue;  Breathing is stable on O2 per NC. She has residual chronic back pain issues, aches and pains which are stable.  She is in wheelchair today but usually uses a walker      She previously had a  PET scan on  2025 which unfortunately showing what looks like progressive cancer in LN's. She had CT guided LN biopsy on 2025 which was highly suspicious for but non-diagnostic of some form of B-cell lymphoproliferative process    She saw Dr Ralph with rad/onc for follow-up on  after completing SBRT on 2025;     she subsequently saw Dr Villalpando with Gen-Surg on  for evaluation for excisional LN procedure but he is concerned about the location of the LN and her ability to tolerate any extensive or major surgery. Her case was presented at the [facility] on . She has been referred to see Dr Mclaughlin with ENT in [place] for purpose of getting LN excisional biopsy         She last saw Dr Henley with pulm on 2025; She last saw Dr Perez in 2024                       ROS:   GEN: normal without any fever, night sweats or weight loss; fatigue; chronic back pains and leg pains at night  HEENT: normal with no HA's, sore throat, stiff neck, changes in vision  CV: normal with no CP, SOB, PND, GAVIRIA or orthopnea  PULM: chronic SOB/GAVIRIA; O2 dependent , hemoptysis, sputum or pleuritic pain  GI: normal with no abdominal pain, nausea, vomiting,  constipation, diarrhea, melanotic stools, BRBPR, or hematemesis  : normal with no hematuria, dysuria  BREAST: normal with no mass, discharge, pain  SKIN: normal with no rash, erythema, bruising, or swelling    Pain Scale:  9    Allergies:  Review of patient's allergies indicates:   Allergen Reactions    Meperidine     Promethazine     Bactrim [sulfamethoxazole-trimethoprim] Rash       Medications:    Current Outpatient Medications:     albuterol (PROVENTIL/VENTOLIN HFA) 90 mcg/actuation inhaler, 2 puffs every 4 hours as needed for cough, wheeze, or shortness of breath, Disp: 18 g, Rfl: 11    ALPRAZolam (XANAX) 0.25 MG tablet, TAKE ONE TABLET BY MOUTH NIGHTLY AS NEEDED FOR ANXIETY, Disp: 60 tablet, Rfl: 1    arformoteroL (BROVANA) 15 mcg/2 mL Nebu, Take 2 mLs (15 mcg total) by nebulization 2 (two) times a day. Controller, Disp: 60 each, Rfl: 11    atorvastatin (LIPITOR) 20 MG tablet, TAKE one TABLET BY MOUTH ONCE DAILY, Disp: 90 tablet, Rfl: 1    budesonide (PULMICORT) 0.5 mg/2 mL nebulizer solution, Take 2 mLs (0.5 mg total) by nebulization 2 (two) times a day. Controller, Disp: 120 mL, Rfl: 11    diltiaZEM (CARDIZEM CD) 120 MG Cp24, Take 1 capsule (120 mg total) by mouth once daily., Disp: 90 capsule, Rfl: 3    gabapentin (NEURONTIN) 300 MG capsule, TAKE one CAPSULE BY MOUTH EVERY EVENING, Disp: 30 capsule, Rfl: 5    HYDROcodone-acetaminophen (NORCO)  mg per tablet, Take 1 tablet by mouth every 6 (six) hours as needed for Pain., Disp: 120 tablet, Rfl: 0    hydrocortisone 2.5 % cream, Apply topically 2 (two) times daily. To hemorrhoids (Patient taking differently: Apply 1 application  topically 2 (two) times daily. To hemorrhoids), Disp: 28 g, Rfl: 0    lactulose (CHRONULAC) 20 gram/30 mL Soln, Take 30 mLs (20 g total) by mouth 2 (two) times daily as needed., Disp: 1800 mL, Rfl: 0    linaCLOtide (LINZESS) 72 mcg Cap capsule, Take 1 capsule (72 mcg total) by mouth before breakfast., Disp: 30 capsule, Rfl: 5     nitrofurantoin, macrocrystal-monohydrate, (MACROBID) 100 MG capsule, Take 100 mg by mouth every 12 (twelve) hours., Disp: , Rfl:     ondansetron (ZOFRAN) 8 MG tablet, Take 1 tablet (8 mg total) by mouth every 8 (eight) hours as needed for Nausea., Disp: 30 tablet, Rfl: 2    pantoprazole (PROTONIX) 20 MG tablet, TAKE ONE TABLET BY MOUTH EVERY DAY, Disp: 90 tablet, Rfl: 1    potassium chloride (KLOR-CON) 10 MEQ TbSR, TAKE 1 TABLET BY MOUTH ONCE DAILY (Patient taking differently: Take 10 mEq by mouth once daily.), Disp: 90 tablet, Rfl: 3    sertraline (ZOLOFT) 100 MG tablet, TAKE 1 TABLET (100 MG TOTAL) BY MOUTH ONCE DAILY., Disp: 90 tablet, Rfl: 3    valsartan-hydrochlorothiazide (DIOVAN-HCT) 320-12.5 mg per tablet, TAKE ONE TABLET BY MOUTH EVERY MORNING, Disp: 90 tablet, Rfl: 0    XARELTO 20 mg Tab, Take 1 tablet (20 mg total) by mouth once daily., Disp: , Rfl:     PMHx/PSHx Updates:  See patient's last visit with me on 6/18/2025  See H&P on 3/10/2023        Pathology:   Cancer Staging   Primary cancer of right upper lobe of lung  Staging form: Lung, AJCC 8th Edition  - Clinical: Stage IA3 (cT1c, cN0, cM0) - Signed by Pedrito Ralph Jr., MD on 3/20/2023      CT guided Biopsy 4/9/2025:      LYMPH NODE, RIGHT RETROPERITONEAL, NEEDLE CORE BIOPSY:     --EXTENSIVE NECROSIS, WITH POSITIVITY FOR CD20 AND CD79a BY IMMUNOHISTOCHEMISTRY; FINDINGS ARE HIGHLY SUSPICIOUS FOR, BUT NOT DEFINITIVELY DIAGNOSTIC OF, B-CELL   LYMPHOPROLIFERATIVE DISORDER (SEE COMMENT).     --EXCISIONAL BIOPSY IS RECOMMENDED, FOR FURTHER DEFINITIVE CHARACTERIZATION.     --NO EVIDENCE OF METASTATIC CARCINOMA.     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 Patient's remote   clinical history of breast carcinoma is noticed. Based on the   immunohistochemical features, the current tumor is favored to be of lung   primary.            Objective:     Vitals:  Blood pressure (!) (P) 176/84, pulse 61, temperature 97.9 °F (36.6 °C), temperature source Temporal, resp. rate 18, height 5' 6" (1.676 m), weight 81.7 kg (180 lb 1.6 oz), SpO2 95%.    Physical Examination:   GEN: no apparent distress, comfortable; AAOx3; overweight; elderly  HEAD: atraumatic and normocephalic  EYES: no pallor, no icterus, PERRLA  ENT: OMM, no pharyngeal erythema, external ears WNL; no nasal discharge; no thrush  NECK: no masses, thyroid normal, trachea midline, no LAD/LN's, supple  CV: RRR with no murmur; normal pulse; normal S1 and S2; no pedal edema  CHEST: Normal respiratory effort; CTAB; normal breath sounds; no wheeze or crackles; O2 per NC portable  ABDOM: nontender and nondistended; soft; normal bowel sounds; no rebound/guarding  MUSC/Skeletal: ROM normal; no crepitus; joints normal; no deformities; +arthropathy of hip/knees, hands; wheelchair  EXTREM: no clubbing, cyanosis, inflammation or swelling; varicosities in bilateral lower extremities  SKIN: no rashes, lesions, ulcers, petechiae or subcutaneous nodules; chronic age related skin changes  : no carrillo  NEURO: grossly intact; motor/sensory WNL; AAOx3; no tremors  PSYCH: normal mood, affect and behavior  LYMPH: normal cervical, supraclavicular, axillary and groin LN's          Labs:     Lab Results   Component Value Date    WBC 6.38 06/04/2025    HGB 11.3 (L) 06/04/2025    HCT 35.4 (L) 06/04/2025    MCV 97 06/04/2025     " 06/04/2025       CMP  Sodium   Date Value Ref Range Status   06/04/2025 138 136 - 145 mmol/L Final   03/04/2019 142 134 - 144 mmol/L      Potassium   Date Value Ref Range Status   06/04/2025 4.3 3.5 - 5.1 mmol/L Final     Chloride   Date Value Ref Range Status   06/04/2025 103 95 - 110 mmol/L Final   03/04/2019 104 98 - 110 mmol/L      CO2   Date Value Ref Range Status   06/04/2025 31 (H) 23 - 29 mmol/L Final     Glucose   Date Value Ref Range Status   06/04/2025 102 70 - 110 mg/dL Final   03/04/2019 102 (H) 70 - 99 mg/dL      BUN   Date Value Ref Range Status   06/04/2025 16 8 - 23 mg/dL Final     Creatinine   Date Value Ref Range Status   06/04/2025 0.7 0.5 - 1.4 mg/dL Final   03/04/2019 0.48 (L) 0.60 - 1.40 mg/dL      Calcium   Date Value Ref Range Status   06/04/2025 9.2 8.7 - 10.5 mg/dL Final     Protein Total   Date Value Ref Range Status   06/04/2025 7.1 6.0 - 8.4 gm/dL Final     Albumin   Date Value Ref Range Status   06/04/2025 4.0 3.5 - 5.2 g/dL Final   03/04/2019 4.0 3.1 - 4.7 g/dL      Bilirubin Total   Date Value Ref Range Status   06/04/2025 0.6 0.1 - 1.0 mg/dL Final     Comment:     For infants and newborns, interpretation of results should be based   on gestational age, weight and in agreement with clinical   observations.    Premature Infant recommended reference ranges:   0-24 hours:  <8.0 mg/dL   24-48 hours: <12.0 mg/dL   3-5 days:    <15.0 mg/dL   6-29 days:   <15.0 mg/dL     ALP   Date Value Ref Range Status   06/04/2025 72 55 - 135 unit/L Final     AST   Date Value Ref Range Status   06/04/2025 18 10 - 40 unit/L Final     ALT   Date Value Ref Range Status   06/04/2025 9 (L) 10 - 44 unit/L Final     Anion Gap   Date Value Ref Range Status   06/04/2025 4 (L) 8 - 16 mmol/L Final     eGFR   Date Value Ref Range Status   06/04/2025 >60 >60 mL/min/1.73/m2 Final   02/28/2025 >60.0 >60 mL/min/1.73 m^2 Final     Lab Results   Component Value Date    CEA 1.2 06/04/2025           Radiology/Diagnostic  Studies:    CT Abdom/pelvis  3/30/2025:  Impression:     Multiple enlarged retroperitoneal nodes concerning for neoplastic process.     Small lytic lesion in the right T12 pedicle, concerning for metastatic disease     Mild diffuse circumferential bladder wall thickening suggestive of cystitis.  Mild bilateral hydroureteronephrosis suggestive of infection or recently passed stone.         PET 2/13/2025:  Impression:     1. Interval development of numerous enlarged, hypermetabolic retrocrural and abdominal retroperitoneal lymph nodes, as well as left supraclavicular lymph nodes, compatible with metastatic disease.  2. Nonspecific hypermetabolic foci in the left neck and in the right hilar region, without CT correlate.  3. Interval increased size and FDG uptake of hypermetabolic osseous metastasis in the T12 vertebral pedicle.  4. Additional observations as described.      PET 11/6/2024:    Impression:     1.  Prior bilateral mastectomy and reconstruction, with improving, likely post radiation change in the pectoralis minor and right upper lobe.     2.  New FDG avid soft tissue nodule/node anterior to the IVC suspicious for malignancy/metastasis.     3.  New FDG avid focus in the right T12 pedicle suspicious for malignancy/metastatic disease.     4.  Unchanged nonspecific fusiform FDG activity along the right external iliac adjacent to the cecum, possibly from misregistration as previously described, and FDG activity in the terminal ileum.     5.  Minimal unchanged FDG activity in the left adrenal, similar to the previous exam.               MRI T-spine 7/22/2024:    Impression:     Degradation by motion.     Abnormal marrow signal within the posterior elements of T12 to the right of midline with additional small foci of abnormal marrow signal within the rightward aspect of the T4 and T5 vertebral bodies.  In light of the history of primary neoplasm, the findings are of concern for osseous metastases.     Remote  compression deformity involving L1 with changes of previous vertebroplasty.  There is mild retropulsion of the posterior vertebral margin.     Degenerative disc/facet changes.      CT Chest  7/1/2024:  Impression:     1. Chronic consolidation of the right upper lobe with air bronchograms and mild bronchiectasis shows no detrimental change from previous exam.  Scattered subpleural interstitial thickening in the bilateral lungs is unchanged.  2. Juxtapleural nodular like density in the posterior right lower lobe measures 9 mm, could reflect a nodule or atelectasis.  Follow-up CT chest in 3 months recommended.  3. 2 mm subpleural nodular density in the anterior right middle lobe is unchanged.  4. Enlarged mediastinal and right hilar lymph nodes noted with no detrimental change.       PET 3/6/2024:    IMPRESSION:  1. Discoid airspace opacity in the paramediastinal right upper lobe favored to represent post radiation/post treatment change, obscuring the previously described pulmonary nodule in the anterior right upper lobe with no convincing focal increased FDG activity from background.     2. Bilateral mastectomy with reconstruction.     3. Asymmetric increased FDG activity in the right pectoralis minor muscle favored to represent radiation change/inflammation.     4. Indeterminate oval area of markedly increased FDG activity adjacent to the right external iliac as above, slightly increased in conspicuity/size from the previous exam. No convincing CT anatomic correlate is present although this is adjacent to the terminal ileum and misregistration from cecal uptake is a consideration. Consider correlation with a history of colonoscopy in this age group.     5. Mild nonspecific asymmetric increased FDG activity in the inferior left adrenal body, newly appreciated from the previous exam, and only marginally increased from background.         Chest CT 12/19/2023:    IMPRESSION:  1.  Reference right upper lobe spiculated  opacity is obscured by a broad area of linear density/consolidative change that abuts the anterior pleural surface. The degree of opacification has mildly increased upon comparison.  2.  Subpleural linear opacities in the right lower lobe superior segment.  3.  Small right pleural effusion with subjacent atelectasis.  4.  No evidence of pathologic lymphadenopathy in the right sury hepatis.  5.  Small sliding type hiatus hernia.  6.  Coronary artery calcification.  7.  L2 compression fracture that has undergone previous kyphoplasty.    PET 9/19/2023:  IMPRESSION:  Decreased size and FDG activity of the spiculated nodule in the anterior right upper lobe     Increased groundglass and interstitial opacities within the right upper lobe and superior segment right lower lobe compatible with post radiation changes     Bilateral mastectomies with reconstruction with interval increased FDG activity in the right pectoralis minor muscle compatible with postradiation changes     Stable FDG activity in the region of the right external iliac vein without adenopathy.     Degenerative changes of the spine  Cardiomegaly with coronary artery calcification         CT chest 6/16/2023:    IMPRESSION:  Decrease in size of the mass within the anterior right upper lobe     Development of peripheral groundglass opacities in the right upper lobe and adjacent to the mass compatible with post radiation changes     No evidence of metastatic disease     Cardiomegaly        PET 1/11/2023:     IMPRESSION: Hypermetabolic 2.3 cm mass within the anterior right upper lobe suspicious for primary lung malignancy     Faint groundglass opacities throughout the lungs with prominence of the pulmonary vasculature and cardiomegaly which may be represent pulmonary edema.  Development of a 13 mm subpleural nodule in the right lower lobe. Since this is new since the most recent CT findings most likely are secondary to infectious or inflammatory process however  metastatic lesion cannot be excluded     Increased FDG activity in the region of the right external iliac vein without corresponding adenopathy. This may be physiologic there is physiologic activity within a superficial vein in the upper right thigh and findings may be secondary to thrombophlebitis.. Follow-up CT abdomen and pelvis with contrast is recommended     Degenerative changes of the spine           CT Chest 12/15/2022:     IMPRESSION:  1. A 23 mm right upper lobe noncalcified pulmonary nodule is highly suspicious for primary pulmonary neoplasm. Tissue diagnosis is recommended.  2. No findings of regional metastatic disease in the chest.  3. Enlarged pulmonary arteries, suggesting pulmonary arterial hypertension.  4. Cardiomegaly, with aortic and coronary arterial calcifications.     CTA 11/19/2022:     IMPRESSION:  1.  No pulmonary embolus detected.  2.  Interstitial abnormality, suspect mild edema superimposed upon chronic changes.  3.  Right upper lobe 2.1 cm nodule. Consider a non-contrast Chest CT at 3 months, a PET/CT, or tissue sampling. These guidelines do not apply to immunocompromised patients and patients with cancer   ADDENDUM #1         The presence of right upper lobe lung nodule needing further evaluation or follow-up has been discussed with Dr. Rick Salgado 11/19/2022 12:50 AM CST.     All lab results and imaging results have been reviewed and     I have reviewed all available lab results and radiology reports.    Assessment/Plan:   (1) 84 y.o. female with diagnosis of RUL lung mass who has been referred by Shilo Perez MD for evaluation by medical hematology/oncology. She has been followed by Dr Ashish Henley with pulmonary for her COPD and chronic hypoxemia/bronchitis, who was a former smoker.      - She had a CTA in Nov 2022 which was negative for a PE but showed a RUL lung mass, which was followed by a CT Chest on 12/15/2022 which confirmed a 2.3cm RUL mass.   - She had a PET scan on  1/11/2023 which showed a 2.3 cm hypermetabolic mass in the anterior right upper lobe abutting the superior vena cava along with development of a 13 mm subpleural nodule within the right lower lobe.         - She had CT guided biopsy of the RUL lung mass on 2/15/2023 with pathology coming back NSCLC favoring a lung primary.     - She is supposed to be on oxygen at home. She has portable tank and has a lot of GAVIRIA. She has chronic SOB.   - She is former smoker and quit when she was 43yrs old.      - discussed the pathology and reviewed and discussed the latest NCCN guidelines (vs. 2-2023)  - unlikely candidate for any surgical intervention  - will refer to Rad/onc to see if there are any radiation options either monotherapy or in combination with low dose weekly chemotherapy  - CARIS on the pathology  - check CEA and up to date labs    3/27/2023:  - She saw Dr Ralph with rad/onc on 3/20/2023 and she is starting XRT monotherapy today with day#1    4/25/2023:  - she completed XRT and has some fatigue  - she will need post-XRT evaluation in about 4 weeks with rad/onc and expect her to need repeat scans in 6-8 weeks    5/25/2023:  - she saw Dr aRlph on 5/2 and he has CT Chest ordered post-XRT  - some residual fatigue and back pain  - due to see Dr Henley  - labs adequate    8/21/2023:  - she has been having some more back pain  - due for repeat CT with Dr Ralph in Sept/oct 2023, will go ahead and order PET now  - she needs to f/u with Dr Henley with pulmonary as well  - await PEt results, consider other scans if necessary  - CEA at 2.0      10/24/2023:  - she had PET scan in Sept 2023 with overall improved report  - she sees rad/onc again on 11/6  - rad/onc has already ordered the next Chest CT  - due for up to date labs incl. CEA    1/8/2024:  - She has residual chronic back pain issues, aches and pains in legs especially at night; doesn't sleep well at night  - She saw Dr Perez in Nov 2023  - She last saw Dr Ralph  with rad/onc on 5/2/2023 and previously completed XRT; she was supposed to see him again on 11/6/2023 but did not  - she had Chest CT on 12/19/2023  - She is on portable O2; breathing up and down. No CP, HA's or N/V.  - She has not seen Dr Henley with pulmonary in some time    4/1/2024:  - She had bout of pneumonia and was hospitalized in Jan 2024  - She saw Dr Henley with pulm in Feb 2024 and Dr Grajeda in Jan 2024  - She had recent PEt scan on 3/6/2024 relatively stable  - she saw Dr Ralph with rad/onc in Jan 2024 8/5/2024:  - She saw Dr Henley with pultigist in May 2024; she saw Dr Perez in may 2024  - She last saw Dr Ralph with rad/onc in Jan 2024  - She had PEt scan on 3/6/2024; Ct chest on 7/1/2024  - Dr Dodge ordered a MRI of her T spine on 7/22 and radiologist mentioned in their report that could not exclude presence of cancer in her spine - discussed this with patient and she does not want to get any bone scan or studies to further evaluate  - CEA 1.7    12/12/2024:  -  She had PET scan on 11/6/2024  - She saw Dr Henley with pulm on 11/20/2024  - She saw Domonique Navas NP on 11/14/2024  - She had a bone biopsy on 11/29 which has come back negative for cancer  - CEA at 1.2    3/6/2025:  - She had recent PET scan on  2/13/2025 which unfortunately showing what looks like progressive cancer in LN's.   - She last saw Dr Henley with pulm on 11/20/2024  - She saw Domonique Navas NP on 1/23/2025; Dr Ralph with rad/onc on 2/11/2025  - I am not sure she could safely undergo any meaningful regimen of chemotherapy given her advanced age, lung function and poor condition  - see is we can get Dr henley to see her again and ask IR if there are any LN's that can be safely biopsied  - CEA 1.2  - no anemic    4/8/2025:  - Dr Ralph with rad/onc on 4/2/2025; she sees Dr Ralph again later today   - she is scheduled for repeat biopsy with IR tomorrow  - will await path report       5/7/2025:   - due for excisional LN biopsy to  determine what type of B cell lymphoma   - increase pain medication to 10 mg dosing of norco    6/18/2025:  - She had CT guided LN biopsy on 4/9/2025 which was highly suspicious for but non-diagnostic of some form of B-cell lymphoproliferative process  - She saw Dr Ralph with rad/onc for follow-up on 5/20 after completing SBRT on 5/2/2025;   - she subsequently saw Dr Villalpando with Gen-Surg on 6/16 for evaluation for excisional LN procedure but he is concerned about the location of the LN and her ability to tolerate any extensive or major surgery. Dr Villalpando plans to present her case at the next [facility] Tumor board    7/9/2025:  - . Her case was presented at the [facility] Tumor Board on 6/24.   - She has been referred to see Dr Mclaughlin with ENT in [place] for purpose of getting LN excisional biopsy  - She last saw Dr Henley with pulm on 6/19/2025      (2) Hx/of PE and DVT     (3) HTN and hypercholesterolemia     (4) COPD/chronic hypoxemia; bronchiectasis; chronic bronchitis - followed by Dr Ashish Henley/Shelbie Villalpando     (5) CHF and dysrhythmia     (6) GERD; gastric ulcer     (7) Hx/of breast cancer s/p bilateral mastectomies- followed by Dr Maryse Beckwith  - She has history of breast cancer in past around 2005  for which she has had bilateral mastectomies and is followed by Dr Beckwith. She did not require chemotherapy or radiation. She had reconstruction surgery with Dr Grimaldo. She saw Dr Beckwith couple of weeks ago.        (8) OA (Knees); chronic back pain; s/p Left total hip after fracture     (9) Urinary urgency/incontinence     (10) Migraine HA's     (11) Anxiety and Depression     (12) Hx/of melanoma left forearm, BCC     (13) Former smoker           VISIT DIAGNOSES:      Abnormal PET scan of lung    Mass of upper lobe of right lung    Radiation fibrosis of lung    Metastasis to bone    History of breast cancer    Primary cancer of right upper lobe of lung    S/P bilateral mastectomy    Abnormal chest  CT          PLAN:  Proceed with evaluation by Dr Mclaughlin with ENT   F/u with Rad/onc and Dr Villalpando  She previously had completed monotherapy XRT; f/u with rad/onc as directed    I am not sure she could safely undergo any meaningful regimen of chemotherapy given her advanced age, lung function and poor condition  Check up to date labs incl. CEA level every 3 months   F/u with PCP, Pulm, etc     RTC in  3-4 weeks  Fax note to Shilo Perez MD; Amena Mejia Mannina, [place]; Mariela Mclaughlin       Discussion:     COVID-19 Discussion:     I had long discussion with patient and any applicable family about the COVID-19 coronavirus epidemic and the recommended precautions with regard to cancer and/or hematology patients. I have re-iterated the CDC recommendations for adequate hand washing, use of hand -like products, and coughing into elbow, etc. In addition, especially for our patients who are on chemotherapy and/or our otherwise immunocompromised patients, I have recommended avoidance of crowds, including movie theaters, restaurants, churches, etc. I have recommended avoidance of any sick or symptomatic family members and/or friends. I have also recommended avoidance of any raw and unwashed food products, and general avoidance of food items that have not been prepared by themselves. The patient has been asked to call us immediately with any symptom developments, issues, questions or other general concerns.         Pathology Discussion:     I reviewed and discussed the pathology report(s) and radiograph reports (if available) in as simple to understand and/or laymen's terms to the best of my ability. I had an indepth conversation with the patient and went over the patient's individual diagnosis based on the information that was currently available. I discussed the TNM staging process with regard to the patient's particular cancer type, and the calculated stage based on the currently available TNM data and  "literature. I discussed the available prognostic data with regard to the current staging information and how it relates to the prognosis of their particular neoplastic process.          NCCN Guidelines:     I discussed the available treatment option(s) in accordance with the latest literature from the NCCN Clinical Practice Guidelines for the patient's particular type of cancer disorder. The NCCN Guidelines provide a "document evidence-based (and) consensus-driven management" of the care of oncology patients. The treatment recommendations were made not only in accordance to the NCCN guidelines, but also factored in to account the patient's overall age, condition, performance status and their medical co-morbidities. I went over the risks and benefits of the the treatment options (if any could be made) with regard to their particular cancer type, their cancer stage, their age, and their co-morbidities.         I have explained and the patient understands all of  the current recommendation(s). I have answered all of their questions to the best of my ability and to their complete satisfaction.      I spent over 25 mins of time with the patient. Reviewed results of the recently ordered labs, tests and studies; made directives with regards to the results. Over half of this time was spent couseling and coordinating care.    I have explained all of the above in detail and the patient understands all of the current recommendation(s). I have answered all of their questions to the best of my ability and to their complete satisfaction.   The patient is to continue with the current management plan.            Electronically signed by Isacc Vogt MD                            "

## 2025-07-10 ENCOUNTER — TELEPHONE (OUTPATIENT)
Dept: HEMATOLOGY/ONCOLOGY | Facility: CLINIC | Age: 85
End: 2025-07-10
Payer: MEDICARE

## 2025-07-10 NOTE — NURSING
Contacted patient regarding referral to Dr. Anuja Mclaughlin. Spoke with daughter and coordinated appointment. Reviewed appt info and location and provided my contact info to her. She verbalized understanding    Oncology Navigation   Intake  Cancer Type: Head and Neck  Type of Referral: Internal  Date of Referral: 06/27/25  Initial Nurse Navigator Contact: 07/10/25  Referral to Initial Contact Timeline (days): 13  First Appointment Available: 07/17/25  Appointment Date: 07/17/25  First Available Date vs. Scheduled Date (days): 0     Treatment  Current Status: Staging work-up  Date Presented to Tumor Board: 06/24/25    Surgical Oncologist: Dr. Anuja Mclaughlin (H&N surg onc)  Consult Date: 07/17/25    Medical Oncologist: Dr. Isacc Vogt                       [employer]      Follow Up  No follow-ups on file.

## 2025-07-17 ENCOUNTER — OFFICE VISIT (OUTPATIENT)
Dept: HEMATOLOGY/ONCOLOGY | Facility: CLINIC | Age: 85
End: 2025-07-17
Payer: MEDICARE

## 2025-07-17 VITALS
DIASTOLIC BLOOD PRESSURE: 67 MMHG | OXYGEN SATURATION: 94 % | BODY MASS INDEX: 29.58 KG/M2 | TEMPERATURE: 98 F | HEIGHT: 66 IN | RESPIRATION RATE: 18 BRPM | HEART RATE: 69 BPM | SYSTOLIC BLOOD PRESSURE: 130 MMHG | WEIGHT: 184.06 LBS

## 2025-07-17 DIAGNOSIS — R59.0 SUPRACLAVICULAR ADENOPATHY: Primary | ICD-10-CM

## 2025-07-17 PROCEDURE — 1101F PT FALLS ASSESS-DOCD LE1/YR: CPT | Mod: CPTII,S$GLB,, | Performed by: STUDENT IN AN ORGANIZED HEALTH CARE EDUCATION/TRAINING PROGRAM

## 2025-07-17 PROCEDURE — 99999 PR PBB SHADOW E&M-EST. PATIENT-LVL V: CPT | Mod: PBBFAC,,, | Performed by: STUDENT IN AN ORGANIZED HEALTH CARE EDUCATION/TRAINING PROGRAM

## 2025-07-17 PROCEDURE — 99205 OFFICE O/P NEW HI 60 MIN: CPT | Mod: S$GLB,,, | Performed by: STUDENT IN AN ORGANIZED HEALTH CARE EDUCATION/TRAINING PROGRAM

## 2025-07-17 PROCEDURE — 1125F AMNT PAIN NOTED PAIN PRSNT: CPT | Mod: CPTII,S$GLB,, | Performed by: STUDENT IN AN ORGANIZED HEALTH CARE EDUCATION/TRAINING PROGRAM

## 2025-07-17 PROCEDURE — 1157F ADVNC CARE PLAN IN RCRD: CPT | Mod: CPTII,S$GLB,, | Performed by: STUDENT IN AN ORGANIZED HEALTH CARE EDUCATION/TRAINING PROGRAM

## 2025-07-17 PROCEDURE — 1159F MED LIST DOCD IN RCRD: CPT | Mod: CPTII,S$GLB,, | Performed by: STUDENT IN AN ORGANIZED HEALTH CARE EDUCATION/TRAINING PROGRAM

## 2025-07-17 PROCEDURE — 3288F FALL RISK ASSESSMENT DOCD: CPT | Mod: CPTII,S$GLB,, | Performed by: STUDENT IN AN ORGANIZED HEALTH CARE EDUCATION/TRAINING PROGRAM

## 2025-07-17 NOTE — PROGRESS NOTES
Note to patients: In accordance with the  Century Cures Act, patients are now granted immediate electronic access to their medical records. This note is primarily intended for communication among medical professionals. As a result, it may incorporate medical terminology, abbreviations, or language that could appear blunt or unfamiliar. If you have questions about this document, we encourage you to discuss it with your physician.      [facility]  Head & Neck Surgical Oncology [facility]    Patient: Lety Herbert    : 1940    MRN: [id number]  Primary Care Provider: Shilo Perez MD  Referring Provider: Domonique Santos Ma*   Rad Onc: Dr. Ralph  Med Onc: Dr. 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 female with history of lymphoma who presents today for evaluation for excisional lymph node biopsy. She reports recent PET scan findings with new areas of concern. Suspicious regions were noted in left neck behind collarbone close to thyroid gland, demonstrating abnormal metabolic activity potentially suggestive of malignancy.     She also reports new onset of intermittent sensation of ear feeling stopped up, similar to having water in the ear. This symptom fluctuates and is not constant. She denies associated ear pain or changes to voice.     She continues Xarelto 20mg for management of atrial fibrillation, which may be paused temporarily for upcoming surgical procedure pending medical clearance.     Patient is here with her daughter.    TREATMENT HISTORY:  Oncology History   Primary cancer of right upper lobe of lung   3/9/2023 Initial Diagnosis    Malignant neoplasm of upper  "lobe of right lung (NSCLC favoring lung primary)     3/20/2023 Cancer Staged    Staging form: Lung, AJCC 8th Edition  - Clinical: Stage IA3 (cT1c, cN0, cM0)         ALLERGIES:  Review of patient's allergies indicates:   Allergen Reactions    Meperidine     Promethazine     Bactrim [sulfamethoxazole-trimethoprim] Rash         MEDICATIONS:  Current Medications[1]    PAST MEDICAL HISTORY:  Past Medical History:   Diagnosis Date    Abnormal chest CT 03/09/2023    Anemia     Basal cell carcinoma     nose     Cancer     breast    COPD (chronic obstructive pulmonary disease)     Depression     DVT (deep venous thrombosis)     Fall 03/20/2018    Former smoker 03/09/2023    Gastric ulceration     GERD (gastroesophageal reflux disease)     History of breast cancer 03/09/2023    History of frequent urinary tract infections     Hyperlipidemia     Hypertension     Malignant neoplasm of upper lobe of right lung (NSCLC favoring lung primary) 03/09/2023    Melanoma 2004?    left forearm    MRSA (methicillin resistant staph aureus) culture positive     Neuropathy     PE (pulmonary embolism)     Post-nasal drip 11/15/2018    Reflux     S/P bilateral mastectomy 03/09/2023        PAST SURGICAL HISTORY:  Past Surgical History:   Procedure Laterality Date    HYSTERECTOMY      MASTECTOMY, RADICAL Bilateral     TOTAL HIP ARTHROPLASTY Left 11/13/2017        FAMILY HISTORY:  Family History   Problem Relation Name Age of Onset    Cancer Mother      Cancer Father      Heart disease Father      Melanoma Neg Hx      Psoriasis Neg Hx      Lupus Neg Hx      Eczema Neg Hx         SOCIAL HISTORY:  Social History[2]  See above substance history    REVIEW OF SYSTEMS:   Comprehensive review of systems was discussed with the patient.  It is positive only for the above complaints.    PHYSICAL EXAMINATION:  Blood pressure 130/67, pulse 69, temperature 97.5 °F (36.4 °C), temperature source Temporal, resp. rate 18, height 5' 6" (1.676 m), weight 83.5 kg " (184 lb 1.4 oz), SpO2 (!) 94%.    Constitutional: Frail appearing. On O2.  Psychiatric: Appropriate mood and affect. Cooperative.  Voice: Non-dysphonic, speaking in full sentences.   Neurologic: Cranial nerves grossly intact, no focal deficits.  Head and face: Salivary glands are not enlarged. Face is symmetric. CN VII strength intact.  Skin: No concerning skin lesions.   Eyes: Vision grossly intact, bilateral extraocular movements intact  Ears: Bilateral pinna, mastoid, external ear canal normal. Hearing intact.   Nose: External nose appears normal.   Lips: No ulcers or lesions  Oral cavity: Mucosa is pink and moist. Exam limited by upper dentures but overall mucosa appear normal. No leukoplakia, erythroplakia, ulceration, masses or lesions.  Oropharynx: Mucosa is pink and moist. Soft palate and base of tongue are normal. Posterior pharyngeal wall normal. Tonsils are normal. No lesions.  Neck: Soft and flat, no masses or lymphadenopathy. No palpable thyroid enlargement or nodules.  Respiratory: Chest expansion symmetric, no audible stridor or stertor. Breathing is unlabored. No active cough.    PROCEDURES:  NECK ULTRASOUND:  Provider: Anuja Mclaughlin MD  Indication: abnormal lymphadenopathy  An ultrasound examination of the left neck was performed here in the office. The abnormal lymph node was noted slightly inferior to the clavicle with the internal jugular vein/subclavian junction anterior to the lymph node.    DATA REVIEWED:   IMAGING:  PET 2/2025  1. Interval development of numerous enlarged, hypermetabolic retrocrural and abdominal retroperitoneal lymph nodes, as well as left supraclavicular lymph nodes, compatible with metastatic disease.  2. Nonspecific hypermetabolic foci in the left neck and in the right hilar region, without CT correlate.  3. Interval increased size and FDG uptake of hypermetabolic osseous metastasis in the T12 vertebral pedicle.  4. Additional observations as described.    RADIOLOGY  REVIEWED:  I have independently viewed and agree with the PET images and reports which demonstrate upper mediastinal/left level IV PET avid node.    ASSESSMENT AND PLAN:  1. Supraclavicular adenopathy         Lety Herbert is a 84 y.o. female with history of lymphoma presenting for excisional lymph node.  LOCALIZED ENLARGED LYMPH NODES:  - Identified lymph node behind left collarbone on PET scan, concerning for recurrent lymphoma.  - Lymph node location close to major blood vessel complicates excisional biopsy procedure.  - Will consult with thoracic or vascular surgeon to determine optimal approach and location for biopsy.  - Discussed need for excisional biopsy of entire lymph node to properly evaluate for lymphoma recurrence.  - Neck US performed during visit to evaluate lymph node location.    ABNORMAL LUNG FINDING:  - Explained PET scan findings, including rationale for areas of increased uptake.    ATRIAL FIBRILLATION:  - Planning to obtain medical clearance and assess safety of pausing Xarelto for surgery given history of a-fib.    SURGICAL PLANNING AND FOLLOW-UP:  - Reviewed potential risks of surgery including bleeding, infection, and damage to nearby structures.  - Surgery to be scheduled in the coming weeks after obtaining necessary clearances.  - Contact the office if patient does not hear back about surgical plan.     Patient encouraged to call with any questions, concerns, or new or worsening symptoms.     Follow up for LN excision        [1]   Current Outpatient Medications:     albuterol (PROVENTIL/VENTOLIN HFA) 90 mcg/actuation inhaler, 2 puffs every 4 hours as needed for cough, wheeze, or shortness of breath, Disp: 18 g, Rfl: 11    ALPRAZolam (XANAX) 0.25 MG tablet, TAKE ONE TABLET BY MOUTH NIGHTLY AS NEEDED FOR ANXIETY, Disp: 60 tablet, Rfl: 1    atorvastatin (LIPITOR) 20 MG tablet, TAKE one TABLET BY MOUTH ONCE DAILY, Disp: 90 tablet, Rfl: 1    budesonide (PULMICORT) 0.5 mg/2 mL nebulizer  solution, Take 2 mLs (0.5 mg total) by nebulization 2 (two) times a day. Controller, Disp: 120 mL, Rfl: 11    diltiaZEM (CARDIZEM CD) 120 MG Cp24, Take 1 capsule (120 mg total) by mouth once daily., Disp: 90 capsule, Rfl: 3    gabapentin (NEURONTIN) 300 MG capsule, TAKE one CAPSULE BY MOUTH EVERY EVENING, Disp: 30 capsule, Rfl: 5    HYDROcodone-acetaminophen (NORCO)  mg per tablet, Take 1 tablet by mouth every 6 (six) hours as needed for Pain., Disp: 120 tablet, Rfl: 0    hydrocortisone 2.5 % cream, Apply topically 2 (two) times daily. To hemorrhoids (Patient taking differently: Apply 1 application  topically 2 (two) times daily. To hemorrhoids), Disp: 28 g, Rfl: 0    lactulose (CHRONULAC) 20 gram/30 mL Soln, Take 30 mLs (20 g total) by mouth 2 (two) times daily as needed., Disp: 1800 mL, Rfl: 0    linaCLOtide (LINZESS) 72 mcg Cap capsule, Take 1 capsule (72 mcg total) by mouth before breakfast., Disp: 30 capsule, Rfl: 5    nitrofurantoin, macrocrystal-monohydrate, (MACROBID) 100 MG capsule, Take 100 mg by mouth every 12 (twelve) hours., Disp: , Rfl:     ondansetron (ZOFRAN) 8 MG tablet, Take 1 tablet (8 mg total) by mouth every 8 (eight) hours as needed for Nausea., Disp: 30 tablet, Rfl: 2    pantoprazole (PROTONIX) 20 MG tablet, TAKE ONE TABLET BY MOUTH EVERY DAY, Disp: 90 tablet, Rfl: 1    potassium chloride (KLOR-CON) 10 MEQ TbSR, TAKE 1 TABLET BY MOUTH ONCE DAILY (Patient taking differently: Take 10 mEq by mouth once daily.), Disp: 90 tablet, Rfl: 3    sertraline (ZOLOFT) 100 MG tablet, TAKE 1 TABLET (100 MG TOTAL) BY MOUTH ONCE DAILY., Disp: 90 tablet, Rfl: 3    valsartan-hydrochlorothiazide (DIOVAN-HCT) 320-12.5 mg per tablet, TAKE ONE TABLET BY MOUTH EVERY MORNING, Disp: 90 tablet, Rfl: 0    XARELTO 20 mg Tab, Take 1 tablet (20 mg total) by mouth once daily., Disp: 30 tablet, Rfl: 3    arformoteroL (BROVANA) 15 mcg/2 mL Nebu, Take 2 mLs (15 mcg total) by nebulization 2 (two) times a day. Controller,  Disp: 60 each, Rfl: 11  [2]   Social History  Socioeconomic History    Marital status:      Spouse name: Jean    Number of children: 3   Tobacco Use    Smoking status: Former     Passive exposure: Past    Smokeless tobacco: Never   Substance and Sexual Activity    Alcohol use: No    Drug use: No    Sexual activity: Not Currently     Partners: Male   Social History Narrative    3 Children- 9 GC, 7 GGrands     Social Drivers of Health     Financial Resource Strain: Medium Risk (11/10/2021)    Overall Financial Resource Strain (CARDIA)     Difficulty of Paying Living Expenses: Somewhat hard   Food Insecurity: No Food Insecurity (11/10/2021)    Hunger Vital Sign     Worried About Running Out of Food in the Last Year: Never true     Ran Out of Food in the Last Year: Never true   Transportation Needs: No Transportation Needs (11/10/2021)    PRAPARE - Transportation     Lack of Transportation (Medical): No     Lack of Transportation (Non-Medical): No   Physical Activity: Inactive (11/10/2021)    Exercise Vital Sign     Days of Exercise per Week: 0 days     Minutes of Exercise per Session: 0 min   Stress: Stress Concern Present (11/10/2021)    [employer] - Occupational Stress Questionnaire     Feeling of Stress : To some extent   Housing Stability: Low Risk  (11/10/2021)    Housing Stability Vital Sign     Unable to Pay for Housing in the Last Year: No     Number of Places Lived in the Last Year: 1     Unstable Housing in the Last Year: No

## 2025-07-25 DIAGNOSIS — F41.9 ANXIETY: ICD-10-CM

## 2025-07-25 DIAGNOSIS — G47.09 OTHER INSOMNIA: ICD-10-CM

## 2025-07-25 RX ORDER — ALPRAZOLAM 0.25 MG/1
0.25 TABLET ORAL NIGHTLY
Qty: 60 TABLET | Refills: 1 | Status: SHIPPED | OUTPATIENT
Start: 2025-07-25

## 2025-08-05 PROCEDURE — G0179 MD RECERTIFICATION HHA PT: HCPCS | Mod: ,,, | Performed by: NURSE PRACTITIONER

## 2025-08-06 ENCOUNTER — TELEPHONE (OUTPATIENT)
Dept: HEMATOLOGY/ONCOLOGY | Facility: CLINIC | Age: 85
End: 2025-08-06
Payer: MEDICARE

## 2025-08-06 NOTE — TELEPHONE ENCOUNTER
Possible surgery with Dr Mclaughlin for excision of lymph node discussed at pt's clinic visit with Dr Mclaughlin.    Request for surgical clearance and advice on holding Xarelto faxed to Dr Perez T: [phone] F: [phone]    Request for surgical clearance faxed to Dr Henley  T: [phone] F: [phone]

## 2025-08-07 ENCOUNTER — OFFICE VISIT (OUTPATIENT)
Dept: FAMILY MEDICINE | Facility: CLINIC | Age: 85
End: 2025-08-07
Payer: MEDICARE

## 2025-08-07 ENCOUNTER — PATIENT MESSAGE (OUTPATIENT)
Dept: FAMILY MEDICINE | Facility: CLINIC | Age: 85
End: 2025-08-07

## 2025-08-07 VITALS
SYSTOLIC BLOOD PRESSURE: 175 MMHG | HEIGHT: 66 IN | BODY MASS INDEX: 28.7 KG/M2 | WEIGHT: 178.56 LBS | HEART RATE: 67 BPM | DIASTOLIC BLOOD PRESSURE: 79 MMHG

## 2025-08-07 DIAGNOSIS — I10 ESSENTIAL HYPERTENSION: ICD-10-CM

## 2025-08-07 DIAGNOSIS — E78.2 MULTIPLE-TYPE HYPERLIPIDEMIA: ICD-10-CM

## 2025-08-07 DIAGNOSIS — J96.11 CHRONIC HYPOXEMIC RESPIRATORY FAILURE: ICD-10-CM

## 2025-08-07 DIAGNOSIS — Z01.818 PREOP EXAM FOR INTERNAL MEDICINE: Primary | ICD-10-CM

## 2025-08-07 DIAGNOSIS — F33.1 MODERATE EPISODE OF RECURRENT MAJOR DEPRESSIVE DISORDER: ICD-10-CM

## 2025-08-07 DIAGNOSIS — Z79.899 LONG TERM CURRENT USE OF DIURETIC: ICD-10-CM

## 2025-08-07 DIAGNOSIS — Z79.01 CHRONIC ANTICOAGULATION: ICD-10-CM

## 2025-08-07 DIAGNOSIS — I50.32 CHRONIC HEART FAILURE WITH PRESERVED EJECTION FRACTION: ICD-10-CM

## 2025-08-07 PROCEDURE — 1160F RVW MEDS BY RX/DR IN RCRD: CPT | Mod: CPTII,S$GLB,, | Performed by: INTERNAL MEDICINE

## 2025-08-07 PROCEDURE — 1157F ADVNC CARE PLAN IN RCRD: CPT | Mod: CPTII,S$GLB,, | Performed by: INTERNAL MEDICINE

## 2025-08-07 PROCEDURE — 99999 PR PBB SHADOW E&M-EST. PATIENT-LVL IV: CPT | Mod: PBBFAC,,, | Performed by: INTERNAL MEDICINE

## 2025-08-07 PROCEDURE — 99214 OFFICE O/P EST MOD 30 MIN: CPT | Mod: S$GLB,,, | Performed by: INTERNAL MEDICINE

## 2025-08-07 PROCEDURE — 3288F FALL RISK ASSESSMENT DOCD: CPT | Mod: CPTII,S$GLB,, | Performed by: INTERNAL MEDICINE

## 2025-08-07 PROCEDURE — 1101F PT FALLS ASSESS-DOCD LE1/YR: CPT | Mod: CPTII,S$GLB,, | Performed by: INTERNAL MEDICINE

## 2025-08-07 PROCEDURE — 1125F AMNT PAIN NOTED PAIN PRSNT: CPT | Mod: CPTII,S$GLB,, | Performed by: INTERNAL MEDICINE

## 2025-08-07 PROCEDURE — 1159F MED LIST DOCD IN RCRD: CPT | Mod: CPTII,S$GLB,, | Performed by: INTERNAL MEDICINE

## 2025-08-07 NOTE — Clinical Note
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  She has chronic illnesses including respiratory failure, AFib on anticoagulation and is considered to be moderate risk under general anesthesia.  Her blood pressure remains uncontrolled.  Her cardiologist is not affiliated with the [facility] or the epic system and will need a cardiology update which I have advised her to call the office for an appointment. I am okay if she is off the anticoagulation 2 or 3 days prior to the surgery but she may need some cardiac backup postoperatively needed and also respiratory backup if there was any respiratory issue.  Dr. Ana JAVED

## 2025-08-07 NOTE — PATIENT INSTRUCTIONS
Vivek Davidson,     If you are due for any health screening(s) below please notify me so we can arrange them to be ordered and scheduled. Most healthy patients at your age complete them, but you are free to accept or refuse.     If you can't do it, I'll definitely understand. If you can, I'd certainly appreciate it!    All of your core healthy metrics are met.      Lets manage your high blood pressure     Your blood pressure was above 140/90 today during your visit. We recommend that you schedule a nurse visit in two weeks to check your blood pressure and discuss ways to support your health goals.     You can also manage your health and record your blood pressure from the comfort of home by keeping a daily blood pressure log. These results are shared with and reviewed by your provider. Please print this form (Daily Blood Pressure Log) to assist you in keeping track of your blood pressure at home.     Schedule your nurse visit in two weeks to learn more about how to track and manage high blood pressure.    Daily Blood Pressure Log    Name:__________________________________                  Date of Birth:_________    Average Blood Pressure:  __________      Date: Time  (a.m.) Blood  Pressure: Pulse  Rate: Time  (p.m.) Blood  Pressure : Pulse  Rate:   Sample 8:37 127/83 84

## 2025-08-07 NOTE — PROGRESS NOTES
Subjective:       Patient ID: Lety Herbert is a 84 y.o. female.    Chief Complaint: Pre-op Exam (Lymph node surg Dr Mclaughlin date TBD  fax [phone]), Chronic respiratory failure (Chronic oxygen need), Atrial Fibrillation, and Frail elderly    History of Present Illness    CHIEF COMPLAINT:  Lety, an 84-year-old female, presents for evaluation and pre-operative clearance for an upcoming lymph node excision.    HPI:  Lety is scheduled for a lymph node excision under general anesthesia. The lymph node is located underneath the collarbone. She is uncertain about the specific details of the procedure, including whether it will involve intrathoracic intervention or remain subcutaneous.    She reports elevated blood pressure today, although she states it is typically normal at home when she is less active. She is currently taking Diltiazem 120 mg daily and Valsartan-hydrochlorothiazide for blood pressure management, Lipitor 20 mg daily for cholesterol, Sertraline (Zoloft) for depression and stress, and Pantoprazole for stomach issues.    She is on Xarelto due to a history of blood clots years ago. This medication will need to be discontinued 2-3 days prior to the surgery to prevent excessive bleeding during the procedure.    She has a history of atrial fibrillation, which was confirmed during the visit using a smartwatch EKG. She is scheduled to see her cardiologist, Dr. Lepe, tomorrow for cardiac clearance for the upcoming surgery.    She also has a noted mild anemia.    MEDICATIONS:  Lety is on Diltiazem 120 mg daily and Valsartan-hydrochlorothiazide for blood pressure. She is also taking Lipitor 20 mg daily for cholesterol, Sertraline (Zoloft) for depression and stress, Pantoprazole for stomach issues, and Xarelto as a anticoagulant due to her history of blood clots.    MEDICAL HISTORY:  Lety has a history of hypertension, hypercholesterolemia, depression, atrial fibrillation, and mild anemia. She  also has a history of blood clots from many years ago.    SURGICAL HISTORY:  Lety has an upcoming lymph node excision scheduled, which will be performed under general anesthesia.    TEST RESULTS:  Lety has mild anemia noted in her lab results. An EKG was performed during the visit using the patient's smartwatch, which confirmed atrial fibrillation.      ROS:  General: -fever, -chills, -fatigue, -weight gain, -weight loss  Cardiovascular: -chest pain, -palpitations, -lower extremity edema  Respiratory: -cough, -shortness of breath  Gastrointestinal: -abdominal pain, -nausea, -vomiting, -diarrhea, -constipation, -blood in stool  Skin: -rash, -lesion  Neurological: -headache, -dizziness, -numbness, -tingling  Psychiatric: +depression         Past Medical History:   Diagnosis Date    Abnormal chest CT 03/09/2023    Anemia     Basal cell carcinoma     nose     Cancer     breast    COPD (chronic obstructive pulmonary disease)     Depression     DVT (deep venous thrombosis)     Fall 03/20/2018    Former smoker 03/09/2023    Gastric ulceration     GERD (gastroesophageal reflux disease)     History of breast cancer 03/09/2023    History of frequent urinary tract infections     Hyperlipidemia     Hypertension     Malignant neoplasm of upper lobe of right lung (NSCLC favoring lung primary) 03/09/2023    Melanoma 2004?    left forearm    MRSA (methicillin resistant staph aureus) culture positive     Neuropathy     PE (pulmonary embolism)     Post-nasal drip 11/15/2018    Reflux     S/P bilateral mastectomy 03/09/2023     Social History[1]  Past Surgical History:   Procedure Laterality Date    HYSTERECTOMY      MASTECTOMY, RADICAL Bilateral     TOTAL HIP ARTHROPLASTY Left 11/13/2017     Family History   Problem Relation Name Age of Onset    Cancer Mother      Cancer Father      Heart disease Father      Melanoma Neg Hx      Psoriasis Neg Hx      Lupus Neg Hx      Eczema Neg Hx         Objective:      Physical Exam  Atrial  "fibrillation noted on cardiac rhythm  Blood pressure (!) 175/79, pulse 67, height 5' 6" (1.676 m), weight 81 kg (178 lb 9.2 oz). Body mass index is 28.82 kg/m².  Physical Exam    Vitals: Elevated blood pressure.  General:  Chronically unwell. Well-developed.  It sallow complexion  Eyes: EOMI. Sclerae anicteric.  Cardiovascular:  You regular rate and rhythm. No murmurs. No rubs. No gallops.  Heart sounds distant  Respiratory: Normal respiratory effort. Clear to auscultation bilaterally. No rales. No rhonchi. No wheezing.  Abdomen soft and no rigidity or rebound  Musculoskeletal: No  obvious deformity.  Extremities:  Mild lower extremity edema.  Neurological: Alert somewhat tired appearing..  Psychiatric:  Appropriate and cooperative.  Cognitively limited for details on her medications, follow-up  Skin: Warm. Dry.              Assessment:       Lab Visit on 06/04/2025   Component Date Value Ref Range Status    Carcinoembryonic Antigen 06/04/2025 1.2  <=5.0 ng/mL Final    Sodium 06/04/2025 138  136 - 145 mmol/L Final    Potassium 06/04/2025 4.3  3.5 - 5.1 mmol/L Final    Chloride 06/04/2025 103  95 - 110 mmol/L Final    CO2 06/04/2025 31 (H)  23 - 29 mmol/L Final    Glucose 06/04/2025 102  70 - 110 mg/dL Final    BUN 06/04/2025 16  8 - 23 mg/dL Final    Creatinine 06/04/2025 0.7  0.5 - 1.4 mg/dL Final    Calcium 06/04/2025 9.2  8.7 - 10.5 mg/dL Final    Protein Total 06/04/2025 7.1  6.0 - 8.4 gm/dL Final    Albumin 06/04/2025 4.0  3.5 - 5.2 g/dL Final    Bilirubin Total 06/04/2025 0.6  0.1 - 1.0 mg/dL Final    ALP 06/04/2025 72  55 - 135 unit/L Final    AST 06/04/2025 18  10 - 40 unit/L Final    ALT 06/04/2025 9 (L)  10 - 44 unit/L Final    Anion Gap 06/04/2025 4 (L)  8 - 16 mmol/L Final    eGFR 06/04/2025 >60  >60 mL/min/1.73/m2 Final    WBC 06/04/2025 6.38  3.90 - 12.70 K/uL Final    RBC 06/04/2025 3.67 (L)  4.00 - 5.40 M/uL Final    Hgb 06/04/2025 11.3 (L)  12.0 - 16.0 gm/dL Final    Hct 06/04/2025 35.4 (L)  37.0 - " 48.5 % Final    MCV 06/04/2025 97  82 - 98 fL Final    MCH 06/04/2025 30.8  27.0 - 31.0 pg Final    MCHC 06/04/2025 31.9 (L)  32.0 - 36.0 g/dL Final    RDW 06/04/2025 14.6 (H)  11.5 - 14.5 % Final    Platelet Count 06/04/2025 220  150 - 450 K/uL Final    MPV 06/04/2025 9.6  9.2 - 12.9 fL Final    Nucleated RBC 06/04/2025 0  <=0 /100 WBC Final    Neut % 06/04/2025 69.3  38 - 73 % Final    Lymph % 06/04/2025 19.3  18 - 48 % Final    Mono % 06/04/2025 6.4  4 - 15 % Final    Eos % 06/04/2025 3.6  0 - 8 % Final    Basophil % 06/04/2025 1.1  <=1.9 % Final    Imm Grans % 06/04/2025 0.3  0.0 - 0.5 % Final    Neut # 06/04/2025 4.4  1.8 - 7.7 K/uL Final    Lymph # 06/04/2025 1.23  1 - 4.8 K/uL Final    Mono # 06/04/2025 0.41  0.3 - 1 K/uL Final    Eos # 06/04/2025 0.23  <=0.5 K/uL Final    Baso # 06/04/2025 0.07  <=0.2 K/uL Final    Imm Grans # 06/04/2025 0.02  0.00 - 0.04 K/uL Final       Assessment & Plan    I50.32 CHRONIC HEART FAILURE WITH PRESERVED EF:  - Evaluated cardiac status, noting history of atrial fibrillation.  - Performed EKG using watch device, confirming atrial fibrillation.    Z01.818 PREOP EXAM FOR INTERNAL MEDICINE:  - Evaluated the patient for pre-operative clearance for lymph node excision under general anesthesia.  - Noted elevated blood pressure in office and identified mild anemia during exam.  - Referred the patient to cardiologist Dr. Lepe for cardiac clearance prior to surgery.    Z79.01 CHRONIC ANTICOAGULATION:  - Lety is on chronic anticoagulation therapy, likely prescribed for atrial fibrillation management.  - Emphasized the importance of discontinuing anticoagulants before surgery to prevent excessive bleeding.  - Instructed the patient to discontinue Xarelto (rivaroxaban) 2-3 days before surgery.    ** DIAGNOSES NOT IN VISIT DX OR FOR REVIEW **  F33.1 MODERATE EPISODE OF RECURRENT MAJOR DEPRESSIVE DISORDER:  - Prescribed Sertraline (Zoloft) for depression and stress management.          Plan:   Preop exam for internal medicine  Comments:  Medical issues reviewed with chronic respiratory failure, AFib, anticoagulation.  Moderate risk for surgery and anesthesia    Chronic heart failure with preserved ejection fraction  Comments:  Last echocardiogram good ejection fraction but diastolic dysfunction    Essential hypertension  Comments:  Blood pressure evaluation somewhat non reliable given pain in the arm    Multiple-type hyperlipidemia  Comments:  Atorvastatin 20 mg.    Chronic anticoagulation  Comments:  Continues on Xarelto.  Will need to stop couple of days on have 3 days before surgery.    Chronic hypoxemic respiratory failure  Comments:  Continues on oxygen.  Will need respiratory support or extubation.  Orders:  -     X-Ray Chest PA And Lateral; Future; Expected date: 08/07/2025    Moderate episode of recurrent major depressive disorder  Comments:  Continues on Zoloft.  Chronic low-level anhedonia    Long term current use of diuretic    Medical clearance is desired for this patient for lymph node biopsy.  This will be done under general anesthesia  Blood pressures are elevated in the office.  Her cardiac status is not very well known to me at this point and she has history of atrial fibrillation and is on chronic anticoagulation.  She should get a cardiology clearance from her cardiologist also.  Mild anemia has been noted  Check CBC and basic metabolic panel.  She continues in chronic atrial fibrillation and is hypoxic.  She carries a moderate risk for general anesthesia.  She can stop anticoagulation 2 days before the surgery.  Take blood pressure medications with a sip of water.  On the day of surgery.  Blood pressure control is rather poor and will need cardiology evaluation also.    Follow up in about 6 months (around 2/7/2026), or if symptoms worsen or fail to improve, for Hypertension/lipids.    Current Medications[2]    This note was generated with the assistance of ambient listening  technology. Verbal consent was obtained by the patient and accompanying visitor(s) for the recording of patient appointment to facilitate this note. I attest to having reviewed and edited the generated note for accuracy, though some syntax or spelling errors may persist. Please contact the author of this note for any clarification.      Shilo Perez           [1]   Social History  Socioeconomic History    Marital status:      Spouse name: Jean    Number of children: 3   Tobacco Use    Smoking status: Former     Passive exposure: Past    Smokeless tobacco: Never   Substance and Sexual Activity    Alcohol use: No    Drug use: No    Sexual activity: Not Currently     Partners: Male   Social History Narrative    3 Children- 9 GC, 7 GGrands     Social Drivers of Health     Financial Resource Strain: Low Risk  (8/7/2025)    Overall Financial Resource Strain (CARDIA)     Difficulty of Paying Living Expenses: Not very hard   Food Insecurity: No Food Insecurity (8/7/2025)    Hunger Vital Sign     Worried About Running Out of Food in the Last Year: Never true     Ran Out of Food in the Last Year: Never true   Transportation Needs: No Transportation Needs (8/7/2025)    PRAPARE - Transportation     Lack of Transportation (Medical): No     Lack of Transportation (Non-Medical): No   Physical Activity: Inactive (8/7/2025)    Exercise Vital Sign     Days of Exercise per Week: 0 days     Minutes of Exercise per Session: 0 min   Stress: Stress Concern Present (8/7/2025)    [employer] - Occupational Stress Questionnaire     Feeling of Stress : To some extent   Housing Stability: Low Risk  (8/7/2025)    Housing Stability Vital Sign     Unable to Pay for Housing in the Last Year: No     Number of Times Moved in the Last Year: 1     Homeless in the Last Year: No   [2]   Current Outpatient Medications:     ALPRAZolam (XANAX) 0.25 MG tablet, TAKE ONE TABLET BY MOUTH NIGHTLY AS NEEDED FOR ANXIETY,  Disp: 60 tablet, Rfl: 1    atorvastatin (LIPITOR) 20 MG tablet, TAKE one TABLET BY MOUTH ONCE DAILY, Disp: 90 tablet, Rfl: 1    diltiaZEM (CARDIZEM CD) 120 MG Cp24, Take 1 capsule (120 mg total) by mouth once daily., Disp: 90 capsule, Rfl: 3    gabapentin (NEURONTIN) 300 MG capsule, TAKE one CAPSULE BY MOUTH EVERY EVENING, Disp: 30 capsule, Rfl: 5    HYDROcodone-acetaminophen (NORCO)  mg per tablet, Take 1 tablet by mouth every 6 (six) hours as needed for Pain., Disp: 120 tablet, Rfl: 0    hydrocortisone 2.5 % cream, Apply topically 2 (two) times daily. To hemorrhoids, Disp: 28 g, Rfl: 0    lactulose (CHRONULAC) 20 gram/30 mL Soln, Take 30 mLs (20 g total) by mouth 2 (two) times daily as needed., Disp: 1800 mL, Rfl: 0    pantoprazole (PROTONIX) 20 MG tablet, TAKE ONE TABLET BY MOUTH EVERY DAY, Disp: 90 tablet, Rfl: 1    sertraline (ZOLOFT) 100 MG tablet, TAKE 1 TABLET (100 MG TOTAL) BY MOUTH ONCE DAILY., Disp: 90 tablet, Rfl: 3    valsartan-hydrochlorothiazide (DIOVAN-HCT) 320-12.5 mg per tablet, TAKE ONE TABLET BY MOUTH EVERY MORNING, Disp: 90 tablet, Rfl: 0    XARELTO 20 mg Tab, Take 1 tablet (20 mg total) by mouth once daily., Disp: 30 tablet, Rfl: 3

## 2025-08-08 ENCOUNTER — HOSPITAL ENCOUNTER (OUTPATIENT)
Dept: RADIOLOGY | Facility: HOSPITAL | Age: 85
Discharge: HOME | End: 2025-08-08
Attending: INTERNAL MEDICINE
Payer: MEDICARE

## 2025-08-08 ENCOUNTER — OFFICE VISIT (OUTPATIENT)
Facility: CLINIC | Age: 85
End: 2025-08-08
Payer: MEDICARE

## 2025-08-08 VITALS
TEMPERATURE: 98 F | BODY MASS INDEX: 29.09 KG/M2 | WEIGHT: 181 LBS | OXYGEN SATURATION: 93 % | SYSTOLIC BLOOD PRESSURE: 163 MMHG | RESPIRATION RATE: 16 BRPM | HEART RATE: 62 BPM | HEIGHT: 66 IN | DIASTOLIC BLOOD PRESSURE: 79 MMHG

## 2025-08-08 DIAGNOSIS — J70.1 RADIATION FIBROSIS OF LUNG: ICD-10-CM

## 2025-08-08 DIAGNOSIS — R94.2 ABNORMAL PET SCAN OF LUNG: ICD-10-CM

## 2025-08-08 DIAGNOSIS — Z85.3 HISTORY OF BREAST CANCER: Primary | ICD-10-CM

## 2025-08-08 DIAGNOSIS — C34.11 PRIMARY CANCER OF RIGHT UPPER LOBE OF LUNG: ICD-10-CM

## 2025-08-08 DIAGNOSIS — J96.11 CHRONIC HYPOXEMIC RESPIRATORY FAILURE: ICD-10-CM

## 2025-08-08 DIAGNOSIS — C79.51 METASTASIS TO BONE: ICD-10-CM

## 2025-08-08 DIAGNOSIS — R93.89 ABNORMAL CHEST CT: ICD-10-CM

## 2025-08-08 DIAGNOSIS — Z87.891 FORMER SMOKER: ICD-10-CM

## 2025-08-08 DIAGNOSIS — Z90.13 S/P BILATERAL MASTECTOMY: ICD-10-CM

## 2025-08-08 PROCEDURE — 99999 PR PBB SHADOW E&M-EST. PATIENT-LVL III: CPT | Mod: PBBFAC,,, | Performed by: INTERNAL MEDICINE

## 2025-08-08 PROCEDURE — 71046 X-RAY EXAM CHEST 2 VIEWS: CPT | Mod: TC

## 2025-08-08 PROCEDURE — 71046 X-RAY EXAM CHEST 2 VIEWS: CPT | Mod: 26,,, | Performed by: RADIOLOGY

## 2025-08-08 NOTE — PROGRESS NOTES
Good FM, no LOF, Ctx, VB. Repeat anatomy scheduled. AFP neg. Glucose next time. Given breast pump rx today.     Given precautions.      [facility] Hematology/Oncology  PROGRESS NOTE -   Follow-up Visit      Subjective:       Patient ID:   NAME: Lety Herbert : 1940     84 y.o. female    Referring Doc: Shilo Perez MD  Other Physicians: Ashish Henley; Maryse Beckwith           Chief Complaint: RUL mass/lung ca f/u       History of Present Illness:     Patient returns today for a regularly scheduled follow-up visit.  The patient is here today to go over the results of the recently ordered labs, tests and studies. She is here with her daughter today.       She saw Dr Mclaughlin with ENT on 2025.     She saw Dr Perez with primary care yesterday on  and plans to f/u with Dr Lepe with cardiology in preparation for her procedure for the lymph node excision with Dr Mclaughlin    She reports general lack of energy and fatigue;  Breathing is stable on O2 per NC. She has residual chronic back pain issues, aches and pains which are stable.         She previously had a  PET scan on  2025 which unfortunately showing what looks like progressive cancer in LN's. She had CT guided LN biopsy on 2025 which was highly suspicious for but non-diagnostic of some form of B-cell lymphoproliferative process    She saw Dr Ralph with rad/onc for follow-up on  after completing SBRT on 2025;       She last saw Dr Henley with pulm on 2025;                  ROS:   GEN: normal without any fever, night sweats or weight loss; fatigue; chronic back pains and leg pains at night  HEENT: normal with no HA's, sore throat, stiff neck, changes in vision  CV: normal with no CP, SOB, PND, GAVIRIA or orthopnea  PULM: chronic SOB/GAVIRIA; O2 dependent , hemoptysis, sputum or pleuritic pain  GI: normal with no abdominal pain, nausea, vomiting, constipation, diarrhea, melanotic stools, BRBPR, or hematemesis  : normal with no hematuria, dysuria  BREAST: normal with no mass, discharge, pain  SKIN: normal with no rash, erythema, bruising, or swelling    Pain Scale:   9    Allergies:  Review of patient's allergies indicates:   Allergen Reactions    Meperidine     Promethazine     Bactrim [sulfamethoxazole-trimethoprim] Rash       Medications:    Current Outpatient Medications:     ALPRAZolam (XANAX) 0.25 MG tablet, TAKE ONE TABLET BY MOUTH NIGHTLY AS NEEDED FOR ANXIETY, Disp: 60 tablet, Rfl: 1    atorvastatin (LIPITOR) 20 MG tablet, TAKE one TABLET BY MOUTH ONCE DAILY, Disp: 90 tablet, Rfl: 1    diltiaZEM (CARDIZEM CD) 120 MG Cp24, Take 1 capsule (120 mg total) by mouth once daily., Disp: 90 capsule, Rfl: 3    gabapentin (NEURONTIN) 300 MG capsule, TAKE one CAPSULE BY MOUTH EVERY EVENING, Disp: 30 capsule, Rfl: 5    HYDROcodone-acetaminophen (NORCO)  mg per tablet, Take 1 tablet by mouth every 6 (six) hours as needed for Pain., Disp: 120 tablet, Rfl: 0    hydrocortisone 2.5 % cream, Apply topically 2 (two) times daily. To hemorrhoids, Disp: 28 g, Rfl: 0    lactulose (CHRONULAC) 20 gram/30 mL Soln, Take 30 mLs (20 g total) by mouth 2 (two) times daily as needed., Disp: 1800 mL, Rfl: 0    pantoprazole (PROTONIX) 20 MG tablet, TAKE ONE TABLET BY MOUTH EVERY DAY, Disp: 90 tablet, Rfl: 1    sertraline (ZOLOFT) 100 MG tablet, TAKE 1 TABLET (100 MG TOTAL) BY MOUTH ONCE DAILY., Disp: 90 tablet, Rfl: 3    valsartan-hydrochlorothiazide (DIOVAN-HCT) 320-12.5 mg per tablet, TAKE ONE TABLET BY MOUTH EVERY MORNING, Disp: 90 tablet, Rfl: 0    XARELTO 20 mg Tab, Take 1 tablet (20 mg total) by mouth once daily., Disp: 30 tablet, Rfl: 3    PMHx/PSHx Updates:  See patient's last visit with me on 7/9/2025  See H&P on 3/10/2023        Pathology:   Cancer Staging   Primary cancer of right upper lobe of lung  Staging form: Lung, AJCC 8th Edition  - Clinical: Stage IA3 (cT1c, cN0, cM0) - Signed by Pedrito Ralph Jr., MD on 3/20/2023      CT guided Biopsy 4/9/2025:      LYMPH NODE, RIGHT RETROPERITONEAL, NEEDLE CORE BIOPSY:     --EXTENSIVE NECROSIS, WITH POSITIVITY FOR CD20 AND CD79a BY  "IMMUNOHISTOCHEMISTRY; FINDINGS ARE HIGHLY SUSPICIOUS FOR, BUT NOT DEFINITIVELY DIAGNOSTIC OF, B-CELL   LYMPHOPROLIFERATIVE DISORDER (SEE COMMENT).     --EXCISIONAL BIOPSY IS RECOMMENDED, FOR FURTHER DEFINITIVE CHARACTERIZATION.     --NO EVIDENCE OF METASTATIC CARCINOMA.     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 Patient's remote   clinical history of breast carcinoma is noticed. Based on the   immunohistochemical features, the current tumor is favored to be of lung   primary.            Objective:     Vitals:  Blood pressure (!) 163/79, pulse 62, temperature 97.9 °F (36.6 °C), temperature source Temporal, resp. rate 16, height 5' 6" (1.676 m), weight 82.1 kg (181 lb), SpO2 (!) 93%.    Physical Examination:   GEN: no apparent distress, comfortable; AAOx3; overweight; elderly  HEAD: atraumatic and normocephalic  EYES: no pallor, no icterus, PERRLA  ENT: OMM, no pharyngeal erythema, external ears WNL; no nasal discharge; no thrush  NECK: no masses, thyroid normal, trachea midline, no LAD/LN's, supple  CV: RRR with no murmur; normal pulse; normal S1 and S2; no pedal edema  CHEST: Normal respiratory effort; CTAB; normal breath sounds; no wheeze or crackles; O2 per NC portable  ABDOM: nontender and nondistended; soft; normal bowel sounds; no rebound/guarding  MUSC/Skeletal: ROM normal; no crepitus; joints normal; no " deformities; +arthropathy of hip/knees, hands; wheelchair  EXTREM: no clubbing, cyanosis, inflammation or swelling; varicosities in bilateral lower extremities  SKIN: no rashes, lesions, ulcers, petechiae or subcutaneous nodules; chronic age related skin changes  : no carrillo  NEURO: grossly intact; motor/sensory WNL; AAOx3; no tremors  PSYCH: normal mood, affect and behavior  LYMPH: normal cervical, supraclavicular, axillary and groin LN's          Labs:     Lab Results   Component Value Date    WBC 6.38 06/04/2025    HGB 11.3 (L) 06/04/2025    HCT 35.4 (L) 06/04/2025    MCV 97 06/04/2025     06/04/2025       CMP  Sodium   Date Value Ref Range Status   06/04/2025 138 136 - 145 mmol/L Final   03/04/2019 142 134 - 144 mmol/L      Potassium   Date Value Ref Range Status   06/04/2025 4.3 3.5 - 5.1 mmol/L Final     Chloride   Date Value Ref Range Status   06/04/2025 103 95 - 110 mmol/L Final   03/04/2019 104 98 - 110 mmol/L      CO2   Date Value Ref Range Status   06/04/2025 31 (H) 23 - 29 mmol/L Final     Glucose   Date Value Ref Range Status   06/04/2025 102 70 - 110 mg/dL Final   03/04/2019 102 (H) 70 - 99 mg/dL      BUN   Date Value Ref Range Status   06/04/2025 16 8 - 23 mg/dL Final     Creatinine   Date Value Ref Range Status   06/04/2025 0.7 0.5 - 1.4 mg/dL Final   03/04/2019 0.48 (L) 0.60 - 1.40 mg/dL      Calcium   Date Value Ref Range Status   06/04/2025 9.2 8.7 - 10.5 mg/dL Final     Protein Total   Date Value Ref Range Status   06/04/2025 7.1 6.0 - 8.4 gm/dL Final     Albumin   Date Value Ref Range Status   06/04/2025 4.0 3.5 - 5.2 g/dL Final   03/04/2019 4.0 3.1 - 4.7 g/dL      Bilirubin Total   Date Value Ref Range Status   06/04/2025 0.6 0.1 - 1.0 mg/dL Final     Comment:     For infants and newborns, interpretation of results should be based   on gestational age, weight and in agreement with clinical   observations.    Premature Infant recommended reference ranges:   0-24 hours:  <8.0 mg/dL    24-48 hours: <12.0 mg/dL   3-5 days:    <15.0 mg/dL   6-29 days:   <15.0 mg/dL     ALP   Date Value Ref Range Status   06/04/2025 72 55 - 135 unit/L Final     AST   Date Value Ref Range Status   06/04/2025 18 10 - 40 unit/L Final     ALT   Date Value Ref Range Status   06/04/2025 9 (L) 10 - 44 unit/L Final     Anion Gap   Date Value Ref Range Status   06/04/2025 4 (L) 8 - 16 mmol/L Final     eGFR   Date Value Ref Range Status   06/04/2025 >60 >60 mL/min/1.73/m2 Final   02/28/2025 >60.0 >60 mL/min/1.73 m^2 Final     Lab Results   Component Value Date    CEA 1.2 06/04/2025           Radiology/Diagnostic Studies:    CT Abdom/pelvis  3/30/2025:  Impression:     Multiple enlarged retroperitoneal nodes concerning for neoplastic process.     Small lytic lesion in the right T12 pedicle, concerning for metastatic disease     Mild diffuse circumferential bladder wall thickening suggestive of cystitis.  Mild bilateral hydroureteronephrosis suggestive of infection or recently passed stone.         PET 2/13/2025:  Impression:     1. Interval development of numerous enlarged, hypermetabolic retrocrural and abdominal retroperitoneal lymph nodes, as well as left supraclavicular lymph nodes, compatible with metastatic disease.  2. Nonspecific hypermetabolic foci in the left neck and in the right hilar region, without CT correlate.  3. Interval increased size and FDG uptake of hypermetabolic osseous metastasis in the T12 vertebral pedicle.  4. Additional observations as described.      PET 11/6/2024:    Impression:     1.  Prior bilateral mastectomy and reconstruction, with improving, likely post radiation change in the pectoralis minor and right upper lobe.     2.  New FDG avid soft tissue nodule/node anterior to the IVC suspicious for malignancy/metastasis.     3.  New FDG avid focus in the right T12 pedicle suspicious for malignancy/metastatic disease.     4.  Unchanged nonspecific fusiform FDG activity along the right external  iliac adjacent to the cecum, possibly from misregistration as previously described, and FDG activity in the terminal ileum.     5.  Minimal unchanged FDG activity in the left adrenal, similar to the previous exam.               MRI T-spine 7/22/2024:    Impression:     Degradation by motion.     Abnormal marrow signal within the posterior elements of T12 to the right of midline with additional small foci of abnormal marrow signal within the rightward aspect of the T4 and T5 vertebral bodies.  In light of the history of primary neoplasm, the findings are of concern for osseous metastases.     Remote compression deformity involving L1 with changes of previous vertebroplasty.  There is mild retropulsion of the posterior vertebral margin.     Degenerative disc/facet changes.      CT Chest  7/1/2024:  Impression:     1. Chronic consolidation of the right upper lobe with air bronchograms and mild bronchiectasis shows no detrimental change from previous exam.  Scattered subpleural interstitial thickening in the bilateral lungs is unchanged.  2. Juxtapleural nodular like density in the posterior right lower lobe measures 9 mm, could reflect a nodule or atelectasis.  Follow-up CT chest in 3 months recommended.  3. 2 mm subpleural nodular density in the anterior right middle lobe is unchanged.  4. Enlarged mediastinal and right hilar lymph nodes noted with no detrimental change.       PET 3/6/2024:    IMPRESSION:  1. Discoid airspace opacity in the paramediastinal right upper lobe favored to represent post radiation/post treatment change, obscuring the previously described pulmonary nodule in the anterior right upper lobe with no convincing focal increased FDG activity from background.     2. Bilateral mastectomy with reconstruction.     3. Asymmetric increased FDG activity in the right pectoralis minor muscle favored to represent radiation change/inflammation.     4. Indeterminate oval area of markedly increased FDG activity  adjacent to the right external iliac as above, slightly increased in conspicuity/size from the previous exam. No convincing CT anatomic correlate is present although this is adjacent to the terminal ileum and misregistration from cecal uptake is a consideration. Consider correlation with a history of colonoscopy in this age group.     5. Mild nonspecific asymmetric increased FDG activity in the inferior left adrenal body, newly appreciated from the previous exam, and only marginally increased from background.         Chest CT 12/19/2023:    IMPRESSION:  1.  Reference right upper lobe spiculated opacity is obscured by a broad area of linear density/consolidative change that abuts the anterior pleural surface. The degree of opacification has mildly increased upon comparison.  2.  Subpleural linear opacities in the right lower lobe superior segment.  3.  Small right pleural effusion with subjacent atelectasis.  4.  No evidence of pathologic lymphadenopathy in the right sury hepatis.  5.  Small sliding type hiatus hernia.  6.  Coronary artery calcification.  7.  L2 compression fracture that has undergone previous kyphoplasty.    PET 9/19/2023:  IMPRESSION:  Decreased size and FDG activity of the spiculated nodule in the anterior right upper lobe     Increased groundglass and interstitial opacities within the right upper lobe and superior segment right lower lobe compatible with post radiation changes     Bilateral mastectomies with reconstruction with interval increased FDG activity in the right pectoralis minor muscle compatible with postradiation changes     Stable FDG activity in the region of the right external iliac vein without adenopathy.     Degenerative changes of the spine  Cardiomegaly with coronary artery calcification         CT chest 6/16/2023:    IMPRESSION:  Decrease in size of the mass within the anterior right upper lobe     Development of peripheral groundglass opacities in the right upper lobe and  adjacent to the mass compatible with post radiation changes     No evidence of metastatic disease     Cardiomegaly        PET 1/11/2023:     IMPRESSION: Hypermetabolic 2.3 cm mass within the anterior right upper lobe suspicious for primary lung malignancy     Faint groundglass opacities throughout the lungs with prominence of the pulmonary vasculature and cardiomegaly which may be represent pulmonary edema.  Development of a 13 mm subpleural nodule in the right lower lobe. Since this is new since the most recent CT findings most likely are secondary to infectious or inflammatory process however metastatic lesion cannot be excluded     Increased FDG activity in the region of the right external iliac vein without corresponding adenopathy. This may be physiologic there is physiologic activity within a superficial vein in the upper right thigh and findings may be secondary to thrombophlebitis.. Follow-up CT abdomen and pelvis with contrast is recommended     Degenerative changes of the spine           CT Chest 12/15/2022:     IMPRESSION:  1. A 23 mm right upper lobe noncalcified pulmonary nodule is highly suspicious for primary pulmonary neoplasm. Tissue diagnosis is recommended.  2. No findings of regional metastatic disease in the chest.  3. Enlarged pulmonary arteries, suggesting pulmonary arterial hypertension.  4. Cardiomegaly, with aortic and coronary arterial calcifications.     CTA 11/19/2022:     IMPRESSION:  1.  No pulmonary embolus detected.  2.  Interstitial abnormality, suspect mild edema superimposed upon chronic changes.  3.  Right upper lobe 2.1 cm nodule. Consider a non-contrast Chest CT at 3 months, a PET/CT, or tissue sampling. These guidelines do not apply to immunocompromised patients and patients with cancer   ADDENDUM #1         The presence of right upper lobe lung nodule needing further evaluation or follow-up has been discussed with Dr. Rick Salgado 11/19/2022 12:50 AM CST.     All lab  results and imaging results have been reviewed and     I have reviewed all available lab results and radiology reports.    Assessment/Plan:   (1) 84 y.o. female with diagnosis of RUL lung mass who has been referred by Shilo Perez MD for evaluation by medical hematology/oncology. She has been followed by Dr Ashish Henley with pulmonary for her COPD and chronic hypoxemia/bronchitis, who was a former smoker.      - She had a CTA in Nov 2022 which was negative for a PE but showed a RUL lung mass, which was followed by a CT Chest on 12/15/2022 which confirmed a 2.3cm RUL mass.   - She had a PET scan on 1/11/2023 which showed a 2.3 cm hypermetabolic mass in the anterior right upper lobe abutting the superior vena cava along with development of a 13 mm subpleural nodule within the right lower lobe.         - She had CT guided biopsy of the RUL lung mass on 2/15/2023 with pathology coming back NSCLC favoring a lung primary.     - She is supposed to be on oxygen at home. She has portable tank and has a lot of GAVIRIA. She has chronic SOB.   - She is former smoker and quit when she was 43yrs old.      - discussed the pathology and reviewed and discussed the latest NCCN guidelines (vs. 2-2023)  - unlikely candidate for any surgical intervention  - will refer to Rad/onc to see if there are any radiation options either monotherapy or in combination with low dose weekly chemotherapy  - CARIS on the pathology  - check CEA and up to date labs    3/27/2023:  - She saw Dr Ralph with rad/onc on 3/20/2023 and she is starting XRT monotherapy today with day#1    4/25/2023:  - she completed XRT and has some fatigue  - she will need post-XRT evaluation in about 4 weeks with rad/onc and expect her to need repeat scans in 6-8 weeks    5/25/2023:  - she saw Dr Ralph on 5/2 and he has CT Chest ordered post-XRT  - some residual fatigue and back pain  - due to see Dr Henley  - labs adequate    8/21/2023:  - she has been having some more back  pain  - due for repeat CT with Dr Ralph in Sept/oct 2023, will go ahead and order PET now  - she needs to f/u with Dr Henley with pulmonary as well  - await PEt results, consider other scans if necessary  - CEA at 2.0      10/24/2023:  - she had PET scan in Sept 2023 with overall improved report  - she sees rad/onc again on 11/6  - rad/onc has already ordered the next Chest CT  - due for up to date labs incl. CEA    1/8/2024:  - She has residual chronic back pain issues, aches and pains in legs especially at night; doesn't sleep well at night  - She saw Dr Perez in Nov 2023  - She last saw Dr Ralph with rad/onc on 5/2/2023 and previously completed XRT; she was supposed to see him again on 11/6/2023 but did not  - she had Chest CT on 12/19/2023  - She is on portable O2; breathing up and down. No CP, HA's or N/V.  - She has not seen Dr Henley with pulmonary in some time    4/1/2024:  - She had bout of pneumonia and was hospitalized in Jan 2024  - She saw Dr Henley with pulm in Feb 2024 and Dr Grajeda in Jan 2024  - She had recent PEt scan on 3/6/2024 relatively stable  - she saw Dr Ralph with rad/onc in Jan 2024 8/5/2024:  - She saw Dr Henley with pulm in May 2024; she saw Dr Perez in may 2024  - She last saw Dr Ralph with rad/onc in Jan 2024  - She had PEt scan on 3/6/2024; Ct chest on 7/1/2024  - Dr Dodge ordered a MRI of her T spine on 7/22 and radiologist mentioned in their report that could not exclude presence of cancer in her spine - discussed this with patient and she does not want to get any bone scan or studies to further evaluate  - CEA 1.7    12/12/2024:  -  She had PET scan on 11/6/2024  - She saw Dr Henley with pulm on 11/20/2024  - She saw Domonique Navas NP on 11/14/2024  - She had a bone biopsy on 11/29 which has come back negative for cancer  - CEA at 1.2    3/6/2025:  - She had recent PET scan on  2/13/2025 which unfortunately showing what looks like progressive cancer in LN's.   - She last saw   Kale with pulm on 11/20/2024  - She saw Domonique Navas NP on 1/23/2025; Dr Ralph with rad/onc on 2/11/2025  - I am not sure she could safely undergo any meaningful regimen of chemotherapy given her advanced age, lung function and poor condition  - see is we can get Dr henley to see her again and ask IR if there are any LN's that can be safely biopsied  - CEA 1.2  - no anemic    4/8/2025:  - Dr Ralph with rad/onc on 4/2/2025; she sees Dr Ralph again later today   - she is scheduled for repeat biopsy with IR tomorrow  - will await path report       5/7/2025:   - due for excisional LN biopsy to determine what type of B cell lymphoma   - increase pain medication to 10 mg dosing of norco    6/18/2025:  - She had CT guided LN biopsy on 4/9/2025 which was highly suspicious for but non-diagnostic of some form of B-cell lymphoproliferative process  - She saw Dr Ralph with rad/onc for follow-up on 5/20 after completing SBRT on 5/2/2025;   - she subsequently saw Dr Villalpando with Gen-Surg on 6/16 for evaluation for excisional LN procedure but he is concerned about the location of the LN and her ability to tolerate any extensive or major surgery. Dr Villalpando plans to present her case at the next [facility] Tumor board    7/9/2025:  - . Her case was presented at the [facility] Tumor Board on 6/24.   - She has been referred to see Dr Mclaughlin with ENT in [place] for purpose of getting LN excisional biopsy  - She last saw Dr Henley with pulm on 6/19/2025    7/10/2025:  - She saw Dr Mclaughlin with ENT on 7/17/2025.   - She saw Dr Perez with primary care yesterday on 8/7 and plans to f/u with Dr Lepe with cardiology in preparation for her procedure for the lymph node excision with Dr Mclaughlin      (2) Hx/of PE and DVT     (3) HTN and hypercholesterolemia     (4) COPD/chronic hypoxemia; bronchiectasis; chronic bronchitis - followed by Dr Ashish Henley/Shelbie Villalpando     (5) CHF and dysrhythmia     (6) GERD; gastric ulcer     (7) Hx/of breast  cancer s/p bilateral mastectomies- followed by Dr Maryse Beckwith  - She has history of breast cancer in past around 2005  for which she has had bilateral mastectomies and is followed by Dr Beckwith. She did not require chemotherapy or radiation. She had reconstruction surgery with Dr Grimaldo. She saw Dr Beckwith couple of weeks ago.        (8) OA (Knees); chronic back pain; s/p Left total hip after fracture     (9) Urinary urgency/incontinence     (10) Migraine HA's     (11) Anxiety and Depression     (12) Hx/of melanoma left forearm, BCC     (13) Former smoker           VISIT DIAGNOSES:      History of breast cancer    Metastasis to bone    Primary cancer of right upper lobe of lung    S/P bilateral mastectomy    Abnormal chest CT    Former smoker    Radiation fibrosis of lung    Abnormal PET scan of lung          PLAN:  Proceed with clearance evaluation from Dr Lepe; followed by lymph excison procedure with Dr Mclaughlin with ENT   F/u with Rad/onc and Dr Villalpando as directed  She previously had completed monotherapy XRT; f/u with rad/onc as directed    I am not sure she could safely undergo any meaningful regimen of chemotherapy given her advanced age, lung function and poor condition  Check up to date labs incl. CEA level every 3 months   F/u with PCP, Pulm, etc     RTC in  3-4 weeks  Fax note to Shilo Perez MD; Amena Mejia Mannina, [place]; Mariela Mclaughlin       Discussion:     COVID-19 Discussion:     I had long discussion with patient and any applicable family about the COVID-19 coronavirus epidemic and the recommended precautions with regard to cancer and/or hematology patients. I have re-iterated the CDC recommendations for adequate hand washing, use of hand -like products, and coughing into elbow, etc. In addition, especially for our patients who are on chemotherapy and/or our otherwise immunocompromised patients, I have recommended avoidance of crowds, including movie theaters,  "restaurants, churches, etc. I have recommended avoidance of any sick or symptomatic family members and/or friends. I have also recommended avoidance of any raw and unwashed food products, and general avoidance of food items that have not been prepared by themselves. The patient has been asked to call us immediately with any symptom developments, issues, questions or other general concerns.         Pathology Discussion:     I reviewed and discussed the pathology report(s) and radiograph reports (if available) in as simple to understand and/or laymen's terms to the best of my ability. I had an indepth conversation with the patient and went over the patient's individual diagnosis based on the information that was currently available. I discussed the TNM staging process with regard to the patient's particular cancer type, and the calculated stage based on the currently available TNM data and literature. I discussed the available prognostic data with regard to the current staging information and how it relates to the prognosis of their particular neoplastic process.          NCCN Guidelines:     I discussed the available treatment option(s) in accordance with the latest literature from the NCCN Clinical Practice Guidelines for the patient's particular type of cancer disorder. The NCCN Guidelines provide a "document evidence-based (and) consensus-driven management" of the care of oncology patients. The treatment recommendations were made not only in accordance to the NCCN guidelines, but also factored in to account the patient's overall age, condition, performance status and their medical co-morbidities. I went over the risks and benefits of the the treatment options (if any could be made) with regard to their particular cancer type, their cancer stage, their age, and their co-morbidities.         I have explained and the patient understands all of  the current recommendation(s). I have answered all of their questions to " the best of my ability and to their complete satisfaction.      I spent over 25 mins of time with the patient. Reviewed results of the recently ordered labs, tests and studies; made directives with regards to the results. Over half of this time was spent couseling and coordinating care.    I have explained all of the above in detail and the patient understands all of the current recommendation(s). I have answered all of their questions to the best of my ability and to their complete satisfaction.   The patient is to continue with the current management plan.            Electronically signed by Isacc Vogt MD

## 2025-08-10 ENCOUNTER — RESULTS FOLLOW-UP (OUTPATIENT)
Dept: FAMILY MEDICINE | Facility: CLINIC | Age: 85
End: 2025-08-10
Payer: MEDICARE

## 2025-08-10 DIAGNOSIS — G89.3 CANCER RELATED PAIN: ICD-10-CM

## 2025-08-10 DIAGNOSIS — C34.11 PRIMARY CANCER OF RIGHT UPPER LOBE OF LUNG: ICD-10-CM

## 2025-08-10 DIAGNOSIS — C79.51 METASTASIS TO BONE: ICD-10-CM

## 2025-08-14 ENCOUNTER — EXTERNAL HOME HEALTH (OUTPATIENT)
Dept: HOME HEALTH SERVICES | Facility: HOSPITAL | Age: 85
End: 2025-08-14
Payer: MEDICARE

## 2025-08-14 RX ORDER — HYDROCODONE BITARTRATE AND ACETAMINOPHEN 10; 325 MG/1; MG/1
1 TABLET ORAL EVERY 6 HOURS PRN
Qty: 120 TABLET | Refills: 0 | Status: SHIPPED | OUTPATIENT
Start: 2025-08-14

## 2025-08-29 ENCOUNTER — TELEPHONE (OUTPATIENT)
Facility: CLINIC | Age: 85
End: 2025-08-29
Payer: MEDICARE

## 2025-09-04 ENCOUNTER — TELEPHONE (OUTPATIENT)
Facility: CLINIC | Age: 85
End: 2025-09-04
Payer: MEDICARE